# Patient Record
Sex: MALE | Race: WHITE | NOT HISPANIC OR LATINO | Employment: OTHER | URBAN - METROPOLITAN AREA
[De-identification: names, ages, dates, MRNs, and addresses within clinical notes are randomized per-mention and may not be internally consistent; named-entity substitution may affect disease eponyms.]

---

## 2017-01-04 ENCOUNTER — LAB CONVERSION - ENCOUNTER (OUTPATIENT)
Dept: OTHER | Facility: OTHER | Age: 62
End: 2017-01-04

## 2017-01-04 ENCOUNTER — GENERIC CONVERSION - ENCOUNTER (OUTPATIENT)
Dept: OTHER | Facility: OTHER | Age: 62
End: 2017-01-04

## 2017-01-04 LAB
HBA1C MFR BLD HPLC: 6.3 % OF TOTAL HGB
HCT VFR BLD AUTO: 50 % (ref 38.5–50)
HGB BLD-MCNC: 15.7 G/DL (ref 13.2–17.1)
TESTOSTERONE FREE (HISTORICAL): 112.7 PG/ML (ref 35–155)
TESTOSTERONE TOTAL (HISTORICAL): 581 NG/DL (ref 250–1100)

## 2017-03-03 ENCOUNTER — GENERIC CONVERSION - ENCOUNTER (OUTPATIENT)
Dept: OTHER | Facility: OTHER | Age: 62
End: 2017-03-03

## 2017-03-03 LAB
LEFT EYE DIABETIC RETINOPATHY: NORMAL
RIGHT EYE DIABETIC RETINOPATHY: NORMAL

## 2017-03-27 ENCOUNTER — ALLSCRIPTS OFFICE VISIT (OUTPATIENT)
Dept: OTHER | Facility: OTHER | Age: 62
End: 2017-03-27

## 2017-04-03 DIAGNOSIS — E11.9 TYPE 2 DIABETES MELLITUS WITHOUT COMPLICATIONS (HCC): ICD-10-CM

## 2017-05-01 ENCOUNTER — LAB CONVERSION - ENCOUNTER (OUTPATIENT)
Dept: OTHER | Facility: OTHER | Age: 62
End: 2017-05-01

## 2017-05-01 LAB — HBA1C MFR BLD HPLC: 6.2 % OF TOTAL HGB

## 2017-05-02 ENCOUNTER — GENERIC CONVERSION - ENCOUNTER (OUTPATIENT)
Dept: OTHER | Facility: OTHER | Age: 62
End: 2017-05-02

## 2017-06-16 ENCOUNTER — TRANSCRIBE ORDERS (OUTPATIENT)
Dept: ADMINISTRATIVE | Facility: HOSPITAL | Age: 62
End: 2017-06-16

## 2017-06-16 DIAGNOSIS — D35.3 BENIGN NEOPLASM OF PITUITARY GLAND AND CRANIOPHARYNGEAL DUCT (POUCH) (HCC): Primary | ICD-10-CM

## 2017-06-16 DIAGNOSIS — D35.2 BENIGN NEOPLASM OF PITUITARY GLAND AND CRANIOPHARYNGEAL DUCT (POUCH) (HCC): Primary | ICD-10-CM

## 2017-06-19 ENCOUNTER — LAB (OUTPATIENT)
Dept: LAB | Age: 62
End: 2017-06-19
Payer: COMMERCIAL

## 2017-06-19 DIAGNOSIS — D35.2 BENIGN NEOPLASM OF PITUITARY GLAND (HCC): ICD-10-CM

## 2017-06-19 LAB
BUN SERPL-MCNC: 25 MG/DL (ref 5–25)
CREAT SERPL-MCNC: 1.12 MG/DL (ref 0.6–1.3)
GFR SERPL CREATININE-BSD FRML MDRD: >60 ML/MIN/1.73SQ M

## 2017-06-19 PROCEDURE — 36415 COLL VENOUS BLD VENIPUNCTURE: CPT

## 2017-06-19 PROCEDURE — 84520 ASSAY OF UREA NITROGEN: CPT

## 2017-06-19 PROCEDURE — 82565 ASSAY OF CREATININE: CPT

## 2017-06-20 ENCOUNTER — GENERIC CONVERSION - ENCOUNTER (OUTPATIENT)
Dept: OTHER | Facility: OTHER | Age: 62
End: 2017-06-20

## 2017-06-26 DIAGNOSIS — D35.2 BENIGN NEOPLASM OF PITUITARY GLAND (HCC): ICD-10-CM

## 2017-06-28 ENCOUNTER — HOSPITAL ENCOUNTER (OUTPATIENT)
Dept: RADIOLOGY | Facility: HOSPITAL | Age: 62
Discharge: HOME | End: 2017-06-28
Attending: INTERNAL MEDICINE
Payer: COMMERCIAL

## 2017-06-28 DIAGNOSIS — D35.2 BENIGN NEOPLASM OF PITUITARY GLAND AND CRANIOPHARYNGEAL DUCT (POUCH) (HCC): ICD-10-CM

## 2017-06-28 DIAGNOSIS — D35.3 BENIGN NEOPLASM OF PITUITARY GLAND AND CRANIOPHARYNGEAL DUCT (POUCH) (HCC): ICD-10-CM

## 2017-06-28 PROCEDURE — 70553 MRI BRAIN STEM W/O & W/DYE: CPT

## 2017-06-28 PROCEDURE — A9585 GADOBUTROL INJECTION: HCPCS | Performed by: INTERNAL MEDICINE

## 2017-06-28 RX ADMIN — GADOBUTROL 7 ML: 604.72 INJECTION INTRAVENOUS at 18:00

## 2017-07-05 ENCOUNTER — GENERIC CONVERSION - ENCOUNTER (OUTPATIENT)
Dept: OTHER | Facility: OTHER | Age: 62
End: 2017-07-05

## 2017-07-31 ENCOUNTER — ALLSCRIPTS OFFICE VISIT (OUTPATIENT)
Dept: OTHER | Facility: OTHER | Age: 62
End: 2017-07-31

## 2017-08-01 DIAGNOSIS — E11.9 TYPE 2 DIABETES MELLITUS WITHOUT COMPLICATIONS (HCC): ICD-10-CM

## 2017-08-01 DIAGNOSIS — E23.0 HYPOPITUITARISM (HCC): ICD-10-CM

## 2017-08-01 DIAGNOSIS — D35.2 BENIGN NEOPLASM OF PITUITARY GLAND (HCC): ICD-10-CM

## 2017-08-01 DIAGNOSIS — E78.5 HYPERLIPIDEMIA: ICD-10-CM

## 2017-10-16 ENCOUNTER — HOSPITAL ENCOUNTER (OUTPATIENT)
Dept: SLEEP CENTER | Facility: CLINIC | Age: 62
Discharge: HOME | End: 2017-10-16
Payer: COMMERCIAL

## 2017-10-16 ENCOUNTER — TRANSCRIBE ORDERS (OUTPATIENT)
Dept: SLEEP CENTER | Facility: CLINIC | Age: 62
End: 2017-10-16

## 2017-10-16 DIAGNOSIS — G47.33 OSA (OBSTRUCTIVE SLEEP APNEA): ICD-10-CM

## 2017-10-16 DIAGNOSIS — G47.33 OSA (OBSTRUCTIVE SLEEP APNEA): Primary | ICD-10-CM

## 2017-11-02 ENCOUNTER — LAB CONVERSION - ENCOUNTER (OUTPATIENT)
Dept: OTHER | Facility: OTHER | Age: 62
End: 2017-11-02

## 2017-11-02 LAB
A/G RATIO (HISTORICAL): 1.6 (CALC) (ref 1–2.5)
ALBUMIN SERPL BCP-MCNC: 5 G/DL (ref 3.6–5.1)
ALP SERPL-CCNC: 56 U/L (ref 40–115)
ALT SERPL W P-5'-P-CCNC: 34 U/L (ref 9–46)
AST SERPL W P-5'-P-CCNC: 20 U/L (ref 10–35)
BILIRUB SERPL-MCNC: 1.2 MG/DL (ref 0.2–1.2)
BUN SERPL-MCNC: 21 MG/DL (ref 7–25)
BUN/CREA RATIO (HISTORICAL): NORMAL (CALC) (ref 6–22)
CALCIUM SERPL-MCNC: 9.6 MG/DL (ref 8.6–10.3)
CHLORIDE SERPL-SCNC: 101 MMOL/L (ref 98–110)
CHOLEST SERPL-MCNC: 141 MG/DL
CHOLEST/HDLC SERPL: 3.7 (CALC)
CO2 SERPL-SCNC: 28 MMOL/L (ref 20–31)
CREAT SERPL-MCNC: 1.09 MG/DL (ref 0.7–1.25)
CREATININE, RANDOM URINE (HISTORICAL): 18 MG/DL (ref 20–370)
EGFR AFRICAN AMERICAN (HISTORICAL): 83 ML/MIN/1.73M2
EGFR-AMERICAN CALC (HISTORICAL): 72 ML/MIN/1.73M2
GAMMA GLOBULIN (HISTORICAL): 3.1 G/DL (CALC) (ref 1.9–3.7)
GLUCOSE (HISTORICAL): 83 MG/DL (ref 65–99)
HBA1C MFR BLD HPLC: 6 % OF TOTAL HGB
HDLC SERPL-MCNC: 38 MG/DL
LDL CHOLESTEROL (HISTORICAL): 83 MG/DL (CALC)
MAGNESIUM, UR (HISTORICAL): 2.6 MG/DL
MICROALBUMIN/CREATININE RATIO (HISTORICAL): 144 MCG/MG CREAT
NON-HDL-CHOL (CHOL-HDL) (HISTORICAL): 103 MG/DL (CALC)
POTASSIUM SERPL-SCNC: 3.7 MMOL/L (ref 3.5–5.3)
PROLACTIN (HISTORICAL): 5.7 NG/ML (ref 2–18)
PROSTATE SPECIFIC ANTIGEN TOTAL (HISTORICAL): 0.7 NG/ML
SODIUM SERPL-SCNC: 140 MMOL/L (ref 135–146)
T4 FREE SERPL-MCNC: 1 NG/DL (ref 0.8–1.8)
TESTOSTERONE FREE (HISTORICAL): 155.9 PG/ML (ref 35–155)
TESTOSTERONE TOTAL (HISTORICAL): 891 NG/DL (ref 250–1100)
TOTAL PROTEIN (HISTORICAL): 8.1 G/DL (ref 6.1–8.1)
TRIGL SERPL-MCNC: 102 MG/DL
TSH SERPL DL<=0.05 MIU/L-ACNC: 3.11 MIU/L (ref 0.4–4.5)

## 2017-11-06 ENCOUNTER — GENERIC CONVERSION - ENCOUNTER (OUTPATIENT)
Dept: OTHER | Facility: OTHER | Age: 62
End: 2017-11-06

## 2017-11-27 ENCOUNTER — GENERIC CONVERSION - ENCOUNTER (OUTPATIENT)
Dept: OTHER | Facility: OTHER | Age: 62
End: 2017-11-27

## 2017-11-30 ENCOUNTER — ALLSCRIPTS OFFICE VISIT (OUTPATIENT)
Dept: OTHER | Facility: OTHER | Age: 62
End: 2017-11-30

## 2017-12-05 NOTE — PROGRESS NOTES
Assessment    1  DMII (diabetes mellitus, type 2) (250 00) (E11 9)   2  Hypogonadotropic hypogonadism (253 4) (E23 0)   3  Benign essential hypertension (401 1) (I10)   4  Hyperlipidemia (272 4) (E78 5)   5  Pituitary microadenoma (227 3) (D35 2)    Plan  DMII (diabetes mellitus, type 2)    · (1) HEMOGLOBIN A1C; Status:Active; Requested for:94Hbb0654; Perform:[facility]; GRR:07NSR5957;NJVPJBJ; For:DMII (diabetes mellitus, type 2); Ordered By:Juan aSntiago;   · Follow-up visit in 6 months Evaluation and Treatment  Follow-up  Status: Complete   Done: 99ZKO6072   Ordered; For: DMII (diabetes mellitus, type 2); Ordered By: Mirella Kirk Performed:  Due: 04TDO0772; Last Updated By: Harley Lopez; 11/30/2017 11:40:19 AM   · Follow-Up With Advanced Practitioner Evaluation and Treatment  Follow-up  Status:  Complete  Done: 61BHL9412   Ordered; For: DMII (diabetes mellitus, type 2); Ordered By: Mirella Kirk Performed:  Due: 54BOA3729; Last Updated By: Harley Lopez; 11/30/2017 11:40:24 AM  Hypogonadotropic hypogonadism    · (1) TESTOSTERONE, FREE (DIRECT) AND TOTAL; Status:Active; Requested  for:41Fcc2386;    Perform:[facility]; TEL:97TAN7516; Ordered;  For:Hypogonadotropic hypogonadism; Ordered By:Juan Santiago; Discussion/Summary  Discussion Summary:   1  Type 2 diabetes is under good control  Continue current therapy  2  Hypogonadism-the testosterone level was elevated and his dose was decreased  Repeat total and free testosterone level in a few weeks  3  Hypertension-the blood pressure is slightly elevated  He is currently taking decongestants which could be playing a role in this  I have asked him to have a repeat blood pressure done at his primary care office when he presents therefore an EKG required for his cataract surgery  In addition, he is going to check his blood pressure at home as well  4  Hyperlipidemia-continue current therapy     Counseling Documentation With Imm: The patient was counseled regarding diagnostic results, instructions for management, impressions  Medication SE Review and Pt Understands Tx: The treatment plan was reviewed with the patient/guardian  The patient/guardian understands and agrees with the treatment plan      Chief Complaint  Chief Complaint Free Text Note Form: Follow up      History of Present Illness  Diabetes: The patient is being seen for routine follow-up of Diabetes Mellitus 2  The HbA1c was 6% performed on   See Medication List for current medication(s)  See Medication List for dosage(s)  By report, there is good compliance with treatment, good tolerance of treatment and good symptom control  There are no known contributing risk factors or pertinent lifestyle factors  The patient is currently asymptomatic   Disease Course and Complications:   Renal: microalbuminuria  Pituitary Adenoma: The patient is being seen for follow-up of a pituitary adenoma  The adenoma has been visualized on pituitary MRI  The patient is currently asymptomatic  Current treatment includes testosterone  By report, there is good compliance with treatment, good tolerance of treatment and good symptom control  Hyperlipidemia (Follow-Up): The patient states his hyperlipidemia has been under good control since the last visit  Comorbid Illnesses: diabetes mellitus and hypertension  He has no significant interval events  Symptoms: The patient is currently asymptomatic  Associated symptoms include no focal neurologic deficits  Hypertension (Follow-Up): The patient presents for follow-up of essential hypertension  The patient states he has been stable with his blood pressure control since the last visit  He has no comorbid illnesses  He has no significant interval events  Symptoms: The patient is currently asymptomatic  Associated symptoms include no headache        Review of Systems  Endo Adult ROS Male Established v2 - [facility]: Constitutional/General: no recent weight gain, no recent weight loss, no poor energy/fatigue, no increased energy level, no insomnia/sleep problems, no fever and no feeling weak  Heart: no high blood pressure, no chest pain/tightness, no rapid/racing heart rate and no palpitations  Genitourinary - Urinary no frequent urination, no excess urination and no urinating during the night  Eyes: no blurred vision, no double vision, no bulging eyes, no gritty/scratchy eyes and no excessive tearing  Mouth / Throat: no hoarseness and no difficulty swallowing  Neck: no lumps, no swollen glands, no neck pain, no neck stiffness and no enlarged thyroid  Respiratory: no wheezing, no asthma and no persistent cough  Musculoskeletal: no muscle aches/pain, no joint aches/pain and no muscle weakness  Skin & Hair: no dry skin, no acne, the hair texture was not oily, no hair loss and no excessive hair growth  Gastrointestinal: no constipation, no diarrhea, no waking at night to drink and no stomach ache  Neurological: no blackouts, no weakness and no tremors  Genital: no testicular pain and no testicular lumps/bumps/mass  Endocrine: no feeling hot frequently, no feeling cold frequently, no shifts between feeling hot and cold, no cold hands or feet, no excessive sweating, no thyroid problems, no blood sugar problems, no excessive thirst, no excessive hunger, no change in shoe size, no nausea or vomiting and no shaky hands  ROS Reviewed:   ROS reviewed  Active Problems    1  Ankle pain, unspecified laterality   2  Arthritis (716 90) (M19 90)   3  Benign essential hypertension (401 1) (I10)   4  Carpal tunnel syndrome (354 0) (G56 00)   5  Cervicalgia (723 1) (M54 2)   6  Closed nondisplaced pilon fracture of left tibia with routine healing (V54 16) (S82 875D)   7  DMII (diabetes mellitus, type 2) (250 00) (E11 9)   8  Hyperlipidemia (272 4) (E78 5)   9  Hypogonadotropic hypogonadism (253 4) (E23 0)   10  Nephrolithiasis (592 0) (N20 0)   11  Other closed fracture of distal end of left radius, initial encounter (813 42) (S52 592A)   12  Other closed fracture of proximal end of right fibula, initial encounter (823 01) (S82 831A)   13  Pilon fracture, right, closed, initial encounter   14  Pituitary microadenoma (227 3) (D35 2)   15  Seborrheic dermatitis (690 10) (L21 9)   16  Sleep apnea (780 57) (G47 30)   17  Special screening examination for neoplasm of prostate (V76 44) (Z12 5)   18  Spondylosis of cervical region without myelopathy or radiculopathy (721 0) (M47 812)   19  Sprain Of The Anterior Talofibular Ligament (845 02)   20  Vitamin D deficiency (268 9) (E55 9)   21  Wrist pain (719 43) (M25 539)    Past Medical History    1  History of Diabetes Mellitus (250 00)  Active Problems And Past Medical History Reviewed: The active problems and past medical history were reviewed and updated today  Surgical History    1  History of Tonsillectomy   2  History of Urological Procedures Renal Cyst Aspiration  Surgical History Reviewed: The surgical history was reviewed and updated today  Family History  Mother    1  Family history of Diabetes Mellitus (V18 0)   2  Family history of Heart Disease (V17 49)   3  Family history of Hypertension (V17 49)   4  Family history of Nephrolithiasis   5  Family history of Renal Disease  Father    6  Family history of Diabetes Mellitus (V18 0)   7  Family history of Heart Disease (V17 49)  Sister    8  Family history of Diabetes Mellitus (V18 0)   9  Family history of Heart Disease (V17 49)   10  Family history of Hypertension (V17 49)   11  Family history of Stroke Syndrome (V17 1)  Brother    15  Family history of Diabetes Mellitus (V18 0)   13  Family history of Diabetes Mellitus (V18 0)   14  Family history of Diabetes Mellitus (V18 0)   15  Family history of Diabetes Mellitus (V18 0)   16  Family history of Heart Disease (V17 49)   17   Family history of Hypertension (V17 49)   18  Family history of Nephrolithiasis  Family History Reviewed: The family history was reviewed and updated today  Social History    · Being A Social Drinker   · Denied: History of Drug Use   · Former smoker (L19 87) (U42 152)  Social History Reviewed: The social history was reviewed and updated today  The social history was reviewed and is unchanged  Current Meds   1  Amlodipine Besylate-Valsartan 5-320 MG Oral Tablet; Take 1 tablet daily; Therapy: 12Sep2017 to (Evaluate:07Sep2018)  Requested for: 02Sdj1174; Last   Rx:12Sep2017 Ordered   2  Apidra SoloStar 100 UNIT/ML Subcutaneous Solution Pen-injector; Use up to 20 units   before meals as directed (has been taking only at breakfast)  Requested for:   07Aug2017; Last Rx:56Dkq9481 Ordered   3  Aspirin EC 81 MG Oral Tablet Delayed Release Recorded   4  BD Pen Needle Harriett U/F 32G X 4 MM Miscellaneous; Uset 4/ a day; Therapy: 13BEA3405 to (Evaluate:45Liq8826)  Requested for: 22LHB8265; Last   Rx:27Qld8581 Ordered   5  BD Syringe/Needle 23G X 1" 3 ML Miscellaneous; Use once a week; Therapy: 87Soc3760 to (Evaluate:72Qny5163)  Requested for: 09OVO6934; Last   Rx:27Ank7402 Ordered   6  GlyBURIDE 5 MG Oral Tablet; take 2 tablets in AM and 1 tablet in PM;   Therapy: 05Bou4964 to (Evaluate:11Jun2018)  Requested for: 20Jun2017; Last   Rx:16Jun2017 Ordered   7  GNP Loratadine 10 MG Oral Tablet Recorded   8  Ibuprofen 200 MG Oral Tablet Recorded   9  Janumet  MG Oral Tablet; TAKE 1 TABLET TWICE DAILY WITH MEALS; Therapy: 23Rwt4727 to (Evaluate:80Lre7205)  Requested for: 27ACQ6469; Last   Rx:56Rbw5080 Ordered   10  Jardiance 25 MG Oral Tablet; Take 1 tablet daily; Therapy: 31Zsc6742 to (Loco Piper)  Requested for: 27Mar2017; Last    Rx:27Mar2017 Ordered   11  Lantus SoloStar 100 UNIT/ML Subcutaneous Solution Pen-injector; INJECT 30 UNITS      SUBCUTANEOUSLY AT BEDTIME;     Therapy: 75JXC9962 to (Evaluate:28Hqd8643) Requested for: 20Jun2017; Last    Rx:12Jun2017 Ordered   12  Lisinopril 40 MG Oral Tablet; Take 1 tablet daily; Therapy: 00Zqq0998 to (Evaluate:26May2018)  Requested for: 44UXH3867; Last    Rx:34Lvk5275 Ordered   13  Omeprazole 20 MG Oral Capsule Delayed Release Recorded   14  OneTouch Delica Lancets 14B Miscellaneous; Patient testing 3 times daily as directed     Requested for: 20Jan2014; Last Rx:20Jan2014 Ordered   15  OneTouch Verio In [employer]; TEST 3 TIMES DAILY OR AS   DIRECTED; Therapy: 40WXQ6244 to (Evaluate:22Mar2018)  Requested for: 52VRW7064; Last    Rx:27Mar2017 Ordered   16  Osteo Bi-Flex Regular Strength TABS Recorded   17  Probiotic Oral Capsule Recorded   18  Simvastatin 20 MG Oral Tablet; TAKE 1 TABLET AT BEDTIME; Therapy: 72Gny9999 to (Evaluate:11Jun2018)  Requested for: 20Jun2017; Last    Rx:16Jun2017 Ordered   19  Testosterone Cypionate 200 MG/ML Intramuscular Solution; Inject  0 4mL weekly as    directed by physician; Therapy: 79Ujn8109 to (Evaluate:96Rtv9047); Last Rx:14Jun2017 Ordered   20  Vitamin B-1 100 MG Oral Tablet; TAKE 1 TABLET DAILY; Therapy: 24OLN8868 to Recorded   21  Vitamin D3 1000 UNIT Oral Capsule; TAKE AS DIRECTED; Therapy: 08RBP3565 to Recorded   22  Vitamin E 100 UNIT Oral Capsule; Take 1 in morning, take 1 in evening; Therapy: 18JSS8409 to Recorded  Medication List Reviewed: The medication list was reviewed and updated today  Allergies    1  Augmentin TABS   2  Biaxin TABS   3  NovoLOG SOLN    4  Dust   5  Mold   6  Peanuts    Vitals  Vital Signs    Recorded: 90AAQ6342 11:23AM   Heart Rate 84   Systolic 407   Diastolic 80   Height 5 ft 3 in   Weight 162 lb 3 04 oz   BMI Calculated 28 73   BSA Calculated 1 77     Physical Exam    Constitutional   General appearance: No acute distress, well appearing and well nourished  Eyes   Conjunctiva and lids: No swelling, erythema, or discharge  Pupils: Equal, round and reactive to light   The sclera are anicteric  Extraocular movements are intact  Ears, Nose, Mouth, and Throat   External inspection of ears, nose and lips: Normal     Oropharynx: Normal with no erythema, edema, exudate or lesions  Exam of Head: The head is atraumatic and normocephalic  Neck: The neck is supple  The thyroid is normal in size with no palpable nodules  Pulmonary   Auscultation of lungs: Clear to auscultation bilaterally with normal chest expansion  Cardiovascular   Auscultation of heart: Normal rate and rhythm with no murmurs, gallops or rubs  Abdomen   Abdomen: Abdomen is soft, non-tender with normal bowel sounds  Lymphatic   Palpation of lymph nodes: No supraclavicular or suboccipital lymphadenopathy  Musculoskeletal   Inspection/palpation of joints, bones, and muscles: Muscle bulk and tone is normal     Skin   Skin and subcutaneous tissue: Normal skin temperature and color  Neurologic   Reflexes: 2+ and symmetric  Motor Strength: Strength is 5/5 bilaterally  Psychiatric   Orientation to person, place and time: Normal     Mood and affect: Affect and attention span are normal        Results/Data  (1) LIPID PANEL, FASTING 28Oct2017 09:25AM Daiana Odor     Test Name Result Flag Reference   CHOLESTEROL, TOTAL 141 mg/dL  <200   HDL CHOLESTEROL 38 mg/dL L >40   TRIGLICERIDES 189 mg/dL  <150   LDL-CHOLESTEROL 83 mg/dL (calc)     Reference range: <100     Desirable range <100 mg/dL for patients with CHD or  diabetes and <70 mg/dL for diabetic patients with  known heart disease  LDL-C is now calculated using the Phil-Webb   calculation, which is a validated novel method providing   better accuracy than the Friedewald equation in the   estimation of LDL-C  Marcio Gill  [facility]  4963;312(39): [phone]   (http://[employer]/faq/IAN667)   CHOL/HDLC RATIO 3 7 (calc)  <5 0   NON HDL CHOLESTEROL 103 mg/dL (calc)  <130   For patients with diabetes plus 1 major ASCVD risk factor, treating to a non-HDL-C goal of <100 mg/dL   (LDL-C of <70 mg/dL) is considered a therapeutic   option  (Q) MICROALBUMIN, RANDOM URINE (W/CREATININE) 28Oct2017 09:25AM Lizbeth Rhea     Test Name Result Flag Reference   CREATININE, RANDOM URINE 18 mg/dL L    MICROALBUMIN 2 6 mg/dL     Reference Range  Not established   MICROALBUMIN/CREATININE$RATIO, RANDOM URINE 144 mcg/mg creat H <30   The ADA defines abnormalities in albumin  excretion as follows:     Category         Result (mcg/mg creatinine)     Normal                    <30  Microalbuminuria            Clinical albuminuria   > OR = 300     The ADA recommends that at least two of three  specimens collected within a 3-6 month period be  abnormal before considering a patient to be  within a diagnostic category  (1) COMPREHENSIVE METABOLIC PANEL 48MRT9709 93:79FG [facility]     Test Name Result Flag Reference   GLUCOSE 83 mg/dL  65-99   Fasting reference interval   UREA NITROGEN (BUN) 21 mg/dL  7-25   CREATININE 1 09 mg/dL  0 70-1 25   For patients >52years of age, the reference limit  for Creatinine is approximately 13% higher for people  identified as -American  eGFR NON-AFR   AMERICAN 72 mL/min/1 73m2  > OR = 60   eGFR AFRICAN AMERICAN 83 mL/min/1 73m2  > OR = 60   BUN/CREATININE RATIO   4-43   NOT APPLICABLE (calc)   SODIUM 140 mmol/L  135-146   POTASSIUM 3 7 mmol/L  3 5-5 3   CHLORIDE 101 mmol/L     CARBON DIOXIDE 28 mmol/L  20-31   CALCIUM 9 6 mg/dL  8 6-10 3   PROTEIN, TOTAL 8 1 g/dL  6 1-8 1   ALBUMIN 5 0 g/dL  3 6-5 1   GLOBULIN 3 1 g/dL (calc)  1 9-3 7   ALBUMIN/GLOBULIN RATIO 1 6 (calc)  1 0-2 5   BILIRUBIN, TOTAL 1 2 mg/dL  0 2-1 2   ALKALINE PHOSPHATASE 56 U/L     AST 20 U/L  10-35   ALT 34 U/L  9-46     (Q) PROLACTIN 28Oct2017 09:25AM Lizbeth Rhea     Test Name Result Flag Reference   PROLACTIN 5 7 ng/mL  2 0-18 0     (1) T4, FREE 28Oct2017 09:25AM [facility]     Test Name Result Flag Reference   T4, FREE 1 0 ng/dL  0 8-1 8     (Q) TSH, 3RD GENERATION 28Oct2017 09:25AM Jackolyn Dowse     Test Name Result Flag Reference   TSH 3 11 mIU/L  0 40-4 50     (1) PSA (SCREEN) (Dx V76 44 Screen for Prostate Cancer) 96QTX2941 09:25AM Jackolyn Dowse     Test Name Result Flag Reference   PSA, TOTAL 0 7 ng/mL  < OR = 4 0   The total PSA value from this assay system is   standardized against the WHO standard  The test   result will be approximately 20% lower when compared   to the equimolar-standardized total PSA (Abund[place]   [place])  Comparison of serial PSA results should be   interpreted with this fact in mind  This test was performed using the Siemens   chemiluminescent method  Values obtained from   different assay methods cannot be used  interchangeably  PSA levels, regardless of  value, should not be interpreted as absolute  evidence of the presence or absence of disease  (Q) TESTOSTERONE, FREE AND TOTAL, LC/MS/MS 28Oct2017 09:25AM Jackolyn Dowse     Test Name Result Flag Reference   TESTOSTERONE, TOTAL,$LC/MS/ ng/dL  250-1100   Men with clinically significant hypogonadal  symptoms and testosterone values repeatedly in  the range of the 200-300 ng/dL or less, may  benefit from testosterone treatment after  adequate risk and benefits counseling  For more information on this test, go to  http://[employer]/faq/  TotalTestosteroneLCMSMS        This test was developed and its analytical performance  characteristics have been determined by [address] [place] [address]  It has  not been cleared or approved by the [place]  Food and Drug  Administration  This assay has been validated pursuant  to the CLIA regulations and is used for clinical  purposes     FREE TESTOSTERONE 155 9 pg/mL H 35 0-155 0   This test was developed and its analytical performance  characteristics have been determined by [facility] [place], [place]  It has  not been cleared or approved by the [place]  Food and Drug  Administration  This assay has been validated pursuant  to the CLIA regulations and is used for clinical  purposes  * MRI BRAIN PITUITARY WO AND W CONTRAST [id number] 04:49PM Devon Cantrell     Test Name Result Flag Reference   MRI BRAIN PITUITARY WO AND W CONTRAST (Report)     This is a summary report  The complete report is available in the patient's medical record  If you cannot access the medical record, please contact the sending organization for a detailed fax or copy  MRI BRAIN AND SELLA WITH AND WITHOUT CONTRAST     INDICATION: 43-year-old male, pituitary microadenoma, follow-up     COMPARISON: 10/1/2015 MRI     TECHNIQUE:   Brain: Sagittal T1, axial T2  Axial FLAIR  Axial T1, Axial Aurelia, Axial DWI  Axial T1 post contrast   Axial BRAVO post contrast       Sella: Sagittal T1, Coronal T1 pre-and-post contrast, coronal post contrast dynamic imaging  Coronal T2  Sagittal T1 post contrast    Targeted images of the sella were performed requiring additional time at acquisition and interpretation of approximately 25%     IV Contrast: 7 mL of gadobutrol injection (MULTI-DOSE)      IMAGE QUALITY: Diagnostic  FINDINGS:     BRAIN PARENCHYMA: There is no discrete mass, mass effect or midline shift  No abnormal white matter signal identified  Brainstem and cerebellum demonstrate normal signal  There is no intracranial hemorrhage  There is no evidence of acute infarction and   diffusion imaging is unremarkable  Postcontrast imaging is normal      VENTRICLES: Normal      SELLA AND PITUITARY GLAND: A faint region of diminished enhancement is present within the left side of the pituitary gland measuring approximately 3-4 mm suspicious for microadenoma, essentially unchanged  ORBITS: Normal      PARANASAL SINUSES: Normal      VASCULATURE: Evaluation of the major intracranial vasculature demonstrates appropriate flow voids  CALVARIUM AND SKULL BASE: Normal      EXTRACRANIAL SOFT TISSUES: Normal        IMPRESSION:   Persistent small region of diminished enhancement left-sided pituitary gland consistent with 3-4 mm microadenoma, unchanged     No significant additional intracranial abnormalities,      Stable appearance           Workstation performed: [id number]     Signed by:   Rita Guillory MD   6/29/17     Future Appointments    Date/Time Provider Specialty Site   12/14/2017 04:15 PM Roderick Santiago MD Internal Medicine [facility]   04/16/2018 05:30 PM Roderick Santiago MD Internal Medicine [facility]   05/31/2018 05:00 PM Harrison Wallis, [facility] Endocrinology [facility] ENDOCRINOLOGY     Signatures   Electronically signed by : LOBITO Santiago ; Nov 30 2017 11:49AM EST                       (Author)

## 2017-12-14 ENCOUNTER — GENERIC CONVERSION - ENCOUNTER (OUTPATIENT)
Dept: INTERNAL MEDICINE CLINIC | Age: 62
End: 2017-12-14

## 2017-12-14 ENCOUNTER — ALLSCRIPTS OFFICE VISIT (OUTPATIENT)
Dept: OTHER | Facility: OTHER | Age: 62
End: 2017-12-14

## 2017-12-21 DIAGNOSIS — E23.0 HYPOPITUITARISM (HCC): ICD-10-CM

## 2018-01-03 ENCOUNTER — LAB CONVERSION - ENCOUNTER (OUTPATIENT)
Dept: OTHER | Facility: OTHER | Age: 63
End: 2018-01-03

## 2018-01-03 LAB
TESTOSTERONE FREE (HISTORICAL): 97.2 PG/ML (ref 35–155)
TESTOSTERONE TOTAL (HISTORICAL): 675 NG/DL (ref 250–1100)

## 2018-01-04 ENCOUNTER — GENERIC CONVERSION - ENCOUNTER (OUTPATIENT)
Dept: OTHER | Facility: OTHER | Age: 63
End: 2018-01-04

## 2018-01-09 NOTE — RESULT NOTES
Discussion/Summary   Testosterone levels slightly high-- what day did he take injection and what day did he go for blood test? may need slight dose reduction  Rest of labs OK   diabetes under good control  Verified Results  (1) LIPID PANEL, FASTING 28Oct2017 09:25AM Kristina Haridng     Test Name Result Flag Reference   CHOLESTEROL, TOTAL 141 mg/dL  <200   HDL CHOLESTEROL 38 mg/dL L >55   TRIGLICERIDES 933 mg/dL  <150   LDL-CHOLESTEROL 83 mg/dL (calc)     Reference range: <100     Desirable range <100 mg/dL for patients with CHD or  diabetes and <70 mg/dL for diabetic patients with  known heart disease  LDL-C is now calculated using the Phil-Webb   calculation, which is a validated novel method providing   better accuracy than the Friedewald equation in the   estimation of LDL-C  Mallory Thompsonteodoro Cano  2021;291(24): 7374-0353   (http://[employer]/faq/MJH505)   CHOL/HDLC RATIO 3 7 (calc)  <5 0   NON HDL CHOLESTEROL 103 mg/dL (calc)  <130   For patients with diabetes plus 1 major ASCVD risk   factor, treating to a non-HDL-C goal of <100 mg/dL   (LDL-C of <70 mg/dL) is considered a therapeutic   option  (Q) MICROALBUMIN, RANDOM URINE (W/CREATININE) 28Oct2017 09:25AM Kristina Harding     Test Name Result Flag Reference   CREATININE, RANDOM URINE 18 mg/dL L    MICROALBUMIN 2 6 mg/dL     Reference Range  Not established   MICROALBUMIN/CREATININE$RATIO, RANDOM URINE 144 mcg/mg creat H <30   The ADA defines abnormalities in albumin  excretion as follows:     Category         Result (mcg/mg creatinine)     Normal                    <30  Microalbuminuria            Clinical albuminuria   > OR = 300     The ADA recommends that at least two of three  specimens collected within a 3-6 month period be  abnormal before considering a patient to be  within a diagnostic category       (1) COMPREHENSIVE METABOLIC PANEL [id number] 68:66NW Kristina Harding     Test Name Result Flag Reference   GLUCOSE 83 mg/dL  65-99   Fasting reference interval   UREA NITROGEN (BUN) 21 mg/dL  7-25   CREATININE 1 09 mg/dL  0 70-1 25   For patients >52years of age, the reference limit  for Creatinine is approximately 13% higher for people  identified as -American  eGFR NON-AFR  AMERICAN 72 mL/min/1 73m2  > OR = 60   eGFR AFRICAN AMERICAN 83 mL/min/1 73m2  > OR = 60   BUN/CREATININE RATIO   6-79   NOT APPLICABLE (calc)   SODIUM 140 mmol/L  135-146   POTASSIUM 3 7 mmol/L  3 5-5 3   CHLORIDE 101 mmol/L     CARBON DIOXIDE 28 mmol/L  20-31   CALCIUM 9 6 mg/dL  8 6-10 3   PROTEIN, TOTAL 8 1 g/dL  6 1-8 1   ALBUMIN 5 0 g/dL  3 6-5 1   GLOBULIN 3 1 g/dL (calc)  1 9-3 7   ALBUMIN/GLOBULIN RATIO 1 6 (calc)  1 0-2 5   BILIRUBIN, TOTAL 1 2 mg/dL  0 2-1 2   ALKALINE PHOSPHATASE 56 U/L     AST 20 U/L  10-35   ALT 34 U/L  9-46     (Q) PROLACTIN 28Oct2017 09:25AM Gwinda Dill     Test Name Result Flag Reference   PROLACTIN 5 7 ng/mL  2 0-18 0     (1) T4, FREE 28Oct2017 09:25AM Gwinda Dill     Test Name Result Flag Reference   T4, FREE 1 0 ng/dL  0 8-1 8     (Q) TSH, 3RD GENERATION 28Oct2017 09:25AM Gwinda Dill     Test Name Result Flag Reference   TSH 3 11 mIU/L  0 40-4 50     (1) PSA (SCREEN) (Dx V76 44 Screen for Prostate Cancer) [id number] 09:25AM Gwinda Dill     Test Name Result Flag Reference   PSA, TOTAL 0 7 ng/mL  < OR = 4 0   The total PSA value from this assay system is   standardized against the WHO standard  The test   result will be approximately 20% lower when compared   to the equimolar-standardized total PSA ([place]und[place]   [place])  Comparison of serial PSA results should be   interpreted with this fact in mind  This test was performed using the Siemens   chemiluminescent method  Values obtained from   different assay methods cannot be used  interchangeably   PSA levels, regardless of  value, should not be interpreted as absolute  evidence of the presence or absence of disease  (Q) TESTOSTERONE, FREE AND TOTAL, LC/MS/MS 28Oct2017 09:25 Kecia Merino     Test Name Result Flag Reference   TESTOSTERONE, TOTAL,$LC/MS/ ng/dL  250-1100   Men with clinically significant hypogonadal  symptoms and testosterone values repeatedly in  the range of the 200-300 ng/dL or less, may  benefit from testosterone treatment after  adequate risk and benefits counseling  For more information on this test, go to  http://[employer]/faq/  TotalTestosteroneLCMSMS        This test was developed and its analytical performance  characteristics have been determined by [address] [place] [address]  It has  not been cleared or approved by the [place]  Food and Drug  Administration  This assay has been validated pursuant  to the CLIA regulations and is used for clinical  purposes  FREE TESTOSTERONE 155 9 pg/mL H 35 0-155 0   This test was developed and its analytical performance  characteristics have been determined by [address] [place] [address]  It has  not been cleared or approved by the [place]  Food and Drug  Administration  This assay has been validated pursuant  to the CLIA regulations and is used for clinical  purposes  (Q) HEMOGLOBIN A1c 28Oct2017 09:25 Kecia Merino     Test Name Result Flag Reference   HEMOGLOBIN A1c 6 0 % of total Hgb H <5 7   For someone without known diabetes, a hemoglobin   A1c value between 5 7% and 6 4% is consistent with  prediabetes and should be confirmed with a   follow-up test      For someone with known diabetes, a value <7%  indicates that their diabetes is well controlled  A1c  targets should be individualized based on duration of  diabetes, age, comorbid conditions, and other  considerations  This assay result is consistent with an increased risk  of diabetes       Currently, no consensus exists regarding use of  hemoglobin A1c for diagnosis of diabetes for children

## 2018-01-12 NOTE — RESULT NOTES
Message   Reviewed [facility] patient by phone  He is reporting some higher blood sugars before lunch and he will send log next week  A1C 6 5  very good  + urine microalbumin, can repeat at next visit     Verified Results  (1) [address] 09:01AM VHX     Test Name Result Flag Reference   GLUCOSE 145 mg/dL H 65-99   Fasting reference interval   UREA NITROGEN (BUN) 28 mg/dL H 7-25   CREATININE 1 02 mg/dL  0 70-1 25   For patients >52years of age, the reference limit  for Creatinine is approximately 13% higher for people  identified as -American  eGFR NON-AFR  AMERICAN 78 mL/min/1 73m2  > OR = 60   eGFR AFRICAN AMERICAN 91 mL/min/1 73m2  > OR = 60   BUN/CREATININE RATIO 27 (calc) H 6-22   SODIUM 138 mmol/L  135-146   POTASSIUM 4 2 mmol/L  3 5-5 3   CHLORIDE 103 mmol/L     CARBON DIOXIDE 20 mmol/L  19-30   CALCIUM 9 3 mg/dL  8 6-10 3   We received your handwritten test order for a chemistry  panel containing 8 to 13 analytes  We performed the AMA   defined Basic Metabolic Panel  If this is not what you   intended to order, please contact your local client   immediately so that we can  adjust our billing appropriately  You may also inquire  about alternative or additional testing            (1) T4, FREE [id number] 09:01AM VHX     Test Name Result Flag Reference   T4, FREE 1 0 ng/dL  0 8-1 8     (1) LIPID PANEL, FASTING [id number] 09:01AM VHX     Test Name Result Flag Reference   CHOLESTEROL, TOTAL 117 mg/dL L 125-200   HDL CHOLESTEROL 36 mg/dL L > OR = 40   TRIGLICERIDES 044 mg/dL  <150   LDL-CHOLESTEROL 60 mg/dL (calc)  <130   Desirable range <100 mg/dL for patients with CHD or  diabetes and <70 mg/dL for diabetic patients with  known heart disease  CHOL/HDLC RATIO 3 3 (calc)  < OR = 5 0   NON HDL CHOLESTEROL 81 mg/dL (calc)     Target for non-HDL cholesterol is 30 mg/dL higher than   LDL cholesterol target       (Q) MICROALBUMIN, RANDOM URINE (W/CREATININE) [id number] 09:01AM Niraj Patches     Test Name Result Flag Reference   CREATININE, RANDOM URINE 29 mg/dL     MICROALBUMIN 3 5 mg/dL     Reference Range  Not established   MICROALBUMIN/CREATININE$RATIO, RANDOM URINE 121 mcg/mg creat H <30   The ADA defines abnormalities in albumin  excretion as follows:     Category         Result (mcg/mg creatinine)     Normal                    <30  Microalbuminuria            Clinical albuminuria   > OR = 300     The ADA recommends that at least two of three  specimens collected within a 3-6 month period be  abnormal before considering a patient to be  within a diagnostic category  (Q) TSH, 3RD GENERATION [id number] 09:01AM Niraj Patches     Test Name Result Flag Reference   TSH 2 13 mIU/L  0 40-4 50     (Q) TESTOSTERONE, FREE AND TOTAL, LC/MS/MS [id number] 09:01AM Niraj Patches     Test Name Result Flag Reference   TESTOSTERONE, TOTAL,$LC/MS/ ng/dL  250-1100   Men with clinically significant hypogonadal  symptoms and testosterone values repeatedly in  the range of the 200-300 ng/dL or less, may  benefit from testosterone treatment after  adequate risk and benefits counseling  For more information on this test, go to  http://[employer]/faq/  TotalTestosteroneLCMSMS   FREE TESTOSTERONE 134 8 pg/mL  35 0-155 0     (Q) HEMOGLOBIN A1c [id number] 09:01AM Jessie Torrez   REPORT COMMENT:  FASTING:YES     Test Name Result Flag Reference   HEMOGLOBIN A1c 6 5 % of total Hgb H <5 7   According to ADA guidelines, hemoglobin A1c <7 0%  represents optimal control in non-pregnant diabetic  patients  Different metrics may apply to specific  patient populations  Standards of Medical Care in    Diabetes Care   2013;36:s11-s66     For the purpose of screening for the presence of  diabetes  <5 7%       Consistent with the absence of diabetes  5 7-6 4%    Consistent with increased risk for diabetes              (prediabetes)  >or=6 5%    Consistent with diabetes     This assay result is consistent with diabetes  mellitus  Currently, no consensus exists for use of hemoglobin  A1c for diagnosis of diabetes for children

## 2018-01-12 NOTE — RESULT NOTES
Message   A1C 6 3- Diabetes is under good control  Testosterone levels look good too  Verified Results  (1) HEMATOCRIT, BLOOD [id number] 11:17AM Augusto Ards     Test Name Result Flag Reference   HEMATOCRIT 50 0 %  38 5-50 0     (1) HEMOGLOBIN, BLOOD [id number] 11:17AM Augusto Ards     Test Name Result Flag Reference   HEMOGLOBIN 15 7 g/dL  13 2-17 1     (Q) TESTOSTERONE, FREE AND TOTAL, LC/MS/MS [id number] 11:17AM Augusto Ards     Test Name Result Flag Reference   TESTOSTERONE, TOTAL,$LC/MS/ ng/dL  250-1100   Men with clinically significant hypogonadal  symptoms and testosterone values repeatedly in  the range of the 200-300 ng/dL or less, may  benefit from testosterone treatment after  adequate risk and benefits counseling  For more information on this test, go to  http://[employer]/faq/  TotalTestosteroneLCMSMS   FREE TESTOSTERONE 112 7 pg/mL  35 0-155 0     (Q) HEMOGLOBIN A1c [id number] 11:17AM Jessie Torrez   REPORT COMMENT:  FASTING:NO     Test Name Result Flag Reference   HEMOGLOBIN A1c 6 3 % of total Hgb H <5 7   According to ADA guidelines, hemoglobin A1c <7 0%  represents optimal control in non-pregnant diabetic  patients  Different metrics may apply to specific  patient populations  Standards of Medical Care in    Diabetes Care  2013;36:s11-s66     For the purpose of screening for the presence of  diabetes  <5 7%       Consistent with the absence of diabetes  5 7-6 4%    Consistent with increased risk for diabetes              (prediabetes)  >or=6 5%    Consistent with diabetes     This assay result is consistent with a higher risk  of diabetes  Currently, no consensus exists for use of hemoglobin  A1c for diagnosis of diabetes for children

## 2018-01-13 VITALS
SYSTOLIC BLOOD PRESSURE: 148 MMHG | BODY MASS INDEX: 29.41 KG/M2 | HEART RATE: 82 BPM | WEIGHT: 166.01 LBS | HEIGHT: 63 IN | DIASTOLIC BLOOD PRESSURE: 72 MMHG

## 2018-01-13 VITALS
WEIGHT: 166 LBS | HEIGHT: 63 IN | BODY MASS INDEX: 29.41 KG/M2 | DIASTOLIC BLOOD PRESSURE: 68 MMHG | HEART RATE: 80 BPM | SYSTOLIC BLOOD PRESSURE: 144 MMHG

## 2018-01-13 NOTE — RESULT NOTES
Discussion/Summary   Diabetes is under good control  Verified Results  (Q) HEMOGLOBIN A1c 29Apr2017 10:34AM Juan Santiago   REPORT COMMENT:  FASTING:NO     Test Name Result Flag Reference   HEMOGLOBIN A1c 6 2 % of total Hgb H <5 7   For someone without known diabetes, a hemoglobin   A1c value between 5 7% and 6 4% is consistent with  prediabetes and should be confirmed with a   follow-up test      For someone with known diabetes, a value <7%  indicates that their diabetes is well controlled  A1c  targets should be individualized based on duration of  diabetes, age, comorbid conditions, and other  considerations  This assay result is consistent with an increased risk  of diabetes  Currently, no consensus exists regarding use of  hemoglobin A1c for diagnosis of diabetes for children

## 2018-01-13 NOTE — RESULT NOTES
Message   Please make sure patient keeps upcoming appointment with Dr Jensie Tompkins  Did he ever go for MRI of pituitary? He will need testosterone replacement and possibly Levothyroxine replacement  Verified Results  (1) CBC/PLT/DIFF [id number] 11:00AM [employer]     Test Name Result Flag Reference   WBC COUNT 9 95 Thousand/uL  4 31-10 16   RBC COUNT 5 00 Million/uL  3 88-5 62   HEMOGLOBIN 14 7 g/dL  12 0-17 0   HEMATOCRIT 42 4 %  36 5-49 3   MCV 85 fL  82-98   MCH 29 4 pg  26 8-34 3   MCHC 34 7 g/dL  31 4-37 4   RDW 15 6 % H 11 6-15 1   MPV 10 6 fL  8 9-12 7   PLATELET COUNT 094 Thousands/uL  149-390   nRBC AUTOMATED 0 /100 WBCs     NEUTROPHILS RELATIVE PERCENT 51 %  43-75   LYMPHOCYTES RELATIVE PERCENT 37 %  14-44   MONOCYTES RELATIVE PERCENT 10 %  4-12   EOSINOPHILS RELATIVE PERCENT 2 %  0-6   BASOPHILS RELATIVE PERCENT 0 %  0-1   NEUTROPHILS ABSOLUTE COUNT 5 00 Thousands/µL  1 85-7 62   LYMPHOCYTES ABSOLUTE COUNT 3 66 Thousands/µL  0 60-4 47   MONOCYTES ABSOLUTE COUNT 1 02 Thousand/µL  0 17-1 22   EOSINOPHILS ABSOLUTE COUNT 0 23 Thousand/µL  0 00-0 61   BASOPHILS ABSOLUTE COUNT 0 01 Thousands/µL  0 00-0 10     (1) TSH [id number] 11:00AM June Skinner   Patients undergoing fluorescein dye angiography may retain small amounts of fluorescein in the body for 48-72 hours post procedure  Samples containing fluorescein can produce falsely depressed TSH values  If the patient had this procedure,a specimen should be resubmitted post fluorescein clearance       Test Name Result Flag Reference   TSH 4 230 uIU/mL H 0 358-3 740     (1) PSA, DIAGNOSTIC (FOLLOW-UP) [id number] 11:00AM [employer]     Test Name Result Flag Reference   PSA 0 6 ng/mL  0 0-4 0     (1) T4, FREE [id number] 11:00AM [employer]     Test Name Result Flag Reference   T4,FREE 0 98 ng/dL  0 76-1 46     (1) COMPREHENSIVE METABOLIC PANEL [id number] 77:20VN Kobe Skinner 39 Kidney Disease Education Program recommendations are as follows:  GFR calculation is accurate only with a steady state creatinine  Chronic Kidney disease less than 60 ml/min/1 73 sq  meters  Kidney failure less than 15 ml/min/1 73 sq  meters  Test Name Result Flag Reference   GLUCOSE,RANDM 233 mg/dL H    SODIUM 137 mmol/L  136-145   POTASSIUM 4 4 mmol/L  3 5-5 3   CHLORIDE 101 mmol/L  100-108   CARBON DIOXIDE 28 mmol/L  21-32   ANION GAP (CALC) 8 mmol/L  4-13   BLOOD UREA NITROGEN 24 mg/dL  5-25   CREATININE 1 07 mg/dL  0 60-1 30   Standardized to IDMS reference method   CALCIUM 9 3 mg/dL  8 3-10 1   BILI, TOTAL 0 94 mg/dL  0 20-1 00   ALK PHOSPHATAS 74 U/L     ALT (SGPT) 30 U/L  12-78   AST(SGOT) 15 U/L  5-45   ALBUMIN 4 1 g/dL  3 5-5 0   TOTAL PROTEIN 7 4 g/dL  6 4-8 2   eGFR Non-African American      >60 0 ml/min/1 73sq m     (1) THYROID ANTIBODIES PANEL [id number] 11:00AM [employer]   Performed at:  [address]  [id number]  : Gilbert Carlos MD, Phone:  [phone]     Test Name Result Flag Reference   THY MICROSOM AB 9 IU/mL  0 - 34     (1) THYROID ANTIBODIES PANEL [id number] 11:00AM [employer]   Performed at:  [address]  [id number]  : Gilbert Carlos MD, Phone:  [phone]     Test Name Result Flag Reference   THYROGLOB AB <1 0 IU/mL  0 0 - 0 9   Thyroglobulin Antibody measured by [facility] Methodology     (1) HEMOGLOBIN A1C [id number] 11:00AM [employer]   5 7-6 4% impaired fasting glucose  >=6 5% diagnosis of diabetes    Falsely low levels are seen in conditions linked to short RBC life span-  hemolytic anemia, and splenomegaly  Falsely elevated levels are seen in situations where there is an increased production of RBC- receipt of erythropoietin or blood transfusions  Adopted from ADA-Clinical Practice Recommendations     Test Name Result Flag Reference   HEMOGLOBIN A1C 6 6 % H 4 0-5 6   EST  AVG   GLUCOSE 143 mg/dl       (1) TESTOSTERONE, FREE (DIRECT) AND TOTAL [id number] 11: Latesha Alexander   Performed at:  [address] [facility] [employer] [address]  [id number]  : Tod Farrell MD, Phone:  [phone]     Test Name Result Flag Reference   FREE TESTOSTERONE, DIRECT 4 6 pg/mL L 6 6 - 18 1   COMMENT Comment     Adult male reference interval is based on a population of lean malesup to 36years old     TESTOSTERONE (TOTAL) 273 ng/dL L 348 - 1197       Signatures   Electronically signed by : Monty Zacarias, ; Feb 19 2016 10:07AM EST                       (Author)

## 2018-01-14 VITALS
HEIGHT: 63 IN | HEART RATE: 84 BPM | SYSTOLIC BLOOD PRESSURE: 148 MMHG | DIASTOLIC BLOOD PRESSURE: 80 MMHG | BODY MASS INDEX: 28.74 KG/M2 | WEIGHT: 162.19 LBS

## 2018-01-15 ENCOUNTER — ALLSCRIPTS OFFICE VISIT (OUTPATIENT)
Dept: OTHER | Facility: OTHER | Age: 63
End: 2018-01-15

## 2018-01-15 NOTE — PROGRESS NOTES
Chief Complaint   Patient here for Testosterone injection teaching  Active Problems    1  Ankle pain, unspecified laterality   2  Arthritis (716 90) (M19 90)   3  Benign essential hypertension (401 1) (I10)   4  Carpal tunnel syndrome (354 0) (G56 00)   5  Cervicalgia (723 1) (M54 2)   6  Closed nondisplaced pilon fracture of left tibia with routine healing (V54 16) (S82 875D)   7  Decreased libido (799 81) (R68 82)   8  DMII (diabetes mellitus, type 2) (250 00) (E11 9)   9  Hyperlipidemia (272 4) (E78 5)   10  Hypertension (401 9) (I10)   11  Hypogonadotropic hypogonadism (253 4) (E23 0)   12  Nephrolithiasis (592 0) (N20 0)   13  Other closed fracture of distal end of left radius, initial encounter (813 42) (S52 592A)   14  Other closed fracture of proximal end of right fibula, initial encounter (823 01) (S82 831A)   15  Pilon fracture, right, closed, initial encounter   16  Pituitary microadenoma (227 3) (D35 2)   17  Sleep apnea (780 57) (G47 30)   18  Special screening examination for neoplasm of prostate (V76 44) (Z12 5)   19  Spondylosis of cervical region without myelopathy or radiculopathy (721 0) (M47 812)   20  Sprain Of The Anterior Talofibular Ligament (845 02)   21  Type 2 diabetes mellitus with hyperglycemia (250 00) (E11 65)   22  Vitamin D deficiency (268 9) (E55 9)   23  Wrist pain (719 43) (M25 539)    Current Meds   1  Amlodipine Besylate-Valsartan 5-320 MG Oral Tablet; Take 1 tablet daily; Therapy: 57VAD6401 to (Evaluate:18Feb2017)  Requested for: 66Utl0934; Last   Rx:24Jnc8724 Ordered   2  Androderm 4 MG/24HR Transdermal Patch 24 Hour; APPLY 1 PATCH DAILY AS   DIRECTED; Therapy: 69GHI0625 to (Evaluate:26Mmi3164); Last Rx:41Fxz6289 Ordered   3  Aspirin EC 81 MG Oral Tablet Delayed Release Recorded   4  BD Pen Needle Harriett U/F 32G X 4 MM Miscellaneous; use 1 daily; Therapy: 77CIJ3238 to (Evaluate:04Smx7587)  Requested for: 75UYZ9194; Last   Rx:49Fdf3821 Ordered   5   BD Syringe/Needle 23G X 1" 3 ML Miscellaneous; Use once a week; Therapy: 26Fmf2137 to (Evaluate:16Cjl2953)  Requested for: 12Cro6391; Last   Rx:66Wij2332 Ordered   6  Diazepam 5 MG Oral Tablet; TAKE 1 TABLET 3 TIMES DAILY AS NEEDED; Therapy: 08IBO8472 to (Evaluate:19Nov2015); Last Rx:29Pek5844 Ordered   7  GlyBURIDE 5 MG Oral Tablet; take 2 tablets in AM and 1 tablet in PM;   Therapy: 42Vrz5611 to (Evaluate:01Abq4172)  Requested for: 49Cph7163; Last   Rx:50Wnb9314 Ordered   8  GNP Loratadine 10 MG Oral Tablet Recorded   9  Ibuprofen 200 MG Oral Tablet Recorded   10  Janumet  MG Oral Tablet; TAKE 1 TABLET TWICE DAILY WITH MEALS; Therapy: 07Vva6024 to (Evaluate:75May4894)  Requested for: 98Bup3679; Last    Rx:09Shc6399 Ordered   11  Lantus SoloStar 100 UNIT/ML Subcutaneous Solution Pen-injector; INJECT 20 UNITS               SUBCUTANEOUSLY AT BEDTIME; Therapy: 12OXQ3814 to (Evaluate:69Gyr2377)  Requested for: 52Pjf9614; Last    Rx:82Rxw4618 Ordered   12  Lisinopril 40 MG Oral Tablet; Take 1 tablet daily; Therapy: 56Ngq7554 to (Evaluate:06Yvj1662)  Requested for: 19Tpb8502; Last    Rx:91Bcf8678 Ordered   13  NovoLOG FlexPen 100 UNIT/ML Subcutaneous Solution Pen-injector; 10 units sq tid ac; Therapy: 15GHP4258 to (Evaluate:25Jun2016)  Requested for: 58XMQ6283; Last    Rx:63Gfx9403 Ordered   14  OneTouch Delica Lancets 49A Miscellaneous; Patient testing 3 times daily as directed     Requested for: 20Jan2014; Last Rx:20Jan2014 Ordered   15  OneTouch Verio In [employer]; TEST 3 TIMES DAILY OR AS   DIRECTED; Therapy: 91DXT8857 to (Evaluate:14Apr2017)  Requested for: 19Apr2016; Last    Rx:33Sbw0675 Ordered   16  Osteo Bi-Flex Regular Strength TABS Recorded   17  Oxycodone-Acetaminophen 5-325 MG Oral Tablet; take 1 tablet every 6 hours as    needed; Therapy: 41BNI8314 to (Last Rx:05Nov2015) Ordered   18  Pioglitazone HCl - 30 MG Oral Tablet (Actos); Take 1 tablet daily;     Therapy: 80Qqy4415 to (Evaluate:45Gcl4597)  Requested for: 64Ugr6171; Last    Rx:36Omb9994 Ordered   19  Probiotic Oral Capsule Recorded   20  Simvastatin 20 MG Oral Tablet; TAKE 1 TABLET AT BEDTIME; Therapy: 47Ive9592 to (Evaluate:45Oqk2837)  Requested for: 24Txr5165; Last    Rx:03Dcy0742 Ordered   21  Testosterone Cypionate 200 MG/ML Intramuscular Solution; Inject 100mg (0 5mL)    weekly as directed by physician; Therapy: 83Eci0933 to (Evaluate:11Oct2016); Last Rx:85Oae4786 Ordered   22  Vitamin B-1 100 MG Oral Tablet; TAKE 1 TABLET DAILY; Therapy: 88ZCE9831 to Recorded   23  Vitamin D3 1000 UNIT Oral Capsule; TAKE AS DIRECTED; Therapy: 02ULH6230 to Recorded   24  Vitamin E 100 UNIT Oral Capsule; Take 1 in morning, take 1 in evening; Therapy: 68EBC8598 to Recorded    Allergies    1  Augmentin TABS   2  Biaxin TABS   3  NovoLOG SOLN    Assessment   Patient accompanied by wife  Medication and syringes/needles brought in by patient  Reviewed safe storage and handling of medication/supplies and proper disposal of sharps  Reviewed proper site selection and med/site prep  Reviewed proper IM injection technique  Patient self administered Testosterone Cypionate 50mg IM in right lateral thigh as ordered  Tolerated well  Answered all questions presented  Plan   Repeat weekly on same day of week  Call office with any questions/problems  Have bloodwork drawn midcycle between 6th and 7th doses  (June 4th)  Script given  Verbalized understanding       Future Appointments    Date/Time Provider Specialty Site   05/25/2016 01:00 PM Annetta Whitley [facility] ENDOCRINOLOGY     Signatures   Electronically signed by : LOBITO Heranndez ; Apr 27 2016 10:20AM EST

## 2018-01-16 NOTE — RESULT NOTES
Verified Results  (1) HGB AND HCT, BLOOD 07Apr2016 04:54PM Dee Cid Order Number: [id number]     Order Number: [id number]     Test Name Result Flag Reference   HEMATOCRIT 39 2 %  36 5-49 3   HEMOGLOBIN 13 6 g/dL  12 0-17 0     (1) PSA, DIAGNOSTIC (FOLLOW-UP) 07Apr2016 04:54PM Dee Cid Order Number: [id number]     Order Number: [id number]     Test Name Result Flag Reference   PSA 0 5 ng/mL  0 0-4 0

## 2018-01-16 NOTE — RESULT NOTES
Verified Results  (1) TESTOSTERONE, FREE (DIRECT) AND TOTAL 07Apr2016 04:54PM Geraldine Flower Order Number: [id number]    Performed at:  [address]  [id number]  : Gilbert Carlos MD, Phone:  [phone]     Test Name Result Flag Reference   FREE TESTOSTERONE, DIRECT 3 0 pg/mL L 6 6 - 18 1   COMMENT Comment     Adult male reference interval is based on a population of lean malesup to 36years old     TESTOSTERONE (TOTAL) 183 ng/dL L 348 - 1197

## 2018-01-16 NOTE — RESULT NOTES
Discussion/Summary   Small pituitary adenoma  This appears to be stable  We will continue to monitor over time  Verified Results  * MRI BRAIN PITUITARY WO AND W CONTRAST [id number] 04:49PM Davide Clarke     Test Name Result Flag Reference   MRI BRAIN PITUITARY WO AND W CONTRAST (Report)     This is a summary report  The complete report is available in the patient's medical record  If you cannot access the medical record, please contact the sending organization for a detailed fax or copy  MRI BRAIN AND SELLA WITH AND WITHOUT CONTRAST     INDICATION: 55-year-old male, pituitary microadenoma, follow-up     COMPARISON: 10/1/2015 MRI     TECHNIQUE:   Brain: Sagittal T1, axial T2  Axial FLAIR  Axial T1, Axial [place], Axial DWI  Axial T1 post contrast   Axial BRAVO post contrast       Sella: Sagittal T1, Coronal T1 pre-and-post contrast, coronal post contrast dynamic imaging  Coronal T2  Sagittal T1 post contrast    Targeted images of the sella were performed requiring additional time at acquisition and interpretation of approximately 25%     IV Contrast: 7 mL of gadobutrol injection (MULTI-DOSE)      IMAGE QUALITY: Diagnostic  FINDINGS:     BRAIN PARENCHYMA: There is no discrete mass, mass effect or midline shift  No abnormal white matter signal identified  Brainstem and cerebellum demonstrate normal signal  There is no intracranial hemorrhage  There is no evidence of acute infarction and   diffusion imaging is unremarkable  Postcontrast imaging is normal      VENTRICLES: Normal      SELLA AND PITUITARY GLAND: A faint region of diminished enhancement is present within the left side of the pituitary gland measuring approximately 3-4 mm suspicious for microadenoma, essentially unchanged  ORBITS: Normal      PARANASAL SINUSES: Normal      VASCULATURE: Evaluation of the major intracranial vasculature demonstrates appropriate flow voids       CALVARIUM AND SKULL BASE: Normal      EXTRACRANIAL SOFT TISSUES: Normal        IMPRESSION:   Persistent small region of diminished enhancement left-sided pituitary gland consistent with 3-4 mm microadenoma, unchanged     No significant additional intracranial abnormalities,      Stable appearance           Workstation performed: [id number]     Signed by:   Paula Coats MD   6/29/17

## 2018-01-16 NOTE — RESULT NOTES
Message   The testosterone level is very elevated  Was test done at the right time i e  middle of the cycle or was it done at the time of the injection? Verified Results  (Q) HEMOGLOBIN + HEMATOCRIT [id number] 10:13AM Juan Santiago     Test Name Result Flag Reference   HEMOGLOBIN 14 3 g/dL  13 2-17 1   HEMATOCRIT 44 3 %  38 5-50 0     (Q) TESTOSTERONE, FREE AND TOTAL, LC/MS/MS [id number] 10:13AM Juan Santiago   REPORT COMMENT:  FASTING:NO     Test Name Result Flag Reference   TESTOSTERONE, TOTAL,$LC/MS/MS 4801 ng/dL H 250-1100   Men with clinically significant hypogonadal  symptoms and testosterone values repeatedly in  the range of the 200-300 ng/dL or less, may  benefit from testosterone treatment after  adequate risk and benefits counseling  For more information on this test, go to  http://education  [facility]/faq/  TotalTestosteroneLCMSMS   FREE TESTOSTERONE 263 6 pg/mL H 35 0-155 0

## 2018-01-16 NOTE — PROGRESS NOTES
Assessment   1  Viral upper respiratory tract infection with cough (465 9) (J06 9,B97 89)    Plan   Hypertension    · Amlodipine Besylate-Valsartan 5-320 MG Oral Tablet  Viral upper respiratory tract infection with cough    · Benzonatate 100 MG Oral Capsule; TAKE 1 CAPSULE 3 TIMES DAILY AS NEEDED   · Fluticasone Propionate 50 MCG/ACT Nasal Suspension; INHALE 1 SPRAY Twice    daily in each nostril   · ProAir  (90 Base) MCG/ACT Inhalation Aerosol Solution; INHALE 1 TO 2    PUFFS EVERY 4 TO 6 HOURS AS NEEDED    Discussion/Summary      This illness appears viral in nature  using proair inhaler for cough and mild wheezing  tessalon perles for cough during the day  flonase nasal spray to open sinuses  Mucinex DM for cough and to thin secretions plenty of fluids and get rest next week if symptoms are not improving  The patient was counseled regarding diagnostic results,-- instructions for management,-- risk factor reductions,-- prognosis,-- patient and family education,-- impressions,-- risks and benefits of treatment options,-- importance of compliance with treatment  Possible side effects of new medications were reviewed with the patient/guardian today  The treatment plan was reviewed with the patient/guardian  The patient/guardian understands and agrees with the treatment plan      Chief Complaint   pt  presents for cough with mucous, headache and fever  Started last 1/11/18, has been getting progressively worse  pt  has been taking OTC Meds  History of Present Illness   HPI: Patient presents for an acute visit  He reports that starting on 1/11/18, he began to cough after having cataract surgery  His cough has worsened significantly over the weekend  He is also experiencing a headache, fevers, chills, but denies N/V/D  This morning, he took ibuprofen, which has lowered his fever today  He reports his tmax is 99 8, with a baseline temp of 96 3 per the patient        Review of Systems Constitutional: fever,-- feeling poorly,-- chills-- and-- feeling tired  ENT: sore throat,-- nasal discharge-- and-- hoarseness, but-- no earache-- and-- no nosebleeds  Cardiovascular: no chest pain  Respiratory: cough-- and-- wheezing, but-- no shortness of breath  Gastrointestinal: no complaints of abdominal pain, no constipation, no nausea or vomiting, no diarrhea or bloody stools  Integumentary: no rashes  Neurological: headache  Active Problems   1  Ankle pain, unspecified laterality   2  Arthritis (716 90) (M19 90)   3  Benign essential hypertension (401 1) (I10)   4  Carpal tunnel syndrome (354 0) (G56 00)   5  Cervicalgia (723 1) (M54 2)   6  Closed nondisplaced pilon fracture of left tibia with routine healing (V54 16) (S82 875D)   7  DMII (diabetes mellitus, type 2) (250 00) (E11 9)   8  Hyperlipidemia (272 4) (E78 5)   9  Hypogonadotropic hypogonadism (253 4) (E23 0)   10  Nephrolithiasis (592 0) (N20 0)   11  Other closed fracture of distal end of left radius, initial encounter (813 42) (S52 592A)   12  Other closed fracture of proximal end of right fibula, initial encounter (823 01) (S82 831A)   13  Pilon fracture, right, closed, initial encounter   14  Pituitary microadenoma (227 3) (D35 2)   15  Pre-op examination (V72 84) (Z01 818)   16  Seborrheic dermatitis (690 10) (L21 9)   17  Sleep apnea (780 57) (G47 30)   18  Spondylosis of cervical region without myelopathy or radiculopathy (721 0) (M47 812)   19  Sprain Of The Anterior Talofibular Ligament (845 02)   20  Vitamin D deficiency (268 9) (E55 9)   21  Wrist pain (719 43) (M25 539)    Past Medical History   Active Problems And Past Medical History Reviewed: The active problems and past medical history were reviewed and updated today  Surgical History   Surgical History Reviewed: The surgical history was reviewed and updated today         Social History    · Being A Social Drinker   · Denied: History of Drug Use   · Former smoker (H59 33) (F54 899)  The social history was reviewed and updated today  The social history was reviewed and is unchanged  Family History   Family History Reviewed: The family history was reviewed and updated today  Current Meds    1  Amlodipine Besylate-Valsartan  MG Oral Tablet; TAKE 1 TABLET DAILY; Therapy: (Recorded:15Jan2018) to Recorded   2  Amlodipine Besylate-Valsartan 5-320 MG Oral Tablet; Take 1 tablet daily; Therapy: 98Dhi5051 to (Evaluate:33Flt0776)  Requested for: 13Iov6676; Last     Rx:12Sep2017 Ordered   3  Apidra SoloStar 100 UNIT/ML Subcutaneous Solution Pen-injector; up to 25 units before     meals as directed; Therapy: (Recorded:77Hwi4923) to Recorded   4  Aspirin EC 81 MG Oral Tablet Delayed Release Recorded   5  BD Pen Needle Harriett U/F 32G X 4 MM Miscellaneous; Uset 4/ a day; Therapy: 60EJZ0081 to (Evaluate:17Zbg9264)  Requested for: 96KWC8299; Last     Rx:84Vrc3909 Ordered   6  BD Syringe/Needle 23G X 1 3 ML Miscellaneous; Use once a week; Therapy: 14Apr2016 to (Evaluate:04Pkn1225)  Requested for: 79UYT4328; Last     Rx:94Ykm2903 Ordered   7  GlyBURIDE 5 MG Oral Tablet; take 2 tablets in AM and 1 tablet in PM;     Therapy: 78Teg0150 to (Evaluate:11Jun2018)  Requested for: 20Jun2017; Last     Rx:16Jun2017 Ordered   8  GNP Loratadine 10 MG Oral Tablet Recorded   9  Ibuprofen 200 MG Oral Tablet; Take 1-3 every 4-6 hours as needed; Therapy: (Recorded:15Jan2018) to Recorded   10  Janumet  MG Oral Tablet; TAKE 1 TABLET TWICE DAILY WITH MEALS; Therapy: 40Tyb0394 to (Evaluate:56Cmg3517)  Requested for: 96SNI5125; Last      Rx:63Nqk1877 Ordered   11  Jardiance 25 MG Oral Tablet; Take 1 tablet daily; Therapy: 30Hpm9907 to (Darcie Franco)  Requested for: 27Mar2017; Last      Rx:27Mar2017 Ordered   12   Ketorolac Tromethamine 0 4 % Ophthalmic Solution; four times a day in Left eye and      three times a day in Right eye;      Therapy: (Recorded:15Jan2018) to Recorded   13  Lantus SoloStar 100 UNIT/ML Subcutaneous Solution Pen-injector; INJECT 30 UNITS        SUBCUTANEOUSLY AT BEDTIME; Therapy: 70HLN3922 to (Evaluate:31Ghj8920)  Requested for: 20Jun2017; Last      Rx:12Jun2017 Ordered   14  Lisinopril 40 MG Oral Tablet; Take 1 tablet daily; Therapy: 73Xgf2825 to (Evaluate:26May2018)  Requested for: 46HIF7261; Last      Rx:31May2017 Ordered   15  Ofloxacin 0 3 % Ophthalmic Solution; INSTILL 1 DROP IN LEFT EYE 4 TIMES A DAY; Therapy: (Recorded:15Jan2018) to Recorded   16  Omeprazole 20 MG Oral Capsule Delayed Release; take 1 capsule daily; Therapy: (Recorded:15Jan2018) to Recorded   17  OneTouch Delica Lancets 79E Miscellaneous; Patient testing 3 times daily as directed       Requested for: 20Jan2014; Last Rx:20Jan2014 Ordered   18  OneTouch Verio In [employer]; TEST 3 TIMES DAILY OR AS   DIRECTED; Therapy: 13CJZ5714 to (Penny Lynch)  Requested for: 20UQL7070; Last      Rx:27Mar2017 Ordered   19  Osteo Bi-Flex Regular Strength TABS; Take 1 tablet twice daily; Therapy: (Recorded:15Jan2018) to Recorded   20  PrednisoLONE Acetate 1 % Ophthalmic Suspension; four times a day in Left eye and      one time a day in Right eye; Therapy: (Recorded:15Jan2018) to Recorded   21  Probiotic Oral Capsule; USE AS DIRECTED; Therapy: (Recorded:15Jan2018) to Recorded   22  Simvastatin 20 MG Oral Tablet; TAKE 1 TABLET AT BEDTIME; Therapy: 93Nej5718 to (Evaluate:11Jun2018)  Requested for: 20Jun2017; Last      Rx:16Jun2017 Ordered   23  Testosterone Cypionate 200 MG/ML Intramuscular Solution; INJECT 0 3 ML Weekly; Therapy: (Recorded:29Prw8026) to Recorded   24  Vitamin B-1 100 MG Oral Tablet; TAKE 1 TABLET DAILY; Therapy: 70CSR8830 to Recorded   25  Vitamin D3 1000 UNIT Oral Capsule; TAKE AS DIRECTED; Therapy: 40EEV7518 to Recorded   26   Vitamin E 400 UNIT Oral Capsule; 1 capsule in AM 1 capsule in PM;      Therapy: 43UXX7224 to Recorded     The medication list was reviewed and updated today  Allergies   1  Augmentin TABS   2  Biaxin TABS   3  NovoLOG SOLN  4  Dust   5  Mold   6  Peanuts    Vitals    Recorded: 52FIQ0037 01:51PM   Temperature 97 9 F, Tympanic   Heart Rate 91   Systolic 644, LUE, Sitting   Diastolic 70, LUE, Sitting   Height 5 ft 3 in   Weight 160 lb 6 oz   BMI Calculated 28 41   BSA Calculated 1 76   O2 Saturation 97, RA     Physical Exam        Constitutional      General appearance: No acute distress, well appearing and well nourished  Eyes wearing dark glasses s/p cataract surgery  Ears, Nose, Mouth, and Throat      External inspection of ears and nose: Normal        Otoscopic examination: Tympanic membrance translucent with normal light reflex  Canals patent without erythema  Nasal mucosa, septum, and turbinates: Normal without edema or erythema  -- clear nasal discharge  Oropharynx: Normal with no erythema, edema, exudate or lesions  Pulmonary      Respiratory effort: No increased work of breathing or signs of respiratory distress  Auscultation of lungs: Clear to auscultation, equal breath sounds bilaterally, no wheezes, no rales, no rhonci  Cardiovascular      Auscultation of heart: Normal rate and rhythm, normal S1 and S2, without murmurs  Examination of extremities for edema and/or varicosities: Normal        Lymphatic      Palpation of lymph nodes in neck: Abnormal   bilateral anterior cervical node enlargement        Musculoskeletal      Gait and station: Normal        Psychiatric      Orientation to person, place and time: Normal        Mood and affect: Normal           Future Appointments      Date/Time Provider Specialty Site   04/16/2018 05:30 PM Ramy Alvares MD Internal Medicine [facility]   05/31/2018 05:00 PM Selwyn Pineda [facility] Endocrinology [facility] ENDOCRINOLOGY   01/22/2018 10:30 AM Jada Desir Kimberly Pepe DO Pain Management [facility] SPINE     Signatures    Electronically signed by : Jeffery Ye; Guanaco 15 2018  2:10PM EST                       (Author)     Electronically signed by : Darrin Hirsch DO; Guanaco 15 2018  5:30PM EST

## 2018-01-17 ENCOUNTER — GENERIC CONVERSION - ENCOUNTER (OUTPATIENT)
Dept: OTHER | Facility: OTHER | Age: 63
End: 2018-01-17

## 2018-01-17 NOTE — RESULT NOTES
Discussion/Summary   Normal     Verified Results  (1) CREATININE 14QDP5505 05:15PM Leelee Mary Order Number: [id number]     Test Name Result Flag Reference   CREATININE 1 12 mg/dL  0 60-1 30   Standardized to IDMS reference method   eGFR Non-African American      >60 0 ml/min/1 73sq m   National Kidney Disease Education Program recommendations are as follows:  GFR calculation is accurate only with a steady state creatinine  Chronic Kidney disease less than 60 ml/min/1 73 sq  meters  Kidney failure less than 15 ml/min/1 73 sq  meters       (1) BUN 87EGZ4658 05:15PM Leelee Hernandez Order Number: [id number]     Test Name Result Flag Reference   BLOOD UREA NITROGEN 25 mg/dL  5-25

## 2018-01-18 NOTE — RESULT NOTES
Message   Diabetes is under good control  He does have protein in the urine  He is on ARB  Verified Results  (1) HEMOGLOBIN A1C 84Bae4697 05:06PM Olga ApnaPaisa Order Number: [id number]     Test Name Result Flag Reference   HEMOGLOBIN A1C 6 7 % H 4 2-6 3   EST  AVG  GLUCOSE 146 mg/dl       (1) MICROALBUMIN CREATININE RATIO, RANDOM URINE 11Uht4424 05:06PM Olga ApnaPaisa Order Number: [id number]     Test Name Result Flag Reference   MICROALBUMIN/ CREAT R 137 mg/g creatinine H 0-30   MICROALBUMIN,URINE 64 0 mg/L H 0 0-20 0   CREATININE URINE 46 6 mg/dL       (1) COMPREHENSIVE METABOLIC PANEL 91BDI6749 51:17QJ Olga ApnaPaisa Order Number: [id number]     Test Name Result Flag Reference   GLUCOSE,RANDM 148 mg/dL H    If the patient is fasting, the ADA then defines impaired fasting glucose as > 100 mg/dL and diabetes as > or equal to 123 mg/dL  SODIUM 140 mmol/L  136-145   POTASSIUM 4 3 mmol/L  3 5-5 3   CHLORIDE 105 mmol/L  100-108   CARBON DIOXIDE 25 mmol/L  21-32   ANION GAP (CALC) 10 mmol/L  4-13   BLOOD UREA NITROGEN 29 mg/dL H 5-25   CREATININE 1 17 mg/dL  0 60-1 30   Standardized to IDMS reference method   CALCIUM 9 6 mg/dL  8 3-10 1   BILI, TOTAL 0 89 mg/dL  0 20-1 00   ALK PHOSPHATAS 53 U/L     ALT (SGPT) 42 U/L  12-78   AST(SGOT) 21 U/L  5-45   ALBUMIN 4 3 g/dL  3 5-5 0   TOTAL PROTEIN 7 3 g/dL  6 4-8 2   eGFR Non-African American      >60 0 ml/min/1 73sq m   [facility] Disease Education Program recommendations are as follows:  GFR calculation is accurate only with a steady state creatinine  Chronic Kidney disease less than 60 ml/min/1 73 sq  meters  Kidney failure less than 15 ml/min/1 73 sq  meters

## 2018-01-22 VITALS
WEIGHT: 163.25 LBS | TEMPERATURE: 98.5 F | BODY MASS INDEX: 28.93 KG/M2 | HEIGHT: 63 IN | OXYGEN SATURATION: 98 % | DIASTOLIC BLOOD PRESSURE: 72 MMHG | SYSTOLIC BLOOD PRESSURE: 144 MMHG | HEART RATE: 80 BPM

## 2018-01-22 DIAGNOSIS — M54.12 RADICULOPATHY OF CERVICAL REGION: ICD-10-CM

## 2018-01-23 VITALS
DIASTOLIC BLOOD PRESSURE: 82 MMHG | SYSTOLIC BLOOD PRESSURE: 154 MMHG | TEMPERATURE: 97.1 F | OXYGEN SATURATION: 98 % | HEART RATE: 87 BPM | BODY MASS INDEX: 28.95 KG/M2 | WEIGHT: 163.38 LBS | HEIGHT: 63 IN

## 2018-01-23 VITALS
WEIGHT: 160.38 LBS | HEIGHT: 63 IN | OXYGEN SATURATION: 97 % | TEMPERATURE: 97.9 F | HEART RATE: 91 BPM | BODY MASS INDEX: 28.42 KG/M2 | DIASTOLIC BLOOD PRESSURE: 70 MMHG | SYSTOLIC BLOOD PRESSURE: 148 MMHG

## 2018-01-23 NOTE — MISCELLANEOUS
Message  Return to work or school:   Linnette Malone is under my professional care   He was seen in my office on 01/15/2017   He is able to return to work on  01/18/2018            Signatures   Electronically signed by : Liana Curling; Jan 18 2018  9:07AM EST                       (Author)

## 2018-01-23 NOTE — RESULT NOTES
Discussion/Summary   Normal testosterone     Verified Results  (Q) TESTOSTERONE, FREE AND TOTAL, LC/MS/MS 32OPR4216 09:34AM Juan Santiago   REPORT COMMENT:  FASTING:YES     Test Name Result Flag Reference   TESTOSTERONE, TOTAL,$LC/MS/ ng/dL  250-1100   Men with clinically significant hypogonadal  symptoms and testosterone values repeatedly in  the range of the 200-300 ng/dL or less, may  benefit from testosterone treatment after  adequate risk and benefits counseling  For more information on this test, go to  http://[employer]/faq/  TotalTestosteroneLCMSMS        This test was developed and its analytical performance  characteristics have been determined by [address] [place] [address]  It has  not been cleared or approved by the [place]  Food and Drug  Administration  This assay has been validated pursuant  to the CLIA regulations and is used for clinical  purposes  FREE TESTOSTERONE 97 2 pg/mL  35 0-155 0   This test was developed and its analytical performance  characteristics have been determined by [address] [place] [address]  It has  not been cleared or approved by the [place]  Food and Drug  Administration  This assay has been validated pursuant  to the CLIA regulations and is used for clinical  purposes

## 2018-01-23 NOTE — CONSULTS
Chief Complaint  pt  presents for PRE OP visit  Date Surgery: 12/20/17 Cataract Removal Dr [facility] [address] [facility] [place] [address] [address] [facility] Ph: [phone] Fax: [phone]      History of Present Illness  Pre-Op Visit (Brief): The patient is being seen for a preoperative visit  The procedure is a(n) Cataract surgery scheduled for 12/20/2017 with Dr George Lima  Surgical Risk Assessment:   Prior Anesthesia: He had prior anesthesia, no prior adverse reaction to edidural anesthesia, no prior adverse reaction to spinal anesthesia and no prior adverse reaction to general anesthesia  Pertinent Past Medical History: diabetes and insulin use  Exercise Capacity: able to walk four blocks without symptoms and able to walk two flights of stairs without symptoms  Lifestyle Factors: denies tobacco use, denies illegal drug use and Social alcohol use  Symptoms: no symptoms  Pertinent Family History: no pertinent family history  Living Situation: home is secure and supportive and no post-op concerns with his living situation  HPI: 15-year-old male is seen today for preoperative assessment for which he is to undergo right cataract surgery on 12/20/2017 followed by left cataract surgery in January 2018  he has no active complaints or concerns  He does have diabetes and is on insulin however is well controlled  Most recent A1c is 6 0 percent  He does see an endocrinologist for diabetes management  Review of Systems    Constitutional: no fever, no recent weight gain, no chills and no recent weight loss  Eyes: eyesight problems, but no eye pain, no dryness of the eyes, eyes not red and no itching of the eyes  ENT: no earache, no nosebleeds, no sore throat, no hearing loss and no hoarseness  Cardiovascular: no chest pain, no intermittent leg claudication, no palpitations and no extremity edema     Respiratory: no shortness of breath, no cough, no orthopnea, no wheezing, no shortness of breath during exertion and no PND  Gastrointestinal: no abdominal pain, no nausea, no vomiting, no constipation, no diarrhea and no blood in stools  Genitourinary: no dysuria and no incontinence  Musculoskeletal: no arthralgias and no myalgias  Integumentary: no rashes and no skin lesions  Neurological: no headache, no numbness and no dizziness  Psychiatric: no anxiety and no depression  Endocrine: no muscle weakness and no feelings of weakness  Hematologic/Lymphatic: no swollen glands, no tendency for easy bleeding and no tendency for easy bruising  ROS reviewed  Active Problems    1  Ankle pain, unspecified laterality   2  Arthritis (716 90) (M19 90)   3  Benign essential hypertension (401 1) (I10)   4  Carpal tunnel syndrome (354 0) (G56 00)   5  Cervicalgia (723 1) (M54 2)   6  Closed nondisplaced pilon fracture of left tibia with routine healing (V54 16) (S82 875D)   7  DMII (diabetes mellitus, type 2) (250 00) (E11 9)   8  Hyperlipidemia (272 4) (E78 5)   9  Hypogonadotropic hypogonadism (253 4) (E23 0)   10  Nephrolithiasis (592 0) (N20 0)   11  Other closed fracture of distal end of left radius, initial encounter (813 42) (S52 592A)   12  Other closed fracture of proximal end of right fibula, initial encounter (823 01) (S82 831A)   13  Pilon fracture, right, closed, initial encounter   14  Pituitary microadenoma (227 3) (D35 2)   15  Seborrheic dermatitis (690 10) (L21 9)   16  Sleep apnea (780 57) (G47 30)   17  Spondylosis of cervical region without myelopathy or radiculopathy (721 0) (M47 812)   18  Sprain Of The Anterior Talofibular Ligament (845 02)   19  Vitamin D deficiency (268 9) (E55 9)   20  Wrist pain (719 43) (M25 539)    Past Medical History    · History of Diabetes Mellitus (250 00)    The active problems and past medical history were reviewed and updated today        Surgical History    · History of Tonsillectomy   · History of Urological Procedures Renal Cyst Aspiration    The surgical history was reviewed and updated today  Family History    · Family history of Diabetes Mellitus (V18 0)   · Family history of Heart Disease (V17 49)   · Family history of Hypertension (V17 49)   · Family history of Nephrolithiasis   · Family history of Renal Disease    · Family history of Diabetes Mellitus (V18 0)   · Family history of Heart Disease (V17 49)    · Family history of Diabetes Mellitus (V18 0)   · Family history of Heart Disease (V17 49)   · Family history of Hypertension (V17 49)   · Family history of Stroke Syndrome (V17 1)    · Family history of Diabetes Mellitus (V18 0)   · Family history of Diabetes Mellitus (V18 0)   · Family history of Diabetes Mellitus (V18 0)   · Family history of Diabetes Mellitus (V18 0)   · Family history of Heart Disease (V17 49)   · Family history of Hypertension (V17 49)   · Family history of Nephrolithiasis    The family history was reviewed and updated today  Social History    · Being A Social Drinker   · Denied: History of Drug Use   · Former smoker (W80 08) (T59 693)  The social history was reviewed and updated today  The social history was reviewed and is unchanged  Current Meds   1  Amlodipine Besylate-Valsartan 5-320 MG Oral Tablet; Take 1 tablet daily; Therapy: 66Wlw7344 to (Evaluate:33Cvh3390)  Requested for: 99Byc5421; Last   Rx:42Hzt8475 Ordered   2  Apidra SoloStar 100 UNIT/ML Subcutaneous Solution Pen-injector; up to 25 units before   meals as directed; Therapy: (Recorded:92Hmf3552) to Recorded   3  Apidra SoloStar 100 UNIT/ML Subcutaneous Solution Pen-injector; Use up to 20 units   before meals as directed (has been taking only at breakfast)  Requested for:   46Bdd5606; Last Rx:40Xap7925 Ordered   4  Aspirin EC 81 MG Oral Tablet Delayed Release Recorded   5  BD Pen Needle Harriett U/F 32G X 4 MM Miscellaneous; Uset 4/ a day;    Therapy: 61BFJ5822 to (Evaluate:77Azy0624)  Requested for: 71PTI5085; Last Rx: 05ZCB1586 Ordered   6  BD Syringe/Needle 23G X 1" 3 ML Miscellaneous; Use once a week; Therapy: 52Xtq7715 to (Evaluate:92Vwj3955)  Requested for: 90OOY7565; Last   Rx:43Djj1845 Ordered   7  GlyBURIDE 5 MG Oral Tablet; take 2 tablets in AM and 1 tablet in PM;   Therapy: 79Lkf6351 to (Evaluate:11Jun2018)  Requested for: 20Jun2017; Last   Rx:16Jun2017 Ordered   8  GNP Loratadine 10 MG Oral Tablet Recorded   9  Ibuprofen 200 MG Oral Tablet Recorded   10  Janumet  MG Oral Tablet; TAKE 1 TABLET TWICE DAILY WITH MEALS; Therapy: 80Vfh6507 to (Evaluate:39Gaf5329)  Requested for: 40YOA3232; Last    Rx:46Yho4584 Ordered   11  Jardiance 25 MG Oral Tablet; Take 1 tablet daily; Therapy: 73Hwo4616 to ([id number])  Requested for: 27Mar2017; Last    Rx:27Mar2017 Ordered   12  Lantus SoloStar 100 UNIT/ML Subcutaneous Solution Pen-injector; INJECT 30 UNITS      SUBCUTANEOUSLY AT BEDTIME; Therapy: 28SES1264 to (Evaluate:35Cgj0347)  Requested for: 20Jun2017; Last    Rx:12Jun2017 Ordered   13  Lisinopril 40 MG Oral Tablet; Take 1 tablet daily; Therapy: 39Bwy3799 to (Evaluate:26May2018)  Requested for: 88DYC2042; Last    Rx:48Mhy3683 Ordered   14  Omeprazole 20 MG Oral Capsule Delayed Release Recorded   15  OneTouch Delica Lancets 87D Miscellaneous; Patient testing 3 times daily as directed     Requested for: 20Jan2014; Last Rx:20Jan2014 Ordered   16  OneTouch Verio In [employer]; TEST 3 TIMES DAILY OR AS   DIRECTED; Therapy: 57BGJ3341 to ([id number])  Requested for: 93ESU5762; Last    Rx:27Mar2017 Ordered   17  Osteo Bi-Flex Regular Strength TABS Recorded   18  Probiotic Oral Capsule Recorded   19  Simvastatin 20 MG Oral Tablet; TAKE 1 TABLET AT BEDTIME; Therapy: 60Jyn6381 to (Evaluate:11Jun2018)  Requested for: 20Jun2017; Last    Rx:16Jun2017 Ordered   20  Testosterone Cypionate 200 MG/ML Intramuscular Solution; Inject  0 4mL weekly as    directed by physician;     Therapy: 14Apr2016 to (Evaluate:05Dzr2120); Last Rx:14Jun2017 Ordered   21  Testosterone Cypionate 200 MG/ML Intramuscular Solution; INJECT 0 3 ML Weekly; Therapy: (Recorded:07Ehk7872) to Recorded   22  Vitamin B-1 100 MG Oral Tablet; TAKE 1 TABLET DAILY; Therapy: 75ZXM5807 to Recorded   23  Vitamin D3 1000 UNIT Oral Capsule; TAKE AS DIRECTED; Therapy: 28KHS4755 to Recorded   24  Vitamin E 100 UNIT Oral Capsule; Take 1 in morning, take 1 in evening; Therapy: 24PEK6952 to Recorded    The medication list was reviewed and updated today  Allergies    1  Augmentin TABS   2  Biaxin TABS   3  NovoLOG SOLN    4  Dust   5  Mold   6  Peanuts    Vitals  Signs    Temperature: 97 1 F, Tympanic  Heart Rate: 87  Systolic: 328, LUE, Sitting  Diastolic: 82, LUE, Sitting  Height: 5 ft 3 in  Weight: 163 lb 6 oz  BMI Calculated: 28 94  BSA Calculated: 1 77  O2 Saturation: 98, RA    Physical Exam    Constitutional   General appearance: No acute distress, well appearing and well nourished  Head and Face   Head and face: Normal     Palpation of the face and sinuses: No sinus tenderness  Eyes   Conjunctiva and lids: No erythema, swelling or discharge  Pupils and irises: Equal, round, reactive to light  Ears, Nose, Mouth, and Throat   External inspection of ears and nose: Normal     Otoscopic examination: Tympanic membranes translucent with normal light reflex  Canals patent without erythema  Hearing: Normal     Nasal mucosa, septum, and turbinates: Normal without edema or erythema  Lips, teeth, and gums: Normal, good dentition  Oropharynx: Normal with no erythema, edema, exudate or lesions  Neck   Neck: Supple, symmetric, trachea midline, no masses  Thyroid: Normal, no thyromegaly  Pulmonary   Respiratory effort: No increased work of breathing or signs of respiratory distress  Percussion of chest: Normal     Palpation of chest: Normal     Auscultation of lungs: Clear to auscultation      Cardiovascular Palpation of heart: Normal PMI, no thrills  Auscultation of heart: Normal rate and rhythm, normal S1 and S2, no murmurs  Pedal pulses: 2+ bilaterally  Examination of extremities for edema and/or varicosities: Normal     Abdomen   Abdomen: Non-tender, no masses  Liver and spleen: No hepatomegaly or splenomegaly  Lymphatic   Palpation of lymph nodes in neck: No lymphadenopathy  Musculoskeletal   Gait and station: Normal     Inspection/palpation of digits and nails: Normal without clubbing or cyanosis  Inspection/palpation of joints, bones, and muscles: Normal     Range of motion: Normal     Stability: Normal     Muscle strength/tone: Normal     Skin   Skin and subcutaneous tissue: Normal without rashes or lesions  Palpation of skin and subcutaneous tissue: Normal turgor  Neurologic   Cranial nerves: Cranial nerves 2-12 intact  Cortical function: Normal mental status  Reflexes: 2+ and symmetric  Sensation: No sensory loss  Coordination: Normal finger to nose and heel to shin  Psychiatric   Judgment and insight: Normal     Orientation to person, place and time: Normal     Recent and remote memory: Intact  Mood and affect: Normal        Results/Data  EKG/ECG- POC 26GKN0719 04:24PM Christianne Chu     Test Name Result Flag Reference   EKG/ECG 12/14/2017       Summary / No summary entered :      No summary entered  Documents attached :      Michael Goldstein ECG - [facility]; Enc: 12VKB1884 - Appointment - Christianne Chu -      (Internal Medicine) (Result Document)  A 12 lead ECG was performed and was normal    Rhythm and rate: normal sinus rhythm  P-waves: the P wave is normal    QRS: the QRS is normal    ST segment: the ST segments are normal    Comparison to prior ECGs:  no interval change  Assessment    1   Pre-op examination (H60 85) (Z01 818)    Plan  DMII (diabetes mellitus, type 2)    · Apidra SoloStar 100 UNIT/ML Subcutaneous Solution Pen-injector  Hypogonadotropic hypogonadism    · Testosterone Cypionate 200 MG/ML Intramuscular Solution  Pre-op examination    · EKG/ECG- POC; Status:Complete - Retrospective By Protocol Authorization;   Done:  41ZVS7711 04:24PM   · Follow-up PRN Evaluation and Treatment  Follow-up  Status: Complete  Done:  14PAD3799 04:30PM    Discussion/Summary  Surgical Clearance: He is at a LOW risk from a cardiovascular standpoint at this time without any additional cardiac testing  Reevaluation needed, if he should present with symptoms prior to surgery/procedure  58-year-old male:  Preoperative assessment: Patient is low risk for this low risk procedure  Continue with current medication regimen  Blood sugars are well controlled  EKG reviewed today in the office, no abnormality seen /no ST or T-wave abnormalities  Preoperative assessment form completed and faxed to his ophthalmologist, [facility]  The patient was counseled regarding diagnostic results, instructions for management, risk factor reductions, prognosis, patient and family education, impressions, risks and benefits of treatment options, importance of compliance with treatment  Educational resources provided:   Possible side effects of new medications were reviewed with the patient/guardian today  The treatment plan was reviewed with the patient/guardian  The patient/guardian understands and agrees with the treatment plan      End of Encounter Meds    1  Lisinopril 40 MG Oral Tablet; Take 1 tablet daily; Therapy: 33Ziv1133 to (Evaluate:86Ast2600)  Requested for: 67AHX5524; Last   Rx:80Azk9144 Ordered    2  Apidra SoloStar 100 UNIT/ML Subcutaneous Solution Pen-injector; up to 25 units before   meals as directed; Therapy: (Recorded:39Haf1425) to Recorded   3  BD Pen Needle Harriett U/F 32G X 4 MM Miscellaneous; Uset 4/ a day; Therapy: 73LYA8806 to (Evaluate:01Ozn6218)  Requested for: 54APJ8719; Last   Rx:18Oxs5257 Ordered   4  Jardiance 25 MG Oral Tablet;  Take 1 tablet daily; Therapy: 74Ffj9755 to (Evaluate:22Mar2018)  Requested for: 27Mar2017; Last   Rx:27Mar2017 Ordered   5  OneTouch Delica Lancets 49H Miscellaneous; Patient testing 3 times daily as directed    Requested for: 20Jan2014; Last Rx:20Jan2014 Ordered   6  OneTouch Verio In [employer]; TEST 3 TIMES DAILY OR AS   DIRECTED; Therapy: 05QNZ7808 to (Evaluate:22Mar2018)  Requested for: 14PMB6190; Last   Rx:27Mar2017 Ordered    7  Simvastatin 20 MG Oral Tablet; TAKE 1 TABLET AT BEDTIME; Therapy: 31Lmi1443 to (Evaluate:11Jun2018)  Requested for: 20Jun2017; Last   Rx:16Jun2017 Ordered    8  Amlodipine Besylate-Valsartan 5-320 MG Oral Tablet; Take 1 tablet daily; Therapy: 25Sbp2518 to (Evaluate:95Cmx1580)  Requested for: 19Pdu1331; Last   Rx:12Sep2017 Ordered    9  BD Syringe/Needle 23G X 1" 3 ML Miscellaneous; Use once a week; Therapy: 42Gnt8541 to (Evaluate:59Qrt5507)  Requested for: 40NCF0963; Last   Rx:75Ymt2895 Ordered    10  GlyBURIDE 5 MG Oral Tablet; take 2 tablets in AM and 1 tablet in PM;    Therapy: 94Nxs3733 to (Evaluate:11Jun2018)  Requested for: 20Jun2017; Last    Rx:16Jun2017 Ordered   11  Janumet  MG Oral Tablet; TAKE 1 TABLET TWICE DAILY WITH MEALS; Therapy: 64Dhh8111 to (Evaluate:46Nfc0692)  Requested for: 97DVM0002; Last    Rx:56Ofz6205 Ordered   12  Lantus SoloStar 100 UNIT/ML Subcutaneous Solution Pen-injector; INJECT 30 UNITS      SUBCUTANEOUSLY AT BEDTIME; Therapy: 63IZT8595 to (Evaluate:63Uwy7949)  Requested for: 20Jun2017; Last    Rx:12Jun2017 Ordered    13  Aspirin EC 81 MG Oral Tablet Delayed Release Recorded   14  GNP Loratadine 10 MG Oral Tablet Recorded   15  Ibuprofen 200 MG Oral Tablet Recorded   16  Omeprazole 20 MG Oral Capsule Delayed Release Recorded   17  Osteo Bi-Flex Regular Strength TABS Recorded   18  Probiotic Oral Capsule Recorded   19  Testosterone Cypionate 200 MG/ML Intramuscular Solution; INJECT 0 3 ML Weekly;     Therapy: (Recorded:14Xso1187) to Recorded 20  Vitamin B-1 100 MG Oral Tablet; TAKE 1 TABLET DAILY; Therapy: 84RPR4022 to Recorded   21  Vitamin D3 1000 UNIT Oral Capsule; TAKE AS DIRECTED; Therapy: 97IZW8408 to Recorded   22  Vitamin E 100 UNIT Oral Capsule; Take 1 in morning, take 1 in evening; Therapy: 73BKW7776 to Recorded    Signatures   Electronically signed by :  Earl Freire MD; Dec 14 2017  4:32PM EST                       (Author)

## 2018-02-01 ENCOUNTER — EVALUATION (OUTPATIENT)
Dept: PHYSICAL THERAPY | Age: 63
End: 2018-02-01
Payer: COMMERCIAL

## 2018-02-01 DIAGNOSIS — M54.12 RADICULOPATHY OF CERVICAL REGION: ICD-10-CM

## 2018-02-01 DIAGNOSIS — M54.12 CERVICAL RADICULOPATHY: Primary | ICD-10-CM

## 2018-02-01 PROCEDURE — 97140 MANUAL THERAPY 1/> REGIONS: CPT | Performed by: PHYSICAL THERAPIST

## 2018-02-01 PROCEDURE — G8990 OTHER PT/OT CURRENT STATUS: HCPCS | Performed by: PHYSICAL THERAPIST

## 2018-02-01 PROCEDURE — 97163 PT EVAL HIGH COMPLEX 45 MIN: CPT | Performed by: PHYSICAL THERAPIST

## 2018-02-01 PROCEDURE — G8991 OTHER PT/OT GOAL STATUS: HCPCS | Performed by: PHYSICAL THERAPIST

## 2018-02-01 RX ORDER — AMLODIPINE BESYLATE AND BENAZEPRIL HYDROCHLORIDE 10; 20 MG/1; MG/1
1 CAPSULE ORAL DAILY
COMMUNITY
End: 2018-05-31 | Stop reason: ALTCHOICE

## 2018-02-01 RX ORDER — GABAPENTIN 250 MG/5ML
SOLUTION ORAL 3 TIMES DAILY
COMMUNITY
End: 2018-03-14

## 2018-02-01 RX ORDER — LORATADINE 10 MG/1
10 TABLET ORAL DAILY
COMMUNITY

## 2018-02-01 RX ORDER — SIMVASTATIN 10 MG
10 TABLET ORAL
COMMUNITY
End: 2018-05-31 | Stop reason: ALTCHOICE

## 2018-02-01 RX ORDER — GLYBURIDE 5 MG/1
5 TABLET ORAL
COMMUNITY
End: 2018-05-31 | Stop reason: SDUPTHER

## 2018-02-01 RX ORDER — OMEPRAZOLE 10 MG/1
10 CAPSULE, DELAYED RELEASE ORAL DAILY
COMMUNITY
End: 2019-08-08 | Stop reason: SDUPTHER

## 2018-02-01 RX ORDER — LISINOPRIL 10 MG/1
10 TABLET ORAL DAILY
COMMUNITY
End: 2018-03-14

## 2018-02-01 NOTE — PROGRESS NOTES
PT Evaluation     Today's date: 2018  Patient name: Nimisha Abbasi  : 1955  MRN: [id number]  Referring provider: Stefan Garrison DO  Dx:   Encounter Diagnoses   Name Primary?  Radiculopathy of cervical region     Cervical radiculopathy Yes                  Assessment  Impairments: abnormal coordination, abnormal muscle tone, abnormal or restricted ROM, abnormal movement, impaired physical strength and lacks appropriate home exercise program  Functional limitations: driving, sleeping, rotating right, carrying, looking up and looking down  Assessment details: Nimisha Abbasi is a 57 y o  male present with:   Cervical radiculopathy  (primary encounter diagnosis)  Radiculopathy of cervical region    Yoli José has the above listed impairments and will benefit from skilled PT to improve deficits to return to prior level of function  Understanding of Dx/Px/POC: good   Prognosis: good  Prognosis details: Pt demonstrates sx resolution in NWB  Progression to loaded reductive measures is yet to be determined  Goals  STG: 3weeks   Independent with HEP Not met  Increase AROM by 50%Not met    LT weeks  Increase strength by 1 grade   Not met  Return to 75% of functional activity Not met    Plan  Patient would benefit from: skilled PT  Referral necessary: Yes  Planned modality interventions: TENS, thermotherapy: hydrocollator packs, traction, ultrasound, unattended electrical stimulation and cryotherapy  Planned therapy interventions: patient education, postural training, strengthening, therapeutic exercise, muscle pump exercises, manual therapy, joint mobilization, flexibility, abdominal trunk stabilization, functional ROM exercises and home exercise program  Other planned therapy interventions: Stephen cervical progression, cervical stabilization TE's, sx avoidance, self treatments  Frequency: 2x week  Duration in visits: 12  Duration in weeks: 6  Treatment plan discussed with: patient  Plan details: Pt educated in self treatment for relative lateral compartment in unloaded positions  Re-assess effects of self treatment and continue to progress pt as tolerated  Subjective Evaluation    History of Present Illness  Mechanism of injury: Present Symptoms:  Present since: chronic since  on acute over the last 6months  Commenced as a result of: no apparent reason  Worsening/Better/No change: worsening  Constant sx: neck   Intermittent sx: shoulder and arm  Worse: L and R sidelying, fex, ext, am and pm  Better: Lying supine, as day progresses  Disturb sleep: yes  Pillows: 2  Bed surface:  firm  Previous treatments: chiropractic, neurology, pain management, scheduled for an epidural, PT with Hp, TENS and stretching  Night pain: yes  Accidents: [address], ,   Unexplained weight loss: Denied    Recurrent probem  Quality of life: good    Pain  Current pain ratin  At best pain rating: 3  At worst pain ratin  Location: L cervical, L shoulder,  N&T L arm  Quality: cramping, radiating, knife-like and discomfort  Relieving factors: rest, support, heat and medications  Progression: worsening    Social Support    Employment status: working  Life stress: Computer work      Diagnostic Tests  MRI studies: abnormal  Treatments  Previous treatment: chiropractic, medication, physical therapy and injection treatment  Patient Goals  Patient goals for therapy: decreased pain          Objective     Special Questions      Additional Special Questions  Pt denies the 5 D's and 3 N's    Static Posture     Cervical Spine  Tilted right      Neurological Testing     Sensation   Cervical/Thoracic   Left   Intact: light touch    Right   Intact: light touch    Reflexes   Left   Biceps (C5/C6): brisk (3+)  Brachioradialis (C6): normal (2+)  Triceps (C7): absent (0)    Right   Biceps (C5/C6): normal (2+)  Brachioradialis (C6): normal (2+)  Triceps (C7): trace (1+)    Strength/Myotome Testing     Left Shoulder     Planes of Motion Flexion: 4+   Abduction: 4+     Right Shoulder     Planes of Motion   Flexion: 4+   Abduction: 4+     Left Elbow   Flexion: 4-  Extension: 3+    Right Elbow   Flexion: 5  Extension: 4+    Additional Strength Details  PAB R 4/5 L 3/5    Tests     Additional Tests Details  Pre-test pain sitting  6/10 L cervical and L shoulder and N&T in C6/7 distributions  [address] and radiated  Rep- pro NT  50 [address],25 A RET  RET EXT  Rep RET EXT    Pretest symptoms lying on 2 pillows dec   Cx  3/10, L shoulder 6/10, N&T in C6 and C7 ( note one pillow inc  Sx and radiated shooting pain down L UE)  RET  Rep RET  RET EXT  REP RET EXT    Pretest pain sitting  LF-R   Rep LF-R  LF-L          Supine with traction and LF- L with O P at C7  Abolished sx spine pain, sh 3/10, N&T "less"  Sustained - Pt educated in self sustained mobilizaition for HEP  Rep LF-L  Rot - R  Rep ROT - R  ROT - L  Rep ROT - L  FLEX  Rep FLEX  Static Tests:  Protrusion  Retraction  Flexion   Extension (sitting, prone, supine)  Supine LF - L  Provisional Classification, Unilateral Asymmetrical below elbow    Principle of Management  Lateral Principal          Precautions:DMII; h/o kidney stones; CX stenosis; pituitary microadenoma; Vit D deficiency; HTN; Cx HNP    Daily Treatment Diary     Manual  2/1/18            Evaulation             Lateral Flexion O P  supine 5'            Man  Traction 3'                                          Exercise Diary                                                                                                                                                                                                                                                                                      Modalities              MHP prn                                                 Flowsheet Rows    Flowsheet Row Most Recent Value   PT/OT G-Codes   Current Score  53   Projected Score  59   FOTO information reviewed  Yes   Assessment Type Evaluation   G code set  Other PT/OT Primary   Other PT Primary Current Status ()  CK   Other PT Primary Goal Status ()  CK

## 2018-02-02 DIAGNOSIS — E11.8 TYPE 2 DIABETES MELLITUS WITH COMPLICATION, UNSPECIFIED LONG TERM INSULIN USE STATUS: Primary | ICD-10-CM

## 2018-02-05 ENCOUNTER — OFFICE VISIT (OUTPATIENT)
Dept: PHYSICAL THERAPY | Age: 63
End: 2018-02-05
Payer: COMMERCIAL

## 2018-02-05 DIAGNOSIS — M54.12 RADICULOPATHY OF CERVICAL REGION: Primary | ICD-10-CM

## 2018-02-05 PROCEDURE — 97140 MANUAL THERAPY 1/> REGIONS: CPT | Performed by: PHYSICAL THERAPIST

## 2018-02-05 PROCEDURE — 97110 THERAPEUTIC EXERCISES: CPT | Performed by: PHYSICAL THERAPIST

## 2018-02-05 PROCEDURE — 97112 NEUROMUSCULAR REEDUCATION: CPT | Performed by: PHYSICAL THERAPIST

## 2018-02-05 RX ORDER — SITAGLIPTIN AND METFORMIN HYDROCHLORIDE 1000; 50 MG/1; MG/1
TABLET, FILM COATED ORAL
Qty: 180 TABLET | Refills: 3 | Status: SHIPPED | OUTPATIENT
Start: 2018-02-05 | End: 2018-05-31 | Stop reason: SDUPTHER

## 2018-02-05 NOTE — PROGRESS NOTES
Daily Note     Today's date: 2018  Patient name: Ramiro Morales  : 1955  MRN: [id number]  Referring provider: George Varner DO  Dx:   Encounter Diagnosis   Name Primary?  Radiculopathy of cervical region Yes                  Subjective: Pt reports pain in left lower  cervical spine -5/10  Objective: See treatment diary below  Precautions: DMII; HBP;     Daily Treatment Diary     Manual  18            Traction 8'            SB with Over Presser 8'            Kinesio Cx CTJ 75%, UT inhib 75%, CX paraspinal 23% facil  8'                                          Exercise Diary              Supine progressive retraction 5' 2 pillow  5'1 5 pillow            Pt self lateral over pressure :10X10                                                                                                                                                                                                                                                          Modalities              CP PRN                                                   Assessment: Tolerated treatment well  Patient given HEP of self overpressure every hour 10 reps 10: in sitting and in supine  Plan: Re-check pt HEP and progress as tolerated

## 2018-02-08 ENCOUNTER — OFFICE VISIT (OUTPATIENT)
Dept: PHYSICAL THERAPY | Age: 63
End: 2018-02-08
Payer: COMMERCIAL

## 2018-02-08 DIAGNOSIS — M54.12 RADICULOPATHY OF CERVICAL REGION: Primary | ICD-10-CM

## 2018-02-08 DIAGNOSIS — M54.12 CERVICAL RADICULOPATHY: ICD-10-CM

## 2018-02-08 PROCEDURE — 97140 MANUAL THERAPY 1/> REGIONS: CPT | Performed by: PHYSICAL THERAPIST

## 2018-02-08 PROCEDURE — 97110 THERAPEUTIC EXERCISES: CPT | Performed by: PHYSICAL THERAPIST
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
## 2018-02-12 ENCOUNTER — APPOINTMENT (OUTPATIENT)
Dept: PHYSICAL THERAPY | Age: 63
End: 2018-02-12
Payer: COMMERCIAL

## 2018-02-12 DIAGNOSIS — E11.9 TYPE 2 DIABETES MELLITUS WITHOUT COMPLICATIONS (HCC): ICD-10-CM

## 2018-02-15 ENCOUNTER — OFFICE VISIT (OUTPATIENT)
Dept: PHYSICAL THERAPY | Age: 63
End: 2018-02-15
Payer: COMMERCIAL

## 2018-02-15 DIAGNOSIS — M54.12 RADICULOPATHY OF CERVICAL REGION: Primary | ICD-10-CM

## 2018-02-15 DIAGNOSIS — M54.12 CERVICAL RADICULOPATHY: ICD-10-CM

## 2018-02-15 PROCEDURE — 97110 THERAPEUTIC EXERCISES: CPT | Performed by: PHYSICAL THERAPIST

## 2018-02-15 PROCEDURE — 97140 MANUAL THERAPY 1/> REGIONS: CPT | Performed by: PHYSICAL THERAPIST

## 2018-02-15 NOTE — PROGRESS NOTES
Daily Note     Today's date: 2/15/2018  Patient name: Phil Wheat  : 1955  MRN: [id number]  Referring provider: Jaycee Villa DO  Dx:   Encounter Diagnoses   Name Primary?  Radiculopathy of cervical region Yes    Cervical radiculopathy                   Subjective: Pt reports pain in left lower  cervical spine 4-5/10 and into Left shoulder  Objective: See treatment diary below  Precautions: DMII; HBP;     Daily Treatment Diary     Manual  2/5/18 2/8/18 2/15/1          Traction 8' 8' 8'          SB with Over Pressure 8' 8' 5'          Kinesio Cx CTJ 75%, UT inhib 75%, CX paraspinal 23% facil  8' 8' [place] UT inhib 5'          Prone JM  CTJ L Unilat PA Gr III-IV and C7 &t1 transverse L to R gr  III-IV 10'  CTJ B PA gr III-IV    5'          Graston CTJ/UT   8'              Exercise Diary  2/5/18 2/8/18 2/15/18          Supine progressive retraction 5' 2 pillow  5'1 5 pillow 5'1 pillow 5' one pillow          Pt self lateral over pressure :10X10 2X10 2X10          Chin tucks supine  2x10 2X10          Prone I             Prone Y             Prone T             Prone retract             Prone retract with UE ext             Prone retract with UE flex             Supine Rotation   2X10          Mulligan self SNAG rotation   2X10                                                                                                                                   Modalities              CP PRN                                         Pt treated 1:1          Assessment: Pt returns after missing last week due to illness  Pt was able to increase his L SB AROM and continued with pain  Added STW with graston to address dysfunction  Pt unable to assume prone without sx  Added light cervical stabilization  Pt is scheduled for cortisone injection this week and opted not to tape cervical spine        Plan: Re-check pt HEP and progress as tolerated to postural TE's in prone

## 2018-02-16 ENCOUNTER — HOSPITAL ENCOUNTER (OUTPATIENT)
Dept: RADIOLOGY | Facility: CLINIC | Age: 63
Discharge: HOME | End: 2018-02-16
Admitting: ANESTHESIOLOGY
Payer: COMMERCIAL

## 2018-02-16 VITALS
RESPIRATION RATE: 18 BRPM | HEART RATE: 75 BPM | TEMPERATURE: 97.1 F | SYSTOLIC BLOOD PRESSURE: 159 MMHG | OXYGEN SATURATION: 98 % | DIASTOLIC BLOOD PRESSURE: 67 MMHG

## 2018-02-16 DIAGNOSIS — E11.9 TYPE 2 DIABETES MELLITUS WITHOUT COMPLICATION, UNSPECIFIED LONG TERM INSULIN USE STATUS: Primary | ICD-10-CM

## 2018-02-16 DIAGNOSIS — M54.12 CERVICAL RADICULOPATHY: ICD-10-CM

## 2018-02-16 PROCEDURE — 62321 NJX INTERLAMINAR CRV/THRC: CPT | Performed by: ANESTHESIOLOGY

## 2018-02-16 RX ORDER — PAPAVERINE HCL 150 MG
10 CAPSULE, EXTENDED RELEASE ORAL ONCE
Status: COMPLETED | OUTPATIENT
Start: 2018-02-16 | End: 2018-02-16

## 2018-02-16 RX ORDER — LIDOCAINE HYDROCHLORIDE 10 MG/ML
5 INJECTION, SOLUTION EPIDURAL; INFILTRATION; INTRACAUDAL; PERINEURAL ONCE
Status: COMPLETED | OUTPATIENT
Start: 2018-02-16 | End: 2018-02-16

## 2018-02-16 RX ORDER — BLOOD SUGAR DIAGNOSTIC
STRIP MISCELLANEOUS
COMMUNITY
Start: 2017-11-13 | End: 2018-02-16 | Stop reason: SDUPTHER

## 2018-02-16 RX ORDER — BLOOD SUGAR DIAGNOSTIC
STRIP MISCELLANEOUS
Qty: 400 EACH | Refills: 3 | Status: SHIPPED | OUTPATIENT
Start: 2018-02-16 | End: 2018-03-28 | Stop reason: SDUPTHER

## 2018-02-16 RX ADMIN — IOHEXOL 1 ML: 300 INJECTION, SOLUTION INTRAVENOUS at 15:37

## 2018-02-16 RX ADMIN — LIDOCAINE HYDROCHLORIDE 3 ML: 10 INJECTION, SOLUTION EPIDURAL; INFILTRATION; INTRACAUDAL; PERINEURAL at 15:36

## 2018-02-16 RX ADMIN — DEXAMETHASONE SODIUM PHOSPHATE 10 MG: 10 INJECTION, SOLUTION INTRAMUSCULAR; INTRAVENOUS at 15:36

## 2018-02-16 NOTE — H&P
History of Present Illness: The patient is a 57 y o  male who presents with complaints of neck and arm pain  There is no problem list on file for this patient  Past Medical History:   Diagnosis Date    Avitaminosis D 2012    Diabetes mellitus ([facility] [address] )     Displacement of cervical intervertebral disc 2014    HTN (hypertension) 2007    [facility]    Spinal stenosis in cervical region 2014    Uric acid nephrolithiasis 1990       History reviewed  No pertinent surgical history        Current Outpatient Prescriptions:     amLODIPine-benazepril (LOTREL) 10-20 MG per capsule, Take 1 capsule by mouth daily, Disp: , Rfl:     gabapentin (NEURONTIN) 250 mg/5 mL solution, Take by mouth 3 (three) times a day, Disp: , Rfl:     glyBURIDE (DIABETA) 5 mg tablet, Take 5 mg by mouth daily with breakfast, Disp: , Rfl:     JANUMET  MG per tablet, TAKE 1 TABLET TWICE DAILY  WITH MEALS, Disp: 180 tablet, Rfl: 3    lisinopril (ZESTRIL) 10 mg tablet, Take 10 mg by mouth daily, Disp: , Rfl:     loratadine (CLARITIN) 10 mg tablet, Take 10 mg by mouth daily, Disp: , Rfl:     omeprazole (PriLOSEC) 10 mg delayed release capsule, Take 10 mg by mouth daily, Disp: , Rfl:     ONETOUCH VERIO test strip, Test blood sugars 4 x daily as directed, Disp: 400 each, Rfl: 3    simvastatin (ZOCOR) 10 mg tablet, Take 10 mg by mouth daily at bedtime, Disp: , Rfl:     Current Facility-Administered Medications:     dexamethasone (PF) (DECADRON) injection 10 mg, 10 mg, Epidural, Once, Mandeep Galarza, DO    iohexol (OMNIPAQUE) 300 mg/mL injection 50 mL, 50 mL, Epidural, Once, Mandeep Galarza,     lidocaine (PF) (XYLOCAINE-MPF) 1 % injection 5 mL, 5 mL, Epidural, Once, Jeffrey Galarza,     Allergies   Allergen Reactions    Augmentin [Amoxicillin-Pot Clavulanate] Abdominal Pain    Biaxin [Clarithromycin] Abdominal Pain    Seasonal Ic [Cholestatin] Headache       Physical Exam:   Vitals:    02/16/18 1501   BP: 155/75   Pulse: 81   Resp: 18   Temp: (!) 97 1 °F (36 2 °C)   SpO2: 97%     General: Awake, Alert, Oriented x 3, Mood and affect appropriate  Respiratory: Respirations even and unlabored  Cardiovascular: Peripheral pulses intact; no edema  Musculoskeletal Exam:   Bilateral cervical paraspinals tender to palpation    ASA Score: 2    Assessment:   1  Cervical radiculopathy        Plan: XIAO      Assessment   Assessed    1  Cervicalgia (723 1) (M54 2)   2  Cervical radiculopathy (723 4) (M54 12)   3  Spondylosis of cervical region without myelopathy or radiculopathy (721 0) (M47 812)   4  Cervical stenosis of spinal canal (723 0) (M48 02)   5  Cervical herniated disc (722 0) (M50 20)     Plan   Cervical radiculopathy    · Start: Gabapentin 100 MG Oral Capsule; TAKE 1 CAPSULE BEDTIME MAY INCREASE TO    3 CAPSULES AT BEDTIME AS TOLERATED   Rx By: Hai Ascencio; Dispense: 30 Days ; #:90 Capsule; Refill: 2;For: Cervical radiculopathy; JEREMIAH = N; Verified Transmission to [id number] Last Updated By: System, [employer]; 1/22/2018 11:29:58 AM   · *1 - [facility]  Consult: evaluate and treat    please evaluate if pt is a candidate for Stpehen based therapy  Status: Active     Requested for: 38UDM9257   Ordered; For: Cervical radiculopathy; Ordered By: Hai Ascencio Performed:  Due: 87QXH3587  () Care Summary provided  : Yes   · Cervical Interlaminar Epidural Steroid Injection; Status:Active; Requested for:45Uag4188; Perform:[facility]; EPN:33XZJ4396;AAKMXNR; For:Cervical radiculopathy; Ordered By:Hemanth Galarza;           68-year-old male with a history of diabetes which is well-controlled presenting for initial consultation regarding neck pain and cervical radiculopathy in the C6-7 distribution of the left upper extremity secondary to multilevel cervical degenerative disc disease and spondylosis with varying degrees of central and foraminal stenosis    There is also a disc herniation at C6-7 which is the likely cause of the patient's symptoms  He has done physical therapy without any lasting relief and is currently taking ibuprofen p r n  with mild relief  He has tried gabapentin in the past which was not effective and Lyrica in the past which she was unable tolerate  He is unsure as to which doses he was taking of these medications at that time as this was some time ago  1  I will schedule the patient for cervical epidural steroid injection reduce the inflammatory component of his pain  The procedure was discussed in detail using diagrams and models  Risks associated with the procedure were discussed with the patient including, but not limited to bleeding, infection, bruising, and nerve damage  2  The patient may continue with ibuprofen and should not exceed more than 800 mg q 8 hours p r n      3  I will reinitiate gabapentin 100 mg q h s  and titrate up to 300 mg q h s     I did discuss with the patient that occasionally will need to titrate up to 1800 mg daily in order to reach optimal therapeutic effect  The patient was apprised of the most common side effects of gabapentin and he was given a titration schedule at this visit  4  I'll prescribed Stephen based physical therapy for his radicular symptoms as he would like to retry physical therapy     5  I will follow up the patient in 6-8 weeks after injection           Discussion/Summary   The patient has the current Goals: Reduced pain and improved function  The patent has the current Barriers: Diabetes  Patient is able to Self-Care  Possible side effects of new medications were reviewed with the patient/guardian today  The treatment plan was reviewed with the patient/guardian   The patient/guardian understands and agrees with the treatment plan    The patient was counseled regarding diagnostic results,-- instructions for management,-- risk factor reductions,-- prognosis,-- patient and family education,-- impressions,-- risks and benefits of treatment options-- and-- importance of compliance with treatment  total time of encounter was 30 minutes  Self Referrals: No      Chief Complaint   Chief Complaints    1  Neck Pain  neck and left arm pain      History of Present Illness   40-year-old male with a history of diabetes presenting for initial consultation regarding neck pain that radiates into the posterolateral aspect of the left upper extremity into the 1st 3 digits of his left hand with associated numbness, paresthesias, and subjective weakness  He denies any right upper extremity symptoms  The pain will occasionally radiate up the occipital region of his left scalp  He denies any bladder or bowel incontinence or balance issues  He is currently taking ibuprofen p r n  with mild relief  He has previously tried gabapentin and Lyrica  The Gabapentin was ineffective and Lyrica he was unable to tolerate  He has done multiple rounds of physical therapy in the past which did not provide any lasting relief  patient does have an MRI of the cervical spine from 2014 revealing degenerative disc disease and spondylosis  There is moderate left foraminal stenosis at C2-3  There is mild central and foraminal stenosis at C3-4  There is mild central and bilateral foraminal stenosis at C4-5  And there is a small disc herniation at C6-7 resulting in mild central and mild left foraminal stenosis  Referring physician is  Hilarie Duverney      Primary Care physician is  Beverley Philippe presents with complaints of constant episodes of left posterior neck pain, described as dull and aching, radiating to the left trapezius, left arm and left hand  On a scale of 1 to 10, the patient rates the pain as 6  Review of Systems        Constitutional: Fever-- and-- recent weight gain, but-- no recent weight loss  Eyes: no double vision-- and-- no blurry vision        Cardiovascular: no chest pain,-- no palpitations-- and-- no lower extremity edema  Respiratory: wheezing, but-- no complaints of shortness of breath  Musculoskeletal: muscle weakness-- and-- decreased range of motion, but-- no difficulty walking,-- no joint stiffness,-- no joint swelling,-- no limb swelling-- and-- no pain in extremity  Neurological: no dizziness,-- no difficulty swallowing,-- no memory loss,-- no loss of consciousness-- and-- no seizures  Gastrointestinal: no nausea,-- no vomiting,-- no constipation-- and-- no diarrhea  Genitourinary: frequent urination, but-- no difficulty initiating urine stream-- and-- no genital pain  Integumentary: no complaints of skin rash  Psychiatric: no depression  Endocrine: hypothyroidism, but-- no excessive thirst,-- no adrenal disease-- and-- no hyperthyroidism  Hematologic/Lymphatic: no tendency for easy bruising-- and-- no tendency for easy bleeding  ROS reviewed  Active Problems   Problems    1  Ankle pain, unspecified laterality   2  Arthritis (716 90) (M19 90)   3  Benign essential hypertension (401 1) (I10)   4  Carpal tunnel syndrome (354 0) (G56 00)   5  Cervicalgia (723 1) (M54 2)   6  Closed nondisplaced pilon fracture of left tibia with routine healing (V54 16) (S82 875D)   7  DMII (diabetes mellitus, type 2) (250 00) (E11 9)   8  Hyperlipidemia (272 4) (E78 5)   9  Hypogonadotropic hypogonadism (253 4) (E23 0)   10  Nephrolithiasis (592 0) (N20 0)   11  Other closed fracture of distal end of left radius, initial encounter (813 42) (S52 592A)   12  Other closed fracture of proximal end of right fibula, initial encounter (823 01) (S82 831A)   13  Pilon fracture, right, closed, initial encounter   14  Pituitary microadenoma (227 3) (D35 2)   15  Pre-op examination (V72 84) (Z01 818)   16  Seborrheic dermatitis (690 10) (L21 9)   17  Sleep apnea (780 57) (G47 30)   18  Spondylosis of cervical region without myelopathy or radiculopathy (721 0) (M47 812)   19  Sprain Of The Anterior Talofibular Ligament (845 02)   20  Viral upper respiratory tract infection with cough (465 9) (J06 9,B97 89)   21  Vitamin D deficiency (268 9) (E55 9)   22  Wrist pain (719 43) (M25 539)     Past Medical History   Problems    1  Arthritis (716 90) (M19 90)   2  History of Diabetes Mellitus (250 00)   3  History of gastroesophageal reflux (GERD) (V12 79) (Z87 19)   4  History of hypertension (V12 59) (Z86 79)   5  History of kidney stones (V13 01) (Z87 442)   6  History of thyroid disorder (V12 29) (Z86 39)   7  Hyperlipidemia (272 4) (E78 5)     Surgical History   Problems    1  History of Cataract Surgery   2  History of Tonsillectomy   3  History of Urological Procedures Renal Cyst Aspiration     Family History   Mother    1  Family history of Diabetes Mellitus (V18 0)   2  Family history of Heart Disease (V17 49)   3  Family history of Hypertension (V17 49)   4  Family history of Nephrolithiasis   5  Family history of Renal Disease  Father    6  Family history of Diabetes Mellitus (V18 0)   7  Family history of Heart Disease (V17 49)  Sister    8  Family history of Diabetes Mellitus (V18 0)   9  Family history of Heart Disease (V17 49)   10  Family history of Hypertension (V17 49)   11  Family history of Stroke Syndrome (V17 1)  Brother    15  Family history of Diabetes Mellitus (V18 0)   13  Family history of Diabetes Mellitus (V18 0)   14  Family history of Diabetes Mellitus (V18 0)   15  Family history of Diabetes Mellitus (V18 0)   16  Family history of Heart Disease (V17 49)   17  Family history of Hypertension (V17 49)   18  Family history of Nephrolithiasis  Unknown    19  Family history of Back disorder   20  Family history of lung cancer (V16 1) (Z80 1)   21  Family history of malignant neoplasm of stomach (V16 0) (Z80 0)   22   Family history of thyroid disease (V18 19) (Z80 46)     Social History   Problems    · Being A Social Drinker   · Denied: History of Drug Use   · Former smoker (V15 82) (G07 088)     Current Meds    1  Amlodipine Besylate-Valsartan  MG Oral Tablet; TAKE 1 TABLET DAILY; Therapy: (Recorded:15Jan2018) to Recorded   2  Apidra SoloStar 100 UNIT/ML Subcutaneous Solution Pen-injector; up to 25 units before     meals as directed; Therapy: (Recorded:74Utx3133) to Recorded   3  Aspirin EC 81 MG Oral Tablet Delayed Release Recorded   4  BD Pen Needle Harriett U/F 32G X 4 MM Miscellaneous; Uset 4/ a day; Therapy: 47ZTV5303 to (Evaluate:62Txk4358)  Requested for: 14MWQ4551; Last     Rx:71Dtv9674 Ordered   5  BD Syringe/Needle 23G X 1 3 ML Miscellaneous; Use once a week; Therapy: 59Huu6013 to (Evaluate:13Xfh5394)  Requested for: 51AZM2410; Last     Rx:85Six4367 Ordered   6  Benzonatate 100 MG Oral Capsule; TAKE 1 CAPSULE 3 TIMES DAILY AS NEEDED; Therapy: 61DDE3544 to [phone])  Requested for: 17ADF8214; Last     Rx:15Jan2018 Ordered   7  Fluticasone Propionate 50 MCG/ACT Nasal Suspension; INHALE 1 SPRAY Twice daily in     each nostril; Therapy: 16OMD0393 to (Last Rx:15Jan2018)  Requested for: 51MMP3075 Ordered   8  GlyBURIDE 5 MG Oral Tablet; take 2 tablets in AM and 1 tablet in PM;     Therapy: 49Frb4456 to (Evaluate:11Jun2018)  Requested for: 20Jun2017; Last     Rx:16Jun2017 Ordered   9  GNP Loratadine 10 MG Oral Tablet Recorded   10  Ibuprofen 200 MG Oral Tablet; Take 1-3 every 4-6 hours as needed; Therapy: (Recorded:15Jan2018) to Recorded   11  Janumet  MG Oral Tablet; TAKE 1 TABLET TWICE DAILY WITH MEALS; Therapy: 99Lzt5526 to (Evaluate:34Qeo2613)  Requested for: 26UDO2415; Last      Rx:36Lej4295 Ordered   12  Jardiance 25 MG Oral Tablet; Take 1 tablet daily; Therapy: 63Rbn6709 to (Geronimo Souza)  Requested for: 27Mar2017; Last      Rx:27Mar2017 Ordered   13  Ketorolac Tromethamine 0 4 % Ophthalmic Solution; four times a day in Left eye and      three times a day in Right eye;       Therapy: (Recorded:15Jan2018) to Recorded   14  Lantus SoloStar 100 UNIT/ML Subcutaneous Solution Pen-injector; INJECT 30 UNITS        SUBCUTANEOUSLY AT BEDTIME; Therapy: 02VOG6126 to (Evaluate:63Efg3621)  Requested for: 20Jun2017; Last      Rx:12Jun2017 Ordered   15  Lisinopril 40 MG Oral Tablet; Take 1 tablet daily; Therapy: 66Emb0163 to (Evaluate:11Wbw1060)  Requested for: 64ZWA4855; Last      Rx:31May2017 Ordered   16  Ofloxacin 0 3 % Ophthalmic Solution; INSTILL 1 DROP IN LEFT EYE 4 TIMES A DAY; Therapy: (Recorded:15Jan2018) to Recorded   17  Omeprazole 20 MG Oral Capsule Delayed Release; take 1 capsule daily; Therapy: (Recorded:15Jan2018) to Recorded   18  OneTouch Delica Lancets 01B Miscellaneous; Patient testing 3 times daily as directed       Requested for: 20Jan2014; Last Rx:20Jan2014 Ordered   19  OneTouch Verio In [employer]; TEST 3 TIMES DAILY OR AS   DIRECTED; Therapy: 88WRI9597 to (Evaluate:22Mar2018)  Requested for: 37NUI8663; Last      Rx:27Mar2017 Ordered   20  Osteo Bi-Flex Regular Strength TABS; Take 1 tablet twice daily; Therapy: (Recorded:15Jan2018) to Recorded   21  PrednisoLONE Acetate 1 % Ophthalmic Suspension; four times a day in Left eye and      one time a day in Right eye; Therapy: (Recorded:15Jan2018) to Recorded   22  ProAir  (90 Base) MCG/ACT Inhalation Aerosol Solution; INHALE 1 TO 2 PUFFS      EVERY 4 TO 6 HOURS AS NEEDED; Therapy: 98QJK3979 to (Last Rx:15Jan2018)  Requested for: 38ZDB5118 Ordered   23  Probiotic Oral Capsule; USE AS DIRECTED; Therapy: (Recorded:15Jan2018) to Recorded   24  Simvastatin 20 MG Oral Tablet; TAKE 1 TABLET AT BEDTIME; Therapy: 83Dom4562 to (Evaluate:11Jun2018)  Requested for: 20Jun2017; Last      Rx:16Jun2017 Ordered   25  Testosterone Cypionate 200 MG/ML Intramuscular Solution; INJECT 0 3 ML Weekly; Therapy: (Recorded:83Oow2963) to Recorded   26  Vitamin B-1 100 MG Oral Tablet; TAKE 1 TABLET DAILY;       Therapy: 13DMA8707 to Recorded   27  Vitamin D3 1000 UNIT Oral Capsule; TAKE AS DIRECTED; Therapy: 88EPM6471 to Recorded   28  Vitamin E 400 UNIT Oral Capsule; 1 capsule in AM 1 capsule in PM;      Therapy: 36LEP7742 to Recorded     Allergies   Medication    1  Augmentin TABS   2  Biaxin TABS   3  NovoLOG SOLN  Non-Medication    4  Dust   5  Mold   6  Peanuts     Vitals   Vital Signs     Recorded: 51IGT2038 10:56AM   Temperature 99 3 F   Heart Rate 95   Systolic 866   Diastolic 78   Height 5 ft 3 in   Weight 160 lb    BMI Calculated 28 34   BSA Calculated 1 76   Pain Scale 6      Physical Exam        Constitutional      General appearance: Well developed, well nourished, alert, in no distress, non-toxic and no overt pain behavior  Eyes      Sclera: anicteric      HEENT      Hearing grossly intact  Neck      Neck: Supple, symmetric, trachea midline, no masses  Pulmonary      Respiratory effort: Even and unlabored  Abdomen      Abdomen: Soft, non-tender, non-distended  Skin      Skin and subcutaneous tissue: Normal without rashes or lesions, well hydrated  Psychiatric      Mood and affect: Mood and affect appropriate  Neurologic      the muscle tone was normal      Musculoskeletal      Gait and station: Normal        Cervical Spine examination demonstrates Cervical Spine:      Appearance: Normal       Tenderness: left paraspinal tenderness-- and-- left trapezius muscle  Palpatory findings include left-sided muscle spasms  Cervical Sensory Exam:   -- Decreased sensation to light touch on the posterior aspect of the left forearm and 1st 3 digits of the left hand particularly on the dorsal aspect  ROM: Full except as noted: Extension was restricted-- and-- was painful       hand strength was normal bilaterally  wrist strength was normal bilaterally  elbow strength was normal bilaterally  shoulder strength was normal bilaterally        Evaluation of Muscle Stretch Reflexes on the right side demonstrates negative Sams's Test right upper extremity-- and-- negative right ankle clonus--   muscle stretch reflexes equal and symmetric in the right upper and lower limbs  Evaluation of Muscle Stretch Reflexes on the left side demonstrates negative left ankle clonus, but-- muscle stretch reflexes equal and symmetric in the left upper and lower limbs-- and-- negative Sams's Test left upper extremity  Special Tests: positive Spurling's Maneuver to the left, but-- negative Spurling's Maneuver to the right

## 2018-02-16 NOTE — DISCHARGE INSTRUCTIONS
Steroid Joint Injection   WHAT YOU NEED TO KNOW:   A steroid joint injection is a procedure to inject steroid medicine into a joint  Steroid medicine decreases pain and inflammation  The injection may also contain an anesthetic (numbing medicine) to decrease pain  It may be done to treat conditions such as arthritis, gout, or carpal tunnel syndrome  The injections may be given in your knee, ankle, shoulder, elbow, wrist, ankle or sacroiliac joint  1  Do not apply heat to any area that is numb  If you have discomfort or soreness at the injection site, you may apply ice today, 20 minutes on and 20 minutes off  Tomorrow you may use ice or warm, moist heat  Do not apply ice or heat directly to the skin  2  You may have an increase or change in the discomfort for 36-48 hours after your treatment  Apply ice and continue with any pain medicine you have been prescribed  3  Do not do anything strenuous today  You may shower, but no tub baths or hot tubs today  You may resume your normal activities tomorrow, but do not overdo it  Resume normal activities slowly when you are feeling better  4  If you experience redness, drainage or swelling at the injection site, or if you develop a fever above 100 degrees, please call [facility] at [phone] or go to the Emergency Room  5  Continue to take all routine medicines prescribed by your primary care physician unless otherwise instructed by our staff  Most blood thinners should be started again according to your regularly scheduled dosing  If you have any questions, please give our office a call  If you have a problem specifically related to your procedure, please call our office at [phone]  Problems not related to your procedure should be directed to your primary care physician  Call PCP if blood sugar is consistently above 300  Blood sugars may be elevated due to steroid

## 2018-02-19 ENCOUNTER — APPOINTMENT (OUTPATIENT)
Dept: PHYSICAL THERAPY | Age: 63
End: 2018-02-19
Payer: COMMERCIAL

## 2018-02-19 LAB — HBA1C MFR BLD: 5.9 % OF TOTAL HGB

## 2018-02-20 NOTE — PROGRESS NOTES
Please call the patient regarding his abnormal result  The diabetes is too tightly controlled  My concern is that you are having low blood sugar  Please send in blood sugar log  Would recommend another continuous glucose monitor

## 2018-02-22 ENCOUNTER — TELEPHONE (OUTPATIENT)
Dept: ENDOCRINOLOGY | Facility: CLINIC | Age: 63
End: 2018-02-22

## 2018-02-22 ENCOUNTER — TELEPHONE (OUTPATIENT)
Dept: PAIN MEDICINE | Facility: CLINIC | Age: 63
End: 2018-02-22

## 2018-02-22 ENCOUNTER — OFFICE VISIT (OUTPATIENT)
Dept: PHYSICAL THERAPY | Age: 63
End: 2018-02-22
Payer: COMMERCIAL

## 2018-02-22 DIAGNOSIS — M54.12 RADICULOPATHY OF CERVICAL REGION: Primary | ICD-10-CM

## 2018-02-22 DIAGNOSIS — M54.12 CERVICAL RADICULOPATHY: ICD-10-CM

## 2018-02-22 PROCEDURE — 97140 MANUAL THERAPY 1/> REGIONS: CPT | Performed by: PHYSICAL THERAPIST

## 2018-02-22 PROCEDURE — 97110 THERAPEUTIC EXERCISES: CPT | Performed by: PHYSICAL THERAPIST

## 2018-02-22 PROCEDURE — 97112 NEUROMUSCULAR REEDUCATION: CPT | Performed by: PHYSICAL THERAPIST

## 2018-02-22 PROCEDURE — 97012 MECHANICAL TRACTION THERAPY: CPT | Performed by: PHYSICAL THERAPIST

## 2018-02-22 NOTE — PROGRESS NOTES
Daily Note     Today's date: 2018  Patient name: Kallie Jamil  : 1955  MRN: [id number]  Referring provider: Ricky Rey DO  Dx:   Encounter Diagnoses   Name Primary?  Radiculopathy of cervical region Yes    Cervical radiculopathy                   Subjective: Pt reports pain in left lower  cervical spine 4-5/10 and into Left shoulder  PT feels the tape from last treatment was not as effective  Objective: See treatment diary below  Precautions: DMII; HBP;     Daily Treatment Diary     Manual  2/5/18 2/8/18 2/15/1 2/22/18         Traction 8' 8' 8' 20min inclusive of set up mechanical 10# intermittent         SB with Over Pressure 8' 8' 5'          Kinesio Cx CTJ 75%, UT inhib 75%, CX paraspinal 23% facil  8' 8' [place]nel UT inhib 5' No UT inhib  Added spring A inter scap for postural Assist  10min         Prone JM  CTJ L Unilat PA Gr III-IV and C7 &t1 transverse L to R gr  III-IV 10'  CTJ B PA gr III-IV    5' Median N  Glide Positve at postion 1         [place] CTJ/UT   8'              Exercise Diary  2/5/18 2/8/18 2/15/18 2/22/18         Supine progressive retraction 5' 2 pillow  5'1 5 pillow 5'1 pillow 5' one pillow 5' one pillow         Pt self lateral over pressure :10X10 2X10 2X10          Chin tucks supine  2x10 2X10 2x10 with biofeedback at 30mmhg 2mmhg pressure 1 sec ea         Prone I             Prone Y             Prone T             Prone retract             Prone retract with UE ext             Prone retract with UE flex             Supine Rotation   2X10          Mulligan self SNAG rotation   2X10                                                                                                                                   Modalities              CP PRN                                         Pt treated 1:1          Assessment: Pt returns after missing last week due to illness  Pt was able to increase his L SB AROM and continued with pain  Added Added mechanical traction  PT feels traction decreased his pain 50%  Continued with light cervical stabilization using biofeedback  Pt received a  cortisone injection last week and reported no pain for 2 days followed by a gradual return of sx  Pt demonstrated positive neural tension signs and educated in median n  Glides in supine  Plan: Re-assess the effects of mechanical traction next visit

## 2018-02-22 NOTE — TELEPHONE ENCOUNTER
----- Message from Bryan Cardozo MD sent at 2/20/2018  7:41 AM EST -----  Please call the patient regarding his abnormal result  The diabetes is too tightly controlled  My concern is that you are having low blood sugar  Please send in blood sugar log  Would recommend another continuous glucose monitor

## 2018-02-22 NOTE — TELEPHONE ENCOUNTER
Patient reports he is doing okay  He does have pain in his shoulders - he is starting physical therapy - Patient will give more time       Confirmed f/u

## 2018-02-22 NOTE — TELEPHONE ENCOUNTER
----- Message from Kaiden Cochran MD sent at 2/20/2018  7:41 AM EST -----  Please call the patient regarding his abnormal result  The diabetes is too tightly controlled  My concern is that you are having low blood sugar  Please send in blood sugar log  Would recommend another continuous glucose monitor

## 2018-02-26 ENCOUNTER — OFFICE VISIT (OUTPATIENT)
Dept: PHYSICAL THERAPY | Age: 63
End: 2018-02-26
Payer: COMMERCIAL

## 2018-02-26 DIAGNOSIS — M54.12 CERVICAL RADICULOPATHY: ICD-10-CM

## 2018-02-26 DIAGNOSIS — M54.12 RADICULOPATHY OF CERVICAL REGION: Primary | ICD-10-CM

## 2018-02-26 PROCEDURE — 97140 MANUAL THERAPY 1/> REGIONS: CPT | Performed by: PHYSICAL THERAPIST

## 2018-03-02 ENCOUNTER — TELEPHONE (OUTPATIENT)
Dept: PAIN MEDICINE | Facility: MEDICAL CENTER | Age: 63
End: 2018-03-02

## 2018-03-02 NOTE — TELEPHONE ENCOUNTER
S/w the patient and he stated he continues with a lot of pain in his neck radiating to his left shoulder  He continues on the medication that was prescribed previously and he cancelled PT yesterday because he was in a lot of pain  JW to advise   thanks

## 2018-03-02 NOTE — TELEPHONE ENCOUNTER
Pt called stating he has a f/u scheduled with Dr Joce Hamilton on 3/14  He is having a lot of pain  He is taking Gabapentin 300 mg at bedtime and approx 12 to 16 ibuprophen a day  He is having trouble sleeping and getting through the day  Pt would like a call back to discuss  Pt can be reached at [phone]

## 2018-03-02 NOTE — TELEPHONE ENCOUNTER
Patient can titrate up on his gabapentin to 100 mg in the morning, 100 mg in the afternoon, and 300 mg at bedtime to see if this provides better relief    If the patient would like we can try a repeat cervical epidural steroid injection

## 2018-03-05 NOTE — TELEPHONE ENCOUNTER
S/w pt, instructed him to titrate up on his Gabapentin  Pt will give it a few days to take effect and if he does not get relief he will discuss doing a repeat XIAO at his OV on 3/14, told pt that he can call before that if he would like to schedule it  Also instructed pt to call if he needs a refill of his Gabapentin with the increase in dose, pt verbalized understanding but said he just got it filled so should have enough for a while

## 2018-03-13 DIAGNOSIS — E11.8 TYPE 2 DIABETES MELLITUS WITH COMPLICATION, UNSPECIFIED LONG TERM INSULIN USE STATUS: Primary | ICD-10-CM

## 2018-03-14 ENCOUNTER — OFFICE VISIT (OUTPATIENT)
Dept: PAIN MEDICINE | Facility: CLINIC | Age: 63
End: 2018-03-14
Payer: COMMERCIAL

## 2018-03-14 VITALS
HEIGHT: 63 IN | SYSTOLIC BLOOD PRESSURE: 150 MMHG | WEIGHT: 163.6 LBS | BODY MASS INDEX: 28.99 KG/M2 | TEMPERATURE: 98.5 F | HEART RATE: 86 BPM | DIASTOLIC BLOOD PRESSURE: 80 MMHG

## 2018-03-14 DIAGNOSIS — M48.02 CERVICAL STENOSIS OF SPINAL CANAL: ICD-10-CM

## 2018-03-14 DIAGNOSIS — M50.20 CERVICAL HERNIATED DISC: ICD-10-CM

## 2018-03-14 DIAGNOSIS — M54.2 NECK PAIN: Primary | ICD-10-CM

## 2018-03-14 DIAGNOSIS — M47.812 SPONDYLOSIS OF CERVICAL REGION WITHOUT MYELOPATHY OR RADICULOPATHY: ICD-10-CM

## 2018-03-14 DIAGNOSIS — M54.12 CERVICAL RADICULOPATHY: ICD-10-CM

## 2018-03-14 PROBLEM — L21.9 SEBORRHEIC DERMATITIS: Status: ACTIVE | Noted: 2017-11-27

## 2018-03-14 PROBLEM — J06.9 VIRAL UPPER RESPIRATORY TRACT INFECTION WITH COUGH: Status: ACTIVE | Noted: 2018-01-15

## 2018-03-14 PROCEDURE — 99215 OFFICE O/P EST HI 40 MIN: CPT | Performed by: NURSE PRACTITIONER

## 2018-03-14 RX ORDER — ASPIRIN 81 MG/1
81 TABLET ORAL DAILY
COMMUNITY

## 2018-03-14 RX ORDER — LISINOPRIL 40 MG/1
TABLET ORAL
COMMUNITY
Start: 2018-01-15 | End: 2018-05-31 | Stop reason: SDUPTHER

## 2018-03-14 RX ORDER — EMPAGLIFLOZIN 25 MG/1
TABLET, FILM COATED ORAL
COMMUNITY
Start: 2018-02-24 | End: 2018-04-21 | Stop reason: SDUPTHER

## 2018-03-14 RX ORDER — FLUTICASONE PROPIONATE 50 MCG
SPRAY, SUSPENSION (ML) NASAL 2 TIMES DAILY
COMMUNITY
Start: 2018-01-15 | End: 2019-02-27

## 2018-03-14 RX ORDER — SIMVASTATIN 20 MG
TABLET ORAL
COMMUNITY
Start: 2018-01-15 | End: 2018-05-31 | Stop reason: SDUPTHER

## 2018-03-14 RX ORDER — GABAPENTIN 300 MG/1
CAPSULE ORAL
Qty: 90 CAPSULE | Refills: 1 | Status: SHIPPED | OUTPATIENT
Start: 2018-03-14 | End: 2018-05-10 | Stop reason: SDUPTHER

## 2018-03-14 RX ORDER — GABAPENTIN 100 MG/1
CAPSULE ORAL
COMMUNITY
Start: 2018-02-17 | End: 2018-03-14 | Stop reason: SDUPTHER

## 2018-03-14 RX ORDER — IBUPROFEN 200 MG
200 TABLET ORAL AS NEEDED
COMMUNITY

## 2018-03-14 NOTE — PROGRESS NOTES
Assessment:  1  Neck pain    2  Cervical stenosis of spinal canal    3  Spondylosis of cervical region without myelopathy or radiculopathy    4  Cervical herniated disc    5  Cervical radiculopathy        Plan:  While the patient was in the office today, I did have a thorough conversation with the patient regarding his medication regimen and treatment plan  I explained to the patient that at this point time since I feel there is definitely significant inflammatory and neuropathic component and he did note short-term relief, that it would be beneficial to proceed with a repeat cervical epidural steroid injection with Dr Nickolas Roth  Complete risks and benefits including bleeding, infection, tissue reaction, nerve injury and allergic reaction were discussed  The approach was demonstrated using models and literature was provided  Verbal and written consent was obtained  With regards to the gabapentin, I explained the patient that since I do feel there is definitely a significant neuropathic component and he is on a subtherapeutic dose, with minimal improvement, without side effects, that would be beneficial to slowly titrate him up to 900 mg daily over the next 2 months and see how he does  I advised the patient that if they experience any side effects or issues with the changes in their medication regiment, they should give our office a call to discuss  I also advised the patient not to drive or operate machinery until they see how the changes in the medication regimen affects them  The patient was agreeable and verbalized an understanding  I advised the patient to hold off on the physical therapy and/or any chiropractic treatment for now and let see how he does with the increase in gabapentin and the repeat injection  The patient was agreeable and verbalized an understanding  The patient will follow-up in 8 weeks for medication prescription refill and reevaluation   The patient was advised to contact the office should their symptoms worsen in the interim  The patient was agreeable and verbalized an understanding  I attest that I have spent at least 30 minutes face to face with the patient and that at least 50% of the time was educating and/or discussing the patient's symptoms and treatment plan options until the patient was satisfied with the plan  History of Present Illness: The patient is a 57 y o  male last seen on 1/22/18 who presents for a follow up office visit in regards to chronic pain secondary to cervical spondylosis with a herniated disc and stenosis as well as left upper extremity radiculopathy  The patient currently reports that since his last office visit although some of his symptoms have improved, he does feel that some of the radicular symptoms have worsened, especially as a result of the physical therapy  He reports that after a week or 2 of therapy he felt that was actually making his pain worse and right now the therapy is on hold  He did proceed with the cervical epidural steroid injection on February 16, 2018 and reports that it did provide relief for approximately 5 days, however, he did have physical therapy around the time of the injection and is not sure that he can make a fair judgment about whether not the injection was helpful  He presents today to discuss his medication regimen and treatment plan as well as the possibility of proceeding with a repeat cervical epidural steroid injection  Since his last office visit he did start the gabapentin as recommended and is currently taking 500 mg daily with minimal to moderate improvement, without side effects  Current pain medications includes:  Gabapentin 500 mg daily  The patient reports that this regimen is providing 30% pain relief  The patient is reporting no side effects from this pain medication regimen      I have personally reviewed and/or updated the patient's past medical history, past surgical history, family history, social history, current medications, allergies, and vital signs today  Review of Systems:    Review of Systems   Respiratory: Negative for shortness of breath  Cardiovascular: Negative for chest pain  Gastrointestinal: Negative for constipation, diarrhea, nausea and vomiting  Musculoskeletal: Negative for arthralgias, gait problem, joint swelling and myalgias  Skin: Positive for rash  Neurological: Negative for dizziness, seizures and weakness (Muscle )  All other systems reviewed and are negative  Past Medical History:   Diagnosis Date    Avitaminosis D 2012    Diabetes mellitus ([facility] )     Displacement of cervical intervertebral disc 2014    HTN (hypertension) 2007    [facility]    Spinal stenosis in cervical region 2014    Uric acid nephrolithiasis 1990       No past surgical history on file  No family history on file  Social History     Occupational History    Not on file       Social History Main Topics    Smoking status: Not on file    Smokeless tobacco: Not on file    Alcohol use Not on file    Drug use: Unknown    Sexual activity: Not on file         Current Outpatient Prescriptions:     fluticasone (FLONASE) 50 mcg/act nasal spray, into each nostril Twice daily, Disp: , Rfl:     Insulin Pen Needle (BD PEN NEEDLE MILDRED U/F) 32G X 4 MM MISC, by Does not apply route, Disp: , Rfl:     SYRINGE-NEEDLE, DISP, 3 ML (B-D SYRINGE/NEEDLE 3CC/23GX1") 23G X 1" 3 ML MISC, by Does not apply route once a week, Disp: , Rfl:     amLODIPine-benazepril (LOTREL) 10-20 MG per capsule, Take 1 capsule by mouth daily, Disp: , Rfl:     aspirin (ECOTRIN LOW STRENGTH) 81 mg EC tablet, Take by mouth, Disp: , Rfl:     gabapentin (NEURONTIN) 100 mg capsule, , Disp: , Rfl:     gabapentin (NEURONTIN) 250 mg/5 mL solution, Take by mouth 3 (three) times a day, Disp: , Rfl:     Glucosamine-Chondroitin (OSTEO BI-FLEX REGULAR STRENGTH) 250-200 MG TABS, Take 1 tablet by mouth 2 (two) times a day, Disp: , Rfl:     glyBURIDE (DIABETA) 5 mg tablet, Take 5 mg by mouth daily with breakfast, Disp: , Rfl:     ibuprofen (MOTRIN) 200 mg tablet, Take by mouth, Disp: , Rfl:     insulin glulisine (APIDRA SOLOSTAR) injection pen 100 units/mL, Inject under the skin Daily, Disp: , Rfl:     JANUMET  MG per tablet, TAKE 1 TABLET TWICE DAILY  WITH MEALS, Disp: 180 tablet, Rfl: 3    JARDIANCE 25 MG TABS, , Disp: , Rfl:     lisinopril (ZESTRIL) 40 mg tablet, , Disp: , Rfl:     loratadine (CLARITIN) 10 mg tablet, Take 10 mg by mouth daily, Disp: , Rfl:     omeprazole (PriLOSEC) 10 mg delayed release capsule, Take 10 mg by mouth daily, Disp: , Rfl:     ONETOUCH VERIO test strip, Test blood sugars 4 x daily as directed, Disp: 400 each, Rfl: 3    PROAIR  (90 Base) MCG/ACT inhaler, Inhale 108 puffs every 4 to 6 hours as needed, Disp: , Rfl: 0    Probiotic Product (PROBIOTIC-10) CAPS, Take by mouth, Disp: , Rfl:     simvastatin (ZOCOR) 10 mg tablet, Take 10 mg by mouth daily at bedtime, Disp: , Rfl:     simvastatin (ZOCOR) 20 mg tablet, , Disp: , Rfl:     Allergies   Allergen Reactions    Augmentin [Amoxicillin-Pot Clavulanate] Abdominal Pain    Biaxin [Clarithromycin] Abdominal Pain    Dust Mite Extract     Insulin Aspart GI Intolerance    Molds & Smuts     Nuts     Seasonal Ic [Cholestatin] Headache       Physical Exam:    /80   Pulse 86   Temp 98 5 °F (36 9 °C)   Ht 5' 3" (1 6 m)   Wt 74 2 kg (163 lb 9 6 oz)   BMI 28 98 kg/m²     Constitutional:normal, well developed, well nourished, alert, in no distress and non-toxic and no overt pain behavior    Eyes:anicteric  HEENT:grossly intact  Neck:supple, symmetric, trachea midline and no masses   Pulmonary:even and unlabored  Cardiovascular:No edema or pitting edema present  Skin:Normal without rashes or lesions and well hydrated  Psychiatric:Mood and affect appropriate  Neurologic:Cranial Nerves II-XII grossly intact  Musculoskeletal:normal      Imaging  No orders to display         No orders of the defined types were placed in this encounter

## 2018-03-14 NOTE — H&P
Assessment:  1  Neck pain    2  Cervical stenosis of spinal canal    3  Spondylosis of cervical region without myelopathy or radiculopathy    4  Cervical herniated disc    5  Cervical radiculopathy        Plan:  While the patient was in the office today, I did have a thorough conversation with the patient regarding his medication regimen and treatment plan  I explained to the patient that at this point time since I feel there is definitely significant inflammatory and neuropathic component and he did note short-term relief, that it would be beneficial to proceed with a repeat cervical epidural steroid injection with Dr Keeley Adair  Complete risks and benefits including bleeding, infection, tissue reaction, nerve injury and allergic reaction were discussed  The approach was demonstrated using models and literature was provided  Verbal and written consent was obtained  With regards to the gabapentin, I explained the patient that since I do feel there is definitely a significant neuropathic component and he is on a subtherapeutic dose, with minimal improvement, without side effects, that would be beneficial to slowly titrate him up to 900 mg daily over the next 2 months and see how he does  I advised the patient that if they experience any side effects or issues with the changes in their medication regiment, they should give our office a call to discuss  I also advised the patient not to drive or operate machinery until they see how the changes in the medication regimen affects them  The patient was agreeable and verbalized an understanding  I advised the patient to hold off on the physical therapy and/or any chiropractic treatment for now and let see how he does with the increase in gabapentin and the repeat injection  The patient was agreeable and verbalized an understanding  The patient will follow-up in 8 weeks for medication prescription refill and reevaluation   The patient was advised to contact the office should their symptoms worsen in the interim  The patient was agreeable and verbalized an understanding  I attest that I have spent at least 30 minutes face to face with the patient and that at least 50% of the time was educating and/or discussing the patient's symptoms and treatment plan options until the patient was satisfied with the plan  History of Present Illness: The patient is a 57 y o  male last seen on 1/22/18 who presents for a follow up office visit in regards to chronic pain secondary to cervical spondylosis with a herniated disc and stenosis as well as left upper extremity radiculopathy  The patient currently reports that since his last office visit although some of his symptoms have improved, he does feel that some of the radicular symptoms have worsened, especially as a result of the physical therapy  He reports that after a week or 2 of therapy he felt that was actually making his pain worse and right now the therapy is on hold  He did proceed with the cervical epidural steroid injection on February 16, 2018 and reports that it did provide relief for approximately 5 days, however, he did have physical therapy around the time of the injection and is not sure that he can make a fair judgment about whether not the injection was helpful  He presents today to discuss his medication regimen and treatment plan as well as the possibility of proceeding with a repeat cervical epidural steroid injection  Since his last office visit he did start the gabapentin as recommended and is currently taking 500 mg daily with minimal to moderate improvement, without side effects  Current pain medications includes:  Gabapentin 500 mg daily  The patient reports that this regimen is providing 30% pain relief  The patient is reporting no side effects from this pain medication regimen      I have personally reviewed and/or updated the patient's past medical history, past surgical history, family history, social history, current medications, allergies, and vital signs today  Review of Systems:    Review of Systems   Respiratory: Negative for shortness of breath  Cardiovascular: Negative for chest pain  Gastrointestinal: Negative for constipation, diarrhea, nausea and vomiting  Musculoskeletal: Negative for arthralgias, gait problem, joint swelling and myalgias  Skin: Positive for rash  Neurological: Negative for dizziness, seizures and weakness (Muscle )  All other systems reviewed and are negative  Past Medical History:   Diagnosis Date    Avitaminosis D 2012    [facility] mellitus ([facility] )     Displacement of cervical intervertebral disc 2014    HTN (hypertension) 2007    [facility] 2010    Spinal stenosis in cervical region 2014    Uric acid nephrolithiasis 1990       No past surgical history on file  No family history on file  Social History     Occupational History    Not on file       Social History Main Topics    Smoking status: Not on file    Smokeless tobacco: Not on file    Alcohol use Not on file    Drug use: Unknown    Sexual activity: Not on file         Current Outpatient Prescriptions:     fluticasone (FLONASE) 50 mcg/act nasal spray, into each nostril Twice daily, Disp: , Rfl:     Insulin Pen Needle (BD PEN NEEDLE MILDRED U/F) 32G X 4 MM MISC, by Does not apply route, Disp: , Rfl:     SYRINGE-NEEDLE, DISP, 3 ML (B-D SYRINGE/NEEDLE 3CC/23GX1") 23G X 1" 3 ML MISC, by Does not apply route once a week, Disp: , Rfl:     amLODIPine-benazepril (LOTREL) 10-20 MG per capsule, Take 1 capsule by mouth daily, Disp: , Rfl:     aspirin (ECOTRIN LOW STRENGTH) 81 mg EC tablet, Take by mouth, Disp: , Rfl:     gabapentin (NEURONTIN) 100 mg capsule, , Disp: , Rfl:     gabapentin (NEURONTIN) 250 mg/5 mL solution, Take by mouth 3 (three) times a day, Disp: , Rfl:     Glucosamine-Chondroitin (OSTEO BI-FLEX REGULAR STRENGTH) 250-200 MG TABS, Take 1 tablet by mouth 2 (two) times a day, Disp: , Rfl:     glyBURIDE (DIABETA) 5 mg tablet, Take 5 mg by mouth daily with breakfast, Disp: , Rfl:     ibuprofen (MOTRIN) 200 mg tablet, Take by mouth, Disp: , Rfl:     insulin glulisine (APIDRA SOLOSTAR) injection pen 100 units/mL, Inject under the skin Daily, Disp: , Rfl:     JANUMET  MG per tablet, TAKE 1 TABLET TWICE DAILY  WITH MEALS, Disp: 180 tablet, Rfl: 3    JARDIANCE 25 MG TABS, , Disp: , Rfl:     lisinopril (ZESTRIL) 40 mg tablet, , Disp: , Rfl:     loratadine (CLARITIN) 10 mg tablet, Take 10 mg by mouth daily, Disp: , Rfl:     omeprazole (PriLOSEC) 10 mg delayed release capsule, Take 10 mg by mouth daily, Disp: , Rfl:     ONETOUCH VERIO test strip, Test blood sugars 4 x daily as directed, Disp: 400 each, Rfl: 3    PROAIR  (90 Base) MCG/ACT inhaler, Inhale 108 puffs every 4 to 6 hours as needed, Disp: , Rfl: 0    Probiotic Product (PROBIOTIC-10) CAPS, Take by mouth, Disp: , Rfl:     simvastatin (ZOCOR) 10 mg tablet, Take 10 mg by mouth daily at bedtime, Disp: , Rfl:     simvastatin (ZOCOR) 20 mg tablet, , Disp: , Rfl:     Allergies   Allergen Reactions    Augmentin [Amoxicillin-Pot Clavulanate] Abdominal Pain    Biaxin [Clarithromycin] Abdominal Pain    Dust Mite Extract     Insulin Aspart GI Intolerance    Molds & Smuts     Nuts     Seasonal Ic [Cholestatin] Headache       Physical Exam:    /80   Pulse 86   Temp 98 5 °F (36 9 °C)   Ht 5' 3" (1 6 m)   Wt 74 2 kg (163 lb 9 6 oz)   BMI 28 98 kg/m²      Constitutional:normal, well developed, well nourished, alert, in no distress and non-toxic and no overt pain behavior    Eyes:anicteric  HEENT:grossly intact  Neck:supple, symmetric, trachea midline and no masses   Pulmonary:even and unlabored  Cardiovascular:No edema or pitting edema present  Skin:Normal without rashes or lesions and well hydrated  Psychiatric:Mood and affect appropriate  Neurologic:Cranial Nerves II-XII grossly intact  Musculoskeletal:normal      Imaging  No orders to display         No orders of the defined types were placed in this encounter

## 2018-03-16 ENCOUNTER — TELEPHONE (OUTPATIENT)
Dept: ENDOCRINOLOGY | Facility: CLINIC | Age: 63
End: 2018-03-16

## 2018-03-20 ENCOUNTER — TELEPHONE (OUTPATIENT)
Dept: PAIN MEDICINE | Facility: MEDICAL CENTER | Age: 63
End: 2018-03-20

## 2018-03-20 NOTE — TELEPHONE ENCOUNTER
Pt left message on spa voicemail  States that he saw Jas on 3/14 and has some questions about his medications  Pt is requesting a call back at [phone]

## 2018-03-22 ENCOUNTER — TELEPHONE (OUTPATIENT)
Dept: PAIN MEDICINE | Facility: MEDICAL CENTER | Age: 63
End: 2018-03-22

## 2018-03-27 DIAGNOSIS — Z79.4 TYPE 2 DIABETES MELLITUS WITH HYPERGLYCEMIA, WITH LONG-TERM CURRENT USE OF INSULIN (HCC): Primary | ICD-10-CM

## 2018-03-27 DIAGNOSIS — E11.65 TYPE 2 DIABETES MELLITUS WITH HYPERGLYCEMIA, WITH LONG-TERM CURRENT USE OF INSULIN (HCC): Primary | ICD-10-CM

## 2018-03-27 RX ORDER — INSULIN GLARGINE 100 [IU]/ML
INJECTION, SOLUTION SUBCUTANEOUS
Qty: 30 ML | Refills: 2 | Status: SHIPPED | OUTPATIENT
Start: 2018-03-27 | End: 2018-05-31 | Stop reason: SDUPTHER

## 2018-03-28 DIAGNOSIS — E11.9 TYPE 2 DIABETES MELLITUS WITHOUT COMPLICATION, UNSPECIFIED LONG TERM INSULIN USE STATUS: ICD-10-CM

## 2018-03-29 RX ORDER — BLOOD SUGAR DIAGNOSTIC
STRIP MISCELLANEOUS
Qty: 400 EACH | Refills: 3 | Status: SHIPPED | OUTPATIENT
Start: 2018-03-29 | End: 2019-03-19 | Stop reason: SDUPTHER

## 2018-04-06 ENCOUNTER — HOSPITAL ENCOUNTER (OUTPATIENT)
Dept: RADIOLOGY | Facility: CLINIC | Age: 63
Discharge: HOME | End: 2018-04-06
Admitting: ANESTHESIOLOGY
Payer: COMMERCIAL

## 2018-04-06 VITALS
RESPIRATION RATE: 18 BRPM | OXYGEN SATURATION: 96 % | TEMPERATURE: 98.6 F | SYSTOLIC BLOOD PRESSURE: 158 MMHG | HEART RATE: 72 BPM | DIASTOLIC BLOOD PRESSURE: 71 MMHG

## 2018-04-06 DIAGNOSIS — M54.12 CERVICAL RADICULOPATHY: ICD-10-CM

## 2018-04-06 DIAGNOSIS — M48.02 CERVICAL STENOSIS OF SPINAL CANAL: ICD-10-CM

## 2018-04-06 DIAGNOSIS — M50.20 CERVICAL HERNIATED DISC: ICD-10-CM

## 2018-04-06 DIAGNOSIS — M54.2 NECK PAIN: ICD-10-CM

## 2018-04-06 PROCEDURE — 62321 NJX INTERLAMINAR CRV/THRC: CPT | Performed by: ANESTHESIOLOGY

## 2018-04-06 RX ORDER — PAPAVERINE HCL 150 MG
10 CAPSULE, EXTENDED RELEASE ORAL ONCE
Status: COMPLETED | OUTPATIENT
Start: 2018-04-06 | End: 2018-04-06

## 2018-04-06 RX ORDER — LIDOCAINE HYDROCHLORIDE 10 MG/ML
5 INJECTION, SOLUTION EPIDURAL; INFILTRATION; INTRACAUDAL; PERINEURAL ONCE
Status: COMPLETED | OUTPATIENT
Start: 2018-04-06 | End: 2018-04-06

## 2018-04-06 RX ADMIN — LIDOCAINE HYDROCHLORIDE 3 ML: 10 INJECTION, SOLUTION EPIDURAL; INFILTRATION; INTRACAUDAL; PERINEURAL at 16:08

## 2018-04-06 RX ADMIN — IOHEXOL 1 ML: 300 INJECTION, SOLUTION INTRAVENOUS at 16:10

## 2018-04-06 RX ADMIN — DEXAMETHASONE SODIUM PHOSPHATE 10 MG: 10 INJECTION, SOLUTION INTRAMUSCULAR; INTRAVENOUS at 16:11

## 2018-04-06 NOTE — DISCHARGE INSTRUCTIONS
Epidural Steroid Injection   WHAT YOU NEED TO KNOW:   An epidural steroid injection (LUIS MIGUEL) is a procedure to inject steroid medicine into the epidural space  The epidural space is between your spinal cord and vertebrae  Steroids reduce inflammation and fluid buildup in your spine that may be causing pain  You may be given pain medicine along with the steroids  ACTIVITY  · Do not drive or operate machinery today  · No strenuous activity today - bending, lifting, etc   · You may resume normal activites starting tomorrow - start slowly and as tolerated  · You may shower today, but no tub baths or hot tubs  · You may have numbness for several hours from the local anesthetic  Please use caution and common sense, especially with weight-bearing activities  CARE OF THE INJECTION SITE  · If you have soreness or pain, apply ice to the area today (20 minutes on/20 minutes off)  · Starting tomorrow, you may use warm, moist heat or ice if needed  · You may have an increase or change in your discomfort for 36-48 hours after your treatment  · Apply ice and continue with any pain medication you have been prescribed  · Notify [facility] if you have any of the following: redness, drainage, swelling, headache, stiff neck or fever above 100°F     SPECIAL INSTRUCTIONS  · Our office will contact you in approximately 7 days for a progress report  MEDICATIONS  · Continue to take all routine medications  · Our office may have instructed you to hold some medications  If you have a problem specifically related to your procedure, please call our office at [phone]  Problems not related to your procedure should be directed to your primary care physician

## 2018-04-06 NOTE — H&P
History of Present Illness: The patient is a 57 y o  male who presents with complaints of neck and arm pain  Patient Active Problem List   Diagnosis    Benign essential hypertension    Carpal tunnel syndrome    Cervical herniated disc    Cervical radiculopathy    Cervical stenosis of spinal canal    Neck pain    DMII (diabetes mellitus, type 2) ([facility] [address] [facility]    Hyperlipidemia    Hypogonadotropic hypogonadism (HCC)    Nephrolithiasis    Pituitary microadenoma ([facility] [facility]    Seborrheic dermatitis    Sleep apnea    Spondylosis of cervical region without myelopathy or radiculopathy    Sprain and strain of calcaneofibular (ligament)    Viral upper respiratory tract infection with cough    Vitamin D deficiency       Past Medical History:   Diagnosis Date    Avitaminosis D 2012    Diabetes mellitus ([facility] [address] [facility]     Displacement of cervical intervertebral disc 2014    HTN (hypertension) 2007    [facility]    Spinal stenosis in cervical region 2014    Uric acid nephrolithiasis 1990       History reviewed  No pertinent surgical history        Current Outpatient Prescriptions:     amLODIPine-benazepril (LOTREL) 10-20 MG per capsule, Take 1 capsule by mouth daily, Disp: , Rfl:     aspirin (ECOTRIN LOW STRENGTH) 81 mg EC tablet, Take by mouth, Disp: , Rfl:     fluticasone (FLONASE) 50 mcg/act nasal spray, into each nostril Twice daily, Disp: , Rfl:     Glucosamine-Chondroitin (OSTEO BI-FLEX REGULAR STRENGTH) 250-200 MG TABS, Take 1 tablet by mouth 2 (two) times a day, Disp: , Rfl:     glyBURIDE (DIABETA) 5 mg tablet, Take 5 mg by mouth daily with breakfast, Disp: , Rfl:     ibuprofen (MOTRIN) 200 mg tablet, Take by mouth, Disp: , Rfl:     insulin glulisine (APIDRA SOLOSTAR) injection pen 100 units/mL, Inject under the skin Daily, Disp: , Rfl:     Insulin Pen Needle (BD PEN NEEDLE MILDRED U/F) 32G X 4 MM MISC, by Does not apply route, Disp: , Rfl:     JANUMET  MG per tablet, TAKE 1 TABLET TWICE DAILY  WITH MEALS, Disp: 180 tablet, Rfl: 3    JARDIANCE 25 MG TABS, , Disp: , Rfl:     LANTUS SOLOSTAR injection pen 100 units/mL, INJECT 30 UNITS            SUBCUTANEOUSLY AT BEDTIME, Disp: 30 mL, Rfl: 2    lisinopril (ZESTRIL) 40 mg tablet, , Disp: , Rfl:     loratadine (CLARITIN) 10 mg tablet, Take 10 mg by mouth daily, Disp: , Rfl:     omeprazole (PriLOSEC) 10 mg delayed release capsule, Take 10 mg by mouth daily, Disp: , Rfl:     ONETOUCH VERIO test strip, Test blood sugars 4 x daily as directed, Disp: 400 each, Rfl: 3    Probiotic Product (PROBIOTIC-10) CAPS, Take by mouth, Disp: , Rfl:     simvastatin (ZOCOR) 10 mg tablet, Take 10 mg by mouth daily at bedtime, Disp: , Rfl:     simvastatin (ZOCOR) 20 mg tablet, , Disp: , Rfl:     SYRINGE-NEEDLE, DISP, 3 ML (B-D SYRINGE/NEEDLE 3CC/23GX1") 23G X 1" 3 ML MISC, by Does not apply route once a week, Disp: , Rfl:     gabapentin (NEURONTIN) 300 mg capsule, Take 1 PO BID x 2 weeks, then 1 PO TID , Disp: 90 capsule, Rfl: 1    PROAIR  (90 Base) MCG/ACT inhaler, Inhale 108 puffs every 4 to 6 hours as needed, Disp: , Rfl: 0  No current facility-administered medications for this encounter  Allergies   Allergen Reactions    Augmentin [Amoxicillin-Pot Clavulanate] Abdominal Pain    Biaxin [Clarithromycin] Abdominal Pain    Dust Mite Extract     Insulin Aspart GI Intolerance    Molds & Smuts     Nuts     Seasonal Ic [Cholestatin] Headache       Physical Exam:   Vitals:    04/06/18 1614   BP: 158/71   Pulse: 72   Resp: 18   Temp:    SpO2: 96%     General: Awake, Alert, Oriented x 3, Mood and affect appropriate  Respiratory: Respirations even and unlabored  Cardiovascular: Peripheral pulses intact; no edema  Musculoskeletal Exam:   Bilateral cervical paraspinals tender to palpation  ASA Score: 2    Assessment:   1  Neck pain    2  Cervical stenosis of spinal canal    3  Cervical herniated disc    4   Cervical radiculopathy        Plan: XIAO #2        Assessment:  1  Neck pain    2  Cervical stenosis of spinal canal    3  Spondylosis of cervical region without myelopathy or radiculopathy    4  Cervical herniated disc    5  Cervical radiculopathy          Plan:  While the patient was in the office today, I did have a thorough conversation with the patient regarding his medication regimen and treatment plan  I explained to the patient that at this point time since I feel there is definitely significant inflammatory and neuropathic component and he did note short-term relief, that it would be beneficial to proceed with a repeat cervical epidural steroid injection with Dr Rizwana Vaughan  Complete risks and benefits including bleeding, infection, tissue reaction, nerve injury and allergic reaction were discussed  The approach was demonstrated using models and literature was provided  Verbal and written consent was obtained  With regards to the gabapentin, I explained the patient that since I do feel there is definitely a significant neuropathic component and he is on a subtherapeutic dose, with minimal improvement, without side effects, that would be beneficial to slowly titrate him up to 900 mg daily over the next 2 months and see how he does  I advised the patient that if they experience any side effects or issues with the changes in their medication regiment, they should give our office a call to discuss  I also advised the patient not to drive or operate machinery until they see how the changes in the medication regimen affects them  The patient was agreeable and verbalized an understanding       I advised the patient to hold off on the physical therapy and/or any chiropractic treatment for now and let see how he does with the increase in gabapentin and the repeat injection  The patient was agreeable and verbalized an understanding                                                       The patient will follow-up in 8 weeks for medication prescription refill and reevaluation  The patient was advised to contact the office should their symptoms worsen in the interim  The patient was agreeable and verbalized an understanding        I attest that I have spent at least 30 minutes face to face with the patient and that at least 50% of the time was educating and/or discussing the patient's symptoms and treatment plan options until the patient was satisfied with the plan          History of Present Illness: The patient is a 57 y o  male last seen on 1/22/18 who presents for a follow up office visit in regards to chronic pain secondary to cervical spondylosis with a herniated disc and stenosis as well as left upper extremity radiculopathy  The patient currently reports that since his last office visit although some of his symptoms have improved, he does feel that some of the radicular symptoms have worsened, especially as a result of the physical therapy  He reports that after a week or 2 of therapy he felt that was actually making his pain worse and right now the therapy is on hold  He did proceed with the cervical epidural steroid injection on February 16, 2018 and reports that it did provide relief for approximately 5 days, however, he did have physical therapy around the time of the injection and is not sure that he can make a fair judgment about whether not the injection was helpful  He presents today to discuss his medication regimen and treatment plan as well as the possibility of proceeding with a repeat cervical epidural steroid injection  Since his last office visit he did start the gabapentin as recommended and is currently taking 500 mg daily with minimal to moderate improvement, without side effects      Current pain medications includes:  Gabapentin 500 mg daily  The patient reports that this regimen is providing 30% pain relief    The patient is reporting no side effects from this pain medication regimen      I have personally reviewed and/or updated the patient's past medical history, past surgical history, family history, social history, current medications, allergies, and vital signs today          Review of Systems:     Review of Systems   Respiratory: Negative for shortness of breath  Cardiovascular: Negative for chest pain  Gastrointestinal: Negative for constipation, diarrhea, nausea and vomiting  Musculoskeletal: Negative for arthralgias, gait problem, joint swelling and myalgias  Skin: Positive for rash  Neurological: Negative for dizziness, seizures and weakness (Muscle )     All other systems reviewed and are negative                 Past Medical History:   Diagnosis Date    Avitaminosis D 2012    Diabetes mellitus ([facility] [address] [facility]      Displacement of cervical intervertebral disc 2014    HTN (hypertension) 2007    [facility]    Spinal stenosis in cervical region 2014    Uric acid nephrolithiasis 1990         No past surgical history on file      No family history on file      Social History          Occupational History    Not on file            Social History Main Topics    Smoking status: Not on file    Smokeless tobacco: Not on file    Alcohol use Not on file    Drug use: Unknown    Sexual activity: Not on file            Current Outpatient Prescriptions:     fluticasone (FLONASE) 50 mcg/act nasal spray, into each nostril Twice daily, Disp: , Rfl:     Insulin Pen Needle (BD PEN NEEDLE MILDRED U/F) 32G X 4 MM MISC, by Does not apply route, Disp: , Rfl:     SYRINGE-NEEDLE, DISP, 3 ML (B-D SYRINGE/NEEDLE 3CC/23GX1") 23G X 1" 3 ML MISC, by Does not apply route once a week, Disp: , Rfl:     amLODIPine-benazepril (LOTREL) 10-20 MG per capsule, Take 1 capsule by mouth daily, Disp: , Rfl:     aspirin (ECOTRIN LOW STRENGTH) 81 mg EC tablet, Take by mouth, Disp: , Rfl:     gabapentin (NEURONTIN) 100 mg capsule, , Disp: , Rfl:     gabapentin (NEURONTIN) 250 mg/5 mL solution, Take by mouth 3 (three) times a day, Disp: , Rfl:     Glucosamine-Chondroitin (OSTEO BI-FLEX REGULAR STRENGTH) 250-200 MG TABS, Take 1 tablet by mouth 2 (two) times a day, Disp: , Rfl:     glyBURIDE (DIABETA) 5 mg tablet, Take 5 mg by mouth daily with breakfast, Disp: , Rfl:     ibuprofen (MOTRIN) 200 mg tablet, Take by mouth, Disp: , Rfl:     insulin glulisine (APIDRA SOLOSTAR) injection pen 100 units/mL, Inject under the skin Daily, Disp: , Rfl:     JANUMET  MG per tablet, TAKE 1 TABLET TWICE DAILY  WITH MEALS, Disp: 180 tablet, Rfl: 3    JARDIANCE 25 MG TABS, , Disp: , Rfl:     lisinopril (ZESTRIL) 40 mg tablet, , Disp: , Rfl:     loratadine (CLARITIN) 10 mg tablet, Take 10 mg by mouth daily, Disp: , Rfl:     omeprazole (PriLOSEC) 10 mg delayed release capsule, Take 10 mg by mouth daily, Disp: , Rfl:     ONETOUCH VERIO test strip, Test blood sugars 4 x daily as directed, Disp: 400 each, Rfl: 3    PROAIR  (90 Base) MCG/ACT inhaler, Inhale 108 puffs every 4 to 6 hours as needed, Disp: , Rfl: 0    Probiotic Product (PROBIOTIC-10) CAPS, Take by mouth, Disp: , Rfl:     simvastatin (ZOCOR) 10 mg tablet, Take 10 mg by mouth daily at bedtime, Disp: , Rfl:     simvastatin (ZOCOR) 20 mg tablet, , Disp: , Rfl:      Allergies   Allergen Reactions    Augmentin [Amoxicillin-Pot Clavulanate] Abdominal Pain    Biaxin [Clarithromycin] Abdominal Pain    Dust Mite Extract      Insulin Aspart GI Intolerance    Molds & Smuts      Nuts      Seasonal Ic [Cholestatin] Headache         Physical Exam:     /80   Pulse 86   Temp 98 5 °F (36 9 °C)   Ht 5' 3" (1 6 m)   Wt 74 2 kg (163 lb 9 6 oz)   BMI 28 98 kg/m²       Constitutional:normal, well developed, well nourished, alert, in no distress and non-toxic and no overt pain behavior    Eyes:anicteric  HEENT:grossly intact  Neck:supple, symmetric, trachea midline and no masses   Pulmonary:even and unlabored  Cardiovascular:No edema or pitting edema present  Skin:Normal without rashes or lesions and well hydrated  Psychiatric:Mood and affect appropriate  Neurologic:Cranial Nerves II-XII grossly intact  Musculoskeletal:normal

## 2018-04-12 ENCOUNTER — TELEPHONE (OUTPATIENT)
Dept: PAIN MEDICINE | Facility: CLINIC | Age: 63
End: 2018-04-12

## 2018-04-16 NOTE — TELEPHONE ENCOUNTER
Spoke with pt states that he has had 90% relief and current pain level is 3/10 following his procedure    Confirmed f/u appt scheduled don 06/05/18

## 2018-04-21 DIAGNOSIS — Z79.4 TYPE 2 DIABETES MELLITUS WITH HYPERGLYCEMIA, WITH LONG-TERM CURRENT USE OF INSULIN (HCC): Primary | ICD-10-CM

## 2018-04-21 DIAGNOSIS — E11.65 TYPE 2 DIABETES MELLITUS WITH HYPERGLYCEMIA, WITH LONG-TERM CURRENT USE OF INSULIN (HCC): Primary | ICD-10-CM

## 2018-04-23 RX ORDER — EMPAGLIFLOZIN 25 MG/1
TABLET, FILM COATED ORAL
Qty: 30 TABLET | Refills: 3 | Status: SHIPPED | OUTPATIENT
Start: 2018-04-23 | End: 2018-05-31 | Stop reason: SDUPTHER

## 2018-05-01 DIAGNOSIS — E29.1 HYPOGONADISM IN MALE: Primary | ICD-10-CM

## 2018-05-02 RX ORDER — TESTOSTERONE CYPIONATE 200 MG/ML
INJECTION INTRAMUSCULAR
Qty: 10 ML | Refills: 1 | Status: SHIPPED | OUTPATIENT
Start: 2018-05-02 | End: 2018-05-18 | Stop reason: SDUPTHER

## 2018-05-06 DIAGNOSIS — M48.02 CERVICAL STENOSIS OF SPINAL CANAL: ICD-10-CM

## 2018-05-06 DIAGNOSIS — M54.12 CERVICAL RADICULOPATHY: ICD-10-CM

## 2018-05-06 DIAGNOSIS — M54.2 NECK PAIN: ICD-10-CM

## 2018-05-07 RX ORDER — GABAPENTIN 300 MG/1
CAPSULE ORAL
Qty: 90 CAPSULE | Refills: 1 | OUTPATIENT
Start: 2018-05-07

## 2018-05-10 ENCOUNTER — TELEPHONE (OUTPATIENT)
Dept: PAIN MEDICINE | Facility: CLINIC | Age: 63
End: 2018-05-10

## 2018-05-10 DIAGNOSIS — M54.12 CERVICAL RADICULOPATHY: ICD-10-CM

## 2018-05-10 DIAGNOSIS — M48.02 CERVICAL STENOSIS OF SPINAL CANAL: ICD-10-CM

## 2018-05-10 DIAGNOSIS — M54.2 NECK PAIN: ICD-10-CM

## 2018-05-10 RX ORDER — GABAPENTIN 300 MG/1
CAPSULE ORAL
Qty: 90 CAPSULE | Refills: 0 | Status: SHIPPED | OUTPATIENT
Start: 2018-05-10 | End: 2018-06-05 | Stop reason: SDUPTHER

## 2018-05-10 NOTE — TELEPHONE ENCOUNTER
Pt called for a refill on his Gabapentin 300mg 3 times a day  Pt uses [facility] on [address]  Pt has a few days left   You may call the patient with any questions at [phone]

## 2018-05-18 DIAGNOSIS — E29.1 HYPOGONADISM IN MALE: ICD-10-CM

## 2018-05-18 RX ORDER — TESTOSTERONE CYPIONATE 200 MG/ML
80 INJECTION INTRAMUSCULAR WEEKLY
Qty: 10 ML | Refills: 0 | Status: SHIPPED | OUTPATIENT
Start: 2018-05-18 | End: 2018-08-31 | Stop reason: SDUPTHER

## 2018-05-31 ENCOUNTER — OFFICE VISIT (OUTPATIENT)
Dept: ENDOCRINOLOGY | Facility: CLINIC | Age: 63
End: 2018-05-31
Payer: COMMERCIAL

## 2018-05-31 VITALS
HEIGHT: 63 IN | HEART RATE: 80 BPM | BODY MASS INDEX: 29.77 KG/M2 | SYSTOLIC BLOOD PRESSURE: 140 MMHG | WEIGHT: 168 LBS | DIASTOLIC BLOOD PRESSURE: 80 MMHG

## 2018-05-31 DIAGNOSIS — E23.0 HYPOGONADOTROPIC HYPOGONADISM (HCC): Primary | ICD-10-CM

## 2018-05-31 DIAGNOSIS — I10 BENIGN ESSENTIAL HYPERTENSION: ICD-10-CM

## 2018-05-31 DIAGNOSIS — Z79.4 TYPE 2 DIABETES MELLITUS WITH HYPERGLYCEMIA, WITH LONG-TERM CURRENT USE OF INSULIN (HCC): ICD-10-CM

## 2018-05-31 DIAGNOSIS — E78.5 HYPERLIPIDEMIA, UNSPECIFIED HYPERLIPIDEMIA TYPE: ICD-10-CM

## 2018-05-31 DIAGNOSIS — E11.65 TYPE 2 DIABETES MELLITUS WITH HYPERGLYCEMIA, WITH LONG-TERM CURRENT USE OF INSULIN (HCC): ICD-10-CM

## 2018-05-31 PROCEDURE — 99214 OFFICE O/P EST MOD 30 MIN: CPT | Performed by: PHYSICIAN ASSISTANT

## 2018-05-31 RX ORDER — SIMVASTATIN 20 MG
20 TABLET ORAL
Qty: 90 TABLET | Refills: 3 | Status: SHIPPED | OUTPATIENT
Start: 2018-05-31 | End: 2018-09-04 | Stop reason: SDUPTHER

## 2018-05-31 RX ORDER — BLOOD-GLUCOSE METER
EACH MISCELLANEOUS ONCE
Qty: 1 KIT | Refills: 1 | Status: SHIPPED | OUTPATIENT
Start: 2018-05-31 | End: 2021-08-17

## 2018-05-31 RX ORDER — LISINOPRIL 40 MG/1
40 TABLET ORAL DAILY
Qty: 90 TABLET | Refills: 3 | Status: SHIPPED | OUTPATIENT
Start: 2018-05-31 | End: 2018-09-04 | Stop reason: SDUPTHER

## 2018-05-31 RX ORDER — AMLODIPINE BESYLATE 10 MG/1
10 TABLET ORAL DAILY
Qty: 90 TABLET | Refills: 3 | Status: SHIPPED | OUTPATIENT
Start: 2018-05-31 | End: 2019-02-22 | Stop reason: SDUPTHER

## 2018-05-31 RX ORDER — GLYBURIDE 5 MG/1
5 TABLET ORAL
Qty: 90 TABLET | Refills: 3 | Status: SHIPPED | OUTPATIENT
Start: 2018-05-31 | End: 2018-09-04 | Stop reason: SDUPTHER

## 2018-05-31 RX ORDER — BLOOD SUGAR DIAGNOSTIC
STRIP MISCELLANEOUS
Qty: 500 EACH | Refills: 3 | Status: SHIPPED | OUTPATIENT
Start: 2018-05-31 | End: 2018-10-15

## 2018-05-31 NOTE — LETTER
June 1, 2018     MD Ankita Barreto [address]  [address] [address]    Patient: Brooke Bliss   YOB: 1955   Date of Visit: 5/31/2018       Dear Dr Melissa Harrell:    Thank you for referring Alia Alexandrasalmita to me for evaluation  Below are my notes for this consultation  If you have questions, please do not hesitate to call me  I look forward to following your patient along with you  Sincerely,        Sina Rosas PA-C        CC: No Recipients  Sina Rosas PA-C  6/1/2018  3:37 PM  Sign at close encounter      Established Patient Progress Note      Chief Complaint   Patient presents with    Diabetes Type 2          History of Present Illness:   Brooke Bliss is a 57 y o  male with a history of type 2 diabetes  Denies recent illness or hospitalizations  Denies recent severe hypoglycemic or severe hyperglycemic episodes  Denies any issues with his current regimen  home glucose monitoring: are performed regularly  Blood Sugars have been higher recently but are getting better  He has had about 3 steroid injections in neck-- Feb-March and is also  on gabapentin  Now getting back to usual activity, was less active  He has had about  8 pound weight gain  Current Diabetes Regimen:  Apidra 22 units before breakfast only During the week  On the weekend, 10-20 with breakfast only  Lantus 30 at bedtime  Jardiance 25mg daily  Glyburide 5mg daily    Tends to go low sometimes before lunch time especially on weekend when more active  Has hypertension: Taking lisinopril  **he has been off the amlodipine/valsartan combo over the past few weeks since he couldn't get a refill (on our list it says amlodipine benazepril but prior [employer] med list confirm the amlodipine/valsartan combo  Has hyperlipidemia: Taking Zocor      For Hypogonadism, taking weekly testosterone injections  Denies problems with injection  History of pituitary Microadenoma    Stable on MRI 6/2017 compared to 2015  He denies headaches and double vision  Patient Active Problem List   Diagnosis    Benign essential hypertension    Carpal tunnel syndrome    Cervical herniated disc    Cervical radiculopathy    Cervical stenosis of spinal canal    Neck pain    DMII [facility]di[facility], type 2) ([facility] )    Hyperlipidemia    Hypogonadotropic hypogonadism (HCC)    Nephrolithiasis    [facility]adenoma [facility] )    Seborrheic dermatitis    Sleep apnea    Spondylosis of cervical region without myelopathy or radiculopathy    Sprain and strain of calcaneofibular (ligament)    Viral upper respiratory tract infection with cough    Vitamin D deficiency      Past Medical History:   Diagnosis Date    Avitaminosis D 2012    Diabetes mellitus ([facility]     Displacement of cervical intervertebral disc 2014    HTN (hypertension) 2007    Pituitary microadenoma ([facility] ) 2010    Spinal stenosis in cervical region 2014    Uric acid nephrolithiasis 1990      History reviewed  No pertinent surgical history     Family History   Problem Relation Age of Onset    Diabetes unspecified Mother     Diabetes unspecified Father     Diabetes unspecified Sister     Diabetes unspecified Brother     Diabetes unspecified Sister     Diabetes unspecified Sister     Diabetes unspecified Brother     Diabetes unspecified Brother     Diabetes unspecified Brother     Diabetes unspecified Brother      Social History   Substance Use Topics    Smoking status: Never Smoker    Smokeless tobacco: Never Used    Alcohol use Yes     Allergies   Allergen Reactions    Augmentin [Amoxicillin-Pot Clavulanate] Abdominal Pain    Biaxin [Clarithromycin] Abdominal Pain    Dust Mite Extract     Insulin Aspart GI Intolerance    Molds & Smuts     Nuts     Seasonal Ic [Cholestatin] Headache         Current Outpatient Prescriptions:     aspirin (ECOTRIN LOW STRENGTH) 81 mg EC tablet, Take by mouth, Disp: , Rfl:     Empagliflozin (Lorry Anis) 25 MG TABS, Take 1 tablet (25 mg total) by mouth daily for 30 days, Disp: 30 tablet, Rfl: 11    fluticasone (FLONASE) 50 mcg/act nasal spray, into each nostril Twice daily, Disp: , Rfl:     gabapentin (NEURONTIN) 300 mg capsule, Take 1 PO TID , Disp: 90 capsule, Rfl: 0    Glucosamine-Chondroitin (OSTEO BI-FLEX REGULAR STRENGTH) 250-200 MG TABS, Take 1 tablet by mouth 2 (two) times a day, Disp: , Rfl:     glyBURIDE (DIABETA) 5 mg tablet, Take 1 tablet (5 mg total) by mouth daily with breakfast, Disp: 90 tablet, Rfl: 3    ibuprofen (MOTRIN) 200 mg tablet, Take by mouth, Disp: , Rfl:     insulin glargine (LANTUS SOLOSTAR) 100 units/mL injection pen, Inject 30 Units under the skin daily at bedtime for 90 days, Disp: 10 pen, Rfl: 3    insulin glulisine (APIDRA SOLOSTAR) 100 units/mL injection pen, Inject 22 Units under the skin daily with breakfast for 90 days, Disp: 7 pen, Rfl: 3    Insulin Pen Needle (BD PEN NEEDLE MILDRED U/F) 32G X 4 MM MISC, by Does not apply route 2 (two) times a day for 100 days, Disp: 200 each, Rfl: 3    lisinopril (ZESTRIL) 40 mg tablet, Take 1 tablet (40 mg total) by mouth daily, Disp: 90 tablet, Rfl: 3    loratadine (CLARITIN) 10 mg tablet, Take 10 mg by mouth daily, Disp: , Rfl:     omeprazole (PriLOSEC) 10 mg delayed release capsule, Take 10 mg by mouth daily, Disp: , Rfl:     ONETOUCH VERIO test strip, Test blood sugars 4 x daily as directed, Disp: 400 each, Rfl: 3    PROAIR  (90 Base) MCG/ACT inhaler, Inhale 108 puffs every 4 to 6 hours as needed, Disp: , Rfl: 0    Probiotic Product (PROBIOTIC-10) CAPS, Take by mouth, Disp: , Rfl:     simvastatin (ZOCOR) 20 mg tablet, Take 1 tablet (20 mg total) by mouth daily at bedtime for 90 days, Disp: 90 tablet, Rfl: 3    sitaGLIPtin-metFORMIN (JANUMET)  MG per tablet, Take 1 tablet by mouth 2 (two) times a day with meals for 90 days, Disp: 180 tablet, Rfl: 3    SYRINGE-NEEDLE, DISP, 3 ML (B-D SYRINGE/NEEDLE 3CC/23GX1") 23G X 1" 3 ML MISC, by Does not apply route once a week, Disp: , Rfl:     testosterone cypionate (DEPO-TESTOSTERONE) 200 mg/mL SOLN, Inject 0 4 mL (80 mg total) into the shoulder, thigh, or buttocks once a week, Disp: 10 mL, Rfl: 0    amLODIPine (NORVASC) 10 mg tablet, Take 1 tablet (10 mg total) by mouth daily for 90 days, Disp: 90 tablet, Rfl: 3    Blood Glucose Monitoring Suppl (ONETOUCH VERIO) w/Device KIT, by Does not apply route once for 1 dose Dispense 1 meter, Disp: 1 kit, Rfl: 1    ONETOUCH VERIO test strip, Check BG 4-5x per day, Disp: 500 each, Rfl: 3    Review of Systems   Constitutional: Negative for activity change, appetite change and fatigue  HENT: Negative for sore throat, trouble swallowing and voice change  Eyes: Negative for visual disturbance  Respiratory: Negative for choking, chest tightness and shortness of breath  Cardiovascular: Negative for chest pain, palpitations and leg swelling  Gastrointestinal: Negative for abdominal pain, constipation and diarrhea  Endocrine: Negative for cold intolerance, heat intolerance, polydipsia, polyphagia and polyuria  Genitourinary: Negative for frequency  Musculoskeletal: Positive for neck pain  Negative for arthralgias and myalgias  Skin: Negative for rash  Neurological: Negative for dizziness and syncope  Hematological: Negative for adenopathy  Psychiatric/Behavioral: Negative for sleep disturbance  All other systems reviewed and are negative  Physical Exam:  Body mass index is 29 76 kg/m²  /80   Pulse 80   Ht 5' 3" (1 6 m)   Wt 76 2 kg (168 lb)   BMI 29 76 kg/m²     Wt Readings from Last 3 Encounters:   05/31/18 76 2 kg (168 lb)   03/14/18 74 2 kg (163 lb 9 6 oz)   01/15/18 72 7 kg (160 lb 6 1 oz)       Physical Exam   Constitutional: He is oriented to person, place, and time  He appears well-developed and well-nourished  No distress  HENT:   Head: Normocephalic and atraumatic     Mouth/Throat: Oropharynx is clear and moist    Eyes: Conjunctivae and EOM are normal  Pupils are equal, round, and reactive to light  Neck: Normal range of motion  Neck supple  No thyromegaly present  Cardiovascular: Normal rate, regular rhythm and normal heart sounds  No murmur heard  Pulmonary/Chest: Effort normal and breath sounds normal  No respiratory distress  He has no wheezes  He has no rales  Abdominal: Soft  Bowel sounds are normal  He exhibits no distension  There is no tenderness  Musculoskeletal: Normal range of motion  He exhibits no edema  Lymphadenopathy:     He has no cervical adenopathy  Neurological: He is alert and oriented to person, place, and time  Skin: Skin is warm and dry  Psychiatric: He has a normal mood and affect  Vitals reviewed      Diabetic Foot Exam    Labs:   Component      Latest Ref Rng & Units 4/29/2017 6/19/2017 10/28/2017   Glucose      65 - 99 mg/dL   83   BUN      7 - 25 mg/dL  25 21   Creatinine      0 70 - 1 25 mg/dL  1 12 1 09   EGFR-AMERICAN CALC      > OR = 60 mL/min/1 73m2   72   AAGFR      > OR = 60 mL/min/1 73m2   83   BUN/CREA Ratio      6 - 22 (calc)   NOT APPLICABLE   Sodium      135 - 146 mmol/L   140   Potassium      3 5 - 5 3 mmol/L   3 7   Chloride      98 - 110 mmol/L   101   CO2      20 - 31 mmol/L   28   Calcium      8 6 - 10 3 mg/dL   9 6   Total Protein      6 1 - 8 1 g/dL   8 1   Albumin      3 6 - 5 1 g/dL   5 0   GAMMA GLOBULIN      1 9 - 3 7 g/dL (calc)   3 1   A/G RATIO      1 0 - 2 5 (calc)   1 6   Total Bilirubin      0 2 - 1 2 mg/dL   1 2   Alkaline Phosphatase      40 - 115 U/L   56   AST      10 - 35 U/L   20   ALT      9 - 46 U/L   34   Cholesterol      <200 mg/dL   141   HDL      >40 mg/dL   38 (L)   Triglycerides      <150 mg/dL   102   LDL CHOLESTEROL      mg/dL (calc)   83   Chol/HDL Ratio      <5 0 (calc)   3 7   NON-HDL-CHOL (CHOL-HDL)      <130 mg/dL (calc)   103   CREATININE, RANDOM URINE      20 - 370 mg/dL   18 (L)   MAGNESIUM, UR      mg/dL 2 6   MICROALBUMIN/CREATININE RATIO      <30 mcg/mg creat   144 (H)   eGFR      ml/min/1 73sq m  >60 0    TESTOSTERONE TOTAL      250 - 1100 ng/dL   891   TESTOSTERONE FREE      35 0 - 155 0 pg/mL   155 9 (H)   PROLACTIN      2 0 - 18 0 ng/mL   5 7   Hemoglobin A1C      <5 7 % of total Hgb 6 2 (H)  6 0 (H)   TSH 3RD GENERATON      0 40 - 4 50 mIU/L   3 11   PROSTATE SPECIFIC ANTIGEN TOTAL      < OR = 4 0 ng/mL   0 7   Free T4      0 8 - 1 8 ng/dL   1 0     Component      Latest Ref Rng & Units 12/30/2017 2/17/2018   Glucose      65 - 99 mg/dL     BUN      7 - 25 mg/dL     Creatinine      0 70 - 1 25 mg/dL     EGFR-AMERICAN CALC      > OR = 60 mL/min/1 73m2     AAGFR      > OR = 60 mL/min/1 73m2     BUN/CREA Ratio      6 - 22 (calc)     Sodium      135 - 146 mmol/L     Potassium      3 5 - 5 3 mmol/L     Chloride      98 - 110 mmol/L     CO2      20 - 31 mmol/L     Calcium      8 6 - 10 3 mg/dL     Total Protein      6 1 - 8 1 g/dL     Albumin      3 6 - 5 1 g/dL     GAMMA GLOBULIN      1 9 - 3 7 g/dL (calc)     A/G RATIO      1 0 - 2 5 (calc)     Total Bilirubin      0 2 - 1 2 mg/dL     Alkaline Phosphatase      40 - 115 U/L     AST      10 - 35 U/L     ALT      9 - 46 U/L     Cholesterol      <200 mg/dL     HDL      >40 mg/dL     Triglycerides      <150 mg/dL     LDL CHOLESTEROL      mg/dL (calc)     Chol/HDL Ratio      <5 0 (calc)     NON-HDL-CHOL (CHOL-HDL)      <130 mg/dL (calc)     CREATININE, RANDOM URINE      20 - 370 mg/dL     MAGNESIUM, UR      mg/dL     MICROALBUMIN/CREATININE RATIO      <30 mcg/mg creat     eGFR      ml/min/1 73sq m     TESTOSTERONE TOTAL      250 - 1100 ng/dL 675    TESTOSTERONE FREE      35 0 - 155 0 pg/mL 97 2    PROLACTIN      2 0 - 18 0 ng/mL     Hemoglobin A1C      <5 7 % of total Hgb  5 9 (H)   TSH 3RD GENERATON      0 40 - 4 50 mIU/L     PROSTATE SPECIFIC ANTIGEN TOTAL      < OR = 4 0 ng/mL     Free T4      0 8 - 1 8 ng/dL         Impression & Plan:    Problem List Items Addressed This Visit     Benign essential hypertension     BP slightly above goal at 140/80--- he has actually been off the amlodipine/valsartan combo for a few weeks  Since he is already on an ACE inhibitor will RX amlodipine 10mg without the ARB  Relevant Medications    amLODIPine (NORVASC) 10 mg tablet    lisinopril (ZESTRIL) 40 mg tablet    DMII (diabetes mellitus, type 2) ([facility])     Last A1C low at 5 9 but blood sugars have been a little higher since that time  Occasional hypoglycemia before lunch especially on weekend when more active  When more active, reduce breakfast apidra to 10 units  Discused option of changing apidra to a GLP-1 agonist such as victoza which may help with appetite reduction and weight loss and less likely to cause hypoglycemia compared to apidra  He will look into insurance formulary and let us know if he wants to try this  Relevant Medications    amLODIPine (NORVASC) 10 mg tablet    glyBURIDE (DIABETA) 5 mg tablet    insulin glulisine (APIDRA SOLOSTAR) 100 units/mL injection pen    Insulin Pen Needle (BD PEN NEEDLE MILDRED U/F) 32G X 4 MM MISC    sitaGLIPtin-metFORMIN (JANUMET)  MG per tablet    Empagliflozin (JARDIANCE) 25 MG TABS    insulin glargine (LANTUS SOLOSTAR) 100 units/mL injection pen    simvastatin (ZOCOR) 20 mg tablet    lisinopril (ZESTRIL) 40 mg tablet    ONETOUCH VERIO test strip    Other Relevant Orders    HEMOGLOBIN A1C W/ EAG ESTIMATION    Hyperlipidemia     Continue statin  Relevant Medications    simvastatin (ZOCOR) 20 mg tablet    Other Relevant Orders    Comprehensive metabolic panel    Hypogonadotropic hypogonadism (HCC) - Primary     Continue Testosterone injection  Check Lab testing as ordered mid-cycle             Relevant Orders    CBC and differential    Testosterone, free, total          Orders Placed This Encounter   Procedures    HEMOGLOBIN A1C W/ EAG ESTIMATION     Standing Status:   Future     Standing Expiration Date:   11/30/2018    Comprehensive metabolic panel     This is a patient instruction: Patient fasting for 8 hours or longer recommended  Standing Status:   Future     Standing Expiration Date:   11/30/2018    CBC and differential     This is a patient instruction: This test is non-fasting  Please drink two glasses of water morning of bloodwork  Standing Status:   Future     Standing Expiration Date:   11/30/2018    Testosterone, free, total     This is a patient instruction: Fasting preferred  Collections for men not undergoing treatment must be completed between 7am-9am ONLY  Collection time restrictions are not applicable to women or men already undergoing treatment  Standing Status:   Future     Standing Expiration Date:   11/30/2018       Patient Instructions   Check if Insurance covers Victoza, Bydureon, Ozempic, or Trulicity  This can help with high blood sugars after meals  These are non-insulin injections that can maybe take place of the apidra and/or glyburide and help with weight loss  OR   Xultophy or Saint Mita  Which are combination daily injections that could take place of lantus and Apidra  Discussed with the patient and all questioned fully answered  He will call me if any problems arise  Follow-up appointment in 4 months       Counseled patient on diagnostic results, prognosis, risk and benefit of treatment options, instruction for management, importance of treatment compliance, Risk  factor reduction and impressions      Rivera Covington PA-C

## 2018-05-31 NOTE — PATIENT INSTRUCTIONS
Check if Insurance covers Victoza, Bydureon, Ozempic, or Trulicity  This can help with high blood sugars after meals  These are non-insulin injections that can maybe take place of the apidra and/or glyburide and help with weight loss  OR   Xultophy or Saint Mita  Which are combination daily injections that could take place of lantus and Apidra

## 2018-05-31 NOTE — PROGRESS NOTES
Established Patient Progress Note      Chief Complaint   Patient presents with    Diabetes Type 2          History of Present Illness:   Travis Holloway is a 57 y o  male with a history of type 2 diabetes  Denies recent illness or hospitalizations  Denies recent severe hypoglycemic or severe hyperglycemic episodes  Denies any issues with his current regimen  home glucose monitoring: are performed regularly  Blood Sugars have been higher recently but are getting better  He has had about 3 steroid injections in neck-- Feb-March and is also  on gabapentin  Now getting back to usual activity, was less active  He has had about  8 pound weight gain  Current Diabetes Regimen:  Apidra 22 units before breakfast only During the week  On the weekend, 10-20 with breakfast only  Lantus 30 at bedtime  Jardiance 25mg daily  Glyburide 5mg daily    Tends to go low sometimes before lunch time especially on weekend when more active  Has hypertension: Taking lisinopril  **he has been off the amlodipine/valsartan combo over the past few weeks since he couldn't get a refill (on our list it says amlodipine benazepril but prior [employer] med list confirm the amlodipine/valsartan combo  Has hyperlipidemia: Taking Zocor      For Hypogonadism, taking weekly testosterone injections  Denies problems with injection  History of pituitary Microadenoma  Stable on MRI 6/2017 compared to 2015  He denies headaches and double vision       Patient Active Problem List   Diagnosis    Benign essential hypertension    Carpal tunnel syndrome    Cervical herniated disc    Cervical radiculopathy    Cervical stenosis of spinal canal    Neck pain    DMII (diabetes mellitus, type 2) (HCC)    Hyperlipidemia    [facility]    Nephrolithiasis    Pituitary microadenoma ([address] )    Seborrheic dermatitis    Sleep apnea    Spondylosis of cervical region without myelopathy or radiculopathy    Sprain and strain of calcaneofibular (ligament)    Viral upper respiratory tract infection with cough    Vitamin D deficiency      Past Medical History:   Diagnosis Date    Avitaminosis D 2012    [facility] mellitus [facility]     Displacement of cervical intervertebral disc 2014    HTN (hypertension) 2007    [facility]adenoma [facility] ) 2010    Spinal stenosis in cervical region 2014    Uric acid nephrolithiasis 1990      History reviewed  No pertinent surgical history     Family History   Problem Relation Age of Onset    Diabetes unspecified Mother     Diabetes unspecified Father     Diabetes unspecified Sister     Diabetes unspecified Brother     Diabetes unspecified Sister     Diabetes unspecified Sister     Diabetes unspecified Brother     Diabetes unspecified Brother     Diabetes unspecified Brother     Diabetes unspecified Brother      Social History   Substance Use Topics    Smoking status: Never Smoker    Smokeless tobacco: Never Used    Alcohol use Yes     Allergies   Allergen Reactions    Augmentin [Amoxicillin-Pot Clavulanate] Abdominal Pain    Biaxin [Clarithromycin] Abdominal Pain    Dust Mite Extract     Insulin Aspart GI Intolerance    Molds & Smuts     Nuts     Seasonal Ic [Cholestatin] Headache         Current Outpatient Prescriptions:     aspirin (ECOTRIN LOW STRENGTH) 81 mg EC tablet, Take by mouth, Disp: , Rfl:     Empagliflozin (JARDIANCE) 25 MG TABS, Take 1 tablet (25 mg total) by mouth daily for 30 days, Disp: 30 tablet, Rfl: 11    fluticasone (FLONASE) 50 mcg/act nasal spray, into each nostril Twice daily, Disp: , Rfl:     gabapentin (NEURONTIN) 300 mg capsule, Take 1 PO TID , Disp: 90 capsule, Rfl: 0    Glucosamine-Chondroitin (OSTEO BI-FLEX REGULAR STRENGTH) 250-200 MG TABS, Take 1 tablet by mouth 2 (two) times a day, Disp: , Rfl:     glyBURIDE (DIABETA) 5 mg tablet, Take 1 tablet (5 mg total) by mouth daily with breakfast, Disp: 90 tablet, Rfl: 3    ibuprofen (MOTRIN) 200 mg tablet, Take by mouth, Disp: , Rfl:     insulin glargine (LANTUS SOLOSTAR) 100 units/mL injection pen, Inject 30 Units under the skin daily at bedtime for 90 days, Disp: 10 pen, Rfl: 3    insulin glulisine (APIDRA SOLOSTAR) 100 units/mL injection pen, Inject 22 Units under the skin daily with breakfast for 90 days, Disp: 7 pen, Rfl: 3    Insulin Pen Needle (BD PEN NEEDLE MILDRED U/F) 32G X 4 MM MISC, by Does not apply route 2 (two) times a day for 100 days, Disp: 200 each, Rfl: 3    lisinopril (ZESTRIL) 40 mg tablet, Take 1 tablet (40 mg total) by mouth daily, Disp: 90 tablet, Rfl: 3    loratadine (CLARITIN) 10 mg tablet, Take 10 mg by mouth daily, Disp: , Rfl:     omeprazole (PriLOSEC) 10 mg delayed release capsule, Take 10 mg by mouth daily, Disp: , Rfl:     ONETOUCH VERIO test strip, Test blood sugars 4 x daily as directed, Disp: 400 each, Rfl: 3    PROAIR  (90 Base) MCG/ACT inhaler, Inhale 108 puffs every 4 to 6 hours as needed, Disp: , Rfl: 0    Probiotic Product (PROBIOTIC-10) CAPS, Take by mouth, Disp: , Rfl:     simvastatin (ZOCOR) 20 mg tablet, Take 1 tablet (20 mg total) by mouth daily at bedtime for 90 days, Disp: 90 tablet, Rfl: 3    sitaGLIPtin-metFORMIN (JANUMET)  MG per tablet, Take 1 tablet by mouth 2 (two) times a day with meals for 90 days, Disp: 180 tablet, Rfl: 3    SYRINGE-NEEDLE, DISP, 3 ML (B-D SYRINGE/NEEDLE 3CC/23GX1") 23G X 1" 3 ML MISC, by Does not apply route once a week, Disp: , Rfl:     testosterone cypionate (DEPO-TESTOSTERONE) 200 mg/mL SOLN, Inject 0 4 mL (80 mg total) into the shoulder, thigh, or buttocks once a week, Disp: 10 mL, Rfl: 0    amLODIPine (NORVASC) 10 mg tablet, Take 1 tablet (10 mg total) by mouth daily for 90 days, Disp: 90 tablet, Rfl: 3    Blood Glucose Monitoring Suppl (ONETOUCH VERIO) w/Device KIT, by Does not apply route once for 1 dose Dispense 1 meter, Disp: 1 kit, Rfl: 1    ONETOUCH VERIO test strip, Check BG 4-5x per day, Disp: 500 each, Rfl: 3    Review of Systems   Constitutional: Negative for activity change, appetite change and fatigue  HENT: Negative for sore throat, trouble swallowing and voice change  Eyes: Negative for visual disturbance  Respiratory: Negative for choking, chest tightness and shortness of breath  Cardiovascular: Negative for chest pain, palpitations and leg swelling  Gastrointestinal: Negative for abdominal pain, constipation and diarrhea  Endocrine: Negative for cold intolerance, heat intolerance, polydipsia, polyphagia and polyuria  Genitourinary: Negative for frequency  Musculoskeletal: Positive for neck pain  Negative for arthralgias and myalgias  Skin: Negative for rash  Neurological: Negative for dizziness and syncope  Hematological: Negative for adenopathy  Psychiatric/Behavioral: Negative for sleep disturbance  All other systems reviewed and are negative  Physical Exam:  Body mass index is 29 76 kg/m²  /80   Pulse 80   Ht 5' 3" (1 6 m)   Wt 76 2 kg (168 lb)   BMI 29 76 kg/m²    Wt Readings from Last 3 Encounters:   05/31/18 76 2 kg (168 lb)   03/14/18 74 2 kg (163 lb 9 6 oz)   01/15/18 72 7 kg (160 lb 6 1 oz)       Physical Exam   Constitutional: He is oriented to person, place, and time  He appears well-developed and well-nourished  No distress  HENT:   Head: Normocephalic and atraumatic  Mouth/Throat: Oropharynx is clear and moist    Eyes: Conjunctivae and EOM are normal  Pupils are equal, round, and reactive to light  Neck: Normal range of motion  Neck supple  No thyromegaly present  Cardiovascular: Normal rate, regular rhythm and normal heart sounds  No murmur heard  Pulmonary/Chest: Effort normal and breath sounds normal  No respiratory distress  He has no wheezes  He has no rales  Abdominal: Soft  Bowel sounds are normal  He exhibits no distension  There is no tenderness  Musculoskeletal: Normal range of motion  He exhibits no edema  Lymphadenopathy:     He has no cervical adenopathy  Neurological: He is alert and oriented to person, place, and time  Skin: Skin is warm and dry  Psychiatric: He has a normal mood and affect  Vitals reviewed      Diabetic Foot Exam    Labs:   Component      Latest Ref Rng & Units 4/29/2017 6/19/2017 10/28/2017   Glucose      65 - 99 mg/dL   83   BUN      7 - 25 mg/dL  25 21   Creatinine      0 70 - 1 25 mg/dL  1 12 1 09   EGFR-AMERICAN CALC      > OR = 60 mL/min/1 73m2   72   AAGFR      > OR = 60 mL/min/1 73m2   83   BUN/CREA Ratio      6 - 22 (calc)   NOT APPLICABLE   Sodium      135 - 146 mmol/L   140   Potassium      3 5 - 5 3 mmol/L   3 7   Chloride      98 - 110 mmol/L   101   CO2      20 - 31 mmol/L   28   Calcium      8 6 - 10 3 mg/dL   9 6   Total Protein      6 1 - 8 1 g/dL   8 1   Albumin      3 6 - 5 1 g/dL   5 0   GAMMA GLOBULIN      1 9 - 3 7 g/dL (calc)   3 1   A/G RATIO      1 0 - 2 5 (calc)   1 6   Total Bilirubin      0 2 - 1 2 mg/dL   1 2   Alkaline Phosphatase      40 - 115 U/L   56   AST      10 - 35 U/L   20   ALT      9 - 46 U/L   34   Cholesterol      <200 mg/dL   141   HDL      >40 mg/dL   38 (L)   Triglycerides      <150 mg/dL   102   LDL CHOLESTEROL      mg/dL (calc)   83   Chol/HDL Ratio      <5 0 (calc)   3 7   NON-HDL-CHOL (CHOL-HDL)      <130 mg/dL (calc)   103   CREATININE, RANDOM URINE      20 - 370 mg/dL   18 (L)   MAGNESIUM, UR      mg/dL   2 6   MICROALBUMIN/CREATININE RATIO      <30 mcg/mg creat   144 (H)   eGFR      ml/min/1 73sq m  >60 0    TESTOSTERONE TOTAL      250 - 1100 ng/dL   891   TESTOSTERONE FREE      35 0 - 155 0 pg/mL   155 9 (H)   PROLACTIN      2 0 - 18 0 ng/mL   5 7   Hemoglobin A1C      <5 7 % of total Hgb 6 2 (H)  6 0 (H)   TSH 3RD GENERATON      0 40 - 4 50 mIU/L   3 11   PROSTATE SPECIFIC ANTIGEN TOTAL      < OR = 4 0 ng/mL   0 7   Free T4      0 8 - 1 8 ng/dL   1 0     Component      Latest Ref Rng & Units 12/30/2017 2/17/2018   Glucose      65 - 99 mg/dL     BUN      7 - 25 mg/dL     Creatinine      0 70 - 1 25 mg/dL     EGFR-AMERICAN CALC      > OR = 60 mL/min/1 73m2     AAGFR      > OR = 60 mL/min/1 73m2     BUN/CREA Ratio      6 - 22 (calc)     Sodium      135 - 146 mmol/L     Potassium      3 5 - 5 3 mmol/L     Chloride      98 - 110 mmol/L     CO2      20 - 31 mmol/L     Calcium      8 6 - 10 3 mg/dL     Total Protein      6 1 - 8 1 g/dL     Albumin      3 6 - 5 1 g/dL     GAMMA GLOBULIN      1 9 - 3 7 g/dL (calc)     A/G RATIO      1 0 - 2 5 (calc)     Total Bilirubin      0 2 - 1 2 mg/dL     Alkaline Phosphatase      40 - 115 U/L     AST      10 - 35 U/L     ALT      9 - 46 U/L     Cholesterol      <200 mg/dL     HDL      >40 mg/dL     Triglycerides      <150 mg/dL     LDL CHOLESTEROL      mg/dL (calc)     Chol/HDL Ratio      <5 0 (calc)     NON-HDL-CHOL (CHOL-HDL)      <130 mg/dL (calc)     CREATININE, RANDOM URINE      20 - 370 mg/dL     MAGNESIUM, UR      mg/dL     MICROALBUMIN/CREATININE RATIO      <30 mcg/mg creat     eGFR      ml/min/1 73sq m     TESTOSTERONE TOTAL      250 - 1100 ng/dL 675    TESTOSTERONE FREE      35 0 - 155 0 pg/mL 97 2    PROLACTIN      2 0 - 18 0 ng/mL     Hemoglobin A1C      <5 7 % of total Hgb  5 9 (H)   TSH 3RD GENERATON      0 40 - 4 50 mIU/L     PROSTATE SPECIFIC ANTIGEN TOTAL      < OR = 4 0 ng/mL     Free T4      0 8 - 1 8 ng/dL         Impression & Plan:    Problem List Items Addressed This Visit     Benign essential hypertension     BP slightly above goal at 140/80--- he has actually been off the amlodipine/valsartan combo for a few weeks  Since he is already on an ACE inhibitor will RX amlodipine 10mg without the ARB  Relevant Medications    amLODIPine (NORVASC) 10 mg tablet    lisinopril (ZESTRIL) 40 mg tablet    DMII (diabetes mellitus, type 2) (HCC)     Last A1C low at 5 9 but blood sugars have been a little higher since that time    Occasional hypoglycemia before lunch especially on weekend when more active  When more active, reduce breakfast apidra to 10 units  Discused option of changing apidra to a GLP-1 agonist such as victoza which may help with appetite reduction and weight loss and less likely to cause hypoglycemia compared to apidra  He will look into insurance formulary and let us know if he wants to try this  Relevant Medications    amLODIPine (NORVASC) 10 mg tablet    glyBURIDE (DIABETA) 5 mg tablet    insulin glulisine (APIDRA SOLOSTAR) 100 units/mL injection pen    Insulin Pen Needle (BD PEN NEEDLE MILDRED U/F) 32G X 4 MM MISC    sitaGLIPtin-metFORMIN (JANUMET)  MG per tablet    Empagliflozin (JARDIANCE) 25 MG TABS    insulin glargine (LANTUS SOLOSTAR) 100 units/mL injection pen    simvastatin (ZOCOR) 20 mg tablet    lisinopril (ZESTRIL) 40 mg tablet    ONETOUCH VERIO test strip    Other Relevant Orders    HEMOGLOBIN A1C W/ EAG ESTIMATION    Hyperlipidemia     Continue statin  Relevant Medications    simvastatin (ZOCOR) 20 mg tablet    Other Relevant Orders    Comprehensive metabolic panel    Hypogonadotropic hypogonadism (HCC) - Primary     Continue Testosterone injection  Check Lab testing as ordered mid-cycle  Relevant Orders    CBC and differential    Testosterone, free, total          Orders Placed This Encounter   Procedures    HEMOGLOBIN A1C W/ EAG ESTIMATION     Standing Status:   Future     Standing Expiration Date:   11/30/2018    Comprehensive metabolic panel     This is a patient instruction: Patient fasting for 8 hours or longer recommended  Standing Status:   Future     Standing Expiration Date:   11/30/2018    CBC and differential     This is a patient instruction: This test is non-fasting  Please drink two glasses of water morning of bloodwork  Standing Status:   Future     Standing Expiration Date:   11/30/2018    Testosterone, free, total     This is a patient instruction: Fasting preferred   Collections for men not undergoing treatment must be completed between 7am-9am ONLY  Collection time restrictions are not applicable to women or men already undergoing treatment  Standing Status:   Future     Standing Expiration Date:   11/30/2018       Patient Instructions   Check if Insurance covers Victoza, Bydureon, Ozempic, or Trulicity  This can help with high blood sugars after meals  These are non-insulin injections that can maybe take place of the apidra and/or glyburide and help with weight loss  OR   Xultophy or [place]  Which are combination daily injections that could take place of lantus and Apidra  Discussed with the patient and all questioned fully answered  He will call me if any problems arise  Follow-up appointment in 4 months       Counseled patient on diagnostic results, prognosis, risk and benefit of treatment options, instruction for management, importance of treatment compliance, Risk  factor reduction and impressions      Terri Brenner PA-C

## 2018-06-01 NOTE — ASSESSMENT & PLAN NOTE
BP slightly above goal at 140/80--- he has actually been off the amlodipine/valsartan combo for a few weeks  Since he is already on an ACE inhibitor will RX amlodipine 10mg without the ARB

## 2018-06-01 NOTE — ASSESSMENT & PLAN NOTE
Last A1C low at 5 9 but blood sugars have been a little higher since that time  Occasional hypoglycemia before lunch especially on weekend when more active  When more active, reduce breakfast apidra to 10 units  Discused option of changing apidra to a GLP-1 agonist such as victoza which may help with appetite reduction and weight loss and less likely to cause hypoglycemia compared to apidra  He will look into insurance formulary and let us know if he wants to try this

## 2018-06-05 ENCOUNTER — CLINICAL SUPPORT (OUTPATIENT)
Dept: PAIN MEDICINE | Facility: CLINIC | Age: 63
End: 2018-06-05
Payer: COMMERCIAL

## 2018-06-05 VITALS
HEIGHT: 63 IN | TEMPERATURE: 99 F | HEART RATE: 77 BPM | BODY MASS INDEX: 29.77 KG/M2 | DIASTOLIC BLOOD PRESSURE: 73 MMHG | WEIGHT: 168 LBS | SYSTOLIC BLOOD PRESSURE: 155 MMHG

## 2018-06-05 DIAGNOSIS — M50.20 CERVICAL HERNIATED DISC: Primary | ICD-10-CM

## 2018-06-05 DIAGNOSIS — M54.12 CERVICAL RADICULOPATHY: ICD-10-CM

## 2018-06-05 DIAGNOSIS — M54.2 NECK PAIN: ICD-10-CM

## 2018-06-05 DIAGNOSIS — M47.812 SPONDYLOSIS OF CERVICAL REGION WITHOUT MYELOPATHY OR RADICULOPATHY: ICD-10-CM

## 2018-06-05 DIAGNOSIS — M48.02 CERVICAL STENOSIS OF SPINAL CANAL: ICD-10-CM

## 2018-06-05 PROCEDURE — 99213 OFFICE O/P EST LOW 20 MIN: CPT | Performed by: ANESTHESIOLOGY

## 2018-06-05 RX ORDER — GABAPENTIN 300 MG/1
CAPSULE ORAL
Qty: 90 CAPSULE | Refills: 2 | Status: SHIPPED | OUTPATIENT
Start: 2018-06-05 | End: 2018-08-24 | Stop reason: SDUPTHER

## 2018-06-05 NOTE — PROGRESS NOTES
Assessment:  1  Cervical herniated disc    2  Neck pain    3  Cervical stenosis of spinal canal    4  Cervical radiculopathy    5  Spondylosis of cervical region without myelopathy or radiculopathy        Plan:  68-year-old male with a history of diabetes returning for follow-up of neck pain and cervical radiculopathy in the C6-7 distribution of the left upper extremity secondary to multilevel cervical degenerative disc disease and spondylosis with varying degrees of central foraminal stenosis  The patient also has a disc herniation at C6-7  The patient is status post cervical epidural steroid injection x2 with significant improvement in the patient's neck and upper extremity symptoms  The patient's last injection was April 6, 2018 and the patient is still noting relief today  The patient is quite satisfied with the amount of relief he is experiencing and has been able to improve his function as a result  The patient is currently taking gabapentin 300 mg t i d  and ibuprofen p r n  with about 80-90% relief  He denies any side effects to the medication the other than a couple lb weight gain  He is unsure as to whether not this is from the gabapentin  I did explain to the patient that weight gain can be a side effect of the gabapentin, however the patient would like to see how he does over the next few months since he has been able to exercise more to see if he is able to lose the weight which I think is reasonable  1   We will continue gabapentin 300 mg t i d   2   The patient may continue with ibuprofen p r n  should not exceed more than 800 mg q 8 hours p r n   3   We will repeat cervical epidural steroid injection p r n   4   The patient will continue with his home exercise program  5    I will follow up the patient in 3 months      [place] Prescription Drug Monitoring Program report was reviewed and was appropriate       My impressions and treatment recommendations were discussed in detail with the patient who verbalized understanding and had no further questions  Discharge instructions were provided  I personally saw and examined the patient and I agree with the above discussed plan of care  No orders of the defined types were placed in this encounter  No orders of the defined types were placed in this encounter  History of Present Illness:    Marzena Sandoval is a 57 y o  male  with a history of diabetes returning for follow-up of neck pain and cervical radiculopathy in the C6-7 distribution of the left upper extremity secondary to multilevel cervical degenerative disc disease and spondylosis with varying degrees of central foraminal stenosis  The patient also has a disc herniation at C6-7  The patient is status post cervical epidural steroid injection x2 with significant improvement in the patient's neck and upper extremity symptoms  The patient's last injection was April 6, 2018 and the patient is still noting relief today  The patient is quite satisfied with the amount of relief he is experiencing and has been able to improve his function as a result  He denies any right upper extremity symptoms, bladder bowel incontinence, or balance issues  The patient is currently taking gabapentin 300 mg t i d  and ibuprofen p r n  with about 80-90% relief  He denies any side effects to the medication the other than a couple lb weight gain  The patient rates pain 1/10 on the pain is worse in the morning  The pain is occasional and described as dull and aching  The pain is worse with bending and twisting his neck and exercise  The pain is alleviated with relaxation, sitting, and lying down  I have personally reviewed and/or updated the patient's past medical history, past surgical history, family history, social history, current medications, allergies, and vital signs today       Other than as stated above, the patient denies any interval changes in medications, medical condition, mental condition, symptoms, or allergies since the last office visit  Review of Systems:    Review of Systems   Respiratory: Negative for shortness of breath  Cardiovascular: Negative for chest pain  Gastrointestinal: Negative for constipation, diarrhea, nausea and vomiting  Musculoskeletal: Negative for arthralgias, gait problem, joint swelling and myalgias  Skin: Negative for rash  Neurological: Negative for dizziness, seizures and weakness  All other systems reviewed and are negative  Patient Active Problem List   Diagnosis    Benign essential hypertension    Carpal tunnel syndrome    Cervical herniated disc    Cervical radiculopathy    Cervical stenosis of spinal canal    Neck pain    DMII (diabetes mellitus, type 2) ([place] [address] )    Hyperlipidemia    Hypogonadotropic hypogonadism (HCC)    Nephrolithiasis    Pituitary microadenoma ([place] [address] )    Seborrheic dermatitis    Sleep apnea    Spondylosis of cervical region without myelopathy or radiculopathy    Sprain and strain of calcaneofibular (ligament)    Viral upper respiratory tract infection with cough    Vitamin D deficiency       Past Medical History:   Diagnosis Date    Avitaminosis D 2012    Diabetes mellitus ([place] [address] )     Displacement of cervical intervertebral disc 2014    HTN (hypertension) 2007    Pituitary microadenoma ([place] [address] ) 2010    Spinal stenosis in cervical region 2014    Uric acid nephrolithiasis 1990       No past surgical history on file  Family History   Problem Relation Age of Onset    Diabetes unspecified Mother     Diabetes unspecified Father     Diabetes unspecified Sister     Diabetes unspecified Brother     Diabetes unspecified Sister     Diabetes unspecified Sister     Diabetes unspecified Brother     Diabetes unspecified Brother     Diabetes unspecified Brother     Diabetes unspecified Brother        Social History     Occupational History    Not on file       Social History Main Topics    Smoking status: Never Smoker    Smokeless tobacco: Never Used    Alcohol use Yes    Drug use: No    Sexual activity: Not on file       Current Outpatient Prescriptions on File Prior to Visit   Medication Sig    amLODIPine (NORVASC) 10 mg tablet Take 1 tablet (10 mg total) by mouth daily for 90 days    aspirin (ECOTRIN LOW STRENGTH) 81 mg EC tablet Take by mouth    Blood Glucose Monitoring Suppl (ONETOUCH VERIO) w/Device KIT by Does not apply route once for 1 dose Dispense 1 meter    Empagliflozin (JARDIANCE) 25 MG TABS Take 1 tablet (25 mg total) by mouth daily for 30 days    fluticasone (FLONASE) 50 mcg/act nasal spray into each nostril Twice daily    gabapentin (NEURONTIN) 300 mg capsule Take 1 PO TID      Glucosamine-Chondroitin (OSTEO BI-FLEX REGULAR STRENGTH) 250-200 MG TABS Take 1 tablet by mouth 2 (two) times a day    glyBURIDE (DIABETA) 5 mg tablet Take 1 tablet (5 mg total) by mouth daily with breakfast    ibuprofen (MOTRIN) 200 mg tablet Take by mouth    insulin glargine (LANTUS SOLOSTAR) 100 units/mL injection pen Inject 30 Units under the skin daily at bedtime for 90 days    insulin glulisine (APIDRA SOLOSTAR) 100 units/mL injection pen Inject 22 Units under the skin daily with breakfast for 90 days    Insulin Pen Needle (BD PEN NEEDLE MILDRED U/F) 32G X 4 MM MISC by Does not apply route 2 (two) times a day for 100 days    lisinopril (ZESTRIL) 40 mg tablet Take 1 tablet (40 mg total) by mouth daily    loratadine (CLARITIN) 10 mg tablet Take 10 mg by mouth daily    omeprazole (PriLOSEC) 10 mg delayed release capsule Take 10 mg by mouth daily    ONETOUCH VERIO test strip Test blood sugars 4 x daily as directed    ONETOUCH VERIO test strip Check BG 4-5x per day    PROAIR  (90 Base) MCG/ACT inhaler Inhale 108 puffs every 4 to 6 hours as needed    Probiotic Product (PROBIOTIC-10) CAPS Take by mouth    simvastatin (ZOCOR) 20 mg tablet Take 1 tablet (20 mg total) by mouth daily at bedtime for 90 days    sitaGLIPtin-metFORMIN (JANUMET)  MG per tablet Take 1 tablet by mouth 2 (two) times a day with meals for 90 days    SYRINGE-NEEDLE, DISP, 3 ML (B-D SYRINGE/NEEDLE 3CC/23GX1") 23G X 1" 3 ML MISC by Does not apply route once a week    testosterone cypionate (DEPO-TESTOSTERONE) 200 mg/mL SOLN Inject 0 4 mL (80 mg total) into the shoulder, thigh, or buttocks once a week     No current facility-administered medications on file prior to visit  Allergies   Allergen Reactions    Augmentin [Amoxicillin-Pot Clavulanate] Abdominal Pain    Biaxin [Clarithromycin] Abdominal Pain    Dust Mite Extract     Insulin Aspart GI Intolerance    Molds & Smuts     Nuts     Seasonal Ic [Cholestatin] Headache       Physical Exam:    /73   Pulse 77   Temp 99 °F (37 2 °C)   Ht 5' 3" (1 6 m)   Wt 76 2 kg (168 lb)   BMI 29 76 kg/m²     Constitutional: normal, well developed, well nourished, alert, in no distress and non-toxic and no overt pain behavior  Eyes: anicteric  HEENT: grossly intact  Neck: supple, symmetric, trachea midline and no masses   Pulmonary:even and unlabored  Cardiovascular:No edema or pitting edema present  Skin:Normal without rashes or lesions and well hydrated  Psychiatric:Mood and affect appropriate  Neurologic:Cranial Nerves II-XII grossly intact  Musculoskeletal:normal gait  Left cervical paraspinals and trapezius mildly tender to palpation  Bilateral upper extremity strength 5/5 in all muscle groups  Negative Spurling's bilaterally      Imaging  Imaging reviewed

## 2018-06-10 DIAGNOSIS — M54.2 NECK PAIN: ICD-10-CM

## 2018-06-10 DIAGNOSIS — M54.12 CERVICAL RADICULOPATHY: ICD-10-CM

## 2018-06-10 DIAGNOSIS — M48.02 CERVICAL STENOSIS OF SPINAL CANAL: ICD-10-CM

## 2018-06-11 RX ORDER — GABAPENTIN 300 MG/1
CAPSULE ORAL
Qty: 90 CAPSULE | Refills: 0 | OUTPATIENT
Start: 2018-06-11

## 2018-06-27 LAB
ALBUMIN SERPL-MCNC: 4.3 G/DL (ref 3.6–5.1)
ALBUMIN SERPL-MCNC: 4.5 G/DL (ref 3.6–5.1)
ALBUMIN/GLOB SERPL: 1.7 (CALC) (ref 1–2.5)
ALP SERPL-CCNC: 55 U/L (ref 40–115)
ALT SERPL-CCNC: 32 U/L (ref 9–46)
AST SERPL-CCNC: 19 U/L (ref 10–35)
BASOPHILS # BLD AUTO: 45 CELLS/UL (ref 0–200)
BASOPHILS NFR BLD AUTO: 0.4 %
BILIRUB SERPL-MCNC: 1 MG/DL (ref 0.2–1.2)
BUN SERPL-MCNC: 28 MG/DL (ref 7–25)
BUN/CREAT SERPL: 26 (CALC) (ref 6–22)
CALCIUM SERPL-MCNC: 9.5 MG/DL (ref 8.6–10.3)
CHLORIDE SERPL-SCNC: 103 MMOL/L (ref 98–110)
CO2 SERPL-SCNC: 25 MMOL/L (ref 20–31)
CREAT SERPL-MCNC: 1.08 MG/DL (ref 0.7–1.25)
EOSINOPHIL # BLD AUTO: 260 CELLS/UL (ref 15–500)
EOSINOPHIL NFR BLD AUTO: 2.3 %
ERYTHROCYTE [DISTWIDTH] IN BLOOD BY AUTOMATED COUNT: 16.6 % (ref 11–15)
EST. AVERAGE GLUCOSE BLD GHB EST-MCNC: 131 (CALC)
EST. AVERAGE GLUCOSE BLD GHB EST-SCNC: 7.3 (CALC)
GLOBULIN SER CALC-MCNC: 2.6 G/DL (CALC) (ref 1.9–3.7)
GLUCOSE SERPL-MCNC: 139 MG/DL (ref 65–99)
HBA1C MFR BLD: 6.2 % OF TOTAL HGB
HCT VFR BLD AUTO: 51.4 % (ref 38.5–50)
HGB BLD-MCNC: 17.2 G/DL (ref 13.2–17.1)
LYMPHOCYTES # BLD AUTO: 3334 CELLS/UL (ref 850–3900)
LYMPHOCYTES NFR BLD AUTO: 29.5 %
MCH RBC QN AUTO: 27.7 PG (ref 27–33)
MCHC RBC AUTO-ENTMCNC: 33.5 G/DL (ref 32–36)
MCV RBC AUTO: 82.6 FL (ref 80–100)
MONOCYTES # BLD AUTO: 1040 CELLS/UL (ref 200–950)
MONOCYTES NFR BLD AUTO: 9.2 %
NEUTROPHILS # BLD AUTO: 6622 CELLS/UL (ref 1500–7800)
NEUTROPHILS NFR BLD AUTO: 58.6 %
PLATELET # BLD AUTO: 316 THOUSAND/UL (ref 140–400)
PMV BLD REES-ECKER: 10.2 FL (ref 7.5–12.5)
POTASSIUM SERPL-SCNC: 4.3 MMOL/L (ref 3.5–5.3)
PROT SERPL-MCNC: 7.1 G/DL (ref 6.1–8.1)
RBC # BLD AUTO: 6.22 MILLION/UL (ref 4.2–5.8)
SHBG SERPL-SCNC: 20 NMOL/L (ref 22–77)
SL AMB EGFR AFRICAN AMERICAN: 84 ML/MIN/1.73M2
SL AMB EGFR NON AFRICAN AMERICAN: 72 ML/MIN/1.73M2
SODIUM SERPL-SCNC: 139 MMOL/L (ref 135–146)
TESTOST FREE SERPL-MCNC: 149.2 PG/ML (ref 46–224)
TESTOST SERPL-MCNC: 687 NG/DL (ref 250–1100)
TESTOSTERONE.FREE+WB SERPL-MCNC: 293.8 NG/DL (ref 110–575)
WBC # BLD AUTO: 11.3 THOUSAND/UL (ref 3.8–10.8)

## 2018-06-28 ENCOUNTER — TELEPHONE (OUTPATIENT)
Dept: ENDOCRINOLOGY | Facility: CLINIC | Age: 63
End: 2018-06-28

## 2018-06-28 NOTE — TELEPHONE ENCOUNTER
----- Message from Belle Luevano PA-C sent at 6/27/2018  5:42 PM EDT -----  A1C 6 2- Diabetes under good control  Testosterone levels normal   Blood count just slightly above normal, which could be from testosterone, will monitor

## 2018-08-16 DIAGNOSIS — E11.65 TYPE 2 DIABETES MELLITUS WITH HYPERGLYCEMIA, WITH LONG-TERM CURRENT USE OF INSULIN (HCC): ICD-10-CM

## 2018-08-16 DIAGNOSIS — Z79.4 TYPE 2 DIABETES MELLITUS WITH HYPERGLYCEMIA, WITH LONG-TERM CURRENT USE OF INSULIN (HCC): ICD-10-CM

## 2018-08-17 DIAGNOSIS — E11.65 TYPE 2 DIABETES MELLITUS WITH HYPERGLYCEMIA, WITH LONG-TERM CURRENT USE OF INSULIN (HCC): ICD-10-CM

## 2018-08-17 DIAGNOSIS — Z79.4 TYPE 2 DIABETES MELLITUS WITH HYPERGLYCEMIA, WITH LONG-TERM CURRENT USE OF INSULIN (HCC): ICD-10-CM

## 2018-08-17 RX ORDER — PEN NEEDLE, DIABETIC 32GX 5/32"
NEEDLE, DISPOSABLE MISCELLANEOUS 4 TIMES DAILY
Qty: 360 EACH | Refills: 2 | Status: SHIPPED | OUTPATIENT
Start: 2018-08-17 | End: 2019-03-19 | Stop reason: SDUPTHER

## 2018-08-24 ENCOUNTER — CLINICAL SUPPORT (OUTPATIENT)
Dept: PAIN MEDICINE | Facility: CLINIC | Age: 63
End: 2018-08-24
Payer: COMMERCIAL

## 2018-08-24 VITALS — WEIGHT: 167 LBS | DIASTOLIC BLOOD PRESSURE: 80 MMHG | SYSTOLIC BLOOD PRESSURE: 156 MMHG | BODY MASS INDEX: 29.58 KG/M2

## 2018-08-24 DIAGNOSIS — M50.20 CERVICAL HERNIATED DISC: ICD-10-CM

## 2018-08-24 DIAGNOSIS — M54.12 CERVICAL RADICULOPATHY: ICD-10-CM

## 2018-08-24 DIAGNOSIS — M54.40 LOW BACK PAIN WITH SCIATICA, SCIATICA LATERALITY UNSPECIFIED, UNSPECIFIED BACK PAIN LATERALITY, UNSPECIFIED CHRONICITY: Primary | ICD-10-CM

## 2018-08-24 DIAGNOSIS — M46.1 SACROILIITIS (HCC): ICD-10-CM

## 2018-08-24 DIAGNOSIS — M54.2 NECK PAIN: ICD-10-CM

## 2018-08-24 DIAGNOSIS — M47.812 SPONDYLOSIS OF CERVICAL REGION WITHOUT MYELOPATHY OR RADICULOPATHY: ICD-10-CM

## 2018-08-24 DIAGNOSIS — M48.02 CERVICAL STENOSIS OF SPINAL CANAL: ICD-10-CM

## 2018-08-24 PROCEDURE — 99214 OFFICE O/P EST MOD 30 MIN: CPT | Performed by: ANESTHESIOLOGY

## 2018-08-24 RX ORDER — MELATONIN
1000 2 TIMES DAILY
COMMUNITY

## 2018-08-24 RX ORDER — GABAPENTIN 300 MG/1
CAPSULE ORAL
Qty: 270 CAPSULE | Refills: 1 | Status: SHIPPED | OUTPATIENT
Start: 2018-08-24 | End: 2019-02-27 | Stop reason: SDUPTHER

## 2018-08-24 RX ORDER — VITAMIN E 268 MG
400 CAPSULE ORAL 2 TIMES DAILY
COMMUNITY

## 2018-08-24 NOTE — PROGRESS NOTES
Assessment:  1  Low back pain with sciatica, sciatica laterality unspecified, unspecified back pain laterality, unspecified chronicity    2  Neck pain    3  Cervical stenosis of spinal canal    4  Spondylosis of cervical region without myelopathy or radiculopathy    5  Cervical radiculopathy    6  Cervical herniated disc    7  Sacroiliitis ([facility] [address] )        Plan:  26-year-old male with neck pain and cervical radiculopathy into the left upper extremity secondary to degenerative disc disease, spondylosis, and central and foraminal stenosis returning for follow-up  The patient's neck and left upper extremity symptoms are well controlled at this point  He had a very favorable result to cervical epidural steroid injection x2 which he is still noting relief from  He continues on gabapentin 300 mg t i d  and ibuprofen p r n  with about 80% relief  The patient does have a new complaint of about 2 weeks of left-sided lumbosacral back pain  The does have evidence of sacroiliitis on the left  The patient states that the pain does radiate into the buttock  There is a myofascial component as well to his left lumbar pain  1   I will prescribe physical therapy for the patient's left-sided low back and left buttock pain  2  I will continue gabapentin 300 mg t i d  for his neuropathic complaints  3  We will repeat cervical epidural steroid injection p r n   4   The patient may continue with ibuprofen p r n  and should not exceed more than 800 mg q 8 hours p r n   5   The patient will continue with his home exercise program  6  I will follow up the patient in 3 months      History of Present Illness: The patient is a 57 y o  male returning for follow-up of neck pain and cervical radiculopathy into the left upper extremity secondary to degenerative disc disease, spondylosis, central and foraminal stenosis  The patient has had excellent relief with cervical epidural steroid injection x2 which she is still noting relief from  He has a new complaint of left-sided low back pain that radiates into the left buttock  He denies any numbness, paresthesias, or subjective weakness into his lower extremities  The patient noted the pain when he was mowing his lawn  The patient is currently taking get b i d  and ibuprofen with about 80% relief  The patient rates his pain a 3/10 and the pain is worse at night  The pain is occasional and described as dull, aching, and cramping  The pain is worse with bending, twisting, lifting, and exercise  The pain is alleviated with sitting, relaxation, and lying down  I have personally reviewed and/or updated the patient's past medical history, past surgical history, family history, social history, current medications, allergies, and vital signs today  Other than as stated above, the patient denies any interval changes in medications, medical condition, mental condition, symptoms, or allergies since the last office visit  Review of Systems  Review of Systems   Musculoskeletal:        Joint stiffness, swelling, pain         Past Medical History:   Diagnosis Date    Avitaminosis D 2012    Diabetes mellitus ([facility] [address] )     Displacement of cervical intervertebral disc 2014    HTN (hypertension) 2007    [facility]    Spinal stenosis in cervical region 2014    Uric acid nephrolithiasis 1990       No past surgical history on file  Family History   Problem Relation Age of Onset    Diabetes unspecified Mother     Diabetes unspecified Father     Diabetes unspecified Sister     Diabetes unspecified Brother     Diabetes unspecified Sister     Diabetes unspecified Sister     Diabetes unspecified Brother     Diabetes unspecified Brother     Diabetes unspecified Brother     Diabetes unspecified Brother        Social History     Occupational History    Not on file       Social History Main Topics    Smoking status: Never Smoker    Smokeless tobacco: Never Used   Bret Chang Alcohol use Yes    Drug use: No    Sexual activity: Not on file         Current Outpatient Prescriptions:     amLODIPine (NORVASC) 10 mg tablet, Take 1 tablet (10 mg total) by mouth daily for 90 days, Disp: 90 tablet, Rfl: 3    aspirin (ECOTRIN LOW STRENGTH) 81 mg EC tablet, Take by mouth, Disp: , Rfl:     B Complex Vitamins (VITAMIN B-COMPLEX PO), Take by mouth, Disp: , Rfl:     BD PEN NEEDLE MILDRED U/F 32G X 4 MM MISC, by Does not apply route 4 (four) times a day for 180 days, Disp: 360 each, Rfl: 2    Blood Glucose Monitoring Suppl (ONETOUCH VERIO) w/Device KIT, by Does not apply route once for 1 dose Dispense 1 meter, Disp: 1 kit, Rfl: 1    cholecalciferol (VITAMIN D3) 1,000 units tablet, Take 1,000 Units by mouth daily, Disp: , Rfl:     Empagliflozin (JARDIANCE) 25 MG TABS, Take 1 tablet (25 mg total) by mouth daily for 30 days, Disp: 30 tablet, Rfl: 11    fluticasone (FLONASE) 50 mcg/act nasal spray, into each nostril Twice daily, Disp: , Rfl:     gabapentin (NEURONTIN) 300 mg capsule, Take 1 PO TID , Disp: 90 capsule, Rfl: 2    Glucosamine-Chondroitin (OSTEO BI-FLEX REGULAR STRENGTH) 250-200 MG TABS, Take 1 tablet by mouth 2 (two) times a day, Disp: , Rfl:     glyBURIDE (DIABETA) 5 mg tablet, Take 1 tablet (5 mg total) by mouth daily with breakfast, Disp: 90 tablet, Rfl: 3    ibuprofen (MOTRIN) 200 mg tablet, Take by mouth, Disp: , Rfl:     insulin glargine (LANTUS SOLOSTAR) 100 units/mL injection pen, Inject 30 Units under the skin daily at bedtime for 90 days, Disp: 10 pen, Rfl: 3    insulin glulisine (APIDRA SOLOSTAR) 100 units/mL injection pen, Inject 22 Units under the skin daily with breakfast for 90 days, Disp: 7 pen, Rfl: 3    lisinopril (ZESTRIL) 40 mg tablet, Take 1 tablet (40 mg total) by mouth daily, Disp: 90 tablet, Rfl: 3    loratadine (CLARITIN) 10 mg tablet, Take 10 mg by mouth daily, Disp: , Rfl:     MULTIPLE VITAMIN PO, Take by mouth, Disp: , Rfl:     omeprazole (PriLOSEC) 10 mg delayed release capsule, Take 10 mg by mouth daily, Disp: , Rfl:     ONETOUCH VERIO test strip, Test blood sugars 4 x daily as directed, Disp: 400 each, Rfl: 3    ONETOUCH VERIO test strip, Check BG 4-5x per day, Disp: 500 each, Rfl: 3    Probiotic Product (PROBIOTIC-10) CAPS, Take by mouth, Disp: , Rfl:     simvastatin (ZOCOR) 20 mg tablet, Take 1 tablet (20 mg total) by mouth daily at bedtime for 90 days, Disp: 90 tablet, Rfl: 3    sitaGLIPtin-metFORMIN (JANUMET)  MG per tablet, Take 1 tablet by mouth 2 (two) times a day with meals for 90 days, Disp: 180 tablet, Rfl: 3    SYRINGE-NEEDLE, DISP, 3 ML (B-D SYRINGE/NEEDLE 3CC/23GX1") 23G X 1" 3 ML MISC, by Does not apply route once a week, Disp: , Rfl:     testosterone cypionate (DEPO-TESTOSTERONE) 200 mg/mL SOLN, Inject 0 4 mL (80 mg total) into the shoulder, thigh, or buttocks once a week, Disp: 10 mL, Rfl: 0    vitamin E, tocopherol, 400 units capsule, Take 400 Units by mouth daily, Disp: , Rfl:     PROAIR  (90 Base) MCG/ACT inhaler, Inhale 108 puffs every 4 to 6 hours as needed, Disp: , Rfl: 0    Allergies   Allergen Reactions    Augmentin [Amoxicillin-Pot Clavulanate] Abdominal Pain    Biaxin [Clarithromycin] Abdominal Pain    Dust Mite Extract     Insulin Aspart GI Intolerance    Molds & Smuts     Nuts     Seasonal Ic [Cholestatin] Headache       Physical Exam:    /80   Wt 75 8 kg (167 lb)   BMI 29 58 kg/m²     Constitutional:normal, well developed, well nourished, alert, in no distress and non-toxic and no overt pain behavior  Eyes:anicteric  HEENT:grossly intact  Neck:supple, symmetric, trachea midline and no masses   Pulmonary:even and unlabored  Cardiovascular:No edema or pitting edema present  Skin:Normal without rashes or lesions and well hydrated  Psychiatric:Mood and affect appropriate  Neurologic:Cranial Nerves II-XII grossly intact  Musculoskeletal:normal gait    Bilateral cervical paraspinals and trapezii mildly tender to palpation  Bilateral upper extremity strength 5/5 in all muscle groups  Negative Spurling's bilaterally  Left lumbar paraspinals tender to palpation from L2-L5 and ropy in texture  Left SI joint tender to palpation  Bilateral lower extremity strength 5/5 in all muscle groups  Equivocal straight leg raise on the left and negative on the right  Positive Nain's, Gaenslen's, and AP compression test on the left and negative on the right  Imaging  Imaging reviewed  No orders to display       No orders of the defined types were placed in this encounter

## 2018-08-31 DIAGNOSIS — E29.1 HYPOGONADISM IN MALE: ICD-10-CM

## 2018-08-31 DIAGNOSIS — E11.65 TYPE 2 DIABETES MELLITUS WITH HYPERGLYCEMIA, WITH LONG-TERM CURRENT USE OF INSULIN (HCC): ICD-10-CM

## 2018-08-31 DIAGNOSIS — Z79.4 TYPE 2 DIABETES MELLITUS WITH HYPERGLYCEMIA, WITH LONG-TERM CURRENT USE OF INSULIN (HCC): ICD-10-CM

## 2018-08-31 RX ORDER — TESTOSTERONE CYPIONATE 200 MG/ML
80 INJECTION INTRAMUSCULAR WEEKLY
Qty: 10 ML | Refills: 0 | Status: SHIPPED | OUTPATIENT
Start: 2018-08-31 | End: 2019-02-22 | Stop reason: SDUPTHER

## 2018-08-31 NOTE — TELEPHONE ENCOUNTER
Patient wants his Testosterone prescription sent to his local [facility] pharmacy and [facility] to [facility]

## 2018-09-04 DIAGNOSIS — E11.65 TYPE 2 DIABETES MELLITUS WITH HYPERGLYCEMIA, WITH LONG-TERM CURRENT USE OF INSULIN (HCC): ICD-10-CM

## 2018-09-04 DIAGNOSIS — Z79.4 TYPE 2 DIABETES MELLITUS WITH HYPERGLYCEMIA, WITH LONG-TERM CURRENT USE OF INSULIN (HCC): ICD-10-CM

## 2018-09-04 RX ORDER — LISINOPRIL 40 MG/1
TABLET ORAL
Qty: 90 TABLET | Refills: 3 | Status: SHIPPED | OUTPATIENT
Start: 2018-09-04 | End: 2019-10-21 | Stop reason: SDUPTHER

## 2018-09-04 RX ORDER — SIMVASTATIN 20 MG
TABLET ORAL
Qty: 90 TABLET | Refills: 3 | Status: SHIPPED | OUTPATIENT
Start: 2018-09-04 | End: 2019-06-24 | Stop reason: SDUPTHER

## 2018-09-04 RX ORDER — GLYBURIDE 5 MG/1
5 TABLET ORAL 3 TIMES DAILY
Qty: 270 TABLET | Refills: 0 | Status: SHIPPED | OUTPATIENT
Start: 2018-09-04 | End: 2018-12-17 | Stop reason: SDUPTHER

## 2018-10-12 ENCOUNTER — TELEPHONE (OUTPATIENT)
Dept: PAIN MEDICINE | Facility: CLINIC | Age: 63
End: 2018-10-12

## 2018-10-12 NOTE — TELEPHONE ENCOUNTER
Patient is calling because he is wanting to ask if he can be brought in to get another injection prior to him going on vacation at the beginning of November  His next office visit is not until 11/20  He is having some increased pain in his neck/back that started recently

## 2018-10-15 ENCOUNTER — OFFICE VISIT (OUTPATIENT)
Dept: ENDOCRINOLOGY | Facility: CLINIC | Age: 63
End: 2018-10-15
Payer: COMMERCIAL

## 2018-10-15 VITALS
HEART RATE: 92 BPM | WEIGHT: 166.4 LBS | SYSTOLIC BLOOD PRESSURE: 140 MMHG | HEIGHT: 63 IN | BODY MASS INDEX: 29.48 KG/M2 | DIASTOLIC BLOOD PRESSURE: 72 MMHG

## 2018-10-15 DIAGNOSIS — Z79.4 TYPE 2 DIABETES MELLITUS WITHOUT COMPLICATION, WITH LONG-TERM CURRENT USE OF INSULIN (HCC): Primary | ICD-10-CM

## 2018-10-15 DIAGNOSIS — I10 BENIGN ESSENTIAL HYPERTENSION: ICD-10-CM

## 2018-10-15 DIAGNOSIS — E11.65 TYPE 2 DIABETES MELLITUS WITH HYPERGLYCEMIA, WITH LONG-TERM CURRENT USE OF INSULIN (HCC): ICD-10-CM

## 2018-10-15 DIAGNOSIS — E11.9 TYPE 2 DIABETES MELLITUS WITHOUT COMPLICATION, WITH LONG-TERM CURRENT USE OF INSULIN (HCC): Primary | ICD-10-CM

## 2018-10-15 DIAGNOSIS — E78.2 MIXED HYPERLIPIDEMIA: ICD-10-CM

## 2018-10-15 DIAGNOSIS — Z79.4 TYPE 2 DIABETES MELLITUS WITH HYPERGLYCEMIA, WITH LONG-TERM CURRENT USE OF INSULIN (HCC): ICD-10-CM

## 2018-10-15 DIAGNOSIS — E55.9 VITAMIN D DEFICIENCY: ICD-10-CM

## 2018-10-15 DIAGNOSIS — E23.0 HYPOGONADOTROPIC HYPOGONADISM (HCC): ICD-10-CM

## 2018-10-15 LAB
ALBUMIN SERPL BCP-MCNC: 4.2 G/DL (ref 3.5–5)
ALP SERPL-CCNC: 59 U/L (ref 46–116)
ALT SERPL W P-5'-P-CCNC: 37 U/L (ref 12–78)
ANION GAP SERPL CALCULATED.3IONS-SCNC: 8 MMOL/L (ref 4–13)
AST SERPL W P-5'-P-CCNC: 18 U/L (ref 5–45)
BILIRUB SERPL-MCNC: 1.1 MG/DL (ref 0.2–1)
BUN SERPL-MCNC: 22 MG/DL (ref 5–25)
CALCIUM SERPL-MCNC: 9.4 MG/DL (ref 8.3–10.1)
CHLORIDE SERPL-SCNC: 108 MMOL/L (ref 100–108)
CHOLEST SERPL-MCNC: 111 MG/DL (ref 50–200)
CO2 SERPL-SCNC: 25 MMOL/L (ref 21–32)
CREAT SERPL-MCNC: 1.01 MG/DL (ref 0.6–1.3)
CREAT UR-MCNC: 64.4 MG/DL
EST. AVERAGE GLUCOSE BLD GHB EST-MCNC: 128 MG/DL
GFR SERPL CREATININE-BSD FRML MDRD: 78 ML/MIN/1.73SQ M
GLUCOSE P FAST SERPL-MCNC: 155 MG/DL (ref 65–99)
HBA1C MFR BLD: 6.1 % (ref 4.2–6.3)
HCT VFR BLD AUTO: 54.3 % (ref 36.5–49.3)
HDLC SERPL-MCNC: 32 MG/DL (ref 40–60)
HGB BLD-MCNC: 18 G/DL (ref 12–17)
LDLC SERPL CALC-MCNC: 43 MG/DL (ref 0–100)
MICROALBUMIN UR-MCNC: 62.9 MG/L (ref 0–20)
MICROALBUMIN/CREAT 24H UR: 98 MG/G CREATININE (ref 0–30)
NONHDLC SERPL-MCNC: 79 MG/DL
POTASSIUM SERPL-SCNC: 4.2 MMOL/L (ref 3.5–5.3)
PROT SERPL-MCNC: 7.3 G/DL (ref 6.4–8.2)
SODIUM SERPL-SCNC: 141 MMOL/L (ref 136–145)
T4 FREE SERPL-MCNC: 0.9 NG/DL (ref 0.76–1.46)
TRIGL SERPL-MCNC: 181 MG/DL
TSH SERPL DL<=0.05 MIU/L-ACNC: 1.55 UIU/ML (ref 0.36–3.74)

## 2018-10-15 PROCEDURE — 80061 LIPID PANEL: CPT | Performed by: INTERNAL MEDICINE

## 2018-10-15 PROCEDURE — 80053 COMPREHEN METABOLIC PANEL: CPT | Performed by: INTERNAL MEDICINE

## 2018-10-15 PROCEDURE — 82043 UR ALBUMIN QUANTITATIVE: CPT | Performed by: INTERNAL MEDICINE

## 2018-10-15 PROCEDURE — 36415 COLL VENOUS BLD VENIPUNCTURE: CPT | Performed by: INTERNAL MEDICINE

## 2018-10-15 PROCEDURE — 84402 ASSAY OF FREE TESTOSTERONE: CPT | Performed by: INTERNAL MEDICINE

## 2018-10-15 PROCEDURE — 84439 ASSAY OF FREE THYROXINE: CPT | Performed by: INTERNAL MEDICINE

## 2018-10-15 PROCEDURE — 84403 ASSAY OF TOTAL TESTOSTERONE: CPT | Performed by: INTERNAL MEDICINE

## 2018-10-15 PROCEDURE — 82570 ASSAY OF URINE CREATININE: CPT | Performed by: INTERNAL MEDICINE

## 2018-10-15 PROCEDURE — 84443 ASSAY THYROID STIM HORMONE: CPT | Performed by: INTERNAL MEDICINE

## 2018-10-15 PROCEDURE — 83036 HEMOGLOBIN GLYCOSYLATED A1C: CPT | Performed by: INTERNAL MEDICINE

## 2018-10-15 PROCEDURE — 99214 OFFICE O/P EST MOD 30 MIN: CPT | Performed by: INTERNAL MEDICINE

## 2018-10-15 PROCEDURE — 85014 HEMATOCRIT: CPT | Performed by: INTERNAL MEDICINE

## 2018-10-15 PROCEDURE — 85018 HEMOGLOBIN: CPT | Performed by: INTERNAL MEDICINE

## 2018-10-15 NOTE — PROGRESS NOTES
Alka Lorenzo 57 y o  male MRN: [id number]    Encounter: [id number]      Assessment/Plan     Assessment: This is a 57y o -year-old male with diabetes with hyperglycemia, vitamin D deficiency, hypertension and hyperlipidemia  He also has hypogonadotropic hypogonadism  Plan:  1  Type 2 diabetes with hyperglycemia-check hemoglobin A1c  Based on results, I will make further changes as necessary  2  For vitamin-D deficiency, he will continue replacement  3  Hypertension-blood pressure is close to goal     4  Hypogonadotropic hypogonadism-continue testosterone replacement  Check H&H, total and free testosterone  5  Hyperlipidemia- continue statin  Check lipid panel, TSH and free T4     CC: Diabetes    History of Present Illness     HPI:  78-year-old male with type 2 diabetes for which he is on oral agents as well as feasible with insulin therapy  He denies any hypoglycemia  He has been having some hyperglycemia  He denies any complications of his diabetes  He denies any polyuria, polydipsia or nocturia  For hyperlipidemia, he is on a statin  He denies any myalgia  For the hypertension, he is on amlodipine and lisinopril  He denies any cough  For the hypogonadotropic hypogonadism, he is on testosterone injections once a week  He denies any difficulty urinating  He takes cholecalciferol for vitamin-D deficiency  He denies any fatigue  Review of Systems   Constitutional: Negative for chills and fever  Respiratory: Negative for shortness of breath  Cardiovascular: Negative for chest pain  Gastrointestinal: Negative for constipation, diarrhea, nausea and vomiting  Endocrine: Negative for polydipsia and polyuria  Neurological: Negative for numbness  All other systems reviewed and are negative        Historical Information   Past Medical History:   Diagnosis Date    Avitaminosis D 2012    Diabetes mellitus ([facility] [address] )     Displacement of cervical intervertebral disc 2014    HTN (hypertension) 2007    Pituitary microadenoma [facility]) 2010    Spinal stenosis in cervical region 2014    Uric acid nephrolithiasis 1990     History reviewed  No pertinent surgical history    Social History   History   Alcohol Use    Yes     History   Drug Use No     History   Smoking Status    Never Smoker   Smokeless Tobacco    Never Used     Family History:   Family History   Problem Relation Age of Onset    Diabetes unspecified Mother     Diabetes unspecified Father     Diabetes unspecified Sister     Diabetes unspecified Brother     Diabetes unspecified Sister     Diabetes unspecified Sister     Diabetes unspecified Brother     Diabetes unspecified Brother     Diabetes unspecified Brother     Diabetes unspecified Brother        Meds/Allergies   Current Outpatient Prescriptions   Medication Sig Dispense Refill    amLODIPine (NORVASC) 10 mg tablet Take 1 tablet (10 mg total) by mouth daily for 90 days 90 tablet 3    aspirin (ECOTRIN LOW STRENGTH) 81 mg EC tablet Take by mouth      B Complex Vitamins (VITAMIN B-COMPLEX PO) Take by mouth      BD PEN NEEDLE MILDRED U/F 32G X 4 MM MISC by Does not apply route 4 (four) times a day for 180 days 360 each 2    Blood Glucose Monitoring Suppl (Saeed Hearn) w/Device KIT by Does not apply route once for 1 dose Dispense 1 meter 1 kit 1    cholecalciferol (VITAMIN D3) 1,000 units tablet Take 1,000 Units by mouth daily      Empagliflozin (JARDIANCE) 25 MG TABS Take 1 tablet (25 mg total) by mouth daily for 30 days 30 tablet 11    fluticasone (FLONASE) 50 mcg/act nasal spray into each nostril Twice daily      gabapentin (NEURONTIN) 300 mg capsule Take 1 PO TID  270 capsule 1    Glucosamine-Chondroitin (OSTEO BI-FLEX REGULAR STRENGTH) 250-200 MG TABS Take 1 tablet by mouth 2 (two) times a day      glyBURIDE (DIABETA) 5 mg tablet Take 1 tablet (5 mg total) by mouth 3 (three) times a day for 90 days 270 tablet 0    ibuprofen (MOTRIN) 200 mg tablet Take by mouth      insulin glargine (LANTUS SOLOSTAR) 100 units/mL injection pen Inject 30 Units under the skin daily at bedtime for 90 days 10 pen 3    insulin glulisine (APIDRA SOLOSTAR) 100 units/mL injection pen Inject 22 Units under the skin daily for 90 days 6 pen 0    lisinopril (ZESTRIL) 40 mg tablet TAKE 1 TABLET DAILY 90 tablet 3    loratadine (CLARITIN) 10 mg tablet Take 10 mg by mouth daily      MULTIPLE VITAMIN PO Take by mouth      omeprazole (PriLOSEC) 10 mg delayed release capsule Take 10 mg by mouth daily      ONETOUCH VERIO test strip Test blood sugars 4 x daily as directed 400 each 3    PROAIR  (90 Base) MCG/ACT inhaler Inhale 108 puffs every 4 to 6 hours as needed  0    Probiotic Product (PROBIOTIC-10) CAPS Take by mouth      simvastatin (ZOCOR) 20 mg tablet TAKE 1 TABLET AT BEDTIME 90 tablet 3    sitaGLIPtin-metFORMIN (JANUMET)  MG per tablet Take 1 tablet by mouth 2 (two) times a day with meals for 90 days 180 tablet 3    SYRINGE-NEEDLE, DISP, 3 ML (B-D SYRINGE/NEEDLE 3CC/23GX1") 23G X 1" 3 ML MISC by Does not apply route once a week 12 each 1    testosterone cypionate (DEPO-TESTOSTERONE) 200 mg/mL SOLN Inject 0 4 mL (80 mg total) into a muscle once a week 10 mL 0    vitamin E, tocopherol, 400 units capsule Take 400 Units by mouth daily       No current facility-administered medications for this visit  Allergies   Allergen Reactions    Augmentin [Amoxicillin-Pot Clavulanate] Abdominal Pain    Biaxin [Clarithromycin] Abdominal Pain    Dust Mite Extract     Insulin Aspart GI Intolerance    Molds & Smuts     Nuts     Seasonal Ic [Cholestatin] Headache       Objective   Vitals: Blood pressure 140/72, pulse 92, height 5' 3" (1 6 m), weight 75 5 kg (166 lb 6 4 oz)  Physical Exam   Constitutional: He is oriented to person, place, and time  He appears well-developed and well-nourished  No distress  HENT:   Head: Normocephalic and atraumatic     Mouth/Throat: Oropharynx is clear and moist and mucous membranes are normal  No oropharyngeal exudate  Eyes: Conjunctivae, EOM and lids are normal  Right eye exhibits no discharge  Left eye exhibits no discharge  No scleral icterus  Neck: Neck supple  No thyromegaly present  Cardiovascular: Normal rate, regular rhythm and normal heart sounds  Exam reveals no gallop and no friction rub  Pulses are no weak pulses  No murmur heard  Pulses:       Dorsalis pedis pulses are 1+ on the right side, and 1+ on the left side  Pulmonary/Chest: Effort normal and breath sounds normal  No respiratory distress  He has no wheezes  Abdominal: Soft  Bowel sounds are normal  He exhibits no distension  There is no tenderness  Musculoskeletal: Normal range of motion  He exhibits no edema, tenderness or deformity  Feet:   Right Foot:   Skin Integrity: Negative for ulcer, skin breakdown, erythema, warmth, callus or dry skin  Left Foot:   Skin Integrity: Negative for ulcer, skin breakdown, erythema, warmth, callus or dry skin  Lymphadenopathy:        Head (right side): No occipital adenopathy present  Head (left side): No occipital adenopathy present  Right: No supraclavicular adenopathy present  Left: No supraclavicular adenopathy present  Neurological: He is alert and oriented to person, place, and time  No cranial nerve deficit  Coordination normal    Skin: Skin is warm and intact  No rash noted  He is not diaphoretic  No erythema  Psychiatric: He has a normal mood and affect  His behavior is normal    Vitals reviewed  Patient's shoes and socks removed  Right Foot/Ankle   Right Foot Inspection  Skin Exam: skin normal and skin intact no dry skin, no warmth, no callus, no erythema, no maceration, no abnormal color, no pre-ulcer, no ulcer and no callus                            Sensory   Vibration: diminished    Monofilament testing: intact  Vascular    The right DP pulse is 1+       Left Foot/Ankle  Left Foot Inspection  Skin Exam: skin intactno dry skin, no warmth, no erythema, no maceration, normal color, no pre-ulcer, no ulcer and no callus                                         Sensory   Vibration: diminished    Monofilament: intact  Vascular    The left DP pulse is 1+  Assign Risk Category:  No deformity present; No loss of protective sensation; No weak pulses       Risk: 0        The history was obtained from the review of the chart, patient  Lab Results:   Lab Results   Component Value Date/Time    Hemoglobin A1C 6 2 (H) 06/23/2018 09:27 AM    Hemoglobin A1C 5 9 (H) 02/17/2018 10:33 AM    Hemoglobin A1C 6 0 (H) 10/28/2017 09:25 AM    White Blood Cell Count 11 3 (H) 06/23/2018 09:27 AM    Hemoglobin 17 2 (H) 06/23/2018 09:27 AM    HCT 51 4 (H) 06/23/2018 09:27 AM    MCV 82 6 06/23/2018 09:27 AM    Platelet Count 154 06/72/0925 09:27 AM    BUN 28 (H) 06/23/2018 09:27 AM    BUN 21 10/28/2017 09:25 AM    Sodium 140 10/28/2017 09:25 AM    SL AMB POTASSIUM 4 3 06/23/2018 09:27 AM    Potassium 3 7 10/28/2017 09:25 AM    Chloride 103 06/23/2018 09:27 AM    Chloride 101 10/28/2017 09:25 AM    CO2 25 06/23/2018 09:27 AM    CO2 28 10/28/2017 09:25 AM    Creatinine 1 09 10/28/2017 09:25 AM    AST 20 10/28/2017 09:25 AM    ALT 34 10/28/2017 09:25 AM    Albumin 4 5 06/23/2018 09:27 AM    Albumin 4 3 06/23/2018 09:27 AM    Albumin 5 0 10/28/2017 09:25 AM    Globulin 2 6 06/23/2018 09:27 AM    Cholesterol 141 10/28/2017 09:25 AM    HDL 38 (L) 10/28/2017 09:25 AM    Triglycerides 102 10/28/2017 09:25 AM           Imaging Studies: I have personally reviewed pertinent reports  Portions of the record may have been created with voice recognition software  Occasional wrong word or "sound a like" substitutions may have occurred due to the inherent limitations of voice recognition software  Read the chart carefully and recognize, using context, where substitutions have occurred

## 2018-10-17 LAB
TESTOST FREE SERPL-MCNC: 14.2 PG/ML (ref 6.6–18.1)
TESTOST SERPL-MCNC: 670 NG/DL (ref 264–916)

## 2018-10-17 NOTE — TELEPHONE ENCOUNTER
Called pt, scheduled XIAO on Tues 10/30/18  Pt takes aspirin on own (preventative)- will hold for 6 days, last dose on Tues 10/23, also takes ibuprofen, will hold for one day, last dose on Sun 10/28  Went over pre procedure instructions, NPO 1 hr prior, if sick or on abx needs to call to rs, wear loose, comf clothing like Tshirt- no jewelry, needs   Pt verbalized understanding

## 2018-10-18 ENCOUNTER — TELEPHONE (OUTPATIENT)
Dept: ENDOCRINOLOGY | Facility: CLINIC | Age: 63
End: 2018-10-18

## 2018-10-18 DIAGNOSIS — E23.0 HYPOGONADOTROPIC HYPOGONADISM (HCC): Primary | ICD-10-CM

## 2018-10-18 NOTE — TELEPHONE ENCOUNTER
----- Message from Magalis Owens MD sent at 10/18/2018  3:03 PM EDT -----  Please call the patient regarding his abnormal result  Diabetes is under good control  Normal thyroid testing  The fascial level was normal   The hemoglobin is slightly elevated  Decrease testosterone to 0 3 mL per week  The LDL is at target  There is small amount of protein in the urine  Continue to monitor  Check total and free testosterone level in six weeks after decrease in the amount injected

## 2018-10-18 NOTE — PROGRESS NOTES
Please call the patient regarding his abnormal result  Diabetes is under good control  Normal thyroid testing  The fascial level was normal   The hemoglobin is slightly elevated  Decrease testosterone to 0 3 mL per week  The LDL is at target  There is small amount of protein in the urine  Continue to monitor  Check total and free testosterone level in six weeks after decrease in the amount injected

## 2018-10-22 ENCOUNTER — OFFICE VISIT (OUTPATIENT)
Dept: SLEEP CENTER | Facility: CLINIC | Age: 63
End: 2018-10-22
Payer: COMMERCIAL

## 2018-10-22 VITALS
BODY MASS INDEX: 29.23 KG/M2 | DIASTOLIC BLOOD PRESSURE: 82 MMHG | HEIGHT: 63 IN | HEART RATE: 80 BPM | SYSTOLIC BLOOD PRESSURE: 146 MMHG | WEIGHT: 165 LBS

## 2018-10-22 DIAGNOSIS — E23.0 HYPOGONADOTROPIC HYPOGONADISM (HCC): ICD-10-CM

## 2018-10-22 DIAGNOSIS — M54.2 NECK PAIN: ICD-10-CM

## 2018-10-22 DIAGNOSIS — I10 BENIGN ESSENTIAL HYPERTENSION: ICD-10-CM

## 2018-10-22 DIAGNOSIS — E66.9 DIABETES MELLITUS TYPE 2 IN OBESE (HCC): ICD-10-CM

## 2018-10-22 DIAGNOSIS — E66.3 OVERWEIGHT (BMI 25.0-29.9): ICD-10-CM

## 2018-10-22 DIAGNOSIS — G47.33 OBSTRUCTIVE SLEEP APNEA SYNDROME: Primary | ICD-10-CM

## 2018-10-22 DIAGNOSIS — E11.69 DIABETES MELLITUS TYPE 2 IN OBESE (HCC): ICD-10-CM

## 2018-10-22 PROCEDURE — 99214 OFFICE O/P EST MOD 30 MIN: CPT | Performed by: INTERNAL MEDICINE

## 2018-10-22 NOTE — PROGRESS NOTES
Follow-Up Note - [address]  57 y o  male  :1955  MCO:[id number]    CC: I saw this patient for follow-up in clinic today for his Sleep Disordered Breathing, Coexisting Sleep and Medical Problems  PFSH, Problem List, Medications & Allergies were reviewed in EMR  Interval changes: [none reported ]   He  has a past medical history of Avitaminosis D (); Diabetes mellitus ([facility] [address] ); Displacement of cervical intervertebral disc (); HTN (hypertension) (); Pituitary microadenoma ([facility] [address] ) (); Spinal stenosis in cervical region (); and Uric acid nephrolithiasis ()  He has a current medication list which includes the following prescription(s): aspirin, b complex vitamins, bd pen needle eunice u/f, cholecalciferol, empagliflozin, fluticasone, gabapentin, glucosamine-chondroitin, glyburide, ibuprofen, insulin glulisine, lisinopril, loratadine, multiple vitamin, omeprazole, onetouch verio, proair hfa, probiotic-10, simvastatin, syringe-needle (disp) 3 ml, testosterone cypionate, vitamin e (tocopherol), amlodipine, onetouch verio, insulin glargine, and sitagliptin-metformin  ROS: Reviewed (see attached)  [Significant for  Neck pain and upper extremity radicular symptoms  That at times disturb sleep, for which she is getting steroid injections  Medical conditions are stable ]     HPI: Catherine Silveira reports no  difficulty tolerating PAP; With respect to compliance, data download shows:  No data was available, but he reports regular use of the device for > 4hours/night  · He is experiencing no  adverse effects:   · He is benefitting from use and reports: sleeping better     Sleep Routine: He reports getting 7 hours or more sleep[  ]; he has no difficulty initiating or maintaining sleep   He awakens with the aid of an alarm feeling refreshed  He denied excessive drowsiness[ ]  He rated himself at Total score: 6 /24 on the Reubens sleepiness scale    [  ]  Habits:[ reports that he has never smoked  He has never used smokeless tobacco ], [ reports that he drinks alcohol ], [ reports that he does not use drugs  ], Caffeine use: limited[ ], Exercise routine: irregular[ ]  EXAM: /82   Pulse 80   Ht 5' 3" (1 6 m)   Wt 74 8 kg (165 lb)   BMI 29 23 kg/m²      Patient is well groomed; well appearing; no deformity  He is alert, orientated, cooperative [and in no distress]  Mental state [appears normal]  Skin/Extrem: warm & dry; col & hydration normal; no edema  EOMI; There are [no] facial pressure marks or rashes  Neck Circumference: 43 cm  Nasal airway is [patent ] Oral airway [is crowded ] Mucous membranes appeared [normal]  RRR; Resp effort is normal  There is truncal obesity  Gait & Balance [normal]   Speech is [clear & coherent]  IMPRESSION: Primary Sleep/Secondary(to Medical or Psych conditions) & comorbidities   1  Obstructive sleep apnea syndrome     2  Benign essential hypertension     3  Neck pain     4  Diabetes mellitus type 2 in obese ([address] )     5  Overweight (BMI 25 0-29  9)         PLAN:  1  Treatment with  PAP is medically necessary and Yoli José is agreable to continue use  2  Care of equipment, methods to improve comfort using PAP and importance of compliance with therapy were discussed  3  Pressure settin cm H2O     4  Rx provided to replace supplies and Care coordinated with DME provider  5  Strategies for weight reduction were discussed  6  Follow-up is advised in [1 [facility] [or sooner if needed] [to monitor progress, compliance and to adjust therapy]  Thank you for allowing me to participate in the care of this patient      Sincerely,    Authenticated electronically by Obey James MD on    Board Certified Specialist

## 2018-10-22 NOTE — PROGRESS NOTES
Review of Systems      Genitourinary none   Cardiology none   Gastrointestinal frequent heartburn/acid reflux   Neurology none   Constitutional fatigue and weight change   Integumentary none   Psychiatry none   Musculoskeletal joint pain, muscle aches and back pain   Pulmonary none   ENT none   Endocrine none   Hematological none

## 2018-10-30 ENCOUNTER — HOSPITAL ENCOUNTER (OUTPATIENT)
Dept: RADIOLOGY | Facility: CLINIC | Age: 63
Discharge: HOME | End: 2018-10-30
Admitting: ANESTHESIOLOGY
Payer: COMMERCIAL

## 2018-10-30 VITALS
RESPIRATION RATE: 18 BRPM | DIASTOLIC BLOOD PRESSURE: 76 MMHG | HEART RATE: 77 BPM | SYSTOLIC BLOOD PRESSURE: 152 MMHG | OXYGEN SATURATION: 96 % | TEMPERATURE: 98.3 F

## 2018-10-30 DIAGNOSIS — M54.2 NECK PAIN: ICD-10-CM

## 2018-10-30 DIAGNOSIS — M54.12 CERVICAL RADICULOPATHY: ICD-10-CM

## 2018-10-30 PROCEDURE — 62321 NJX INTERLAMINAR CRV/THRC: CPT | Performed by: ANESTHESIOLOGY

## 2018-10-30 RX ORDER — LIDOCAINE HYDROCHLORIDE 10 MG/ML
5 INJECTION, SOLUTION EPIDURAL; INFILTRATION; INTRACAUDAL; PERINEURAL ONCE
Status: COMPLETED | OUTPATIENT
Start: 2018-10-30 | End: 2018-10-30

## 2018-10-30 RX ORDER — PAPAVERINE HCL 150 MG
10 CAPSULE, EXTENDED RELEASE ORAL ONCE
Status: COMPLETED | OUTPATIENT
Start: 2018-10-30 | End: 2018-10-30

## 2018-10-30 RX ADMIN — DEXAMETHASONE SODIUM PHOSPHATE 10 MG: 10 INJECTION, SOLUTION INTRAMUSCULAR; INTRAVENOUS at 08:43

## 2018-10-30 RX ADMIN — IOHEXOL 1 ML: 300 INJECTION, SOLUTION INTRAVENOUS at 08:42

## 2018-10-30 RX ADMIN — LIDOCAINE HYDROCHLORIDE 3 ML: 10 INJECTION, SOLUTION EPIDURAL; INFILTRATION; INTRACAUDAL; PERINEURAL at 08:39

## 2018-10-30 NOTE — DISCHARGE INSTR - LAB
Epidural Steroid Injection   WHAT YOU NEED TO KNOW:   An epidural steroid injection (LUIS MIGUEL) is a procedure to inject steroid medicine into the epidural space  The epidural space is between your spinal cord and vertebrae  Steroids reduce inflammation and fluid buildup in your spine that may be causing pain  You may be given pain medicine along with the steroids  ACTIVITY  · Do not drive or operate machinery today  · No strenuous activity today - bending, lifting, etc   · You may resume normal activites starting tomorrow - start slowly and as tolerated  · You may shower today, but no tub baths or hot tubs  · You may have numbness for several hours from the local anesthetic  Please use caution and common sense, especially with weight-bearing activities  CARE OF THE INJECTION SITE  · If you have soreness or pain, apply ice to the area today (20 minutes on/20 minutes off)  · Starting tomorrow, you may use warm, moist heat or ice if needed  · You may have an increase or change in your discomfort for 36-48 hours after your treatment  · Apply ice and continue with any pain medication you have been prescribed  · Notify [facility] if you have any of the following: redness, drainage, swelling, headache, stiff neck or fever above 100°F     SPECIAL INSTRUCTIONS  · Our office will contact you in approximately 7 days for a progress report  MEDICATIONS  · Continue to take all routine medications  · Our office may have instructed you to hold some medications  If you have a problem specifically related to your procedure, please call our office at [phone]  Problems not related to your procedure should be directed to your primary care physician

## 2018-10-30 NOTE — H&P
History of Present Illness: The patient is a 57 y o  male who presents with complaints of neck and arm pain  Patient Active Problem List   Diagnosis    Benign essential hypertension    Carpal tunnel syndrome    Cervical herniated disc    Cervical radiculopathy    Cervical stenosis of spinal canal    Neck pain    DMII (diabetes mellitus, type 2) ([facility] [address] )    Hyperlipidemia    Hypogonadotropic hypogonadism (HCC)    Nephrolithiasis    Pituitary microadenoma ([facility] [address] )    Seborrheic dermatitis    Obstructive sleep apnea syndrome    Spondylosis of cervical region without myelopathy or radiculopathy    Sprain and strain of calcaneofibular (ligament)    Viral upper respiratory tract infection with cough    Vitamin D deficiency    Low back pain with sciatica    Sacroiliitis ([facility] [address] )       Past Medical History:   Diagnosis Date    Avitaminosis D 2012    Diabetes mellitus ([facility] [address] )     Displacement of cervical intervertebral disc 2014    HTN (hypertension) 2007    Pituitary microadenoma ([facility] [address] ) 2010    Spinal stenosis in cervical region 2014    Uric acid nephrolithiasis 1990       History reviewed  No pertinent surgical history        Current Outpatient Prescriptions:     amLODIPine (NORVASC) 10 mg tablet, Take 1 tablet (10 mg total) by mouth daily for 90 days, Disp: 90 tablet, Rfl: 3    aspirin (ECOTRIN LOW STRENGTH) 81 mg EC tablet, Take by mouth, Disp: , Rfl:     B Complex Vitamins (VITAMIN B-COMPLEX PO), Take by mouth, Disp: , Rfl:     BD PEN NEEDLE MILDRED U/F 32G X 4 MM MISC, by Does not apply route 4 (four) times a day for 180 days, Disp: 360 each, Rfl: 2    Blood Glucose Monitoring Suppl (ONETOUCH VERIO) w/Device KIT, by Does not apply route once for 1 dose Dispense 1 meter, Disp: 1 kit, Rfl: 1    cholecalciferol (VITAMIN D3) 1,000 units tablet, Take 1,000 Units by mouth daily, Disp: , Rfl:     Empagliflozin (JARDIANCE) 25 MG TABS, Take 1 tablet (25 mg total) by mouth daily for 30 days, Disp: 90 tablet, Rfl: 3    fluticasone (FLONASE) 50 mcg/act nasal spray, into each nostril Twice daily, Disp: , Rfl:     gabapentin (NEURONTIN) 300 mg capsule, Take 1 PO TID , Disp: 270 capsule, Rfl: 1    Glucosamine-Chondroitin (OSTEO BI-FLEX REGULAR STRENGTH) 250-200 MG TABS, Take 1 tablet by mouth 2 (two) times a day, Disp: , Rfl:     glyBURIDE (DIABETA) 5 mg tablet, Take 1 tablet (5 mg total) by mouth 3 (three) times a day for 90 days, Disp: 270 tablet, Rfl: 0    ibuprofen (MOTRIN) 200 mg tablet, Take by mouth, Disp: , Rfl:     insulin glargine (LANTUS SOLOSTAR) 100 units/mL injection pen, Inject 30 Units under the skin daily at bedtime for 90 days, Disp: 10 pen, Rfl: 3    insulin glulisine (APIDRA SOLOSTAR) 100 units/mL injection pen, Inject 22 Units under the skin daily for 90 days, Disp: 6 pen, Rfl: 0    lisinopril (ZESTRIL) 40 mg tablet, TAKE 1 TABLET DAILY, Disp: 90 tablet, Rfl: 3    loratadine (CLARITIN) 10 mg tablet, Take 10 mg by mouth daily, Disp: , Rfl:     MULTIPLE VITAMIN PO, Take by mouth, Disp: , Rfl:     omeprazole (PriLOSEC) 10 mg delayed release capsule, Take 10 mg by mouth daily, Disp: , Rfl:     ONETOUCH VERIO test strip, Test blood sugars 4 x daily as directed, Disp: 400 each, Rfl: 3    PROAIR  (90 Base) MCG/ACT inhaler, Inhale 108 puffs every 4 to 6 hours as needed, Disp: , Rfl: 0    Probiotic Product (PROBIOTIC-10) CAPS, Take by mouth, Disp: , Rfl:     simvastatin (ZOCOR) 20 mg tablet, TAKE 1 TABLET AT BEDTIME, Disp: 90 tablet, Rfl: 3    sitaGLIPtin-metFORMIN (JANUMET)  MG per tablet, Take 1 tablet by mouth 2 (two) times a day with meals for 90 days, Disp: 180 tablet, Rfl: 3    SYRINGE-NEEDLE, DISP, 3 ML (B-D SYRINGE/NEEDLE 3CC/23GX1") 23G X 1" 3 ML MISC, by Does not apply route once a week, Disp: 12 each, Rfl: 1    testosterone cypionate (DEPO-TESTOSTERONE) 200 mg/mL SOLN, Inject 0 4 mL (80 mg total) into a muscle once a week, Disp: 10 mL, Rfl: 0    vitamin E, tocopherol, 400 units capsule, Take 400 Units by mouth daily, Disp: , Rfl:     Current Facility-Administered Medications:     dexamethasone (PF) (DECADRON) injection 10 mg, 10 mg, Intra-articular, Once, Gaetano Long DO    iohexol (OMNIPAQUE) 300 mg/mL injection 50 mL, 50 mL, Injection, Once, Jeffrey Galarza DO    lidocaine (PF) (XYLOCAINE-MPF) 1 % injection 5 mL, 5 mL, Intra-articular, Once, Jeffrey Galarza, DO    Allergies   Allergen Reactions    Augmentin [Amoxicillin-Pot Clavulanate] Abdominal Pain    Biaxin [Clarithromycin] Abdominal Pain    Dust Mite Extract     Insulin Aspart GI Intolerance    Molds & Smuts     Nuts     Seasonal Ic [Cholestatin] Headache       Physical Exam:   Vitals:    10/30/18 0820   BP: 154/76   Pulse: 71   Resp: 18   Temp: 98 3 °F (36 8 °C)   SpO2: 95%     General: Awake, Alert, Oriented x 3, Mood and affect appropriate  Respiratory: Respirations even and unlabored  Cardiovascular: Peripheral pulses intact; no edema  Musculoskeletal Exam:  Left cervical paraspinals tender to palpation  ASA Score: 2    Patient/Chart Verification  Patient ID Verified: Verbal  Consents Confirmed: Procedural, To be obtained in the Pre-Procedure area  H&P( within 30 days) Verified: To be obtained in the Pre-Procedure area  Interval H&P(within 24 hr) Complete (required for Outpatients and Surgery Admit only): To be obtained in the Pre-Procedure area  Allergies Reviewed: Yes  Anticoag/NSAID held?: Yes (ASA held for 6 days, Ibuprofen held for 24 hours)  Currently on antibiotics?: No    Assessment:   1  Cervical radiculopathy    2   Neck pain        Plan: REPEAT XIAO- ASA/ IBUPROFEN

## 2018-11-07 ENCOUNTER — TELEPHONE (OUTPATIENT)
Dept: PAIN MEDICINE | Facility: CLINIC | Age: 63
End: 2018-11-07

## 2018-11-08 NOTE — TELEPHONE ENCOUNTER
Pt returned call stating that he had about 70% relief so far and his current pain level is a 3 or 4  He is currently on a trip to [place] and is doing a lot of walking and carrying luggage, etc  Pt is happy with the results so far  He is aware of his f/u visit on 11/20  He can be reached at [phone] with any other questions

## 2018-12-06 LAB
TESTOST FREE SERPL-MCNC: 85.7 PG/ML (ref 35–155)
TESTOST SERPL-MCNC: 595 NG/DL (ref 250–1100)

## 2018-12-14 ENCOUNTER — TELEPHONE (OUTPATIENT)
Dept: ENDOCRINOLOGY | Facility: CLINIC | Age: 63
End: 2018-12-14

## 2018-12-14 NOTE — TELEPHONE ENCOUNTER
----- Message from Leah Escobar MD sent at 12/14/2018 12:25 PM EST -----  The laboratory testing results are normal

## 2018-12-17 DIAGNOSIS — E11.65 TYPE 2 DIABETES MELLITUS WITH HYPERGLYCEMIA, WITH LONG-TERM CURRENT USE OF INSULIN (HCC): ICD-10-CM

## 2018-12-17 DIAGNOSIS — Z79.4 TYPE 2 DIABETES MELLITUS WITH HYPERGLYCEMIA, WITH LONG-TERM CURRENT USE OF INSULIN (HCC): ICD-10-CM

## 2018-12-18 RX ORDER — GLYBURIDE 5 MG/1
5 TABLET ORAL 3 TIMES DAILY
Qty: 270 TABLET | Refills: 1 | Status: SHIPPED | OUTPATIENT
Start: 2018-12-18 | End: 2019-03-19 | Stop reason: SDUPTHER

## 2019-02-21 ENCOUNTER — TELEPHONE (OUTPATIENT)
Dept: PAIN MEDICINE | Facility: CLINIC | Age: 64
End: 2019-02-21

## 2019-02-21 NOTE — TELEPHONE ENCOUNTER
Pt contacted Call Center requested refill of their medication  Medication Name: Gabapentin      Dosage of Med: 300 mg      Frequency of Med: take 1 po tid      Remaining Medication: patient has 2 week supply left, but states it may take a week to receive through [address] mail service  That is why he is requesting his refill now  Pharmacy and Location: On file        Pt  Preferred Callback Phone Number: [phone]      Thank you  ADVISED PATIENT REFILL REQUESTS WILL BE PROCESSED WITHIN 24-48 HOURS

## 2019-02-21 NOTE — TELEPHONE ENCOUNTER
Patient aware during his office visit with Shannon on 2/26/19, he will then get his refill sent for mail order  He appreciated follow up on plan

## 2019-02-21 NOTE — TELEPHONE ENCOUNTER
Spoke to patient as he is requesting his refill for Gabapentin 300 TID  He is taking it as prescribed, overall helping him, he only experienced wt gain in the very beginning, which has now tapered off  He does have 2 weeks of medication left  However, due to using [facility], it takes a wk for him to get the medications  This is why he is requesting early, please advise  I seen patient had cancelled his last OV in Novembers, and then he forgot to re-schedule   He is now scheduled to see you on 2/26/19 arrival 845 am

## 2019-02-22 DIAGNOSIS — Z79.4 TYPE 2 DIABETES MELLITUS WITH HYPERGLYCEMIA, WITH LONG-TERM CURRENT USE OF INSULIN (HCC): ICD-10-CM

## 2019-02-22 DIAGNOSIS — E11.65 TYPE 2 DIABETES MELLITUS WITH HYPERGLYCEMIA, WITH LONG-TERM CURRENT USE OF INSULIN (HCC): ICD-10-CM

## 2019-02-22 DIAGNOSIS — E29.1 HYPOGONADISM IN MALE: ICD-10-CM

## 2019-02-22 RX ORDER — TESTOSTERONE CYPIONATE 200 MG/ML
80 INJECTION INTRAMUSCULAR WEEKLY
Qty: 10 ML | Refills: 0 | Status: SHIPPED | OUTPATIENT
Start: 2019-02-22 | End: 2019-06-25 | Stop reason: SDUPTHER

## 2019-02-22 RX ORDER — AMLODIPINE BESYLATE 10 MG/1
10 TABLET ORAL DAILY
Qty: 90 TABLET | Refills: 3 | Status: SHIPPED | OUTPATIENT
Start: 2019-02-22 | End: 2020-01-27 | Stop reason: SDUPTHER

## 2019-02-27 ENCOUNTER — OFFICE VISIT (OUTPATIENT)
Dept: PAIN MEDICINE | Facility: CLINIC | Age: 64
End: 2019-02-27
Payer: COMMERCIAL

## 2019-02-27 VITALS
HEIGHT: 63 IN | HEART RATE: 76 BPM | WEIGHT: 167 LBS | TEMPERATURE: 98.2 F | BODY MASS INDEX: 29.59 KG/M2 | DIASTOLIC BLOOD PRESSURE: 78 MMHG | SYSTOLIC BLOOD PRESSURE: 158 MMHG

## 2019-02-27 DIAGNOSIS — M48.02 CERVICAL STENOSIS OF SPINAL CANAL: ICD-10-CM

## 2019-02-27 DIAGNOSIS — M50.20 CERVICAL HERNIATED DISC: ICD-10-CM

## 2019-02-27 DIAGNOSIS — M47.812 CERVICAL FACET SYNDROME: ICD-10-CM

## 2019-02-27 DIAGNOSIS — M54.2 NECK PAIN: ICD-10-CM

## 2019-02-27 DIAGNOSIS — M54.12 CERVICAL RADICULOPATHY: ICD-10-CM

## 2019-02-27 DIAGNOSIS — M47.812 SPONDYLOSIS OF CERVICAL REGION WITHOUT MYELOPATHY OR RADICULOPATHY: Primary | ICD-10-CM

## 2019-02-27 PROCEDURE — 99214 OFFICE O/P EST MOD 30 MIN: CPT | Performed by: NURSE PRACTITIONER

## 2019-02-27 RX ORDER — GABAPENTIN 300 MG/1
CAPSULE ORAL
Qty: 270 CAPSULE | Refills: 1 | Status: SHIPPED | OUTPATIENT
Start: 2019-02-27 | End: 2019-07-19 | Stop reason: SDUPTHER

## 2019-02-27 RX ORDER — MELOXICAM 7.5 MG/1
7.5 TABLET ORAL 2 TIMES DAILY
Qty: 60 TABLET | Refills: 1 | Status: SHIPPED | OUTPATIENT
Start: 2019-02-27 | End: 2019-03-19 | Stop reason: ALTCHOICE

## 2019-02-27 RX ORDER — EMPAGLIFLOZIN 25 MG/1
TABLET, FILM COATED ORAL
COMMUNITY
Start: 2019-01-05 | End: 2019-03-19 | Stop reason: SDUPTHER
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
## 2019-03-01 ENCOUNTER — TELEPHONE (OUTPATIENT)
Dept: PAIN MEDICINE | Facility: CLINIC | Age: 64
End: 2019-03-01

## 2019-03-01 NOTE — TELEPHONE ENCOUNTER
Spoke with patient,  scheduled RT C2-4 MBB 03/20/19 arrival 7:45am    Went over pre procedure instructions, pt diabetic, follow normal routine for meds and food, if sick or on abx needs to call to rs, wear loose, comf clothing- like Tshirt, no jewelry, needs   Went over 5/10 pain level or greater that day and not to take any OTC pain meds prior  Pt verbalized understanding

## 2019-03-19 ENCOUNTER — OFFICE VISIT (OUTPATIENT)
Dept: ENDOCRINOLOGY | Facility: CLINIC | Age: 64
End: 2019-03-19
Payer: COMMERCIAL

## 2019-03-19 VITALS
BODY MASS INDEX: 29.59 KG/M2 | HEIGHT: 63 IN | HEART RATE: 80 BPM | SYSTOLIC BLOOD PRESSURE: 136 MMHG | DIASTOLIC BLOOD PRESSURE: 70 MMHG | WEIGHT: 167 LBS

## 2019-03-19 DIAGNOSIS — E11.65 TYPE 2 DIABETES MELLITUS WITH HYPERGLYCEMIA, WITH LONG-TERM CURRENT USE OF INSULIN (HCC): ICD-10-CM

## 2019-03-19 DIAGNOSIS — D35.2 PITUITARY MICROADENOMA (HCC): ICD-10-CM

## 2019-03-19 DIAGNOSIS — Z79.4 INSULIN LONG-TERM USE (HCC): Primary | ICD-10-CM

## 2019-03-19 DIAGNOSIS — Z79.4 TYPE 2 DIABETES MELLITUS WITHOUT COMPLICATION, WITH LONG-TERM CURRENT USE OF INSULIN (HCC): ICD-10-CM

## 2019-03-19 DIAGNOSIS — I10 BENIGN ESSENTIAL HYPERTENSION: ICD-10-CM

## 2019-03-19 DIAGNOSIS — E11.9 TYPE 2 DIABETES MELLITUS WITHOUT COMPLICATION, WITH LONG-TERM CURRENT USE OF INSULIN (HCC): ICD-10-CM

## 2019-03-19 DIAGNOSIS — E78.2 MIXED HYPERLIPIDEMIA: ICD-10-CM

## 2019-03-19 DIAGNOSIS — E55.9 VITAMIN D DEFICIENCY: ICD-10-CM

## 2019-03-19 DIAGNOSIS — E29.1 HYPOGONADISM IN MALE: ICD-10-CM

## 2019-03-19 DIAGNOSIS — Z79.4 TYPE 2 DIABETES MELLITUS WITH HYPERGLYCEMIA, WITH LONG-TERM CURRENT USE OF INSULIN (HCC): ICD-10-CM

## 2019-03-19 DIAGNOSIS — E23.0 HYPOGONADOTROPIC HYPOGONADISM (HCC): ICD-10-CM

## 2019-03-19 LAB — SL AMB POCT HEMOGLOBIN AIC: 6.4 (ref ?–6.5)

## 2019-03-19 PROCEDURE — 99214 OFFICE O/P EST MOD 30 MIN: CPT | Performed by: PHYSICIAN ASSISTANT

## 2019-03-19 PROCEDURE — 83036 HEMOGLOBIN GLYCOSYLATED A1C: CPT | Performed by: PHYSICIAN ASSISTANT

## 2019-03-19 RX ORDER — BLOOD SUGAR DIAGNOSTIC
STRIP MISCELLANEOUS
Qty: 400 EACH | Refills: 3 | Status: SHIPPED | OUTPATIENT
Start: 2019-03-19 | End: 2020-01-27 | Stop reason: SDUPTHER

## 2019-03-19 RX ORDER — GLYBURIDE 5 MG/1
5 TABLET ORAL
Qty: 270 TABLET | Refills: 1 | Status: SHIPPED | OUTPATIENT
Start: 2019-03-19 | End: 2019-05-30

## 2019-03-19 RX ORDER — PEN NEEDLE, DIABETIC 32GX 5/32"
NEEDLE, DISPOSABLE MISCELLANEOUS
Qty: 200 EACH | Refills: 3 | Status: SHIPPED | OUTPATIENT
Start: 2019-03-19 | End: 2019-09-17 | Stop reason: SDUPTHER

## 2019-03-19 NOTE — PROGRESS NOTES
Established Patient Progress Note      Chief Complaint   Patient presents with    Diabetes Type 2    Hypogonadism    Vitamin D Deficiency    Hyperlipidemia    Hypertension        History of Present Illness:   Elizabeth Morales is a 70 y o  male with a history of type 2 diabetes with long term use of insulin since many years ago  Reports complications of none  Denies recent illness or hospitalizations  Denies recent severe hypoglycemic or severe hyperglycemic episodes  Denies any issues with his current regimen  home glucose monitoring: are performed regularly 4x per day  Reports hypoglycemia after breakfast, especially on week when active  Home blood glucose readings:   Before breakfast: low-mid 100s, though sometimes 200s  Before lunch: 100-200s  Before dinner:   Bedtime: 100s-200s     Current regimen:   Apidra 20 units before breakfast during week-- weekend takes 5-15 units before breakfast   Lantus 30 at bedtime  Glyburide 5mg breakfast, 10mg dinner  Jardiance 10mg daily    Has hypertension: Taking lisinopril and amlodipine  Has hyperlipidemia: Taking simvastatin      For hypogonadism, taking weekly testosterone injections  Reports continued fatigue, a little better  Reports increased breakouts on skin  Wondering about any alternatives  Previously used patch  Hx pituitary adenoma 3-4mm stable 2017 MRI compared to 2015  For Vit D Deficiency takes supplements       Patient Active Problem List   Diagnosis    Benign essential hypertension    Carpal tunnel syndrome    Cervical herniated disc    Cervical radiculopathy    Cervical stenosis of spinal canal    Neck pain    [facility]    Hyperlipidemia    [facility]ogonadotropic hypogonadism ([facility]    Nephrolithiasis    Pit[facility] microadenoma [facility] )    Seborrheic dermatitis    Obstructive sleep apnea syndrome    Spondylosis of cervical region without myelopathy or radiculopathy    Sprain and strain of calcaneofibular (ligament)    Viral upper respiratory tract infection with cough    Vitamin D deficiency    Low back pain with sciatica    [facility]roili[facility] )      Past Medical History:   Diagnosis Date    Avitaminosis D 2012    [facility] mellitus [facility]     Displacement of cervical intervertebral disc 2014    HTN (hypertension) 2007    Pituitary microadenoma [facility] 2010    Spinal stenosis in cervical region 2014    Uric acid nephrolithiasis 1990      History reviewed  No pertinent surgical history     Family History   Problem Relation Age of Onset    Diabetes unspecified Mother     Diabetes unspecified Father     Diabetes unspecified Sister     Diabetes unspecified Brother     Diabetes unspecified Sister     Diabetes unspecified Sister     Diabetes unspecified Brother     Diabetes unspecified Brother     Diabetes unspecified Brother     Diabetes unspecified Brother      Social History     Tobacco Use    Smoking status: Never Smoker    Smokeless tobacco: Never Used   Substance Use Topics    Alcohol use: Yes     Comment: social     Allergies   Allergen Reactions    Augmentin [Amoxicillin-Pot Clavulanate] Abdominal Pain    Biaxin [Clarithromycin] Abdominal Pain    Dust Mite Extract     Insulin Aspart GI Intolerance    Molds & Smuts     Nuts     Seasonal Ic [Cholestatin] Headache         Current Outpatient Medications:     amLODIPine (NORVASC) 10 mg tablet, Take 1 tablet (10 mg total) by mouth daily for 90 days, Disp: 90 tablet, Rfl: 3    aspirin (ECOTRIN LOW STRENGTH) 81 mg EC tablet, Take by mouth, Disp: , Rfl:     B Complex Vitamins (VITAMIN B-COMPLEX PO), Take by mouth, Disp: , Rfl:     BD PEN NEEDLE MILDRED U/F 32G X 4 MM MISC, Use 2 per day, Disp: 200 each, Rfl: 3    Blood Glucose Monitoring Suppl (ONETOUCH VERIO) w/Device KIT, by Does not apply route once for 1 dose Dispense 1 meter, Disp: 1 kit, Rfl: 1    cholecalciferol (VITAMIN D3) 1,000 units tablet, Take 1,000 Units by mouth daily, Disp: , Rfl:     Empagliflozin (JARDIANCE) 25 MG TABS, Take 1 tablet (25 mg total) by mouth daily for 30 days, Disp: 90 tablet, Rfl: 3    gabapentin (NEURONTIN) 300 mg capsule, Take 1 PO TID , Disp: 270 capsule, Rfl: 1    Glucosamine-Chondroitin (OSTEO BI-FLEX REGULAR STRENGTH) 250-200 MG TABS, Take 1 tablet by mouth 2 (two) times a day, Disp: , Rfl:     glyBURIDE (DIABETA) 5 mg tablet, Take 1 tablet (5 mg total) by mouth daily with breakfast for 90 days, Disp: 270 tablet, Rfl: 1    ibuprofen (MOTRIN) 200 mg tablet, Take by mouth, Disp: , Rfl:     insulin glargine (LANTUS SOLOSTAR) 100 units/mL injection pen, Inject 30 Units under the skin daily at bedtime for 90 days, Disp: 10 pen, Rfl: 0    lisinopril (ZESTRIL) 40 mg tablet, TAKE 1 TABLET DAILY, Disp: 90 tablet, Rfl: 3    loratadine (CLARITIN) 10 mg tablet, Take 10 mg by mouth daily, Disp: , Rfl:     MULTIPLE VITAMIN PO, Take by mouth, Disp: , Rfl:     omeprazole (PriLOSEC) 10 mg delayed release capsule, Take 10 mg by mouth daily, Disp: , Rfl:     ONETOUCH VERIO test strip, Test blood sugars 4 x daily as directed, Disp: 400 each, Rfl: 3    Probiotic Product (PROBIOTIC-10) CAPS, Take by mouth, Disp: , Rfl:     simvastatin (ZOCOR) 20 mg tablet, TAKE 1 TABLET AT BEDTIME, Disp: 90 tablet, Rfl: 3    sitaGLIPtin-metFORMIN (JANUMET)  MG per tablet, Take 1 tablet by mouth 2 (two) times a day with meals for 90 days, Disp: 180 tablet, Rfl: 3    SYRINGE-NEEDLE, DISP, 3 ML (B-D SYRINGE/NEEDLE 3CC/23GX1") 23G X 1" 3 ML MISC, Use 1 per week, Disp: 12 each, Rfl: 3    testosterone cypionate (DEPO-TESTOSTERONE) 200 mg/mL SOLN, Inject 0 4 mL (80 mg total) into a muscle once a week, Disp: 10 mL, Rfl: 0    vitamin E, tocopherol, 400 units capsule, Take 400 Units by mouth daily, Disp: , Rfl:     liraglutide (VICTOZA) injection, Inject 0 3 mL (1 8 mg total) under the skin daily for 30 days, Disp: 9 mL, Rfl: 0    Review of Systems   Constitutional: Negative for activity change, appetite change and fatigue  HENT: Negative for sore throat, trouble swallowing and voice change  Eyes: Negative for visual disturbance  Respiratory: Negative for choking, chest tightness and shortness of breath  Cardiovascular: Negative for chest pain, palpitations and leg swelling  Gastrointestinal: Negative for abdominal pain, constipation and diarrhea  Endocrine: Negative for cold intolerance, heat intolerance, polydipsia, polyphagia and polyuria  Genitourinary: Negative for frequency  Musculoskeletal: Positive for arthralgias, back pain and neck pain  Negative for myalgias  Skin: Negative for rash  Neurological: Negative for dizziness and syncope  Hematological: Negative for adenopathy  Psychiatric/Behavioral: Negative for sleep disturbance  All other systems reviewed and are negative  Physical Exam:  Body mass index is 29 58 kg/m²  /70   Pulse 80   Ht 5' 3" (1 6 m)   Wt 75 8 kg (167 lb)   BMI 29 58 kg/m²    Wt Readings from Last 3 Encounters:   03/19/19 75 8 kg (167 lb)   02/27/19 75 8 kg (167 lb)   10/22/18 74 8 kg (165 lb)       Physical Exam   Constitutional: He is oriented to person, place, and time  He appears well-developed and well-nourished  No distress  HENT:   Head: Normocephalic and atraumatic  Mouth/Throat: Oropharynx is clear and moist    Eyes: Pupils are equal, round, and reactive to light  Conjunctivae and EOM are normal    Neck: Normal range of motion  Neck supple  No thyromegaly present  Cardiovascular: Normal rate, regular rhythm and normal heart sounds  Pulses are no weak pulses  No murmur heard  Pulses:       Dorsalis pedis pulses are 2+ on the right side, and 2+ on the left side  Posterior tibial pulses are 2+ on the right side, and 2+ on the left side  Pulmonary/Chest: Effort normal and breath sounds normal  No respiratory distress  He has no wheezes  He has no rales  Abdominal: Soft   Bowel sounds are normal  He exhibits no distension  There is no tenderness  Musculoskeletal: Normal range of motion  He exhibits no edema  Feet:   Right Foot:   Skin Integrity: Negative for ulcer, skin breakdown, erythema, warmth, callus or dry skin  Left Foot:   Skin Integrity: Negative for ulcer, skin breakdown, erythema, warmth, callus or dry skin  Lymphadenopathy:     He has no cervical adenopathy  Neurological: He is alert and oriented to person, place, and time  Skin: Skin is warm and dry  Psychiatric: He has a normal mood and affect  Vitals reviewed  Patient's shoes and socks removed  Right Foot/Ankle   Right Foot Inspection  Skin Exam: skin normal and skin intact no dry skin, no warmth, no callus, no erythema, no maceration, no abnormal color, no pre-ulcer, no ulcer and no callus                          Toe Exam: ROM and strength within normal limitsno swelling, no tenderness, erythema and  no right toe deformity  Sensory       Monofilament testing: intact  Vascular  Capillary refills: < 3 seconds  The right DP pulse is 2+  The right PT pulse is 2+  Left Foot/Ankle  Left Foot Inspection  Skin Exam: skin normal and skin intactno dry skin, no warmth, no erythema, no maceration, normal color, no pre-ulcer, no ulcer and no callus                         Toe Exam: ROM and strength within normal limitsno swelling, no tenderness, no erythema and no left toe deformity                   Sensory       Monofilament: intact  Vascular  Capillary refills: < 3 seconds  The left DP pulse is 2+  The left PT pulse is 2+  Assign Risk Category:  No deformity present; No loss of protective sensation;  No weak pulses       Risk: 0      Labs:     Component      Latest Ref Rng & Units 4/24/2015 9/15/2015 10/28/2017   White Blood Cell Count      3 8 - 10 8 Thousand/uL      Red Blood Cell Count      4 20 - 5 80 Million/uL      Hemoglobin      13 2 - 17 1 g/dL      HCT      38 5 - 50 0 %      MCV      80 0 - 100 0 fL MCH      27 0 - 33 0 pg      MCHC  32 0 - 36 0 g/dL      RDW      11 0 - 15 0 %      Platelet Count      047 - 400 Thousand/uL      SL AMB MPV      7 5 - 12 5 fL      Neutrophils (Absolute)      1,500 - 7,800 cells/uL      Lymphocytes (Absolute)      850 - 3,900 cells/uL      Monocytes (Absolute)      200 - 950 cells/uL      Eosinophils (Absolute)      15 - 500 cells/uL      Basophils ABS      0 - 200 cells/uL      Neutrophils      %      Lymphocytes      %      Monocytes      %      Eosinophils      %      Basophils PCT      %      Cholesterol      50 - 200 mg/dL      Triglycerides      <=150 mg/dL      HDL      40 - 60 mg/dL      LDL Direct      0 - 100 mg/dL      Non-HDL Cholesterol      mg/dl      EXT Creatinine Urine      mg/dL      MICROALBUM ,U,RANDOM      0 0 - 20 0 mg/L      MICROALBUMIN/CREATININE RATIO      0 - 30 mg/g creatinine      Hemoglobin      12 0 - 17 0 g/dL      HCT      36 5 - 49 3 %      TESTOSTERONE FREE      35 0 - 155 0 pg/mL      Testosterone, Total, LC/MS      250 - 1,100 ng/dL      INSULIN-LIKE GROWTH FACTOR-1      49 - 188 ng/mL  132 0    FSH, POC      0 9 - 15 0 mIU/mL 9 7     LUTEINIZING HORMONE      1 5 - 9 3 mIU/mL 4 9     PROLACTIN      2 0 - 18 0 ng/mL   5 7   PROSTATE SPECIFIC ANTIGEN TOTAL      < OR = 4 0 ng/mL   0 7   Free T4      0 76 - 1 46 ng/dL      TSH 3RD GENERATON      0 358 - 3 740 uIU/mL      Hemoglobin A1C      6 5        Component      Latest Ref Rng & Units 6/23/2018 10/15/2018 12/1/2018   White Blood Cell Count      3 8 - 10 8 Thousand/uL 11 3 (H)     Red Blood Cell Count      4 20 - 5 80 Million/uL 6 22 (H)     Hemoglobin      13 2 - 17 1 g/dL 17 2 (H)     HCT      38 5 - 50 0 % 51 4 (H)     MCV      80 0 - 100 0 fL 82 6     MCH      27 0 - 33 0 pg 27 7     MCHC        32 0 - 36 0 g/dL 33 5     RDW      11 0 - 15 0 % 16 6 (H)     Platelet Count      306 - 400 Thousand/uL 316     SL AMB MPV      7 5 - 12 5 fL 10 2     Neutrophils (Absolute)      1,500 - 7,800 cells/uL 6,622     Lymphocytes (Absolute)      850 - 3,900 cells/uL 3,334     Monocytes (Absolute)      200 - 950 cells/uL 1,040 (H)     Eosinophils (Absolute)      15 - 500 cells/uL 260     Basophils ABS      0 - 200 cells/uL 45     Neutrophils      % 58 6     Lymphocytes      % 29 5     Monocytes      % 9 2     Eosinophils      % 2 3     Basophils PCT      % 0 4     Cholesterol      50 - 200 mg/dL  111    Triglycerides      <=150 mg/dL  181 (H)    HDL      40 - 60 mg/dL  32 (L)    LDL Direct      0 - 100 mg/dL  43    Non-HDL Cholesterol      mg/dl  79    EXT Creatinine Urine      mg/dL  64 4    MICROALBUM ,U,RANDOM      0 0 - 20 0 mg/L  62 9 (H)    MICROALBUMIN/CREATININE RATIO      0 - 30 mg/g creatinine  98 (H)    Hemoglobin      12 0 - 17 0 g/dL  18 0 (H)    HCT      36 5 - 49 3 %  54 3 (H)    TESTOSTERONE FREE      35 0 - 155 0 pg/mL  14 2 85 7   Testosterone, Total, LC/MS      250 - 1,100 ng/dL  670 595   INSULIN-LIKE GROWTH FACTOR-1      49 - 188 ng/mL      FSH, POC      0 9 - 15 0 mIU/mL      LUTEINIZING HORMONE      1 5 - 9 3 mIU/mL      PROLACTIN      2 0 - 18 0 ng/mL      PROSTATE SPECIFIC ANTIGEN TOTAL      < OR = 4 0 ng/mL      Free T4      0 76 - 1 46 ng/dL  0 90    TSH 3RD GENERATON      0 358 - 3 740 uIU/mL  1 550    Hemoglobin A1C      6 5        Component      Latest Ref Rng & Units 3/19/2019   White Blood Cell Count      3 8 - 10 8 Thousand/uL    Red Blood Cell Count      4 20 - 5 80 Million/uL    Hemoglobin      13 2 - 17 1 g/dL    HCT      38 5 - 50 0 %    MCV      80 0 - 100 0 fL    MCH      27 0 - 33 0 pg    MCHC        32 0 - 36 0 g/dL    RDW      11 0 - 15 0 %    Platelet Count      495 - 400 Thousand/uL    SL AMB MPV      7 5 - 12 5 fL    Neutrophils (Absolute)      1,500 - 7,800 cells/uL    Lymphocytes (Absolute)      850 - 3,900 cells/uL    Monocytes (Absolute)      200 - 950 cells/uL    Eosinophils (Absolute)      15 - 500 cells/uL    Basophils ABS      0 - 200 cells/uL Neutrophils      %    Lymphocytes      %    Monocytes      %    Eosinophils      %    Basophils PCT      %    Cholesterol      50 - 200 mg/dL    Triglycerides      <=150 mg/dL    HDL      40 - 60 mg/dL    LDL Direct      0 - 100 mg/dL    Non-HDL Cholesterol      mg/dl    EXT Creatinine Urine      mg/dL    MICROALBUM ,U,RANDOM      0 0 - 20 0 mg/L    MICROALBUMIN/CREATININE RATIO      0 - 30 mg/g creatinine    Hemoglobin      12 0 - 17 0 g/dL    HCT      36 5 - 49 3 %    TESTOSTERONE FREE      35 0 - 155 0 pg/mL    Testosterone, Total, LC/MS      250 - 1,100 ng/dL    INSULIN-LIKE GROWTH FACTOR-1      49 - 188 ng/mL    FSH, POC      0 9 - 15 0 mIU/mL    LUTEINIZING HORMONE      1 5 - 9 3 mIU/mL    PROLACTIN      2 0 - 18 0 ng/mL    PROSTATE SPECIFIC ANTIGEN TOTAL      < OR = 4 0 ng/mL    Free T4      0 76 - 1 46 ng/dL    TSH 3RD GENERATON      0 358 - 3 740 uIU/mL    Hemoglobin A1C      6 5 6 4     Impression & Plan:     Problem List Items Addressed This Visit        Endocrine    DMII (diabetes mellitus, type 2) ([facility])     A1C 6 4- at goal but blood sugars highly variable with some hypoglycemia  He is taking large dose of apidra with breakfast only and often has hypoglycemia after breakfast   Discussed starting GLP-1 agonist- patient agreeable  Will start daily victoza 0 6mg daily x 1 week, 1 2mg daily x 1 week, then 1 8mg daily after that  For the first week he should reduce apidra by half then when on 1 2mg dose of victoza stop the apidra  Send BG log in two weeks  Reviewed risks/Side effects of victoza  IF doing well on victoza when due for refill will change janumet to plain metformin  Will also reduce Glyburide to 5mg twice per day  Discussed risk of hypoglycemia on insulin, glyburide, and glp-1 agonist and need to monitor personally  He is currently checking bG 4x per day-- Discussed option of CGM for personal use  and he will check with insurance and let us know if he is interested  Relevant Medications    liraglutide (VICTOZA) injection    insulin glargine (LANTUS SOLOSTAR) 100 units/mL injection pen    glyBURIDE (DIABETA) 5 mg tablet    BD PEN NEEDLE MILDRED U/F 32G X 4 MM MISC    SYRINGE-NEEDLE, DISP, 3 ML (B-D SYRINGE/NEEDLE 3CC/23GX1") 23G X 1" 3 ML MISC    ONETOUCH VERIO test strip    Other Relevant Orders    Hemoglobin A1C    Comprehensive metabolic panel    Hypogonadotropic hypogonadism ([facility] )     He is asking about alternatives to testosterone injections- suggest trial of clomiphene  He will think about it and let me know  Pituitary microadenoma ([facility] [address] [address] )     Stable in 2017 compared to 2015 MRI  Cardiovascular and Mediastinum    Benign essential hypertension     Improved  Continue current regimen  Other    Hyperlipidemia     Continue simvastatin  Vitamin D deficiency     Continue supplements  Other Visit Diagnoses     Insulin [facility] ([facility] )    -  Primary    Relevant Orders    POCT hemoglobin A1c (Completed)    Hypogonadism in male        Relevant Orders    Testosterone, free, total    CBC and differential Lab Collect    PSA, total screen Lab Collect          Orders Placed This Encounter   Procedures    Hemoglobin A1C     Standing Status:   Future     Standing Expiration Date:   3/19/2020    Comprehensive metabolic panel     This is a patient instruction: Patient fasting for 8 hours or longer recommended  Standing Status:   Future     Standing Expiration Date:   3/19/2020    Testosterone, free, total     This is a patient instruction: Fasting preferred  Collections for men not undergoing treatment must be completed between 7am-9am ONLY  Collection time restrictions are not applicable to women or men already undergoing treatment  Standing Status:   Future     Standing Expiration Date:   3/19/2020    CBC and differential Lab Collect     This is a patient instruction: This test is non-fasting   Please drink two glasses of water morning of bloodwork  Standing Status:   Future     Standing Expiration Date:   3/19/2020    PSA, total screen Lab Collect     This is a patient instruction: This test is non-fasting  Please drink two glasses of water morning of bloodwork  Standing Status:   Future     Standing Expiration Date:   3/19/2020    POCT hemoglobin A1c       There are no Patient Instructions on file for this visit  Discussed with the patient and all questioned fully answered  He will call me if any problems arise  Follow-up appointment in 3 months       Counseled patient on diagnostic results, prognosis, risk and benefit of treatment options, instruction for management, importance of treatment compliance, Risk  factor reduction and impressions    JAMES Alvarez

## 2019-03-26 ENCOUNTER — HOSPITAL ENCOUNTER (OUTPATIENT)
Dept: RADIOLOGY | Facility: CLINIC | Age: 64
Discharge: HOME | End: 2019-03-26
Admitting: ANESTHESIOLOGY
Payer: COMMERCIAL

## 2019-03-26 VITALS
SYSTOLIC BLOOD PRESSURE: 179 MMHG | TEMPERATURE: 98.8 F | DIASTOLIC BLOOD PRESSURE: 78 MMHG | HEART RATE: 78 BPM | OXYGEN SATURATION: 94 % | RESPIRATION RATE: 20 BRPM

## 2019-03-26 DIAGNOSIS — M47.812 SPONDYLOSIS OF CERVICAL REGION WITHOUT MYELOPATHY OR RADICULOPATHY: ICD-10-CM

## 2019-03-26 PROCEDURE — 64490 INJ PARAVERT F JNT C/T 1 LEV: CPT | Performed by: ANESTHESIOLOGY

## 2019-03-26 PROCEDURE — 64491 INJ PARAVERT F JNT C/T 2 LEV: CPT | Performed by: ANESTHESIOLOGY

## 2019-03-26 RX ORDER — LIDOCAINE HYDROCHLORIDE 10 MG/ML
5 INJECTION, SOLUTION EPIDURAL; INFILTRATION; INTRACAUDAL; PERINEURAL ONCE
Status: COMPLETED | OUTPATIENT
Start: 2019-03-26 | End: 2019-03-26

## 2019-03-26 RX ADMIN — LIDOCAINE HYDROCHLORIDE 1.5 ML: 20 INJECTION, SOLUTION EPIDURAL; INFILTRATION; INTRACAUDAL; PERINEURAL at 10:53

## 2019-03-26 RX ADMIN — LIDOCAINE HYDROCHLORIDE 4 ML: 10 INJECTION, SOLUTION EPIDURAL; INFILTRATION; INTRACAUDAL; PERINEURAL at 10:48

## 2019-03-26 NOTE — H&P
History of Present Illness: The patient is a 70 y o  male who presents with complaints of neck pain  Patient Active Problem List   Diagnosis    Benign essential hypertension    Carpal tunnel syndrome    Cervical herniated disc    Cervical radiculopathy    Cervical stenosis of spinal canal    Neck pain    DMII (diabetes mellitus, type 2) ([facility]    Hyperlipidemia    Hypogonadotropic hypogonadism (HCC)    Nephrolithiasis    Pituitary microadenoma ([facility]    Seborrheic dermatitis    Obstructive sleep apnea syndrome    Spondylosis of cervical region without myelopathy or radiculopathy    Sprain and strain of calcaneofibular (ligament)    Viral upper respiratory tract infection with cough    Vitamin D deficiency    Low back pain with sciatica    Sacroiliitis ([facility] )       Past Medical History:   Diagnosis Date    Avitaminosis D 2012    Diabetes mellitus ([facility]     Displacement of cervical intervertebral disc 2014    HTN (hypertension) 2007    Pituitary microadenoma ([facility] ) 2010    Spinal stenosis in cervical region 2014    Uric acid nephrolithiasis 1990       History reviewed  No pertinent surgical history        Current Outpatient Medications:     amLODIPine (NORVASC) 10 mg tablet, Take 1 tablet (10 mg total) by mouth daily for 90 days, Disp: 90 tablet, Rfl: 3    aspirin (ECOTRIN LOW STRENGTH) 81 mg EC tablet, Take by mouth, Disp: , Rfl:     B Complex Vitamins (VITAMIN B-COMPLEX PO), Take by mouth, Disp: , Rfl:     BD PEN NEEDLE MILDRED U/F 32G X 4 MM MISC, Use 2 per day, Disp: 200 each, Rfl: 3    Blood Glucose Monitoring Suppl (ONETOUCH VERIO) w/Device KIT, by Does not apply route once for 1 dose Dispense 1 meter, Disp: 1 kit, Rfl: 1    cholecalciferol (VITAMIN D3) 1,000 units tablet, Take 1,000 Units by mouth daily, Disp: , Rfl:     Empagliflozin (JARDIANCE) 25 MG TABS, Take 1 tablet (25 mg total) by mouth daily for 30 days, Disp: 90 tablet, Rfl: 3    gabapentin (NEURONTIN) 300 mg capsule, Take 1 PO TID , Disp: 270 capsule, Rfl: 1    Glucosamine-Chondroitin (OSTEO BI-FLEX REGULAR STRENGTH) 250-200 MG TABS, Take 1 tablet by mouth 2 (two) times a day, Disp: , Rfl:     glyBURIDE (DIABETA) 5 mg tablet, Take 1 tablet (5 mg total) by mouth daily with breakfast for 90 days, Disp: 270 tablet, Rfl: 1    ibuprofen (MOTRIN) 200 mg tablet, Take by mouth, Disp: , Rfl:     insulin glargine (LANTUS SOLOSTAR) 100 units/mL injection pen, Inject 30 Units under the skin daily at bedtime for 90 days, Disp: 10 pen, Rfl: 0    liraglutide (VICTOZA) injection, Inject 0 3 mL (1 8 mg total) under the skin daily for 30 days, Disp: 9 mL, Rfl: 0    lisinopril (ZESTRIL) 40 mg tablet, TAKE 1 TABLET DAILY, Disp: 90 tablet, Rfl: 3    loratadine (CLARITIN) 10 mg tablet, Take 10 mg by mouth daily, Disp: , Rfl:     MULTIPLE VITAMIN PO, Take by mouth, Disp: , Rfl:     omeprazole (PriLOSEC) 10 mg delayed release capsule, Take 10 mg by mouth daily, Disp: , Rfl:     ONETOUCH VERIO test strip, Test blood sugars 4 x daily as directed, Disp: 400 each, Rfl: 3    Probiotic Product (PROBIOTIC-10) CAPS, Take by mouth, Disp: , Rfl:     simvastatin (ZOCOR) 20 mg tablet, TAKE 1 TABLET AT BEDTIME, Disp: 90 tablet, Rfl: 3    sitaGLIPtin-metFORMIN (JANUMET)  MG per tablet, Take 1 tablet by mouth 2 (two) times a day with meals for 90 days, Disp: 180 tablet, Rfl: 3    SYRINGE-NEEDLE, DISP, 3 ML (B-D SYRINGE/NEEDLE 3CC/23GX1") 23G X 1" 3 ML MISC, Use 1 per week, Disp: 12 each, Rfl: 3    testosterone cypionate (DEPO-TESTOSTERONE) 200 mg/mL SOLN, Inject 0 4 mL (80 mg total) into a muscle once a week, Disp: 10 mL, Rfl: 0    vitamin E, tocopherol, 400 units capsule, Take 400 Units by mouth daily, Disp: , Rfl:     Allergies   Allergen Reactions    Augmentin [Amoxicillin-Pot Clavulanate] Abdominal Pain    Biaxin [Clarithromycin] Abdominal Pain    Dust Mite Extract     Insulin Aspart GI Intolerance    Molds & Smuts     Nuts     Seasonal Ic [Cholestatin] Headache       Physical Exam:   Vitals:    03/26/19 1029   BP: 161/75   Pulse: 84   Resp: 20   Temp: 98 8 °F (37 1 °C)   SpO2: 95%     General: Awake, Alert, Oriented x 3, Mood and affect appropriate  Respiratory: Respirations even and unlabored  Cardiovascular: Peripheral pulses intact; no edema  Musculoskeletal Exam:  Right cervical paraspinals and trapezius tender to palpation  ASA Score: 2    Patient/Chart Verification  Patient ID Verified: Verbal  ID Band Applied: No  Consents Confirmed: Procedural  H&P( within 30 days) Verified: To be obtained in the Pre-Procedure area  Interval H&P(within 24 hr) Complete (required for Outpatients and Surgery Admit only): To be obtained in the Pre-Procedure area  Allergies Reviewed: Yes  Anticoag/NSAID held?: NA  Currently on antibiotics?: No    Assessment:   1   Spondylosis of cervical region without myelopathy or radiculopathy        Plan: RT C2-4 MBB

## 2019-03-26 NOTE — DISCHARGE INSTR - LAB

## 2019-03-29 ENCOUNTER — TELEPHONE (OUTPATIENT)
Dept: PAIN MEDICINE | Facility: CLINIC | Age: 64
End: 2019-03-29

## 2019-03-29 NOTE — TELEPHONE ENCOUNTER
The patient had a variable response to right C2, C3, C4 medial branch blocks    Did the patient feel he got enough relief to move forward with medial branch block 2 ?

## 2019-03-29 NOTE — TELEPHONE ENCOUNTER
S/w patient would like to confirm his pain diary was rec'd in [facility] office, please call pt

## 2019-04-01 NOTE — TELEPHONE ENCOUNTER
S/w pt, he said it only helped his pain in his shoulder and down his arm for about three hours but did not help the pain at the base of his skull  He said he did have some relief in his shoulder the next morning  RN asked pt if he had relief when he did the activities that cause him pain, pt said he did not have any relief in any of his painful areas  Confirmed next OV with pt to re-group for reevaluation

## 2019-04-04 LAB
LEFT EYE DIABETIC RETINOPATHY: NORMAL
RIGHT EYE DIABETIC RETINOPATHY: NORMAL

## 2019-04-08 ENCOUNTER — TELEPHONE (OUTPATIENT)
Dept: ENDOCRINOLOGY | Facility: CLINIC | Age: 64
End: 2019-04-08

## 2019-04-12 DIAGNOSIS — E11.65 TYPE 2 DIABETES MELLITUS WITH HYPERGLYCEMIA, WITH LONG-TERM CURRENT USE OF INSULIN (HCC): ICD-10-CM

## 2019-04-12 DIAGNOSIS — Z79.4 TYPE 2 DIABETES MELLITUS WITH HYPERGLYCEMIA, WITH LONG-TERM CURRENT USE OF INSULIN (HCC): ICD-10-CM

## 2019-04-15 RX ORDER — INSULIN GLARGINE 100 [IU]/ML
INJECTION, SOLUTION SUBCUTANEOUS
Qty: 30 ML | Refills: 2 | Status: SHIPPED | OUTPATIENT
Start: 2019-04-15 | End: 2019-06-24 | Stop reason: SDUPTHER

## 2019-04-19 ENCOUNTER — TELEPHONE (OUTPATIENT)
Dept: PAIN MEDICINE | Facility: MEDICAL CENTER | Age: 64
End: 2019-04-19

## 2019-04-23 ENCOUNTER — TELEPHONE (OUTPATIENT)
Dept: ENDOCRINOLOGY | Facility: CLINIC | Age: 64
End: 2019-04-23

## 2019-05-02 ENCOUNTER — TELEPHONE (OUTPATIENT)
Dept: INTERNAL MEDICINE CLINIC | Facility: CLINIC | Age: 64
End: 2019-05-02

## 2019-05-14 ENCOUNTER — OFFICE VISIT (OUTPATIENT)
Dept: INTERNAL MEDICINE CLINIC | Facility: CLINIC | Age: 64
End: 2019-05-14
Payer: COMMERCIAL

## 2019-05-14 VITALS
DIASTOLIC BLOOD PRESSURE: 76 MMHG | TEMPERATURE: 99 F | OXYGEN SATURATION: 96 % | WEIGHT: 159.6 LBS | SYSTOLIC BLOOD PRESSURE: 156 MMHG | BODY MASS INDEX: 27.25 KG/M2 | HEIGHT: 64 IN | HEART RATE: 104 BPM

## 2019-05-14 DIAGNOSIS — E78.2 MIXED HYPERLIPIDEMIA: ICD-10-CM

## 2019-05-14 DIAGNOSIS — E11.9 TYPE 2 DIABETES MELLITUS WITHOUT COMPLICATION, WITH LONG-TERM CURRENT USE OF INSULIN (HCC): ICD-10-CM

## 2019-05-14 DIAGNOSIS — Z23 NEED FOR DIPHTHERIA-TETANUS-PERTUSSIS (TDAP) VACCINE: ICD-10-CM

## 2019-05-14 DIAGNOSIS — I10 BENIGN ESSENTIAL HYPERTENSION: ICD-10-CM

## 2019-05-14 DIAGNOSIS — Z79.4 TYPE 2 DIABETES MELLITUS WITHOUT COMPLICATION, WITH LONG-TERM CURRENT USE OF INSULIN (HCC): ICD-10-CM

## 2019-05-14 DIAGNOSIS — Z23 NEED FOR PNEUMOCOCCAL VACCINATION: ICD-10-CM

## 2019-05-14 DIAGNOSIS — Z11.59 NEED FOR HEPATITIS C SCREENING TEST: ICD-10-CM

## 2019-05-14 DIAGNOSIS — H66.001 NON-RECURRENT ACUTE SUPPURATIVE OTITIS MEDIA OF RIGHT EAR WITHOUT SPONTANEOUS RUPTURE OF TYMPANIC MEMBRANE: ICD-10-CM

## 2019-05-14 DIAGNOSIS — Z12.11 SCREENING FOR COLON CANCER: Primary | ICD-10-CM

## 2019-05-14 PROCEDURE — 1101F PT FALLS ASSESS-DOCD LE1/YR: CPT | Performed by: INTERNAL MEDICINE

## 2019-05-14 PROCEDURE — 3008F BODY MASS INDEX DOCD: CPT | Performed by: INTERNAL MEDICINE

## 2019-05-14 PROCEDURE — 1160F RVW MEDS BY RX/DR IN RCRD: CPT | Performed by: INTERNAL MEDICINE

## 2019-05-14 PROCEDURE — 99214 OFFICE O/P EST MOD 30 MIN: CPT | Performed by: INTERNAL MEDICINE

## 2019-05-14 RX ORDER — AZITHROMYCIN 250 MG/1
TABLET, FILM COATED ORAL
Qty: 6 TABLET | Refills: 0 | Status: SHIPPED | OUTPATIENT
Start: 2019-05-14 | End: 2019-05-18

## 2019-05-14 RX ORDER — LIRAGLUTIDE 6 MG/ML
INJECTION SUBCUTANEOUS
Qty: 9 PEN | Refills: 3 | Status: SHIPPED | OUTPATIENT
Start: 2019-05-14 | End: 2019-05-14 | Stop reason: SINTOL

## 2019-05-15 ENCOUNTER — TELEPHONE (OUTPATIENT)
Dept: INTERNAL MEDICINE CLINIC | Facility: CLINIC | Age: 64
End: 2019-05-15

## 2019-05-15 ENCOUNTER — TELEPHONE (OUTPATIENT)
Dept: RADIOLOGY | Facility: CLINIC | Age: 64
End: 2019-05-15

## 2019-05-15 DIAGNOSIS — J06.9 VIRAL UPPER RESPIRATORY TRACT INFECTION WITH COUGH: Primary | ICD-10-CM

## 2019-05-15 PROCEDURE — 2026F EYE IMG VALID EVC RTNOPTHY: CPT | Performed by: INTERNAL MEDICINE

## 2019-05-15 PROCEDURE — 3044F HG A1C LEVEL LT 7.0%: CPT | Performed by: INTERNAL MEDICINE

## 2019-05-15 RX ORDER — BENZONATATE 200 MG/1
200 CAPSULE ORAL 3 TIMES DAILY PRN
Qty: 20 CAPSULE | Refills: 0 | Status: SHIPPED | OUTPATIENT
Start: 2019-05-15 | End: 2019-06-24 | Stop reason: ALTCHOICE

## 2019-05-15 RX ORDER — PROMETHAZINE HYDROCHLORIDE AND CODEINE PHOSPHATE 6.25; 1 MG/5ML; MG/5ML
5 SYRUP ORAL EVERY 4 HOURS PRN
Qty: 120 ML | Refills: 0 | Status: SHIPPED | OUTPATIENT
Start: 2019-05-15 | End: 2019-06-24 | Stop reason: ALTCHOICE

## 2019-05-30 DIAGNOSIS — Z79.4 TYPE 2 DIABETES MELLITUS WITH HYPERGLYCEMIA, WITH LONG-TERM CURRENT USE OF INSULIN (HCC): ICD-10-CM

## 2019-05-30 DIAGNOSIS — E11.65 TYPE 2 DIABETES MELLITUS WITH HYPERGLYCEMIA, WITH LONG-TERM CURRENT USE OF INSULIN (HCC): ICD-10-CM

## 2019-05-30 RX ORDER — GLYBURIDE 5 MG/1
TABLET ORAL
Qty: 270 TABLET | Refills: 1 | Status: SHIPPED | OUTPATIENT
Start: 2019-05-30 | End: 2019-06-24 | Stop reason: SDUPTHER

## 2019-06-19 LAB
ALBUMIN SERPL-MCNC: 4.6 G/DL (ref 3.6–5.1)
ALBUMIN/GLOB SERPL: 1.8 (CALC) (ref 1–2.5)
ALP SERPL-CCNC: 52 U/L (ref 40–115)
ALT SERPL-CCNC: 35 U/L (ref 9–46)
AST SERPL-CCNC: 21 U/L (ref 10–35)
BASOPHILS # BLD AUTO: 45 CELLS/UL (ref 0–200)
BASOPHILS NFR BLD AUTO: 0.4 %
BILIRUB SERPL-MCNC: 1.6 MG/DL (ref 0.2–1.2)
BUN SERPL-MCNC: 23 MG/DL (ref 7–25)
BUN/CREAT SERPL: ABNORMAL (CALC) (ref 6–22)
CALCIUM SERPL-MCNC: 9.6 MG/DL (ref 8.6–10.3)
CHLORIDE SERPL-SCNC: 102 MMOL/L (ref 98–110)
CO2 SERPL-SCNC: 28 MMOL/L (ref 20–32)
CREAT SERPL-MCNC: 1.03 MG/DL (ref 0.7–1.25)
EOSINOPHIL # BLD AUTO: 347 CELLS/UL (ref 15–500)
EOSINOPHIL NFR BLD AUTO: 3.1 %
ERYTHROCYTE [DISTWIDTH] IN BLOOD BY AUTOMATED COUNT: 15.7 % (ref 11–15)
GLOBULIN SER CALC-MCNC: 2.5 G/DL (CALC) (ref 1.9–3.7)
GLUCOSE SERPL-MCNC: 78 MG/DL (ref 65–99)
HBA1C MFR BLD: 6.4 % OF TOTAL HGB
HCT VFR BLD AUTO: 56.8 % (ref 38.5–50)
HGB BLD-MCNC: 19.3 G/DL (ref 13.2–17.1)
LYMPHOCYTES # BLD AUTO: 3315 CELLS/UL (ref 850–3900)
LYMPHOCYTES NFR BLD AUTO: 29.6 %
MCH RBC QN AUTO: 29.3 PG (ref 27–33)
MCHC RBC AUTO-ENTMCNC: 34 G/DL (ref 32–36)
MCV RBC AUTO: 86.2 FL (ref 80–100)
MONOCYTES # BLD AUTO: 1086 CELLS/UL (ref 200–950)
MONOCYTES NFR BLD AUTO: 9.7 %
NEUTROPHILS # BLD AUTO: 6406 CELLS/UL (ref 1500–7800)
NEUTROPHILS NFR BLD AUTO: 57.2 %
PLATELET # BLD AUTO: 269 THOUSAND/UL (ref 140–400)
PMV BLD REES-ECKER: 10.1 FL (ref 7.5–12.5)
POTASSIUM SERPL-SCNC: 3.8 MMOL/L (ref 3.5–5.3)
PROT SERPL-MCNC: 7.1 G/DL (ref 6.1–8.1)
PSA SERPL-MCNC: 0.7 NG/ML
RBC # BLD AUTO: 6.59 MILLION/UL (ref 4.2–5.8)
SL AMB EGFR AFRICAN AMERICAN: 88 ML/MIN/1.73M2
SL AMB EGFR NON AFRICAN AMERICAN: 76 ML/MIN/1.73M2
SODIUM SERPL-SCNC: 140 MMOL/L (ref 135–146)
TESTOST FREE SERPL-MCNC: 122 PG/ML (ref 35–155)
TESTOST SERPL-MCNC: 722 NG/DL (ref 250–1100)
WBC # BLD AUTO: 11.2 THOUSAND/UL (ref 3.8–10.8)

## 2019-06-24 ENCOUNTER — OFFICE VISIT (OUTPATIENT)
Dept: ENDOCRINOLOGY | Facility: CLINIC | Age: 64
End: 2019-06-24
Payer: COMMERCIAL

## 2019-06-24 VITALS
HEIGHT: 64 IN | DIASTOLIC BLOOD PRESSURE: 80 MMHG | BODY MASS INDEX: 28.17 KG/M2 | SYSTOLIC BLOOD PRESSURE: 142 MMHG | WEIGHT: 165 LBS | HEART RATE: 72 BPM

## 2019-06-24 DIAGNOSIS — E29.1 HYPOGONADISM IN MALE: ICD-10-CM

## 2019-06-24 DIAGNOSIS — E55.9 VITAMIN D DEFICIENCY: ICD-10-CM

## 2019-06-24 DIAGNOSIS — Z79.4 TYPE 2 DIABETES MELLITUS WITH HYPERGLYCEMIA, WITH LONG-TERM CURRENT USE OF INSULIN (HCC): Primary | ICD-10-CM

## 2019-06-24 DIAGNOSIS — E23.0 HYPOGONADOTROPIC HYPOGONADISM (HCC): ICD-10-CM

## 2019-06-24 DIAGNOSIS — E78.2 MIXED HYPERLIPIDEMIA: ICD-10-CM

## 2019-06-24 DIAGNOSIS — I10 BENIGN ESSENTIAL HYPERTENSION: ICD-10-CM

## 2019-06-24 DIAGNOSIS — E11.65 TYPE 2 DIABETES MELLITUS WITH HYPERGLYCEMIA, WITH LONG-TERM CURRENT USE OF INSULIN (HCC): Primary | ICD-10-CM

## 2019-06-24 DIAGNOSIS — D35.2 PITUITARY MICROADENOMA (HCC): ICD-10-CM

## 2019-06-24 PROCEDURE — 99214 OFFICE O/P EST MOD 30 MIN: CPT | Performed by: PHYSICIAN ASSISTANT

## 2019-06-24 RX ORDER — INSULIN GLULISINE 100 [IU]/ML
INJECTION, SOLUTION SUBCUTANEOUS
COMMUNITY
End: 2019-06-24 | Stop reason: SDUPTHER

## 2019-06-24 RX ORDER — GLYBURIDE 5 MG/1
5 TABLET ORAL 3 TIMES DAILY
Qty: 270 TABLET | Refills: 3 | Status: SHIPPED | OUTPATIENT
Start: 2019-06-24 | End: 2020-01-27 | Stop reason: SDUPTHER

## 2019-06-24 RX ORDER — BRIMONIDINE TARTRATE, TIMOLOL MALEATE 2; 5 MG/ML; MG/ML
1 SOLUTION/ DROPS OPHTHALMIC EVERY 12 HOURS SCHEDULED
COMMUNITY

## 2019-06-24 RX ORDER — SIMVASTATIN 20 MG
20 TABLET ORAL
Qty: 90 TABLET | Refills: 3 | Status: SHIPPED | OUTPATIENT
Start: 2019-06-24 | End: 2020-01-27 | Stop reason: SDUPTHER

## 2019-06-24 RX ORDER — INSULIN GLULISINE 100 [IU]/ML
25 INJECTION, SOLUTION SUBCUTANEOUS
Qty: 10 PEN | Refills: 3 | Status: SHIPPED | OUTPATIENT
Start: 2019-06-24 | End: 2019-07-25 | Stop reason: SDUPTHER

## 2019-06-25 ENCOUNTER — TELEPHONE (OUTPATIENT)
Dept: PAIN MEDICINE | Facility: MEDICAL CENTER | Age: 64
End: 2019-06-25

## 2019-06-25 RX ORDER — TESTOSTERONE CYPIONATE 200 MG/ML
60 INJECTION INTRAMUSCULAR WEEKLY
Qty: 10 ML | Refills: 0 | Status: SHIPPED | OUTPATIENT
Start: 2019-06-25 | End: 2019-12-19 | Stop reason: SDUPTHER

## 2019-06-25 NOTE — TELEPHONE ENCOUNTER
Pt left a voice mail stating that he would like to reschedule his procedure     Pt can be reached at [phone]

## 2019-06-27 NOTE — TELEPHONE ENCOUNTER
Procedure ordered- called pt, he needs to check w his wife about her availability to drive him  Gave multiple date/ time options to pt from 07/11/19 and after  Pt will call back to my direct line to schedule

## 2019-06-27 NOTE — TELEPHONE ENCOUNTER
Pt returned call, scheduled XIAO on Wed 07/17/19 arr at 15:00  Pt takes ASA on own, will hold for 6 days, last dose on Wed 07/10/19 and takes ibuprofen, will hold for 1 day, last dose on Mon 07/15/19  Went over pre procedure instructions, NPO 1 hr prior, if sick or on abx needs to call to rs, wear loose, comf clothing like Tshirt- no jewelry, needs   Pt verbalized understanding

## 2019-07-11 ENCOUNTER — TELEPHONE (OUTPATIENT)
Dept: ENDOCRINOLOGY | Facility: CLINIC | Age: 64
End: 2019-07-11

## 2019-07-17 ENCOUNTER — HOSPITAL ENCOUNTER (OUTPATIENT)
Dept: RADIOLOGY | Facility: CLINIC | Age: 64
Discharge: HOME | End: 2019-07-17
Admitting: ANESTHESIOLOGY
Payer: COMMERCIAL

## 2019-07-17 VITALS
TEMPERATURE: 97.6 F | SYSTOLIC BLOOD PRESSURE: 175 MMHG | DIASTOLIC BLOOD PRESSURE: 76 MMHG | OXYGEN SATURATION: 96 % | HEART RATE: 70 BPM | RESPIRATION RATE: 20 BRPM

## 2019-07-17 DIAGNOSIS — M54.12 CERVICAL RADICULOPATHY: ICD-10-CM

## 2019-07-17 PROCEDURE — 62321 NJX INTERLAMINAR CRV/THRC: CPT | Performed by: ANESTHESIOLOGY

## 2019-07-17 RX ORDER — LIDOCAINE HYDROCHLORIDE 10 MG/ML
5 INJECTION, SOLUTION EPIDURAL; INFILTRATION; INTRACAUDAL; PERINEURAL ONCE
Status: COMPLETED | OUTPATIENT
Start: 2019-07-17 | End: 2019-07-17

## 2019-07-17 RX ORDER — PAPAVERINE HCL 150 MG
10 CAPSULE, EXTENDED RELEASE ORAL ONCE
Status: COMPLETED | OUTPATIENT
Start: 2019-07-17 | End: 2019-07-17

## 2019-07-17 RX ADMIN — LIDOCAINE HYDROCHLORIDE 5 ML: 10 INJECTION, SOLUTION EPIDURAL; INFILTRATION; INTRACAUDAL; PERINEURAL at 15:39

## 2019-07-17 RX ADMIN — DEXAMETHASONE SODIUM PHOSPHATE 10 MG: 10 INJECTION, SOLUTION INTRAMUSCULAR; INTRAVENOUS at 15:38

## 2019-07-17 RX ADMIN — IOHEXOL 1 ML: 300 INJECTION, SOLUTION INTRAVENOUS at 15:38

## 2019-07-17 NOTE — DISCHARGE INSTR - LAB
Epidural Steroid Injection   WHAT YOU NEED TO KNOW:   An epidural steroid injection (LUIS MIGUEL) is a procedure to inject steroid medicine into the epidural space  The epidural space is between your spinal cord and vertebrae  Steroids reduce inflammation and fluid buildup in your spine that may be causing pain  You may be given pain medicine along with the steroids  ACTIVITY  · Do not drive or operate machinery today  · No strenuous activity today - bending, lifting, etc   · You may resume normal activites starting tomorrow - start slowly and as tolerated  · You may shower today, but no tub baths or hot tubs  · You may have numbness for several hours from the local anesthetic  Please use caution and common sense, especially with weight-bearing activities  CARE OF THE INJECTION SITE  · If you have soreness or pain, apply ice to the area today (20 minutes on/20 minutes off)  · Starting tomorrow, you may use warm, moist heat or ice if needed  · You may have an increase or change in your discomfort for 36-48 hours after your treatment  · Apply ice and continue with any pain medication you have been prescribed  · Notify [facility] if you have any of the following: redness, drainage, swelling, headache, stiff neck or fever above 100°F     SPECIAL INSTRUCTIONS  · Our office will contact you in approximately 7 days for a progress report  MEDICATIONS  · Continue to take all routine medications  · Our office may have instructed you to hold some medications  If you have a problem specifically related to your procedure, please call our office at [phone]  Problems not related to your procedure should be directed to your primary care physician

## 2019-07-17 NOTE — H&P
History of Present Illness: The patient is a 70 y o  male who presents with complaints of neck and arm pain      Patient Active Problem List   Diagnosis    Benign essential hypertension    Carpal tunnel syndrome    Cervical herniated disc    Cervical radiculopathy    Cervical stenosis of spinal canal    Neck pain    Type 2 diabetes mellitus with hyperglycemia, with long-term current use of insulin ([facility])    Hyperlipidemia    Hypogonadotropic hypogonadism ([address] )    Nephrolithiasis    Pituitary microadenoma ([address] )    Seborrheic dermatitis    Obstructive sleep apnea syndrome    Spondylosis of cervical region without myelopathy or radiculopathy    Sprain and strain of calcaneofibular (ligament)    Viral upper respiratory tract infection with cough    Vitamin D deficiency    Low back pain with sciatica    Sacroiliitis ([address] )    BMI 27 0-27 9,adult    Non-recurrent acute suppurative otitis media of right ear without spontaneous rupture of tympanic membrane       Past Medical History:   Diagnosis Date    Arthritis     Last assessed 5/24/2013    Avitaminosis D 2012    Diabetes mellitus ([address] )     Displacement of cervical intervertebral disc 2014    GERD (gastroesophageal reflux disease)     HTN (hypertension) 2007    Pituitary microadenoma [facility] [address] [facility] 2010    Spinal stenosis in cervical region 2014    Uric acid nephrolithiasis 1990       Past Surgical History:   Procedure Laterality Date    ASPIRATION / INJECTION RENAL CYST      Renal Cyst Aspiration    CATARACT EXTRACTION      12/2017- right eye, 01/2018- left eye    EYE SURGERY Bilateral     Cataract Surgery    TONSILLECTOMY           Current Outpatient Medications:     amLODIPine (NORVASC) 10 mg tablet, Take 1 tablet (10 mg total) by mouth daily for 90 days, Disp: 90 tablet, Rfl: 3    APIDRA SOLOSTAR 100 units/mL injection pen, Inject 25 Units under the skin daily before breakfast for 90 days 25 units only in the AM, Disp: 10 pen, Rfl: 3   aspirin (ECOTRIN LOW STRENGTH) 81 mg EC tablet, Take 81 mg by mouth daily , Disp: , Rfl:     B Complex Vitamins (VITAMIN B-COMPLEX PO), Take 1 capsule by mouth daily , Disp: , Rfl:     BD PEN NEEDLE MILDRED U/F 32G X 4 MM MISC, Use 2 per day, Disp: 200 each, Rfl: 3    Blood Glucose Monitoring Suppl (ONETOUCH VERIO) w/Device KIT, by Does not apply route once for 1 dose Dispense 1 meter, Disp: 1 kit, Rfl: 1    brimonidine-timolol (COMBIGAN) 0 2-0 5 %, Administer 1 drop to both eyes every 12 (twelve) hours, Disp: , Rfl:     cholecalciferol (VITAMIN D3) 1,000 units tablet, Take 1,000 Units by mouth 2 (two) times a day , Disp: , Rfl:     Empagliflozin (JARDIANCE) 25 MG TABS, Take 1 tablet (25 mg total) by mouth daily for 252 days, Disp: 90 tablet, Rfl: 3    gabapentin (NEURONTIN) 300 mg capsule, Take 1 PO TID   (Patient taking differently: Take 300 mg by mouth 3 (three) times a day ), Disp: 270 capsule, Rfl: 1    Glucosamine-Chondroitin (OSTEO BI-FLEX REGULAR STRENGTH) 250-200 MG TABS, Take 1 tablet by mouth 2 (two) times a day, Disp: , Rfl:     glyBURIDE (DIABETA) 5 mg tablet, Take 1 tablet (5 mg total) by mouth 3 (three) times a day, Disp: 270 tablet, Rfl: 3    ibuprofen (MOTRIN) 200 mg tablet, Take 200 mg by mouth as needed , Disp: , Rfl:     insulin glargine (LANTUS SOLOSTAR) 100 units/mL injection pen, Inject 30 Units under the skin daily at bedtime, Disp: 30 mL, Rfl: 3    lisinopril (ZESTRIL) 40 mg tablet, TAKE 1 TABLET DAILY, Disp: 90 tablet, Rfl: 3    loratadine (CLARITIN) 10 mg tablet, Take 10 mg by mouth daily, Disp: , Rfl:     MULTIPLE VITAMIN PO, Take 1 tablet by mouth daily , Disp: , Rfl:     omeprazole (PriLOSEC) 10 mg delayed release capsule, Take 10 mg by mouth daily, Disp: , Rfl:     ONETOUCH VERIO test strip, Test blood sugars 4 x daily as directed, Disp: 400 each, Rfl: 3    Probiotic Product (PROBIOTIC-10) CAPS, Take by mouth, Disp: , Rfl:     simvastatin (ZOCOR) 20 mg tablet, Take 1 tablet (20 mg total) by mouth daily at bedtime, Disp: 90 tablet, Rfl: 3    sitaGLIPtin-metFORMIN (JANUMET)  MG per tablet, Take 1 tablet by mouth 2 (two) times a day with meals for 122 days, Disp: 180 tablet, Rfl: 3    SYRINGE-NEEDLE, DISP, 3 ML (B-D SYRINGE/NEEDLE 3CC/23GX1") 23G X 1" 3 ML MISC, Use 1 per week, Disp: 12 each, Rfl: 3    testosterone cypionate (DEPO-TESTOSTERONE) 200 mg/mL SOLN, Inject 0 3 mL (60 mg total) into a muscle once a week for 234 days, Disp: 10 mL, Rfl: 0    vitamin E, tocopherol, 400 units capsule, Take 400 Units by mouth 2 (two) times a day , Disp: , Rfl:     Allergies   Allergen Reactions    Augmentin [Amoxicillin-Pot Clavulanate] Abdominal Pain    Biaxin [Clarithromycin] Abdominal Pain    Dust Mite Extract     Insulin Aspart GI Intolerance    Molds & Smuts     Nuts     Seasonal Ic [Cholestatin] Headache       Physical Exam:   Vitals:    07/17/19 1512   BP: 154/84   Pulse:    Resp:    Temp:    SpO2:      General: Awake, Alert, Oriented x 3, Mood and affect appropriate  Respiratory: Respirations even and unlabored  Cardiovascular: Peripheral pulses intact; no edema  Musculoskeletal Exam:  Bilateral cervical paraspinals tender to palpation  ASA Score: 2    Patient/Chart Verification  Patient ID Verified: Verbal  ID Band Applied: No  Consents Confirmed: Procedural  H&P( within 30 days) Verified: To be obtained in the Pre-Procedure area  Interval H&P(within 24 hr) Complete (required for Outpatients and Surgery Admit only): To be obtained in the Pre-Procedure area  Allergies Reviewed: Yes  Anticoag/NSAID held?: Yes(ASA , Ibuprofen)  Currently on antibiotics?: No    Assessment:   1   Cervical radiculopathy        Plan: XIAO

## 2019-07-19 ENCOUNTER — TELEPHONE (OUTPATIENT)
Dept: PAIN MEDICINE | Facility: MEDICAL CENTER | Age: 64
End: 2019-07-19

## 2019-07-19 DIAGNOSIS — M54.12 CERVICAL RADICULOPATHY: ICD-10-CM

## 2019-07-19 DIAGNOSIS — M54.2 NECK PAIN: ICD-10-CM

## 2019-07-19 DIAGNOSIS — M48.02 CERVICAL STENOSIS OF SPINAL CANAL: ICD-10-CM

## 2019-07-19 RX ORDER — GABAPENTIN 300 MG/1
300 CAPSULE ORAL 3 TIMES DAILY
Qty: 270 CAPSULE | Refills: 0 | Status: SHIPPED | OUTPATIENT
Start: 2019-07-19 | End: 2019-08-29 | Stop reason: SDUPTHER

## 2019-07-19 NOTE — TELEPHONE ENCOUNTER
SW patient, states the medication is still helping with his pain, denies any side effects from taking the medication

## 2019-07-19 NOTE — TELEPHONE ENCOUNTER
Pt contacted Call Center requested refill of their medication  Medication Name:  Gabapentin     Dosage of Med: 300 mg     Frequency of Med:  1 tablet TID    Remaining Medication:  20 days left     Pharmacy and Location:    [facility] mail order     Pt   Preferred Callback Phone Number:    [phone]

## 2019-07-19 NOTE — TELEPHONE ENCOUNTER
Refill sent to pharmacy    Please let the patient know that this is the last refill I will be sending and without an office visit since he has not been seen since February

## 2019-07-24 ENCOUNTER — TELEPHONE (OUTPATIENT)
Dept: PAIN MEDICINE | Facility: CLINIC | Age: 64
End: 2019-07-24

## 2019-07-25 DIAGNOSIS — Z79.4 TYPE 2 DIABETES MELLITUS WITH HYPERGLYCEMIA, WITH LONG-TERM CURRENT USE OF INSULIN (HCC): ICD-10-CM

## 2019-07-25 DIAGNOSIS — E11.65 TYPE 2 DIABETES MELLITUS WITH HYPERGLYCEMIA, WITH LONG-TERM CURRENT USE OF INSULIN (HCC): ICD-10-CM

## 2019-07-25 RX ORDER — INSULIN GLULISINE 100 [IU]/ML
INJECTION, SOLUTION SUBCUTANEOUS
Qty: 10 PEN | Refills: 3 | Status: SHIPPED | OUTPATIENT
Start: 2019-07-25 | End: 2020-01-27 | Stop reason: SDUPTHER

## 2019-07-25 RX ORDER — INSULIN GLULISINE 100 [IU]/ML
25 INJECTION, SOLUTION SUBCUTANEOUS
Qty: 10 PEN | Refills: 3 | Status: CANCELLED | OUTPATIENT
Start: 2019-07-25 | End: 2019-10-23

## 2019-07-31 NOTE — TELEPHONE ENCOUNTER
S/w pt, he said he is doing a little better in the 2nd week s/p injection  Pt said he still has some pain in the spot where the top of his spine meets his skull  Pt said he got 50-60% relief total  Pt said he has not had any HA's since  Pt is going to call back to set up a follow up

## 2019-08-08 ENCOUNTER — OFFICE VISIT (OUTPATIENT)
Dept: INTERNAL MEDICINE CLINIC | Facility: CLINIC | Age: 64
End: 2019-08-08
Payer: COMMERCIAL

## 2019-08-08 ENCOUNTER — TELEPHONE (OUTPATIENT)
Dept: INTERNAL MEDICINE CLINIC | Facility: CLINIC | Age: 64
End: 2019-08-08

## 2019-08-08 VITALS
OXYGEN SATURATION: 97 % | SYSTOLIC BLOOD PRESSURE: 142 MMHG | HEIGHT: 64 IN | WEIGHT: 163.6 LBS | HEART RATE: 90 BPM | DIASTOLIC BLOOD PRESSURE: 80 MMHG | BODY MASS INDEX: 27.93 KG/M2 | TEMPERATURE: 98.5 F

## 2019-08-08 DIAGNOSIS — E11.65 TYPE 2 DIABETES MELLITUS WITH HYPERGLYCEMIA, WITH LONG-TERM CURRENT USE OF INSULIN (HCC): ICD-10-CM

## 2019-08-08 DIAGNOSIS — M50.20 CERVICAL HERNIATED DISC: ICD-10-CM

## 2019-08-08 DIAGNOSIS — L82.0 INFLAMED SEBORRHEIC KERATOSIS: ICD-10-CM

## 2019-08-08 DIAGNOSIS — M54.12 CERVICAL RADICULOPATHY: ICD-10-CM

## 2019-08-08 DIAGNOSIS — M54.40 LOW BACK PAIN WITH SCIATICA, SCIATICA LATERALITY UNSPECIFIED, UNSPECIFIED BACK PAIN LATERALITY, UNSPECIFIED CHRONICITY: ICD-10-CM

## 2019-08-08 DIAGNOSIS — K21.9 GASTROESOPHAGEAL REFLUX DISEASE WITHOUT ESOPHAGITIS: ICD-10-CM

## 2019-08-08 DIAGNOSIS — R59.0 SUBMANDIBULAR LYMPHADENOPATHY: ICD-10-CM

## 2019-08-08 DIAGNOSIS — E55.9 VITAMIN D DEFICIENCY: ICD-10-CM

## 2019-08-08 DIAGNOSIS — I10 BENIGN ESSENTIAL HYPERTENSION: ICD-10-CM

## 2019-08-08 DIAGNOSIS — Z86.010 HISTORY OF COLON POLYPS: Primary | ICD-10-CM

## 2019-08-08 DIAGNOSIS — Z12.11 ENCOUNTER FOR SCREENING FOR MALIGNANT NEOPLASM OF COLON: ICD-10-CM

## 2019-08-08 DIAGNOSIS — Z79.4 TYPE 2 DIABETES MELLITUS WITH HYPERGLYCEMIA, WITH LONG-TERM CURRENT USE OF INSULIN (HCC): ICD-10-CM

## 2019-08-08 PROBLEM — H66.001 NON-RECURRENT ACUTE SUPPURATIVE OTITIS MEDIA OF RIGHT EAR WITHOUT SPONTANEOUS RUPTURE OF TYMPANIC MEMBRANE: Status: RESOLVED | Noted: 2019-05-14 | Resolved: 2019-08-08

## 2019-08-08 PROBLEM — J06.9 VIRAL UPPER RESPIRATORY TRACT INFECTION WITH COUGH: Status: RESOLVED | Noted: 2018-01-15 | Resolved: 2019-08-08

## 2019-08-08 PROCEDURE — 1160F RVW MEDS BY RX/DR IN RCRD: CPT | Performed by: INTERNAL MEDICINE

## 2019-08-08 PROCEDURE — 99215 OFFICE O/P EST HI 40 MIN: CPT | Performed by: INTERNAL MEDICINE

## 2019-08-08 PROCEDURE — 1036F TOBACCO NON-USER: CPT | Performed by: INTERNAL MEDICINE

## 2019-08-08 PROCEDURE — 3008F BODY MASS INDEX DOCD: CPT | Performed by: INTERNAL MEDICINE

## 2019-08-08 RX ORDER — OMEPRAZOLE 10 MG/1
10 CAPSULE, DELAYED RELEASE ORAL DAILY
Qty: 90 CAPSULE | Refills: 1 | Status: SHIPPED | OUTPATIENT
Start: 2019-08-08 | End: 2020-03-09 | Stop reason: SDUPTHER

## 2019-08-08 RX ORDER — HYDROCHLOROTHIAZIDE 12.5 MG/1
12.5 TABLET ORAL DAILY
Qty: 30 TABLET | Refills: 3 | Status: SHIPPED | OUTPATIENT
Start: 2019-08-08 | End: 2019-11-18 | Stop reason: SDUPTHER

## 2019-08-08 NOTE — TELEPHONE ENCOUNTER
Spoke to Kirit Nguyen @ [facility]  She will look into obtaining the Colonoscopy Report from 2005  She will call me back with that information  We need the colonoscopy report faxed to our office

## 2019-08-08 NOTE — ASSESSMENT & PLAN NOTE
Continue follow-up spine pain and continue gabapentin  He has an appointment for bimal evaluation at [facility]

## 2019-08-08 NOTE — ASSESSMENT & PLAN NOTE
Continue with lisinopril 40 mg daily and amlodipine 10 mg daily  Will start hydrochlorothiazide 12 5 mg daily

## 2019-08-08 NOTE — PROGRESS NOTES
Assessment/Plan:    Inflamed seborrheic keratosis  Will schedule appointment for shave excision and biopsy  Type 2 diabetes mellitus with hyperglycemia, with long-term current use of insulin (HCC)  Lab Results   Component Value Date    HGBA1C 6 4 (H) 06/15/2019       Continue current regimen and follow-up with endocrinology  Great control  Benign essential hypertension  Continue with lisinopril 40 mg daily and amlodipine 10 mg daily  Will start hydrochlorothiazide 12 5 mg daily  Cervical radiculopathy  Continue follow-up spine pain and continue gabapentin  He has an appointment for furtther evaluation at [facility]  Hyperlipidemia  Continue with simvastatin 20 mg daily  Vitamin D deficiency  Continue vitamin-D supplementation  Gastroesophageal reflux disease without esophagitis  Continue with omeprazole  Submandibular lymphadenopathy  Will order an US for evaluation of right submandibular lymphadenopathy  Diagnoses and all orders for this visit:    History of colon polyps    Encounter for screening for malignant neoplasm of colon  -     Ambulatory referral to Colorectal Surgery; Future    Inflamed seborrheic keratosis  -     Ambulatory referral to Dermatology; Future    Type 2 diabetes mellitus with hyperglycemia, with long-term current use of insulin ([facility])    Cervical radiculopathy    Low back pain with sciatica, sciatica laterality unspecified, unspecified back pain laterality, unspecified chronicity    Cervical herniated disc    Vitamin D deficiency    Gastroesophageal reflux disease without esophagitis  -     omeprazole (PriLOSEC) 10 mg delayed release capsule; Take 1 capsule (10 mg total) by mouth daily    Submandibular lymphadenopathy  -     US head neck soft tissue; Future    Benign essential hypertension  -     hydrochlorothiazide (HYDRODIURIL) 12 5 mg tablet;  Take 1 tablet (12 5 mg total) by mouth daily    Other orders  -     Cancel: Ambulatory referral to Colorectal Surgery; Future          Subjective:      Patient ID: Trent Hassan is a 70 y o  male  40-year-old male is seen today for follow-up of chronic conditions  No laboratory studies to review today  He has followed up with his endocrinologist as well as spine and pain doctor  He recently underwent a steroid injection to the cervical spine in July 17, 2019  He reports having mild relief of pain after receiving injection  He is also on gabapentin which is also providing little relief  He also reports having right sided submandibular lymphadenopathy since "6 months to a year"  He also reports having a mole on his right mid-lower back that is bothersome  (pruritis, inflamed, crusting)  Diabetes   He presents for his follow-up diabetic visit  He has type 2 diabetes mellitus  His disease course has been stable  There are no hypoglycemic associated symptoms  Pertinent negatives for hypoglycemia include no dizziness or headaches  There are no diabetic associated symptoms  Pertinent negatives for diabetes include no blurred vision, no chest pain, no fatigue, no foot paresthesias, no foot ulcerations, no polydipsia, no polyphagia, no polyuria, no visual change, no weakness and no weight loss  There are no hypoglycemic complications  Symptoms are stable  Diabetic complications include nephropathy  Risk factors for coronary artery disease include diabetes mellitus, dyslipidemia, hypertension and male sex  Current diabetic treatment includes insulin injections and oral agent (triple therapy)  He is compliant with treatment all of the time  He is following a generally healthy and diabetic diet  He participates in exercise intermittently  His breakfast blood glucose is taken between 7-8 am  His breakfast blood glucose range is generally 140-180 mg/dl  His overall blood glucose range is 140-180 mg/dl  An ACE inhibitor/angiotensin II receptor blocker is being taken  He sees a podiatrist Eye exam is current  Hypertension   This is a chronic problem  The current episode started more than 1 year ago  The problem is unchanged  The problem is uncontrolled  Pertinent negatives include no blurred vision, chest pain, headaches, palpitations or shortness of breath  Risk factors for coronary artery disease include dyslipidemia, diabetes mellitus, male gender and obesity  Past treatments include ACE inhibitors and calcium channel blockers  The current treatment provides moderate improvement  Compliance problems include exercise  Heartburn   He reports no abdominal pain, no chest pain, no choking, no coughing, no heartburn, no nausea, no sore throat or no wheezing  This is a chronic problem  The current episode started more than 1 year ago  The problem occurs occasionally  The symptoms are aggravated by certain foods  Pertinent negatives include no fatigue or weight loss  He has tried a PPI for the symptoms  The treatment provided moderate relief  The following portions of the patient's history were reviewed and updated as appropriate: allergies, current medications, past family history, past medical history, past social history, past surgical history and problem list     Review of Systems   Constitutional: Negative for activity change, appetite change, chills, diaphoresis, fatigue, fever and weight loss  HENT: Negative for congestion, postnasal drip, rhinorrhea, sinus pressure, sinus pain, sneezing and sore throat  Eyes: Negative for blurred vision and visual disturbance  Respiratory: Negative for apnea, cough, choking, chest tightness, shortness of breath and wheezing  Cardiovascular: Negative for chest pain, palpitations and leg swelling  Gastrointestinal: Negative for abdominal distention, abdominal pain, anal bleeding, blood in stool, constipation, diarrhea, heartburn, nausea and vomiting  Endocrine: Negative for cold intolerance, heat intolerance, polydipsia, polyphagia and polyuria     Genitourinary: Negative for difficulty urinating, dysuria and hematuria  Musculoskeletal: Negative  Skin: Negative  Neurological: Negative for dizziness, weakness, light-headedness, numbness and headaches  Hematological: Negative for adenopathy  Psychiatric/Behavioral: Negative for agitation, sleep disturbance and suicidal ideas  All other systems reviewed and are negative          Past Medical History:   Diagnosis Date    Arthritis     Last assessed 5/24/2013    Avitaminosis D 2012    Diabetes mellitus ([facility] )     Displacement of cervical intervertebral disc 2014    GERD (gastroesophageal reflux disease)     Glaucoma     Normal Pressure Glaucoma    HTN (hypertension) 2007    Pituitary microadenoma ([facility] ) 2010    Spinal stenosis in cervical region 2014    Uric acid nephrolithiasis 1990         Current Outpatient Medications:     amLODIPine (NORVASC) 10 mg tablet, Take 1 tablet (10 mg total) by mouth daily for 90 days, Disp: 90 tablet, Rfl: 3    APIDRA SOLOSTAR 100 units/mL injection pen, Use up to 25-30 units only in the AM, Disp: 10 pen, Rfl: 3    aspirin (ECOTRIN LOW STRENGTH) 81 mg EC tablet, Take 81 mg by mouth daily , Disp: , Rfl:     B Complex Vitamins (VITAMIN B-COMPLEX PO), Take 1 capsule by mouth daily , Disp: , Rfl:     BD PEN NEEDLE MILDRED U/F 32G X 4 MM MISC, Use 2 per day, Disp: 200 each, Rfl: 3    brimonidine-timolol (COMBIGAN) 0 2-0 5 %, Administer 1 drop to both eyes every 12 (twelve) hours, Disp: , Rfl:     cholecalciferol (VITAMIN D3) 1,000 units tablet, Take 1,000 Units by mouth 2 (two) times a day , Disp: , Rfl:     Empagliflozin (JARDIANCE) 25 MG TABS, Take 1 tablet (25 mg total) by mouth daily for 252 days, Disp: 90 tablet, Rfl: 3    gabapentin (NEURONTIN) 300 mg capsule, Take 1 capsule (300 mg total) by mouth 3 (three) times a day, Disp: 270 capsule, Rfl: 0    Glucosamine-Chondroitin (OSTEO BI-FLEX REGULAR STRENGTH) 250-200 MG TABS, Take 1 tablet by mouth 2 (two) times a day, Disp: , Rfl:     glyBURIDE (DIABETA) 5 mg tablet, Take 1 tablet (5 mg total) by mouth 3 (three) times a day, Disp: 270 tablet, Rfl: 3    ibuprofen (MOTRIN) 200 mg tablet, Take 200 mg by mouth as needed , Disp: , Rfl:     insulin glargine (LANTUS SOLOSTAR) 100 units/mL injection pen, Inject 30 Units under the skin daily at bedtime, Disp: 30 mL, Rfl: 3    lisinopril (ZESTRIL) 40 mg tablet, TAKE 1 TABLET DAILY, Disp: 90 tablet, Rfl: 3    loratadine (CLARITIN) 10 mg tablet, Take 10 mg by mouth daily, Disp: , Rfl:     MULTIPLE VITAMIN PO, Take 1 tablet by mouth daily , Disp: , Rfl:     omeprazole (PriLOSEC) 10 mg delayed release capsule, Take 1 capsule (10 mg total) by mouth daily, Disp: 90 capsule, Rfl: 1    ONETOUCH VERIO test strip, Test blood sugars 4 x daily as directed, Disp: 400 each, Rfl: 3    Probiotic Product (PROBIOTIC-10) CAPS, Take by mouth daily , Disp: , Rfl:     simvastatin (ZOCOR) 20 mg tablet, Take 1 tablet (20 mg total) by mouth daily at bedtime, Disp: 90 tablet, Rfl: 3    sitaGLIPtin-metFORMIN (JANUMET)  MG per tablet, Take 1 tablet by mouth 2 (two) times a day with meals for 122 days, Disp: 180 tablet, Rfl: 3    SYRINGE-NEEDLE, DISP, 3 ML (B-D SYRINGE/NEEDLE 3CC/23GX1") 23G X 1" 3 ML MISC, Use 1 per week, Disp: 12 each, Rfl: 3    testosterone cypionate (DEPO-TESTOSTERONE) 200 mg/mL SOLN, Inject 0 3 mL (60 mg total) into a muscle once a week for 234 days, Disp: 10 mL, Rfl: 0    vitamin E, tocopherol, 400 units capsule, Take 400 Units by mouth 2 (two) times a day , Disp: , Rfl:     Blood Glucose Monitoring Suppl (ONETOUCH VERIO) w/Device KIT, by Does not apply route once for 1 dose Dispense 1 meter, Disp: 1 kit, Rfl: 1    hydrochlorothiazide (HYDRODIURIL) 12 5 mg tablet, Take 1 tablet (12 5 mg total) by mouth daily, Disp: 30 tablet, Rfl: 3    Allergies   Allergen Reactions    Augmentin [Amoxicillin-Pot Clavulanate] Abdominal Pain    Biaxin [Clarithromycin] Abdominal Pain    Dust Mite Extract     Insulin Aspart GI Intolerance    Molds & Smuts     Nuts     Seasonal Ic [Cholestatin] Headache       Social History   Past Surgical History:   Procedure Laterality Date    ASPIRATION / INJECTION RENAL CYST      Renal Cyst Aspiration    CATARACT EXTRACTION      12/2017- right eye, 01/2018- left eye    EYE SURGERY Bilateral     Cataract Surgery    TONSILLECTOMY       Family History   Problem Relation Age of Onset    Diabetes unspecified Mother     Heart disease Mother     Nephrolithiasis Mother     Kidney disease Mother     Other Mother         Back Disorder    Thyroid disease Mother     Diabetes unspecified Father     Heart disease Father     Diabetes unspecified Sister     Heart disease Sister     Stroke Sister     Diabetes unspecified Brother     Heart disease Brother     Nephrolithiasis Brother     Lung cancer Brother     Other Brother         COPD    Diabetes unspecified Sister     Heart disease Sister     Diabetes unspecified Sister     Diabetes unspecified Brother     Heart disease Brother     Diabetes unspecified Brother     Other Brother         Back Disorder    Diabetes unspecified Brother     Other Brother         Back Disorder    Diabetes unspecified Brother     Stomach cancer Paternal Aunt        Objective:  /80 (BP Location: Left arm, Patient Position: Sitting, Cuff Size: Adult)   Pulse 90   Temp 98 5 °F (36 9 °C) (Oral)   Ht 5' 3 5" (1 613 m)   Wt 74 2 kg (163 lb 9 6 oz) Comment: WITH SHOES  SpO2 97% Comment: RA  BMI 28 53 kg/m²     Recent Results (from the past 1344 hour(s))   Comprehensive metabolic panel    Collection Time: 06/15/19  9:48 AM   Result Value Ref Range    Glucose, Random 78 65 - 99 mg/dL    BUN 23 7 - 25 mg/dL    Creatinine 1 03 0 70 - 1 25 mg/dL    eGFR Non  76 > OR = 60 mL/min/1 73m2    eGFR  88 > OR = 60 mL/min/1 73m2    SL AMB BUN/CREATININE RATIO NOT APPLICABLE 6 - 22 (calc) Sodium 140 135 - 146 mmol/L    Potassium 3 8 3 5 - 5 3 mmol/L    Chloride 102 98 - 110 mmol/L    CO2 28 20 - 32 mmol/L    SL AMB CALCIUM 9 6 8 6 - 10 3 mg/dL    Protein, Total 7 1 6 1 - 8 1 g/dL    Albumin 4 6 3 6 - 5 1 g/dL    Globulin 2 5 1 9 - 3 7 g/dL (calc)    Albumin/Globulin Ratio 1 8 1 0 - 2 5 (calc)    TOTAL BILIRUBIN 1 6 (H) 0 2 - 1 2 mg/dL    Alkaline Phosphatase 52 40 - 115 U/L    AST 21 10 - 35 U/L    ALT 35 9 - 46 U/L   CBC and differential    Collection Time: 06/15/19  9:48 AM   Result Value Ref Range    White Blood Cell Count 11 2 (H) 3 8 - 10 8 Thousand/uL    Red Blood Cell Count 6 59 (H) 4 20 - 5 80 Million/uL    Hemoglobin 19 3 (H) 13 2 - 17 1 g/dL    HCT 56 8 (H) 38 5 - 50 0 %    MCV 86 2 80 0 - 100 0 fL    MCH 29 3 27 0 - 33 0 pg    MCHC 34 0 32 0 - 36 0 g/dL    RDW 15 7 (H) 11 0 - 15 0 %    Platelet Count 919 614 - 400 Thousand/uL    SL AMB MPV 10 1 7 5 - 12 5 fL    Neutrophils (Absolute) 6,406 1,500 - 7,800 cells/uL    Lymphocytes (Absolute) 3,315 850 - 3,900 cells/uL    Monocytes (Absolute) 1,086 (H) 200 - 950 cells/uL    Eosinophils (Absolute) 347 15 - 500 cells/uL    Basophils ABS 45 0 - 200 cells/uL    Neutrophils 57 2 %    Lymphocytes 29 6 %    Monocytes 9 7 %    Eosinophils 3 1 %    Basophils PCT 0 4 %   PSA, Total Screen    Collection Time: 06/15/19  9:48 AM   Result Value Ref Range    Prostate Specific Antigen Total 0 7 < OR = 4 0 ng/mL   Testosterone, free, total    Collection Time: 06/15/19  9:48 AM   Result Value Ref Range    Testosterone, Total, LC/ 250 - 1,100 ng/dL    Testosterone, Free 122 0 35 0 - 155 0 pg/mL   Hemoglobin A1c (w/out EAG)    Collection Time: 06/15/19  9:48 AM   Result Value Ref Range    Hemoglobin A1C 6 4 (H) <5 7 % of total Hgb            Physical Exam   Constitutional: He is oriented to person, place, and time  He appears well-developed and well-nourished  No distress  HENT:   Head: Normocephalic and atraumatic     Eyes: Pupils are equal, round, and reactive to light  Conjunctivae and EOM are normal  Right eye exhibits no discharge  Left eye exhibits no discharge  No scleral icterus  Neck: Normal range of motion  Neck supple  No JVD present  No thyromegaly present  Cardiovascular: Normal rate, regular rhythm, normal heart sounds and intact distal pulses  Exam reveals no gallop and no friction rub  No murmur heard  Pulmonary/Chest: Effort normal and breath sounds normal  No respiratory distress  He has no wheezes  He has no rales  He exhibits no tenderness  Abdominal: Soft  Bowel sounds are normal  He exhibits no distension and no mass  There is no tenderness  There is no rebound and no guarding  Musculoskeletal: Normal range of motion  He exhibits no edema, tenderness or deformity  Lymphadenopathy:        Head (right side): Submandibular adenopathy present  He has no cervical adenopathy  Neurological: He is alert and oriented to person, place, and time  He has normal reflexes  No cranial nerve deficit  Coordination normal    Skin: Skin is warm and dry  No rash noted  He is not diaphoretic  No erythema  No pallor  Psychiatric: He has a normal mood and affect  His behavior is normal  Judgment and thought content normal    Nursing note and vitals reviewed

## 2019-08-08 NOTE — ASSESSMENT & PLAN NOTE
Lab Results   Component Value Date    HGBA1C 6 4 (H) 06/15/2019       Continue current regimen and follow-up with endocrinology  Great control

## 2019-08-28 ENCOUNTER — TELEPHONE (OUTPATIENT)
Dept: PAIN MEDICINE | Facility: CLINIC | Age: 64
End: 2019-08-28

## 2019-08-29 ENCOUNTER — TELEPHONE (OUTPATIENT)
Dept: PAIN MEDICINE | Facility: CLINIC | Age: 64
End: 2019-08-29

## 2019-08-29 DIAGNOSIS — M48.02 CERVICAL STENOSIS OF SPINAL CANAL: ICD-10-CM

## 2019-08-29 DIAGNOSIS — M54.2 NECK PAIN: ICD-10-CM

## 2019-08-29 DIAGNOSIS — M54.12 CERVICAL RADICULOPATHY: ICD-10-CM

## 2019-08-29 RX ORDER — GABAPENTIN 300 MG/1
300 CAPSULE ORAL 3 TIMES DAILY
Qty: 270 CAPSULE | Refills: 1 | Status: SHIPPED | OUTPATIENT
Start: 2019-08-29 | End: 2019-08-30 | Stop reason: SDUPTHER

## 2019-08-29 NOTE — TELEPHONE ENCOUNTER
S/w pt, he was in to make a follow up today and he is asking for a RF of Gabapentin 300 mg TID  Pt said he is tolerating his current dose and denies s/e  Pt would like it sent to [facility]

## 2019-08-29 NOTE — TELEPHONE ENCOUNTER
Patient called to schedule f/u- offered 1st avail oct 11th- he stated he will be out of his gabapentin   Please advise

## 2019-08-30 RX ORDER — GABAPENTIN 300 MG/1
300 CAPSULE ORAL 3 TIMES DAILY
Qty: 270 CAPSULE | Refills: 1 | Status: SHIPPED | OUTPATIENT
Start: 2019-08-30 | End: 2020-03-23 | Stop reason: SDUPTHER

## 2019-09-04 ENCOUNTER — HOSPITAL ENCOUNTER (OUTPATIENT)
Dept: RADIOLOGY | Age: 64
Discharge: HOME | End: 2019-09-04
Payer: COMMERCIAL

## 2019-09-04 DIAGNOSIS — R59.0 SUBMANDIBULAR LYMPHADENOPATHY: ICD-10-CM

## 2019-09-04 PROCEDURE — 76536 US EXAM OF HEAD AND NECK: CPT

## 2019-09-17 DIAGNOSIS — Z79.4 TYPE 2 DIABETES MELLITUS WITH HYPERGLYCEMIA, WITH LONG-TERM CURRENT USE OF INSULIN (HCC): ICD-10-CM

## 2019-09-17 DIAGNOSIS — E11.65 TYPE 2 DIABETES MELLITUS WITH HYPERGLYCEMIA, WITH LONG-TERM CURRENT USE OF INSULIN (HCC): ICD-10-CM

## 2019-09-17 RX ORDER — PEN NEEDLE, DIABETIC 32GX 5/32"
NEEDLE, DISPOSABLE MISCELLANEOUS
Qty: 200 EACH | Refills: 3 | Status: SHIPPED | OUTPATIENT
Start: 2019-09-17 | End: 2020-01-27 | Stop reason: SDUPTHER

## 2019-10-11 ENCOUNTER — OFFICE VISIT (OUTPATIENT)
Dept: PAIN MEDICINE | Facility: CLINIC | Age: 64
End: 2019-10-11
Payer: COMMERCIAL

## 2019-10-11 VITALS
WEIGHT: 164 LBS | SYSTOLIC BLOOD PRESSURE: 153 MMHG | HEIGHT: 64 IN | HEART RATE: 69 BPM | BODY MASS INDEX: 28 KG/M2 | DIASTOLIC BLOOD PRESSURE: 77 MMHG

## 2019-10-11 DIAGNOSIS — G89.4 CHRONIC PAIN SYNDROME: Primary | ICD-10-CM

## 2019-10-11 DIAGNOSIS — M54.2 NECK PAIN: ICD-10-CM

## 2019-10-11 DIAGNOSIS — M50.20 CERVICAL HERNIATED DISC: ICD-10-CM

## 2019-10-11 DIAGNOSIS — M47.812 SPONDYLOSIS OF CERVICAL REGION WITHOUT MYELOPATHY OR RADICULOPATHY: ICD-10-CM

## 2019-10-11 DIAGNOSIS — M48.02 CERVICAL STENOSIS OF SPINAL CANAL: ICD-10-CM

## 2019-10-11 DIAGNOSIS — M54.12 CERVICAL RADICULOPATHY: ICD-10-CM

## 2019-10-11 PROCEDURE — 99213 OFFICE O/P EST LOW 20 MIN: CPT | Performed by: NURSE PRACTITIONER

## 2019-10-11 NOTE — PROGRESS NOTES
Assessment:  1  Chronic pain syndrome    2  Neck pain    3  Cervical radiculopathy    4  Cervical herniated disc    5  Cervical stenosis of spinal canal    6  Spondylosis of cervical region without myelopathy or radiculopathy        Plan:  While the patient and his wife were in the office today, I did have a thorough conversation with them regarding his chronic pain syndrome, symptoms, medication regimen  I explained the patient at this point since he feels the gabapentin and medical marijuana providing significant and stable relief, I feel it is reasonable appropriate to continue the gabapentin as prescribed  However, the patient just filled a 90 day supply from his mail order 6 weeks ago and still has a refill  I advised the patient that once he feels the refill he should call our office so we can send another 90 days supply so that way we can make sure that he has enough medication to get back to his office visit in 6 months  The patient was agreeable and verbalized an understanding  I advised the patient that with regards to medical marijuana, he should continue to follow-up with the physician who is managing the medical marijuana Program for him  The patient was agreeable and verbalized an understanding  The patient will follow-up in 6 months for medication prescription refill and reevaluation  The patient was advised to contact the office should their symptoms worsen in the interim  The patient was agreeable and verbalized an understanding  History of Present Illness: The patient is a 70 y o  male last seen on 2/2719 who presents for a follow up office visit in regards to chronic pain syndrome secondary to herniated cervical disc with stenosis and radiculopathy  The patient currently reports that since his last office visit overall his pain symptoms have significantly improved, which she feels is a direct result of the medical marijuana    The patient is status post a cervical epidural steroid injection in July with Dr Ariadna Sultana, which she reports provided 30-40% relief of his pain symptoms for a week at best   The patient reports that he has been continuing to take the gabapentin and he feels that the combination of the gabapentin and the medical marijuana is providing significant and stable relief as he is sleeping much better and most of his pain is minimal, tolerable, and manageable at this time  He even reports that he has seen a significant decrease in the need for over-the-counter medications help his pain and just feels better overall  Current pain medications includes:  Gabapentin 300 mg t i d   The patient reports that this regimen is providing 80% pain relief  The patient is reporting no side effects from this pain medication regimen  I have personally reviewed and/or updated the patient's past medical history, past surgical history, family history, social history, current medications, allergies, and vital signs today  Review of Systems:    Review of Systems   Respiratory: Negative for shortness of breath  Cardiovascular: Negative for chest pain  Gastrointestinal: Negative for constipation, diarrhea, nausea and vomiting  Musculoskeletal: Negative for arthralgias, gait problem, joint swelling and myalgias  Skin: Negative for rash  Neurological: Negative for dizziness, seizures and weakness  All other systems reviewed and are negative          Past Medical History:   Diagnosis Date    Arthritis     Last assessed 5/24/2013    Avitaminosis D 2012    [facility]     Displacement of cervical intervertebral disc 2014    GERD (gastroesophageal reflux disease)     Glaucoma     Normal Pressure Glaucoma    HTN (hypertension) 2007    Pituitary microadenoma ([facility] ) 2010    Spinal stenosis in cervical region 2014    Uric acid nephrolithiasis 1990       Past Surgical History:   Procedure Laterality Date    ASPIRATION / INJECTION RENAL CYST      Renal Cyst Aspiration    CATARACT EXTRACTION      2017- right eye, 2018- left eye    EYE SURGERY Bilateral     Cataract Surgery    TONSILLECTOMY         Family History   Problem Relation Age of Onset    Diabetes unspecified Mother     Heart disease Mother     Nephrolithiasis Mother     Kidney disease Mother     Other Mother         Back Disorder    Thyroid disease Mother     Diabetes unspecified Father     Heart disease Father     Diabetes unspecified Sister     Heart disease Sister     Stroke Sister     Diabetes unspecified Brother     Heart disease Brother     Nephrolithiasis Brother     Lung cancer Brother     Other Brother         COPD    Diabetes unspecified Sister     Heart disease Sister     Diabetes unspecified Sister     Diabetes unspecified Brother     Heart disease Brother     Diabetes unspecified Brother     Other Brother         Back Disorder    Diabetes unspecified Brother     Other Brother         Back Disorder    Diabetes unspecified Brother     Stomach cancer Paternal Aunt        Social History     Occupational History    Not on file   Tobacco Use    Smoking status: Former Smoker     Last attempt to quit: 1980     Years since quittin 8    Smokeless tobacco: Never Used   Substance and Sexual Activity    Alcohol use:  Yes     Alcohol/week: 2 0 standard drinks     Types: 2 Cans of beer per week     Frequency: 2-4 times a month     Drinks per session: 1 or 2     Binge frequency: Never     Comment: social    Drug use: No    Sexual activity: Not on file           Allergies   Allergen Reactions    Augmentin [Amoxicillin-Pot Clavulanate] Abdominal Pain    Biaxin [Clarithromycin] Abdominal Pain    Dust Mite Extract     Insulin Aspart GI Intolerance    Molds & Smuts     Nuts     Seasonal Ic [Cholestatin] Headache       Physical Exam:    /77   Pulse 69   Ht 5' 3 5" (1 613 m)   Wt 74 4 kg (164 lb)   BMI 28 60 kg/m²     Constitutional:normal, well developed, well nourished, alert, in no distress and non-toxic and no overt pain behavior  Eyes:anicteric  HEENT:grossly intact  Neck:supple, symmetric, trachea midline and no masses   Pulmonary:even and unlabored  Cardiovascular:No edema or pitting edema present  Skin:Normal without rashes or lesions and well hydrated  Psychiatric:Mood and affect appropriate  Neurologic:Cranial Nerves II-XII grossly intact  Musculoskeletal:normal      Imaging  No orders to display         No orders of the defined types were placed in this encounter

## 2019-10-21 ENCOUNTER — OFFICE VISIT (OUTPATIENT)
Dept: SLEEP CENTER | Facility: CLINIC | Age: 64
End: 2019-10-21
Payer: COMMERCIAL

## 2019-10-21 VITALS
SYSTOLIC BLOOD PRESSURE: 112 MMHG | DIASTOLIC BLOOD PRESSURE: 60 MMHG | WEIGHT: 165 LBS | HEIGHT: 63 IN | BODY MASS INDEX: 29.23 KG/M2

## 2019-10-21 DIAGNOSIS — E11.65 TYPE 2 DIABETES MELLITUS WITH HYPERGLYCEMIA, WITH LONG-TERM CURRENT USE OF INSULIN (HCC): ICD-10-CM

## 2019-10-21 DIAGNOSIS — E66.3 OVERWEIGHT (BMI 25.0-29.9): ICD-10-CM

## 2019-10-21 DIAGNOSIS — I10 BENIGN ESSENTIAL HYPERTENSION: ICD-10-CM

## 2019-10-21 DIAGNOSIS — G89.4 CHRONIC PAIN SYNDROME: ICD-10-CM

## 2019-10-21 DIAGNOSIS — Z79.4 TYPE 2 DIABETES MELLITUS WITH HYPERGLYCEMIA, WITH LONG-TERM CURRENT USE OF INSULIN (HCC): ICD-10-CM

## 2019-10-21 DIAGNOSIS — G47.33 OBSTRUCTIVE SLEEP APNEA SYNDROME: Primary | ICD-10-CM

## 2019-10-21 PROCEDURE — 3074F SYST BP LT 130 MM HG: CPT | Performed by: INTERNAL MEDICINE

## 2019-10-21 PROCEDURE — 99214 OFFICE O/P EST MOD 30 MIN: CPT | Performed by: INTERNAL MEDICINE

## 2019-10-21 PROCEDURE — 3078F DIAST BP <80 MM HG: CPT | Performed by: INTERNAL MEDICINE

## 2019-10-21 RX ORDER — LISINOPRIL 40 MG/1
TABLET ORAL
Qty: 90 TABLET | Refills: 3 | Status: SHIPPED | OUTPATIENT
Start: 2019-10-21 | End: 2020-01-27 | Stop reason: SDUPTHER

## 2019-10-21 NOTE — PATIENT INSTRUCTIONS

## 2019-10-21 NOTE — PROGRESS NOTES
Follow-Up Note - [address]  70 y o  male  :1955  KKZ:[id number]    CC: I saw this patient for follow-up in clinic today for his Sleep Disordered Breathing, Coexisting Sleep and Medical Problems  The patient had both diagnostic and therapeutic sleep studies in : The diagnostic study confirmed moderate obstructive sleep apnea:  AHI 24/hour  higher while supine and during stage REM  Intermittent snoring of moderate intensity was noted  Minimum oxygen saturation 90 %  During the subsequent therapeutic study, sleep disordered breathing was successfully remediated with PAP at 6 H2O   (currently is using at 7 cm H2O)    PFSH, Problem List, Medications & Allergies were reviewed in EMR  Interval changes: none reported  He  has a past medical history of Arthritis, Avitaminosis D (), Diabetes mellitus ([facility] ), Displacement of cervical intervertebral disc (), GERD (gastroesophageal reflux disease), Glaucoma, HTN (hypertension) (), Pituitary microadenoma ([facility] ) (), Spinal stenosis in cervical region (), and Uric acid nephrolithiasis ()  He has a current medication list which includes the following prescription(s): apidra solostar, aspirin, b complex vitamins, bd pen needle eunice u/f, brimonidine-timolol, cholecalciferol, empagliflozin, gabapentin, glucosamine-chondroitin, glyburide, hydrochlorothiazide, ibuprofen, insulin glargine, lisinopril, loratadine, multiple vitamin, omeprazole, onetouch verio, other medication (see sig), probiotic-10, simvastatin, sitagliptin-metformin, syringe-needle (disp) 3 ml, testosterone cypionate, vitamin e (tocopherol), amlodipine, and onetouch verio  ROS: Reviewed (see attached)  Significant for medical conditions are stable  DATA REVIEWED:  No data was available, but he reports regular use of the device for > 4hours/night     SUBJECTIVE: Regarding use of PAP, Eugenio Moreno reports:   · He is experiencing some adverse effects: dry mouth  · He is   benefiting from use: sleeping better   Sleep Routine: He reports getting 7 5 hrs sleep  ; since initiating medical marijuana around a month ago, has no difficulty initiating or maintaining sleep   He awakens with the aid of an alarm and feels refreshed  He denied excessive drowsiness   He rated himself at Total score: 5 /24 on the [place] sleepiness scale  Habits: reports that he quit smoking about 39 years ago  He has quit using smokeless tobacco ,  reports that he drinks about 2 0 standard drinks of alcohol per week  ,  reports that he does not use drugs  , Caffeine use: limited , Exercise routine: regular    OBJECTIVE: /60   Ht 5' 3" (1 6 m)   Wt 74 8 kg (165 lb)   BMI 29 23 kg/m²    Constitutional: Patient is well groomed; well appearing  Skin/Extrem: warm & dry; col & hydration normal; no edema  Psych: cooperativeand in no distress  Mental State appears normal   CNS: Alert, orientated, clear & coherent speech  H&N: EOMI; NC/AT:no facial pressure marks, no rashes  ENMT Mucus membranes normal Nasal airway:patent  Oral airway: crowded  Resp:effort is normal CVS: RRR ABD:truncal obesity MSK:Gait normal     ASSESSMENT: Primary Sleep/Secondary(to Medical or Psych conditions) & comorbidities   1  Obstructive sleep apnea syndrome  PAP DME Resupply/Reorder   2  Chronic pain syndrome     3  Benign essential hypertension     4  Overweight (BMI 25 0-29 9)     5  Type 2 diabetes mellitus with hyperglycemia, with long-term current use of insulin (HCC)       PLAN:  1  Treatment with  PAP is medically necessary and Eugenio Burn is agreable to continue use  2  Care of equipment, methods to improve comfort using PAP and importance of compliance with therapy were discussed  3  Pressure setting: continue 7 cmH2O     4  Rx provided to replace supplies and Care coordinated with DME provider  5  Strategies for weight reduction were discussed      6  Follow-up is advised in 1 year or sooner if needed to monitor progress, compliance and to adjust therapy  Thank you for allowing me to participate in the care of this patient      Sincerely,    Authenticated electronically by Grant Hutson MD on 45/73/25   Board Certified Specialist

## 2019-10-21 NOTE — PROGRESS NOTES
Review of Systems      Genitourinary none   Cardiology ankle/leg swelling   Gastrointestinal none   Neurology none   Constitutional fatigue   Integumentary none   Psychiatry none   Musculoskeletal joint pain, muscle aches and back pain   Pulmonary none   ENT none   Endocrine none   Hematological none

## 2019-10-23 ENCOUNTER — TELEPHONE (OUTPATIENT)
Dept: SLEEP CENTER | Facility: CLINIC | Age: 64
End: 2019-10-23

## 2019-11-05 ENCOUNTER — OFFICE VISIT (OUTPATIENT)
Dept: INTERNAL MEDICINE CLINIC | Age: 64
End: 2019-11-05
Payer: COMMERCIAL

## 2019-11-05 VITALS
BODY MASS INDEX: 29.3 KG/M2 | WEIGHT: 165.4 LBS | SYSTOLIC BLOOD PRESSURE: 160 MMHG | HEART RATE: 65 BPM | HEIGHT: 63 IN | DIASTOLIC BLOOD PRESSURE: 70 MMHG | TEMPERATURE: 97.8 F | OXYGEN SATURATION: 97 %

## 2019-11-05 DIAGNOSIS — Z79.4 TYPE 2 DIABETES MELLITUS WITH HYPERGLYCEMIA, WITH LONG-TERM CURRENT USE OF INSULIN (HCC): ICD-10-CM

## 2019-11-05 DIAGNOSIS — E11.65 TYPE 2 DIABETES MELLITUS WITH HYPERGLYCEMIA, WITH LONG-TERM CURRENT USE OF INSULIN (HCC): ICD-10-CM

## 2019-11-05 DIAGNOSIS — K11.20 SALIVARY GLAND ADENITIS: Primary | ICD-10-CM

## 2019-11-05 DIAGNOSIS — I10 BENIGN ESSENTIAL HYPERTENSION: ICD-10-CM

## 2019-11-05 DIAGNOSIS — H60.391 OTHER INFECTIVE ACUTE OTITIS EXTERNA OF RIGHT EAR: ICD-10-CM

## 2019-11-05 DIAGNOSIS — G47.33 OBSTRUCTIVE SLEEP APNEA SYNDROME: ICD-10-CM

## 2019-11-05 DIAGNOSIS — H66.90 EAR INFECTION: ICD-10-CM

## 2019-11-05 DIAGNOSIS — M50.20 CERVICAL HERNIATED DISC: ICD-10-CM

## 2019-11-05 DIAGNOSIS — M54.12 CERVICAL RADICULOPATHY: ICD-10-CM

## 2019-11-05 DIAGNOSIS — M54.40 LOW BACK PAIN WITH SCIATICA, SCIATICA LATERALITY UNSPECIFIED, UNSPECIFIED BACK PAIN LATERALITY, UNSPECIFIED CHRONICITY: ICD-10-CM

## 2019-11-05 DIAGNOSIS — E78.2 MIXED HYPERLIPIDEMIA: ICD-10-CM

## 2019-11-05 PROBLEM — H60.90 OTITIS EXTERNA: Status: ACTIVE | Noted: 2019-11-05

## 2019-11-05 PROCEDURE — 3008F BODY MASS INDEX DOCD: CPT | Performed by: INTERNAL MEDICINE

## 2019-11-05 PROCEDURE — 99214 OFFICE O/P EST MOD 30 MIN: CPT | Performed by: INTERNAL MEDICINE

## 2019-11-05 RX ORDER — MOMETASONE FUROATE 50 UG/1
2 SPRAY, METERED NASAL DAILY
Qty: 1 ACT | Refills: 0 | Status: SHIPPED | OUTPATIENT
Start: 2019-11-05 | End: 2021-04-14

## 2019-11-05 RX ORDER — CEFUROXIME AXETIL 500 MG/1
500 TABLET ORAL EVERY 12 HOURS SCHEDULED
Qty: 14 TABLET | Refills: 0 | Status: SHIPPED | OUTPATIENT
Start: 2019-11-05 | End: 2019-11-12

## 2019-11-05 NOTE — PATIENT INSTRUCTIONS
1  Diarrhea go on a brat diet and use Kaopectate p r n   2  Chronic swelling in the right side of the jaw consistent with salivary gland enlargement and pain as well as ear pain  Antibiotics were given as well as ear drops and decongestants  3  Diabetes mellitus readjust insulin according to his testing for management during his infection    4  You have an appointment with Dr Grayson DURBIN tomorrow at 9:30 a m    5  Dr Mey Ornelas will determine whether you can go on year vacation and fly

## 2019-11-05 NOTE — PROGRESS NOTES
BMI Counseling: Body mass index is 29 3 kg/m²  The BMI is above normal  Nutrition recommendations include reducing portion sizes, decreasing overall calorie intake, 3-5 servings of fruits/vegetables daily and reducing fast food intake  Assessment/Plan:    1  Chronic swelling of the right side of his jaw consistent with salivary gland enlargement  Patient states that recently has become quite painful and it has been associated with ear pain  Physical examination did reveal swelling and pain in the salivary gland along the right jaw he had some tenderness on internal jaw at the area of the Stetten duct     I believe the had some otitis externa et media with dullness to the drum on the right  There was no evidence of diabetic malignant here infection there was no drainage  I gave the patient a script for Ceftin as well as ear drops and patient was to take Allegra and Nasonex also was to gargle with warm salt water  Because of the fact that he is diabetic and this was quite painful and he wishes to go on a flight on Friday I made arrangements for him to see his ear and nose and throat specialist on Wednesday  2  Diabetes:  Patient is quite stable at this time he is on insulin and frequently monitors his own blood sugar  He was told to monitor it more closely with this infection  3  Pituitary microadenoma which is being followed by his primary care  4  Obstructive sleep apnea patient uses a CPAP  5  Congenital murmur without symptoms of shortness of breath or chest pain  6  Benign essential hypertension stable  7  Lower back and neck pain stable   8  Loose stools patient was told to go on a brat diet and use Kaopectate on p r n  Basis   Diagnoses and all orders for this visit:    Salivary gland adenitis  -     FL sialogram; Future  -     cefuroxime (CEFTIN) 500 mg tablet;  Take 1 tablet (500 mg total) by mouth every 12 (twelve) hours for 7 days  -     mometasone (NASONEX) 50 mcg/act nasal spray; 2 sprays into each nostril daily    Ear infection  -     mometasone (NASONEX) 50 mcg/act nasal spray; 2 sprays into each nostril daily    Other infective acute otitis externa of right ear  -     neomycin-polymyxin-hydrocortisone (CORTISPORIN) otic solution; Administer 4 drops to the right ear every 8 (eight) hours          Subjective:      Patient ID: Murtaza Moreland is a 70 y o  male  This is a case of a 70year old male who presents with right ear pain and painful submandibular lymphadenopathy  The earache started 2 days ago and has gotten worse  This AM he noticed the lymphadenopathy  He is afebrile  He used his medical marijuana this AM but it has not relieved the pain  He has had intermittent submandibular lymphadenopathy for 5-10 years  9/4/19 ultrasound showed benign lymphadenopathy  Earache    Associated symptoms include diarrhea (no blood or mucus), neck pain and a sore throat  Pertinent negatives include no coughing, ear discharge, headaches, hearing loss, rash, rhinorrhea or vomiting  Diarrhea    Associated symptoms include arthralgias  Pertinent negatives include no chills, coughing, fever, headaches, myalgias or vomiting  The following portions of the patient's history were reviewed and updated as appropriate: allergies, current medications, past family history, past medical history, past social history, past surgical history and problem list     Review of Systems   Constitutional: Negative for chills and fever  HENT: Positive for ear pain, sinus pressure and sore throat  Negative for congestion, ear discharge, hearing loss, rhinorrhea, sneezing and tinnitus  Eyes: Negative for pain and itching  Respiratory: Negative for cough and shortness of breath  Cardiovascular: Negative for chest pain  Gastrointestinal: Positive for diarrhea (no blood or mucus)  Negative for vomiting  Endocrine: Negative for polydipsia and polyuria  Genitourinary: Positive for frequency (on diuretic)   Negative for difficulty urinating and urgency  Musculoskeletal: Positive for arthralgias, back pain and neck pain  Negative for myalgias  Skin: Negative for rash  Allergic/Immunologic: Positive for environmental allergies  Neurological: Negative for dizziness, seizures, syncope, speech difficulty, light-headedness, numbness and headaches  Psychiatric/Behavioral: Positive for sleep disturbance (sleep apnea)  Negative for suicidal ideas  The patient is not nervous/anxious  Objective:      /70 (BP Location: Left arm, Patient Position: Sitting, Cuff Size: Large)   Pulse 65   Temp 97 8 °F (36 6 °C) (Tympanic)   Ht 5' 3" (1 6 m)   Wt 75 kg (165 lb 6 4 oz) Comment: shoes on  SpO2 97%   BMI 29 30 kg/m²          Physical Exam   Constitutional: He appears well-developed and well-nourished  HENT:   Head: Normocephalic  Right Ear: External ear normal    Left Ear: External ear normal    Ears:    Right TM dullness   Eyes: Right eye exhibits no discharge  Left eye exhibits no discharge  Neck: Normal range of motion  Neck supple  Cardiovascular: Normal rate and regular rhythm  Exam reveals no gallop  Murmur heard  Pulmonary/Chest: Effort normal and breath sounds normal    Abdominal: Soft  Bowel sounds are normal  He exhibits no distension and no mass  There is no tenderness  There is no rebound and no guarding  A hernia is present  Musculoskeletal: He exhibits edema (trace)  Neurological: He is alert  No cranial nerve deficit  Coordination normal    Skin: Skin is warm  Psychiatric: He has a normal mood and affect

## 2019-11-07 ENCOUNTER — APPOINTMENT (OUTPATIENT)
Dept: LAB | Age: 64
End: 2019-11-07
Payer: COMMERCIAL

## 2019-11-07 ENCOUNTER — TRANSCRIBE ORDERS (OUTPATIENT)
Dept: LAB | Age: 64
End: 2019-11-07

## 2019-11-07 DIAGNOSIS — Z11.59 SCREENING EXAMINATION FOR POLIOMYELITIS: ICD-10-CM

## 2019-11-07 DIAGNOSIS — Z11.59 SCREENING EXAMINATION FOR POLIOMYELITIS: Primary | ICD-10-CM

## 2019-11-07 LAB — HCV AB SER QL: NORMAL

## 2019-11-07 PROCEDURE — 86803 HEPATITIS C AB TEST: CPT

## 2019-11-07 PROCEDURE — 36415 COLL VENOUS BLD VENIPUNCTURE: CPT

## 2019-11-18 ENCOUNTER — OFFICE VISIT (OUTPATIENT)
Dept: INTERNAL MEDICINE CLINIC | Facility: CLINIC | Age: 64
End: 2019-11-18
Payer: COMMERCIAL

## 2019-11-18 VITALS
BODY MASS INDEX: 28.48 KG/M2 | OXYGEN SATURATION: 98 % | HEIGHT: 64 IN | SYSTOLIC BLOOD PRESSURE: 158 MMHG | WEIGHT: 166.8 LBS | DIASTOLIC BLOOD PRESSURE: 72 MMHG | HEART RATE: 68 BPM | TEMPERATURE: 98.5 F

## 2019-11-18 DIAGNOSIS — E11.65 TYPE 2 DIABETES MELLITUS WITH HYPERGLYCEMIA, WITH LONG-TERM CURRENT USE OF INSULIN (HCC): ICD-10-CM

## 2019-11-18 DIAGNOSIS — R59.0 SUBMANDIBULAR LYMPHADENOPATHY: ICD-10-CM

## 2019-11-18 DIAGNOSIS — K21.9 GASTROESOPHAGEAL REFLUX DISEASE WITHOUT ESOPHAGITIS: Primary | ICD-10-CM

## 2019-11-18 DIAGNOSIS — I10 BENIGN ESSENTIAL HYPERTENSION: ICD-10-CM

## 2019-11-18 DIAGNOSIS — Z79.4 TYPE 2 DIABETES MELLITUS WITH HYPERGLYCEMIA, WITH LONG-TERM CURRENT USE OF INSULIN (HCC): ICD-10-CM

## 2019-11-18 DIAGNOSIS — E55.9 VITAMIN D DEFICIENCY: ICD-10-CM

## 2019-11-18 DIAGNOSIS — E78.2 MIXED HYPERLIPIDEMIA: ICD-10-CM

## 2019-11-18 PROBLEM — H66.90 EAR INFECTION: Status: RESOLVED | Noted: 2019-11-05 | Resolved: 2019-11-18

## 2019-11-18 PROBLEM — H60.90 OTITIS EXTERNA: Status: RESOLVED | Noted: 2019-11-05 | Resolved: 2019-11-18

## 2019-11-18 PROCEDURE — 1160F RVW MEDS BY RX/DR IN RCRD: CPT | Performed by: INTERNAL MEDICINE

## 2019-11-18 PROCEDURE — 1036F TOBACCO NON-USER: CPT | Performed by: INTERNAL MEDICINE

## 2019-11-18 PROCEDURE — 99214 OFFICE O/P EST MOD 30 MIN: CPT | Performed by: INTERNAL MEDICINE

## 2019-11-18 RX ORDER — HYDROCHLOROTHIAZIDE 25 MG/1
25 TABLET ORAL DAILY
Qty: 90 TABLET | Refills: 1 | Status: SHIPPED | OUTPATIENT
Start: 2019-11-18 | End: 2020-01-27 | Stop reason: SDUPTHER

## 2019-11-18 NOTE — ASSESSMENT & PLAN NOTE
Uncontrolled  Continue with amlodipine 10 mg daily and lisinopril 40 mg daily  Increase hydrochlorothiazide from 12 5 mg daily to 25 mg daily

## 2019-11-18 NOTE — PROGRESS NOTES
Assessment/Plan:    Benign essential hypertension  Uncontrolled  Continue with amlodipine 10 mg daily and lisinopril 40 mg daily  Increase hydrochlorothiazide from 12 5 mg daily to 25 mg daily  Type 2 diabetes mellitus with hyperglycemia, with long-term current use of insulin ([facility])  Controlled  Continue current diabetes regimen  Continue follow-up with endocrinology  Gastroesophageal reflux disease without esophagitis  Well controlled  Continue with omeprazole  Submandibular lymphadenopathy  Likely infectious in etiology  Continue follow-up with ENT as well as completing antibiotic therapy  Hyperlipidemia  Continue simvastatin 20 mg at bedtime  Will check a lipid panel  Vitamin D deficiency  Continue vitamin-D supplementation  Diagnoses and all orders for this visit:    Gastroesophageal reflux disease without esophagitis    Type 2 diabetes mellitus with hyperglycemia, with long-term current use of insulin ([facility])    Benign essential hypertension  -     hydrochlorothiazide (HYDRODIURIL) 25 mg tablet; Take 1 tablet (25 mg total) by mouth daily    Submandibular lymphadenopathy    Mixed hyperlipidemia    Vitamin D deficiency                Subjective:      Patient ID: Thomas Syed is a 70 y o  male  70year old male is seen today for follow up of chronic conditions  Since last visit, he was evaluated by ENT for recurrent right side jaw swelling  He was treated with oral antibiotic and antibiotic ear drops  He has follow up with ENT and swelling has reduced  He has been compliant with medication regimen  Hypertension   This is a chronic problem  The current episode started more than 1 year ago  The problem is unchanged  The problem is uncontrolled  Pertinent negatives include no anxiety, blurred vision, chest pain, headaches, malaise/fatigue, neck pain, orthopnea, palpitations, peripheral edema, PND, shortness of breath or sweats   Risk factors for coronary artery disease include male gender, diabetes mellitus and obesity  Past treatments include ACE inhibitors, calcium channel blockers and diuretics  The current treatment provides mild improvement  There are no compliance problems  Diabetes   He presents for his follow-up diabetic visit  He has type 2 diabetes mellitus  His disease course has been stable  There are no hypoglycemic associated symptoms  Pertinent negatives for hypoglycemia include no confusion, headaches or sweats  There are no diabetic associated symptoms  Pertinent negatives for diabetes include no blurred vision, no chest pain and no fatigue  There are no hypoglycemic complications  Symptoms are stable  There are no diabetic complications  Risk factors for coronary artery disease include diabetes mellitus, hypertension, male sex and obesity  Current diabetic treatment includes insulin injections and oral agent (triple therapy)  He is compliant with treatment all of the time  He is following a generally healthy diet  He participates in exercise intermittently  His breakfast blood glucose is taken between 7-8 am  His breakfast blood glucose range is generally 130-140 mg/dl  His overall blood glucose range is 130-140 mg/dl  An ACE inhibitor/angiotensin II receptor blocker is being taken  He does not see a podiatrist Eye exam is current  Heartburn   He reports no abdominal pain, no chest pain, no coughing, no nausea, no sore throat or no wheezing  Pertinent negatives include no fatigue  He has tried a PPI for the symptoms  The treatment provided moderate relief  The following portions of the patient's history were reviewed and updated as appropriate: allergies, current medications, past family history, past medical history, past social history, past surgical history and problem list     Review of Systems   Constitutional: Negative  Negative for chills, fatigue, fever and malaise/fatigue     HENT: Negative for congestion, ear pain, postnasal drip, rhinorrhea and sore throat  Eyes: Negative  Negative for blurred vision  Respiratory: Negative for cough, chest tightness, shortness of breath and wheezing  Cardiovascular: Negative for chest pain, palpitations, orthopnea and PND  Gastrointestinal: Negative for abdominal distention, abdominal pain, blood in stool, constipation, diarrhea and nausea  Endocrine: Negative  Genitourinary: Negative for difficulty urinating, dysuria and hematuria  Musculoskeletal: Negative  Negative for neck pain  Skin: Negative  Allergic/Immunologic: Negative for environmental allergies and food allergies  Neurological: Negative  Negative for headaches  Hematological: Negative for adenopathy  Psychiatric/Behavioral: Negative for agitation, behavioral problems, confusion and sleep disturbance           Past Medical History:   Diagnosis Date    Arthritis     Last assessed 5/24/2013    Avitaminosis D 2012    Diabetes mellitus ([facility] )     Displacement of cervical intervertebral disc 2014    GERD (gastroesophageal reflux disease)     Glaucoma     Normal Pressure Glaucoma    HTN (hypertension) 2007    Pituitary microadenoma ([facility] ) 2010    Spinal stenosis in cervical region 2014    Uric acid nephrolithiasis 1990         Current Outpatient Medications:     amLODIPine (NORVASC) 10 mg tablet, Take 1 tablet (10 mg total) by mouth daily for 90 days, Disp: 90 tablet, Rfl: 3    APIDRA SOLOSTAR 100 units/mL injection pen, Use up to 25-30 units only in the AM (Patient taking differently: Use up to 10-25 units only in the AM), Disp: 10 pen, Rfl: 3    aspirin (ECOTRIN LOW STRENGTH) 81 mg EC tablet, Take 81 mg by mouth daily , Disp: , Rfl:     B Complex Vitamins (VITAMIN B-COMPLEX PO), Take 1 capsule by mouth daily , Disp: , Rfl:     BD PEN NEEDLE MILDRED U/F 32G X 4 MM MISC, Use 2 per day, Disp: 200 each, Rfl: 3    Blood Glucose Monitoring Suppl (ONETOUCH VERIO) w/Device KIT, by Does not apply route once for 1 dose Dispense 1 meter, Disp: 1 kit, Rfl: 1    brimonidine-timolol (COMBIGAN) 0 2-0 5 %, Administer 1 drop to both eyes every 12 (twelve) hours, Disp: , Rfl:     cholecalciferol (VITAMIN D3) 1,000 units tablet, Take 1,000 Units by mouth 2 (two) times a day , Disp: , Rfl:     Empagliflozin (JARDIANCE) 25 MG TABS, Take 1 tablet (25 mg total) by mouth daily for 252 days, Disp: 90 tablet, Rfl: 3    gabapentin (NEURONTIN) 300 mg capsule, Take 1 capsule (300 mg total) by mouth 3 (three) times a day, Disp: 270 capsule, Rfl: 1    Glucosamine-Chondroitin (OSTEO BI-FLEX REGULAR STRENGTH) 250-200 MG TABS, Take 1 tablet by mouth 2 (two) times a day, Disp: , Rfl:     glyBURIDE (DIABETA) 5 mg tablet, Take 1 tablet (5 mg total) by mouth 3 (three) times a day, Disp: 270 tablet, Rfl: 3    hydrochlorothiazide (HYDRODIURIL) 25 mg tablet, Take 1 tablet (25 mg total) by mouth daily, Disp: 90 tablet, Rfl: 1    ibuprofen (MOTRIN) 200 mg tablet, Take 200 mg by mouth as needed , Disp: , Rfl:     insulin glargine (LANTUS SOLOSTAR) 100 units/mL injection pen, Inject 30 Units under the skin daily at bedtime, Disp: 30 mL, Rfl: 3    lisinopril (ZESTRIL) 40 mg tablet, TAKE 1 TABLET DAILY, Disp: 90 tablet, Rfl: 3    loratadine (CLARITIN) 10 mg tablet, Take 10 mg by mouth daily, Disp: , Rfl:     mometasone (NASONEX) 50 mcg/act nasal spray, 2 sprays into each nostril daily, Disp: 1 Act, Rfl: 0    MULTIPLE VITAMIN PO, Take 1 tablet by mouth daily , Disp: , Rfl:     neomycin-polymyxin-hydrocortisone (CORTISPORIN) otic solution, Administer 4 drops to the right ear every 8 (eight) hours, Disp: 10 mL, Rfl: 0    omeprazole (PriLOSEC) 10 mg delayed release capsule, Take 1 capsule (10 mg total) by mouth daily, Disp: 90 capsule, Rfl: 1    ONETOUCH VERIO test strip, Test blood sugars 4 x daily as directed, Disp: 400 each, Rfl: 3    other medication, see sig,, Medication/product name: Medical Marijuana, Disp: , Rfl:     Probiotic Product (PROBIOTIC-10) CAPS, Take by mouth daily , Disp: , Rfl:     simvastatin (ZOCOR) 20 mg tablet, Take 1 tablet (20 mg total) by mouth daily at bedtime, Disp: 90 tablet, Rfl: 3    sitaGLIPtin-metFORMIN (JANUMET)  MG per tablet, Take 1 tablet by mouth 2 (two) times a day with meals for 122 days, Disp: 180 tablet, Rfl: 3    SYRINGE-NEEDLE, DISP, 3 ML (B-D SYRINGE/NEEDLE 3CC/23GX1") 23G X 1" 3 ML MISC, Use 1 per week, Disp: 12 each, Rfl: 3    testosterone cypionate (DEPO-TESTOSTERONE) 200 mg/mL SOLN, Inject 0 3 mL (60 mg total) into a muscle once a week for 234 days, Disp: 10 mL, Rfl: 0    vitamin E, tocopherol, 400 units capsule, Take 400 Units by mouth 2 (two) times a day , Disp: , Rfl:     Allergies   Allergen Reactions    Augmentin [Amoxicillin-Pot Clavulanate] Abdominal Pain    Biaxin [Clarithromycin] Abdominal Pain    Dust Mite Extract     Insulin Aspart GI Intolerance     Novolog     Molds & Smuts     Nuts     Seasonal Ic [Cholestatin] Headache       Social History   Past Surgical History:   Procedure Laterality Date    ASPIRATION / INJECTION RENAL CYST      Renal Cyst Aspiration    CATARACT EXTRACTION      12/2017- right eye, 01/2018- left eye    EYE SURGERY Bilateral     Cataract Surgery    TONSILLECTOMY       Family History   Problem Relation Age of Onset    Diabetes unspecified Mother     Heart disease Mother     Nephrolithiasis Mother     Kidney disease Mother     Other Mother         Back Disorder    Thyroid disease Mother     Diabetes unspecified Father     Heart disease Father     Diabetes unspecified Sister     Heart disease Sister     Stroke Sister     Diabetes unspecified Brother     Heart disease Brother     Nephrolithiasis Brother     Lung cancer Brother     Other Brother         COPD    Diabetes unspecified Sister     Heart disease Sister     Diabetes unspecified Sister     Diabetes unspecified Brother     Heart disease Brother     Diabetes unspecified Brother     Other Brother Back Disorder    Diabetes unspecified Brother     Other Brother         Back Disorder    Diabetes unspecified Brother     Stomach cancer Paternal Aunt        Objective:  /72 (BP Location: Left arm, Patient Position: Sitting, Cuff Size: Standard)   Pulse 68   Temp 98 5 °F (36 9 °C) (Oral)   Ht 5' 3 78" (1 62 m) Comment: boots on  Wt 75 7 kg (166 lb 12 8 oz) Comment: boots on  SpO2 98%   BMI 28 83 kg/m²     Recent Results (from the past 1344 hour(s))   Hepatitis C antibody    Collection Time: 11/07/19 10:55 AM   Result Value Ref Range    Hepatitis C Ab Non-reactive Non-reactive            Physical Exam   Constitutional: He is oriented to person, place, and time  He appears well-developed and well-nourished  No distress  HENT:   Head: Normocephalic and atraumatic  Eyes: Pupils are equal, round, and reactive to light  Conjunctivae and EOM are normal  Right eye exhibits no discharge  Left eye exhibits no discharge  No scleral icterus  Neck: Normal range of motion  Neck supple  No JVD present  No thyromegaly present  Cardiovascular: Normal rate, regular rhythm, normal heart sounds and intact distal pulses  Exam reveals no gallop and no friction rub  No murmur heard  Pulmonary/Chest: Effort normal and breath sounds normal  No respiratory distress  He has no wheezes  He has no rales  He exhibits no tenderness  Abdominal: Soft  Bowel sounds are normal  He exhibits no distension and no mass  There is no tenderness  There is no rebound and no guarding  Musculoskeletal: Normal range of motion  He exhibits no edema, tenderness or deformity  Lymphadenopathy:     He has no cervical adenopathy  Neurological: He is alert and oriented to person, place, and time  He has normal reflexes  No cranial nerve deficit  Coordination normal    Skin: Skin is warm and dry  No rash noted  He is not diaphoretic  No erythema  No pallor  Psychiatric: He has a normal mood and affect   His behavior is normal  Judgment and thought content normal    Nursing note and vitals reviewed

## 2019-11-18 NOTE — ASSESSMENT & PLAN NOTE
Likely infectious in etiology  Continue follow-up with ENT as well as completing antibiotic therapy

## 2019-12-19 DIAGNOSIS — E29.1 HYPOGONADISM IN MALE: ICD-10-CM

## 2019-12-19 RX ORDER — TESTOSTERONE CYPIONATE 200 MG/ML
60 INJECTION INTRAMUSCULAR WEEKLY
Qty: 10 ML | Refills: 0 | Status: SHIPPED | OUTPATIENT
Start: 2019-12-19 | End: 2019-12-23 | Stop reason: SDUPTHER

## 2019-12-20 ENCOUNTER — TELEPHONE (OUTPATIENT)
Dept: ENDOCRINOLOGY | Facility: CLINIC | Age: 64
End: 2019-12-20

## 2019-12-20 ENCOUNTER — OFFICE VISIT (OUTPATIENT)
Dept: INTERNAL MEDICINE CLINIC | Facility: CLINIC | Age: 64
End: 2019-12-20
Payer: COMMERCIAL

## 2019-12-20 VITALS
HEART RATE: 82 BPM | WEIGHT: 168 LBS | TEMPERATURE: 98.9 F | DIASTOLIC BLOOD PRESSURE: 80 MMHG | HEIGHT: 63 IN | OXYGEN SATURATION: 96 % | SYSTOLIC BLOOD PRESSURE: 178 MMHG | BODY MASS INDEX: 29.77 KG/M2

## 2019-12-20 DIAGNOSIS — I10 BENIGN ESSENTIAL HYPERTENSION: Primary | ICD-10-CM

## 2019-12-20 DIAGNOSIS — Z23 NEED FOR SHINGLES VACCINE: ICD-10-CM

## 2019-12-20 PROCEDURE — 99213 OFFICE O/P EST LOW 20 MIN: CPT | Performed by: INTERNAL MEDICINE

## 2019-12-20 PROCEDURE — 90471 IMMUNIZATION ADMIN: CPT

## 2019-12-20 PROCEDURE — 90750 HZV VACC RECOMBINANT IM: CPT

## 2019-12-20 RX ORDER — CLONIDINE HYDROCHLORIDE 0.1 MG/1
0.1 TABLET ORAL EVERY 12 HOURS SCHEDULED
Qty: 60 TABLET | Refills: 0 | Status: SHIPPED | OUTPATIENT
Start: 2019-12-20 | End: 2020-01-02

## 2019-12-20 NOTE — TELEPHONE ENCOUNTER
[facility] prior auth started for Testosterone Cypionate 200 mg/ML Sol - Inject 0 3 mL weekly, [phone]  Prior auth completed via CoverMyMeds

## 2019-12-20 NOTE — ASSESSMENT & PLAN NOTE
Uncontrolled  Will add clonidine 0 1 mg BID to current regimen: amlodipine 10 mg daily, lisinopril 40 mg daily, and HCTZ 25 mg daily

## 2019-12-23 RX ORDER — TESTOSTERONE CYPIONATE 200 MG/ML
60 INJECTION INTRAMUSCULAR WEEKLY
Qty: 10 ML | Refills: 0 | Status: SHIPPED | OUTPATIENT
Start: 2019-12-23 | End: 2020-01-27 | Stop reason: ALTCHOICE

## 2019-12-23 NOTE — TELEPHONE ENCOUNTER
[facility] prior auth approved, PA# [id number], [employer] 12/20/2019 - 12/20/20    Called and notified [facility] pharmacy of prior auth approval

## 2019-12-28 DIAGNOSIS — E11.65 TYPE 2 DIABETES MELLITUS WITH HYPERGLYCEMIA, WITH LONG-TERM CURRENT USE OF INSULIN (HCC): ICD-10-CM

## 2019-12-28 DIAGNOSIS — Z79.4 TYPE 2 DIABETES MELLITUS WITH HYPERGLYCEMIA, WITH LONG-TERM CURRENT USE OF INSULIN (HCC): ICD-10-CM

## 2019-12-31 ENCOUNTER — APPOINTMENT (OUTPATIENT)
Dept: LAB | Age: 64
End: 2019-12-31
Payer: COMMERCIAL

## 2019-12-31 DIAGNOSIS — E11.9 TYPE 2 DIABETES MELLITUS WITHOUT COMPLICATION, WITH LONG-TERM CURRENT USE OF INSULIN (HCC): ICD-10-CM

## 2019-12-31 DIAGNOSIS — E78.2 MIXED HYPERLIPIDEMIA: ICD-10-CM

## 2019-12-31 DIAGNOSIS — E29.1 HYPOGONADISM IN MALE: ICD-10-CM

## 2019-12-31 DIAGNOSIS — Z11.59 NEED FOR HEPATITIS C SCREENING TEST: ICD-10-CM

## 2019-12-31 DIAGNOSIS — Z79.4 TYPE 2 DIABETES MELLITUS WITH HYPERGLYCEMIA, WITH LONG-TERM CURRENT USE OF INSULIN (HCC): ICD-10-CM

## 2019-12-31 DIAGNOSIS — E11.65 TYPE 2 DIABETES MELLITUS WITH HYPERGLYCEMIA, WITH LONG-TERM CURRENT USE OF INSULIN (HCC): ICD-10-CM

## 2019-12-31 DIAGNOSIS — Z79.4 TYPE 2 DIABETES MELLITUS WITHOUT COMPLICATION, WITH LONG-TERM CURRENT USE OF INSULIN (HCC): ICD-10-CM

## 2019-12-31 DIAGNOSIS — I10 BENIGN ESSENTIAL HYPERTENSION: ICD-10-CM

## 2019-12-31 LAB
ALBUMIN SERPL BCP-MCNC: 4.1 G/DL (ref 3.5–5)
ALP SERPL-CCNC: 54 U/L (ref 46–116)
ALT SERPL W P-5'-P-CCNC: 47 U/L (ref 12–78)
ANION GAP SERPL CALCULATED.3IONS-SCNC: 5 MMOL/L (ref 4–13)
AST SERPL W P-5'-P-CCNC: 19 U/L (ref 5–45)
BASOPHILS # BLD AUTO: 0.07 THOUSANDS/ΜL (ref 0–0.1)
BASOPHILS NFR BLD AUTO: 1 % (ref 0–1)
BILIRUB SERPL-MCNC: 1.03 MG/DL (ref 0.2–1)
BUN SERPL-MCNC: 28 MG/DL (ref 5–25)
CALCIUM SERPL-MCNC: 9.6 MG/DL (ref 8.3–10.1)
CHLORIDE SERPL-SCNC: 103 MMOL/L (ref 100–108)
CHOLEST SERPL-MCNC: 119 MG/DL (ref 50–200)
CO2 SERPL-SCNC: 31 MMOL/L (ref 21–32)
CREAT SERPL-MCNC: 1.2 MG/DL (ref 0.6–1.3)
CREAT UR-MCNC: 57.7 MG/DL
EOSINOPHIL # BLD AUTO: 0.42 THOUSAND/ΜL (ref 0–0.61)
EOSINOPHIL NFR BLD AUTO: 3 % (ref 0–6)
ERYTHROCYTE [DISTWIDTH] IN BLOOD BY AUTOMATED COUNT: 17 % (ref 11.6–15.1)
EST. AVERAGE GLUCOSE BLD GHB EST-MCNC: 131 MG/DL
GFR SERPL CREATININE-BSD FRML MDRD: 63 ML/MIN/1.73SQ M
GLUCOSE P FAST SERPL-MCNC: 200 MG/DL (ref 65–99)
HBA1C MFR BLD: 6.2 % (ref 4.2–6.3)
HCT VFR BLD AUTO: 57.2 % (ref 36.5–49.3)
HCV AB SER QL: NORMAL
HDLC SERPL-MCNC: 28 MG/DL
HGB BLD-MCNC: 19.1 G/DL (ref 12–17)
IMM GRANULOCYTES # BLD AUTO: 0.09 THOUSAND/UL (ref 0–0.2)
IMM GRANULOCYTES NFR BLD AUTO: 1 % (ref 0–2)
LDLC SERPL CALC-MCNC: 38 MG/DL (ref 0–100)
LYMPHOCYTES # BLD AUTO: 3.41 THOUSANDS/ΜL (ref 0.6–4.47)
LYMPHOCYTES NFR BLD AUTO: 27 % (ref 14–44)
MCH RBC QN AUTO: 28.9 PG (ref 26.8–34.3)
MCHC RBC AUTO-ENTMCNC: 33.4 G/DL (ref 31.4–37.4)
MCV RBC AUTO: 87 FL (ref 82–98)
MICROALBUMIN UR-MCNC: 29.5 MG/L (ref 0–20)
MICROALBUMIN/CREAT 24H UR: 51 MG/G CREATININE (ref 0–30)
MONOCYTES # BLD AUTO: 1.15 THOUSAND/ΜL (ref 0.17–1.22)
MONOCYTES NFR BLD AUTO: 9 % (ref 4–12)
NEUTROPHILS # BLD AUTO: 7.55 THOUSANDS/ΜL (ref 1.85–7.62)
NEUTS SEG NFR BLD AUTO: 59 % (ref 43–75)
NRBC BLD AUTO-RTO: 0 /100 WBCS
PLATELET # BLD AUTO: 317 THOUSANDS/UL (ref 149–390)
PMV BLD AUTO: 9.8 FL (ref 8.9–12.7)
POTASSIUM SERPL-SCNC: 3.9 MMOL/L (ref 3.5–5.3)
PROT SERPL-MCNC: 7.8 G/DL (ref 6.4–8.2)
PSA SERPL-MCNC: 0.8 NG/ML (ref 0–4)
RBC # BLD AUTO: 6.6 MILLION/UL (ref 3.88–5.62)
SODIUM SERPL-SCNC: 139 MMOL/L (ref 136–145)
T4 FREE SERPL-MCNC: 1 NG/DL (ref 0.76–1.46)
TRIGL SERPL-MCNC: 264 MG/DL
TSH SERPL DL<=0.05 MIU/L-ACNC: 2.7 UIU/ML (ref 0.36–3.74)
WBC # BLD AUTO: 12.69 THOUSAND/UL (ref 4.31–10.16)

## 2019-12-31 PROCEDURE — 84443 ASSAY THYROID STIM HORMONE: CPT

## 2019-12-31 PROCEDURE — 84403 ASSAY OF TOTAL TESTOSTERONE: CPT

## 2019-12-31 PROCEDURE — 85025 COMPLETE CBC W/AUTO DIFF WBC: CPT

## 2019-12-31 PROCEDURE — 80061 LIPID PANEL: CPT

## 2019-12-31 PROCEDURE — 84439 ASSAY OF FREE THYROXINE: CPT

## 2019-12-31 PROCEDURE — 84402 ASSAY OF FREE TESTOSTERONE: CPT

## 2019-12-31 PROCEDURE — 80053 COMPREHEN METABOLIC PANEL: CPT

## 2019-12-31 PROCEDURE — G0103 PSA SCREENING: HCPCS

## 2019-12-31 PROCEDURE — 82043 UR ALBUMIN QUANTITATIVE: CPT

## 2019-12-31 PROCEDURE — 82570 ASSAY OF URINE CREATININE: CPT

## 2019-12-31 PROCEDURE — 86803 HEPATITIS C AB TEST: CPT

## 2019-12-31 PROCEDURE — 36415 COLL VENOUS BLD VENIPUNCTURE: CPT

## 2019-12-31 PROCEDURE — 83036 HEMOGLOBIN GLYCOSYLATED A1C: CPT

## 2019-12-31 RX ORDER — EMPAGLIFLOZIN 25 MG/1
TABLET, FILM COATED ORAL
Qty: 90 TABLET | Refills: 3 | Status: SHIPPED | OUTPATIENT
Start: 2019-12-31 | End: 2020-01-27 | Stop reason: SDUPTHER

## 2020-01-02 ENCOUNTER — OFFICE VISIT (OUTPATIENT)
Dept: INTERNAL MEDICINE CLINIC | Age: 65
End: 2020-01-02
Payer: COMMERCIAL

## 2020-01-02 VITALS
SYSTOLIC BLOOD PRESSURE: 148 MMHG | HEIGHT: 63 IN | WEIGHT: 163 LBS | OXYGEN SATURATION: 96 % | DIASTOLIC BLOOD PRESSURE: 82 MMHG | TEMPERATURE: 97.2 F | BODY MASS INDEX: 28.88 KG/M2 | HEART RATE: 65 BPM

## 2020-01-02 DIAGNOSIS — I10 BENIGN ESSENTIAL HYPERTENSION: ICD-10-CM

## 2020-01-02 DIAGNOSIS — E11.65 TYPE 2 DIABETES MELLITUS WITH HYPERGLYCEMIA, WITH LONG-TERM CURRENT USE OF INSULIN (HCC): ICD-10-CM

## 2020-01-02 DIAGNOSIS — Z79.4 TYPE 2 DIABETES MELLITUS WITH HYPERGLYCEMIA, WITH LONG-TERM CURRENT USE OF INSULIN (HCC): ICD-10-CM

## 2020-01-02 DIAGNOSIS — M54.12 CERVICAL RADICULOPATHY: ICD-10-CM

## 2020-01-02 DIAGNOSIS — I10 BENIGN ESSENTIAL HYPERTENSION: Primary | ICD-10-CM

## 2020-01-02 LAB
TESTOST FREE SERPL-MCNC: 14.6 PG/ML (ref 6.6–18.1)
TESTOST SERPL-MCNC: 574 NG/DL (ref 264–916)

## 2020-01-02 PROCEDURE — 3008F BODY MASS INDEX DOCD: CPT | Performed by: PHYSICIAN ASSISTANT

## 2020-01-02 PROCEDURE — 99213 OFFICE O/P EST LOW 20 MIN: CPT | Performed by: PHYSICIAN ASSISTANT

## 2020-01-02 RX ORDER — CLONIDINE HYDROCHLORIDE 0.1 MG/1
0.05 TABLET ORAL EVERY 12 HOURS SCHEDULED
Qty: 30 TABLET | Refills: 0
Start: 2020-01-02 | End: 2020-02-05

## 2020-01-02 NOTE — PROGRESS NOTES
Assessment/Plan:         Diagnoses and all orders for this visit:    Benign essential hypertension  Comments:  decrease clonidine to 1/2 tab bid - if pt tolerates to continue this  f/u bp check in 1-2 weeks  limit salt intake, caffiene limit to once daily   denies nsaid  Orders:  -     cloNIDine (CATAPRES) 0 1 mg tablet; Take 0 5 tablets (0 05 mg total) by mouth every 12 (twelve) hours    Cervical radiculopathy  Comments:  pain has been under better control   not currently having neck pain     Type 2 diabetes mellitus with hyperglycemia, with long-term current use of insulin (HCC)  Comments:  continue to monitor at home     Benign essential hypertension  -     cloNIDine (CATAPRES) 0 1 mg tablet; Take 0 5 tablets (0 05 mg total) by mouth every 12 (twelve) hours        Pt to check bp with arm cuff daily and make log  Call in 1 week  F/u with Dr Shelby Solitario in 1 month  Pt intolerant of catapres at 0 1mg bid  Refuses to try patch due to skin sensitivities to other patches in past   We are limited with medication options as pt is already on ace, diruetic and ccb  Pt instructed to f/u if bp consistently over 150/90   Subjective:      Patient ID: Thomas Syed is a 75 y o  male  Pt here for f/u for HTN  Pt was started on clonidine 0 1mg bid on 12/20/19  Pt is already on amlodipine 10mg, lisinopril 40mg, hctz 25mg daily   Pt reports since adding the clonidine he has developed significant dry mouth to the point where he has trouble talking  Pt reports he has been compliant with this   He does not want to continue this current dose       Pt has home wrist cuff which has typically been 130-140s/ 80s     Pt has eliminated salt from diet, he states he doesn't eat anything with salt         The following portions of the patient's history were reviewed and updated as appropriate: allergies, current medications, past family history, past medical history, past social history, past surgical history and problem list     Review of Systems   Constitutional: Negative for appetite change, chills, fatigue and fever  HENT: Negative for congestion, hearing loss, postnasal drip and voice change  Dry mouth    Eyes: Negative for itching and visual disturbance  Respiratory: Negative for cough, shortness of breath and wheezing  Cardiovascular: Negative for chest pain, palpitations and leg swelling  Gastrointestinal: Negative for abdominal pain, constipation, diarrhea and nausea  Genitourinary: Negative for dysuria, hematuria and urgency  Musculoskeletal: Negative for arthralgias, back pain, gait problem and myalgias  Skin: Negative for rash  Allergic/Immunologic: Negative for environmental allergies  Neurological: Negative for dizziness, speech difficulty, light-headedness and headaches  Hematological: Does not bruise/bleed easily  Psychiatric/Behavioral: Negative for sleep disturbance  The patient is not nervous/anxious            Past Medical History:   Diagnosis Date    Arthritis     Last assessed 5/24/2013    Avitaminosis D 2012    Diabetes mellitus ([facility] )     Displacement of cervical intervertebral disc 2014    GERD (gastroesophageal reflux disease)     Glaucoma     Normal Pressure Glaucoma    HTN (hypertension) 2007    Pituitary microadenoma ([facility] ) 2010    Spinal stenosis in cervical region 2014    Uric acid nephrolithiasis 1990         Current Outpatient Medications:     amLODIPine (NORVASC) 10 mg tablet, Take 1 tablet (10 mg total) by mouth daily for 90 days, Disp: 90 tablet, Rfl: 3    APIDRA SOLOSTAR 100 units/mL injection pen, Use up to 25-30 units only in the AM (Patient taking differently: Use up to 10-25 units only in the AM), Disp: 10 pen, Rfl: 3    aspirin (ECOTRIN LOW STRENGTH) 81 mg EC tablet, Take 81 mg by mouth daily , Disp: , Rfl:     B Complex Vitamins (VITAMIN B-COMPLEX PO), Take 1 capsule by mouth daily , Disp: , Rfl:     BD PEN NEEDLE MILDRED U/F 32G X 4 MM MISC, Use 2 per day, Disp: 200 each, Rfl: 3    brimonidine-timolol (COMBIGAN) 0 2-0 5 %, Administer 1 drop to both eyes every 12 (twelve) hours, Disp: , Rfl:     cholecalciferol (VITAMIN D3) 1,000 units tablet, Take 1,000 Units by mouth 2 (two) times a day , Disp: , Rfl:     cloNIDine (CATAPRES) 0 1 mg tablet, Take 0 5 tablets (0 05 mg total) by mouth every 12 (twelve) hours, Disp: 30 tablet, Rfl: 0    gabapentin (NEURONTIN) 300 mg capsule, Take 1 capsule (300 mg total) by mouth 3 (three) times a day, Disp: 270 capsule, Rfl: 1    Glucosamine-Chondroitin (OSTEO BI-FLEX REGULAR STRENGTH) 250-200 MG TABS, Take 1 tablet by mouth 2 (two) times a day, Disp: , Rfl:     glyBURIDE (DIABETA) 5 mg tablet, Take 1 tablet (5 mg total) by mouth 3 (three) times a day, Disp: 270 tablet, Rfl: 3    hydrochlorothiazide (HYDRODIURIL) 25 mg tablet, Take 1 tablet (25 mg total) by mouth daily, Disp: 90 tablet, Rfl: 1    ibuprofen (MOTRIN) 200 mg tablet, Take 200 mg by mouth as needed , Disp: , Rfl:     insulin glargine (LANTUS SOLOSTAR) 100 units/mL injection pen, Inject 30 Units under the skin daily at bedtime, Disp: 30 mL, Rfl: 3    JARDIANCE 25 MG TABS, TAKE 1 TABLET DAILY, Disp: 90 tablet, Rfl: 3    lisinopril (ZESTRIL) 40 mg tablet, TAKE 1 TABLET DAILY, Disp: 90 tablet, Rfl: 3    loratadine (CLARITIN) 10 mg tablet, Take 10 mg by mouth daily, Disp: , Rfl:     MULTIPLE VITAMIN PO, Take 1 tablet by mouth daily , Disp: , Rfl:     omeprazole (PriLOSEC) 10 mg delayed release capsule, Take 1 capsule (10 mg total) by mouth daily, Disp: 90 capsule, Rfl: 1    ONETOUCH VERIO test strip, Test blood sugars 4 x daily as directed, Disp: 400 each, Rfl: 3    other medication, see sig,, Medication/product name: Medical Marijuana, Disp: , Rfl:     Probiotic Product (PROBIOTIC-10) CAPS, Take by mouth daily , Disp: , Rfl:     simvastatin (ZOCOR) 20 mg tablet, Take 1 tablet (20 mg total) by mouth daily at bedtime, Disp: 90 tablet, Rfl: 3    SYRINGE-NEEDLE, DISP, 3 ML (B-D SYRINGE/NEEDLE 3CC/23GX1") 23G X 1" 3 ML MISC, Use 1 per week, Disp: 12 each, Rfl: 3    testosterone cypionate (DEPO-TESTOSTERONE) 200 mg/mL SOLN, Inject 0 3 mL (60 mg total) into a muscle once a week, Disp: 10 mL, Rfl: 0    vitamin E, tocopherol, 400 units capsule, Take 400 Units by mouth 2 (two) times a day , Disp: , Rfl:     Blood Glucose Monitoring Suppl (ONETOUCH VERIO) w/Device KIT, by Does not apply route once for 1 dose Dispense 1 meter, Disp: 1 kit, Rfl: 1    mometasone (NASONEX) 50 mcg/act nasal spray, 2 sprays into each nostril daily, Disp: 1 Act, Rfl: 0    neomycin-polymyxin-hydrocortisone (CORTISPORIN) otic solution, Administer 4 drops to the right ear every 8 (eight) hours (Patient not taking: Reported on 1/2/2020), Disp: 10 mL, Rfl: 0    sitaGLIPtin-metFORMIN (JANUMET)  MG per tablet, Take 1 tablet by mouth 2 (two) times a day with meals for 122 days, Disp: 180 tablet, Rfl: 3    Allergies   Allergen Reactions    Augmentin [Amoxicillin-Pot Clavulanate] Abdominal Pain    Biaxin [Clarithromycin] Abdominal Pain    Dust Mite Extract     Insulin Aspart GI Intolerance     Novolog     Molds & Smuts     Nuts     Seasonal Ic [Cholestatin] Headache       Social History   Past Surgical History:   Procedure Laterality Date    ASPIRATION / INJECTION RENAL CYST      Renal Cyst Aspiration    CATARACT EXTRACTION      12/2017- right eye, 01/2018- left eye    EYE SURGERY Bilateral     Cataract Surgery    TONSILLECTOMY       Family History   Problem Relation Age of Onset    Diabetes unspecified Mother     Heart disease Mother     Nephrolithiasis Mother     Kidney disease Mother     Other Mother         Back Disorder    Thyroid disease Mother     Diabetes unspecified Father     Heart disease Father     Diabetes unspecified Sister     Heart disease Sister     Stroke Sister     Diabetes unspecified Brother     Heart disease Brother     Nephrolithiasis Brother     Lung cancer Brother     Other Brother         COPD    Diabetes unspecified Sister     Heart disease Sister     Diabetes unspecified Sister     Diabetes unspecified Brother     Heart disease Brother     Diabetes unspecified Brother     Other Brother         Back Disorder    Diabetes unspecified Brother     Other Brother         Back Disorder    Diabetes unspecified Brother     Stomach cancer Paternal Aunt        Objective:  /82 (BP Location: Left arm, Patient Position: Sitting, Cuff Size: Standard)   Pulse 65   Temp (!) 97 2 °F (36 2 °C) (Tympanic)   Ht 5' 3 39" (1 61 m) Comment: shoes on  Wt 73 9 kg (163 lb) Comment: shoes on  SpO2 96%   BMI 28 52 kg/m²        Physical Exam   Constitutional: He is oriented to person, place, and time  He appears well-developed and well-nourished  No distress  HENT:   Head: Normocephalic and atraumatic  Mouth/Throat: Oropharynx is clear and moist    Eyes: Pupils are equal, round, and reactive to light  EOM are normal    Neck: Normal range of motion  Cardiovascular: Normal rate, regular rhythm and normal heart sounds  No murmur heard  Pulmonary/Chest: Effort normal and breath sounds normal  No respiratory distress  He has no wheezes  He has no rales  Abdominal: Soft  Bowel sounds are normal  There is no tenderness  Musculoskeletal: Normal range of motion  He exhibits no edema or deformity  Lymphadenopathy:     He has no cervical adenopathy  Neurological: He is alert and oriented to person, place, and time  No cranial nerve deficit  Coordination normal    Skin: Skin is warm and dry  No rash noted  Psychiatric: He has a normal mood and affect   His behavior is normal

## 2020-01-27 ENCOUNTER — OFFICE VISIT (OUTPATIENT)
Dept: ENDOCRINOLOGY | Facility: CLINIC | Age: 65
End: 2020-01-27
Payer: COMMERCIAL

## 2020-01-27 VITALS
DIASTOLIC BLOOD PRESSURE: 90 MMHG | BODY MASS INDEX: 28.59 KG/M2 | WEIGHT: 163.4 LBS | SYSTOLIC BLOOD PRESSURE: 160 MMHG | HEART RATE: 80 BPM

## 2020-01-27 DIAGNOSIS — Z79.4 TYPE 2 DIABETES MELLITUS WITH HYPERGLYCEMIA, WITH LONG-TERM CURRENT USE OF INSULIN (HCC): Primary | ICD-10-CM

## 2020-01-27 DIAGNOSIS — E11.65 TYPE 2 DIABETES MELLITUS WITH HYPERGLYCEMIA, WITH LONG-TERM CURRENT USE OF INSULIN (HCC): Primary | ICD-10-CM

## 2020-01-27 DIAGNOSIS — Z79.4 TYPE 2 DIABETES MELLITUS WITHOUT COMPLICATION, WITH LONG-TERM CURRENT USE OF INSULIN (HCC): ICD-10-CM

## 2020-01-27 DIAGNOSIS — E78.2 MIXED HYPERLIPIDEMIA: ICD-10-CM

## 2020-01-27 DIAGNOSIS — E55.9 VITAMIN D DEFICIENCY: ICD-10-CM

## 2020-01-27 DIAGNOSIS — E23.0 HYPOGONADOTROPIC HYPOGONADISM (HCC): ICD-10-CM

## 2020-01-27 DIAGNOSIS — D75.1 ERYTHROCYTOSIS: ICD-10-CM

## 2020-01-27 DIAGNOSIS — E11.9 TYPE 2 DIABETES MELLITUS WITHOUT COMPLICATION, WITH LONG-TERM CURRENT USE OF INSULIN (HCC): ICD-10-CM

## 2020-01-27 DIAGNOSIS — I10 BENIGN ESSENTIAL HYPERTENSION: ICD-10-CM

## 2020-01-27 DIAGNOSIS — Z12.11 SCREENING FOR MALIGNANT NEOPLASM OF COLON: Primary | ICD-10-CM

## 2020-01-27 PROCEDURE — 4010F ACE/ARB THERAPY RXD/TAKEN: CPT | Performed by: PHYSICIAN ASSISTANT

## 2020-01-27 PROCEDURE — 99214 OFFICE O/P EST MOD 30 MIN: CPT | Performed by: PHYSICIAN ASSISTANT

## 2020-01-27 RX ORDER — GLYBURIDE 5 MG/1
TABLET ORAL
Qty: 270 TABLET | Refills: 3
Start: 2020-01-27 | End: 2020-01-27 | Stop reason: SDUPTHER

## 2020-01-27 RX ORDER — LISINOPRIL 40 MG/1
40 TABLET ORAL DAILY
Qty: 90 TABLET | Refills: 3 | Status: SHIPPED | OUTPATIENT
Start: 2020-01-27 | End: 2021-02-03 | Stop reason: SDUPTHER

## 2020-01-27 RX ORDER — AMLODIPINE BESYLATE 10 MG/1
10 TABLET ORAL DAILY
Qty: 90 TABLET | Refills: 3 | Status: SHIPPED | OUTPATIENT
Start: 2020-01-27 | End: 2020-03-18

## 2020-01-27 RX ORDER — BLOOD SUGAR DIAGNOSTIC
STRIP MISCELLANEOUS
Qty: 400 EACH | Refills: 3 | Status: SHIPPED | OUTPATIENT
Start: 2020-01-27 | End: 2021-02-17 | Stop reason: SDUPTHER

## 2020-01-27 RX ORDER — GLYBURIDE 5 MG/1
TABLET ORAL
Qty: 90 TABLET | Refills: 3 | Status: SHIPPED | OUTPATIENT
Start: 2020-01-27 | End: 2020-05-04 | Stop reason: SDUPTHER

## 2020-01-27 RX ORDER — SIMVASTATIN 20 MG
20 TABLET ORAL
Qty: 90 TABLET | Refills: 3 | Status: SHIPPED | OUTPATIENT
Start: 2020-01-27 | End: 2021-02-03 | Stop reason: SDUPTHER

## 2020-01-27 RX ORDER — INSULIN GLULISINE 100 [IU]/ML
20 INJECTION, SOLUTION SUBCUTANEOUS
Qty: 6 PEN | Refills: 1 | Status: SHIPPED | OUTPATIENT
Start: 2020-01-27 | End: 2020-05-04 | Stop reason: SDUPTHER

## 2020-01-27 RX ORDER — PEN NEEDLE, DIABETIC 32GX 5/32"
NEEDLE, DISPOSABLE MISCELLANEOUS
Qty: 200 EACH | Refills: 3 | Status: SHIPPED | OUTPATIENT
Start: 2020-01-27 | End: 2020-05-04 | Stop reason: SDUPTHER

## 2020-01-27 RX ORDER — HYDROCHLOROTHIAZIDE 25 MG/1
25 TABLET ORAL DAILY
Qty: 90 TABLET | Refills: 3 | Status: SHIPPED | OUTPATIENT
Start: 2020-01-27 | End: 2020-02-06 | Stop reason: SDUPTHER

## 2020-01-27 NOTE — ASSESSMENT & PLAN NOTE
Well controlled but due to hypoglycemia will reduce glyburide to 5mg daily and send BG log in two weeks   Lab Results   Component Value Date    HGBA1C 6 2 12/31/2019

## 2020-01-27 NOTE — PROGRESS NOTES
Established Patient Progress Note      Chief Complaint   Patient presents with    Diabetes Type 2        History of Present Illness:   Scar Saul is a 75 y o  male with a history of type 2 diabetes with long term use of insulin since many years ago  Reports complications of none  Denies recent illness or hospitalizations  Denies any issues with his current regimen  home glucose monitoring: are performed regularly 4x per day  Many readings are in target except occasional highs and some lows-- 1 night had lows in the 50s  Hx GI Side effects on Victoza     Current regimen:   Lantus 30 units daily   Janumet  Jardiance  Apiday 10-25 units with breakfast  Glyburide 5mg- 1 tab breakfast, 2 tabs dinner    Last Eye Exam: UTD  Last Foot Exam: UTD    Has hypertension: Taking lisinopril, clonidine, amlodipine, HCTZ  Hasn't been feeling right since on new on new BP Medication, clonidine  Has been getting hot flashes and dry mouth, some of the side effects improved since dose lowered  Will be seeing PCP for this 2/5  Has hyperlipidemia: Taking simvastatin      Hypogonadism: Taking Testosterone 200mg/ml-- 60mg weekly  Had problems with insurance covering testosterone blood levels  History of pituitary Microadenoma  Stable on MRI 6/2017 compared to 2015         Patient Active Problem List   Diagnosis    Benign essential hypertension    Carpal tunnel syndrome    Cervical herniated disc    Cervical radiculopathy    Cervical stenosis of spinal canal    Type 2 diabetes mellitus with hyperglycemia, with long-term current use of insulin (HCC)    Hyperlipidemia    Hypogonadotropic hypogonadism (HCC)    Nephrolithiasis    Pituitary microadenoma (HCC)    Seborrheic dermatitis    Obstructive sleep apnea syndrome    Spondylosis of cervical region without myelopathy or radiculopathy    Sprain and strain of calcaneofibular (ligament)    Vitamin D deficiency    Low back pain with sciatica    Sacroiliitis ([address] )  BMI 27 0-27 9,adult    Inflamed seborrheic keratosis    Gastroesophageal reflux disease without esophagitis    Submandibular lymphadenopathy    Chronic pain syndrome    Salivary gland adenitis      Past Medical History:   Diagnosis Date    Arthritis     Last assessed 5/24/2013    Avitaminosis D 2012    Diabetes mellitus [facility] [address]     Displacement of cervical intervertebral disc 2014    GERD (gastroesophageal reflux disease)     Glaucoma     Normal Pressure Glaucoma    HTN (hypertension) 2007    Pituitary microadenoma ([facility] [address] ) 2010    Spinal stenosis in cervical region 2014    Uric acid nephrolithiasis 1990      Past Surgical History:   Procedure Laterality Date    ASPIRATION / INJECTION RENAL CYST      Renal Cyst Aspiration    CATARACT EXTRACTION      12/2017- right eye, 01/2018- left eye    EYE SURGERY Bilateral     Cataract Surgery    TONSILLECTOMY        Family History   Problem Relation Age of Onset    Diabetes unspecified Mother     Heart disease Mother     Nephrolithiasis Mother     Kidney disease Mother     Other Mother         Back Disorder    Thyroid disease Mother     Diabetes unspecified Father     Heart disease Father     Diabetes unspecified Sister     Heart disease Sister     Stroke Sister     Diabetes unspecified Brother     Heart disease Brother     Nephrolithiasis Brother     Lung cancer Brother     Other Brother         COPD    Diabetes unspecified Sister     Heart disease Sister     Diabetes unspecified Sister     Diabetes unspecified Brother     Heart disease Brother     Diabetes unspecified Brother     Other Brother         Back Disorder    Diabetes unspecified Brother     Other Brother         Back Disorder    Diabetes unspecified Brother     Stomach cancer Paternal Aunt      Social History     Tobacco Use    Smoking status: Former Smoker     Packs/day: 0 25     Years: 2 00     Pack years: 0 50     Last attempt to quit: 1980     Years since quitting: 40 0    Smokeless tobacco: Former User   Substance Use Topics    Alcohol use:  Yes     Alcohol/week: 2 0 standard drinks     Types: 2 Cans of beer per week     Frequency: 2-4 times a month     Drinks per session: 1 or 2     Binge frequency: Never     Comment: social     Allergies   Allergen Reactions    Augmentin [Amoxicillin-Pot Clavulanate] Abdominal Pain    Biaxin [Clarithromycin] Abdominal Pain    Dust Mite Extract     Insulin Aspart GI Intolerance     Novolog     Molds & Smuts     Nuts     Seasonal Ic [Cholestatin] Headache         Current Outpatient Medications:     amLODIPine (NORVASC) 10 mg tablet, Take 1 tablet (10 mg total) by mouth daily, Disp: 90 tablet, Rfl: 3    APIDRA SOLOSTAR 100 units/mL injection pen, Inject 20 Units under the skin daily before breakfast, Disp: 6 pen, Rfl: 1    aspirin (ECOTRIN LOW STRENGTH) 81 mg EC tablet, Take 81 mg by mouth daily , Disp: , Rfl:     B Complex Vitamins (VITAMIN B-COMPLEX PO), Take 1 capsule by mouth daily , Disp: , Rfl:     BD PEN NEEDLE MILDRED U/F 32G X 4 MM MISC, Use 2 per day, Disp: 200 each, Rfl: 3    brimonidine-timolol (COMBIGAN) 0 2-0 5 %, Administer 1 drop to both eyes every 12 (twelve) hours, Disp: , Rfl:     cholecalciferol (VITAMIN D3) 1,000 units tablet, Take 1,000 Units by mouth 2 (two) times a day , Disp: , Rfl:     cloNIDine (CATAPRES) 0 1 mg tablet, Take 0 5 tablets (0 05 mg total) by mouth every 12 (twelve) hours, Disp: 30 tablet, Rfl: 0    Empagliflozin (JARDIANCE) 25 MG TABS, Take 1 tablet (25 mg total) by mouth daily, Disp: 90 tablet, Rfl: 3    gabapentin (NEURONTIN) 300 mg capsule, Take 1 capsule (300 mg total) by mouth 3 (three) times a day, Disp: 270 capsule, Rfl: 1    Glucosamine-Chondroitin (OSTEO BI-FLEX REGULAR STRENGTH) 250-200 MG TABS, Take 1 tablet by mouth 2 (two) times a day, Disp: , Rfl:     glyBURIDE (DIABETA) 5 mg tablet, 1 tab daily with breakfast, Disp: 90 tablet, Rfl: 3    hydrochlorothiazide (HYDRODIURIL) 25 mg tablet, Take 1 tablet (25 mg total) by mouth daily, Disp: 90 tablet, Rfl: 3    ibuprofen (MOTRIN) 200 mg tablet, Take 200 mg by mouth as needed , Disp: , Rfl:     insulin glargine (LANTUS SOLOSTAR) 100 units/mL injection pen, Inject 30 Units under the skin daily at bedtime, Disp: 30 mL, Rfl: 3    lisinopril (ZESTRIL) 40 mg tablet, Take 1 tablet (40 mg total) by mouth daily, Disp: 90 tablet, Rfl: 3    loratadine (CLARITIN) 10 mg tablet, Take 10 mg by mouth daily, Disp: , Rfl:     MULTIPLE VITAMIN PO, Take 1 tablet by mouth daily , Disp: , Rfl:     omeprazole (PriLOSEC) 10 mg delayed release capsule, Take 1 capsule (10 mg total) by mouth daily, Disp: 90 capsule, Rfl: 1    ONETOUCH VERIO test strip, Test blood sugars 4 x daily as directed, Disp: 400 each, Rfl: 3    Probiotic Product (PROBIOTIC-10) CAPS, Take by mouth daily , Disp: , Rfl:     simvastatin (ZOCOR) 20 mg tablet, Take 1 tablet (20 mg total) by mouth daily at bedtime, Disp: 90 tablet, Rfl: 3    sitaGLIPtin-metFORMIN (JANUMET)  MG per tablet, Take 1 tablet by mouth 2 (two) times a day with meals, Disp: 180 tablet, Rfl: 3    SYRINGE-NEEDLE, DISP, 3 ML (B-D SYRINGE/NEEDLE 3CC/23GX1") 23G X 1" 3 ML MISC, Use 1 per week, Disp: 12 each, Rfl: 3    vitamin E, tocopherol, 400 units capsule, Take 400 Units by mouth 2 (two) times a day , Disp: , Rfl:     Blood Glucose Monitoring Suppl (ONETOUCH VERIO) w/Device KIT, by Does not apply route once for 1 dose Dispense 1 meter, Disp: 1 kit, Rfl: 1    mometasone (NASONEX) 50 mcg/act nasal spray, 2 sprays into each nostril daily (Patient not taking: Reported on 1/27/2020), Disp: 1 Act, Rfl: 0    neomycin-polymyxin-hydrocortisone (CORTISPORIN) otic solution, Administer 4 drops to the right ear every 8 (eight) hours (Patient not taking: Reported on 1/2/2020), Disp: 10 mL, Rfl: 0    other medication, see sig,, Medication/product name: Medical Marijuana, Disp: , Rfl:     Review of Systems Constitutional: Negative for activity change, appetite change and fatigue  HENT: Negative for sore throat, trouble swallowing and voice change  Eyes: Negative for visual disturbance  Respiratory: Negative for choking, chest tightness and shortness of breath  Cardiovascular: Negative for chest pain, palpitations and leg swelling  Gastrointestinal: Negative for abdominal pain, constipation and diarrhea  Endocrine: Negative for cold intolerance, heat intolerance, polydipsia, polyphagia and polyuria  Genitourinary: Negative for frequency  Musculoskeletal: Positive for neck pain  Negative for arthralgias and myalgias  Skin: Negative for rash  Neurological: Negative for dizziness and syncope  Hematological: Negative for adenopathy  Psychiatric/Behavioral: Negative for sleep disturbance  All other systems reviewed and are negative  Physical Exam:  Body mass index is 28 59 kg/m²  /90   Pulse 80   Wt 74 1 kg (163 lb 6 4 oz)   BMI 28 59 kg/m²    Wt Readings from Last 3 Encounters:   01/27/20 74 1 kg (163 lb 6 4 oz)   01/02/20 73 9 kg (163 lb)   12/20/19 76 2 kg (168 lb)       Physical Exam   Constitutional: He is oriented to person, place, and time  He appears well-developed and well-nourished  No distress  HENT:   Head: Normocephalic and atraumatic  Mouth/Throat: Oropharynx is clear and moist    Eyes: Pupils are equal, round, and reactive to light  Conjunctivae and EOM are normal    Neck: Normal range of motion  Neck supple  No thyromegaly present  Cardiovascular: Normal rate, regular rhythm and normal heart sounds  No murmur heard  Pulmonary/Chest: Effort normal and breath sounds normal  No respiratory distress  He has no wheezes  He has no rales  Abdominal: Soft  Bowel sounds are normal  He exhibits no distension  There is no tenderness  Musculoskeletal: Normal range of motion  He exhibits no edema  Lymphadenopathy:     He has no cervical adenopathy  Neurological: He is alert and oriented to person, place, and time  Skin: Skin is warm and dry  Psychiatric: He has a normal mood and affect  Vitals reviewed      Labs:     Component      Latest Ref Rng & Units 6/15/2019 12/31/2019   WBC      4 31 - 10 16 Thousand/uL  12 69 (H)   Red Blood Cell Count      3 88 - 5 62 Million/uL  6 60 (H)   Hemoglobin      12 0 - 17 0 g/dL  19 1 (H)   HCT      36 5 - 49 3 %  57 2 (H)   MCV      82 - 98 fL  87   MCH      26 8 - 34 3 pg  28 9   MCHC      31 4 - 37 4 g/dL  33 4   RDW      11 6 - 15 1 %  17 0 (H)   MPV      8 9 - 12 7 fL  9 8   Platelet Count      672 - 390 Thousands/uL  317   nRBC      /100 WBCs  0   Neutrophils %      43 - 75 %  59   Immat GRANS %      0 - 2 %  1   Lymphocytes Relative      14 - 44 %  27   Monocytes Relative      4 - 12 %  9   Eosinophils      0 - 6 %  3   Basophils Relative      0 - 1 %  1   Absolute Neutrophils      1 85 - 7 62 Thousands/µL  7 55   Immature Grans Absolute      0 00 - 0 20 Thousand/uL  0 09   Lymphocytes Absolute      0 60 - 4 47 Thousands/µL  3 41   Absolute Monocytes      0 17 - 1 22 Thousand/µL  1 15   Absolute Eosinophils      0 00 - 0 61 Thousand/µL  0 42   Basophils Absolute      0 00 - 0 10 Thousands/µL  0 07   Sodium      136 - 145 mmol/L  139   Potassium      3 5 - 5 3 mmol/L  3 9   Chloride      100 - 108 mmol/L  103   CO2      21 - 32 mmol/L  31   Anion Gap      4 - 13 mmol/L  5   BUN      5 - 25 mg/dL  28 (H)   Creatinine      0 60 - 1 30 mg/dL  1 20   GLUCOSE FASTING      65 - 99 mg/dL  200 (H)   Calcium      8 3 - 10 1 mg/dL  9 6   AST      5 - 45 U/L  19   ALT      12 - 78 U/L  47   Alkaline Phosphatase      46 - 116 U/L  54   Total Protein      6 4 - 8 2 g/dL  7 8   Albumin      3 5 - 5 0 g/dL  4 1   TOTAL BILIRUBIN      0 20 - 1 00 mg/dL  1 03 (H)   eGFR      ml/min/1 73sq m  63   Cholesterol      50 - 200 mg/dL  119   Triglycerides      <=150 mg/dL  264 (H)   HDL      >=40 mg/dL  28 (L)   LDL Direct      0 - 100 mg/dL  38   EXT Creatinine Urine      mg/dL  57 7   MICROALBUM ,U,RANDOM      0 0 - 20 0 mg/L  29 5 (H)   MICROALBUMIN/CREATININE RATIO      0 - 30 mg/g creatinine  51 (H)   Hemoglobin A1C      4 2 - 6 3 % 6 4 (H) 6 2   EAG      mg/dl  131   TESTOSTERONE FREE      6 6 - 18 1 pg/mL  14 6   Testosterone, Total, LC/MS      264 - 916 ng/dL  574   PSA, Total      0 0 - 4 0 ng/mL  0 8   HEPATITIS C ANTIBODY      Non-reactive  Non-reactive   TSH 3RD GENERATON      0 358 - 3 740 uIU/mL  2 700   Free T4      0 76 - 1 46 ng/dL  1 00       Impression & Plan:    Problem List Items Addressed This Visit        Endocrine    Type 2 diabetes mellitus with hyperglycemia, with long-term current use of insulin ([facility] [address] ) - Primary     Well controlled but due to hypoglycemia will reduce glyburide to 5mg daily and send BG log in two weeks   Lab Results   Component Value Date    HGBA1C 6 2 12/31/2019            Relevant Medications    ONETOUCH VERIO test strip    lisinopril (ZESTRIL) 40 mg tablet    Empagliflozin (JARDIANCE) 25 MG TABS    glyBURIDE (DIABETA) 5 mg tablet    BD PEN NEEDLE MILDRED U/F 32G X 4 MM MISC    APIDRA SOLOSTAR 100 units/mL injection pen    sitaGLIPtin-metFORMIN (JANUMET)  MG per tablet    amLODIPine (NORVASC) 10 mg tablet    simvastatin (ZOCOR) 20 mg tablet    insulin glargine (LANTUS SOLOSTAR) 100 units/mL injection pen    Hypogonadotropic hypogonadism (HCC)     H&H remain elevated and are actually higher with reduction of testosterone from last visit and he has recently had a hard time with control of HTN recently  Stop testosterone and repeat CBC in 1 month    He has had trouble with insurance not covered testosterone levels-- more frequent testing was needed due to adjustment in medication due to increase in H&H advised him to send letter can appeal if needed but for now will not repeat the testosterone in 1 month when CBC is checked         Relevant Orders    CBC and differential       Cardiovascular and Mediastinum    Benign essential hypertension    Relevant Medications    lisinopril (ZESTRIL) 40 mg tablet    amLODIPine (NORVASC) 10 mg tablet    hydrochlorothiazide (HYDRODIURIL) 25 mg tablet       Other    Hyperlipidemia     LDL at goal but triglycerides higher likely due to diet around holidays lab testing completed 12/31- continue simvastatin  Relevant Medications    simvastatin (ZOCOR) 20 mg tablet    Vitamin D deficiency     Continue supplements  Other Visit Diagnoses     Erythrocytosis        Relevant Orders    CBC and differential    Type 2 diabetes mellitus without complication, with long-term current use of insulin ([facility])        Relevant Medications    ONETOUCH VERIO test strip    Empagliflozin (JARDIANCE) 25 MG TABS    glyBURIDE (DIABETA) 5 mg tablet    APIDRA SOLOSTAR 100 units/mL injection pen    sitaGLIPtin-metFORMIN (JANUMET)  MG per tablet    insulin glargine (LANTUS SOLOSTAR) 100 units/mL injection pen          Orders Placed This Encounter   Procedures    CBC and differential     This is a patient instruction: This test is non-fasting  Please drink two glasses of water morning of bloodwork  Standing Status:   Future     Standing Expiration Date:   7/27/2020       Patient Instructions   Stop Testosterone  Repeat CBC in 1 month    Reduce glyburide to 5mg daily with breakfast  Send BG readings in two weeks or ASAP if problems, we may need to increase dose of insulin      Discussed with the patient and all questioned fully answered  He will call me if any problems arise  Follow-up appointment in 3 months       Counseled patient on diagnostic results, prognosis, risk and benefit of treatment options, instruction for management, importance of treatment compliance, Risk  factor reduction and impressions    John Cruz PA-C

## 2020-01-27 NOTE — ASSESSMENT & PLAN NOTE
H&H remain elevated and are actually higher with reduction of testosterone from last visit and he has recently had a hard time with control of HTN recently  Stop testosterone and repeat CBC in 1 month    He has had trouble with insurance not covered testosterone levels-- more frequent testing was needed due to adjustment in medication due to increase in H&H advised him to send letter can appeal if needed but for now will not repeat the testosterone in 1 month when CBC is checked

## 2020-01-27 NOTE — ASSESSMENT & PLAN NOTE
LDL at goal but triglycerides higher likely due to diet around holidays lab testing completed 12/31- continue simvastatin

## 2020-01-27 NOTE — PATIENT INSTRUCTIONS
Stop Testosterone  Repeat CBC in 1 month    Reduce glyburide to 5mg daily with breakfast  Send BG readings in two weeks or ASAP if problems, we may need to increase dose of insulin

## 2020-02-05 ENCOUNTER — OFFICE VISIT (OUTPATIENT)
Dept: INTERNAL MEDICINE CLINIC | Facility: CLINIC | Age: 65
End: 2020-02-05
Payer: COMMERCIAL

## 2020-02-05 VITALS
WEIGHT: 161 LBS | HEART RATE: 71 BPM | DIASTOLIC BLOOD PRESSURE: 80 MMHG | SYSTOLIC BLOOD PRESSURE: 162 MMHG | HEIGHT: 63 IN | BODY MASS INDEX: 28.53 KG/M2 | TEMPERATURE: 98.6 F | OXYGEN SATURATION: 95 %

## 2020-02-05 DIAGNOSIS — I10 BENIGN ESSENTIAL HYPERTENSION: Primary | ICD-10-CM

## 2020-02-05 PROCEDURE — 3077F SYST BP >= 140 MM HG: CPT | Performed by: INTERNAL MEDICINE

## 2020-02-05 PROCEDURE — 99213 OFFICE O/P EST LOW 20 MIN: CPT | Performed by: INTERNAL MEDICINE

## 2020-02-05 PROCEDURE — 1036F TOBACCO NON-USER: CPT | Performed by: INTERNAL MEDICINE

## 2020-02-05 PROCEDURE — 3079F DIAST BP 80-89 MM HG: CPT | Performed by: INTERNAL MEDICINE

## 2020-02-05 PROCEDURE — 1160F RVW MEDS BY RX/DR IN RCRD: CPT | Performed by: INTERNAL MEDICINE

## 2020-02-05 PROCEDURE — 3008F BODY MASS INDEX DOCD: CPT | Performed by: INTERNAL MEDICINE

## 2020-02-05 RX ORDER — DOXAZOSIN MESYLATE 4 MG/1
4 TABLET ORAL DAILY
Qty: 30 TABLET | Refills: 0 | Status: SHIPPED | OUTPATIENT
Start: 2020-02-05 | End: 2020-03-04 | Stop reason: SDUPTHER

## 2020-02-05 NOTE — ASSESSMENT & PLAN NOTE
Continue lisinopril 40 mg daily, amlodipine 10 mg daily, and hydrochlorothiazide 25 mg daily  Will discontinue clonidine due to adverse effects and lack of efficacy with treatment  Will start doxazosin 4 mg daily  Recheck in 1 month

## 2020-02-05 NOTE — PROGRESS NOTES
Assessment/Plan:    Benign essential hypertension  Continue lisinopril 40 mg daily, amlodipine 10 mg daily, and hydrochlorothiazide 25 mg daily  Will discontinue clonidine due to adverse effects and lack of efficacy with treatment  Will start doxazosin 4 mg daily  Recheck in 1 month  Diagnoses and all orders for this visit:    Benign essential hypertension  -     doxazosin (CARDURA) 4 mg tablet; Take 1 tablet (4 mg total) by mouth daily          BMI Counseling: Body mass index is 28 52 kg/m²  The BMI is above normal  Nutrition recommendations include decreasing portion sizes, encouraging healthy choices of fruits and vegetables, decreasing fast food intake, consuming healthier snacks, limiting drinks that contain sugar, moderation in carbohydrate intake, increasing intake of lean protein, reducing intake of saturated and trans fat and reducing intake of cholesterol  Exercise recommendations include moderate physical activity 150 minutes/week and exercising 3-5 times per week  No pharmacotherapy was ordered  Patient referred to PCP due to patient being overweight  Time spent during encounter: 15 minutes (counseling, reviewing medications, and discussing treatment and plan)    Subjective:      Patient ID: Panda Sarah is a 75 y o  male  Chief Complaint   Patient presents with    Follow-up     1 month follow up blood work done on 12/31/2019    Hypertension    Diabetes       75year old male is seen today for follow-up for management of hypertension  His current antihypertensive regimen consist of lisinopril 40 mg daily, amlodipine 10 mg daily, hydrochlorothiazide 25 mg daily, and clonidine 0 5 mg twice a day  His clonidine was reduced from 0 1 mg twice a day  He monitors his blood pressure at home to which an average blood pressure reading is 155/80 mm Hg  Clonidine dose was reduced due to dry mouth    He also has been experiencing episodes of hot flashes and diaphoresis that lasts approximately 5-10 minutes  This occurs randomly, at rest or with exertion  He has checked his blood glucose during these episodes and reports the readings are within normal range  He cannot correlate with any preceding event/medications  Symptoms started he started clonidine and when hydrochlorothiazide was increased  Hypertension   This is a chronic problem  The current episode started more than 1 year ago  The problem is unchanged  The problem is uncontrolled  Pertinent negatives include no anxiety, blurred vision, chest pain, headaches, malaise/fatigue, neck pain, orthopnea, palpitations, peripheral edema, PND, shortness of breath or sweats  Risk factors for coronary artery disease include male gender, diabetes mellitus, dyslipidemia and obesity  Past treatments include ACE inhibitors, calcium channel blockers, diuretics and alpha 1 blockers  The current treatment provides mild improvement  There are no compliance problems  The following portions of the patient's history were reviewed and updated as appropriate: allergies, current medications, past family history, past medical history, past social history, past surgical history and problem list     Review of Systems   Constitutional: Negative for activity change, appetite change, chills, diaphoresis, fatigue, fever and malaise/fatigue  HENT: Negative for congestion, postnasal drip, rhinorrhea, sinus pressure, sinus pain, sneezing and sore throat  Eyes: Negative for blurred vision and visual disturbance  Respiratory: Negative for apnea, cough, choking, chest tightness, shortness of breath and wheezing  Cardiovascular: Negative for chest pain, palpitations, orthopnea, leg swelling and PND  Gastrointestinal: Negative for abdominal distention, abdominal pain, anal bleeding, blood in stool, constipation, diarrhea, nausea and vomiting  Endocrine: Negative for cold intolerance and heat intolerance     Genitourinary: Negative for difficulty urinating, dysuria and hematuria  Musculoskeletal: Negative  Negative for neck pain  Skin: Negative  Neurological: Negative for dizziness, weakness, light-headedness, numbness and headaches  Hematological: Negative for adenopathy  Psychiatric/Behavioral: Negative for agitation, sleep disturbance and suicidal ideas  All other systems reviewed and are negative          Past Medical History:   Diagnosis Date    Arthritis     Last assessed 5/24/2013    Avitaminosis D 2012    Diabetes mellitus ([facility] [place] [facility] )     Displacement of cervical intervertebral disc 2014    GERD (gastroesophageal reflux disease)     Glaucoma     Normal Pressure Glaucoma    HTN (hypertension) 2007    Pituitary microadenoma ([facility] [place] [facility] ) 2010    Spinal stenosis in cervical region 2014    Uric acid nephrolithiasis 1990         Current Outpatient Medications:     amLODIPine (NORVASC) 10 mg tablet, Take 1 tablet (10 mg total) by mouth daily, Disp: 90 tablet, Rfl: 3    APIDRA SOLOSTAR 100 units/mL injection pen, Inject 20 Units under the skin daily before breakfast, Disp: 6 pen, Rfl: 1    aspirin (ECOTRIN LOW STRENGTH) 81 mg EC tablet, Take 81 mg by mouth daily , Disp: , Rfl:     B Complex Vitamins (VITAMIN B-COMPLEX PO), Take 1 capsule by mouth daily , Disp: , Rfl:     BD PEN NEEDLE MILDRED U/F 32G X 4 MM MISC, Use 2 per day, Disp: 200 each, Rfl: 3    brimonidine-timolol (COMBIGAN) 0 2-0 5 %, Administer 1 drop to both eyes every 12 (twelve) hours, Disp: , Rfl:     cholecalciferol (VITAMIN D3) 1,000 units tablet, Take 1,000 Units by mouth 2 (two) times a day , Disp: , Rfl:     Empagliflozin (JARDIANCE) 25 MG TABS, Take 1 tablet (25 mg total) by mouth daily, Disp: 90 tablet, Rfl: 3    gabapentin (NEURONTIN) 300 mg capsule, Take 1 capsule (300 mg total) by mouth 3 (three) times a day, Disp: 270 capsule, Rfl: 1    Glucosamine-Chondroitin (OSTEO BI-FLEX REGULAR STRENGTH) 250-200 MG TABS, Take 1 tablet by mouth 2 (two) times a day, Disp: , Rfl:   glyBURIDE (DIABETA) 5 mg tablet, 1 tab daily with breakfast, Disp: 90 tablet, Rfl: 3    hydrochlorothiazide (HYDRODIURIL) 25 mg tablet, Take 1 tablet (25 mg total) by mouth daily, Disp: 90 tablet, Rfl: 3    ibuprofen (MOTRIN) 200 mg tablet, Take 200 mg by mouth as needed , Disp: , Rfl:     insulin glargine (LANTUS SOLOSTAR) 100 units/mL injection pen, Inject 30 Units under the skin daily at bedtime, Disp: 30 mL, Rfl: 3    lisinopril (ZESTRIL) 40 mg tablet, Take 1 tablet (40 mg total) by mouth daily, Disp: 90 tablet, Rfl: 3    loratadine (CLARITIN) 10 mg tablet, Take 10 mg by mouth daily, Disp: , Rfl:     MULTIPLE VITAMIN PO, Take 1 tablet by mouth daily , Disp: , Rfl:     neomycin-polymyxin-hydrocortisone (CORTISPORIN) otic solution, Administer 4 drops to the right ear every 8 (eight) hours, Disp: 10 mL, Rfl: 0    omeprazole (PriLOSEC) 10 mg delayed release capsule, Take 1 capsule (10 mg total) by mouth daily, Disp: 90 capsule, Rfl: 1    ONETOUCH VERIO test strip, Test blood sugars 4 x daily as directed, Disp: 400 each, Rfl: 3    other medication, see sig,, Medication/product name: Medical Marijuana, Disp: , Rfl:     Probiotic Product (PROBIOTIC-10) CAPS, Take by mouth daily , Disp: , Rfl:     simvastatin (ZOCOR) 20 mg tablet, Take 1 tablet (20 mg total) by mouth daily at bedtime, Disp: 90 tablet, Rfl: 3    sitaGLIPtin-metFORMIN (JANUMET)  MG per tablet, Take 1 tablet by mouth 2 (two) times a day with meals, Disp: 180 tablet, Rfl: 3    SYRINGE-NEEDLE, DISP, 3 ML (B-D SYRINGE/NEEDLE 3CC/23GX1") 23G X 1" 3 ML MISC, Use 1 per week, Disp: 12 each, Rfl: 3    vitamin E, tocopherol, 400 units capsule, Take 400 Units by mouth 2 (two) times a day , Disp: , Rfl:     Blood Glucose Monitoring Suppl (ONETOUCH VERIO) w/Device KIT, by Does not apply route once for 1 dose Dispense 1 meter, Disp: 1 kit, Rfl: 1    doxazosin (CARDURA) 4 mg tablet, Take 1 tablet (4 mg total) by mouth daily, Disp: 30 tablet, Rfl: 0    mometasone (NASONEX) 50 mcg/act nasal spray, 2 sprays into each nostril daily (Patient not taking: Reported on 2/5/2020), Disp: 1 Act, Rfl: 0    Allergies   Allergen Reactions    Clonidine Other (See Comments)     Diaphoresis, hot flashes    Augmentin [Amoxicillin-Pot Clavulanate] Abdominal Pain    Biaxin [Clarithromycin] Abdominal Pain    Dust Mite Extract     Insulin Aspart GI Intolerance     Novolog     Molds & Smuts     Nuts     Seasonal Ic [Cholestatin] Headache       Social History   Past Surgical History:   Procedure Laterality Date    ASPIRATION / INJECTION RENAL CYST      Renal Cyst Aspiration    CATARACT EXTRACTION      12/2017- right eye, 01/2018- left eye    EYE SURGERY Bilateral     Cataract Surgery    TONSILLECTOMY       Family History   Problem Relation Age of Onset    Diabetes unspecified Mother     Heart disease Mother     Nephrolithiasis Mother     Kidney disease Mother     Other Mother         Back Disorder    Thyroid disease Mother     Diabetes unspecified Father     Heart disease Father     Diabetes unspecified Sister     Heart disease Sister     Stroke Sister     Diabetes unspecified Brother     Heart disease Brother     Nephrolithiasis Brother     Lung cancer Brother     Other Brother         COPD    Diabetes unspecified Sister     Heart disease Sister     Diabetes unspecified Sister     Diabetes unspecified Brother     Heart disease Brother     Diabetes unspecified Brother     Other Brother         Back Disorder    Diabetes unspecified Brother     Other Brother         Back Disorder    Diabetes unspecified Brother     Stomach cancer Paternal Aunt        Objective:  /80 (BP Location: Left arm, Patient Position: Sitting, Cuff Size: Adult)   Pulse 71   Temp 98 6 °F (37 °C) (Oral)   Ht 5' 3" (1 6 m)   Wt 73 kg (161 lb)   SpO2 95%   BMI 28 52 kg/m²     Recent Results (from the past 1344 hour(s))   Hemoglobin A1C    Collection Time: 12/31/19  7:42 AM   Result Value Ref Range    Hemoglobin A1C 6 2 4 2 - 6 3 %     mg/dl   Comprehensive metabolic panel    Collection Time: 12/31/19  7:42 AM   Result Value Ref Range    Sodium 139 136 - 145 mmol/L    Potassium 3 9 3 5 - 5 3 mmol/L    Chloride 103 100 - 108 mmol/L    CO2 31 21 - 32 mmol/L    ANION GAP 5 4 - 13 mmol/L    BUN 28 (H) 5 - 25 mg/dL    Creatinine 1 20 0 60 - 1 30 mg/dL    Glucose, Fasting 200 (H) 65 - 99 mg/dL    Calcium 9 6 8 3 - 10 1 mg/dL    AST 19 5 - 45 U/L    ALT 47 12 - 78 U/L    Alkaline Phosphatase 54 46 - 116 U/L    Total Protein 7 8 6 4 - 8 2 g/dL    Albumin 4 1 3 5 - 5 0 g/dL    Total Bilirubin 1 03 (H) 0 20 - 1 00 mg/dL    eGFR 63 ml/min/1 73sq m   Testosterone, free, total    Collection Time: 12/31/19  7:42 AM   Result Value Ref Range    Testosterone, Free 14 6 6 6 - 18 1 pg/mL    TESTOSTERONE TOTAL 574 264 - 916 ng/dL   CBC and differential Lab Collect    Collection Time: 12/31/19  7:42 AM   Result Value Ref Range    WBC 12 69 (H) 4 31 - 10 16 Thousand/uL    RBC 6 60 (H) 3 88 - 5 62 Million/uL    Hemoglobin 19 1 (H) 12 0 - 17 0 g/dL    Hematocrit 57 2 (H) 36 5 - 49 3 %    MCV 87 82 - 98 fL    MCH 28 9 26 8 - 34 3 pg    MCHC 33 4 31 4 - 37 4 g/dL    RDW 17 0 (H) 11 6 - 15 1 %    MPV 9 8 8 9 - 12 7 fL    Platelets 330 631 - 551 Thousands/uL    nRBC 0 /100 WBCs    Neutrophils Relative 59 43 - 75 %    Immat GRANS % 1 0 - 2 %    Lymphocytes Relative 27 14 - 44 %    Monocytes Relative 9 4 - 12 %    Eosinophils Relative 3 0 - 6 %    Basophils Relative 1 0 - 1 %    Neutrophils Absolute 7 55 1 85 - 7 62 Thousands/µL    Immature Grans Absolute 0 09 0 00 - 0 20 Thousand/uL    Lymphocytes Absolute 3 41 0 60 - 4 47 Thousands/µL    Monocytes Absolute 1 15 0 17 - 1 22 Thousand/µL    Eosinophils Absolute 0 42 0 00 - 0 61 Thousand/µL    Basophils Absolute 0 07 0 00 - 0 10 Thousands/µL   PSA, total screen Lab Collect    Collection Time: 12/31/19  7:42 AM   Result Value Ref Range    PSA 0 8 0 0 - 4 0 ng/mL   Hepatitis C antibody    Collection Time: 12/31/19  7:42 AM   Result Value Ref Range    Hepatitis C Ab Non-reactive Non-reactive   Lipid Panel with Direct LDL reflex    Collection Time: 12/31/19  7:42 AM   Result Value Ref Range    Cholesterol 119 50 - 200 mg/dL    Triglycerides 264 (H) <=150 mg/dL    HDL, Direct 28 (L) >=40 mg/dL    LDL Calculated 38 0 - 100 mg/dL   Microalbumin / creatinine urine ratio    Collection Time: 12/31/19  7:42 AM   Result Value Ref Range    Creatinine, Ur 57 7 mg/dL    Microalbum  ,U,Random 29 5 (H) 0 0 - 20 0 mg/L    Microalb Creat Ratio 51 (H) 0 - 30 mg/g creatinine   TSH, 3rd generation    Collection Time: 12/31/19  7:42 AM   Result Value Ref Range    TSH 3RD GENERATON 2 700 0 358 - 3 740 uIU/mL   T4, free    Collection Time: 12/31/19  7:42 AM   Result Value Ref Range    Free T4 1 00 0 76 - 1 46 ng/dL            Physical Exam   Constitutional: He is oriented to person, place, and time  He appears well-developed and well-nourished  No distress  HENT:   Head: Normocephalic and atraumatic  Eyes: Pupils are equal, round, and reactive to light  Conjunctivae and EOM are normal  Right eye exhibits no discharge  Left eye exhibits no discharge  No scleral icterus  Neck: Normal range of motion  Neck supple  No JVD present  No thyromegaly present  Cardiovascular: Normal rate, regular rhythm, normal heart sounds and intact distal pulses  Exam reveals no gallop and no friction rub  No murmur heard  Pulmonary/Chest: Effort normal and breath sounds normal  No respiratory distress  He has no wheezes  He has no rales  He exhibits no tenderness  Abdominal: Soft  Bowel sounds are normal  He exhibits no distension and no mass  There is no tenderness  There is no rebound and no guarding  Musculoskeletal: Normal range of motion  He exhibits no edema, tenderness or deformity  Lymphadenopathy:     He has no cervical adenopathy     Neurological: He is alert and oriented to person, place, and time  He has normal reflexes  No cranial nerve deficit  Coordination normal    Skin: Skin is warm and dry  No rash noted  He is not diaphoretic  No erythema  No pallor  Psychiatric: He has a normal mood and affect  His behavior is normal  Judgment and thought content normal    Nursing note and vitals reviewed

## 2020-02-06 DIAGNOSIS — I10 BENIGN ESSENTIAL HYPERTENSION: ICD-10-CM

## 2020-02-06 RX ORDER — HYDROCHLOROTHIAZIDE 25 MG/1
25 TABLET ORAL DAILY
Qty: 90 TABLET | Refills: 3 | Status: SHIPPED | OUTPATIENT
Start: 2020-02-06 | End: 2021-02-08 | Stop reason: SDUPTHER

## 2020-03-04 ENCOUNTER — OFFICE VISIT (OUTPATIENT)
Dept: INTERNAL MEDICINE CLINIC | Facility: CLINIC | Age: 65
End: 2020-03-04
Payer: COMMERCIAL

## 2020-03-04 VITALS
HEIGHT: 63 IN | WEIGHT: 161 LBS | BODY MASS INDEX: 28.53 KG/M2 | TEMPERATURE: 98.3 F | DIASTOLIC BLOOD PRESSURE: 70 MMHG | HEART RATE: 81 BPM | SYSTOLIC BLOOD PRESSURE: 140 MMHG | OXYGEN SATURATION: 97 %

## 2020-03-04 DIAGNOSIS — E11.65 TYPE 2 DIABETES MELLITUS WITH HYPERGLYCEMIA, WITH LONG-TERM CURRENT USE OF INSULIN (HCC): ICD-10-CM

## 2020-03-04 DIAGNOSIS — I10 BENIGN ESSENTIAL HYPERTENSION: Primary | ICD-10-CM

## 2020-03-04 DIAGNOSIS — Z79.4 TYPE 2 DIABETES MELLITUS WITH HYPERGLYCEMIA, WITH LONG-TERM CURRENT USE OF INSULIN (HCC): ICD-10-CM

## 2020-03-04 DIAGNOSIS — Z23 NEED FOR PNEUMOCOCCAL VACCINE: ICD-10-CM

## 2020-03-04 DIAGNOSIS — E78.2 MIXED HYPERLIPIDEMIA: ICD-10-CM

## 2020-03-04 PROCEDURE — 3078F DIAST BP <80 MM HG: CPT | Performed by: INTERNAL MEDICINE

## 2020-03-04 PROCEDURE — 99213 OFFICE O/P EST LOW 20 MIN: CPT | Performed by: INTERNAL MEDICINE

## 2020-03-04 PROCEDURE — 3008F BODY MASS INDEX DOCD: CPT | Performed by: INTERNAL MEDICINE

## 2020-03-04 PROCEDURE — 90670 PCV13 VACCINE IM: CPT

## 2020-03-04 PROCEDURE — 1160F RVW MEDS BY RX/DR IN RCRD: CPT | Performed by: INTERNAL MEDICINE

## 2020-03-04 PROCEDURE — 90471 IMMUNIZATION ADMIN: CPT

## 2020-03-04 PROCEDURE — 1036F TOBACCO NON-USER: CPT | Performed by: INTERNAL MEDICINE

## 2020-03-04 PROCEDURE — 3077F SYST BP >= 140 MM HG: CPT | Performed by: INTERNAL MEDICINE

## 2020-03-04 RX ORDER — DOXAZOSIN MESYLATE 4 MG/1
4 TABLET ORAL DAILY
Qty: 90 TABLET | Refills: 1 | Status: SHIPPED | OUTPATIENT
Start: 2020-03-04 | End: 2020-03-04 | Stop reason: SDUPTHER

## 2020-03-04 RX ORDER — DOXAZOSIN MESYLATE 4 MG/1
4 TABLET ORAL DAILY
Qty: 30 TABLET | Refills: 0 | Status: SHIPPED | OUTPATIENT
Start: 2020-03-04 | End: 2020-06-25 | Stop reason: SDUPTHER

## 2020-03-04 NOTE — PROGRESS NOTES
Assessment/Plan:    Benign essential hypertension  Continue current antihypertensive regimen  Elevated during today's visit however this was due to stress from traffic  In reviewing his home blood pressure readings, he is well controlled and less than 130/80  Continue amlodipine 10 mg daily, doxazosin 4 mg daily, hydrochlorothiazide 25 mg daily, and lisinopril 40 mg daily  Diagnoses and all orders for this visit:    Benign essential hypertension  -     CBC and differential; Future  -     Comprehensive metabolic panel; Future  -     Discontinue: doxazosin (CARDURA) 4 mg tablet; Take 1 tablet (4 mg total) by mouth daily  -     doxazosin (CARDURA) 4 mg tablet; Take 1 tablet (4 mg total) by mouth daily    Type 2 diabetes mellitus with hyperglycemia, with long-term current use of insulin (HCC)  -     CBC and differential; Future  -     Comprehensive metabolic panel; Future  -     Hemoglobin A1C; Future  -     Microalbumin / creatinine urine ratio; Future  -     Lipid panel; Future    Mixed hyperlipidemia  -     Comprehensive metabolic panel; Future  -     Lipid panel; Future                Time spent during encounter: 15 minutes (counseling, reviewing medications, and discussing treatment and plan)    Subjective:      Patient ID: Murtaza Moreland is a 75 y o  male  No chief complaint on file  12-year-old male is seen today for one-month follow-up for management of hypertension  Since last visit, to which clonidine was discontinued due to lack of efficacy and side effects (hot flashes, jitteriness), and he was started on doxazosin 4 mg daily and advised to continue lisinopril 40 mg daily, amlodipine 10 mg daily, and hydrochlorothiazide 25 mg daily  He has been keeping a blood pressure log and after reviewing, appears is average systolic blood pressure is 120 over 65 mm Hg  Average heart rate of 75 beats per minute    He denies any adverse effects with starting doxazosin or better controlled of blood pressure  Hypertension   This is a chronic problem  The current episode started more than 1 year ago  The problem is unchanged  The problem is controlled  Pertinent negatives include no chest pain, headaches, palpitations or shortness of breath  Past treatments include ACE inhibitors, diuretics and calcium channel blockers  The current treatment provides moderate improvement  There are no compliance problems  The following portions of the patient's history were reviewed and updated as appropriate: allergies, current medications, past family history, past medical history, past social history, past surgical history and problem list     Review of Systems   Constitutional: Negative for activity change, appetite change, chills, diaphoresis, fatigue and fever  HENT: Negative for congestion, postnasal drip, rhinorrhea, sinus pressure, sinus pain, sneezing and sore throat  Eyes: Negative for visual disturbance  Respiratory: Negative for apnea, cough, choking, chest tightness, shortness of breath and wheezing  Cardiovascular: Negative for chest pain, palpitations and leg swelling  Gastrointestinal: Negative for abdominal distention, abdominal pain, anal bleeding, blood in stool, constipation, diarrhea, nausea and vomiting  Endocrine: Negative for cold intolerance and heat intolerance  Genitourinary: Negative for difficulty urinating, dysuria and hematuria  Musculoskeletal: Negative  Skin: Negative  Neurological: Negative for dizziness, weakness, light-headedness, numbness and headaches  Hematological: Negative for adenopathy  Psychiatric/Behavioral: Negative for agitation, sleep disturbance and suicidal ideas  All other systems reviewed and are negative          Past Medical History:   Diagnosis Date    Arthritis     Last assessed 5/24/2013    Avitaminosis D 2012    Diabetes mellitus ([facility] [address] )     Displacement of cervical intervertebral disc 2014    GERD (gastroesophageal reflux disease)     Glaucoma     Normal Pressure Glaucoma    HTN (hypertension) 2007    Pituitary microadenoma ([address] ) 2010    Spinal stenosis in cervical region 2014    Uric acid nephrolithiasis 1990         Current Outpatient Medications:     amLODIPine (NORVASC) 10 mg tablet, Take 1 tablet (10 mg total) by mouth daily, Disp: 90 tablet, Rfl: 3    APIDRA SOLOSTAR 100 units/mL injection pen, Inject 20 Units under the skin daily before breakfast, Disp: 6 pen, Rfl: 1    aspirin (ECOTRIN LOW STRENGTH) 81 mg EC tablet, Take 81 mg by mouth daily , Disp: , Rfl:     B Complex Vitamins (VITAMIN B-COMPLEX PO), Take 1 capsule by mouth daily , Disp: , Rfl:     BD PEN NEEDLE MILDRED U/F 32G X 4 MM MISC, Use 2 per day, Disp: 200 each, Rfl: 3    brimonidine-timolol (COMBIGAN) 0 2-0 5 %, Administer 1 drop to both eyes every 12 (twelve) hours, Disp: , Rfl:     cholecalciferol (VITAMIN D3) 1,000 units tablet, Take 1,000 Units by mouth 2 (two) times a day , Disp: , Rfl:     doxazosin (CARDURA) 4 mg tablet, Take 1 tablet (4 mg total) by mouth daily, Disp: 30 tablet, Rfl: 0    Empagliflozin (JARDIANCE) 25 MG TABS, Take 1 tablet (25 mg total) by mouth daily, Disp: 90 tablet, Rfl: 3    gabapentin (NEURONTIN) 300 mg capsule, Take 1 capsule (300 mg total) by mouth 3 (three) times a day, Disp: 270 capsule, Rfl: 1    Glucosamine-Chondroitin (OSTEO BI-FLEX REGULAR STRENGTH) 250-200 MG TABS, Take 1 tablet by mouth 2 (two) times a day, Disp: , Rfl:     glyBURIDE (DIABETA) 5 mg tablet, 1 tab daily with breakfast, Disp: 90 tablet, Rfl: 3    hydrochlorothiazide (HYDRODIURIL) 25 mg tablet, Take 1 tablet (25 mg total) by mouth daily, Disp: 90 tablet, Rfl: 3    ibuprofen (MOTRIN) 200 mg tablet, Take 200 mg by mouth as needed , Disp: , Rfl:     insulin glargine (LANTUS SOLOSTAR) 100 units/mL injection pen, Inject 30 Units under the skin daily at bedtime, Disp: 30 mL, Rfl: 3    lisinopril (ZESTRIL) 40 mg tablet, Take 1 tablet (40 mg total) by mouth daily, Disp: 90 tablet, Rfl: 3    loratadine (CLARITIN) 10 mg tablet, Take 10 mg by mouth daily, Disp: , Rfl:     mometasone (NASONEX) 50 mcg/act nasal spray, 2 sprays into each nostril daily, Disp: 1 Act, Rfl: 0    MULTIPLE VITAMIN PO, Take 1 tablet by mouth daily , Disp: , Rfl:     neomycin-polymyxin-hydrocortisone (CORTISPORIN) otic solution, Administer 4 drops to the right ear every 8 (eight) hours, Disp: 10 mL, Rfl: 0    omeprazole (PriLOSEC) 10 mg delayed release capsule, Take 1 capsule (10 mg total) by mouth daily, Disp: 90 capsule, Rfl: 1    ONETOUCH VERIO test strip, Test blood sugars 4 x daily as directed, Disp: 400 each, Rfl: 3    other medication, see sig,, Medication/product name: Medical Marijuana, Disp: , Rfl:     Probiotic Product (PROBIOTIC-10) CAPS, Take by mouth daily , Disp: , Rfl:     simvastatin (ZOCOR) 20 mg tablet, Take 1 tablet (20 mg total) by mouth daily at bedtime, Disp: 90 tablet, Rfl: 3    sitaGLIPtin-metFORMIN (JANUMET)  MG per tablet, Take 1 tablet by mouth 2 (two) times a day with meals, Disp: 180 tablet, Rfl: 3    SYRINGE-NEEDLE, DISP, 3 ML (B-D SYRINGE/NEEDLE 3CC/23GX1") 23G X 1" 3 ML MISC, Use 1 per week, Disp: 12 each, Rfl: 3    vitamin E, tocopherol, 400 units capsule, Take 400 Units by mouth 2 (two) times a day , Disp: , Rfl:     Blood Glucose Monitoring Suppl (ONETOUCH VERIO) w/Device KIT, by Does not apply route once for 1 dose Dispense 1 meter, Disp: 1 kit, Rfl: 1    Allergies   Allergen Reactions    Clonidine Other (See Comments)     Diaphoresis, hot flashes    Augmentin [Amoxicillin-Pot Clavulanate] Abdominal Pain    Biaxin [Clarithromycin] Abdominal Pain    Dust Mite Extract     Insulin Aspart GI Intolerance     Novolog     Molds & Smuts     Nuts     Seasonal Ic [Cholestatin] Headache       Social History   Past Surgical History:   Procedure Laterality Date    ASPIRATION / INJECTION RENAL CYST      Renal Cyst Aspiration    CATARACT EXTRACTION      12/2017- right eye, 01/2018- left eye    EYE SURGERY Bilateral     Cataract Surgery    TONSILLECTOMY       Family History   Problem Relation Age of Onset    Diabetes unspecified Mother     Heart disease Mother     Nephrolithiasis Mother     Kidney disease Mother     Other Mother         Back Disorder    Thyroid disease Mother     Diabetes unspecified Father     Heart disease Father     Diabetes unspecified Sister     Heart disease Sister     Stroke Sister     Diabetes unspecified Brother     Heart disease Brother     Nephrolithiasis Brother     Lung cancer Brother     Other Brother         COPD    Diabetes unspecified Sister     Heart disease Sister     Diabetes unspecified Sister     Diabetes unspecified Brother     Heart disease Brother     Diabetes unspecified Brother     Other Brother         Back Disorder    Diabetes unspecified Brother     Other Brother         Back Disorder    Diabetes unspecified Brother     Stomach cancer Paternal Aunt        Objective:  /70 (BP Location: Left arm, Patient Position: Sitting, Cuff Size: Large)   Pulse 81   Temp 98 3 °F (36 8 °C) (Oral)   Ht 5' 3" (1 6 m)   Wt 73 kg (161 lb)   SpO2 97%   BMI 28 52 kg/m²     No results found for this or any previous visit (from the past 1344 hour(s))  Physical Exam   Constitutional: He is oriented to person, place, and time  He appears well-developed and well-nourished  No distress  HENT:   Head: Normocephalic and atraumatic  Eyes: Pupils are equal, round, and reactive to light  Conjunctivae and EOM are normal  Right eye exhibits no discharge  Left eye exhibits no discharge  No scleral icterus  Neck: Normal range of motion  Neck supple  No JVD present  No thyromegaly present  Cardiovascular: Normal rate, regular rhythm, normal heart sounds and intact distal pulses  Exam reveals no gallop and no friction rub  Pulses are no weak pulses  No murmur heard    Pulses: Dorsalis pedis pulses are 2+ on the right side, and 2+ on the left side  Posterior tibial pulses are 2+ on the right side, and 2+ on the left side  Pulmonary/Chest: Effort normal and breath sounds normal  No respiratory distress  He has no wheezes  He has no rales  He exhibits no tenderness  Abdominal: Soft  Bowel sounds are normal  He exhibits no distension and no mass  There is no tenderness  There is no rebound and no guarding  Musculoskeletal: Normal range of motion  He exhibits no edema, tenderness or deformity  Feet:   Right Foot:   Skin Integrity: Negative for ulcer, skin breakdown, erythema, warmth, callus or dry skin  Left Foot:   Skin Integrity: Negative for ulcer, skin breakdown, erythema, warmth, callus or dry skin  Lymphadenopathy:     He has no cervical adenopathy  Neurological: He is alert and oriented to person, place, and time  He has normal reflexes  No cranial nerve deficit  Coordination normal    Skin: Skin is warm and dry  No rash noted  He is not diaphoretic  No erythema  No pallor  Psychiatric: He has a normal mood and affect  His behavior is normal  Judgment and thought content normal    Nursing note and vitals reviewed  Diabetic Foot Exam    Patient's shoes and socks removed  Right Foot/Ankle   Right Foot Inspection  Skin Exam: skin normal and skin intact no dry skin, no warmth, no callus, no erythema, no maceration, no abnormal color, no pre-ulcer, no ulcer and no callus                          Toe Exam: ROM and strength within normal limits  Sensory       Monofilament testing: intact  Vascular  Capillary refills: < 3 seconds  The right DP pulse is 2+  The right PT pulse is 2+       Left Foot/Ankle  Left Foot Inspection  Skin Exam: skin normal and skin intactno dry skin, no warmth, no erythema, no maceration, normal color, no pre-ulcer, no ulcer and no callus                         Toe Exam: ROM and strength within normal limits                   Sensory Monofilament: intact  Vascular  Capillary refills: < 3 seconds  The left DP pulse is 2+  The left PT pulse is 2+  Assign Risk Category:  No deformity present; No loss of protective sensation;  No weak pulses       Risk: 0

## 2020-03-04 NOTE — ASSESSMENT & PLAN NOTE
Continue current antihypertensive regimen  Elevated during today's visit however this was due to stress from traffic  In reviewing his home blood pressure readings, he is well controlled and less than 130/80  Continue amlodipine 10 mg daily, doxazosin 4 mg daily, hydrochlorothiazide 25 mg daily, and lisinopril 40 mg daily

## 2020-03-09 DIAGNOSIS — K21.9 GASTROESOPHAGEAL REFLUX DISEASE WITHOUT ESOPHAGITIS: ICD-10-CM

## 2020-03-09 RX ORDER — OMEPRAZOLE 10 MG/1
10 CAPSULE, DELAYED RELEASE ORAL DAILY
Qty: 90 CAPSULE | Refills: 1 | Status: SHIPPED | OUTPATIENT
Start: 2020-03-09 | End: 2020-06-25 | Stop reason: SDUPTHER

## 2020-03-17 DIAGNOSIS — Z79.4 TYPE 2 DIABETES MELLITUS WITH HYPERGLYCEMIA, WITH LONG-TERM CURRENT USE OF INSULIN (HCC): ICD-10-CM

## 2020-03-17 DIAGNOSIS — E11.65 TYPE 2 DIABETES MELLITUS WITH HYPERGLYCEMIA, WITH LONG-TERM CURRENT USE OF INSULIN (HCC): ICD-10-CM

## 2020-03-18 RX ORDER — AMLODIPINE BESYLATE 10 MG/1
TABLET ORAL
Qty: 90 TABLET | Refills: 3 | Status: SHIPPED | OUTPATIENT
Start: 2020-03-18 | End: 2021-02-03 | Stop reason: SDUPTHER

## 2020-03-22 ENCOUNTER — TELEPHONE (OUTPATIENT)
Dept: PAIN MEDICINE | Facility: CLINIC | Age: 65
End: 2020-03-22

## 2020-03-23 RX ORDER — GABAPENTIN 300 MG/1
300 CAPSULE ORAL 3 TIMES DAILY
Qty: 270 CAPSULE | Refills: 1 | Status: SHIPPED | OUTPATIENT
Start: 2020-03-23 | End: 2020-09-09 | Stop reason: SDUPTHER

## 2020-03-23 NOTE — PROGRESS NOTES
Virtual Brief Visit    Reason for visit is a follow-up for chronic neck pain and cervical radiculopathy      Encounter provider CHRISTIANO Goodwin    Provider located at [address] [place] [address]      Recent Visits  Date Type Provider Dept   03/22/20 Telephone Gus Johnson recent visits within past 7 days and meeting all other requirements     Future Appointments  No visits were found meeting these conditions  Showing future appointments within next 150 days and meeting all other requirements        Patient agrees to participate in a virtual check in via telephone or video visit instead of presenting to the office to address urgent/immediate medical needs  Patient is aware this is a billable service  After connecting through telephone, the patient was identified by name and date of birth  Murtaza Moreland was informed that this was a telemedicine visit and that the visit is being conducted through telephone which may not be secure and therefore might not be HIPAA-compliant  My office door was closed  No one else was in the room  He acknowledged consent and understanding of privacy and security of the virtual check-in visit  I informed the patient that I have reviewed his record in Epic and presented the opportunity for him to ask any questions regarding the visit today  The patient initiated communication and agreed to participate  Subjective  Murtaza Moreland is a 75 y o  male who is currently concerned about the risks of COVID-19, and although not experiencing signs or symptoms of COVID-19, out of fear of exposure, the patient preferred to proceed with virtual visit today  The patient is currently being seen by our practice for chronic neck pain secondary to chronic pain syndrome, cervical degenerative disc disease, cervical stenosis and cervical radiculopathy    The patient has been managing on gabapentin 300 mg t i d  And medical marijuana with an 80% improvement of his pain  He has had cervical epidural steroid injections in the past with good relief as well  Overall, he is happy with his current pain relief and would like to continue on his current medication regimen        Past Medical History:   Diagnosis Date    Arthritis     Last assessed 5/24/2013    Avitaminosis D 2012    [facility] [address] [facility]     Displacement of cervical intervertebral disc 2014    GERD (gastroesophageal reflux disease)     Glaucoma     Normal Pressure Glaucoma    HTN (hypertension) 2007    Pituitary microadenoma ([facility] [address] ) 2010    Spinal stenosis in cervical region 2014    Uric acid nephrolithiasis 1990       Past Surgical History:   Procedure Laterality Date    ASPIRATION / INJECTION RENAL CYST      Renal Cyst Aspiration    CATARACT EXTRACTION      12/2017- right eye, 01/2018- left eye    EYE SURGERY Bilateral     Cataract Surgery    TONSILLECTOMY         Current Outpatient Medications   Medication Sig Dispense Refill    amLODIPine (NORVASC) 10 mg tablet TAKE 1 TABLET BY MOUTH DAILY 90 tablet 3    APIDRA SOLOSTAR 100 units/mL injection pen Inject 20 Units under the skin daily before breakfast 6 pen 1    aspirin (ECOTRIN LOW STRENGTH) 81 mg EC tablet Take 81 mg by mouth daily       B Complex Vitamins (VITAMIN B-COMPLEX PO) Take 1 capsule by mouth daily       BD PEN NEEDLE MILDRED U/F 32G X 4 MM MISC Use 2 per day 200 each 3    Blood Glucose Monitoring Suppl (ONETOUCH VERIO) w/Device KIT by Does not apply route once for 1 dose Dispense 1 meter 1 kit 1    brimonidine-timolol (COMBIGAN) 0 2-0 5 % Administer 1 drop to both eyes every 12 (twelve) hours      cholecalciferol (VITAMIN D3) 1,000 units tablet Take 1,000 Units by mouth 2 (two) times a day       doxazosin (CARDURA) 4 mg tablet Take 1 tablet (4 mg total) by mouth daily 30 tablet 0    Empagliflozin (JARDIANCE) 25 MG TABS Take 1 tablet (25 mg total) by mouth daily 90 tablet 3    gabapentin (NEURONTIN) 300 mg capsule Take 1 capsule (300 mg total) by mouth 3 (three) times a day 270 capsule 1    Glucosamine-Chondroitin (OSTEO BI-FLEX REGULAR STRENGTH) 250-200 MG TABS Take 1 tablet by mouth 2 (two) times a day      glyBURIDE (DIABETA) 5 mg tablet 1 tab daily with breakfast 90 tablet 3    hydrochlorothiazide (HYDRODIURIL) 25 mg tablet Take 1 tablet (25 mg total) by mouth daily 90 tablet 3    ibuprofen (MOTRIN) 200 mg tablet Take 200 mg by mouth as needed       insulin glargine (LANTUS SOLOSTAR) 100 units/mL injection pen Inject 30 Units under the skin daily at bedtime 30 mL 3    lisinopril (ZESTRIL) 40 mg tablet Take 1 tablet (40 mg total) by mouth daily 90 tablet 3    loratadine (CLARITIN) 10 mg tablet Take 10 mg by mouth daily      mometasone (NASONEX) 50 mcg/act nasal spray 2 sprays into each nostril daily 1 Act 0    MULTIPLE VITAMIN PO Take 1 tablet by mouth daily       neomycin-polymyxin-hydrocortisone (CORTISPORIN) otic solution Administer 4 drops to the right ear every 8 (eight) hours 10 mL 0    omeprazole (PriLOSEC) 10 mg delayed release capsule Take 1 capsule (10 mg total) by mouth daily 90 capsule 1    ONETOUCH VERIO test strip Test blood sugars 4 x daily as directed 400 each 3    other medication, see sig, Medication/product name: Medical Marijuana      Probiotic Product (PROBIOTIC-10) CAPS Take by mouth daily       simvastatin (ZOCOR) 20 mg tablet Take 1 tablet (20 mg total) by mouth daily at bedtime 90 tablet 3    sitaGLIPtin-metFORMIN (JANUMET)  MG per tablet Take 1 tablet by mouth 2 (two) times a day with meals 180 tablet 3    SYRINGE-NEEDLE, DISP, 3 ML (B-D SYRINGE/NEEDLE 3CC/23GX1") 23G X 1" 3 ML MISC Use 1 per week 12 each 3    vitamin E, tocopherol, 400 units capsule Take 400 Units by mouth 2 (two) times a day        No current facility-administered medications for this visit           Allergies   Allergen Reactions    Clonidine Other (See Comments)     Diaphoresis, hot flashes    Augmentin [Amoxicillin-Pot Clavulanate] Abdominal Pain    Biaxin [Clarithromycin] Abdominal Pain    Dust Mite Extract     Insulin Aspart GI Intolerance     Novolog     Molds & Smuts     Nuts     Seasonal Ic [Cholestatin] Headache       Assessment    Chad telephone assessment is The patient's pain seems to be well managed on his current medication regimen        Plan:  1  The patient may continue gabapentin 300 mg t i d  As prescribed  This medication was refilled at today's office visit  2  The patient may continue medical marijuana as per certified Physician  3  We can repeat cervical epidural steroid injections as needed  4  The patient may continue Tylenol p r n  and should not exceed more than 3000 mg daily  5  The patient will continue with his home exercise program  6  The patient will follow-up in 6 months for medication prescription refill and reevaluation  The patient was advised to contact the office should their symptoms worsen in the interim  The patient was agreeable and verbalized an understanding  I spent 6 minutes with the patient during this virtual check-in visit    Established patient level 4

## 2020-03-23 NOTE — PATIENT INSTRUCTIONS
Plan:  1  The patient may continue gabapentin 300 mg t i d  As prescribed  This medication was refilled at today's office visit  2  The patient may continue medical marijuana as per certified Physician  3  We can repeat cervical epidural steroid injections as needed  4  The patient may continue Tylenol p r n  and should not exceed more than 3000 mg daily  5  The patient will continue with his home exercise program  6  The patient will follow-up in 6 months for medication prescription refill and reevaluation  The patient was advised to contact the office should their symptoms worsen in the interim  The patient was agreeable and verbalized an understanding

## 2020-03-24 ENCOUNTER — TELEMEDICINE (OUTPATIENT)
Dept: PAIN MEDICINE | Facility: CLINIC | Age: 65
End: 2020-03-24
Payer: COMMERCIAL

## 2020-03-24 DIAGNOSIS — M48.02 CERVICAL STENOSIS OF SPINAL CANAL: ICD-10-CM

## 2020-03-24 DIAGNOSIS — M54.12 CERVICAL RADICULOPATHY: ICD-10-CM

## 2020-03-24 DIAGNOSIS — M54.2 NECK PAIN: ICD-10-CM

## 2020-03-24 DIAGNOSIS — M47.812 SPONDYLOSIS OF CERVICAL REGION WITHOUT MYELOPATHY OR RADICULOPATHY: ICD-10-CM

## 2020-03-24 DIAGNOSIS — G89.4 CHRONIC PAIN SYNDROME: Primary | ICD-10-CM

## 2020-03-24 DIAGNOSIS — M50.20 CERVICAL HERNIATED DISC: ICD-10-CM

## 2020-03-24 PROCEDURE — 99213 OFFICE O/P EST LOW 20 MIN: CPT | Performed by: NURSE PRACTITIONER

## 2020-05-04 ENCOUNTER — TELEMEDICINE (OUTPATIENT)
Dept: ENDOCRINOLOGY | Facility: CLINIC | Age: 65
End: 2020-05-04
Payer: COMMERCIAL

## 2020-05-04 VITALS — SYSTOLIC BLOOD PRESSURE: 123 MMHG | BODY MASS INDEX: 28.52 KG/M2 | HEIGHT: 63 IN | DIASTOLIC BLOOD PRESSURE: 58 MMHG

## 2020-05-04 DIAGNOSIS — E11.65 TYPE 2 DIABETES MELLITUS WITH HYPERGLYCEMIA, WITH LONG-TERM CURRENT USE OF INSULIN (HCC): Primary | ICD-10-CM

## 2020-05-04 DIAGNOSIS — E55.9 VITAMIN D DEFICIENCY: ICD-10-CM

## 2020-05-04 DIAGNOSIS — I10 BENIGN ESSENTIAL HYPERTENSION: ICD-10-CM

## 2020-05-04 DIAGNOSIS — Z79.4 TYPE 2 DIABETES MELLITUS WITH HYPERGLYCEMIA, WITH LONG-TERM CURRENT USE OF INSULIN (HCC): Primary | ICD-10-CM

## 2020-05-04 DIAGNOSIS — G47.33 OBSTRUCTIVE SLEEP APNEA SYNDROME: ICD-10-CM

## 2020-05-04 DIAGNOSIS — D35.2 PITUITARY MICROADENOMA (HCC): ICD-10-CM

## 2020-05-04 DIAGNOSIS — E23.0 HYPOGONADOTROPIC HYPOGONADISM (HCC): ICD-10-CM

## 2020-05-04 PROCEDURE — 99214 OFFICE O/P EST MOD 30 MIN: CPT | Performed by: INTERNAL MEDICINE

## 2020-05-04 RX ORDER — PEN NEEDLE, DIABETIC 32GX 5/32"
NEEDLE, DISPOSABLE MISCELLANEOUS
Qty: 200 EACH | Refills: 3 | Status: SHIPPED | OUTPATIENT
Start: 2020-05-04 | End: 2021-02-17 | Stop reason: SDUPTHER

## 2020-05-04 RX ORDER — GLYBURIDE 5 MG/1
TABLET ORAL
Qty: 90 TABLET | Refills: 3 | Status: SHIPPED | OUTPATIENT
Start: 2020-05-04 | End: 2020-06-27

## 2020-05-04 RX ORDER — INSULIN GLULISINE 100 [IU]/ML
20 INJECTION, SOLUTION SUBCUTANEOUS
Qty: 6 PEN | Refills: 1 | Status: SHIPPED | OUTPATIENT
Start: 2020-05-04 | End: 2020-05-05

## 2020-05-05 ENCOUNTER — TELEPHONE (OUTPATIENT)
Dept: ENDOCRINOLOGY | Facility: CLINIC | Age: 65
End: 2020-05-05

## 2020-05-05 DIAGNOSIS — E11.65 TYPE 2 DIABETES MELLITUS WITH HYPERGLYCEMIA, WITH LONG-TERM CURRENT USE OF INSULIN (HCC): ICD-10-CM

## 2020-05-05 DIAGNOSIS — Z79.4 TYPE 2 DIABETES MELLITUS WITH HYPERGLYCEMIA, WITH LONG-TERM CURRENT USE OF INSULIN (HCC): ICD-10-CM

## 2020-05-05 RX ORDER — INSULIN GLULISINE 100 [IU]/ML
20 INJECTION, SOLUTION SUBCUTANEOUS
Qty: 15 ML | Refills: 1 | Status: SHIPPED | OUTPATIENT
Start: 2020-05-05 | End: 2020-12-08 | Stop reason: SDUPTHER

## 2020-06-10 ENCOUNTER — CLINICAL SUPPORT (OUTPATIENT)
Dept: INTERNAL MEDICINE CLINIC | Age: 65
End: 2020-06-10
Payer: COMMERCIAL

## 2020-06-10 DIAGNOSIS — Z23 NEED FOR SHINGLES VACCINE: Primary | ICD-10-CM

## 2020-06-10 PROCEDURE — 90471 IMMUNIZATION ADMIN: CPT

## 2020-06-10 PROCEDURE — 90750 HZV VACC RECOMBINANT IM: CPT

## 2020-06-12 ENCOUNTER — TELEMEDICINE (OUTPATIENT)
Dept: INTERNAL MEDICINE CLINIC | Age: 65
End: 2020-06-12
Payer: COMMERCIAL

## 2020-06-12 VITALS — WEIGHT: 161 LBS | TEMPERATURE: 98.6 F | HEIGHT: 63 IN | BODY MASS INDEX: 28.53 KG/M2

## 2020-06-12 DIAGNOSIS — Z20.828 EXPOSURE TO SARS-ASSOCIATED CORONAVIRUS: ICD-10-CM

## 2020-06-12 DIAGNOSIS — Z20.828 EXPOSURE TO SARS-ASSOCIATED CORONAVIRUS: Primary | ICD-10-CM

## 2020-06-12 DIAGNOSIS — B34.9 ACUTE VIRAL SYNDROME: ICD-10-CM

## 2020-06-12 PROCEDURE — U0003 INFECTIOUS AGENT DETECTION BY NUCLEIC ACID (DNA OR RNA); SEVERE ACUTE RESPIRATORY SYNDROME CORONAVIRUS 2 (SARS-COV-2) (CORONAVIRUS DISEASE [COVID-19]), AMPLIFIED PROBE TECHNIQUE, MAKING USE OF HIGH THROUGHPUT TECHNOLOGIES AS DESCRIBED BY CMS-2020-01-R: HCPCS | Performed by: OBSTETRICS & GYNECOLOGY

## 2020-06-12 PROCEDURE — 1101F PT FALLS ASSESS-DOCD LE1/YR: CPT | Performed by: INTERNAL MEDICINE

## 2020-06-12 PROCEDURE — 99214 OFFICE O/P EST MOD 30 MIN: CPT | Performed by: INTERNAL MEDICINE

## 2020-06-12 PROCEDURE — 3288F FALL RISK ASSESSMENT DOCD: CPT | Performed by: INTERNAL MEDICINE

## 2020-06-13 LAB — SARS-COV-2 RNA SPEC QL NAA+PROBE: NOT DETECTED

## 2020-06-15 ENCOUNTER — TELEMEDICINE (OUTPATIENT)
Dept: INTERNAL MEDICINE CLINIC | Age: 65
End: 2020-06-15
Payer: COMMERCIAL

## 2020-06-15 ENCOUNTER — TELEPHONE (OUTPATIENT)
Dept: INTERNAL MEDICINE CLINIC | Age: 65
End: 2020-06-15

## 2020-06-15 VITALS — WEIGHT: 165 LBS | TEMPERATURE: 97.2 F | BODY MASS INDEX: 29.23 KG/M2

## 2020-06-15 DIAGNOSIS — B34.9 ACUTE VIRAL SYNDROME: Primary | ICD-10-CM

## 2020-06-15 PROCEDURE — 4040F PNEUMOC VAC/ADMIN/RCVD: CPT | Performed by: INTERNAL MEDICINE

## 2020-06-15 PROCEDURE — 99214 OFFICE O/P EST MOD 30 MIN: CPT | Performed by: INTERNAL MEDICINE

## 2020-06-15 PROCEDURE — 1160F RVW MEDS BY RX/DR IN RCRD: CPT | Performed by: INTERNAL MEDICINE

## 2020-06-19 ENCOUNTER — LAB (OUTPATIENT)
Dept: LAB | Age: 65
End: 2020-06-19
Payer: COMMERCIAL

## 2020-06-19 DIAGNOSIS — I10 BENIGN ESSENTIAL HYPERTENSION: ICD-10-CM

## 2020-06-19 DIAGNOSIS — E78.2 MIXED HYPERLIPIDEMIA: ICD-10-CM

## 2020-06-19 DIAGNOSIS — Z79.4 TYPE 2 DIABETES MELLITUS WITH HYPERGLYCEMIA, WITH LONG-TERM CURRENT USE OF INSULIN (HCC): ICD-10-CM

## 2020-06-19 DIAGNOSIS — E23.0 HYPOGONADOTROPIC HYPOGONADISM (HCC): ICD-10-CM

## 2020-06-19 DIAGNOSIS — E11.65 TYPE 2 DIABETES MELLITUS WITH HYPERGLYCEMIA, WITH LONG-TERM CURRENT USE OF INSULIN (HCC): ICD-10-CM

## 2020-06-19 LAB
ALBUMIN SERPL BCP-MCNC: 3.8 G/DL (ref 3.5–5)
ALP SERPL-CCNC: 54 U/L (ref 46–116)
ALT SERPL W P-5'-P-CCNC: 58 U/L (ref 12–78)
ANION GAP SERPL CALCULATED.3IONS-SCNC: 5 MMOL/L (ref 4–13)
AST SERPL W P-5'-P-CCNC: 23 U/L (ref 5–45)
BASOPHILS # BLD AUTO: 0.04 THOUSANDS/ΜL (ref 0–0.1)
BASOPHILS NFR BLD AUTO: 0 % (ref 0–1)
BILIRUB SERPL-MCNC: 0.67 MG/DL (ref 0.2–1)
BUN SERPL-MCNC: 27 MG/DL (ref 5–25)
CALCIUM SERPL-MCNC: 8.6 MG/DL (ref 8.3–10.1)
CHLORIDE SERPL-SCNC: 107 MMOL/L (ref 100–108)
CHOLEST SERPL-MCNC: 104 MG/DL (ref 50–200)
CO2 SERPL-SCNC: 27 MMOL/L (ref 21–32)
CREAT SERPL-MCNC: 0.92 MG/DL (ref 0.6–1.3)
CREAT UR-MCNC: 34 MG/DL
EOSINOPHIL # BLD AUTO: 0.43 THOUSAND/ΜL (ref 0–0.61)
EOSINOPHIL NFR BLD AUTO: 4 % (ref 0–6)
ERYTHROCYTE [DISTWIDTH] IN BLOOD BY AUTOMATED COUNT: 14.6 % (ref 11.6–15.1)
EST. AVERAGE GLUCOSE BLD GHB EST-MCNC: 123 MG/DL
GFR SERPL CREATININE-BSD FRML MDRD: 86 ML/MIN/1.73SQ M
GLUCOSE P FAST SERPL-MCNC: 126 MG/DL (ref 65–99)
HBA1C MFR BLD: 5.9 %
HCT VFR BLD AUTO: 43.6 % (ref 36.5–49.3)
HDLC SERPL-MCNC: 28 MG/DL
HGB BLD-MCNC: 14.8 G/DL (ref 12–17)
IMM GRANULOCYTES # BLD AUTO: 0.13 THOUSAND/UL (ref 0–0.2)
IMM GRANULOCYTES NFR BLD AUTO: 1 % (ref 0–2)
LDLC SERPL CALC-MCNC: 46 MG/DL (ref 0–100)
LYMPHOCYTES # BLD AUTO: 3.04 THOUSANDS/ΜL (ref 0.6–4.47)
LYMPHOCYTES NFR BLD AUTO: 27 % (ref 14–44)
MCH RBC QN AUTO: 30.6 PG (ref 26.8–34.3)
MCHC RBC AUTO-ENTMCNC: 33.9 G/DL (ref 31.4–37.4)
MCV RBC AUTO: 90 FL (ref 82–98)
MICROALBUMIN UR-MCNC: 12.3 MG/L (ref 0–20)
MICROALBUMIN/CREAT 24H UR: 36 MG/G CREATININE (ref 0–30)
MONOCYTES # BLD AUTO: 1.08 THOUSAND/ΜL (ref 0.17–1.22)
MONOCYTES NFR BLD AUTO: 10 % (ref 4–12)
NEUTROPHILS # BLD AUTO: 6.56 THOUSANDS/ΜL (ref 1.85–7.62)
NEUTS SEG NFR BLD AUTO: 58 % (ref 43–75)
NONHDLC SERPL-MCNC: 76 MG/DL
NRBC BLD AUTO-RTO: 0 /100 WBCS
PLATELET # BLD AUTO: 329 THOUSANDS/UL (ref 149–390)
PMV BLD AUTO: 9.3 FL (ref 8.9–12.7)
POTASSIUM SERPL-SCNC: 3.6 MMOL/L (ref 3.5–5.3)
PROT SERPL-MCNC: 7.4 G/DL (ref 6.4–8.2)
RBC # BLD AUTO: 4.84 MILLION/UL (ref 3.88–5.62)
SODIUM SERPL-SCNC: 139 MMOL/L (ref 136–145)
TRIGL SERPL-MCNC: 150 MG/DL
WBC # BLD AUTO: 11.28 THOUSAND/UL (ref 4.31–10.16)

## 2020-06-19 PROCEDURE — 3061F NEG MICROALBUMINURIA REV: CPT | Performed by: PHYSICIAN ASSISTANT

## 2020-06-19 PROCEDURE — 36415 COLL VENOUS BLD VENIPUNCTURE: CPT

## 2020-06-19 PROCEDURE — 82570 ASSAY OF URINE CREATININE: CPT

## 2020-06-19 PROCEDURE — 85025 COMPLETE CBC W/AUTO DIFF WBC: CPT

## 2020-06-19 PROCEDURE — 3044F HG A1C LEVEL LT 7.0%: CPT | Performed by: PHYSICIAN ASSISTANT

## 2020-06-19 PROCEDURE — 84403 ASSAY OF TOTAL TESTOSTERONE: CPT

## 2020-06-19 PROCEDURE — 83036 HEMOGLOBIN GLYCOSYLATED A1C: CPT

## 2020-06-19 PROCEDURE — 80053 COMPREHEN METABOLIC PANEL: CPT

## 2020-06-19 PROCEDURE — 84402 ASSAY OF FREE TESTOSTERONE: CPT

## 2020-06-19 PROCEDURE — 82043 UR ALBUMIN QUANTITATIVE: CPT

## 2020-06-19 PROCEDURE — 80061 LIPID PANEL: CPT

## 2020-06-20 LAB
TESTOST FREE SERPL-MCNC: 2.7 PG/ML (ref 6.6–18.1)
TESTOST SERPL-MCNC: 141 NG/DL (ref 264–916)

## 2020-06-25 ENCOUNTER — OFFICE VISIT (OUTPATIENT)
Dept: INTERNAL MEDICINE CLINIC | Facility: CLINIC | Age: 65
End: 2020-06-25
Payer: COMMERCIAL

## 2020-06-25 VITALS
HEIGHT: 63 IN | WEIGHT: 166 LBS | BODY MASS INDEX: 29.41 KG/M2 | OXYGEN SATURATION: 97 % | DIASTOLIC BLOOD PRESSURE: 70 MMHG | TEMPERATURE: 98.9 F | SYSTOLIC BLOOD PRESSURE: 148 MMHG | HEART RATE: 81 BPM

## 2020-06-25 DIAGNOSIS — B00.1 HERPES LABIALIS: ICD-10-CM

## 2020-06-25 DIAGNOSIS — E23.0 HYPOGONADOTROPIC HYPOGONADISM (HCC): Primary | ICD-10-CM

## 2020-06-25 DIAGNOSIS — K21.9 GASTROESOPHAGEAL REFLUX DISEASE WITHOUT ESOPHAGITIS: ICD-10-CM

## 2020-06-25 DIAGNOSIS — I10 BENIGN ESSENTIAL HYPERTENSION: ICD-10-CM

## 2020-06-25 DIAGNOSIS — E78.2 MIXED HYPERLIPIDEMIA: ICD-10-CM

## 2020-06-25 PROBLEM — M54.40 LOW BACK PAIN WITH SCIATICA: Status: RESOLVED | Noted: 2018-08-24 | Resolved: 2020-06-25

## 2020-06-25 PROBLEM — B34.9 ACUTE VIRAL SYNDROME: Status: RESOLVED | Noted: 2020-06-12 | Resolved: 2020-06-25

## 2020-06-25 PROCEDURE — 1160F RVW MEDS BY RX/DR IN RCRD: CPT | Performed by: INTERNAL MEDICINE

## 2020-06-25 PROCEDURE — 4040F PNEUMOC VAC/ADMIN/RCVD: CPT | Performed by: INTERNAL MEDICINE

## 2020-06-25 PROCEDURE — 99214 OFFICE O/P EST MOD 30 MIN: CPT | Performed by: INTERNAL MEDICINE

## 2020-06-25 PROCEDURE — 1036F TOBACCO NON-USER: CPT | Performed by: INTERNAL MEDICINE

## 2020-06-25 PROCEDURE — 3008F BODY MASS INDEX DOCD: CPT | Performed by: INTERNAL MEDICINE

## 2020-06-25 PROCEDURE — 3044F HG A1C LEVEL LT 7.0%: CPT | Performed by: INTERNAL MEDICINE

## 2020-06-25 PROCEDURE — 3078F DIAST BP <80 MM HG: CPT | Performed by: INTERNAL MEDICINE

## 2020-06-25 PROCEDURE — 3077F SYST BP >= 140 MM HG: CPT | Performed by: INTERNAL MEDICINE

## 2020-06-25 RX ORDER — DOXAZOSIN MESYLATE 4 MG/1
4 TABLET ORAL DAILY
Qty: 90 TABLET | Refills: 1 | Status: SHIPPED | OUTPATIENT
Start: 2020-06-25 | End: 2020-09-29 | Stop reason: SDUPTHER

## 2020-06-25 RX ORDER — VALACYCLOVIR HYDROCHLORIDE 1 G/1
2000 TABLET, FILM COATED ORAL 2 TIMES DAILY
Qty: 4 TABLET | Refills: 1 | Status: SHIPPED | OUTPATIENT
Start: 2020-06-25 | End: 2020-08-10 | Stop reason: ALTCHOICE

## 2020-06-25 RX ORDER — OMEPRAZOLE 10 MG/1
10 CAPSULE, DELAYED RELEASE ORAL DAILY
Qty: 90 CAPSULE | Refills: 1 | Status: SHIPPED | OUTPATIENT
Start: 2020-06-25 | End: 2020-08-04

## 2020-06-27 DIAGNOSIS — E11.65 TYPE 2 DIABETES MELLITUS WITH HYPERGLYCEMIA, WITH LONG-TERM CURRENT USE OF INSULIN (HCC): ICD-10-CM

## 2020-06-27 DIAGNOSIS — Z79.4 TYPE 2 DIABETES MELLITUS WITH HYPERGLYCEMIA, WITH LONG-TERM CURRENT USE OF INSULIN (HCC): ICD-10-CM

## 2020-06-27 RX ORDER — GLYBURIDE 5 MG/1
TABLET ORAL
Qty: 270 TABLET | Refills: 1 | Status: SHIPPED | OUTPATIENT
Start: 2020-06-27 | End: 2021-02-03 | Stop reason: SDUPTHER

## 2020-06-29 ENCOUNTER — TELEPHONE (OUTPATIENT)
Dept: ENDOCRINOLOGY | Facility: CLINIC | Age: 65
End: 2020-06-29

## 2020-08-04 DIAGNOSIS — K21.9 GASTROESOPHAGEAL REFLUX DISEASE WITHOUT ESOPHAGITIS: ICD-10-CM

## 2020-08-04 RX ORDER — OMEPRAZOLE 10 MG/1
CAPSULE, DELAYED RELEASE ORAL
Qty: 90 CAPSULE | Refills: 1 | Status: SHIPPED | OUTPATIENT
Start: 2020-08-04 | End: 2021-02-08 | Stop reason: SDUPTHER

## 2020-08-10 ENCOUNTER — OFFICE VISIT (OUTPATIENT)
Dept: ENDOCRINOLOGY | Facility: CLINIC | Age: 65
End: 2020-08-10
Payer: COMMERCIAL

## 2020-08-10 VITALS
WEIGHT: 168.2 LBS | HEIGHT: 63 IN | DIASTOLIC BLOOD PRESSURE: 64 MMHG | HEART RATE: 74 BPM | BODY MASS INDEX: 29.8 KG/M2 | SYSTOLIC BLOOD PRESSURE: 120 MMHG

## 2020-08-10 DIAGNOSIS — E11.65 TYPE 2 DIABETES MELLITUS WITH HYPERGLYCEMIA, WITH LONG-TERM CURRENT USE OF INSULIN (HCC): Primary | ICD-10-CM

## 2020-08-10 DIAGNOSIS — Z79.4 TYPE 2 DIABETES MELLITUS WITH HYPERGLYCEMIA, WITH LONG-TERM CURRENT USE OF INSULIN (HCC): Primary | ICD-10-CM

## 2020-08-10 DIAGNOSIS — E55.9 VITAMIN D DEFICIENCY: ICD-10-CM

## 2020-08-10 DIAGNOSIS — E23.0 HYPOGONADOTROPIC HYPOGONADISM (HCC): ICD-10-CM

## 2020-08-10 DIAGNOSIS — I10 BENIGN ESSENTIAL HYPERTENSION: ICD-10-CM

## 2020-08-10 DIAGNOSIS — E78.2 MIXED HYPERLIPIDEMIA: ICD-10-CM

## 2020-08-10 PROCEDURE — 3078F DIAST BP <80 MM HG: CPT | Performed by: PHYSICIAN ASSISTANT

## 2020-08-10 PROCEDURE — 1160F RVW MEDS BY RX/DR IN RCRD: CPT | Performed by: PHYSICIAN ASSISTANT

## 2020-08-10 PROCEDURE — 1036F TOBACCO NON-USER: CPT | Performed by: PHYSICIAN ASSISTANT

## 2020-08-10 PROCEDURE — 99214 OFFICE O/P EST MOD 30 MIN: CPT | Performed by: PHYSICIAN ASSISTANT

## 2020-08-10 PROCEDURE — 3074F SYST BP LT 130 MM HG: CPT | Performed by: PHYSICIAN ASSISTANT

## 2020-08-10 PROCEDURE — 3044F HG A1C LEVEL LT 7.0%: CPT | Performed by: PHYSICIAN ASSISTANT

## 2020-08-10 PROCEDURE — 3008F BODY MASS INDEX DOCD: CPT | Performed by: PHYSICIAN ASSISTANT

## 2020-08-10 PROCEDURE — 4040F PNEUMOC VAC/ADMIN/RCVD: CPT | Performed by: PHYSICIAN ASSISTANT

## 2020-08-10 NOTE — PROGRESS NOTES
Established Patient Progress Note      Chief Complaint   Patient presents with    Diabetes Type 2        History of Present Illness:   Rosa Razo is a 75 y o  male with a history of type 2 diabetes with long term use of insulin since many years ago  Reports complications of neuropathy  Denies recent illness or hospitalizations  Denies recent severe hypoglycemic or severe hyperglycemic episodes  Denies any issues with his current regimen  home glucose monitoring: are performed regularly  Remains off Victoza due to GI side effects  He does report mid afternoon hypoglycemia on the weekend when he is more active  He is checking blood sugars on average 3x per day  Most readings are in target with a few mild lows and a few highs which tend to occur most frequently at bedtime  Current regimen:   Lantus 30 units daily   Janumet  Jardiance  Apiday 20 units with breakfast, reduces on the weekend  Glyburide 5mg daily     Hx hypogonadism  Since last visit, reports continued fatigue  Fatigue is worsening since stopping testosterone in January  H e reports decreased libdo, but No ED  No change in hair growth and No night sweats  Has CHUYITA and uses CPAP  Denies urinary problems or hx blood clots    Last Eye Exam: UTD  Last Foot Exam: UTD    Has hypertension: Taking multiple meds  Has improved  Doing better off the clonidine  Has hyperlipidemia: Taking simvastatin      For Vitamin D Deficiency, taking supplements       Patient Active Problem List   Diagnosis    Benign essential hypertension    Carpal tunnel syndrome    Cervical herniated disc    Cervical radiculopathy    Cervical stenosis of spinal canal    Type 2 diabetes mellitus with hyperglycemia, with long-term current use of insulin (HCC)    Hyperlipidemia    Hypogonadotropic hypogonadism (HCC)    Nephrolithiasis    Pituitary microadenoma ([address] )    Seborrheic dermatitis    Obstructive sleep apnea syndrome    Spondylosis of cervical region without myelopathy or radiculopathy    Sprain and strain of calcaneofibular (ligament)    Vitamin D deficiency    Sacroiliitis (HCC)    BMI 27 0-27 9,adult    Inflamed seborrheic keratosis    Gastroesophageal reflux disease without esophagitis    Submandibular lymphadenopathy    Chronic pain syndrome    Salivary gland adenitis      Past Medical History:   Diagnosis Date    Arthritis     Last assessed 5/24/2013    Avitaminosis D 2012    Diabetes mellitus [facility] [address] [facility]     Displacement of cervical intervertebral disc 2014    GERD (gastroesophageal reflux disease)     Glaucoma     Normal Pressure Glaucoma    HTN (hypertension) 2007    Pituitary microadenoma ([facility] [address] ) 2010    Spinal stenosis in cervical region 2014    Uric acid nephrolithiasis 1990      Past Surgical History:   Procedure Laterality Date    ASPIRATION / INJECTION RENAL CYST      Renal Cyst Aspiration    CATARACT EXTRACTION      12/2017- right eye, 01/2018- left eye    EYE SURGERY Bilateral     Cataract Surgery    TONSILLECTOMY        Family History   Problem Relation Age of Onset    Diabetes unspecified Mother     Heart disease Mother     Nephrolithiasis Mother     Kidney disease Mother     Other Mother         Back Disorder    Thyroid disease Mother     Diabetes unspecified Father     Heart disease Father     Diabetes unspecified Sister     Heart disease Sister     Stroke Sister     Diabetes unspecified Brother     Heart disease Brother     Nephrolithiasis Brother     Lung cancer Brother     Other Brother         COPD    Diabetes unspecified Sister     Heart disease Sister     Diabetes unspecified Sister     Diabetes unspecified Brother     Heart disease Brother     Diabetes unspecified Brother     Other Brother         Back Disorder    Diabetes unspecified Brother     Other Brother         Back Disorder    Diabetes unspecified Brother     Stomach cancer Paternal Aunt      Social History     Tobacco Use    Smoking status: Former Smoker     Packs/day: 0 25     Years: 2 00     Pack years: 0 50     Types: Cigarettes     Last attempt to quit:      Years since quittin 6    Smokeless tobacco: Never Used   Substance Use Topics    Alcohol use:  Yes     Alcohol/week: 2 0 standard drinks     Types: 2 Cans of beer per week     Frequency: 2-4 times a month     Drinks per session: 1 or 2     Binge frequency: Never     Comment: social     Allergies   Allergen Reactions    Clonidine Other (See Comments)     Diaphoresis, hot flashes    Augmentin [Amoxicillin-Pot Clavulanate] Abdominal Pain    Biaxin [Clarithromycin] Abdominal Pain    Dust Mite Extract     Insulin Aspart GI Intolerance     Novolog     Molds & Smuts     Nuts     Seasonal Ic [Cholestatin] Headache         Current Outpatient Medications:     amLODIPine (NORVASC) 10 mg tablet, TAKE 1 TABLET BY MOUTH DAILY, Disp: 90 tablet, Rfl: 3    APIDRA SOLOSTAR 100 units/mL injection pen, INJECT 20 UNITS UNDER THE SKIN DAILY BEFORE BREAKFAST, Disp: 15 mL, Rfl: 1    aspirin (ECOTRIN LOW STRENGTH) 81 mg EC tablet, Take 81 mg by mouth daily , Disp: , Rfl:     B Complex Vitamins (VITAMIN B-COMPLEX PO), Take 1 capsule by mouth daily , Disp: , Rfl:     BD PEN NEEDLE MILDRED U/F 32G X 4 MM MISC, Use 2 per day, Disp: 200 each, Rfl: 3    Blood Glucose Monitoring Suppl (ONETOUCH VERIO) w/Device KIT, by Does not apply route once for 1 dose Dispense 1 meter, Disp: 1 kit, Rfl: 1    brimonidine-timolol (COMBIGAN) 0 2-0 5 %, Administer 1 drop to both eyes every 12 (twelve) hours, Disp: , Rfl:     cholecalciferol (VITAMIN D3) 1,000 units tablet, Take 1,000 Units by mouth 2 (two) times a day , Disp: , Rfl:     doxazosin (CARDURA) 4 mg tablet, Take 1 tablet (4 mg total) by mouth daily, Disp: 90 tablet, Rfl: 1    Empagliflozin (JARDIANCE) 25 MG TABS, Take 1 tablet (25 mg total) by mouth daily, Disp: 90 tablet, Rfl: 3    gabapentin (NEURONTIN) 300 mg capsule, Take 1 capsule (300 mg total) by mouth 3 (three) times a day, Disp: 270 capsule, Rfl: 1    Glucosamine-Chondroitin (OSTEO BI-FLEX REGULAR STRENGTH) 250-200 MG TABS, Take 1 tablet by mouth 2 (two) times a day, Disp: , Rfl:     glyBURIDE (DIABETA) 5 mg tablet, TAKE 1 TABLET BY MOUTH THREE TIMES DAILY (Patient taking differently: Take 5 mg by mouth daily with breakfast ), Disp: 270 tablet, Rfl: 1    hydrochlorothiazide (HYDRODIURIL) 25 mg tablet, Take 1 tablet (25 mg total) by mouth daily, Disp: 90 tablet, Rfl: 3    ibuprofen (MOTRIN) 200 mg tablet, Take 200 mg by mouth as needed , Disp: , Rfl:     insulin glargine (LANTUS SOLOSTAR) 100 units/mL injection pen, Inject 30 Units under the skin daily at bedtime, Disp: 30 mL, Rfl: 3    lisinopril (ZESTRIL) 40 mg tablet, Take 1 tablet (40 mg total) by mouth daily, Disp: 90 tablet, Rfl: 3    loratadine (CLARITIN) 10 mg tablet, Take 10 mg by mouth daily, Disp: , Rfl:     mometasone (NASONEX) 50 mcg/act nasal spray, 2 sprays into each nostril daily (Patient taking differently: 2 sprays into each nostril as needed ), Disp: 1 Act, Rfl: 0    MULTIPLE VITAMIN PO, Take 1 tablet by mouth daily , Disp: , Rfl:     omeprazole (PriLOSEC) 10 mg delayed release capsule, TAKE ONE CAPSULE BY MOUTH DAILY, Disp: 90 capsule, Rfl: 1    ONETOUCH VERIO test strip, Test blood sugars 4 x daily as directed, Disp: 400 each, Rfl: 3    other medication, see sig,, Medication/product name: Medical Marijuana, Disp: , Rfl:     Probiotic Product (PROBIOTIC-10) CAPS, Take 1 capsule by mouth daily , Disp: , Rfl:     simvastatin (ZOCOR) 20 mg tablet, Take 1 tablet (20 mg total) by mouth daily at bedtime, Disp: 90 tablet, Rfl: 3    sitaGLIPtin-metFORMIN (Janumet)  MG per tablet, Take 1 tablet by mouth 2 (two) times a day with meals, Disp: 180 tablet, Rfl: 3    vitamin E, tocopherol, 400 units capsule, Take 400 Units by mouth 2 (two) times a day , Disp: , Rfl:     clomiPHENE (CLOMID) 50 mg tablet, 1/2 tab daily, Disp: 30 tablet, Rfl: 1    SYRINGE-NEEDLE, DISP, 3 ML (B-D SYRINGE/NEEDLE 3CC/23GX1") 23G X 1" 3 ML MISC, Use 1 per week (Patient not taking: Reported on 8/10/2020), Disp: 12 each, Rfl: 3    Review of Systems   Constitutional: Positive for fatigue  Negative for activity change and appetite change  HENT: Negative for sore throat, trouble swallowing and voice change  Eyes: Negative for visual disturbance  Respiratory: Negative for choking, chest tightness and shortness of breath  Cardiovascular: Negative for chest pain, palpitations and leg swelling  Gastrointestinal: Negative for abdominal pain, constipation and diarrhea  Endocrine: Negative for cold intolerance, heat intolerance, polydipsia, polyphagia and polyuria  Genitourinary: Negative for frequency  Musculoskeletal: Positive for back pain and neck pain  Negative for arthralgias and myalgias  Skin: Negative for rash  Neurological: Negative for dizziness and syncope  Hematological: Negative for adenopathy  Psychiatric/Behavioral: Negative for sleep disturbance  All other systems reviewed and are negative  Physical Exam:  Body mass index is 29 8 kg/m²  /64 (BP Location: Left arm, Patient Position: Sitting, Cuff Size: Standard)   Pulse 74   Ht 5' 3" (1 6 m)   Wt 76 3 kg (168 lb 3 2 oz)   BMI 29 80 kg/m²    Wt Readings from Last 3 Encounters:   08/10/20 76 3 kg (168 lb 3 2 oz)   06/25/20 75 3 kg (166 lb)   06/15/20 74 8 kg (165 lb)       Physical Exam  Vitals signs reviewed  Constitutional:       General: He is not in acute distress  Appearance: He is well-developed  HENT:      Head: Normocephalic and atraumatic  Eyes:      Conjunctiva/sclera: Conjunctivae normal       Pupils: Pupils are equal, round, and reactive to light  Neck:      Musculoskeletal: Normal range of motion and neck supple  Thyroid: No thyromegaly  Cardiovascular:      Rate and Rhythm: Normal rate and regular rhythm        Heart sounds: Normal heart sounds  No murmur  Pulmonary:      Effort: Pulmonary effort is normal  No respiratory distress  Breath sounds: Normal breath sounds  No wheezing or rales  Abdominal:      General: Bowel sounds are normal  There is no distension  Palpations: Abdomen is soft  Tenderness: There is no abdominal tenderness  Musculoskeletal: Normal range of motion  Lymphadenopathy:      Cervical: No cervical adenopathy  Skin:     General: Skin is warm and dry  Neurological:      Mental Status: He is alert and oriented to person, place, and time  Labs:   Lab Results   Component Value Date    HGBA1C 5 9 (H) 06/19/2020    HGBA1C 6 2 12/31/2019    HGBA1C 6 4 (H) 06/15/2019     Lab Results   Component Value Date    CREATININE 0 92 06/19/2020    CREATININE 1 20 12/31/2019    CREATININE 1 03 06/15/2019    BUN 27 (H) 06/19/2020     10/28/2017    K 3 6 06/19/2020     06/19/2020    CO2 27 06/19/2020     eGFR   Date Value Ref Range Status   06/19/2020 86 ml/min/1 73sq m Final     Lab Results   Component Value Date    CHOL 141 10/28/2017    HDL 28 (L) 06/19/2020    TRIG 150 06/19/2020     Lab Results   Component Value Date    ALT 58 06/19/2020    AST 23 06/19/2020    ALKPHOS 54 06/19/2020    BILITOT 1 2 10/28/2017     Lab Results   Component Value Date    SVR6AJMEADQH 2 700 12/31/2019    COK6ZNVVHJLH 1 550 10/15/2018    UQY8UTDKCKKA 3 11 10/28/2017     Lab Results   Component Value Date    FREET4 1 00 12/31/2019       Impression & Plan:    Problem List Items Addressed This Visit        Endocrine    Type 2 diabetes mellitus with hyperglycemia, with long-term current use of insulin ([address] ) - Primary     Well controlled  He is reporting hypoglycemia on the weekend when [facility] to skip glyburide doses on the weekend     Lab Results   Component Value Date    HGBA1C 5 9 (H) 06/19/2020            Relevant Orders    HEMOGLOBIN A1C W/ EAG ESTIMATION Lab Collect Hypogonadotropic hypogonadism ([facility] [address] )     He has been off testosterone injections due to increased H&H  Since that time, he has had worsening fatigue and decreased libido  Discussed option of clomiphene and that it is off label treatment for hypogonadism  He would like to give this a try  Start clomiphene 25mg daily and complete lab testing as ordered  If not covered or ineffective will resume testosterone injections at lower dose with close monitoring of H&H         Relevant Medications    clomiPHENE (CLOMID) 50 mg tablet    Other Relevant Orders    Testosterone, free, total Lab Collect    Hemoglobin and hematocrit, blood Lab Collect       Cardiovascular and Mediastinum    Benign essential hypertension     This has improved significantly  Continue current regimen  Other    Hyperlipidemia     Continue simvastatin  Vitamin D deficiency     Continue supplements  Orders Placed This Encounter   Procedures    Testosterone, free, total Lab Collect     This is a patient instruction: Fasting preferred  Collections for men not undergoing treatment must be completed between 7am-9am ONLY  Collection time restrictions are not applicable to women or men already undergoing treatment  Standing Status:   Future     Standing Expiration Date:   8/10/2021    Hemoglobin and hematocrit, blood Lab Collect     Standing Status:   Future     Standing Expiration Date:   8/10/2021    HEMOGLOBIN A1C W/ EAG ESTIMATION Lab Collect     Standing Status:   Future     Standing Expiration Date:   8/10/2021       There are no Patient Instructions on file for this visit  Discussed with the patient and all questioned fully answered  He will call me if any problems arise  Follow-up appointment in 3 months       Counseled patient on diagnostic results, prognosis, risk and benefit of treatment options, instruction for management, importance of treatment compliance, Risk  factor reduction and impressions    Lesa Gowers, PA-C

## 2020-08-12 NOTE — ASSESSMENT & PLAN NOTE
He has been off testosterone injections due to increased H&H  Since that time, he has had worsening fatigue and decreased libido  Discussed option of clomiphene and that it is off label treatment for hypogonadism  He would like to give this a try  Start clomiphene 25mg daily and complete lab testing as ordered   If not covered or ineffective will resume testosterone injections at lower dose with close monitoring of H&H

## 2020-08-12 NOTE — ASSESSMENT & PLAN NOTE
Well controlled  He is reporting hypoglycemia on the weekend when [facility] to skip glyburide doses on the weekend     Lab Results   Component Value Date    HGBA1C 5 9 (H) 06/19/2020

## 2020-08-26 ENCOUNTER — TELEPHONE (OUTPATIENT)
Dept: RADIOLOGY | Facility: CLINIC | Age: 65
End: 2020-08-26

## 2020-08-26 NOTE — TELEPHONE ENCOUNTER
lmom for pt to cb to r/s appt on 9/4- Shmuel Lockwood is not in the office in the afternoon- please r/s to the morning or another afternoon that works for patient  Thank you

## 2020-09-09 ENCOUNTER — OFFICE VISIT (OUTPATIENT)
Dept: PAIN MEDICINE | Facility: CLINIC | Age: 65
End: 2020-09-09
Payer: COMMERCIAL

## 2020-09-09 VITALS
SYSTOLIC BLOOD PRESSURE: 138 MMHG | HEART RATE: 75 BPM | DIASTOLIC BLOOD PRESSURE: 68 MMHG | WEIGHT: 167 LBS | HEIGHT: 63 IN | TEMPERATURE: 98.2 F | BODY MASS INDEX: 29.59 KG/M2

## 2020-09-09 DIAGNOSIS — M50.20 CERVICAL HERNIATED DISC: ICD-10-CM

## 2020-09-09 DIAGNOSIS — G89.4 CHRONIC PAIN SYNDROME: Primary | ICD-10-CM

## 2020-09-09 DIAGNOSIS — M48.02 CERVICAL STENOSIS OF SPINAL CANAL: ICD-10-CM

## 2020-09-09 DIAGNOSIS — M54.2 NECK PAIN: ICD-10-CM

## 2020-09-09 DIAGNOSIS — M54.12 CERVICAL RADICULOPATHY: ICD-10-CM

## 2020-09-09 PROCEDURE — 99214 OFFICE O/P EST MOD 30 MIN: CPT | Performed by: NURSE PRACTITIONER

## 2020-09-09 PROCEDURE — 3075F SYST BP GE 130 - 139MM HG: CPT | Performed by: NURSE PRACTITIONER

## 2020-09-09 PROCEDURE — 3078F DIAST BP <80 MM HG: CPT | Performed by: NURSE PRACTITIONER

## 2020-09-09 RX ORDER — GABAPENTIN 300 MG/1
300 CAPSULE ORAL 3 TIMES DAILY
Qty: 270 CAPSULE | Refills: 1 | Status: SHIPPED | OUTPATIENT
Start: 2020-09-09 | End: 2020-09-14 | Stop reason: SDUPTHER

## 2020-09-09 NOTE — PROGRESS NOTES
Assessment:  1  Chronic pain syndrome    2  Neck pain    3  Cervical stenosis of spinal canal    4  Cervical radiculopathy    5  Cervical herniated disc        Plan:  1  The patient may continue gabapentin 300 mg 3 times a day as prescribed  This medication was refilled today  2  The patient may continue medical marijuana as per certified Physician  3  We can repeat cervical epidural steroid injections as needed  4  The patient may continue Tylenol p r n  And should not exceed more than 4000 mg in 24 hours  5  The patient will continue with his home exercise program  6  The patient will follow-up in 6 months for medication prescription refill and reevaluation  The patient was advised to contact the office should their symptoms worsen in the interim  The patient was agreeable and verbalized an understanding  [employer] software was used to dictate this note  It may contain errors with dictating incorrect words or incorrect spelling  Please contact the provider directly with any questions  History of Present Illness: The patient is a 75 y o  male last seen on 3/24/20 who presents for a follow up office visit in regards to chronic right-sided neck pain secondary to cervical degenerative disc disease, stenosis and radiculopathy  The patient denies any radiating symptoms into the upper extremities, bowel or bladder incontinence or balance issues  The patient has had cervical epidural steroid injections in the past with great relief  He feels his pain is currently well managed with gabapentin 300 mg 3 times a day and medical marijuana  He rates his pain as 3/10 on the numeric pain rating scale  States his pain is intermittent in nature and bothersome at night  He characterizes the pain as dull aching and pressure-like  I have personally reviewed and/or updated the patient's past medical history, past surgical history, family history, social history, current medications, allergies, and vital signs today  Review of Systems:    Review of Systems   Constitutional: Negative for fever and unexpected weight change  HENT: Negative for trouble swallowing  Eyes: Negative for visual disturbance  Respiratory: Negative for shortness of breath and wheezing  Cardiovascular: Negative for chest pain and palpitations  Gastrointestinal: Negative for constipation, diarrhea, nausea and vomiting  Endocrine: Negative for cold intolerance, heat intolerance and polydipsia  Genitourinary: Negative for difficulty urinating and frequency  Musculoskeletal: Negative for arthralgias, gait problem, joint swelling and myalgias  Skin: Negative for rash  Neurological: Negative for dizziness, seizures, syncope, weakness and headaches  Hematological: Does not bruise/bleed easily  Psychiatric/Behavioral: Negative for dysphoric mood  All other systems reviewed and are negative          Past Medical History:   Diagnosis Date    Arthritis     Last assessed 5/24/2013    Avitaminosis D 2012    Diabetes mellitus ([facility] )     Displacement of cervical intervertebral disc 2014    GERD (gastroesophageal reflux disease)     Glaucoma     Normal Pressure Glaucoma    HTN (hypertension) 2007    Pituitary microadenoma ([facility] [address] ) 2010    Spinal stenosis in cervical region 2014    Uric acid nephrolithiasis 1990       Past Surgical History:   Procedure Laterality Date    ASPIRATION / INJECTION RENAL CYST      Renal Cyst Aspiration    CATARACT EXTRACTION      12/2017- right eye, 01/2018- left eye    EYE SURGERY Bilateral     Cataract Surgery    TONSILLECTOMY         Family History   Problem Relation Age of Onset    Diabetes unspecified Mother     Heart disease Mother     Nephrolithiasis Mother     Kidney disease Mother     Other Mother         Back Disorder    Thyroid disease Mother     Diabetes unspecified Father     Heart disease Father     Diabetes unspecified Sister     Heart disease Sister     Stroke Sister     Diabetes unspecified Brother     Heart disease Brother     Nephrolithiasis Brother     Lung cancer Brother     Other Brother         COPD    Diabetes unspecified Sister     Heart disease Sister     Diabetes unspecified Sister     Diabetes unspecified Brother     Heart disease Brother     Diabetes unspecified Brother     Other Brother         Back Disorder    Diabetes unspecified Brother     Other Brother         Back Disorder    Diabetes unspecified Brother     Stomach cancer Paternal [de-identified]        Social History     Occupational History    Occupation:    Tobacco Use    Smoking status: Former Smoker     Packs/day: 0 25     Years: 2 00     Pack years: 0 50     Types: Cigarettes     Last attempt to quit:      Years since quittin 7    Smokeless tobacco: Never Used   Substance and Sexual Activity    Alcohol use:  Yes     Alcohol/week: 2 0 standard drinks     Types: 2 Cans of beer per week     Frequency: 2-4 times a month     Drinks per session: 1 or 2     Binge frequency: Never     Comment: social    Drug use: Yes     Types: Marijuana     Comment: legal     Sexual activity: Yes     Partners: Female     Birth control/protection: Female Sterilization         Current Outpatient Medications:     amLODIPine (NORVASC) 10 mg tablet, TAKE 1 TABLET BY MOUTH DAILY, Disp: 90 tablet, Rfl: 3    aspirin (ECOTRIN LOW STRENGTH) 81 mg EC tablet, Take 81 mg by mouth daily , Disp: , Rfl:     B Complex Vitamins (VITAMIN B-COMPLEX PO), Take 1 capsule by mouth daily , Disp: , Rfl:     BD PEN NEEDLE MILDRED U/F 32G X 4 MM MISC, Use 2 per day, Disp: 200 each, Rfl: 3    brimonidine-timolol (COMBIGAN) 0 2-0 5 %, Administer 1 drop to both eyes every 12 (twelve) hours, Disp: , Rfl:     cholecalciferol (VITAMIN D3) 1,000 units tablet, Take 1,000 Units by mouth 2 (two) times a day , Disp: , Rfl:     clomiPHENE (CLOMID) 50 mg tablet, 1/2 tab daily, Disp: 30 tablet, Rfl: 1    doxazosin (CARDURA) 4 mg tablet, Take 1 tablet (4 mg total) by mouth daily, Disp: 90 tablet, Rfl: 1    Empagliflozin (JARDIANCE) 25 MG TABS, Take 1 tablet (25 mg total) by mouth daily, Disp: 90 tablet, Rfl: 3    gabapentin (NEURONTIN) 300 mg capsule, Take 1 capsule (300 mg total) by mouth 3 (three) times a day, Disp: 270 capsule, Rfl: 1    Glucosamine-Chondroitin (OSTEO BI-FLEX REGULAR STRENGTH) 250-200 MG TABS, Take 1 tablet by mouth 2 (two) times a day, Disp: , Rfl:     glyBURIDE (DIABETA) 5 mg tablet, TAKE 1 TABLET BY MOUTH THREE TIMES DAILY (Patient taking differently: Take 5 mg by mouth daily with breakfast ), Disp: 270 tablet, Rfl: 1    hydrochlorothiazide (HYDRODIURIL) 25 mg tablet, Take 1 tablet (25 mg total) by mouth daily, Disp: 90 tablet, Rfl: 3    ibuprofen (MOTRIN) 200 mg tablet, Take 200 mg by mouth as needed , Disp: , Rfl:     insulin glargine (LANTUS SOLOSTAR) 100 units/mL injection pen, Inject 30 Units under the skin daily at bedtime, Disp: 30 mL, Rfl: 3    lisinopril (ZESTRIL) 40 mg tablet, Take 1 tablet (40 mg total) by mouth daily, Disp: 90 tablet, Rfl: 3    loratadine (CLARITIN) 10 mg tablet, Take 10 mg by mouth daily, Disp: , Rfl:     mometasone (NASONEX) 50 mcg/act nasal spray, 2 sprays into each nostril daily (Patient taking differently: 2 sprays into each nostril as needed ), Disp: 1 Act, Rfl: 0    MULTIPLE VITAMIN PO, Take 1 tablet by mouth daily , Disp: , Rfl:     omeprazole (PriLOSEC) 10 mg delayed release capsule, TAKE ONE CAPSULE BY MOUTH DAILY, Disp: 90 capsule, Rfl: 1    ONETOUCH VERIO test strip, Test blood sugars 4 x daily as directed, Disp: 400 each, Rfl: 3    other medication, see sig,, Medication/product name: Medical Marijuana, Disp: , Rfl:     Probiotic Product (PROBIOTIC-10) CAPS, Take 1 capsule by mouth daily , Disp: , Rfl:     simvastatin (ZOCOR) 20 mg tablet, Take 1 tablet (20 mg total) by mouth daily at bedtime, Disp: 90 tablet, Rfl: 3    vitamin E, tocopherol, 400 units capsule, Take 400 Units by mouth 2 (two) times a day , Disp: , Rfl:     APIDRA SOLOSTAR 100 units/mL injection pen, INJECT 20 UNITS UNDER THE SKIN DAILY BEFORE BREAKFAST, Disp: 15 mL, Rfl: 1    Blood Glucose Monitoring Suppl (ONETOUCH VERIO) w/Device KIT, by Does not apply route once for 1 dose Dispense 1 meter, Disp: 1 kit, Rfl: 1    sitaGLIPtin-metFORMIN (Janumet)  MG per tablet, Take 1 tablet by mouth 2 (two) times a day with meals, Disp: 180 tablet, Rfl: 3    SYRINGE-NEEDLE, DISP, 3 ML (B-D SYRINGE/NEEDLE 3CC/23GX1") 23G X 1" 3 ML MISC, Use 1 per week, Disp: 12 each, Rfl: 3    Allergies   Allergen Reactions    Clonidine Other (See Comments)     Diaphoresis, hot flashes    Augmentin [Amoxicillin-Pot Clavulanate] Abdominal Pain    Biaxin [Clarithromycin] Abdominal Pain    Dust Mite Extract     Insulin Aspart GI Intolerance     Novolog     Molds & Smuts     Nuts     Seasonal Ic [Cholestatin] Headache       Physical Exam:    /68   Pulse 75   Temp 98 2 °F (36 8 °C)   Ht 5' 3" (1 6 m)   Wt 75 8 kg (167 lb)   BMI 29 58 kg/m²     Constitutional:normal, well developed, well nourished, alert, in no distress and non-toxic and no overt pain behavior  Eyes:anicteric  HEENT:grossly intact  Neck:supple, symmetric, trachea midline and no masses   Pulmonary:even and unlabored  Cardiovascular:No edema or pitting edema present  Skin:Normal without rashes or lesions and well hydrated  Psychiatric:Mood and affect appropriate  Neurologic:Cranial Nerves II-XII grossly intact  Musculoskeletal:normal gait      Imaging  No orders to display         No orders of the defined types were placed in this encounter

## 2020-09-11 ENCOUNTER — TELEPHONE (OUTPATIENT)
Dept: PAIN MEDICINE | Facility: MEDICAL CENTER | Age: 65
End: 2020-09-11

## 2020-09-11 DIAGNOSIS — M54.12 CERVICAL RADICULOPATHY: ICD-10-CM

## 2020-09-11 DIAGNOSIS — M48.02 CERVICAL STENOSIS OF SPINAL CANAL: ICD-10-CM

## 2020-09-11 DIAGNOSIS — M54.2 NECK PAIN: ICD-10-CM

## 2020-09-11 NOTE — TELEPHONE ENCOUNTER
Saul Lent[facility] called requesting a year supply of refills for patient's Gabapentin 300 mg       Phone#: [phone]  Fax # [phone]

## 2020-09-11 NOTE — TELEPHONE ENCOUNTER
Jefry Ramos, looks like you recently sent a 80 day Rx with 1 Refill to [facility] and now they are requesting refills for 1 year  Pls advise

## 2020-09-13 DIAGNOSIS — E11.65 TYPE 2 DIABETES MELLITUS WITH HYPERGLYCEMIA, WITH LONG-TERM CURRENT USE OF INSULIN (HCC): ICD-10-CM

## 2020-09-13 DIAGNOSIS — Z79.4 TYPE 2 DIABETES MELLITUS WITH HYPERGLYCEMIA, WITH LONG-TERM CURRENT USE OF INSULIN (HCC): ICD-10-CM

## 2020-09-14 RX ORDER — GABAPENTIN 300 MG/1
300 CAPSULE ORAL 3 TIMES DAILY
Qty: 270 CAPSULE | Refills: 3 | Status: SHIPPED | OUTPATIENT
Start: 2020-09-14 | End: 2021-02-08 | Stop reason: SDUPTHER

## 2020-09-14 RX ORDER — SITAGLIPTIN AND METFORMIN HYDROCHLORIDE 1000; 50 MG/1; MG/1
TABLET, FILM COATED ORAL
Qty: 180 TABLET | Refills: 3 | Status: SHIPPED | OUTPATIENT
Start: 2020-09-14 | End: 2021-02-03 | Stop reason: SDUPTHER

## 2020-09-29 ENCOUNTER — OFFICE VISIT (OUTPATIENT)
Dept: INTERNAL MEDICINE CLINIC | Facility: CLINIC | Age: 65
End: 2020-09-29
Payer: COMMERCIAL

## 2020-09-29 VITALS
SYSTOLIC BLOOD PRESSURE: 138 MMHG | HEIGHT: 63 IN | WEIGHT: 166.6 LBS | TEMPERATURE: 98 F | OXYGEN SATURATION: 98 % | HEART RATE: 70 BPM | DIASTOLIC BLOOD PRESSURE: 68 MMHG | BODY MASS INDEX: 29.52 KG/M2

## 2020-09-29 DIAGNOSIS — E11.65 TYPE 2 DIABETES MELLITUS WITH HYPERGLYCEMIA, WITH LONG-TERM CURRENT USE OF INSULIN (HCC): ICD-10-CM

## 2020-09-29 DIAGNOSIS — K21.9 GASTROESOPHAGEAL REFLUX DISEASE WITHOUT ESOPHAGITIS: Primary | ICD-10-CM

## 2020-09-29 DIAGNOSIS — I10 BENIGN ESSENTIAL HYPERTENSION: ICD-10-CM

## 2020-09-29 DIAGNOSIS — E55.9 VITAMIN D DEFICIENCY: ICD-10-CM

## 2020-09-29 DIAGNOSIS — Z79.4 TYPE 2 DIABETES MELLITUS WITH HYPERGLYCEMIA, WITH LONG-TERM CURRENT USE OF INSULIN (HCC): ICD-10-CM

## 2020-09-29 DIAGNOSIS — E78.2 MIXED HYPERLIPIDEMIA: ICD-10-CM

## 2020-09-29 DIAGNOSIS — M25.541 ARTHRALGIA OF RIGHT HAND: ICD-10-CM

## 2020-09-29 PROCEDURE — 99214 OFFICE O/P EST MOD 30 MIN: CPT | Performed by: INTERNAL MEDICINE

## 2020-09-29 PROCEDURE — 1036F TOBACCO NON-USER: CPT | Performed by: INTERNAL MEDICINE

## 2020-09-29 PROCEDURE — 1160F RVW MEDS BY RX/DR IN RCRD: CPT | Performed by: INTERNAL MEDICINE

## 2020-09-29 RX ORDER — DOXAZOSIN MESYLATE 4 MG/1
4 TABLET ORAL DAILY
Qty: 90 TABLET | Refills: 1 | Status: SHIPPED | OUTPATIENT
Start: 2020-09-29 | End: 2021-02-08 | Stop reason: SDUPTHER

## 2020-09-29 NOTE — ASSESSMENT & PLAN NOTE
Continue follow-up with endocrinology  Continue Apidra 20 units daily, Lantus 30 units at bedtime, Jardiance 25 mg daily, glyburide 5 mg daily and Janumet

## 2020-09-29 NOTE — PROGRESS NOTES
Assessment/Plan:    Arthralgia of right hand  Discussed dexterity exercises and use of tylenol/NSAIDs  Vitamin D deficiency  Continue vitamin supplementation  Hyperlipidemia  Continue simvastatin 20 mg daily  Benign essential hypertension  Controlled  Continue lisinopril 40 mg daily, amlodipine 10 mg daily, doxazosin 4 mg daily  Type 2 diabetes mellitus with hyperglycemia, with long-term current use of insulin (HCC)  Continue follow-up with endocrinology  Continue Apidra 20 units daily, Lantus 30 units at bedtime, Jardiance 25 mg daily, glyburide 5 mg daily and Janumet  Gastroesophageal reflux disease without esophagitis  Controlled  Continue omeprazole  Diagnoses and all orders for this visit:    Gastroesophageal reflux disease without esophagitis    Benign essential hypertension  -     doxazosin (CARDURA) 4 mg tablet; Take 1 tablet (4 mg total) by mouth daily    Type 2 diabetes mellitus with hyperglycemia, with long-term current use of insulin (HCC)    Arthralgia of right hand    BMI 27 0-27 9,adult    Vitamin D deficiency    Mixed hyperlipidemia                Time spent during encounter: 25 minutes (counseling, reviewing medications, and discussing treatment and plan)    Subjective:      Patient ID: Lilia Henderson is a 75 y o  male  Chief Complaint   Patient presents with    Follow-up     folloe up on htn    Hypertension    Diabetes       12-year-old male is seen today for follow-up of chronic conditions  No laboratory studies to review today  Hypertension:  He keeps a blood pressure log which he brought in with him today  Average systolic blood pressure readings are 120 mm Hg, ranging from 104-139  He does admit to occasional lightheadedness that occurs whenever he rises from a seated position  He has been compliant with his medication regimen  He is experiencing right index finger pain and swelling  Hypertension   This is a chronic problem   The current episode started more than 1 year ago  The problem is unchanged  The problem is controlled  Pertinent negatives include no chest pain, headaches, palpitations or shortness of breath  Risk factors for coronary artery disease include diabetes mellitus, dyslipidemia and male gender  Past treatments include ACE inhibitors, calcium channel blockers, diuretics and alpha 1 blockers  The current treatment provides moderate improvement  There are no compliance problems  Diabetes   He presents for his follow-up diabetic visit  He has type 2 diabetes mellitus  His disease course has been stable  There are no hypoglycemic associated symptoms  Pertinent negatives for hypoglycemia include no dizziness or headaches  There are no diabetic associated symptoms  Pertinent negatives for diabetes include no chest pain, no fatigue and no weakness  There are no hypoglycemic complications  Symptoms are stable  There are no diabetic complications  Risk factors for coronary artery disease include dyslipidemia, male sex and diabetes mellitus  Current diabetic treatment includes diet, insulin injections and oral agent (triple therapy)  His weight is stable  He is following a diabetic and generally healthy diet  His breakfast blood glucose is taken between 6-7 am  His breakfast blood glucose range is generally 140-180 mg/dl  His lunch blood glucose is taken between 12-1 pm  His lunch blood glucose range is generally 140-180 mg/dl  His dinner blood glucose range is generally 140-180 mg/dl  His overall blood glucose range is 140-180 mg/dl  An ACE inhibitor/angiotensin II receptor blocker is being taken  He sees a podiatrist Eye exam is current  Hyperlipidemia   This is a chronic problem  The current episode started more than 1 year ago  The problem is controlled  Recent lipid tests were reviewed and are normal  Pertinent negatives include no chest pain or shortness of breath  Current antihyperlipidemic treatment includes statins   The current treatment provides moderate improvement of lipids  There are no compliance problems  The following portions of the patient's history were reviewed and updated as appropriate: allergies, current medications, past family history, past medical history, past social history, past surgical history and problem list     Review of Systems   Constitutional: Negative for activity change, appetite change, chills, diaphoresis, fatigue and fever  HENT: Negative for congestion, postnasal drip, rhinorrhea, sinus pressure, sinus pain, sneezing and sore throat  Eyes: Negative for visual disturbance  Respiratory: Negative for apnea, cough, choking, chest tightness, shortness of breath and wheezing  Cardiovascular: Negative for chest pain, palpitations and leg swelling  Gastrointestinal: Negative for abdominal distention, abdominal pain, anal bleeding, blood in stool, constipation, diarrhea, nausea and vomiting  Endocrine: Negative for cold intolerance and heat intolerance  Genitourinary: Negative for difficulty urinating, dysuria and hematuria  Musculoskeletal: Negative  Skin: Negative  Neurological: Negative for dizziness, weakness, light-headedness, numbness and headaches  Hematological: Negative for adenopathy  Psychiatric/Behavioral: Negative for agitation, sleep disturbance and suicidal ideas  All other systems reviewed and are negative          Past Medical History:   Diagnosis Date    Arthritis     Last assessed 5/24/2013    Avitaminosis D 2012    Diabetes mellitus ([facility] )     Displacement of cervical intervertebral disc 2014    GERD (gastroesophageal reflux disease)     Glaucoma     Normal Pressure Glaucoma    HTN (hypertension) 2007    Pituitary microadenoma ([facility] ) 2010    Spinal stenosis in cervical region 2014    Uric acid nephrolithiasis 1990         Current Outpatient Medications:     amLODIPine (NORVASC) 10 mg tablet, TAKE 1 TABLET BY MOUTH DAILY, Disp: 90 tablet, Rfl: 3   aspirin (ECOTRIN LOW STRENGTH) 81 mg EC tablet, Take 81 mg by mouth daily , Disp: , Rfl:     B Complex Vitamins (VITAMIN B-COMPLEX PO), Take 1 capsule by mouth daily , Disp: , Rfl:     BD PEN NEEDLE MILDRED U/F 32G X 4 MM MISC, Use 2 per day, Disp: 200 each, Rfl: 3    brimonidine-timolol (COMBIGAN) 0 2-0 5 %, Administer 1 drop to both eyes every 12 (twelve) hours, Disp: , Rfl:     cholecalciferol (VITAMIN D3) 1,000 units tablet, Take 1,000 Units by mouth 2 (two) times a day , Disp: , Rfl:     clomiPHENE (CLOMID) 50 mg tablet, 1/2 tab daily, Disp: 30 tablet, Rfl: 1    doxazosin (CARDURA) 4 mg tablet, Take 1 tablet (4 mg total) by mouth daily, Disp: 90 tablet, Rfl: 1    Empagliflozin (JARDIANCE) 25 MG TABS, Take 1 tablet (25 mg total) by mouth daily, Disp: 90 tablet, Rfl: 3    gabapentin (NEURONTIN) 300 mg capsule, Take 1 capsule (300 mg total) by mouth 3 (three) times a day, Disp: 270 capsule, Rfl: 3    Glucosamine-Chondroitin (OSTEO BI-FLEX REGULAR STRENGTH) 250-200 MG TABS, Take 1 tablet by mouth 2 (two) times a day, Disp: , Rfl:     glyBURIDE (DIABETA) 5 mg tablet, TAKE 1 TABLET BY MOUTH THREE TIMES DAILY (Patient taking differently: Take 5 mg by mouth daily with breakfast ), Disp: 270 tablet, Rfl: 1    hydrochlorothiazide (HYDRODIURIL) 25 mg tablet, Take 1 tablet (25 mg total) by mouth daily, Disp: 90 tablet, Rfl: 3    ibuprofen (MOTRIN) 200 mg tablet, Take 200 mg by mouth as needed , Disp: , Rfl:     insulin glargine (LANTUS SOLOSTAR) 100 units/mL injection pen, Inject 30 Units under the skin daily at bedtime, Disp: 30 mL, Rfl: 3    Janumet  MG per tablet, TAKE 1 TABLET BY MOUTH TWICE DAILY WITH MEALS, Disp: 180 tablet, Rfl: 3    lisinopril (ZESTRIL) 40 mg tablet, Take 1 tablet (40 mg total) by mouth daily, Disp: 90 tablet, Rfl: 3    loratadine (CLARITIN) 10 mg tablet, Take 10 mg by mouth daily, Disp: , Rfl:     mometasone (NASONEX) 50 mcg/act nasal spray, 2 sprays into each nostril daily (Patient taking differently: 2 sprays into each nostril as needed ), Disp: 1 Act, Rfl: 0    MULTIPLE VITAMIN PO, Take 1 tablet by mouth daily , Disp: , Rfl:     omeprazole (PriLOSEC) 10 mg delayed release capsule, TAKE ONE CAPSULE BY MOUTH DAILY, Disp: 90 capsule, Rfl: 1    ONETOUCH VERIO test strip, Test blood sugars 4 x daily as directed, Disp: 400 each, Rfl: 3    other medication, see sig,, Medication/product name: Medical Marijuana, Disp: , Rfl:     Probiotic Product (PROBIOTIC-10) CAPS, Take 1 capsule by mouth daily , Disp: , Rfl:     simvastatin (ZOCOR) 20 mg tablet, Take 1 tablet (20 mg total) by mouth daily at bedtime, Disp: 90 tablet, Rfl: 3    SYRINGE-NEEDLE, DISP, 3 ML (B-D SYRINGE/NEEDLE 3CC/23GX1") 23G X 1" 3 ML MISC, Use 1 per week, Disp: 12 each, Rfl: 3    vitamin E, tocopherol, 400 units capsule, Take 400 Units by mouth 2 (two) times a day , Disp: , Rfl:     APIDRA SOLOSTAR 100 units/mL injection pen, INJECT 20 UNITS UNDER THE SKIN DAILY BEFORE BREAKFAST, Disp: 15 mL, Rfl: 1    Blood Glucose Monitoring Suppl (Eduardo Gerard) w/Device KIT, by Does not apply route once for 1 dose Dispense 1 meter, Disp: 1 kit, Rfl: 1    Allergies   Allergen Reactions    Clonidine Other (See Comments)     Diaphoresis, hot flashes    Augmentin [Amoxicillin-Pot Clavulanate] Abdominal Pain    Biaxin [Clarithromycin] Abdominal Pain    Dust Mite Extract     Insulin Aspart GI Intolerance     Novolog     Molds & Smuts     Nuts     Seasonal Ic [Cholestatin] Headache       Social History   Past Surgical History:   Procedure Laterality Date    ASPIRATION / INJECTION RENAL CYST      Renal Cyst Aspiration    CATARACT EXTRACTION      12/2017- right eye, 01/2018- left eye    EYE SURGERY Bilateral     Cataract Surgery    TONSILLECTOMY       Family History   Problem Relation Age of Onset    Diabetes unspecified Mother     Heart disease Mother     Nephrolithiasis Mother     Kidney disease Mother    Shadia Courser Other Mother         Back Disorder    Thyroid disease Mother     Diabetes unspecified Father     Heart disease Father     Diabetes unspecified Sister     Heart disease Sister     Stroke Sister     Diabetes unspecified Brother     Heart disease Brother     Nephrolithiasis Brother     Lung cancer Brother     Other Brother         COPD    Diabetes unspecified Sister     Heart disease Sister     Diabetes unspecified Sister     Diabetes unspecified Brother     Heart disease Brother     Diabetes unspecified Brother     Other Brother         Back Disorder    Diabetes unspecified Brother     Other Brother         Back Disorder    Diabetes unspecified Brother     Stomach cancer Paternal Aunt        Objective:  /68 (BP Location: Left arm, Patient Position: Sitting, Cuff Size: Adult)   Pulse 70   Temp 98 °F (36 7 °C)   Ht 5' 3" (1 6 m)   Wt 75 6 kg (166 lb 9 6 oz)   SpO2 98%   BMI 29 51 kg/m²     No results found for this or any previous visit (from the past 1344 hour(s))  Physical Exam  Vitals signs and nursing note reviewed  Constitutional:       General: He is not in acute distress  Appearance: He is well-developed  He is not diaphoretic  HENT:      Head: Normocephalic and atraumatic  Eyes:      General: No scleral icterus  Right eye: No discharge  Left eye: No discharge  Conjunctiva/sclera: Conjunctivae normal       Pupils: Pupils are equal, round, and reactive to light  Neck:      Musculoskeletal: Normal range of motion and neck supple  Thyroid: No thyromegaly  Vascular: No JVD  Cardiovascular:      Rate and Rhythm: Normal rate and regular rhythm  Heart sounds: Normal heart sounds  No murmur  No friction rub  No gallop  Pulmonary:      Effort: Pulmonary effort is normal  No respiratory distress  Breath sounds: Normal breath sounds  No wheezing or rales  Chest:      Chest wall: No tenderness     Abdominal:      General: Bowel sounds are normal  There is no distension  Palpations: Abdomen is soft  There is no mass  Tenderness: There is no abdominal tenderness  There is no guarding or rebound  Musculoskeletal: Normal range of motion  General: No tenderness or deformity  Hands:    Lymphadenopathy:      Cervical: No cervical adenopathy  Skin:     General: Skin is warm and dry  Coloration: Skin is not pale  Findings: No erythema or rash  Neurological:      Mental Status: He is alert and oriented to person, place, and time  Cranial Nerves: No cranial nerve deficit  Coordination: Coordination normal       Deep Tendon Reflexes: Reflexes are normal and symmetric  Psychiatric:         Behavior: Behavior normal          Thought Content:  Thought content normal          Judgment: Judgment normal

## 2020-09-29 NOTE — ASSESSMENT & PLAN NOTE
"Chief Complaint   Patient presents with     Pain     F/U       Initial /90  Pulse 68  Wt 112.9 kg (249 lb)  SpO2 95%  BMI 31.97 kg/m2 Estimated body mass index is 31.97 kg/(m^2) as calculated from the following:    Height as of 11/15/16: 1.88 m (6' 2\").    Weight as of this encounter: 112.9 kg (249 lb).  Medication Reconciliation: [facility]        " Continue vitamin supplementation

## 2020-10-26 ENCOUNTER — OFFICE VISIT (OUTPATIENT)
Dept: SLEEP CENTER | Facility: CLINIC | Age: 65
End: 2020-10-26
Payer: COMMERCIAL

## 2020-10-26 VITALS
BODY MASS INDEX: 29.23 KG/M2 | DIASTOLIC BLOOD PRESSURE: 60 MMHG | HEART RATE: 77 BPM | WEIGHT: 165 LBS | SYSTOLIC BLOOD PRESSURE: 100 MMHG | TEMPERATURE: 96.3 F | HEIGHT: 63 IN

## 2020-10-26 DIAGNOSIS — R41.3 MEMORY DIFFICULTIES: ICD-10-CM

## 2020-10-26 DIAGNOSIS — Z79.899 MEDICAL MARIJUANA USE: ICD-10-CM

## 2020-10-26 DIAGNOSIS — I10 BENIGN ESSENTIAL HYPERTENSION: ICD-10-CM

## 2020-10-26 DIAGNOSIS — Z79.4 TYPE 2 DIABETES MELLITUS WITH HYPERGLYCEMIA, WITH LONG-TERM CURRENT USE OF INSULIN (HCC): ICD-10-CM

## 2020-10-26 DIAGNOSIS — E66.3 OVERWEIGHT (BMI 25.0-29.9): ICD-10-CM

## 2020-10-26 DIAGNOSIS — G89.4 CHRONIC PAIN SYNDROME: ICD-10-CM

## 2020-10-26 DIAGNOSIS — G47.33 OBSTRUCTIVE SLEEP APNEA SYNDROME: Primary | ICD-10-CM

## 2020-10-26 DIAGNOSIS — E11.65 TYPE 2 DIABETES MELLITUS WITH HYPERGLYCEMIA, WITH LONG-TERM CURRENT USE OF INSULIN (HCC): ICD-10-CM

## 2020-10-26 PROCEDURE — 99214 OFFICE O/P EST MOD 30 MIN: CPT | Performed by: INTERNAL MEDICINE

## 2020-10-27 ENCOUNTER — TELEPHONE (OUTPATIENT)
Dept: SLEEP CENTER | Facility: CLINIC | Age: 65
End: 2020-10-27

## 2020-10-28 ENCOUNTER — TELEPHONE (OUTPATIENT)
Dept: SLEEP CENTER | Facility: CLINIC | Age: 65
End: 2020-10-28

## 2020-11-11 ENCOUNTER — TELEPHONE (OUTPATIENT)
Dept: ENDOCRINOLOGY | Facility: CLINIC | Age: 65
End: 2020-11-11

## 2020-11-20 ENCOUNTER — OFFICE VISIT (OUTPATIENT)
Dept: INTERNAL MEDICINE CLINIC | Facility: CLINIC | Age: 65
End: 2020-11-20
Payer: COMMERCIAL

## 2020-11-20 VITALS
HEART RATE: 81 BPM | OXYGEN SATURATION: 98 % | BODY MASS INDEX: 29.55 KG/M2 | DIASTOLIC BLOOD PRESSURE: 70 MMHG | TEMPERATURE: 97.6 F | HEIGHT: 63 IN | SYSTOLIC BLOOD PRESSURE: 148 MMHG | WEIGHT: 166.8 LBS

## 2020-11-20 DIAGNOSIS — R22.30 AXILLARY FULLNESS: Primary | ICD-10-CM

## 2020-11-20 PROBLEM — R59.0 AXILLARY LYMPHADENOPATHY: Status: ACTIVE | Noted: 2020-11-20

## 2020-11-20 PROCEDURE — 3008F BODY MASS INDEX DOCD: CPT | Performed by: INTERNAL MEDICINE

## 2020-11-20 PROCEDURE — 3077F SYST BP >= 140 MM HG: CPT | Performed by: INTERNAL MEDICINE

## 2020-11-20 PROCEDURE — 99213 OFFICE O/P EST LOW 20 MIN: CPT | Performed by: INTERNAL MEDICINE

## 2020-11-20 PROCEDURE — 3078F DIAST BP <80 MM HG: CPT | Performed by: INTERNAL MEDICINE

## 2020-11-20 PROCEDURE — 1160F RVW MEDS BY RX/DR IN RCRD: CPT | Performed by: INTERNAL MEDICINE

## 2020-11-20 PROCEDURE — 1036F TOBACCO NON-USER: CPT | Performed by: INTERNAL MEDICINE

## 2020-11-23 ENCOUNTER — HOSPITAL ENCOUNTER (OUTPATIENT)
Dept: RADIOLOGY | Age: 65
Discharge: HOME | End: 2020-11-23
Payer: COMMERCIAL

## 2020-11-23 ENCOUNTER — LAB (OUTPATIENT)
Dept: LAB | Age: 65
End: 2020-11-23
Payer: COMMERCIAL

## 2020-11-23 DIAGNOSIS — R22.30 AXILLARY FULLNESS: ICD-10-CM

## 2020-11-23 DIAGNOSIS — E11.65 TYPE 2 DIABETES MELLITUS WITH HYPERGLYCEMIA, WITH LONG-TERM CURRENT USE OF INSULIN (HCC): ICD-10-CM

## 2020-11-23 DIAGNOSIS — Z79.4 TYPE 2 DIABETES MELLITUS WITH HYPERGLYCEMIA, WITH LONG-TERM CURRENT USE OF INSULIN (HCC): ICD-10-CM

## 2020-11-23 DIAGNOSIS — E23.0 HYPOGONADOTROPIC HYPOGONADISM (HCC): ICD-10-CM

## 2020-11-23 LAB
BASOPHILS # BLD AUTO: 0.05 THOUSANDS/ΜL (ref 0–0.1)
BASOPHILS NFR BLD AUTO: 1 % (ref 0–1)
EOSINOPHIL # BLD AUTO: 0.32 THOUSAND/ΜL (ref 0–0.61)
EOSINOPHIL NFR BLD AUTO: 3 % (ref 0–6)
ERYTHROCYTE [DISTWIDTH] IN BLOOD BY AUTOMATED COUNT: 14.7 % (ref 11.6–15.1)
HCT VFR BLD AUTO: 45.7 % (ref 36.5–49.3)
HGB BLD-MCNC: 14.9 G/DL (ref 12–17)
IMM GRANULOCYTES # BLD AUTO: 0.05 THOUSAND/UL (ref 0–0.2)
IMM GRANULOCYTES NFR BLD AUTO: 1 % (ref 0–2)
LYMPHOCYTES # BLD AUTO: 2.63 THOUSANDS/ΜL (ref 0.6–4.47)
LYMPHOCYTES NFR BLD AUTO: 26 % (ref 14–44)
MCH RBC QN AUTO: 29 PG (ref 26.8–34.3)
MCHC RBC AUTO-ENTMCNC: 32.6 G/DL (ref 31.4–37.4)
MCV RBC AUTO: 89 FL (ref 82–98)
MONOCYTES # BLD AUTO: 0.89 THOUSAND/ΜL (ref 0.17–1.22)
MONOCYTES NFR BLD AUTO: 9 % (ref 4–12)
NEUTROPHILS # BLD AUTO: 6.06 THOUSANDS/ΜL (ref 1.85–7.62)
NEUTS SEG NFR BLD AUTO: 60 % (ref 43–75)
NRBC BLD AUTO-RTO: 0 /100 WBCS
PLATELET # BLD AUTO: 257 THOUSANDS/UL (ref 149–390)
PMV BLD AUTO: 9.7 FL (ref 8.9–12.7)
RBC # BLD AUTO: 5.14 MILLION/UL (ref 3.88–5.62)
WBC # BLD AUTO: 10 THOUSAND/UL (ref 4.31–10.16)

## 2020-11-23 PROCEDURE — 85025 COMPLETE CBC W/AUTO DIFF WBC: CPT

## 2020-11-23 PROCEDURE — 83036 HEMOGLOBIN GLYCOSYLATED A1C: CPT

## 2020-11-23 PROCEDURE — 36415 COLL VENOUS BLD VENIPUNCTURE: CPT

## 2020-11-23 PROCEDURE — 84402 ASSAY OF FREE TESTOSTERONE: CPT

## 2020-11-23 PROCEDURE — 84403 ASSAY OF TOTAL TESTOSTERONE: CPT

## 2020-11-23 PROCEDURE — 76882 US LMTD JT/FCL EVL NVASC XTR: CPT

## 2020-11-24 ENCOUNTER — TELEPHONE (OUTPATIENT)
Dept: INTERNAL MEDICINE CLINIC | Facility: CLINIC | Age: 65
End: 2020-11-24

## 2020-11-24 LAB
EST. AVERAGE GLUCOSE BLD GHB EST-MCNC: 134 MG/DL
HBA1C MFR BLD: 6.3 %
TESTOST FREE SERPL-MCNC: 2.7 PG/ML (ref 6.6–18.1)
TESTOST SERPL-MCNC: 208 NG/DL (ref 264–916)

## 2020-11-24 PROCEDURE — 3044F HG A1C LEVEL LT 7.0%: CPT | Performed by: INTERNAL MEDICINE

## 2020-11-25 DIAGNOSIS — R59.0 AXILLARY LYMPHADENOPATHY: ICD-10-CM

## 2020-11-25 DIAGNOSIS — R22.30 AXILLARY FULLNESS: Primary | ICD-10-CM

## 2020-12-07 ENCOUNTER — TELEPHONE (OUTPATIENT)
Dept: ENDOCRINOLOGY | Facility: CLINIC | Age: 65
End: 2020-12-07

## 2020-12-07 LAB
LEFT EYE DIABETIC RETINOPATHY: NORMAL
RIGHT EYE DIABETIC RETINOPATHY: NORMAL

## 2020-12-08 DIAGNOSIS — E11.65 TYPE 2 DIABETES MELLITUS WITH HYPERGLYCEMIA, WITH LONG-TERM CURRENT USE OF INSULIN (HCC): ICD-10-CM

## 2020-12-08 DIAGNOSIS — Z79.4 TYPE 2 DIABETES MELLITUS WITH HYPERGLYCEMIA, WITH LONG-TERM CURRENT USE OF INSULIN (HCC): ICD-10-CM

## 2020-12-08 RX ORDER — INSULIN GLULISINE 100 [IU]/ML
20 INJECTION, SOLUTION SUBCUTANEOUS
Qty: 6 PEN | Refills: 0 | Status: SHIPPED | OUTPATIENT
Start: 2020-12-08 | End: 2020-12-16

## 2020-12-09 DIAGNOSIS — Z79.4 TYPE 2 DIABETES MELLITUS WITH HYPERGLYCEMIA, WITH LONG-TERM CURRENT USE OF INSULIN (HCC): ICD-10-CM

## 2020-12-09 DIAGNOSIS — E11.65 TYPE 2 DIABETES MELLITUS WITH HYPERGLYCEMIA, WITH LONG-TERM CURRENT USE OF INSULIN (HCC): ICD-10-CM

## 2020-12-09 RX ORDER — SYRINGE WITH NEEDLE, 1 ML 25GX5/8"
SYRINGE, EMPTY DISPOSABLE MISCELLANEOUS
Qty: 12 EACH | Refills: 3 | Status: SHIPPED | OUTPATIENT
Start: 2020-12-09 | End: 2022-01-12 | Stop reason: ALTCHOICE

## 2020-12-15 DIAGNOSIS — Z79.4 TYPE 2 DIABETES MELLITUS WITH HYPERGLYCEMIA, WITH LONG-TERM CURRENT USE OF INSULIN (HCC): ICD-10-CM

## 2020-12-15 DIAGNOSIS — E11.65 TYPE 2 DIABETES MELLITUS WITH HYPERGLYCEMIA, WITH LONG-TERM CURRENT USE OF INSULIN (HCC): ICD-10-CM

## 2020-12-16 RX ORDER — INSULIN GLULISINE 100 [IU]/ML
20 INJECTION, SOLUTION SUBCUTANEOUS
Qty: 6 PEN | Refills: 0 | Status: SHIPPED | OUTPATIENT
Start: 2020-12-16 | End: 2021-01-05

## 2020-12-21 ENCOUNTER — HOSPITAL ENCOUNTER (OUTPATIENT)
Dept: RADIOLOGY | Age: 65
Discharge: HOME | End: 2020-12-21
Payer: COMMERCIAL

## 2020-12-21 DIAGNOSIS — R59.0 AXILLARY LYMPHADENOPATHY: ICD-10-CM

## 2020-12-21 PROCEDURE — 76882 US LMTD JT/FCL EVL NVASC XTR: CPT

## 2020-12-23 ENCOUNTER — TELEPHONE (OUTPATIENT)
Dept: INTERNAL MEDICINE CLINIC | Age: 65
End: 2020-12-23

## 2021-01-05 DIAGNOSIS — Z79.4 TYPE 2 DIABETES MELLITUS WITH HYPERGLYCEMIA, WITH LONG-TERM CURRENT USE OF INSULIN (HCC): ICD-10-CM

## 2021-01-05 DIAGNOSIS — E11.65 TYPE 2 DIABETES MELLITUS WITH HYPERGLYCEMIA, WITH LONG-TERM CURRENT USE OF INSULIN (HCC): ICD-10-CM

## 2021-01-05 RX ORDER — INSULIN GLULISINE 100 [IU]/ML
20 INJECTION, SOLUTION SUBCUTANEOUS
Qty: 15 ML | Refills: 1 | Status: SHIPPED | OUTPATIENT
Start: 2021-01-05 | End: 2021-07-29 | Stop reason: ALTCHOICE

## 2021-02-03 ENCOUNTER — TELEPHONE (OUTPATIENT)
Dept: ENDOCRINOLOGY | Facility: CLINIC | Age: 66
End: 2021-02-03

## 2021-02-03 DIAGNOSIS — E11.65 TYPE 2 DIABETES MELLITUS WITH HYPERGLYCEMIA, WITH LONG-TERM CURRENT USE OF INSULIN (HCC): Primary | ICD-10-CM

## 2021-02-03 DIAGNOSIS — Z79.4 TYPE 2 DIABETES MELLITUS WITH HYPERGLYCEMIA, WITH LONG-TERM CURRENT USE OF INSULIN (HCC): Primary | ICD-10-CM

## 2021-02-03 RX ORDER — SIMVASTATIN 20 MG
20 TABLET ORAL
Qty: 90 TABLET | Refills: 1 | Status: SHIPPED | OUTPATIENT
Start: 2021-02-03 | End: 2021-06-07 | Stop reason: SDUPTHER

## 2021-02-03 RX ORDER — INSULIN GLARGINE 100 [IU]/ML
30 INJECTION, SOLUTION SUBCUTANEOUS
Qty: 30 ML | Refills: 1 | Status: SHIPPED | OUTPATIENT
Start: 2021-02-03 | End: 2021-10-06 | Stop reason: SDUPTHER

## 2021-02-03 RX ORDER — GLYBURIDE 5 MG/1
5 TABLET ORAL
Qty: 90 TABLET | Refills: 1 | Status: SHIPPED | OUTPATIENT
Start: 2021-02-03 | End: 2021-02-08 | Stop reason: CLARIF

## 2021-02-03 RX ORDER — AMLODIPINE BESYLATE 10 MG/1
10 TABLET ORAL DAILY
Qty: 90 TABLET | Refills: 1 | Status: SHIPPED | OUTPATIENT
Start: 2021-02-03 | End: 2021-06-07 | Stop reason: SDUPTHER

## 2021-02-03 RX ORDER — EMPAGLIFLOZIN 25 MG/1
25 TABLET, FILM COATED ORAL DAILY
Qty: 90 TABLET | Refills: 1 | Status: SHIPPED | OUTPATIENT
Start: 2021-02-03 | End: 2021-08-09

## 2021-02-03 RX ORDER — INSULIN LISPRO 100 [IU]/ML
INJECTION, SOLUTION INTRAVENOUS; SUBCUTANEOUS
Qty: 8 PEN | Refills: 1 | Status: SHIPPED | OUTPATIENT
Start: 2021-02-03 | End: 2021-04-14 | Stop reason: SDUPTHER

## 2021-02-03 RX ORDER — LISINOPRIL 40 MG/1
40 TABLET ORAL DAILY
Qty: 90 TABLET | Refills: 1 | Status: SHIPPED | OUTPATIENT
Start: 2021-02-03 | End: 2021-06-07 | Stop reason: SDUPTHER

## 2021-02-03 RX ORDER — SITAGLIPTIN AND METFORMIN HYDROCHLORIDE 1000; 50 MG/1; MG/1
1 TABLET, FILM COATED ORAL 2 TIMES DAILY WITH MEALS
Qty: 180 TABLET | Refills: 3 | Status: SHIPPED | OUTPATIENT
Start: 2021-02-03 | End: 2021-04-14

## 2021-02-03 NOTE — TELEPHONE ENCOUNTER
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

## 2021-02-03 NOTE — TELEPHONE ENCOUNTER
Patient left a message on our answering machine asking for a call back regarding prescriptions  He stated he called and left a message on 1/25 but never heard back  Please call him at [phone]  Thank you

## 2021-02-08 ENCOUNTER — OFFICE VISIT (OUTPATIENT)
Dept: INTERNAL MEDICINE CLINIC | Facility: CLINIC | Age: 66
End: 2021-02-08
Payer: MEDICARE

## 2021-02-08 VITALS
HEIGHT: 63 IN | SYSTOLIC BLOOD PRESSURE: 102 MMHG | OXYGEN SATURATION: 98 % | TEMPERATURE: 41 F | DIASTOLIC BLOOD PRESSURE: 70 MMHG | BODY MASS INDEX: 29.62 KG/M2 | HEART RATE: 82 BPM | WEIGHT: 167.2 LBS

## 2021-02-08 DIAGNOSIS — M48.02 CERVICAL STENOSIS OF SPINAL CANAL: ICD-10-CM

## 2021-02-08 DIAGNOSIS — Z79.4 TYPE 2 DIABETES MELLITUS WITH HYPERGLYCEMIA, WITH LONG-TERM CURRENT USE OF INSULIN (HCC): Primary | ICD-10-CM

## 2021-02-08 DIAGNOSIS — Z11.59 NEED FOR HEPATITIS C SCREENING TEST: ICD-10-CM

## 2021-02-08 DIAGNOSIS — K21.9 GASTROESOPHAGEAL REFLUX DISEASE WITHOUT ESOPHAGITIS: ICD-10-CM

## 2021-02-08 DIAGNOSIS — M54.12 CERVICAL RADICULOPATHY: ICD-10-CM

## 2021-02-08 DIAGNOSIS — M54.2 NECK PAIN: ICD-10-CM

## 2021-02-08 DIAGNOSIS — E78.2 MIXED HYPERLIPIDEMIA: ICD-10-CM

## 2021-02-08 DIAGNOSIS — I10 BENIGN ESSENTIAL HYPERTENSION: ICD-10-CM

## 2021-02-08 DIAGNOSIS — E55.9 VITAMIN D DEFICIENCY: ICD-10-CM

## 2021-02-08 DIAGNOSIS — Z13.6 SCREENING FOR CARDIOVASCULAR CONDITION: ICD-10-CM

## 2021-02-08 DIAGNOSIS — Z12.12 SCREENING FOR COLORECTAL CANCER: ICD-10-CM

## 2021-02-08 DIAGNOSIS — E11.65 TYPE 2 DIABETES MELLITUS WITH HYPERGLYCEMIA, WITH LONG-TERM CURRENT USE OF INSULIN (HCC): Primary | ICD-10-CM

## 2021-02-08 DIAGNOSIS — Z12.11 SCREENING FOR COLORECTAL CANCER: ICD-10-CM

## 2021-02-08 DIAGNOSIS — Z00.00 ENCOUNTER FOR ANNUAL WELLNESS VISIT (AWV) IN MEDICARE PATIENT: ICD-10-CM

## 2021-02-08 DIAGNOSIS — Z00.00 WELCOME TO MEDICARE PREVENTIVE VISIT: ICD-10-CM

## 2021-02-08 DIAGNOSIS — Z12.5 SCREENING FOR PROSTATE CANCER: ICD-10-CM

## 2021-02-08 PROBLEM — R22.30 AXILLARY FULLNESS: Status: RESOLVED | Noted: 2020-11-20 | Resolved: 2021-02-08

## 2021-02-08 PROBLEM — E78.5 DYSLIPIDEMIA: Status: ACTIVE | Noted: 2021-02-08

## 2021-02-08 PROCEDURE — 1123F ACP DISCUSS/DSCN MKR DOCD: CPT | Performed by: INTERNAL MEDICINE

## 2021-02-08 PROCEDURE — G0402 INITIAL PREVENTIVE EXAM: HCPCS | Performed by: INTERNAL MEDICINE

## 2021-02-08 PROCEDURE — 99214 OFFICE O/P EST MOD 30 MIN: CPT | Performed by: INTERNAL MEDICINE

## 2021-02-08 PROCEDURE — G0403 EKG FOR INITIAL PREVENT EXAM: HCPCS | Performed by: INTERNAL MEDICINE

## 2021-02-08 RX ORDER — HYDROCHLOROTHIAZIDE 25 MG/1
25 TABLET ORAL DAILY
Qty: 90 TABLET | Refills: 3 | Status: SHIPPED | OUTPATIENT
Start: 2021-02-08 | End: 2021-08-17 | Stop reason: SDUPTHER

## 2021-02-08 RX ORDER — OMEPRAZOLE 10 MG/1
10 CAPSULE, DELAYED RELEASE ORAL DAILY
Qty: 90 CAPSULE | Refills: 1 | Status: SHIPPED | OUTPATIENT
Start: 2021-02-08 | End: 2021-03-08

## 2021-02-08 RX ORDER — GABAPENTIN 300 MG/1
300 CAPSULE ORAL 3 TIMES DAILY
Qty: 270 CAPSULE | Refills: 3 | Status: SHIPPED | OUTPATIENT
Start: 2021-02-08 | End: 2021-08-17 | Stop reason: SDUPTHER

## 2021-02-08 RX ORDER — GLIPIZIDE 5 MG/1
5 TABLET, FILM COATED, EXTENDED RELEASE ORAL DAILY
Qty: 90 TABLET | Refills: 1 | Status: SHIPPED | OUTPATIENT
Start: 2021-02-08 | End: 2021-07-29 | Stop reason: ALTCHOICE

## 2021-02-08 RX ORDER — DOXAZOSIN MESYLATE 4 MG/1
4 TABLET ORAL DAILY
Qty: 90 TABLET | Refills: 1 | Status: SHIPPED | OUTPATIENT
Start: 2021-02-08 | End: 2021-08-17 | Stop reason: SDUPTHER

## 2021-02-08 NOTE — PROGRESS NOTES
Assessment and Plan:     Problem List Items Addressed This Visit        Digestive    Gastroesophageal reflux disease without esophagitis    Relevant Medications    omeprazole (PriLOSEC) 10 mg delayed release capsule       Endocrine    Type 2 diabetes mellitus with hyperglycemia, with long-term current use of insulin (HCC)    Relevant Orders    Microalbumin / creatinine urine ratio    Lipid panel       Cardiovascular and Mediastinum    Benign essential hypertension    Relevant Medications    hydrochlorothiazide (HYDRODIURIL) 25 mg tablet    doxazosin (CARDURA) 4 mg tablet       Nervous and Auditory    Cervical radiculopathy    Relevant Medications    gabapentin (NEURONTIN) 300 mg capsule       Other    Cervical stenosis of spinal canal    Relevant Medications    gabapentin (NEURONTIN) 300 mg capsule    Hyperlipidemia    Relevant Orders    Lipid panel      Other Visit Diagnoses     Encounter for annual wellness visit (AWV) in Medicare patient    -  Primary    Relevant Orders    POCT ECG    Neck pain        Relevant Medications    gabapentin (NEURONTIN) 300 mg capsule    Welcome to Medicare preventive visit        Screening for cardiovascular condition        Need for hepatitis C screening test        Screening for colorectal cancer        Screening for prostate cancer        Relevant Orders    PSA, Total Screen        BMI Counseling: Body mass index is 29 62 kg/m²  The BMI is above normal  Nutrition recommendations include decreasing portion sizes, encouraging healthy choices of fruits and vegetables, decreasing fast food intake, consuming healthier snacks, limiting drinks that contain sugar, moderation in carbohydrate intake, increasing intake of lean protein, reducing intake of saturated and trans fat and reducing intake of cholesterol  Exercise recommendations include moderate physical activity 150 minutes/week and exercising 3-5 times per week  No pharmacotherapy was ordered   Patient referred to PCP due to patient being overweight  Depression Screening and Follow-up Plan: Patient's depression screening was positive with a PHQ-2 score of 0  Clincally patient does not have depression  No treatment is required  Falls Plan of Care: balance, strength, and gait training instructions were provided  Medications that increase falls were reviewed  Vitamin D supplementation was recommended  Preventive health issues were discussed with patient, and age appropriate screening tests were ordered as noted in patient's After Visit Summary  Personalized health advice and appropriate referrals for health education or preventive services given if needed, as noted in patient's After Visit Summary  History of Present Illness:     Patient presents for [place] to Medicare visit  Patient Care Team:  Paul Canales MD as PCP - Arlen Meyers MD as PCP - Endocrinology (Endocrinology)  Paul Canales MD as PCP - [facility]CP-[facility]  MD Anabela Browne DO (Pain Medicine)  DO Ashlie John MD     Review of Systems:     Review of Systems   Constitutional: Negative for activity change, appetite change, chills, diaphoresis, fatigue and fever  HENT: Negative for congestion, postnasal drip, rhinorrhea, sinus pressure, sinus pain, sneezing and sore throat  Eyes: Negative for visual disturbance  Respiratory: Negative for apnea, cough, choking, chest tightness, shortness of breath and wheezing  Cardiovascular: Negative for chest pain, palpitations and leg swelling  Gastrointestinal: Negative for abdominal distention, abdominal pain, anal bleeding, blood in stool, constipation, diarrhea, nausea and vomiting  Endocrine: Negative for cold intolerance and heat intolerance  Genitourinary: Negative for difficulty urinating, dysuria and hematuria  Musculoskeletal: Negative  Skin: Negative      Neurological: Negative for dizziness, weakness, light-headedness, numbness and headaches  Hematological: Negative for adenopathy  Psychiatric/Behavioral: Negative for agitation, sleep disturbance and suicidal ideas  All other systems reviewed and are negative       Problem List:     Patient Active Problem List   Diagnosis    Benign essential hypertension    Carpal tunnel syndrome    Cervical herniated disc    Cervical radiculopathy    Cervical stenosis of spinal canal    Type 2 diabetes mellitus with hyperglycemia, with long-term current use of insulin (HCC)    Hyperlipidemia    Hypogonadotropic hypogonadism (HCC)    Nephrolithiasis    Pituitary microadenoma ([address] )    Seborrheic dermatitis    Obstructive sleep apnea syndrome    Spondylosis of cervical region without myelopathy or radiculopathy    Sprain and strain of calcaneofibular (ligament)    Vitamin D deficiency    Sacroiliitis (HCC)    BMI 27 0-27 9,adult    Inflamed seborrheic keratosis    Gastroesophageal reflux disease without esophagitis    Submandibular lymphadenopathy    Chronic pain syndrome    Salivary gland adenitis    Arthralgia of right hand    Axillary fullness      Past Medical and Surgical History:     Past Medical History:   Diagnosis Date    Arthritis     Last assessed 5/24/2013    Avitaminosis D 2012    Diabetes mellitus ([address] )     Displacement of cervical intervertebral disc 2014    GERD (gastroesophageal reflux disease)     Glaucoma     Normal Pressure Glaucoma    HTN (hypertension) 2007    Pituitary microadenoma ([address] ) 2010    Spinal stenosis in cervical region 2014    Uric acid nephrolithiasis 1990     Past Surgical History:   Procedure Laterality Date    ASPIRATION / INJECTION RENAL CYST      Renal Cyst Aspiration    CATARACT EXTRACTION      12/2017- right eye, 01/2018- left eye    EYE SURGERY Bilateral     Cataract Surgery    TONSILLECTOMY        Family History:     Family History   Problem Relation Age of Onset    Diabetes unspecified Mother     Heart disease Mother     Nephrolithiasis Mother     Kidney disease Mother     Other Mother         Back Disorder    Thyroid disease Mother     Diabetes unspecified Father     Heart disease Father     Diabetes unspecified Sister     Heart disease Sister     Stroke Sister     Diabetes unspecified Brother     Heart disease Brother     Nephrolithiasis Brother     Lung cancer Brother     Other Brother         COPD    Diabetes unspecified Sister     Heart disease Sister     Diabetes unspecified Sister     Diabetes unspecified Brother     Heart disease Brother     Diabetes unspecified Brother     Other Brother         Back Disorder    Diabetes unspecified Brother     Other Brother         Back Disorder    Diabetes unspecified Brother     Stomach cancer Paternal Aunt       Social History:     E-Cigarette/Vaping    E-Cigarette Use Never User      E-Cigarette/Vaping Substances    Nicotine No     THC No     CBD No     Flavoring No     Other No     Unknown No      Social History     Socioeconomic History    Marital status: /Civil Union     Spouse name: None    Number of children: None    Years of education: None    Highest education level: None   Occupational History    Occupation:    Social Needs    Financial resource strain: None    Food insecurity     Worry: None     Inability: None    Transportation needs     Medical: None     Non-medical: None   Tobacco Use    Smoking status: Former Smoker     Packs/day: 0 25     Years: 2 00     Pack years: 0 50     Types: Cigarettes     Quit date:      Years since quittin 1    Smokeless tobacco: Never Used   Substance and Sexual Activity    Alcohol use:  Yes     Alcohol/week: 2 0 standard drinks     Types: 2 Cans of beer per week     Frequency: 2-4 times a month     Drinks per session: 1 or 2     Binge frequency: Never     Comment: social    Drug use: Yes     Types: Marijuana     Comment: legal / medical     Sexual activity: Yes     Partners: Female     Birth control/protection: Female Sterilization   Lifestyle    Physical activity     Days per week: None     Minutes per session: None    Stress: None   Relationships    Social connections     Talks on phone: None     Gets together: None     Attends [employer] service: None     Active member of club or organization: None     Attends meetings of clubs or organizations: None     Relationship status: None    Intimate partner violence     Fear of current or ex partner: None     Emotionally abused: None     Physically abused: None     Forced sexual activity: None   Other Topics Concern    None   Social History Narrative    None      Medications and Allergies:     Current Outpatient Medications   Medication Sig Dispense Refill    amLODIPine (NORVASC) 10 mg tablet Take 1 tablet (10 mg total) by mouth daily 90 tablet 1    Apidra SoloStar 100 units/mL injection pen INJECT 20 UNITS UNDER THE SKIN DAILY BEFORE BREAKFAST 15 mL 1    aspirin (ECOTRIN LOW STRENGTH) 81 mg EC tablet Take 81 mg by mouth daily       B Complex Vitamins (VITAMIN B-COMPLEX PO) Take 1 capsule by mouth daily       BD PEN NEEDLE MILDRED U/F 32G X 4 MM MISC Use 2 per day 200 each 3    Blood Glucose Monitoring Suppl ([facility]) w/Device KIT by Does not apply route once for 1 dose Dispense 1 meter 1 kit 1    brimonidine-timolol (COMBIGAN) 0 2-0 5 % Administer 1 drop to both eyes every 12 (twelve) hours      cholecalciferol (VITAMIN D3) 1,000 units tablet Take 1,000 Units by mouth 2 (two) times a day       doxazosin (CARDURA) 4 mg tablet Take 1 tablet (4 mg total) by mouth daily 90 tablet 1    Empagliflozin (Jardiance) 25 MG TABS Take 1 tablet (25 mg total) by mouth daily 90 tablet 1    gabapentin (NEURONTIN) 300 mg capsule Take 1 capsule (300 mg total) by mouth 3 (three) times a day 270 capsule 3    glipiZIDE (GLUCOTROL XL) 5 mg 24 hr tablet Take 1 tablet (5 mg total) by mouth daily 90 tablet 1    Glucosamine-Chondroitin (OSTEO BI-FLEX REGULAR STRENGTH) 250-200 MG TABS Take 1 tablet by mouth 2 (two) times a day      hydrochlorothiazide (HYDRODIURIL) 25 mg tablet Take 1 tablet (25 mg total) by mouth daily 90 tablet 3    ibuprofen (MOTRIN) 200 mg tablet Take 200 mg by mouth as needed       insulin glargine (Lantus SoloStar) 100 units/mL injection pen Inject 30 Units under the skin daily at bedtime 30 mL 1    insulin lispro (HumaLOG KwikPen) 100 units/mL injection pen Take 25 units daily as directed 8 pen 1    lisinopril (ZESTRIL) 40 mg tablet Take 1 tablet (40 mg total) by mouth daily 90 tablet 1    loratadine (CLARITIN) 10 mg tablet Take 10 mg by mouth daily      MULTIPLE VITAMIN PO Take 1 tablet by mouth daily       omeprazole (PriLOSEC) 10 mg delayed release capsule Take 1 capsule (10 mg total) by mouth daily 90 capsule 1    ONETOUCH VERIO test strip Test blood sugars 4 x daily as directed 400 each 3    other medication, see sig, Medication/product name: Medical Marijuana      Probiotic Product (PROBIOTIC-10) CAPS Take 1 capsule by mouth daily       simvastatin (ZOCOR) 20 mg tablet Take 1 tablet (20 mg total) by mouth daily at bedtime 90 tablet 1    sitaGLIPtin-metFORMIN (Janumet)  MG per tablet Take 1 tablet by mouth 2 (two) times a day with meals 180 tablet 3    SYRINGE-NEEDLE, DISP, 3 ML (B-D 3CC LUER-DESHAUN SYR 23GX1") 23G X 1" 3 ML MISC USE AND DISCARD 1 SYRINGE PER WEEK 12 each 3    vitamin E, tocopherol, 400 units capsule Take 400 Units by mouth 2 (two) times a day       clomiPHENE (CLOMID) 50 mg tablet 1/2 tab daily (Patient not taking: Reported on 2/8/2021) 30 tablet 1    mometasone (NASONEX) 50 mcg/act nasal spray 2 sprays into each nostril daily (Patient not taking: Reported on 2/8/2021) 1 Act 0     No current facility-administered medications for this visit        Allergies   Allergen Reactions    Clonidine Other (See Comments)     Diaphoresis, hot flashes    Augmentin [Amoxicillin-Pot Clavulanate] Abdominal Pain    Biaxin [Clarithromycin] Abdominal Pain    Dust Mite Extract     Insulin Aspart GI Intolerance     Novolog     Molds & Smuts     Nuts     Seasonal Ic [Cholestatin] Headache      Immunizations:     Immunization History   Administered Date(s) Administered    H1N1, All Formulations 11/16/2009    INFLUENZA 09/29/2009, 10/07/2016, 10/14/2017, 10/09/2018, 10/04/2019    Influenza Quadrivalent Preservative Free 3 years and older IM 10/14/2017    Influenza, injectable, quadrivalent, preservative free 0 5 mL 10/09/2018    Pneumococcal Conjugate 13-Valent 03/04/2020    Tdap 06/05/2008    Zoster 09/08/2016    Zoster Vaccine Recombinant 12/20/2019, 06/10/2020      Health Maintenance:         Topic Date Due    Colorectal Cancer Screening  02/17/2023    Hepatitis C Screening  Completed         Topic Date Due    DTaP,Tdap,and Td Vaccines (2 - Td) 06/05/2018    Influenza Vaccine (1) 09/01/2020      Medicare Screening Tests and Risk Assessments:     Renita Price is here for his Welcome to Medicare visit  Health Risk Assessment:   Patient rates overall health as good  Patient feels that their physical health rating is same  Eyesight was rated as slightly worse  Hearing was rated as same  Patient feels that their emotional and mental health rating is same  Pain experienced in the last 7 days has been a lot  Patient's pain rating has been 6/10  Patient states that he has experienced no weight loss or gain in last 6 months  Depression Screening:   PHQ-2 Score: 0      Fall Risk Screening: In the past year, patient has experienced: no history of falling in past year      Home Safety:  Patient has trouble with stairs inside or outside of their home  Patient has working smoke alarms and has working carbon monoxide detector  Home safety hazards include: none  Nutrition:   Current diet is No Added Salt, Limited junk food and Diabetic       Medications:   Patient is currently taking over-the-counter supplements  OTC medications include: see medication list  Patient is able to manage medications  Activities of Daily Living (ADLs)/Instrumental Activities of Daily Living (IADLs):   Walk and transfer into and out of bed and chair?: Yes  Dress and groom yourself?: Yes    Bathe or shower yourself?: Yes    Feed yourself? Yes  Do your laundry/housekeeping?: Yes  Manage your money, pay your bills and track your expenses?: Yes  Make your own meals?: Yes    Do your own shopping?: Yes    Previous Hospitalizations:   Any hospitalizations or ED visits within the last 12 months?: No      Advance Care Planning:   Living will: No    Durable POA for healthcare: No    Advanced directive counseling given: Yes    End of Life Decisions reviewed with patient: Yes    Provider agrees with end of life decisions: Yes      Comments: Discussed with patient, full code    PREVENTIVE SCREENINGS      Cardiovascular Screening:    General: Screening Not Indicated, History Lipid Disorder, Risks and Benefits Discussed and Screening Current      Diabetes Screening:     General: Screening Not Indicated, History Diabetes, Risks and Benefits Discussed and Screening Current      Colorectal Cancer Screening:     General: Screening Current      Prostate Cancer Screening:    General: Risks and Benefits Discussed and Screening Current    Due for: PSA      Osteoporosis Screening:    General: Screening Not Indicated      Abdominal Aortic Aneurysm (AAA) Screening:    Risk factors include: age between 73-67 yo        Lung Cancer Screening:     General: Screening Not Indicated      Hepatitis C Screening:    General: Screening Current    Other Counseling Topics:   Alcohol use counseling, car/seat belt/driving safety, skin self-exam, sunscreen and calcium and vitamin D intake and regular weightbearing exercise       No exam data present     Physical Exam:     /70 (BP Location: Left arm, Patient Position: Sitting, Cuff Size: Large)   Pulse 82   Temp (!) 41 °F (5 °C)   Ht 5' 3" (1 6 m)   Wt 75 8 kg (167 lb 3 2 oz)   SpO2 98%   BMI 29 62 kg/m²     Physical Exam  Vitals signs and nursing note reviewed  Constitutional:       General: He is not in acute distress  Appearance: Normal appearance  He is normal weight  He is not ill-appearing, toxic-appearing or diaphoretic  HENT:      Head: Normocephalic and atraumatic  Right Ear: Tympanic membrane, ear canal and external ear normal  There is no impacted cerumen  Left Ear: Tympanic membrane, ear canal and external ear normal  There is no impacted cerumen  Nose: Nose normal  No congestion or rhinorrhea  Mouth/Throat:      Mouth: Mucous membranes are moist       Pharynx: Oropharynx is clear  No oropharyngeal exudate or posterior oropharyngeal erythema  Eyes:      General: No scleral icterus  Right eye: No discharge  Left eye: No discharge  Extraocular Movements: Extraocular movements intact  Conjunctiva/sclera: Conjunctivae normal       Pupils: Pupils are equal, round, and reactive to light  Neck:      Musculoskeletal: Normal range of motion and neck supple  No neck rigidity or muscular tenderness  Vascular: No carotid bruit  Cardiovascular:      Rate and Rhythm: Normal rate and regular rhythm  Pulses: Normal pulses  Heart sounds: Normal heart sounds  No murmur  No friction rub  No gallop  Pulmonary:      Effort: Pulmonary effort is normal  No respiratory distress  Breath sounds: Normal breath sounds  No wheezing or rales  Abdominal:      General: Abdomen is flat  Bowel sounds are normal  There is no distension  Palpations: Abdomen is soft  There is no mass  Tenderness: There is no abdominal tenderness  There is no guarding  Hernia: No hernia is present  Musculoskeletal: Normal range of motion  General: No swelling, tenderness, deformity or signs of injury        Right lower leg: No edema  Left lower leg: No edema  Lymphadenopathy:      Cervical: No cervical adenopathy  Skin:     General: Skin is warm and dry  Capillary Refill: Capillary refill takes less than 2 seconds  Coloration: Skin is not jaundiced or pale  Findings: No bruising, erythema, lesion or rash  Neurological:      General: No focal deficit present  Mental Status: He is alert and oriented to person, place, and time  Mental status is at baseline  Cranial Nerves: No cranial nerve deficit  Sensory: No sensory deficit  Motor: No weakness  Coordination: Coordination normal       Gait: Gait normal       Deep Tendon Reflexes: Reflexes normal    Psychiatric:         Mood and Affect: Mood normal          Behavior: Behavior normal          Thought Content:  Thought content normal          Judgment: Judgment normal           Aliza Morgan MD

## 2021-02-08 NOTE — PROGRESS NOTES
Assessment/Plan:    Gastroesophageal reflux disease without esophagitis  Controlled  Continue omeprazole  Type 2 diabetes mellitus with hyperglycemia, with long-term current use of insulin ([facility])    Lab Results   Component Value Date    HGBA1C 6 3 (H) 11/23/2020   Controlled  Continue follow-up with endocrinology  Continue current diabetic regimen:  Glipizide 5 mg daily, Janumet, Jardiance 25 mg daily, Lantus 30 units daily, Humalog with sliding scale coverage, and Apidra 20 units daily       Benign essential hypertension  Controlled  Continue amlodipine 10 mg daily, doxazosin 4 mg daily, lisinopril 40 mg daily, and hydrochlorothiazide 25 mg daily  Vitamin D deficiency  Continue vitamin supplementation  Dyslipidemia  Continue simvastatin 20 mg daily  Diagnoses and all orders for this visit:    Type 2 diabetes mellitus with hyperglycemia, with long-term current use of insulin ([facility])  -     Microalbumin / creatinine urine ratio; Future  -     Lipid panel; Future    Encounter for annual wellness visit (AWV) in Medicare patient  -     POCT ECG    Gastroesophageal reflux disease without esophagitis  -     omeprazole (PriLOSEC) 10 mg delayed release capsule; Take 1 capsule (10 mg total) by mouth daily    Benign essential hypertension  -     hydrochlorothiazide (HYDRODIURIL) 25 mg tablet; Take 1 tablet (25 mg total) by mouth daily  -     doxazosin (CARDURA) 4 mg tablet; Take 1 tablet (4 mg total) by mouth daily    Neck pain  -     gabapentin (NEURONTIN) 300 mg capsule; Take 1 capsule (300 mg total) by mouth 3 (three) times a day    Cervical stenosis of spinal canal  -     gabapentin (NEURONTIN) 300 mg capsule; Take 1 capsule (300 mg total) by mouth 3 (three) times a day    Cervical radiculopathy  -     gabapentin (NEURONTIN) 300 mg capsule;  Take 1 capsule (300 mg total) by mouth 3 (three) times a day    Welcome to Medicare preventive visit    Screening for cardiovascular condition    Need for hepatitis C screening test    Screening for colorectal cancer    Screening for prostate cancer  -     PSA, Total Screen; Future    Mixed hyperlipidemia  -     Lipid panel; Future    BMI 27 0-27 9,adult    Vitamin D deficiency                Time spent during encounter: 25 minutes (counseling, reviewing medications, and discussing treatment and plan)    Subjective:      Patient ID: Shannon Hamilton is a 77 y o  male  Chief Complaint   Patient presents with    Medicare Wellness Visit     Welcome to [facility]        40-year-old male is seen today for follow-up of chronic conditions  No laboratory studies to review today  He has been compliant with medication regimen  He follows up with his endocrinologist regularly  Diabetes well controlled  Diabetes  He presents for his follow-up diabetic visit  He has type 2 diabetes mellitus  His disease course has been stable  Pertinent negatives for hypoglycemia include no dizziness or headaches  There are no diabetic associated symptoms  Pertinent negatives for diabetes include no chest pain, no fatigue and no weakness  Symptoms are stable  Current diabetic treatment includes oral agent (triple therapy) and insulin injections  He is compliant with treatment all of the time  An ACE inhibitor/angiotensin II receptor blocker is being taken  Eye exam is current  Hypertension  This is a chronic problem  The current episode started more than 1 year ago  The problem is unchanged  The problem is controlled  Pertinent negatives include no chest pain, headaches, palpitations or shortness of breath  Past treatments include diuretics, calcium channel blockers, ACE inhibitors and alpha 1 blockers  The current treatment provides moderate improvement  There are no compliance problems  Hyperlipidemia  This is a chronic problem  The current episode started more than 1 year ago  The problem is controlled   Recent lipid tests were reviewed and are normal  Pertinent negatives include no chest pain or shortness of breath  Current antihyperlipidemic treatment includes statins  The current treatment provides moderate improvement of lipids  There are no compliance problems  The following portions of the patient's history were reviewed and updated as appropriate: allergies, current medications, past family history, past medical history, past social history, past surgical history and problem list     Review of Systems   Constitutional: Negative for activity change, appetite change, chills, diaphoresis, fatigue and fever  HENT: Negative for congestion, postnasal drip, rhinorrhea, sinus pressure, sinus pain, sneezing and sore throat  Eyes: Negative for visual disturbance  Respiratory: Negative for apnea, cough, choking, chest tightness, shortness of breath and wheezing  Cardiovascular: Negative for chest pain, palpitations and leg swelling  Gastrointestinal: Negative for abdominal distention, abdominal pain, anal bleeding, blood in stool, constipation, diarrhea, nausea and vomiting  Endocrine: Negative for cold intolerance and heat intolerance  Genitourinary: Negative for difficulty urinating, dysuria and hematuria  Musculoskeletal: Negative  Skin: Negative  Neurological: Negative for dizziness, weakness, light-headedness, numbness and headaches  Hematological: Negative for adenopathy  Psychiatric/Behavioral: Negative for agitation, sleep disturbance and suicidal ideas  All other systems reviewed and are negative          Past Medical History:   Diagnosis Date    Arthritis     Last assessed 5/24/2013    Avitaminosis D 2012    [facility]     Displacement of cervical intervertebral disc 2014    GERD (gastroesophageal reflux disease)     Glaucoma     Normal Pressure Glaucoma    HTN (hypertension) 2007    Nephrolithiasis 5/24/2013    Pituitary microadenoma ([facility] 2010    Spinal stenosis in cervical region 2014    Sprain and strain of calcaneofibular (ligament) 12/5/2013    Uric acid nephrolithiasis 1990         Current Outpatient Medications:     amLODIPine (NORVASC) 10 mg tablet, Take 1 tablet (10 mg total) by mouth daily, Disp: 90 tablet, Rfl: 1    Apidra SoloStar 100 units/mL injection pen, INJECT 20 UNITS UNDER THE SKIN DAILY BEFORE BREAKFAST, Disp: 15 mL, Rfl: 1    aspirin (ECOTRIN LOW STRENGTH) 81 mg EC tablet, Take 81 mg by mouth daily , Disp: , Rfl:     B Complex Vitamins (VITAMIN B-COMPLEX PO), Take 1 capsule by mouth daily , Disp: , Rfl:     BD PEN NEEDLE MILDRED U/F 32G X 4 MM MISC, Use 2 per day, Disp: 200 each, Rfl: 3    Blood Glucose Monitoring Suppl (ONETOUCH VERIO) w/Device KIT, by Does not apply route once for 1 dose Dispense 1 meter, Disp: 1 kit, Rfl: 1    brimonidine-timolol (COMBIGAN) 0 2-0 5 %, Administer 1 drop to both eyes every 12 (twelve) hours, Disp: , Rfl:     cholecalciferol (VITAMIN D3) 1,000 units tablet, Take 1,000 Units by mouth 2 (two) times a day , Disp: , Rfl:     doxazosin (CARDURA) 4 mg tablet, Take 1 tablet (4 mg total) by mouth daily, Disp: 90 tablet, Rfl: 1    Empagliflozin (Jardiance) 25 MG TABS, Take 1 tablet (25 mg total) by mouth daily, Disp: 90 tablet, Rfl: 1    gabapentin (NEURONTIN) 300 mg capsule, Take 1 capsule (300 mg total) by mouth 3 (three) times a day, Disp: 270 capsule, Rfl: 3    glipiZIDE (GLUCOTROL XL) 5 mg 24 hr tablet, Take 1 tablet (5 mg total) by mouth daily, Disp: 90 tablet, Rfl: 1    Glucosamine-Chondroitin (OSTEO BI-FLEX REGULAR STRENGTH) 250-200 MG TABS, Take 1 tablet by mouth 2 (two) times a day, Disp: , Rfl:     hydrochlorothiazide (HYDRODIURIL) 25 mg tablet, Take 1 tablet (25 mg total) by mouth daily, Disp: 90 tablet, Rfl: 3    ibuprofen (MOTRIN) 200 mg tablet, Take 200 mg by mouth as needed , Disp: , Rfl:     insulin glargine (Lantus SoloStar) 100 units/mL injection pen, Inject 30 Units under the skin daily at bedtime, Disp: 30 mL, Rfl: 1    insulin lispro (HumaLOG KwikPen) 100 units/mL injection pen, Take 25 units daily as directed, Disp: 8 pen, Rfl: 1    lisinopril (ZESTRIL) 40 mg tablet, Take 1 tablet (40 mg total) by mouth daily, Disp: 90 tablet, Rfl: 1    loratadine (CLARITIN) 10 mg tablet, Take 10 mg by mouth daily, Disp: , Rfl:     MULTIPLE VITAMIN PO, Take 1 tablet by mouth daily , Disp: , Rfl:     omeprazole (PriLOSEC) 10 mg delayed release capsule, Take 1 capsule (10 mg total) by mouth daily, Disp: 90 capsule, Rfl: 1    ONETOUCH VERIO test strip, Test blood sugars 4 x daily as directed, Disp: 400 each, Rfl: 3    other medication, see sig,, Medication/product name: Medical Marijuana, Disp: , Rfl:     Probiotic Product (PROBIOTIC-10) CAPS, Take 1 capsule by mouth daily , Disp: , Rfl:     simvastatin (ZOCOR) 20 mg tablet, Take 1 tablet (20 mg total) by mouth daily at bedtime, Disp: 90 tablet, Rfl: 1    sitaGLIPtin-metFORMIN (Janumet)  MG per tablet, Take 1 tablet by mouth 2 (two) times a day with meals, Disp: 180 tablet, Rfl: 3    SYRINGE-NEEDLE, DISP, 3 ML (B-D 3CC LUER-DESHAUN SYR 23GX1") 23G X 1" 3 ML MISC, USE AND DISCARD 1 SYRINGE PER WEEK, Disp: 12 each, Rfl: 3    vitamin E, tocopherol, 400 units capsule, Take 400 Units by mouth 2 (two) times a day , Disp: , Rfl:     mometasone (NASONEX) 50 mcg/act nasal spray, 2 sprays into each nostril daily (Patient not taking: Reported on 2/8/2021), Disp: 1 Act, Rfl: 0    Allergies   Allergen Reactions    Clonidine Other (See Comments)     Diaphoresis, hot flashes    Augmentin [Amoxicillin-Pot Clavulanate] Abdominal Pain    Biaxin [Clarithromycin] Abdominal Pain    Dust Mite Extract     Insulin Aspart GI Intolerance     Novolog     Molds & Smuts     Nuts     Seasonal Ic [Cholestatin] Headache       Social History   Past Surgical History:   Procedure Laterality Date    ASPIRATION / INJECTION RENAL CYST      Renal Cyst Aspiration    CATARACT EXTRACTION      12/2017- right eye, 01/2018- left eye    EYE SURGERY Bilateral Cataract Surgery    TONSILLECTOMY       Family History   Problem Relation Age of Onset    Diabetes unspecified Mother     Heart disease Mother     Nephrolithiasis Mother     Kidney disease Mother     Other Mother         Back Disorder    Thyroid disease Mother     Diabetes unspecified Father     Heart disease Father     Diabetes unspecified Sister     Heart disease Sister     Stroke Sister     Diabetes unspecified Brother     Heart disease Brother     Nephrolithiasis Brother     Lung cancer Brother     Other Brother         COPD    Diabetes unspecified Sister     Heart disease Sister     Diabetes unspecified Sister     Diabetes unspecified Brother     Heart disease Brother     Diabetes unspecified Brother     Other Brother         Back Disorder    Diabetes unspecified Brother     Other Brother         Back Disorder    Diabetes unspecified Brother     Stomach cancer Paternal Aunt        Objective:  /70 (BP Location: Left arm, Patient Position: Sitting, Cuff Size: Large)   Pulse 82   Temp (!) 41 °F (5 °C)   Ht 5' 3" (1 6 m)   Wt 75 8 kg (167 lb 3 2 oz)   SpO2 98%   BMI 29 62 kg/m²     No results found for this or any previous visit (from the past 1344 hour(s))  Physical Exam  Vitals signs and nursing note reviewed  Constitutional:       General: He is not in acute distress  Appearance: He is well-developed  He is not diaphoretic  HENT:      Head: Normocephalic and atraumatic  Eyes:      General: No scleral icterus  Right eye: No discharge  Left eye: No discharge  Conjunctiva/sclera: Conjunctivae normal       Pupils: Pupils are equal, round, and reactive to light  Neck:      Musculoskeletal: Normal range of motion and neck supple  Thyroid: No thyromegaly  Vascular: No JVD  Cardiovascular:      Rate and Rhythm: Normal rate and regular rhythm        Pulses: no weak pulses          Dorsalis pedis pulses are 2+ on the right side and 2+ on the left side  Posterior tibial pulses are 2+ on the right side and 2+ on the left side  Heart sounds: Normal heart sounds  No murmur  No friction rub  No gallop  Pulmonary:      Effort: Pulmonary effort is normal  No respiratory distress  Breath sounds: Normal breath sounds  No wheezing or rales  Chest:      Chest wall: No tenderness  Abdominal:      General: Bowel sounds are normal  There is no distension  Palpations: Abdomen is soft  There is no mass  Tenderness: There is no abdominal tenderness  There is no guarding or rebound  Musculoskeletal: Normal range of motion  General: No tenderness or deformity  Feet:      Right foot:      Skin integrity: No ulcer, skin breakdown, erythema, warmth, callus or dry skin  Left foot:      Skin integrity: No ulcer, skin breakdown, erythema, warmth, callus or dry skin  Lymphadenopathy:      Cervical: No cervical adenopathy  Skin:     General: Skin is warm and dry  Coloration: Skin is not pale  Findings: No erythema or rash  Neurological:      Mental Status: He is alert and oriented to person, place, and time  Cranial Nerves: No cranial nerve deficit  Coordination: Coordination normal       Deep Tendon Reflexes: Reflexes are normal and symmetric  Psychiatric:         Behavior: Behavior normal          Thought Content: Thought content normal          Judgment: Judgment normal          Patient's shoes and socks removed  Right Foot/Ankle   Right Foot Inspection  Skin Exam: skin normal and skin intact no dry skin, no warmth, no callus, no erythema, no maceration, no abnormal color, no pre-ulcer, no ulcer and no callus                          Toe Exam: ROM and strength within normal limits  Sensory       Monofilament testing: intact  Vascular  Capillary refills: < 3 seconds  The right DP pulse is 2+  The right PT pulse is 2+       Left Foot/Ankle  Left Foot Inspection  Skin Exam: skin normal and skin intactno dry skin, no warmth, no erythema, no maceration, normal color, no pre-ulcer, no ulcer and no callus                         Toe Exam: ROM and strength within normal limits                   Sensory       Monofilament: intact  Vascular  Capillary refills: < 3 seconds  The left DP pulse is 2+  The left PT pulse is 2+  Assign Risk Category:  No deformity present; No loss of protective sensation;  No weak pulses       Risk: 0

## 2021-02-08 NOTE — TELEPHONE ENCOUNTER
Called and notified patient that med was being switched  He said he goes back and fourth depending was insurance covers so it wasn't a problem  Please authorize attached Rx  Thanks!

## 2021-02-08 NOTE — ASSESSMENT & PLAN NOTE
Lab Results   Component Value Date    HGBA1C 6 3 (H) 11/23/2020   Controlled  Continue follow-up with endocrinology  Continue current diabetic regimen:  Glipizide 5 mg daily, Janumet, Jardiance 25 mg daily, Lantus 30 units daily, Humalog with sliding scale coverage, and Apidra 20 units daily  Leidy Serum

## 2021-02-08 NOTE — ASSESSMENT & PLAN NOTE
Controlled  Continue amlodipine 10 mg daily, doxazosin 4 mg daily, lisinopril 40 mg daily, and hydrochlorothiazide 25 mg daily

## 2021-02-08 NOTE — TELEPHONE ENCOUNTER
Received notice from [employer] that Glyburide is no longer covered by insurance  Preferred covered alternative is Glipizide  Did you want to switch or have me submit PA? Please advise

## 2021-02-08 NOTE — PATIENT INSTRUCTIONS

## 2021-02-17 DIAGNOSIS — E11.9 TYPE 2 DIABETES MELLITUS WITHOUT COMPLICATION, WITH LONG-TERM CURRENT USE OF INSULIN (HCC): ICD-10-CM

## 2021-02-17 DIAGNOSIS — Z79.4 TYPE 2 DIABETES MELLITUS WITH HYPERGLYCEMIA, WITH LONG-TERM CURRENT USE OF INSULIN (HCC): ICD-10-CM

## 2021-02-17 DIAGNOSIS — Z79.4 TYPE 2 DIABETES MELLITUS WITHOUT COMPLICATION, WITH LONG-TERM CURRENT USE OF INSULIN (HCC): ICD-10-CM

## 2021-02-17 DIAGNOSIS — E11.65 TYPE 2 DIABETES MELLITUS WITH HYPERGLYCEMIA, WITH LONG-TERM CURRENT USE OF INSULIN (HCC): ICD-10-CM

## 2021-02-17 RX ORDER — BLOOD SUGAR DIAGNOSTIC
STRIP MISCELLANEOUS
Qty: 400 EACH | Refills: 3 | Status: SHIPPED | OUTPATIENT
Start: 2021-02-17 | End: 2021-08-17

## 2021-02-17 RX ORDER — PEN NEEDLE, DIABETIC 32GX 5/32"
NEEDLE, DISPOSABLE MISCELLANEOUS
Qty: 200 EACH | Refills: 3 | Status: SHIPPED | OUTPATIENT
Start: 2021-02-17 | End: 2021-05-27 | Stop reason: SDUPTHER

## 2021-03-04 ENCOUNTER — TELEPHONE (OUTPATIENT)
Dept: PAIN MEDICINE | Facility: MEDICAL CENTER | Age: 66
End: 2021-03-04

## 2021-03-04 NOTE — TELEPHONE ENCOUNTER
S/w the patient today and he stated he was shoveling two weeks ago and he hurt his back  The pain goes across his LB and down into his hip and leg  Reviewed the previous office note form 9/9/20 and he was previously treated for cervical pain and I then inquired to see if we had ever treated him for lower back pain and he stated no  He was never seen previously for that  Not sure how to schedule  Please advise   Thanks

## 2021-03-04 NOTE — TELEPHONE ENCOUNTER
Pt called stating that he is having a lot of pain and would like to know what can be done     Pt can be reached at [phone]

## 2021-03-08 DIAGNOSIS — K21.9 GASTROESOPHAGEAL REFLUX DISEASE WITHOUT ESOPHAGITIS: ICD-10-CM

## 2021-03-08 RX ORDER — OMEPRAZOLE 10 MG/1
CAPSULE, DELAYED RELEASE ORAL
Qty: 90 CAPSULE | Refills: 1 | Status: SHIPPED | OUTPATIENT
Start: 2021-03-08 | End: 2021-08-17 | Stop reason: SDUPTHER

## 2021-03-10 DIAGNOSIS — Z23 ENCOUNTER FOR IMMUNIZATION: ICD-10-CM

## 2021-03-12 DIAGNOSIS — I10 BENIGN ESSENTIAL HYPERTENSION: ICD-10-CM

## 2021-03-12 RX ORDER — DOXAZOSIN MESYLATE 4 MG/1
TABLET ORAL
Qty: 90 TABLET | Refills: 1 | OUTPATIENT
Start: 2021-03-12

## 2021-03-18 ENCOUNTER — OFFICE VISIT (OUTPATIENT)
Dept: PAIN MEDICINE | Facility: CLINIC | Age: 66
End: 2021-03-18
Payer: MEDICARE

## 2021-03-18 ENCOUNTER — APPOINTMENT (OUTPATIENT)
Dept: RADIOLOGY | Age: 66
End: 2021-03-18
Payer: MEDICARE

## 2021-03-18 VITALS — HEIGHT: 63 IN | BODY MASS INDEX: 29.59 KG/M2 | WEIGHT: 167 LBS | TEMPERATURE: 97.9 F

## 2021-03-18 DIAGNOSIS — M54.40 LOW BACK PAIN WITH SCIATICA, SCIATICA LATERALITY UNSPECIFIED, UNSPECIFIED BACK PAIN LATERALITY, UNSPECIFIED CHRONICITY: ICD-10-CM

## 2021-03-18 DIAGNOSIS — M54.12 CERVICAL RADICULOPATHY: ICD-10-CM

## 2021-03-18 DIAGNOSIS — M47.812 SPONDYLOSIS OF CERVICAL REGION WITHOUT MYELOPATHY OR RADICULOPATHY: ICD-10-CM

## 2021-03-18 DIAGNOSIS — G89.4 CHRONIC PAIN SYNDROME: ICD-10-CM

## 2021-03-18 DIAGNOSIS — M48.02 CERVICAL STENOSIS OF SPINAL CANAL: ICD-10-CM

## 2021-03-18 DIAGNOSIS — M54.16 LUMBAR RADICULOPATHY: Primary | ICD-10-CM

## 2021-03-18 DIAGNOSIS — M54.16 LUMBAR RADICULOPATHY: ICD-10-CM

## 2021-03-18 PROCEDURE — 99214 OFFICE O/P EST MOD 30 MIN: CPT | Performed by: ANESTHESIOLOGY

## 2021-03-18 PROCEDURE — 72100 X-RAY EXAM L-S SPINE 2/3 VWS: CPT

## 2021-03-18 NOTE — PROGRESS NOTES
Assessment  1  Lumbar radiculopathy    2  Low back pain with sciatica, sciatica laterality unspecified, unspecified back pain laterality, unspecified chronicity    3  Spondylosis of cervical region without myelopathy or radiculopathy    4  Chronic pain syndrome    5  Cervical radiculopathy    6  Cervical stenosis of spinal canal        Plan    49-year-old male last seen March 24, 2020 with a history of cervical degenerative disc disease, spondylosis, stenosis, and cervical radiculopathy presenting for interval consultation regarding new complaint of lumbosacral back pain that radiates into the lateral aspect of the right lower extremity to the ankle with associated subjective weakness  Pain began when he was shoveling snow a few months ago  Patient's neck and arm symptoms are fairly well controlled at this point  He is currently taking gabapentin 300 mg t i d  and using medical marijuana with significant relief of his neck and arm symptoms, however no relief of his low back and lower extremity symptoms  The patient's symptoms are consistent with lumbar radiculopathy predominantly in the L5 distribution  The patient does have significant weakness particularly of the right quadriceps, dorsiflexion, and EHL as well as a positive straight leg raise on the right  1  Will order an x-ray of the lumbar spine as well as an MRI of the lumbar spine without contrast considering significant  Unilateral weakness of the right lower extremity  2  I advised the patient he may increase his gabapentin to 300 mg in the morning, 300 mg in the afternoon, and 600 mg at bedtime for his neuropathic complaints  3  Patient will continue with medical marijuana as prescribed by certified provider  4  The patient was provided with a prescription for physical therapy as I feel he may benefit from Ruthie Collie based exercises  5   I will follow up the patient in 4-6 weeks or sooner if needed        My impressions and treatment recommendations were discussed in detail with the patient who verbalized understanding and had no further questions  Discharge instructions were provided  I personally saw and examined the patient and I agree with the above discussed plan of care  No orders of the defined types were placed in this encounter  No orders of the defined types were placed in this encounter  History of Present Illness    Tri Chow is a 77 y o  male  last seen March 24, 2020 with a history of cervical degenerative disc disease, spondylosis, stenosis, and cervical radiculopathy presenting for interval consultation regarding new complaint of lumbosacral back pain that radiates into the lateral aspect of the right lower extremity to the ankle with associated subjective weakness  He denies any left lower extremity symptoms, bladder bowel incontinence, or saddle anesthesia  Pain began when he was shoveling snow a few months ago  Patient's neck and arm symptoms are fairly well controlled at this point  He is currently taking gabapentin 300 mg t i d  and using medical marijuana with significant relief of his neck and arm symptoms, however no relief of his low back and lower extremity symptoms  The patient rates his pain as 7/10 on the pain is worse in the morning and at night  The pain is intermittent and described as dull, aching, sharp, and shooting  The pain is worse with standing, walking, bending, and exercise  The pain is alleviated with sitting, relaxation, and lying down  Other than as stated above, the patient denies any interval changes in medications, medical condition, mental condition, symptoms, or allergies since the last office visit  I have personally reviewed and/or updated the patient's past medical history, past surgical history, family history, social history, current medications, allergies, and vital signs today  Review of Systems   Constitutional: Negative for fever and unexpected weight change  HENT: Negative for trouble swallowing  Eyes: Negative for visual disturbance  Respiratory: Negative for shortness of breath and wheezing  Cardiovascular: Negative for chest pain and palpitations  Gastrointestinal: Negative for constipation, diarrhea, nausea and vomiting  Endocrine: Negative for cold intolerance, heat intolerance and polydipsia  Genitourinary: Negative for difficulty urinating and frequency  Musculoskeletal: Positive for gait problem and joint swelling  Negative for arthralgias and myalgias  Decreased ROM, pain in extremity   Skin: Negative for rash  Neurological: Positive for weakness  Negative for dizziness, seizures, syncope and headaches  Hematological: Does not bruise/bleed easily  Psychiatric/Behavioral: Negative for dysphoric mood  All other systems reviewed and are negative        Patient Active Problem List   Diagnosis    Benign essential hypertension    Carpal tunnel syndrome    Cervical herniated disc    Cervical radiculopathy    Cervical stenosis of spinal canal    Type 2 diabetes mellitus with hyperglycemia, with long-term current use of insulin (HCC)    Hyperlipidemia    Hypogonadotropic hypogonadism (HCC)    Pituitary microadenoma (HCC)    Seborrheic dermatitis    Obstructive sleep apnea syndrome    Spondylosis of cervical region without myelopathy or radiculopathy    Vitamin D deficiency    Sacroiliitis (HCC)    BMI 27 0-27 9,adult    Inflamed seborrheic keratosis    Gastroesophageal reflux disease without esophagitis    Submandibular lymphadenopathy    Chronic pain syndrome    Salivary gland adenitis    Arthralgia of right hand    Dyslipidemia       Past Medical History:   Diagnosis Date    Arthritis     Last assessed 5/24/2013    Avitaminosis D 2012    Diabetes mellitus ([facility] [address] [facility]     Displacement of cervical intervertebral disc 2014    GERD (gastroesophageal reflux disease)     Glaucoma     Normal Pressure Glaucoma    HTN (hypertension) 2007    Nephrolithiasis 2013    Pituitary microadenoma ([address] ) 2010    Spinal stenosis in cervical region     Sprain and strain of calcaneofibular (ligament) 2013    Uric acid nephrolithiasis        Past Surgical History:   Procedure Laterality Date    ASPIRATION / INJECTION RENAL CYST      Renal Cyst Aspiration    CATARACT EXTRACTION      2017- right eye, 2018- left eye    EYE SURGERY Bilateral     Cataract Surgery    TONSILLECTOMY         Family History   Problem Relation Age of Onset    Diabetes unspecified Mother     Heart disease Mother     Nephrolithiasis Mother     Kidney disease Mother     Other Mother         Back Disorder    Thyroid disease Mother     Diabetes unspecified Father     Heart disease Father     Diabetes unspecified Sister     Heart disease Sister     Stroke Sister     Diabetes unspecified Brother     Heart disease Brother     Nephrolithiasis Brother     Lung cancer Brother     Other Brother         COPD    Diabetes unspecified Sister     Heart disease Sister     Diabetes unspecified Sister     Diabetes unspecified Brother     Heart disease Brother     Diabetes unspecified Brother     Other Brother         Back Disorder    Diabetes unspecified Brother     Other Brother         Back Disorder    Diabetes unspecified Brother     Stomach cancer Paternal [de-identified]        Social History     Occupational History    Occupation:    Tobacco Use    Smoking status: Former Smoker     Packs/day: 0 25     Years: 2 00     Pack years: 0 50     Types: Cigarettes     Quit date:      Years since quittin 2    Smokeless tobacco: Never Used   Substance and Sexual Activity    Alcohol use:  Yes     Alcohol/week: 2 0 standard drinks     Types: 2 Cans of beer per week     Frequency: 2-4 times a month     Drinks per session: 1 or 2     Binge frequency: Never     Comment: social    Drug use: Yes     Types: Marijuana Comment: legal / medical     Sexual activity: Yes     Partners: Female     Birth control/protection: Female Sterilization       Current Outpatient Medications on File Prior to Visit   Medication Sig    amLODIPine (NORVASC) 10 mg tablet Take 1 tablet (10 mg total) by mouth daily    Apidra SoloStar 100 units/mL injection pen INJECT 20 UNITS UNDER THE SKIN DAILY BEFORE BREAKFAST    aspirin (ECOTRIN LOW STRENGTH) 81 mg EC tablet Take 81 mg by mouth daily     B Complex Vitamins (VITAMIN B-COMPLEX PO) Take 1 capsule by mouth daily     BD Pen Needle Harriett U/F 32G X 4 MM MISC Use 2 per day    brimonidine-timolol (COMBIGAN) 0 2-0 5 % Administer 1 drop to both eyes every 12 (twelve) hours    cholecalciferol (VITAMIN D3) 1,000 units tablet Take 1,000 Units by mouth 2 (two) times a day     doxazosin (CARDURA) 4 mg tablet Take 1 tablet (4 mg total) by mouth daily    Empagliflozin (Jardiance) 25 MG TABS Take 1 tablet (25 mg total) by mouth daily    gabapentin (NEURONTIN) 300 mg capsule Take 1 capsule (300 mg total) by mouth 3 (three) times a day    glipiZIDE (GLUCOTROL XL) 5 mg 24 hr tablet Take 1 tablet (5 mg total) by mouth daily    Glucosamine-Chondroitin (OSTEO BI-FLEX REGULAR STRENGTH) 250-200 MG TABS Take 1 tablet by mouth 2 (two) times a day    hydrochlorothiazide (HYDRODIURIL) 25 mg tablet Take 1 tablet (25 mg total) by mouth daily    ibuprofen (MOTRIN) 200 mg tablet Take 200 mg by mouth as needed     insulin glargine (Lantus SoloStar) 100 units/mL injection pen Inject 30 Units under the skin daily at bedtime    insulin lispro (HumaLOG KwikPen) 100 units/mL injection pen Take 25 units daily as directed    lisinopril (ZESTRIL) 40 mg tablet Take 1 tablet (40 mg total) by mouth daily    loratadine (CLARITIN) 10 mg tablet Take 10 mg by mouth daily    mometasone (NASONEX) 50 mcg/act nasal spray 2 sprays into each nostril daily    MULTIPLE VITAMIN PO Take 1 tablet by mouth daily     omeprazole (PriLOSEC) 10 mg delayed release capsule TAKE ONE CAPSULE BY MOUTH DAILY    OneTouch Verio test strip Test blood sugars 4 x daily as directed    other medication, see sig, Medication/product name: Medical Marijuana    Probiotic Product (PROBIOTIC-10) CAPS Take 1 capsule by mouth daily     simvastatin (ZOCOR) 20 mg tablet Take 1 tablet (20 mg total) by mouth daily at bedtime    sitaGLIPtin-metFORMIN (Janumet)  MG per tablet Take 1 tablet by mouth 2 (two) times a day with meals    SYRINGE-NEEDLE, DISP, 3 ML (B-D 3CC LUER-DESHAUN SYR 23GX1") 23G X 1" 3 ML MISC USE AND DISCARD 1 SYRINGE PER WEEK    vitamin E, tocopherol, 400 units capsule Take 400 Units by mouth 2 (two) times a day     Blood Glucose Monitoring Suppl (ONETOUCH VERIO) w/Device KIT by Does not apply route once for 1 dose Dispense 1 meter     No current facility-administered medications on file prior to visit  Allergies   Allergen Reactions    Clonidine Other (See Comments)     Diaphoresis, hot flashes    Augmentin [Amoxicillin-Pot Clavulanate] Abdominal Pain    Biaxin [Clarithromycin] Abdominal Pain    Dust Mite Extract     Insulin Aspart GI Intolerance     Novolog     Molds & Smuts     Nuts     Seasonal Ic [Cholestatin] Headache       Physical Exam    Temp 97 9 °F (36 6 °C) (Oral)   Ht 5' 3" (1 6 m)   Wt 75 8 kg (167 lb)   BMI 29 58 kg/m²     Constitutional: normal, well developed, well nourished, alert, in no distress and non-toxic and no overt pain behavior  Eyes: anicteric  HEENT: grossly intact  Neck: supple, symmetric, trachea midline and no masses   Pulmonary:even and unlabored  Cardiovascular:No edema or pitting edema present  Skin:Normal without rashes or lesions and well hydrated  Psychiatric:Mood and affect appropriate  Neurologic:Cranial Nerves II-XII grossly intact  Musculoskeletal:antalgic  Gait  Right lumbar paraspinals tender to palpation from L4-L5  Bilateral SI joints nontender to palpation    Bilateral patellar and Achilles reflexes were 2/4 and symmetrical   Left lower extremity strength 5/5 in all muscle groups  Right plantar flexion 5/5 strength  Right dorsiflexion and EHL 4-5 strength  Right quadriceps 3-5 strength  Right hamstrings 4-5 strength  Right iliopsoas 5/5 strength  Sensation intact to light touch in L3 through S1 dermatomes bilaterally  Positive straight leg raise on the right and negative on the left  Negative Nain's test bilaterally      Imaging

## 2021-04-06 ENCOUNTER — HOSPITAL ENCOUNTER (OUTPATIENT)
Dept: RADIOLOGY | Age: 66
Discharge: HOME | End: 2021-04-06
Payer: MEDICARE

## 2021-04-06 DIAGNOSIS — M54.16 LUMBAR RADICULOPATHY: ICD-10-CM

## 2021-04-06 PROCEDURE — 72148 MRI LUMBAR SPINE W/O DYE: CPT

## 2021-04-06 PROCEDURE — G1004 CDSM NDSC: HCPCS

## 2021-04-09 ENCOUNTER — TELEPHONE (OUTPATIENT)
Dept: PAIN MEDICINE | Facility: CLINIC | Age: 66
End: 2021-04-09

## 2021-04-09 NOTE — TELEPHONE ENCOUNTER
Please notify the patient the MRI of his lumbar spine reveals degenerative disc disease and arthritis at L4-5 and L5-S1  There is mild-to-moderate right and mild left foraminal stenosis at L5-S1    I can offer the patient a right L5 TFESI

## 2021-04-12 NOTE — TELEPHONE ENCOUNTER
S/ws the patient and reviewed  He takes 81 mg ASA daily and had both COVID immunizations  Please schedule and then send to JW to review  He stated he continues with pain in his neck and upper back area and he is aware that he failed the blocks but would like to know what to do for the pain in his upper back and neck area  Encouraged him to schedule the LB epidural and he does have an ovs scheduled for 4/29 but JW to advise otherwise   Thanks

## 2021-04-14 ENCOUNTER — OFFICE VISIT (OUTPATIENT)
Dept: ENDOCRINOLOGY | Facility: CLINIC | Age: 66
End: 2021-04-14
Payer: MEDICARE

## 2021-04-14 VITALS
DIASTOLIC BLOOD PRESSURE: 58 MMHG | SYSTOLIC BLOOD PRESSURE: 138 MMHG | HEIGHT: 63 IN | WEIGHT: 166.9 LBS | BODY MASS INDEX: 29.57 KG/M2 | HEART RATE: 68 BPM

## 2021-04-14 DIAGNOSIS — E78.2 MIXED HYPERLIPIDEMIA: ICD-10-CM

## 2021-04-14 DIAGNOSIS — E55.9 VITAMIN D DEFICIENCY: ICD-10-CM

## 2021-04-14 DIAGNOSIS — D35.2 PITUITARY MICROADENOMA (HCC): ICD-10-CM

## 2021-04-14 DIAGNOSIS — G47.33 OBSTRUCTIVE SLEEP APNEA SYNDROME: ICD-10-CM

## 2021-04-14 DIAGNOSIS — Z79.4 TYPE 2 DIABETES MELLITUS WITH HYPERGLYCEMIA, WITH LONG-TERM CURRENT USE OF INSULIN (HCC): Primary | ICD-10-CM

## 2021-04-14 DIAGNOSIS — E23.0 HYPOGONADOTROPIC HYPOGONADISM (HCC): ICD-10-CM

## 2021-04-14 DIAGNOSIS — I10 BENIGN ESSENTIAL HYPERTENSION: ICD-10-CM

## 2021-04-14 DIAGNOSIS — E11.65 TYPE 2 DIABETES MELLITUS WITH HYPERGLYCEMIA, WITH LONG-TERM CURRENT USE OF INSULIN (HCC): Primary | ICD-10-CM

## 2021-04-14 LAB — SL AMB POCT HEMOGLOBIN AIC: 6.9 (ref ?–6.5)

## 2021-04-14 PROCEDURE — 83036 HEMOGLOBIN GLYCOSYLATED A1C: CPT | Performed by: INTERNAL MEDICINE

## 2021-04-14 PROCEDURE — 99214 OFFICE O/P EST MOD 30 MIN: CPT | Performed by: INTERNAL MEDICINE

## 2021-04-14 RX ORDER — INSULIN LISPRO 100 [IU]/ML
10 INJECTION, SOLUTION INTRAVENOUS; SUBCUTANEOUS
Qty: 30 ML | Refills: 3 | Status: SHIPPED | OUTPATIENT
Start: 2021-04-14 | End: 2022-04-12

## 2021-04-14 NOTE — PROGRESS NOTES
Marianela Mariscal 77 y o  male MRN: [id number]    Encounter: [id number]      Assessment/Plan     Assessment: This is a 77y o -year-old male with diabetes with hyperglycemia , hypogonadotropic hypogonadism, pituitary microadenoma, obstructive sleep apnea, hypertension, hyperlipidemia and vitamin-D deficiency  Plan:  1  Type 2 diabetes with hyperglycemia- since Janumet is cost prohibitive, change Humalog to 10 units with each meal   Continue basal insulin  He is on four insulin injections per day and checking his blood sugars 4 times a day per with making adjustments to his insulin based on the blood sugar  Therefore, he would be a good candidate for continuous glucose monitor  Have asked him to send me his blood sugar log in a few weeks  Based on A1c of  Less than 7%, his diabetes is under reasonable control  2   Hypogonadotropic hypogonadism- he is going to research which testosterone is covered by his insurance  3   Pituitary microadenoma- no need for pituitary MRI at this time  4   Obstructive sleep apnea-continue to follow with sleep specialist and use CPAP  5  Hypertension- the blood pressure is under good control  6   Hyperlipidemia -continue current regimen  Check TSH and free T4     7   Vitamin-D deficiency- continue replacement  CC: Diabetes    History of Present Illness     HPI:    69-year-old male with type 2 diabetes for many years presents for follow-up  He is currently on two insulin injections per day  He has had hyper and hypoglycemia  For vitamin-D deficiency, he is on supplementation  For hypogonadotropic hypogonadism, he is currently not on any replacement  For obstructive sleep apnea, he uses CPAP  He may be due for a new machine  He is on multiple medications for hypertension  He denies any headaches  He is on a statin for hyperlipidemia  He denies any myalgia  Review of Systems   Constitutional: Negative for chills and fever  Respiratory: Negative for shortness of breath  Cardiovascular: Negative for chest pain  Gastrointestinal: Negative for constipation, diarrhea, nausea and vomiting  Endocrine: Negative for polydipsia and polyuria  All other systems reviewed and are negative        Historical Information   Past Medical History:   Diagnosis Date    Arthritis     Last assessed 2013    Avitaminosis D     Diabetes mellitus ([facility] )     Displacement of cervical intervertebral disc     GERD (gastroesophageal reflux disease)     Glaucoma     Normal Pressure Glaucoma    HTN (hypertension) 2007    Nephrolithiasis 2013    Pituitary microadenoma ([facility] ) 2010    Spinal stenosis in cervical region     Sprain and strain of calcaneofibular (ligament) 2013    Uric acid nephrolithiasis      Past Surgical History:   Procedure Laterality Date    ASPIRATION / INJECTION RENAL CYST      Renal Cyst Aspiration    CATARACT EXTRACTION      2017- right eye, 2018- left eye    EYE SURGERY Bilateral     Cataract Surgery    TONSILLECTOMY       Social History   Social History     Substance and Sexual Activity   Alcohol Use Yes    Alcohol/week: 2 0 standard drinks    Types: 2 Cans of beer per week    Frequency: 2-4 times a month    Drinks per session: 1 or 2    Binge frequency: Never    Comment: social     Social History     Substance and Sexual Activity   Drug Use Yes    Types: Marijuana    Comment: legal / medical      Social History     Tobacco Use   Smoking Status Former Smoker    Packs/day: 0 25    Years: 2 00    Pack years: 0 50    Types: Cigarettes    Quit date: 34    Years since quittin 3   Smokeless Tobacco Never Used     Family History:   Family History   Problem Relation Age of Onset    Diabetes unspecified Mother     Heart disease Mother     Nephrolithiasis Mother     Kidney disease Mother     Other Mother         Back Disorder    Thyroid disease Mother     Diabetes unspecified Father     Heart disease Father     Diabetes unspecified Sister     Heart disease Sister     Stroke Sister     Diabetes unspecified Brother     Heart disease Brother     Nephrolithiasis Brother     Lung cancer Brother     Other Brother         COPD    Diabetes unspecified Sister     Heart disease Sister     Diabetes unspecified Sister     Diabetes unspecified Brother     Heart disease Brother     Diabetes unspecified Brother     Other Brother         Back Disorder    Diabetes unspecified Brother     Other Brother         Back Disorder    Diabetes unspecified Brother     Stomach cancer Paternal Aunt        Meds/Allergies   Current Outpatient Medications   Medication Sig Dispense Refill    amLODIPine (NORVASC) 10 mg tablet Take 1 tablet (10 mg total) by mouth daily 90 tablet 1    aspirin (ECOTRIN LOW STRENGTH) 81 mg EC tablet Take 81 mg by mouth daily       B Complex Vitamins (VITAMIN B-COMPLEX PO) Take 1 capsule by mouth daily       BD Pen Needle Harriett U/F 32G X 4 MM MISC Use 2 per day 200 each 3    brimonidine-timolol (COMBIGAN) 0 2-0 5 % Administer 1 drop to both eyes every 12 (twelve) hours      cholecalciferol (VITAMIN D3) 1,000 units tablet Take 1,000 Units by mouth 2 (two) times a day       doxazosin (CARDURA) 4 mg tablet Take 1 tablet (4 mg total) by mouth daily 90 tablet 1    Empagliflozin (Jardiance) 25 MG TABS Take 1 tablet (25 mg total) by mouth daily 90 tablet 1    gabapentin (NEURONTIN) 300 mg capsule Take 1 capsule (300 mg total) by mouth 3 (three) times a day 270 capsule 3    glipiZIDE (GLUCOTROL XL) 5 mg 24 hr tablet Take 1 tablet (5 mg total) by mouth daily 90 tablet 1    Glucosamine-Chondroitin (OSTEO BI-FLEX REGULAR STRENGTH) 250-200 MG TABS Take 1 tablet by mouth 2 (two) times a day      hydrochlorothiazide (HYDRODIURIL) 25 mg tablet Take 1 tablet (25 mg total) by mouth daily 90 tablet 3    insulin glargine (Lantus SoloStar) 100 units/mL injection pen Inject 30 Units under the skin daily at bedtime 30 mL 1    insulin lispro (HumaLOG KwikPen) 100 units/mL injection pen Take 25 units daily as directed 8 pen 1    lisinopril (ZESTRIL) 40 mg tablet Take 1 tablet (40 mg total) by mouth daily 90 tablet 1    loratadine (CLARITIN) 10 mg tablet Take 10 mg by mouth daily      MULTIPLE VITAMIN PO Take 1 tablet by mouth daily       omeprazole (PriLOSEC) 10 mg delayed release capsule TAKE ONE CAPSULE BY MOUTH DAILY 90 capsule 1    other medication, see sig, Medication/product name: Medical Marijuana      Probiotic Product (PROBIOTIC-10) CAPS Take 1 capsule by mouth daily       simvastatin (ZOCOR) 20 mg tablet Take 1 tablet (20 mg total) by mouth daily at bedtime 90 tablet 1    SYRINGE-NEEDLE, DISP, 3 ML (B-D 3CC LUER-DESHAUN SYR 23GX1") 23G X 1" 3 ML MISC USE AND DISCARD 1 SYRINGE PER WEEK 12 each 3    vitamin E, tocopherol, 400 units capsule Take 400 Units by mouth 2 (two) times a day       Apidra SoloStar 100 units/mL injection pen INJECT 20 UNITS UNDER THE SKIN DAILY BEFORE BREAKFAST 15 mL 1    Blood Glucose Monitoring Suppl (Janette Fuentes) w/Device KIT by Does not apply route once for 1 dose Dispense 1 meter 1 kit 1    ibuprofen (MOTRIN) 200 mg tablet Take 200 mg by mouth as needed       mometasone (NASONEX) 50 mcg/act nasal spray 2 sprays into each nostril daily 1 Act 0    OneTouch Verio test strip Test blood sugars 4 x daily as directed (Patient not taking: Reported on 4/14/2021) 400 each 3    sitaGLIPtin-metFORMIN (Janumet)  MG per tablet Take 1 tablet by mouth 2 (two) times a day with meals (Patient not taking: Reported on 4/14/2021) 180 tablet 3     No current facility-administered medications for this visit        Allergies   Allergen Reactions    Clonidine Other (See Comments)     Diaphoresis, hot flashes    Augmentin [Amoxicillin-Pot Clavulanate] Abdominal Pain    Biaxin [Clarithromycin] Abdominal Pain    Dust Mite Extract     Insulin Aspart GI Intolerance     Novolog     Molds & Smuts     Nuts - Food Allergy     Seasonal Ic [Cholestatin] Headache       Objective   Vitals: Blood pressure 138/58, pulse 68, height 5' 3" (1 6 m), weight 75 7 kg (166 lb 14 4 oz)  Physical Exam  Vitals signs reviewed  Constitutional:       General: He is not in acute distress  Appearance: He is well-developed  He is not diaphoretic  HENT:      Head: Normocephalic and atraumatic  Eyes:      General: Lids are normal  No scleral icterus  Right eye: No discharge  Left eye: No discharge  Conjunctiva/sclera: Conjunctivae normal    Neck:      Musculoskeletal: Neck supple  Thyroid: No thyromegaly  Cardiovascular:      Rate and Rhythm: Normal rate and regular rhythm  Heart sounds: Normal heart sounds  No murmur  No friction rub  No gallop  Pulmonary:      Effort: Pulmonary effort is normal  No respiratory distress  Breath sounds: Normal breath sounds  No wheezing  Abdominal:      General: Bowel sounds are normal  There is no distension  Palpations: Abdomen is soft  Tenderness: There is no abdominal tenderness  Musculoskeletal: Normal range of motion  General: No tenderness or deformity  Lymphadenopathy:      Head:      Right side of head: No occipital adenopathy  Left side of head: No occipital adenopathy  Upper Body:      Right upper body: No supraclavicular adenopathy  Left upper body: No supraclavicular adenopathy  Skin:     General: Skin is warm  Findings: No erythema or rash  Neurological:      Mental Status: He is alert and oriented to person, place, and time  Coordination: Coordination normal    Psychiatric:         Mood and Affect: Mood normal          Behavior: Behavior normal          The history was obtained from the review of the chart, patient      Lab Results:   Lab Results   Component Value Date/Time    Hemoglobin A1C 6 3 (H) 11/23/2020 08:34 AM    Hemoglobin A1C 5 9 (H) 06/19/2020 08:01 AM    WBC 10 00 11/23/2020 08:34 AM    WBC 11 28 (H) 06/19/2020 08:01 AM    Hemoglobin 14 9 11/23/2020 08:34 AM    Hemoglobin 14 8 06/19/2020 08:01 AM    Hematocrit 45 7 11/23/2020 08:34 AM    Hematocrit 43 6 06/19/2020 08:01 AM    MCV 89 11/23/2020 08:34 AM    MCV 90 06/19/2020 08:01 AM    Platelets 592 57/45/7377 08:34 AM    Platelets 569 52/55/4711 08:01 AM    BUN 27 (H) 06/19/2020 08:01 AM    Potassium 3 6 06/19/2020 08:01 AM    Chloride 107 06/19/2020 08:01 AM    CO2 27 06/19/2020 08:01 AM    Creatinine 0 92 06/19/2020 08:01 AM    AST 23 06/19/2020 08:01 AM    ALT 58 06/19/2020 08:01 AM    Albumin 3 8 06/19/2020 08:01 AM    HDL, Direct 28 (L) 06/19/2020 08:01 AM    Triglycerides 150 06/19/2020 08:01 AM           Imaging Studies: I have personally reviewed pertinent reports  Portions of the record may have been created with voice recognition software  Occasional wrong word or "sound a like" substitutions may have occurred due to the inherent limitations of voice recognition software  Read the chart carefully and recognize, using context, where substitutions have occurred

## 2021-04-15 ENCOUNTER — TELEPHONE (OUTPATIENT)
Dept: ENDOCRINOLOGY | Facility: CLINIC | Age: 66
End: 2021-04-15

## 2021-04-19 DIAGNOSIS — M54.16 LUMBAR RADICULOPATHY: Primary | ICD-10-CM

## 2021-04-19 NOTE — TELEPHONE ENCOUNTER
Patient was contacted and scheduled for right L5 TFESI  All pre procedure instructions were given to patient   Nothing to eat or drink for 1 hour prior  Loose fitting clothing   Denies Anti Coag's   NSAIDS okay, ASA okay  Denies Antibx   Needs    Patient instructed to continue all routine medications   Patient instructed to contact our office if becomes sick   Refrain from any vaccines 2 weeks before & 2 weeks after  Insurance auth received but is not a guarantee of payment per your insurance company's authorization disclaimer and it is your responsibility to verify your benefits   COVID -19 screening complete

## 2021-04-20 ENCOUNTER — TELEPHONE (OUTPATIENT)
Dept: ENDOCRINOLOGY | Facility: CLINIC | Age: 66
End: 2021-04-20

## 2021-04-20 DIAGNOSIS — E11.65 TYPE 2 DIABETES MELLITUS WITH HYPERGLYCEMIA, WITH LONG-TERM CURRENT USE OF INSULIN (HCC): Primary | ICD-10-CM

## 2021-04-20 DIAGNOSIS — Z79.4 TYPE 2 DIABETES MELLITUS WITH HYPERGLYCEMIA, WITH LONG-TERM CURRENT USE OF INSULIN (HCC): Primary | ICD-10-CM

## 2021-04-20 NOTE — TELEPHONE ENCOUNTER
Pt called today, he had an appointment on 04/14/2021, Per pt  Janumet medication was D/C and replaced for  Metformin ,Pt went to his pharmacy [facility] , they don't have this RX on file

## 2021-04-21 RX ORDER — METFORMIN HYDROCHLORIDE 500 MG/1
TABLET, EXTENDED RELEASE ORAL
Qty: 360 TABLET | Refills: 1 | Status: SHIPPED | OUTPATIENT
Start: 2021-04-21 | End: 2021-07-26 | Stop reason: SDUPTHER

## 2021-04-27 ENCOUNTER — HOSPITAL ENCOUNTER (OUTPATIENT)
Dept: RADIOLOGY | Facility: CLINIC | Age: 66
Discharge: HOME | End: 2021-04-27
Attending: ANESTHESIOLOGY
Payer: MEDICARE

## 2021-04-27 VITALS
TEMPERATURE: 98.7 F | HEART RATE: 67 BPM | SYSTOLIC BLOOD PRESSURE: 136 MMHG | RESPIRATION RATE: 18 BRPM | OXYGEN SATURATION: 96 % | DIASTOLIC BLOOD PRESSURE: 72 MMHG

## 2021-04-27 DIAGNOSIS — M54.16 LUMBAR RADICULOPATHY: ICD-10-CM

## 2021-04-27 PROCEDURE — 64483 NJX AA&/STRD TFRM EPI L/S 1: CPT | Performed by: ANESTHESIOLOGY

## 2021-04-27 RX ORDER — PAPAVERINE HCL 150 MG
10 CAPSULE, EXTENDED RELEASE ORAL ONCE
Status: COMPLETED | OUTPATIENT
Start: 2021-04-27 | End: 2021-04-27

## 2021-04-27 RX ADMIN — LIDOCAINE HYDROCHLORIDE 2 ML: 20 INJECTION, SOLUTION EPIDURAL; INFILTRATION; INTRACAUDAL; PERINEURAL at 15:47

## 2021-04-27 RX ADMIN — IOHEXOL 1 ML: 300 INJECTION, SOLUTION INTRAVENOUS at 15:48

## 2021-04-27 RX ADMIN — DEXAMETHASONE SODIUM PHOSPHATE 10 MG: 10 INJECTION, SOLUTION INTRAMUSCULAR; INTRAVENOUS at 15:48

## 2021-04-27 NOTE — DISCHARGE INSTR - LAB
Epidural Steroid Injection   WHAT YOU NEED TO KNOW:   An epidural steroid injection (LUIS MIGUEL) is a procedure to inject steroid medicine into the epidural space  The epidural space is between your spinal cord and vertebrae  Steroids reduce inflammation and fluid buildup in your spine that may be causing pain  You may be given pain medicine along with the steroids  ACTIVITY  · Do not drive or operate machinery today  · No strenuous activity today - bending, lifting, etc   · You may resume normal activites starting tomorrow - start slowly and as tolerated  · You may shower today, but no tub baths or hot tubs  · You may have numbness for several hours from the local anesthetic  Please use caution and common sense, especially with weight-bearing activities  CARE OF THE INJECTION SITE  · If you have soreness or pain, apply ice to the area today (20 minutes on/20 minutes off)  · Starting tomorrow, you may use warm, moist heat or ice if needed  · You may have an increase or change in your discomfort for 36-48 hours after your treatment  · Apply ice and continue with any pain medication you have been prescribed  · Notify [facility] if you have any of the following: redness, drainage, swelling, headache, stiff neck or fever above 100°F     SPECIAL INSTRUCTIONS  · Our office will contact you in approximately 7 days for a progress report  MEDICATIONS  · Continue to take all routine medications  · Our office may have instructed you to hold some medications  As no general anesthesia was used in today's procedure, you should not experience any side effects related to anesthesia  If you have a problem specifically related to your procedure, please call our office at [phone]  Problems not related to your procedure should be directed to your primary care physician

## 2021-05-04 ENCOUNTER — TELEPHONE (OUTPATIENT)
Dept: PAIN MEDICINE | Facility: CLINIC | Age: 66
End: 2021-05-04

## 2021-05-11 ENCOUNTER — OFFICE VISIT (OUTPATIENT)
Dept: PAIN MEDICINE | Facility: CLINIC | Age: 66
End: 2021-05-11
Payer: MEDICARE

## 2021-05-11 VITALS
HEART RATE: 71 BPM | SYSTOLIC BLOOD PRESSURE: 112 MMHG | WEIGHT: 166 LBS | TEMPERATURE: 97.5 F | HEIGHT: 63 IN | DIASTOLIC BLOOD PRESSURE: 66 MMHG | BODY MASS INDEX: 29.41 KG/M2

## 2021-05-11 DIAGNOSIS — M54.40 LOW BACK PAIN WITH SCIATICA, SCIATICA LATERALITY UNSPECIFIED, UNSPECIFIED BACK PAIN LATERALITY, UNSPECIFIED CHRONICITY: ICD-10-CM

## 2021-05-11 DIAGNOSIS — M54.16 LUMBAR RADICULOPATHY: ICD-10-CM

## 2021-05-11 DIAGNOSIS — M50.20 CERVICAL HERNIATED DISC: ICD-10-CM

## 2021-05-11 DIAGNOSIS — M48.02 CERVICAL STENOSIS OF SPINAL CANAL: ICD-10-CM

## 2021-05-11 DIAGNOSIS — G89.4 CHRONIC PAIN SYNDROME: Primary | ICD-10-CM

## 2021-05-11 DIAGNOSIS — M47.812 SPONDYLOSIS OF CERVICAL REGION WITHOUT MYELOPATHY OR RADICULOPATHY: ICD-10-CM

## 2021-05-11 PROCEDURE — 99213 OFFICE O/P EST LOW 20 MIN: CPT | Performed by: NURSE PRACTITIONER

## 2021-05-11 NOTE — PROGRESS NOTES
Assessment:  1  Chronic pain syndrome    2  Low back pain with sciatica, sciatica laterality unspecified, unspecified back pain laterality, unspecified chronicity    3  Lumbar radiculopathy    4  Cervical herniated disc    5  Spondylosis of cervical region without myelopathy or radiculopathy    6  Cervical stenosis of spinal canal        Plan:  1  We can repeat right L5 TFESI p r n     I discussed with the patient that since there has been moderate to significant improvement in the pain symptoms, we will hold off on any repeat injections at this point in time  However, I reviewed with the patient that if their symptoms should return or worsen,  they should call our office to schedule to discuss repeating the injection  2  We can repeat cervical epidural steroid injection p r n   3  The patient may continue gabapentin as prescribed  4  The patient may continue medical marijuana as per certified Physician  I will avoid opioid medications secondary to medical marijuana use  5  The patient may continue Tylenol p r n  and should not exceed more than 3000 mg in 24 hours   6  Patient will continue with his home exercise program   He was also given a referral for physical therapy  7  The patient will follow up on a p r n  basis at this time     M*Modal software was used to dictate this note  It may contain errors with dictating incorrect words or incorrect spelling  Please contact the provider directly with any questions  History of Present Illness: The patient is a 77 y o  male last seen on 3/18/21 who presents for a follow up office visit in regards to chronic  Low back pain that radiates into the lateral aspect of the right lower extremity to the ankle with associated subjective weakness secondary to  Lumbar degenerative disc disease, lumbar spondylosis, lumbar stenosis, lumbar radiculopathy and chronic pain syndrome  The patient also follows with our office for neck pain with cervical radiculopathy     The patient  Denies bowel or bladder incontinence, balance issues or saddle anesthesia  The patient is status post right L5 TFESI with Dr Michael Neri on April 27, 2021 and currently reports a 30% improvement of his pain from the procedure  He states that since the procedure, the frequency of the pain has lessened and the severity has also lessened  He states he mostly has pain in the morning but dissipates throughout the day  He does not have any complaints of neck pain at this time  He has had relief in the past with cervical epidural steroid injections  The patient rates his pain currently a 5/10 on the numeric pain rating scale  States his pain is occasional in nature and bothersome the morning  He characterizes the pain as sharp and shooting  Current pain medications includes: gabapentin 300 mg 3 times a day and medical marijuana with significant relief      I have personally reviewed and/or updated the patient's past medical history, past surgical history, family history, social history, current medications, allergies, and vital signs today  Review of Systems:    Review of Systems   Respiratory: Negative for shortness of breath  Cardiovascular: Negative for chest pain  Gastrointestinal: Negative for constipation, diarrhea, nausea and vomiting  Musculoskeletal: Negative for arthralgias, gait problem, joint swelling and myalgias  Skin: Negative for rash  Neurological: Negative for dizziness, seizures and weakness  All other systems reviewed and are negative          Past Medical History:   Diagnosis Date    Arthritis     Last assessed 5/24/2013    Avitaminosis D 2012    Diabetes mellitus ([facility] [address] )     Displacement of cervical intervertebral disc 2014    GERD (gastroesophageal reflux disease)     Glaucoma     Normal Pressure Glaucoma    HTN (hypertension) 2007    Nephrolithiasis 5/24/2013    Pituitary microadenoma ([facility] [address] ) 2010    Spinal stenosis in cervical region 2014    Sprain and strain of calcaneofibular (ligament) 2013    Uric acid nephrolithiasis        Past Surgical History:   Procedure Laterality Date    ASPIRATION / INJECTION RENAL CYST      Renal Cyst Aspiration    CATARACT EXTRACTION      2017- right eye, 2018- left eye    EYE SURGERY Bilateral     Cataract Surgery    TONSILLECTOMY         Family History   Problem Relation Age of Onset    Diabetes unspecified Mother     Heart disease Mother     Nephrolithiasis Mother     Kidney disease Mother     Other Mother         Back Disorder    Thyroid disease Mother     Diabetes unspecified Father     Heart disease Father     Diabetes unspecified Sister     Heart disease Sister     Stroke Sister     Diabetes unspecified Brother     Heart disease Brother     Nephrolithiasis Brother     Lung cancer Brother     Other Brother         COPD    Diabetes unspecified Sister     Heart disease Sister     Diabetes unspecified Sister     Diabetes unspecified Brother     Heart disease Brother     Diabetes unspecified Brother     Other Brother         Back Disorder    Diabetes unspecified Brother     Other Brother         Back Disorder    Diabetes unspecified Brother     Stomach cancer Paternal [de-identified]        Social History     Occupational History    Occupation:    Tobacco Use    Smoking status: Former Smoker     Packs/day: 0 25     Years: 2 00     Pack years: 0 50     Types: Cigarettes     Quit date:      Years since quittin 3    Smokeless tobacco: Never Used   Substance and Sexual Activity    Alcohol use:  Yes     Alcohol/week: 2 0 standard drinks     Types: 2 Cans of beer per week     Frequency: 2-4 times a month     Drinks per session: 1 or 2     Binge frequency: Never     Comment: social    Drug use: Yes     Types: Marijuana     Comment: legal / medical     Sexual activity: Yes     Partners: Female     Birth control/protection: Female Sterilization         Current Outpatient Medications:     amLODIPine (NORVASC) 10 mg tablet, Take 1 tablet (10 mg total) by mouth daily, Disp: 90 tablet, Rfl: 1    Apidra SoloStar 100 units/mL injection pen, INJECT 20 UNITS UNDER THE SKIN DAILY BEFORE BREAKFAST, Disp: 15 mL, Rfl: 1    aspirin (ECOTRIN LOW STRENGTH) 81 mg EC tablet, Take 81 mg by mouth daily , Disp: , Rfl:     B Complex Vitamins (VITAMIN B-COMPLEX PO), Take 1 capsule by mouth daily , Disp: , Rfl:     BD Pen Needle Harriett U/F 32G X 4 MM MISC, Use 2 per day, Disp: 200 each, Rfl: 3    Blood Glucose Monitoring Suppl (ONETOUCH VERIO) w/Device KIT, by Does not apply route once for 1 dose Dispense 1 meter, Disp: 1 kit, Rfl: 1    brimonidine-timolol (COMBIGAN) 0 2-0 5 %, Administer 1 drop to both eyes every 12 (twelve) hours, Disp: , Rfl:     cholecalciferol (VITAMIN D3) 1,000 units tablet, Take 1,000 Units by mouth 2 (two) times a day , Disp: , Rfl:     doxazosin (CARDURA) 4 mg tablet, Take 1 tablet (4 mg total) by mouth daily, Disp: 90 tablet, Rfl: 1    Empagliflozin (Jardiance) 25 MG TABS, Take 1 tablet (25 mg total) by mouth daily, Disp: 90 tablet, Rfl: 1    gabapentin (NEURONTIN) 300 mg capsule, Take 1 capsule (300 mg total) by mouth 3 (three) times a day, Disp: 270 capsule, Rfl: 3    glipiZIDE (GLUCOTROL XL) 5 mg 24 hr tablet, Take 1 tablet (5 mg total) by mouth daily, Disp: 90 tablet, Rfl: 1    Glucosamine-Chondroitin (OSTEO BI-FLEX REGULAR STRENGTH) 250-200 MG TABS, Take 1 tablet by mouth 2 (two) times a day, Disp: , Rfl:     hydrochlorothiazide (HYDRODIURIL) 25 mg tablet, Take 1 tablet (25 mg total) by mouth daily, Disp: 90 tablet, Rfl: 3    ibuprofen (MOTRIN) 200 mg tablet, Take 200 mg by mouth as needed , Disp: , Rfl:     insulin glargine (Lantus SoloStar) 100 units/mL injection pen, Inject 30 Units under the skin daily at bedtime, Disp: 30 mL, Rfl: 1    insulin lispro (HumaLOG KwikPen) 100 units/mL injection pen, Inject 10 Units under the skin 3 (three) times a day with meals Take 25 units daily as directed, Disp: 30 mL, Rfl: 3    lisinopril (ZESTRIL) 40 mg tablet, Take 1 tablet (40 mg total) by mouth daily, Disp: 90 tablet, Rfl: 1    loratadine (CLARITIN) 10 mg tablet, Take 10 mg by mouth daily, Disp: , Rfl:     metFORMIN (GLUCOPHAGE-XR) 500 mg 24 hr tablet, Take 2 tablets twice daily, Disp: 360 tablet, Rfl: 1    MULTIPLE VITAMIN PO, Take 1 tablet by mouth daily , Disp: , Rfl:     omeprazole (PriLOSEC) 10 mg delayed release capsule, TAKE ONE CAPSULE BY MOUTH DAILY, Disp: 90 capsule, Rfl: 1    OneTouch Verio test strip, Test blood sugars 4 x daily as directed (Patient not taking: Reported on 4/14/2021), Disp: 400 each, Rfl: 3    other medication, see sig,, Medication/product name: Medical Marijuana, Disp: , Rfl:     Probiotic Product (PROBIOTIC-10) CAPS, Take 1 capsule by mouth daily , Disp: , Rfl:     simvastatin (ZOCOR) 20 mg tablet, Take 1 tablet (20 mg total) by mouth daily at bedtime, Disp: 90 tablet, Rfl: 1    SYRINGE-NEEDLE, DISP, 3 ML (B-D 3CC LUER-DESHAUN SYR 23GX1") 23G X 1" 3 ML MISC, USE AND DISCARD 1 SYRINGE PER WEEK, Disp: 12 each, Rfl: 3    vitamin E, tocopherol, 400 units capsule, Take 400 Units by mouth 2 (two) times a day , Disp: , Rfl:     Allergies   Allergen Reactions    Clonidine Other (See Comments)     Diaphoresis, hot flashes    Augmentin [Amoxicillin-Pot Clavulanate] Abdominal Pain    Biaxin [Clarithromycin] Abdominal Pain    Dust Mite Extract     Insulin Aspart GI Intolerance     Novolog     Molds & Smuts     Nuts - Food Allergy     Seasonal Ic [Cholestatin] Headache       Physical Exam:    /66   Pulse 71   Temp 97 5 °F (36 4 °C)   Ht 5' 3" (1 6 m)   Wt 75 3 kg (166 lb)   BMI 29 41 kg/m²     Constitutional:normal, well developed, well nourished, alert, in no distress and non-toxic and no overt pain behavior    Eyes:anicteric  HEENT:grossly intact  Neck:supple, symmetric, trachea midline and no masses   Pulmonary:even and unlabored  Cardiovascular:No edema or pitting edema present  Skin:Normal without rashes or lesions and well hydrated  Psychiatric:Mood and affect appropriate  Neurologic:Cranial Nerves II-XII grossly intact  Musculoskeletal:normal gait      Imaging  No orders to display         No orders of the defined types were placed in this encounter  MRI LUMBAR SPINE WITHOUT CONTRAST   INDICATION: [id number]: Radiculopathy, lumbar region  COMPARISON: X-ray lumbar spine 3/18/2020   TECHNIQUE: Sagittal T1, sagittal T2, sagittal inversion recovery, axial T1 and axial T2, coronal T2    IMAGE QUALITY: Diagnostic   FINDINGS:   VERTEBRAL BODIES: There are 5 lumbar type vertebral bodies  There is preserved normal lumbar lordosis  No spondylolisthesis  SACRUM: Normal signal within the sacrum  No evidence of insufficiency or stress fracture  Tiny right-sided Tarlov cyst is noted at level L2-3  DISTAL CORD AND CONUS: Normal size and signal within the distal cord and conus  PARASPINAL SOFT TISSUES: Paraspinal soft tissues are unremarkable  Tiny T2 hyperintense liver lesion statistically favoring hepatic cyst  Left renal upper pole cortical cyst    LOWER THORACIC DISC SPACES: Normal disc height and signal  No disc herniation, canal stenosis or foraminal narrowing  LUMBAR DISC SPACES:   L1-L2: No disc bulge  No canal or foraminal stenosis  L2-L3: No disc bulge  No canal or foraminal stenosis  L3-L4: No disc bulge  No canal or foraminal stenosis  L4-L5: Minimal left foraminal disc protrusion  No canal stenosis  No significant foraminal narrowing  L5-S1: There is mild disc bulge  Right greater than left facet arthropathy  No significant canal stenosis  There is mild to moderate right and mild left foraminal narrowing  IMPRESSION:   1  Mild degenerative changes at levels L4-5 and L5-S1 without significant canal stenosis  2  Mild to moderate right foraminal narrowing at L5-S1     Workstation performed: [id number]

## 2021-05-25 LAB — LEFT EYE DIABETIC RETINOPATHY: NORMAL

## 2021-05-26 ENCOUNTER — OFFICE VISIT (OUTPATIENT)
Dept: GASTROENTEROLOGY | Facility: CLINIC | Age: 66
End: 2021-05-26
Payer: MEDICARE

## 2021-05-26 VITALS
SYSTOLIC BLOOD PRESSURE: 138 MMHG | HEIGHT: 63 IN | DIASTOLIC BLOOD PRESSURE: 68 MMHG | HEART RATE: 78 BPM | TEMPERATURE: 98.2 F | BODY MASS INDEX: 29.2 KG/M2 | WEIGHT: 164.8 LBS

## 2021-05-26 DIAGNOSIS — R10.32 LEFT LOWER QUADRANT ABDOMINAL PAIN: Primary | ICD-10-CM

## 2021-05-26 DIAGNOSIS — R11.0 NAUSEA: ICD-10-CM

## 2021-05-26 DIAGNOSIS — R19.7 DIARRHEA, UNSPECIFIED TYPE: ICD-10-CM

## 2021-05-26 DIAGNOSIS — K21.9 GASTROESOPHAGEAL REFLUX DISEASE WITHOUT ESOPHAGITIS: ICD-10-CM

## 2021-05-26 DIAGNOSIS — R14.0 ABDOMINAL BLOATING: ICD-10-CM

## 2021-05-26 PROCEDURE — 99204 OFFICE O/P NEW MOD 45 MIN: CPT | Performed by: INTERNAL MEDICINE

## 2021-05-26 RX ORDER — GLYBURIDE 5 MG/1
5 TABLET ORAL
COMMUNITY
End: 2022-04-12 | Stop reason: SDUPTHER

## 2021-05-26 RX ORDER — POLYETHYLENE GLYCOL 3350 17 G/17G
POWDER, FOR SOLUTION ORAL
Qty: 238 G | Refills: 0 | Status: SHIPPED | OUTPATIENT
Start: 2021-05-26 | End: 2021-07-29 | Stop reason: ALTCHOICE

## 2021-05-26 RX ORDER — DICYCLOMINE HCL 20 MG
20 TABLET ORAL
Qty: 120 TABLET | Refills: 5 | Status: SHIPPED | OUTPATIENT
Start: 2021-05-26 | End: 2021-12-17 | Stop reason: SDUPTHER

## 2021-05-26 NOTE — PROGRESS NOTES
[facility] - Outpatient Consultation  Kassandra Mckeon 77 y o  male MRN: [id number]  Encounter: [id number]      PCP: Kevin Goyal MD  Referring: Kevin Goyal MD  [address] [place] [address]      ASSESSMENT AND PLAN:      1  Left lower quadrant abdominal pain  2  Diarrhea, unspecified type  3  Abdominal bloating  4  Nausea  Differential includes celiac versus inflammatory bowel disease versus infectious versus other, evaluation as below  - Colonoscopy; Future, evaluate for IBD, r/o malignancy, microscopic colitis or other  - polyethylene glycol (GLYCOLAX) 17 GM/SCOOP powder; At 5pm take 5mgx2 dulcolax  At 6pm mix 238 g miralax in 64oz gatorade  Drink 8oz glass every 5 mins until 32oz finished  Drink remaining as rec  Dispense: 238 g; Refill: 0  - Pancreatic elastase, fecal; Future  - Calprotectin,Fecal; Future  - Celiac Disease Antibody Profile; Future  - Clostridium difficile toxin by PCR with EIA; Future  - Giardia antigen; Future  - C-reactive protein; Future  - dicyclomine (BENTYL) 20 mg tablet; Take 1 tablet (20 mg total) by mouth 4 (four) times a day (before meals and at bedtime)  Dispense: 120 tablet; Refill: 5, discussed possible side effects of sedation or constipation, and recommend titration of medication    5  Gastroesophageal reflux disease without esophagitis  Long standing reflux on PPI therapy  - EGD; Future, screen for Brooke's esophagus  ______________________________________________________________________    CC:  Chief Complaint   Patient presents with    Abdominal Pain     lower left abd pain    Diarrhea     up to 4-5 x daily     Bloated    Nausea       HPI:      Patient is a 75-year-old male referred for abdominal pain, change in bowel habits, bloating, nausea    He has GERD, salivary gland added night is, T2 DM on insulin (last hba1c 6 9%), CHUYITA, pituitary macroadenoma/hypogonadotropic hypogonadism, lumbar radiculopathy with sciatica, carpal tunnel syndrome, cervical radiculopathy, chronic pain syndrome, HLD, spinal stenosis in the cervical region, vitamin-D deficiency  He describes three months of change in bowel habits, with loose and watery stools occurring 4-5 times per day  Most of his symptoms occur in the morning  It is associated with left left lower quadrant abdominal, cramping pain  Symptoms are also associated with fecal urgency, and improved with a bowel movement  He describes associated abdominal bloating, early satiety which is improved with bowel movement  He recognize a chronic history of GI distress related to peppers, spicy foods, vegetables which she avoids  He has also discontinued coffee, and caffeinated substances without relief  He denies any rectal bleeding or unintentional weight loss  He underwent Cologuard within the last two years which was negative  He has had occasional reflux symptoms, but feels this is well controlled with omeprazole  He has had a barium swallow in the remote past, has never had an endoscopy  His last colonoscopy was performed in 2005 at [facility] Dr Tanja Mcclain, with subcentimeter colonic polyp, and right-sided colonic diverticulosis  He does use medical marijuana  REVIEW OF SYSTEMS:    CONSTITUTIONAL: Denies any fever, chills, rigors, and weight loss  HEENT: No earache or tinnitus  Denies hearing loss or visual disturbances  CARDIOVASCULAR: No chest pain or palpitations  RESPIRATORY: Denies any cough, hemoptysis, shortness of breath or dyspnea on exertion  GASTROINTESTINAL: As noted in the History of Present Illness  GENITOURINARY: No problems with urination  Denies any hematuria or dysuria  NEUROLOGIC: No dizziness or vertigo, denies headaches  MUSCULOSKELETAL: Denies any muscle or joint pain  SKIN: Denies skin rashes or itching  ENDOCRINE: Denies excessive thirst  Denies intolerance to heat or cold  PSYCHOSOCIAL: Denies depression or anxiety   Denies any recent memory loss         Historical Information   Past Medical History:   Diagnosis Date    Arthritis     Last assessed 2013    Avitaminosis D     [facility]     Displacement of cervical intervertebral disc     GERD (gastroesophageal reflux disease)     Glaucoma     Normal Pressure Glaucoma    HTN (hypertension) 2007    Nephrolithiasis 2013    Pituitary microadenoma ([facility] ) 2010    Spinal stenosis in cervical region     Sprain and strain of calcaneofibular (ligament) 2013    Uric acid nephrolithiasis      Past Surgical History:   Procedure Laterality Date    ASPIRATION / INJECTION RENAL CYST      Renal Cyst Aspiration    CATARACT EXTRACTION      2017- right eye, 2018- left eye    COLONOSCOPY  2005    EYE SURGERY Bilateral     Cataract Surgery    TONSILLECTOMY       Social History   Social History     Substance and Sexual Activity   Alcohol Use Yes    Alcohol/week: 2 0 standard drinks    Types: 2 Cans of beer per week    Frequency: 2-4 times a month    Drinks per session: 1 or 2    Binge frequency: Never    Comment: social     Social History     Substance and Sexual Activity   Drug Use Yes    Types: Marijuana    Comment: legal / medical      Social History     Tobacco Use   Smoking Status Former Smoker    Packs/day: 0 25    Years: 2 00    Pack years: 0 50    Types: Cigarettes    Quit date: 34    Years since quittin 4   Smokeless Tobacco Never Used     Family History   Problem Relation Age of Onset    Diabetes unspecified Mother     Heart disease Mother     Nephrolithiasis Mother     Kidney disease Mother     Other Mother         Back Disorder    Thyroid disease Mother     Diabetes unspecified Father     Heart disease Father     Diabetes unspecified Sister     Heart disease Sister     Stroke Sister     Diabetes unspecified Brother     Heart disease Brother     Nephrolithiasis Brother     Lung cancer Brother     Other Brother COPD    Diabetes unspecified Sister     Heart disease Sister     Diabetes unspecified Sister     Diabetes unspecified Brother     Heart disease Brother     Diabetes unspecified Brother     Other Brother         Back Disorder    Diabetes unspecified Brother     Other Brother         Back Disorder    Diabetes unspecified Brother     Stomach cancer Paternal Aunt        Meds/Allergies       Current Outpatient Medications:     amLODIPine (NORVASC) 10 mg tablet    aspirin (ECOTRIN LOW STRENGTH) 81 mg EC tablet    B Complex Vitamins (VITAMIN B-COMPLEX PO)    BD Pen Needle Harriett U/F 32G X 4 MM MISC    brimonidine-timolol (COMBIGAN) 0 2-0 5 %    cholecalciferol (VITAMIN D3) 1,000 units tablet    doxazosin (CARDURA) 4 mg tablet    Empagliflozin (Jardiance) 25 MG TABS    gabapentin (NEURONTIN) 300 mg capsule    Glucosamine-Chondroitin (OSTEO BI-FLEX REGULAR STRENGTH) 250-200 MG TABS    glyBURIDE (DIABETA) 5 mg tablet    hydrochlorothiazide (HYDRODIURIL) 25 mg tablet    ibuprofen (MOTRIN) 200 mg tablet    insulin glargine (Lantus SoloStar) 100 units/mL injection pen    insulin lispro (HumaLOG KwikPen) 100 units/mL injection pen    lisinopril (ZESTRIL) 40 mg tablet    loratadine (CLARITIN) 10 mg tablet    metFORMIN (GLUCOPHAGE-XR) 500 mg 24 hr tablet    MULTIPLE VITAMIN PO    omeprazole (PriLOSEC) 10 mg delayed release capsule    other medication, see sig,    Probiotic Product (PROBIOTIC-10) CAPS    simvastatin (ZOCOR) 20 mg tablet    vitamin E, tocopherol, 400 units capsule    Apidra SoloStar 100 units/mL injection pen    Blood Glucose Monitoring Suppl (ONETOUCH VERIO) w/Device KIT    glipiZIDE (GLUCOTROL XL) 5 mg 24 hr tablet    OneTouch Verio test strip    SYRINGE-NEEDLE, DISP, 3 ML (B-D 3CC LUER-DESHAUN SYR 23GX1") 23G X 1" 3 ML MISC    Allergies   Allergen Reactions    Clonidine Other (See Comments)     Diaphoresis, hot flashes    Augmentin [Amoxicillin-Pot Clavulanate] Abdominal Pain    Biaxin [Clarithromycin] Abdominal Pain    Dust Mite Extract     Insulin Aspart GI Intolerance     Novolog     Molds & Smuts     Nuts - Food Allergy     Seasonal Ic [Cholestatin] Headache           Objective     Blood pressure 138/68, pulse 78, temperature 98 2 °F (36 8 °C), temperature source Tympanic, height 5' 3" (1 6 m), weight 74 8 kg (164 lb 12 8 oz)  Body mass index is 29 19 kg/m²  PHYSICAL EXAM:      General Appearance:   Alert, cooperative, no distress   HEENT:   Normocephalic, atraumatic, anicteric  Neck:  Supple, symmetrical, trachea midline   Lungs:   Clear to auscultation bilaterally; no rales, rhonchi or wheezing; respirations unlabored    Heart[de-identified]   Regular rate and rhythm; no murmur, rub, or gallop     Abdomen:   Soft, non-tender, non-distended; normal bowel sounds; no masses, no organomegaly    Genitalia:   Deferred    Rectal:   Deferred    Extremities:  No cyanosis, clubbing or edema    Pulses:  2+ and symmetric    Skin:  No jaundice, rashes, or lesions    Lymph nodes:  No palpable cervical lymphadenopathy        Lab Results:     Lab Results   Component Value Date    WBC 10 00 11/23/2020    HGB 14 9 11/23/2020    HCT 45 7 11/23/2020    MCV 89 11/23/2020     11/23/2020       Lab Results   Component Value Date     10/28/2017    K 3 6 06/19/2020     06/19/2020    CO2 27 06/19/2020    ANIONGAP 10 09/15/2015    BUN 27 (H) 06/19/2020    CREATININE 0 92 06/19/2020    GLUCOSE 127 09/15/2015    GLUF 126 (H) 06/19/2020    CALCIUM 8 6 06/19/2020    AST 23 06/19/2020    ALT 58 06/19/2020    ALKPHOS 54 06/19/2020    PROT 8 1 10/28/2017    BILITOT 1 2 10/28/2017    EGFR 86 06/19/2020       Lab Results   Component Value Date    INR 0 95 07/14/2014    PROTIME 12 5 07/14/2014         Radiology Results:   [facility] Procedure    Result Date: 4/27/2021  Narrative: 1 Level Transforaminal Epidural Steroid Injection Indication:  Low back and leg pain Preoperative diagnosis: Lumbar radiculitis Postoperative diagnosis:  Lumbar radiculitis Procedure: Fluoroscopically-guided right L5 transforaminal epidural steroid injection under fluoroscopy After discussing the risks, benefits, and alternatives to the procedure, the patient expressed understanding and wished to proceed  The patient was brought to the fluoroscopy suite and placed in the prone position  A procedural pause was conducted to verify:  correct patient identity, procedure to be performed and as applicable, correct side and site, correct patient position, and availability of implants, special equipment and special requirements  After identifying the right L5 pedicle fluoroscopically with an oblique view, the skin was sterilely prepped and draped in the usual fashion using Chloraprep skin prep  The skin and subcutaneous tissue were anesthetized with 1% lidocaine  A 3 5 inch 22 gauge spinal needle was then advanced under fluoroscopic guidance to the posterior aspect of the right L5 neural foramen  Appropriate foraminal depth was determined with a lateral fluoroscopic view, and AP visualization confirmed needle positioning at approximately the 6 o'clock position relative to the pedicle  After negative aspiration, 1 mL of Omnipaque 300 contrast was injected using live fluoroscopy/digital subtraction angiography, confirming appropriate transforaminal spread without evidence of intravascular or intrathecal uptake  Next, a local anesthetic test dose consisting of 1 mL of 2% lidocaine was injected through the needle  After an appropriate period of observation, a directed neurological exam was performed which revealed no new neurologic deficits  Next, a 1 5 ml solution consisting of 10 mg of dexamethasone in sterile saline was injected slowly and incrementally into the epidural space  Following the injection the needle was withdrawn slightly and flushed with lidocaine as it was fully extracted    The patient tolerated the procedure well and there were no apparent complications  The patient did not develop any new neurologic deficits  After appropriate observation, the patient was dismissed from the clinic in good condition under their own power  COMMENTS The patient received a total steroid dose of 10 mg of dexamethasone  EBL:  Minimal Specimen:  None           Portions of the record may have been created with voice recognition software  Occasional wrong word or "sound a like" substitutions may have occurred due to the inherent limitations of voice recognition software  Read the chart carefully and recognize, using context, where substitutions have occurred

## 2021-05-27 DIAGNOSIS — Z79.4 TYPE 2 DIABETES MELLITUS WITH HYPERGLYCEMIA, WITH LONG-TERM CURRENT USE OF INSULIN (HCC): ICD-10-CM

## 2021-05-27 DIAGNOSIS — E11.65 TYPE 2 DIABETES MELLITUS WITH HYPERGLYCEMIA, WITH LONG-TERM CURRENT USE OF INSULIN (HCC): ICD-10-CM

## 2021-05-27 RX ORDER — PEN NEEDLE, DIABETIC 32GX 5/32"
NEEDLE, DISPOSABLE MISCELLANEOUS
Qty: 200 EACH | Refills: 3 | Status: SHIPPED | OUTPATIENT
Start: 2021-05-27 | End: 2021-10-06 | Stop reason: SDUPTHER

## 2021-06-07 DIAGNOSIS — Z79.4 TYPE 2 DIABETES MELLITUS WITH HYPERGLYCEMIA, WITH LONG-TERM CURRENT USE OF INSULIN (HCC): ICD-10-CM

## 2021-06-07 DIAGNOSIS — E11.65 TYPE 2 DIABETES MELLITUS WITH HYPERGLYCEMIA, WITH LONG-TERM CURRENT USE OF INSULIN (HCC): ICD-10-CM

## 2021-06-07 RX ORDER — SIMVASTATIN 20 MG
20 TABLET ORAL
Qty: 90 TABLET | Refills: 1 | Status: SHIPPED | OUTPATIENT
Start: 2021-06-07 | End: 2021-10-06 | Stop reason: SDUPTHER

## 2021-06-07 RX ORDER — AMLODIPINE BESYLATE 10 MG/1
10 TABLET ORAL DAILY
Qty: 90 TABLET | Refills: 1 | Status: SHIPPED | OUTPATIENT
Start: 2021-06-07 | End: 2021-08-17 | Stop reason: SDUPTHER

## 2021-06-07 RX ORDER — LISINOPRIL 40 MG/1
40 TABLET ORAL DAILY
Qty: 90 TABLET | Refills: 1 | Status: SHIPPED | OUTPATIENT
Start: 2021-06-07 | End: 2021-10-06 | Stop reason: SDUPTHER

## 2021-06-10 ENCOUNTER — LAB (OUTPATIENT)
Dept: LAB | Age: 66
End: 2021-06-10
Payer: MEDICARE

## 2021-06-10 DIAGNOSIS — E78.2 MIXED HYPERLIPIDEMIA: ICD-10-CM

## 2021-06-10 DIAGNOSIS — R10.32 LEFT LOWER QUADRANT ABDOMINAL PAIN: ICD-10-CM

## 2021-06-10 DIAGNOSIS — R19.7 DIARRHEA, UNSPECIFIED TYPE: ICD-10-CM

## 2021-06-10 DIAGNOSIS — R14.0 ABDOMINAL BLOATING: ICD-10-CM

## 2021-06-10 LAB
CRP SERPL QL: <3 MG/L
T4 FREE SERPL-MCNC: 0.97 NG/DL (ref 0.76–1.46)
TSH SERPL DL<=0.05 MIU/L-ACNC: 3.02 UIU/ML (ref 0.36–3.74)

## 2021-06-10 PROCEDURE — 84439 ASSAY OF FREE THYROXINE: CPT

## 2021-06-10 PROCEDURE — 86255 FLUORESCENT ANTIBODY SCREEN: CPT

## 2021-06-10 PROCEDURE — 82784 ASSAY IGA/IGD/IGG/IGM EACH: CPT

## 2021-06-10 PROCEDURE — 83516 IMMUNOASSAY NONANTIBODY: CPT

## 2021-06-10 PROCEDURE — 84443 ASSAY THYROID STIM HORMONE: CPT

## 2021-06-10 PROCEDURE — 36415 COLL VENOUS BLD VENIPUNCTURE: CPT

## 2021-06-10 PROCEDURE — 86140 C-REACTIVE PROTEIN: CPT

## 2021-06-12 LAB
ENDOMYSIUM IGA SER QL: NEGATIVE
GLIADIN PEPTIDE IGA SER-ACNC: 3 UNITS (ref 0–19)
GLIADIN PEPTIDE IGG SER-ACNC: 3 UNITS (ref 0–19)
IGA SERPL-MCNC: 203 MG/DL (ref 61–437)
TTG IGA SER-ACNC: <2 U/ML (ref 0–3)
TTG IGG SER-ACNC: <2 U/ML (ref 0–5)

## 2021-06-17 ENCOUNTER — APPOINTMENT (OUTPATIENT)
Dept: LAB | Age: 66
End: 2021-06-17
Payer: MEDICARE

## 2021-06-17 DIAGNOSIS — R19.7 DIARRHEA, UNSPECIFIED TYPE: ICD-10-CM

## 2021-06-17 DIAGNOSIS — R10.32 LEFT LOWER QUADRANT ABDOMINAL PAIN: ICD-10-CM

## 2021-06-17 DIAGNOSIS — Z12.5 SCREENING FOR PROSTATE CANCER: ICD-10-CM

## 2021-06-17 DIAGNOSIS — Z79.4 TYPE 2 DIABETES MELLITUS WITH HYPERGLYCEMIA, WITH LONG-TERM CURRENT USE OF INSULIN (HCC): ICD-10-CM

## 2021-06-17 DIAGNOSIS — E78.2 MIXED HYPERLIPIDEMIA: ICD-10-CM

## 2021-06-17 DIAGNOSIS — E11.65 TYPE 2 DIABETES MELLITUS WITH HYPERGLYCEMIA, WITH LONG-TERM CURRENT USE OF INSULIN (HCC): ICD-10-CM

## 2021-06-17 DIAGNOSIS — R14.0 ABDOMINAL BLOATING: ICD-10-CM

## 2021-06-17 LAB — C DIFF TOX B TCDB STL QL NAA+PROBE: NEGATIVE

## 2021-06-17 PROCEDURE — 83993 ASSAY FOR CALPROTECTIN FECAL: CPT

## 2021-06-17 PROCEDURE — 82656 EL-1 FECAL QUAL/SEMIQ: CPT

## 2021-06-17 PROCEDURE — 87493 C DIFF AMPLIFIED PROBE: CPT

## 2021-06-17 PROCEDURE — 87329 GIARDIA AG IA: CPT

## 2021-06-18 LAB — G LAMBLIA AG STL QL IA: NEGATIVE

## 2021-06-21 LAB
CALPROTECTIN STL-MCNT: 55 UG/G (ref 0–120)
ELASTASE PANC STL-MCNT: 247 UG ELAST./G

## 2021-07-08 ENCOUNTER — PREPPED CHART (OUTPATIENT)
Dept: URBAN - METROPOLITAN AREA CLINIC 6 | Facility: CLINIC | Age: 66
End: 2021-07-08

## 2021-07-26 ENCOUNTER — NURSE TRIAGE (OUTPATIENT)
Dept: OTHER | Facility: OTHER | Age: 66
End: 2021-07-26

## 2021-07-26 DIAGNOSIS — E11.65 TYPE 2 DIABETES MELLITUS WITH HYPERGLYCEMIA, WITH LONG-TERM CURRENT USE OF INSULIN (HCC): ICD-10-CM

## 2021-07-26 DIAGNOSIS — Z79.4 TYPE 2 DIABETES MELLITUS WITH HYPERGLYCEMIA, WITH LONG-TERM CURRENT USE OF INSULIN (HCC): ICD-10-CM

## 2021-07-26 NOTE — TELEPHONE ENCOUNTER
Attempted to call patient  Voicemail was received and a message was left that the call will be attempted again in about 15 minutes

## 2021-07-26 NOTE — TELEPHONE ENCOUNTER
Regardin76 Y/O PRE OP QUESTIONS CLARIFICATION ON AVOIDING FOODS  ----- Message from Noreen Rothman sent at 2021 10:23 AM EDT -----  "Questions regarding his colonoscopy scheduled on Thursday - avoiding nuts and seeds, fruits with skins wanted to clarify what is ok and not also I drink  Mark milk was not sure if that is considered to avoid nuts "

## 2021-07-26 NOTE — TELEPHONE ENCOUNTER
Reason for Disposition   Bowel prep for colonoscopy, questions about    Answer Assessment - Initial Assessment Questions  1  DATE/TIME: "When did you have your colonoscopy?"       Upcoming appointment  2  MAIN CONCERN: "What is your main concern right now?" "What questions do you have?"      Can I eat cherries, blue berries and apples  Can I drink almond milk      Protocols used: COLONOSCOPY SYMPTOMS AND QUESTIONS-ADULT-

## 2021-07-27 RX ORDER — METFORMIN HYDROCHLORIDE 500 MG/1
TABLET, EXTENDED RELEASE ORAL
Qty: 360 TABLET | Refills: 1 | Status: SHIPPED | OUTPATIENT
Start: 2021-07-27 | End: 2021-10-06 | Stop reason: ALTCHOICE

## 2021-07-28 ENCOUNTER — TELEPHONE (OUTPATIENT)
Dept: GASTROENTEROLOGY | Facility: HOSPITAL | Age: 66
End: 2021-07-28

## 2021-07-29 ENCOUNTER — HOSPITAL ENCOUNTER (OUTPATIENT)
Dept: GASTROENTEROLOGY | Facility: HOSPITAL | Age: 66
Setting detail: OUTPATIENT SURGERY
Discharge: HOME | End: 2021-07-29
Attending: INTERNAL MEDICINE | Admitting: INTERNAL MEDICINE
Payer: MEDICARE

## 2021-07-29 ENCOUNTER — ANESTHESIA EVENT (OUTPATIENT)
Dept: GASTROENTEROLOGY | Facility: HOSPITAL | Age: 66
End: 2021-07-29

## 2021-07-29 ENCOUNTER — ANESTHESIA (OUTPATIENT)
Dept: GASTROENTEROLOGY | Facility: HOSPITAL | Age: 66
End: 2021-07-29

## 2021-07-29 VITALS
OXYGEN SATURATION: 97 % | TEMPERATURE: 97.2 F | HEART RATE: 69 BPM | HEIGHT: 63 IN | BODY MASS INDEX: 27.96 KG/M2 | SYSTOLIC BLOOD PRESSURE: 139 MMHG | RESPIRATION RATE: 20 BRPM | WEIGHT: 157.8 LBS | DIASTOLIC BLOOD PRESSURE: 72 MMHG

## 2021-07-29 DIAGNOSIS — R19.7 DIARRHEA, UNSPECIFIED TYPE: ICD-10-CM

## 2021-07-29 DIAGNOSIS — R14.0 ABDOMINAL BLOATING: ICD-10-CM

## 2021-07-29 DIAGNOSIS — R11.0 NAUSEA: ICD-10-CM

## 2021-07-29 DIAGNOSIS — R10.32 LEFT LOWER QUADRANT ABDOMINAL PAIN: ICD-10-CM

## 2021-07-29 LAB — GLUCOSE SERPL-MCNC: 106 MG/DL (ref 65–140)

## 2021-07-29 PROCEDURE — 88305 TISSUE EXAM BY PATHOLOGIST: CPT | Performed by: PATHOLOGY

## 2021-07-29 PROCEDURE — 82948 REAGENT STRIP/BLOOD GLUCOSE: CPT

## 2021-07-29 PROCEDURE — 45385 COLONOSCOPY W/LESION REMOVAL: CPT | Performed by: INTERNAL MEDICINE

## 2021-07-29 PROCEDURE — 43239 EGD BIOPSY SINGLE/MULTIPLE: CPT | Performed by: INTERNAL MEDICINE

## 2021-07-29 RX ORDER — SODIUM CHLORIDE, SODIUM LACTATE, POTASSIUM CHLORIDE, CALCIUM CHLORIDE 600; 310; 30; 20 MG/100ML; MG/100ML; MG/100ML; MG/100ML
INJECTION, SOLUTION INTRAVENOUS CONTINUOUS PRN
Status: DISCONTINUED | OUTPATIENT
Start: 2021-07-29 | End: 2021-07-29

## 2021-07-29 RX ORDER — PROPOFOL 10 MG/ML
INJECTION, EMULSION INTRAVENOUS AS NEEDED
Status: DISCONTINUED | OUTPATIENT
Start: 2021-07-29 | End: 2021-07-29

## 2021-07-29 RX ORDER — LIDOCAINE HYDROCHLORIDE 10 MG/ML
INJECTION, SOLUTION EPIDURAL; INFILTRATION; INTRACAUDAL; PERINEURAL AS NEEDED
Status: DISCONTINUED | OUTPATIENT
Start: 2021-07-29 | End: 2021-07-29

## 2021-07-29 RX ADMIN — PROPOFOL 50 MG: 10 INJECTION, EMULSION INTRAVENOUS at 08:26

## 2021-07-29 RX ADMIN — PROPOFOL 50 MG: 10 INJECTION, EMULSION INTRAVENOUS at 08:31

## 2021-07-29 RX ADMIN — SODIUM CHLORIDE, SODIUM LACTATE, POTASSIUM CHLORIDE, AND CALCIUM CHLORIDE: .6; .31; .03; .02 INJECTION, SOLUTION INTRAVENOUS at 08:54

## 2021-07-29 RX ADMIN — PROPOFOL 50 MG: 10 INJECTION, EMULSION INTRAVENOUS at 08:52

## 2021-07-29 RX ADMIN — LIDOCAINE HYDROCHLORIDE 50 MG: 10 INJECTION, SOLUTION EPIDURAL; INFILTRATION; INTRACAUDAL at 08:22

## 2021-07-29 RX ADMIN — PROPOFOL 100 MG: 10 INJECTION, EMULSION INTRAVENOUS at 08:22

## 2021-07-29 RX ADMIN — PROPOFOL 50 MG: 10 INJECTION, EMULSION INTRAVENOUS at 08:35

## 2021-07-29 RX ADMIN — SODIUM CHLORIDE, SODIUM LACTATE, POTASSIUM CHLORIDE, AND CALCIUM CHLORIDE: .6; .31; .03; .02 INJECTION, SOLUTION INTRAVENOUS at 08:20

## 2021-07-29 NOTE — H&P
History and Physical -  [facility]  Shaheed Cheema 77 y o  male MRN: [id number]                  HPI: Shaheed Cheema is a 77y o  year old male who presents for abdominal pain, bloating, diarrhea, screening      REVIEW OF SYSTEMS: Per the HPI, and otherwise unremarkable      Historical Information   Past Medical History:   Diagnosis Date    Arthritis     Last assessed 2013    Avitaminosis D 2012    Colon polyp     Diabetes [facility]     Displacement of cervical intervertebral disc     GERD (gastroesophageal reflux disease)     Glaucoma     Normal Pressure Glaucoma    HTN (hypertension) 2007    Nephrolithiasis 2013    Pituitary microadenoma ([facility] ) 2010    Spinal stenosis in cervical region 2014    Sprain and strain of calcaneofibular (ligament) 2013    Uric acid nephrolithiasis      Past Surgical History:   Procedure Laterality Date    ASPIRATION / INJECTION RENAL CYST      Renal Cyst Aspiration    CATARACT EXTRACTION      2017- right eye, 2018- left eye    COLONOSCOPY  2005    EYE SURGERY Bilateral     Cataract Surgery    TONSILLECTOMY       Social History   Social History     Substance and Sexual Activity   Alcohol Use Yes    Alcohol/week: 2 0 standard drinks    Types: 2 Cans of beer per week    Comment: social     Social History     Substance and Sexual Activity   Drug Use Yes    Types: Marijuana    Comment: legal / medical      Social History     Tobacco Use   Smoking Status Former Smoker    Packs/day: 0 25    Years: 2 00    Pack years: 0 50    Types: Cigarettes    Quit date: 36    Years since quittin 6   Smokeless Tobacco Never Used     Family History   Problem Relation Age of Onset    Diabetes unspecified Mother     Heart disease Mother     Nephrolithiasis Mother     Kidney disease Mother     Other Mother         Back Disorder    Thyroid disease Mother     Diabetes unspecified Father     Heart disease Father    Arline Jacobs Diabetes unspecified Sister     Heart disease Sister     Stroke Sister     Diabetes unspecified Brother     Heart disease Brother     Nephrolithiasis Brother     Lung cancer Brother     Other Brother         COPD    Diabetes unspecified Sister     Heart disease Sister     Diabetes unspecified Sister     Diabetes unspecified Brother     Heart disease Brother     Diabetes unspecified Brother     Other Brother         Back Disorder    Diabetes unspecified Brother     Other Brother         Back Disorder    Diabetes unspecified Brother     Stomach cancer Paternal Aunt        Meds/Allergies       Current Outpatient Medications:     amLODIPine (NORVASC) 10 mg tablet    aspirin (ECOTRIN LOW STRENGTH) 81 mg EC tablet    B Complex Vitamins (VITAMIN B-COMPLEX PO)    brimonidine-timolol (COMBIGAN) 0 2-0 5 %    cholecalciferol (VITAMIN D3) 1,000 units tablet    doxazosin (CARDURA) 4 mg tablet    Empagliflozin (Jardiance) 25 MG TABS    gabapentin (NEURONTIN) 300 mg capsule    Glucosamine-Chondroitin (OSTEO BI-FLEX REGULAR STRENGTH) 250-200 MG TABS    glyBURIDE (DIABETA) 5 mg tablet    hydrochlorothiazide (HYDRODIURIL) 25 mg tablet    ibuprofen (MOTRIN) 200 mg tablet    insulin glargine (Lantus SoloStar) 100 units/mL injection pen    insulin lispro (HumaLOG KwikPen) 100 units/mL injection pen    lisinopril (ZESTRIL) 40 mg tablet    loratadine (CLARITIN) 10 mg tablet    metFORMIN (GLUCOPHAGE-XR) 500 mg 24 hr tablet    MULTIPLE VITAMIN PO    omeprazole (PriLOSEC) 10 mg delayed release capsule    Probiotic Product (PROBIOTIC-10) CAPS    simvastatin (ZOCOR) 20 mg tablet    vitamin E, tocopherol, 400 units capsule    BD Pen Needle Harriett U/F 32G X 4 MM MISC    Blood Glucose Monitoring Suppl (ONETOUCH VERIO) w/Device KIT    dicyclomine (BENTYL) 20 mg tablet    OneTouch Verio test strip    other medication, see sig,    SYRINGE-NEEDLE, DISP, 3 ML (B-D 3CC LUER-DESHAUN SYR 23GX1") 23G X 1" 3 ML MISC    Allergies   Allergen Reactions    Clonidine Other (See Comments)     Diaphoresis, hot flashes    Augmentin [Amoxicillin-Pot Clavulanate] Abdominal Pain    Biaxin [Clarithromycin] Abdominal Pain    Dust Mite Extract     Insulin Aspart GI Intolerance     Novolog     Molds & Smuts     Nuts - Food Allergy     Seasonal Ic [Cholestatin] Headache       Objective     /61   Pulse 72   Temp (!) 96 8 °F (36 °C) (Temporal)   Resp 18   Ht 5' 3" (1 6 m)   Wt 71 6 kg (157 lb 12 8 oz)   SpO2 95%   BMI 27 95 kg/m²       PHYSICAL EXAM    Gen: NAD  Head: NCAT  CV: RRR  CHEST: Clear  ABD: soft, NT/ND  EXT: no edema      ASSESSMENT/PLAN:  This is a 77y o  year old male here for EGD and colonoscopy, and he is stable and optimized for his procedure

## 2021-07-29 NOTE — ANESTHESIA POSTPROCEDURE EVALUATION
Post-Op Assessment Note    CV Status:  Stable    Pain management: adequate     Mental Status:  Alert and awake   Hydration Status:  Euvolemic   PONV Controlled:  Controlled   Airway Patency:  Patent      Post Op Vitals Reviewed: Yes      Staff: CRNA         No complications documented      /60 (07/29/21 0903)    Temp (!) 97 2 °F (36 2 °C) (07/29/21 0903)    Pulse 71 (07/29/21 0903)   Resp 18 (07/29/21 0903)    SpO2 94 % (07/29/21 0903)

## 2021-07-29 NOTE — ANESTHESIA PREPROCEDURE EVALUATION
Procedure:  COLONOSCOPY  EGD    Relevant Problems   ANESTHESIA (within normal limits)   (-) History of anesthesia complications      CARDIO   (+) Benign essential hypertension   (+) Hyperlipidemia   (-) Chest pain   (-) AC (dyspnea on exertion)      ENDO   (+) Type 2 diabetes mellitus with hyperglycemia, with long-term current use of insulin (HCC)      GI/HEPATIC   (+) Gastroesophageal reflux disease without esophagitis      MUSCULOSKELETAL   (+) Low back pain with sciatica   (+) Sacroiliitis (HCC)   (+) Spondylosis of cervical region without myelopathy or radiculopathy      NEURO/PSYCH   (+) Chronic pain syndrome      PULMONARY   (+) Obstructive sleep apnea syndrome   (-) Shortness of breath   (-) URI (upper respiratory infection)      Other   (+) Submandibular lymphadenopathy             Anesthesia Plan  ASA Score- 3     Anesthesia Type- IV sedation with anesthesia with ASA Monitors  Additional Monitors:   Airway Plan:           Plan Factors-Exercise tolerance (METS): >4 METS  Chart reviewed  EKG reviewed  Existing labs reviewed  Induction- intravenous  Postoperative Plan-     Informed Consent- Anesthetic plan and risks discussed with patient  I personally reviewed this patient with the CRNA  Discussed and agreed on the Anesthesia Plan with the CRNA  Arlen Brunson

## 2021-08-06 ENCOUNTER — APPOINTMENT (OUTPATIENT)
Dept: LAB | Age: 66
End: 2021-08-06
Payer: MEDICARE

## 2021-08-06 LAB
CHOLEST SERPL-MCNC: 92 MG/DL (ref 50–200)
CREAT UR-MCNC: 81.9 MG/DL
HDLC SERPL-MCNC: 33 MG/DL
LDLC SERPL CALC-MCNC: 29 MG/DL (ref 0–100)
MICROALBUMIN UR-MCNC: 5.8 MG/L (ref 0–20)
MICROALBUMIN/CREAT 24H UR: 7 MG/G CREATININE (ref 0–30)
NONHDLC SERPL-MCNC: 59 MG/DL
PSA SERPL-MCNC: 0.6 NG/ML (ref 0–4)
TRIGL SERPL-MCNC: 151 MG/DL

## 2021-08-06 PROCEDURE — 80061 LIPID PANEL: CPT

## 2021-08-06 PROCEDURE — 82043 UR ALBUMIN QUANTITATIVE: CPT

## 2021-08-06 PROCEDURE — 36415 COLL VENOUS BLD VENIPUNCTURE: CPT

## 2021-08-06 PROCEDURE — G0103 PSA SCREENING: HCPCS

## 2021-08-06 PROCEDURE — 82570 ASSAY OF URINE CREATININE: CPT

## 2021-08-08 DIAGNOSIS — Z79.4 TYPE 2 DIABETES MELLITUS WITH HYPERGLYCEMIA, WITH LONG-TERM CURRENT USE OF INSULIN (HCC): ICD-10-CM

## 2021-08-08 DIAGNOSIS — E11.65 TYPE 2 DIABETES MELLITUS WITH HYPERGLYCEMIA, WITH LONG-TERM CURRENT USE OF INSULIN (HCC): ICD-10-CM

## 2021-08-09 RX ORDER — EMPAGLIFLOZIN 25 MG/1
TABLET, FILM COATED ORAL
Qty: 90 TABLET | Refills: 3 | Status: SHIPPED | OUTPATIENT
Start: 2021-08-09 | End: 2022-04-12 | Stop reason: SDUPTHER

## 2021-08-17 ENCOUNTER — OFFICE VISIT (OUTPATIENT)
Dept: INTERNAL MEDICINE CLINIC | Facility: CLINIC | Age: 66
End: 2021-08-17
Payer: MEDICARE

## 2021-08-17 VITALS
HEIGHT: 63 IN | DIASTOLIC BLOOD PRESSURE: 74 MMHG | WEIGHT: 162 LBS | TEMPERATURE: 98.1 F | SYSTOLIC BLOOD PRESSURE: 120 MMHG | OXYGEN SATURATION: 98 % | BODY MASS INDEX: 28.7 KG/M2 | HEART RATE: 66 BPM

## 2021-08-17 DIAGNOSIS — I10 BENIGN ESSENTIAL HYPERTENSION: ICD-10-CM

## 2021-08-17 DIAGNOSIS — G89.4 CHRONIC PAIN SYNDROME: ICD-10-CM

## 2021-08-17 DIAGNOSIS — E11.65 TYPE 2 DIABETES MELLITUS WITH HYPERGLYCEMIA, WITH LONG-TERM CURRENT USE OF INSULIN (HCC): Primary | ICD-10-CM

## 2021-08-17 DIAGNOSIS — Z79.4 TYPE 2 DIABETES MELLITUS WITH HYPERGLYCEMIA, WITH LONG-TERM CURRENT USE OF INSULIN (HCC): Primary | ICD-10-CM

## 2021-08-17 DIAGNOSIS — E78.2 MIXED HYPERLIPIDEMIA: ICD-10-CM

## 2021-08-17 DIAGNOSIS — M54.2 NECK PAIN: ICD-10-CM

## 2021-08-17 DIAGNOSIS — M48.02 CERVICAL STENOSIS OF SPINAL CANAL: ICD-10-CM

## 2021-08-17 DIAGNOSIS — M54.12 CERVICAL RADICULOPATHY: ICD-10-CM

## 2021-08-17 DIAGNOSIS — K21.9 GASTROESOPHAGEAL REFLUX DISEASE WITHOUT ESOPHAGITIS: ICD-10-CM

## 2021-08-17 DIAGNOSIS — E55.9 VITAMIN D DEFICIENCY: ICD-10-CM

## 2021-08-17 PROBLEM — E78.5 DYSLIPIDEMIA: Status: RESOLVED | Noted: 2021-02-08 | Resolved: 2021-08-17

## 2021-08-17 PROBLEM — R59.0 SUBMANDIBULAR LYMPHADENOPATHY: Status: RESOLVED | Noted: 2019-08-08 | Resolved: 2021-08-17

## 2021-08-17 LAB — SL AMB POCT HEMOGLOBIN AIC: 5.7 (ref ?–6.5)

## 2021-08-17 PROCEDURE — 99214 OFFICE O/P EST MOD 30 MIN: CPT | Performed by: INTERNAL MEDICINE

## 2021-08-17 PROCEDURE — 83036 HEMOGLOBIN GLYCOSYLATED A1C: CPT | Performed by: INTERNAL MEDICINE

## 2021-08-17 RX ORDER — OMEPRAZOLE 40 MG/1
40 CAPSULE, DELAYED RELEASE ORAL DAILY
Qty: 30 CAPSULE | Refills: 0 | Status: SHIPPED | OUTPATIENT
Start: 2021-08-17 | End: 2021-09-16 | Stop reason: SDUPTHER

## 2021-08-17 RX ORDER — DOXAZOSIN MESYLATE 4 MG/1
4 TABLET ORAL DAILY
Qty: 90 TABLET | Refills: 1 | Status: SHIPPED | OUTPATIENT
Start: 2021-08-17 | End: 2022-02-22 | Stop reason: SDUPTHER

## 2021-08-17 RX ORDER — AMLODIPINE BESYLATE 10 MG/1
10 TABLET ORAL DAILY
Qty: 90 TABLET | Refills: 1 | Status: SHIPPED | OUTPATIENT
Start: 2021-08-17 | End: 2022-04-14 | Stop reason: SDUPTHER

## 2021-08-17 RX ORDER — GABAPENTIN 300 MG/1
300 CAPSULE ORAL 3 TIMES DAILY
Qty: 270 CAPSULE | Refills: 1 | Status: SHIPPED | OUTPATIENT
Start: 2021-08-17 | End: 2022-02-22 | Stop reason: SDUPTHER

## 2021-08-17 RX ORDER — HYDROCHLOROTHIAZIDE 25 MG/1
25 TABLET ORAL DAILY
Qty: 90 TABLET | Refills: 1 | Status: SHIPPED | OUTPATIENT
Start: 2021-08-17 | End: 2022-02-22 | Stop reason: SDUPTHER

## 2021-08-17 NOTE — ASSESSMENT & PLAN NOTE
Controlled, POC A1c today is 5 7%  Continue current diabetic regimen  Continue follow-up endocrinology

## 2021-08-17 NOTE — PROGRESS NOTES
Assessment/Plan:    Type 2 diabetes mellitus with hyperglycemia, with long-term current use of insulin ([facility])  Controlled, POC A1c today is 5 7%  Continue current diabetic regimen  Continue follow-up endocrinology  Benign essential hypertension  Well controlled  Continue lisinopril 40 mg daily, amlodipine 10 mg daily, and hydrochlorothiazide 25 mg daily, doxazosin 4 mg daily  Hyperlipidemia  Controlled  Continue simvastatin 20 mg daily  Vitamin D deficiency  Continue vitamin supplementation  BMI 27 0-27 9,adult  Discussed lifestyle modifications with diet, exercise, and weight loss  Chronic pain syndrome  Controlled  continue gabapentin 300 mg TID and medical marijuana  Gastroesophageal reflux disease without esophagitis  Will increase omeprazole to 40 mg daily  Follow up with GI  Diagnoses and all orders for this visit:    Type 2 diabetes mellitus with hyperglycemia, with long-term current use of insulin ([facility])  -     amLODIPine (NORVASC) 10 mg tablet; Take 1 tablet (10 mg total) by mouth daily  -     POCT hemoglobin A1c    Gastroesophageal reflux disease without esophagitis  -     omeprazole (PriLOSEC) 40 MG capsule; Take 1 capsule (40 mg total) by mouth daily    Benign essential hypertension  -     hydrochlorothiazide (HYDRODIURIL) 25 mg tablet; Take 1 tablet (25 mg total) by mouth daily  -     doxazosin (CARDURA) 4 mg tablet; Take 1 tablet (4 mg total) by mouth daily    Neck pain  -     gabapentin (NEURONTIN) 300 mg capsule; Take 1 capsule (300 mg total) by mouth 3 (three) times a day    Cervical stenosis of spinal canal  -     gabapentin (NEURONTIN) 300 mg capsule; Take 1 capsule (300 mg total) by mouth 3 (three) times a day    Cervical radiculopathy  -     gabapentin (NEURONTIN) 300 mg capsule; Take 1 capsule (300 mg total) by mouth 3 (three) times a day    Mixed hyperlipidemia    Vitamin D deficiency    BMI 27 0-27 9,adult    Chronic pain syndrome          BMI Counseling:  Body mass index is 28 7 kg/m²  The BMI is above normal  Nutrition recommendations include decreasing portion sizes, encouraging healthy choices of fruits and vegetables, decreasing fast food intake, consuming healthier snacks, limiting drinks that contain sugar, moderation in carbohydrate intake, increasing intake of lean protein, reducing intake of saturated and trans fat and reducing intake of cholesterol  Exercise recommendations include moderate physical activity 150 minutes/week and exercising 3-5 times per week  No pharmacotherapy was ordered  Patient referred to PCP due to patient being overweight  Depression Screening and Follow-up Plan: Patient's depression screening was positive with a PHQ-2 score of 0  Clincally patient does not have depression  No treatment is required  Patient advised to follow-up with PCP for further management  Subjective:      Patient ID: Sima Mendez is a 77 y o  male  Chief Complaint   Patient presents with    Follow-up     6 month follow up     Diabetes     blood woprk done on 8/6/2021    Hypertension       57-year-old male is seen today for follow-up of chronic conditions  Laboratory studies reviewed today, microalbumin:  Creatinine ratio, lipid panel, and PSA are within acceptable range  He recently underwent EGD and colonoscopy for persistent diarrhea  He was started on dicyclomine which provided some relief  He follows up with Gastroenterology  EGD and colonoscopy biopsies were negative for malignancy however did show gastritis and esophagitis  He admits to persistent GERD symptoms despite taking omeprazole 10 mg daily  He also underwent laboratory workup for inflammatory or infectious etiology of diarrhea to which were all negative  CRP was also negative  Otherwise, he has no active complaints or concerns at this time  He follows up with his endocrinologist regularly  He has been compliant with his medication regimen        Diabetes  He presents for his follow-up diabetic visit  He has type 2 diabetes mellitus  His disease course has been stable  There are no hypoglycemic associated symptoms  Pertinent negatives for hypoglycemia include no confusion  There are no diabetic associated symptoms  Pertinent negatives for diabetes include no chest pain and no fatigue  There are no hypoglycemic complications  Symptoms are stable  There are no diabetic complications  Risk factors for coronary artery disease include dyslipidemia, diabetes mellitus, hypertension and male sex  He is compliant with treatment all of the time  His weight is stable  He is following a generally healthy diet  He participates in exercise intermittently  There is no change in his home blood glucose trend  His overall blood glucose range is 130-140 mg/dl  An ACE inhibitor/angiotensin II receptor blocker is being taken  He does not see a podiatrist Eye exam is current  Hypertension  This is a chronic problem  The current episode started more than 1 year ago  The problem is unchanged  The problem is controlled  Pertinent negatives include no chest pain, palpitations or shortness of breath  Past treatments include ACE inhibitors, calcium channel blockers and diuretics  The current treatment provides moderate improvement  There are no compliance problems  Heartburn  He reports no abdominal pain, no chest pain, no coughing, no heartburn, no nausea, no sore throat or no wheezing  This is a chronic problem  The current episode started more than 1 year ago  The problem occurs frequently  The symptoms are aggravated by certain foods  Pertinent negatives include no fatigue  He has tried a PPI for the symptoms  The treatment provided mild relief         The following portions of the patient's history were reviewed and updated as appropriate: allergies, current medications, past family history, past medical history, past social history, past surgical history and problem list     Review of Systems Constitutional: Negative  Negative for chills, fatigue and fever  HENT: Negative for congestion, ear pain, postnasal drip, rhinorrhea and sore throat  Eyes: Negative  Respiratory: Negative for cough, chest tightness, shortness of breath and wheezing  Cardiovascular: Negative for chest pain and palpitations  Gastrointestinal: Negative for abdominal distention, abdominal pain, blood in stool, constipation, diarrhea, heartburn and nausea  Endocrine: Negative  Genitourinary: Negative for difficulty urinating, dysuria and hematuria  Musculoskeletal: Negative  Skin: Negative  Allergic/Immunologic: Negative for environmental allergies and food allergies  Neurological: Negative  Hematological: Negative for adenopathy  Psychiatric/Behavioral: Negative for agitation, behavioral problems, confusion and sleep disturbance  All other systems reviewed and are negative          Past Medical History:   Diagnosis Date    Arthritis     Last assessed 5/24/2013    Avitaminosis D 2012    Colon polyp     Diabetes mellitus ([facility] [address] [facility] )     Displacement of cervical intervertebral disc 2014    GERD (gastroesophageal reflux disease)     Glaucoma     Normal Pressure Glaucoma    HTN (hypertension) 2007    Nephrolithiasis 5/24/2013    Pituitary microadenoma ([facility] [address] ) 2010    Spinal stenosis in cervical region 2014    Sprain and strain of calcaneofibular (ligament) 12/5/2013    Submandibular lymphadenopathy 8/8/2019    Uric acid nephrolithiasis 1990         Current Outpatient Medications:     amLODIPine (NORVASC) 10 mg tablet, Take 1 tablet (10 mg total) by mouth daily, Disp: 90 tablet, Rfl: 1    aspirin (ECOTRIN LOW STRENGTH) 81 mg EC tablet, Take 81 mg by mouth daily , Disp: , Rfl:     B Complex Vitamins (VITAMIN B-COMPLEX PO), Take 1 capsule by mouth daily , Disp: , Rfl:     BD Pen Needle Harriett U/F 32G X 4 MM MISC, Use 4x per day, Disp: 200 each, Rfl: 3    brimonidine-timolol (COMBIGAN) 0 2-0 5 %, Administer 1 drop to both eyes every 12 (twelve) hours, Disp: , Rfl:     cholecalciferol (VITAMIN D3) 1,000 units tablet, Take 1,000 Units by mouth 2 (two) times a day , Disp: , Rfl:     dicyclomine (BENTYL) 20 mg tablet, Take 1 tablet (20 mg total) by mouth 4 (four) times a day (before meals and at bedtime), Disp: 120 tablet, Rfl: 5    doxazosin (CARDURA) 4 mg tablet, Take 1 tablet (4 mg total) by mouth daily, Disp: 90 tablet, Rfl: 1    gabapentin (NEURONTIN) 300 mg capsule, Take 1 capsule (300 mg total) by mouth 3 (three) times a day, Disp: 270 capsule, Rfl: 1    Glucosamine-Chondroitin (OSTEO BI-FLEX REGULAR STRENGTH) 250-200 MG TABS, Take 1 tablet by mouth 2 (two) times a day, Disp: , Rfl:     glyBURIDE (DIABETA) 5 mg tablet, Take 5 mg by mouth daily with breakfast, Disp: , Rfl:     hydrochlorothiazide (HYDRODIURIL) 25 mg tablet, Take 1 tablet (25 mg total) by mouth daily, Disp: 90 tablet, Rfl: 1    ibuprofen (MOTRIN) 200 mg tablet, Take 200 mg by mouth as needed , Disp: , Rfl:     insulin glargine (Lantus SoloStar) 100 units/mL injection pen, Inject 30 Units under the skin daily at bedtime, Disp: 30 mL, Rfl: 1    insulin lispro (HumaLOG KwikPen) 100 units/mL injection pen, Inject 10 Units under the skin 3 (three) times a day with meals Take 25 units daily as directed, Disp: 30 mL, Rfl: 3    Jardiance 25 MG TABS, TAKE 1 TABLET BY MOUTH  DAILY, Disp: 90 tablet, Rfl: 3    lisinopril (ZESTRIL) 40 mg tablet, Take 1 tablet (40 mg total) by mouth daily, Disp: 90 tablet, Rfl: 1    loratadine (CLARITIN) 10 mg tablet, Take 10 mg by mouth daily, Disp: , Rfl:     metFORMIN (GLUCOPHAGE-XR) 500 mg 24 hr tablet, Take 2 tablets twice daily, Disp: 360 tablet, Rfl: 1    MULTIPLE VITAMIN PO, Take 1 tablet by mouth daily , Disp: , Rfl:     omeprazole (PriLOSEC) 40 MG capsule, Take 1 capsule (40 mg total) by mouth daily, Disp: 30 capsule, Rfl: 0    other medication, see sig,, Medication/product name: Medical Marijuana, Disp: , Rfl:     Probiotic Product (PROBIOTIC-10) CAPS, Take 1 capsule by mouth daily , Disp: , Rfl:     simvastatin (ZOCOR) 20 mg tablet, Take 1 tablet (20 mg total) by mouth daily at bedtime, Disp: 90 tablet, Rfl: 1    SYRINGE-NEEDLE, DISP, 3 ML (B-D 3CC LUER-DESHAUN SYR 23GX1") 23G X 1" 3 ML MISC, USE AND DISCARD 1 SYRINGE PER WEEK, Disp: 12 each, Rfl: 3    vitamin E, tocopherol, 400 units capsule, Take 400 Units by mouth 2 (two) times a day , Disp: , Rfl:     Allergies   Allergen Reactions    Clonidine Other (See Comments)     Diaphoresis, hot flashes    Augmentin [Amoxicillin-Pot Clavulanate] Abdominal Pain    Biaxin [Clarithromycin] Abdominal Pain    Dust Mite Extract     Insulin Aspart GI Intolerance     Novolog     Molds & Smuts     Nuts - Food Allergy     Seasonal Ic [Cholestatin] Headache       Social History   Past Surgical History:   Procedure Laterality Date    ASPIRATION / INJECTION RENAL CYST      Renal Cyst Aspiration    CATARACT EXTRACTION      12/2017- right eye, 01/2018- left eye    COLONOSCOPY  2005    EYE SURGERY Bilateral     Cataract Surgery    TONSILLECTOMY       Family History   Problem Relation Age of Onset    Diabetes unspecified Mother     Heart disease Mother     Nephrolithiasis Mother     Kidney disease Mother     Other Mother         Back Disorder    Thyroid disease Mother     Diabetes unspecified Father     Heart disease Father     Diabetes unspecified Sister     Heart disease Sister     Stroke Sister     Diabetes unspecified Brother     Heart disease Brother     Nephrolithiasis Brother     Lung cancer Brother     Other Brother         COPD    Diabetes unspecified Sister     Heart disease Sister     Diabetes unspecified Sister     Diabetes unspecified Brother     Heart disease Brother     Diabetes unspecified Brother     Other Brother         Back Disorder    Diabetes unspecified Brother     Other Brother         Back Disorder    Diabetes unspecified Brother     Stomach cancer Paternal Aunt        Objective:  /74 (BP Location: Left arm, Patient Position: Sitting, Cuff Size: Adult)   Pulse 66   Temp 98 1 °F (36 7 °C) (Oral)   Ht 5' 3" (1 6 m)   Wt 73 5 kg (162 lb)   SpO2 98% Comment: Room Air  BMI 28 70 kg/m²     Recent Results (from the past 1344 hour(s))   Fingerstick Glucose (POCT)    Collection Time: 07/29/21  7:35 AM   Result Value Ref Range    POC Glucose 106 65 - 140 mg/dl   Tissue Exam    Collection Time: 07/29/21  8:25 AM   Result Value Ref Range    Case Report       Surgical Pathology Report                         Case: [id number]                                   Authorizing Provider:  Shad Crenshaw MD          Collected:           07/29/2021 0825              Ordering Location:     [facility] [place]        Received:            07/29/2021 [facility]                                                           Pathologist:           Lynette Barnett MD                                                         Specimens:   A) - Duodenum, Cold BX Duodenum                                                                     B) - Stomach, Cold BX Gastric                                                                       C) - Stomach, Cold BX Gastric Polyp                                                                 D) - Esophagogastric junction, Cold BX GE Junction                                                  E) - Large Intestine, Right/Ascending Colon, Cold Snare Ascending Colon Polypectomy                  F) - Large Intestine, Transverse Colon, Cold Snare Transverse Colon Polypectomy  x2                 G) - Large Intestine, Left/Descending Colon, Cold Snare Descending Colon Polypectomy                x2                                                                                         Final Diagnosis       A    Duodenum, biopsy:     - No significant pathologic abnormalities  - No villous atrophy, increased intraepithelial lymphocytes or crypt hyperplasia to suggest       malabsorptive enteropathy      - No active inflammation, granulomas, organisms, dysplasia or neoplasia identified  B   Stomach, biopsy:     - Chronic inactive oxyntic gastritis  - No H  pylori organisms identified on H&E stained sections      - No intestinal metaplasia, dysplasia or neoplasia identified  C   Stomach, polyp:     - Fundic gland polyp      - No intestinal metaplasia, dysplasia or neoplasia identified  D   Esophagogastric junction, biopsy:     - Cardiac gastric and squamous junctional mucosa with mild chronic inflammation      - No squamous intraepithelial eosinophils noted  - No intestinal metaplasia, dysplasia or neoplasia identified  E   Colon, ascending, polyp:     - Tubular adenoma, fragments      - No high grade dysplasia or carcinoma identified  F   Colon, transverse, polyps:     - Tubular adenomas (x2)  - No high grade dysplasia or carcinoma identified  G   Colon, descending, polyp:     - Tubular adenoma, fragments      - No high grade dysplasia or carcinoma identified  Additional Information       All reported additional testing was performed with appropriately reactive controls  These tests were developed and their performance characteristics determined by [facility] or appropriate performing facility, though some tests may be performed on tissues which have not been validated for performance characteristics (such as staining performed on alcohol exposed cell blocks and decalcified tissues)  Results should be interpreted with caution and in the context of the patients clinical condition  These tests may not be cleared or approved by the [place]  Food and Drug Administration, though the FDA has determined that such clearance or approval is not necessary   These tests are used for clinical purposes and they should not be regarded as investigational or for research  This laboratory has been approved by CLIA 88, designated as a high-complexity laboratory and is qualified to perform these tests     - Interpretation performed at [facility], [address] [place] [address] Drive         (COLON/RECTUM POLYP FORM - GI - E, F, G)             :    Adenoma(s)      Gross Description          A  The specimen is received in formalin, labeled with the patient's name and hospital number, and is designated "cold biopsy duodenum    It consists of three 0 2-0 4 cm pieces of soft, tan tissue  Entirely submitted  Screened cassette  B  The specimen is received in formalin, labeled with the patient's name and hospital number, and is designated "cold biopsy gastric    It consists of three 0 1-0 3 cm pieces of soft, tan-pink tissue  Entirely submitted  Screened cassette  C  The specimen is received in formalin, labeled with the patient's name and hospital number, and is designated "cold biopsy gastric polyp    It consists of a single 0 4 cm soft, tan, polypoid tissue  Entirely submitted  Screened cassette  D  The specimen is received in formalin, labeled with the patient's name and hospital number, and is designated "cold biopsy GE junction    It consists of two pieces of soft, tan pink tissue each measuring 0 3 cm  Entirely submitted  Screened cassette  E  The specimen is received in formalin, labeled with the patient's name and hospital number, and is designated "cold snare ascending colon polypectomy    It consists of multiple 0 1-0 3 cm pieces of soft, tan-yellow to green, friable tissue  Entirely submitted  Screened cassette  F  The specimen is received in formalin, labeled with the patient's name and hospital number, and is designated "cold snare transverse colon polypectomy x2 " It consists of multiple 0 1-0 2 cm pieces of soft, tan-yellow, friable tissue    Due to the size and consistency of the specimen, the tissue may not survive processing  Entirely submitted  Screened cassette  G  The specimen is received in formalin, labeled with the patient's name and hospital number, and is designated "cold snare descending colon polypectomy x2    It consists of multiple 0 1-0 3 cm pieces of soft, tan-pink to green-yellow, friable tissue  Entirely submitted  Screened cassette  Note: The estimated total formalin fixation time based upon information provided by the submitting clinician and the standard processing schedule is under 72 hours  Isiah           Clinical Information R/O Celiac    PSA, Total Screen    Collection Time: 08/06/21  8:17 AM   Result Value Ref Range    PSA 0 6 0 0 - 4 0 ng/mL   Microalbumin / creatinine urine ratio    Collection Time: 08/06/21  8:17 AM   Result Value Ref Range    Creatinine, Ur 81 9 mg/dL    Microalbum  ,U,Random 5 8 0 0 - 20 0 mg/L    Microalb Creat Ratio 7 0 - 30 mg/g creatinine   Lipid panel    Collection Time: 08/06/21  8:17 AM   Result Value Ref Range    Cholesterol 92 50 - 200 mg/dL    Triglycerides 151 (H) <=150 mg/dL    HDL, Direct 33 (L) >=40 mg/dL    LDL Calculated 29 0 - 100 mg/dL    Non-HDL-Chol (CHOL-HDL) 59 mg/dl   POCT hemoglobin A1c    Collection Time: 08/17/21 11:00 AM   Result Value Ref Range    Hemoglobin A1C 5 7 6 5            Physical Exam  Vitals and nursing note reviewed  Constitutional:       General: He is not in acute distress  Appearance: He is well-developed  He is not diaphoretic  HENT:      Head: Normocephalic and atraumatic  Eyes:      General: No scleral icterus  Right eye: No discharge  Left eye: No discharge  Conjunctiva/sclera: Conjunctivae normal       Pupils: Pupils are equal, round, and reactive to light  Neck:      Thyroid: No thyromegaly  Vascular: No JVD  Cardiovascular:      Rate and Rhythm: Normal rate and regular rhythm        Heart sounds: Normal heart sounds  No murmur heard  No friction rub  No gallop  Pulmonary:      Effort: Pulmonary effort is normal  No respiratory distress  Breath sounds: Normal breath sounds  No wheezing or rales  Chest:      Chest wall: No tenderness  Abdominal:      General: Bowel sounds are normal  There is no distension  Palpations: Abdomen is soft  There is no mass  Tenderness: There is no abdominal tenderness  There is no guarding or rebound  Musculoskeletal:         General: No tenderness or deformity  Normal range of motion  Cervical back: Normal range of motion and neck supple  Lymphadenopathy:      Cervical: No cervical adenopathy  Skin:     General: Skin is warm and dry  Coloration: Skin is not pale  Findings: No erythema or rash  Neurological:      Mental Status: He is alert and oriented to person, place, and time  Cranial Nerves: No cranial nerve deficit  Coordination: Coordination normal       Deep Tendon Reflexes: Reflexes are normal and symmetric  Psychiatric:         Behavior: Behavior normal          Thought Content:  Thought content normal          Judgment: Judgment normal

## 2021-08-17 NOTE — ASSESSMENT & PLAN NOTE
Well controlled  Continue lisinopril 40 mg daily, amlodipine 10 mg daily, and hydrochlorothiazide 25 mg daily, doxazosin 4 mg daily

## 2021-08-26 ENCOUNTER — OFFICE VISIT (OUTPATIENT)
Dept: GASTROENTEROLOGY | Facility: CLINIC | Age: 66
End: 2021-08-26
Payer: MEDICARE

## 2021-08-26 VITALS
HEART RATE: 75 BPM | BODY MASS INDEX: 28.39 KG/M2 | SYSTOLIC BLOOD PRESSURE: 122 MMHG | HEIGHT: 63 IN | DIASTOLIC BLOOD PRESSURE: 72 MMHG | TEMPERATURE: 97.2 F | WEIGHT: 160.2 LBS

## 2021-08-26 DIAGNOSIS — K58.0 IRRITABLE BOWEL SYNDROME WITH DIARRHEA: Primary | ICD-10-CM

## 2021-08-26 DIAGNOSIS — R63.4 ABNORMAL WEIGHT LOSS: ICD-10-CM

## 2021-08-26 DIAGNOSIS — K21.9 GASTROESOPHAGEAL REFLUX DISEASE WITHOUT ESOPHAGITIS: ICD-10-CM

## 2021-08-26 DIAGNOSIS — R10.32 LEFT LOWER QUADRANT ABDOMINAL PAIN: ICD-10-CM

## 2021-08-26 PROCEDURE — 99214 OFFICE O/P EST MOD 30 MIN: CPT | Performed by: PHYSICIAN ASSISTANT

## 2021-08-26 NOTE — PATIENT INSTRUCTIONS
You can stop dicyclomine (Bentyl)  Start Imodium 1-2 tablets in the morning after waking up  Try this for 2 weeks, and if no relief, call the office or send us a [employer] message and we can prescribe Xifaxan     Another option is amitriptyline (Elavil) for chronic abdominal pain  Low FODMAP diet  Can try Gas-X or Beano before meals to help with digestion      CT scheduled 09/01/21 @[facility]

## 2021-08-26 NOTE — LETTER
August 26, 2021     Brooklynn Triana, [address] [facility]    Patient: Glo David   YOB: 1955   Date of Visit: 8/26/2021       Dear Dr Gloria Solares:    Thank you for referring Cody Craven to me for evaluation  Below are my notes for this consultation  If you have questions, please do not hesitate to call me  I look forward to following your patient along with you  Sincerely,        Montserrat Nixon PA-C        CC: No Recipients  Montserrat Nixon PA-C  8/26/2021  1:10 PM  Sign when Signing Visit  [facility] - Outpatient Follow-up Note  Glo Offer 77 y o  male MRN: [id number]  Encounter: [id number]          ASSESSMENT AND PLAN:      1  Irritable bowel syndrome with diarrhea  Suspect diarrhea predominant irritable bowel syndrome given negative work-up for infection, celiac disease, thyroid disease, inflammatory bowel disease, pancreatic insufficiency  Since he has already tried and failed Bentyl, would recommend 2 week trial of Imodium  If Imodium is not helpful, would recommend Xifaxan and/or Elavil for management of visceral hypersensitivity  He can also use Gas-X or Beano  Discussed low FODMAP diet and gave handout  Recommend avoiding artificial sweeteners  2  Left lower quadrant abdominal pain  He has moderate to severe tenderness in his LUQ on exam  He appears nontoxic and his vital signs are stable  Would recommend CT scan to rule out diverticulitis  - CT abdomen pelvis w contrast; Future    3  Abnormal weight loss  Check CT abdomen pelvis to rule out intra-abdominal etiology including malignancy since EGD and colonoscopy were unremarkable  If CT is negative, would recommend gastric emptying study  4  GERD without esophagitis  Recent EGD concerning for Brooke's esophagus, although biopsies were negative  Biopsies did show some mild chronic inflammation  Continue omeprazole 40 mg daily   Continue to avoid reflux triggers like citrus, garlic, onions, caffeine  Follow-up in 2 months  ______________________________________________________________________    SUBJECTIVE:    26-year-old male with type 2 diabetes mellitus, hypertension, pituitary microadenoma, GERD, lumbar radiculopathy presenting for follow-up of diarrhea  and abdominal pain  He saw Dr Apple Emerson in the office in May for diarrhea, bloating, and left lower quadrant pain  The diarrhea has been ongoing since February 2021  He has at least 3-5 bowel movements between 7-8 AM and 12 PM   Occasionally he will have more diarrhea in the afternoon  He also reports left lower quadrant pain any time during the day  Today, he has constant pain  His pain is unrelieved with Bentyl  He has been taking Bentyl 4 times daily for the past 3 months without relief  He denies any blood in the stool  He does admit to 8 lb of abnormal weight loss and attributes this to poor appetite  He does have some reflux and belching  REVIEW OF SYSTEMS IS OTHERWISE NEGATIVE        Historical Information   Past Medical History:   Diagnosis Date    Arthritis     Last assessed 5/24/2013    Avitaminosis D 2012    Colon polyp     Diabetes mellitus ([facility] [address] )     Displacement of cervical intervertebral disc 2014    GERD (gastroesophageal reflux disease)     Glaucoma     Normal Pressure Glaucoma    HTN (hypertension) 2007    Hypertension     Nephrolithiasis 5/24/2013    Pituitary microadenoma ([facility] [address] ) 2010    Spinal stenosis in cervical region 2014    Sprain and strain of calcaneofibular (ligament) 12/5/2013    Submandibular lymphadenopathy 8/8/2019    Uric acid nephrolithiasis 1990     Past Surgical History:   Procedure Laterality Date    ASPIRATION / INJECTION RENAL CYST      Renal Cyst Aspiration    CATARACT EXTRACTION      12/2017- right eye, 01/2018- left eye    COLONOSCOPY  2005    EYE SURGERY Bilateral     Cataract Surgery    TONSILLECTOMY      UPPER GASTROINTESTINAL ENDOSCOPY       Social History   Social History     Substance and Sexual Activity   Alcohol Use Yes    Alcohol/week: 2 0 standard drinks    Types: 2 Cans of beer per week    Comment: social     Social History     Substance and Sexual Activity   Drug Use Yes    Types: Marijuana    Comment: legal / medical      Social History     Tobacco Use   Smoking Status Former Smoker    Packs/day: 0 25    Years: 2 00    Pack years: 0 50    Types: Cigarettes    Quit date: 36    Years since quittin 6   Smokeless Tobacco Never Used     Family History   Problem Relation Age of Onset    Diabetes unspecified Mother     Heart disease Mother     Nephrolithiasis Mother     Kidney disease Mother     Other Mother         Back Disorder    Thyroid disease Mother     Diabetes unspecified Father     Heart disease Father     Diabetes unspecified Sister     Heart disease Sister     Stroke Sister     Diabetes unspecified Brother     Heart disease Brother     Nephrolithiasis Brother     Lung cancer Brother     Other Brother         COPD    Diabetes unspecified Sister     Heart disease Sister     Diabetes unspecified Sister     Diabetes unspecified Brother     Heart disease Brother     Diabetes unspecified Brother     Other Brother         Back Disorder    Diabetes unspecified Brother     Other Brother         Back Disorder    Diabetes unspecified Brother     Stomach cancer Paternal Aunt        Meds/Allergies       Current Outpatient Medications:     amLODIPine (NORVASC) 10 mg tablet    aspirin (ECOTRIN LOW STRENGTH) 81 mg EC tablet    B Complex Vitamins (VITAMIN B-COMPLEX PO)    BD Pen Needle Harriett U/F 32G X 4 MM MISC    brimonidine-timolol (COMBIGAN) 0 2-0 5 %    cholecalciferol (VITAMIN D3) 1,000 units tablet    dicyclomine (BENTYL) 20 mg tablet    doxazosin (CARDURA) 4 mg tablet    gabapentin (NEURONTIN) 300 mg capsule    Glucosamine-Chondroitin (OSTEO BI-FLEX REGULAR STRENGTH) 250-200 MG TABS    glyBURIDE (DIABETA) 5 mg tablet    hydrochlorothiazide (HYDRODIURIL) 25 mg tablet    ibuprofen (MOTRIN) 200 mg tablet    insulin glargine (Lantus SoloStar) 100 units/mL injection pen    insulin lispro (HumaLOG KwikPen) 100 units/mL injection pen    Jardiance 25 MG TABS    lisinopril (ZESTRIL) 40 mg tablet    loratadine (CLARITIN) 10 mg tablet    metFORMIN (GLUCOPHAGE-XR) 500 mg 24 hr tablet    MULTIPLE VITAMIN PO    omeprazole (PriLOSEC) 40 MG capsule    Probiotic Product (PROBIOTIC-10) CAPS    simvastatin (ZOCOR) 20 mg tablet    SYRINGE-NEEDLE, DISP, 3 ML (B-D 3CC LUER-DESHAUN SYR 23GX1") 23G X 1" 3 ML MISC    vitamin E, tocopherol, 400 units capsule    other medication, see sig,    Allergies   Allergen Reactions    Clonidine Other (See Comments)     Diaphoresis, hot flashes    Augmentin [Amoxicillin-Pot Clavulanate] Abdominal Pain    Biaxin [Clarithromycin] Abdominal Pain    Dust Mite Extract     Insulin Aspart GI Intolerance     Novolog     Molds & Smuts     Nuts - Food Allergy     Seasonal Ic [Cholestatin] Headache           Objective     Blood pressure 122/72, pulse 75, temperature (!) 97 2 °F (36 2 °C), temperature source Tympanic, height 5' 3" (1 6 m), weight 72 7 kg (160 lb 3 2 oz)  Body mass index is 28 38 kg/m²  PHYSICAL EXAM:      General Appearance:   Alert, cooperative, no distress   HEENT:   Normocephalic, atraumatic, anicteric      Neck:  Supple, symmetrical, trachea midline   Lungs:   Clear to auscultation bilaterally; no rales, rhonchi or wheezing; respirations unlabored    Heart[de-identified]   Regular rate and rhythm; no murmur, rub, or gallop  Abdomen:     Nondistended  Normal bowel sounds  Soft  Moderate to severe left lower quadrant tenderness to palpation    No rebound or guarding     Genitalia:   Deferred    Rectal:   Deferred    Extremities:  No cyanosis, clubbing or edema    Pulses:  2+ and symmetric    Skin:  No jaundice, rashes, or lesions    Lymph nodes:  No palpable cervical lymphadenopathy        Lab Results:   No visits with results within 1 Day(s) from this visit  Latest known visit with results is:   Office Visit on 08/17/2021   Component Date Value    Hemoglobin A1C 08/17/2021 5 7          Radiology Results:   EGD    Result Date: 7/29/2021  Narrative: [address] [place] [address] [phone] DATE OF SERVICE: 7/29/21 PHYSICIAN(S): Shad Crenshaw MD - Attending Physician INDICATION: Nausea, Abdominal bloating POST-OP DIAGNOSIS: See the impression below  PREPROCEDURE: Informed consent was obtained for the procedure, including sedation  Risks of perforation, hemorrhage, adverse drug reaction and aspiration were discussed  The patient was placed in the left lateral decubitus position  Patient was explained about the risks and benefits of the procedure  Risks including but not limited to bleeding, infection, and perforation were explained in detail  Also explained about less than 100% sensitivity with the exam and other alternatives  DETAILS OF PROCEDURE: Patient was taken to the procedure room where a time out was performed to confirm correct patient and correct procedure  The patient underwent monitored anesthesia care, which was administered by an anesthesia professional  The patient's blood pressure, heart rate, level of consciousness, respirations and oxygen were monitored throughout the procedure  The scope was advanced to the second part of the duodenum  Retroflexion was performed in the fundus  The patient experienced no blood loss  The procedure was not difficult  The patient tolerated the procedure well  There were no apparent complications   ANESTHESIA INFORMATION: ASA: III Anesthesia Type: IV Sedation with Anesthesia MEDICATIONS: No administrations occurring from 0755 to 0829 on 07/29/21 FINDINGS: [place]-colored mucosa in the GE junction; performed cold forceps biopsy to rule out Brooke's esophagus Mild erythematous mucosa in the antrum; performed cold forceps biopsy to rule out H  pylori Polyp measuring smaller than 5 mm in the fundus of the stomach; performed cold forceps biopsy The duodenal bulb and 2nd part of the duodenum appeared normal  Performed random biopsy  SPECIMENS: ID Type Source Tests Collected by Time Destination 1 : Cold BX Duodenum Tissue Duodenum TISSUE EXAM Garrett Minor MD 7/29/2021  8:25 AM  2 : Cold BX Gastric Tissue Stomach TISSUE EXAM Garrett Minor MD 7/29/2021  8:26 AM  3 : Cold BX Gastric Polyp Tissue Stomach TISSUE EXAM Garrett Minor MD 7/29/2021  8:27 AM      Impression: [place]-colored mucosa in the GE junction; performed cold forceps biopsy to rule out Brooke's esophagus Mild erythematous mucosa in the antrum; performed cold forceps biopsy to rule out H  pylori Polyp measuring smaller than 5 mm in the fundus of the stomach; performed cold forceps biopsy The duodenal bulb and 2nd part of the duodenum appeared normal  Performed random biopsy  RECOMMENDATION: Await pathology results  Garrett Minor MD     Colonoscopy    Result Date: 7/29/2021  Narrative: [facility] [address] [place] [address] [phone] DATE OF SERVICE: 7/29/21 PHYSICIAN(S): Garrett Minor MD - Attending Physician INDICATION: Diarrhea, unspecified type, Left lower quadrant abdominal pain Colonoscopy performed for a diagnostic indication  POST-OP DIAGNOSIS: See the impression below  HISTORY: Prior colonoscopy: More than 10 years ago  BOWEL PREPARATION: Miralax/Dulcolax PREPROCEDURE: Informed consent was obtained for the procedure, including sedation  Risks including but not limited to bleeding, infection, perforation, adverse drug reaction and aspiration were explained in detail  Also explained about less than 100% sensitivity with the exam and other alternatives  The patient was placed in the left lateral decubitus position   DETAILS OF PROCEDURE: Patient was taken to the procedure room where a time out was performed to confirm correct patient and correct procedure  The patient underwent monitored anesthesia care, which was administered by an anesthesia professional  The patient's blood pressure, heart rate, level of consciousness, oxygen and respirations were monitored throughout the procedure  A digital rectal exam was performed  The scope was introduced through the anus and advanced to the cecum  Retroflexion was performed in the rectum  The quality of bowel preparation was evaluated using the [facility] Bowel Preparation Scale with scores of: right colon = 2, transverse colon = 2, left colon = 2  The total BBPS score was 6  Bowel prep was adequate  The patient experienced no blood loss  The procedure was not difficult  The patient tolerated the procedure well  There were no apparent complications   ANESTHESIA INFORMATION: ASA: III Anesthesia Type: IV Sedation with Anesthesia MEDICATIONS: No administrations occurring from 0755 to 0900 on 07/29/21 FINDINGS: Five polyps measuring 5-9 mm in the ascending colon, transverse colon and descending colon; removed en bloc by cold snare and retrieved specimen Few moderate pancolonic diverticula Small, protruding, internal hemorrhoids EVENTS: Procedure Events Event Event Time ENDO CECUM REACHED 7/29/2021  8:37 AM ENDO SCOPE OUT TIME 7/29/2021  8:59 AM SPECIMENS: ID Type Source Tests Collected by Time Destination 1 : Cold BX Duodenum Tissue Duodenum TISSUE EXAM Asim Caro MD 7/29/2021  8:25 AM  2 : Cold BX Gastric Tissue Stomach TISSUE EXAM Asim Caro MD 7/29/2021  8:26 AM  3 : Cold BX Gastric Polyp Tissue Stomach TISSUE EXAM Asim Caro MD 7/29/2021  8:27 AM  4 : Cold BX GE Junction Tissue Esophagogastric junction TISSUE EXAM Asim Caro MD 7/29/2021  8:29 AM  5 : Cold Snare Ascending Colon Polypectomy Tissue Large Intestine, Right/Ascending Colon TISSUE EXAM Asim Caro MD 7/29/2021  8:48 AM  6 : Cold Snare Transverse Colon Polypectomy  x2 Tissue Large Intestine, Transverse Colon TISSUE EXAM Edel Watt MD 7/29/2021  8:51 AM  7 : Cold Snare Descending Colon Polypectomy x2 Tissue Large Intestine, Left/Descending Colon TISSUE EXAM Edel Watt MD 7/29/2021  8:54 AM  EQUIPMENT: [id number] ENDOCUFF VISION MED BLUE ID [id number]     Impression: Five polyps measuring 5-9 mm in the ascending colon, transverse colon and descending colon; removed en bloc by cold snare and retrieved specimen Few moderate pancolonic diverticula Small, protruding, internal hemorrhoids RECOMMENDATION: Await pathology results Repeat colonoscopy in 3 years due to a personal history of colon polyps   Obtain gastric emptying study Resume regular diet Discharge home once standard parameters are met Edel Watt MD

## 2021-08-26 NOTE — PROGRESS NOTES
[facility] - Outpatient Follow-up Note  Latonia Marte 77 y o  male MRN: [id number]  Encounter: [id number]          ASSESSMENT AND PLAN:      1  Irritable bowel syndrome with diarrhea  Suspect diarrhea predominant irritable bowel syndrome given negative work-up for infection, celiac disease, thyroid disease, inflammatory bowel disease, pancreatic insufficiency  Since he has already tried and failed Bentyl, would recommend 2 week trial of Imodium  If Imodium is not helpful, would recommend Xifaxan and/or Elavil for management of visceral hypersensitivity  He can also use Gas-X or Beano  Discussed low FODMAP diet and gave handout  Recommend avoiding artificial sweeteners  2  Left lower quadrant abdominal pain  He has moderate to severe tenderness in his LUQ on exam  He appears nontoxic and his vital signs are stable  Would recommend CT scan to rule out diverticulitis  - CT abdomen pelvis w contrast; Future    3  Abnormal weight loss  Check CT abdomen pelvis to rule out intra-abdominal etiology including malignancy since EGD and colonoscopy were unremarkable  If CT is negative, would recommend gastric emptying study  4  GERD without esophagitis  Recent EGD concerning for Brooke's esophagus, although biopsies were negative  Biopsies did show some mild chronic inflammation  Continue omeprazole 40 mg daily  Continue to avoid reflux triggers like citrus, garlic, onions, caffeine  Follow-up in 2 months  ______________________________________________________________________    SUBJECTIVE:    57-year-old male with type 2 diabetes mellitus, hypertension, pituitary microadenoma, GERD, lumbar radiculopathy presenting for follow-up of diarrhea  and abdominal pain  He saw Dr Roper Cancer in the office in May for diarrhea, bloating, and left lower quadrant pain  The diarrhea has been ongoing since February 2021    He has at least 3-5 bowel movements between 7-8 AM and 12 PM   Occasionally he will have more diarrhea in the afternoon  He also reports left lower quadrant pain any time during the day  Today, he has constant pain  His pain is unrelieved with Bentyl  He has been taking Bentyl 4 times daily for the past 3 months without relief  He denies any blood in the stool  He does admit to 8 lb of abnormal weight loss and attributes this to poor appetite  He does have some reflux and belching  REVIEW OF SYSTEMS IS OTHERWISE NEGATIVE        Historical Information   Past Medical History:   Diagnosis Date    Arthritis     Last assessed 2013    Avitaminosis D     Colon polyp     Diabetes mellitus ([facility] [address] [facility]     Displacement of cervical intervertebral disc     GERD (gastroesophageal reflux disease)     Glaucoma     Normal Pressure Glaucoma    HTN (hypertension)     Hypertension     Nephrolithiasis 2013    Pituitary microadenoma ([facility] [address] [facility]     Spinal stenosis in cervical region 2014    Sprain and strain of calcaneofibular (ligament) 2013    Submandibular lymphadenopathy 2019    Uric acid nephrolithiasis      Past Surgical History:   Procedure Laterality Date    ASPIRATION / INJECTION RENAL CYST      Renal Cyst Aspiration    CATARACT EXTRACTION      2017- right eye, 2018- left eye    COLONOSCOPY  2005    EYE SURGERY Bilateral     Cataract Surgery    TONSILLECTOMY      UPPER GASTROINTESTINAL ENDOSCOPY       Social History   Social History     Substance and Sexual Activity   Alcohol Use Yes    Alcohol/week: 2 0 standard drinks    Types: 2 Cans of beer per week    Comment: social     Social History     Substance and Sexual Activity   Drug Use Yes    Types: Marijuana    Comment: legal / medical      Social History     Tobacco Use   Smoking Status Former Smoker    Packs/day: 0 25    Years: 2 00    Pack years: 0 50    Types: Cigarettes    Quit date: 34    Years since quittin 6   Smokeless Tobacco Never Used     Family History   Problem Relation Age of Onset    Diabetes unspecified Mother     Heart disease Mother     Nephrolithiasis Mother     Kidney disease Mother     Other Mother         Back Disorder    Thyroid disease Mother     Diabetes unspecified Father     Heart disease Father     Diabetes unspecified Sister     Heart disease Sister     Stroke Sister     Diabetes unspecified Brother     Heart disease Brother     Nephrolithiasis Brother     Lung cancer Brother     Other Brother         COPD    Diabetes unspecified Sister     Heart disease Sister     Diabetes unspecified Sister     Diabetes unspecified Brother     Heart disease Brother     Diabetes unspecified Brother     Other Brother         Back Disorder    Diabetes unspecified Brother     Other Brother         Back Disorder    Diabetes unspecified Brother     Stomach cancer Paternal Aunt        Meds/Allergies       Current Outpatient Medications:     amLODIPine (NORVASC) 10 mg tablet    aspirin (ECOTRIN LOW STRENGTH) 81 mg EC tablet    B Complex Vitamins (VITAMIN B-COMPLEX PO)    BD Pen Needle Harriett U/F 32G X 4 MM MISC    brimonidine-timolol (COMBIGAN) 0 2-0 5 %    cholecalciferol (VITAMIN D3) 1,000 units tablet    dicyclomine (BENTYL) 20 mg tablet    doxazosin (CARDURA) 4 mg tablet    gabapentin (NEURONTIN) 300 mg capsule    Glucosamine-Chondroitin (OSTEO BI-FLEX REGULAR STRENGTH) 250-200 MG TABS    glyBURIDE (DIABETA) 5 mg tablet    hydrochlorothiazide (HYDRODIURIL) 25 mg tablet    ibuprofen (MOTRIN) 200 mg tablet    insulin glargine (Lantus SoloStar) 100 units/mL injection pen    insulin lispro (HumaLOG KwikPen) 100 units/mL injection pen    Jardiance 25 MG TABS    lisinopril (ZESTRIL) 40 mg tablet    loratadine (CLARITIN) 10 mg tablet    metFORMIN (GLUCOPHAGE-XR) 500 mg 24 hr tablet    MULTIPLE VITAMIN PO    omeprazole (PriLOSEC) 40 MG capsule    Probiotic Product (PROBIOTIC-10) CAPS    simvastatin (ZOCOR) 20 mg tablet   SYRINGE-NEEDLE, DISP, 3 ML (B-D 3CC LUER-DESHAUN SYR 23GX1") 23G X 1" 3 ML MISC    vitamin E, tocopherol, 400 units capsule    other medication, see sig,    Allergies   Allergen Reactions    Clonidine Other (See Comments)     Diaphoresis, hot flashes    Augmentin [Amoxicillin-Pot Clavulanate] Abdominal Pain    Biaxin [Clarithromycin] Abdominal Pain    Dust Mite Extract     Insulin Aspart GI Intolerance     Novolog     Molds & Smuts     Nuts - Food Allergy     Seasonal Ic [Cholestatin] Headache           Objective     Blood pressure 122/72, pulse 75, temperature (!) 97 2 °F (36 2 °C), temperature source Tympanic, height 5' 3" (1 6 m), weight 72 7 kg (160 lb 3 2 oz)  Body mass index is 28 38 kg/m²  PHYSICAL EXAM:      General Appearance:   Alert, cooperative, no distress   HEENT:   Normocephalic, atraumatic, anicteric      Neck:  Supple, symmetrical, trachea midline   Lungs:   Clear to auscultation bilaterally; no rales, rhonchi or wheezing; respirations unlabored    Heart[de-identified]   Regular rate and rhythm; no murmur, rub, or gallop  Abdomen:     Nondistended  Normal bowel sounds  Soft  Moderate to severe left lower quadrant tenderness to palpation  No rebound or guarding     Genitalia:   Deferred    Rectal:   Deferred    Extremities:  No cyanosis, clubbing or edema    Pulses:  2+ and symmetric    Skin:  No jaundice, rashes, or lesions    Lymph nodes:  No palpable cervical lymphadenopathy        Lab Results:   No visits with results within 1 Day(s) from this visit     Latest known visit with results is:   Office Visit on 08/17/2021   Component Date Value    Hemoglobin A1C 08/17/2021 5 7          Radiology Results:   EGD    Result Date: 7/29/2021  Narrative: [address] [place] [address] [phone] DATE OF SERVICE: 7/29/21 PHYSICIAN(S): Shad Crenshaw MD - Attending Physician INDICATION: Nausea, Abdominal bloating POST-OP DIAGNOSIS: See the impression below  PREPROCEDURE: Informed consent was obtained for the procedure, including sedation  Risks of perforation, hemorrhage, adverse drug reaction and aspiration were discussed  The patient was placed in the left lateral decubitus position  Patient was explained about the risks and benefits of the procedure  Risks including but not limited to bleeding, infection, and perforation were explained in detail  Also explained about less than 100% sensitivity with the exam and other alternatives  DETAILS OF PROCEDURE: Patient was taken to the procedure room where a time out was performed to confirm correct patient and correct procedure  The patient underwent monitored anesthesia care, which was administered by an anesthesia professional  The patient's blood pressure, heart rate, level of consciousness, respirations and oxygen were monitored throughout the procedure  The scope was advanced to the second part of the duodenum  Retroflexion was performed in the fundus  The patient experienced no blood loss  The procedure was not difficult  The patient tolerated the procedure well  There were no apparent complications  ANESTHESIA INFORMATION: ASA: III Anesthesia Type: IV Sedation with Anesthesia MEDICATIONS: No administrations occurring from 0755 to 0829 on 07/29/21 FINDINGS: Bethel-colored mucosa in the GE junction; performed cold forceps biopsy to rule out Brooke's esophagus Mild erythematous mucosa in the antrum; performed cold forceps biopsy to rule out H  pylori Polyp measuring smaller than 5 mm in the fundus of the stomach; performed cold forceps biopsy The duodenal bulb and 2nd part of the duodenum appeared normal  Performed random biopsy   SPECIMENS: ID Type Source Tests Collected by Time Destination 1 : Cold BX Duodenum Tissue Duodenum TISSUE EXAM Cecy Delacruz MD 7/29/2021  8:25 AM  2 : Cold BX Gastric Tissue Stomach TISSUE EXAM Cecy Delacruz MD 7/29/2021  8:26 AM  3 : Cold BX Gastric Polyp Tissue Stomach TISSUE EXAM Pelon Abbasi MD 7/29/2021  8:27 AM      Impression: Ault-colored mucosa in the GE junction; performed cold forceps biopsy to rule out Brooke's esophagus Mild erythematous mucosa in the antrum; performed cold forceps biopsy to rule out H  pylori Polyp measuring smaller than 5 mm in the fundus of the stomach; performed cold forceps biopsy The duodenal bulb and 2nd part of the duodenum appeared normal  Performed random biopsy  RECOMMENDATION: Await pathology results  Pelon Abbasi MD     Colonoscopy    Result Date: 7/29/2021  Narrative: [facility] [address] [phone] DATE OF SERVICE: 7/29/21 PHYSICIAN(S): Pelon Abbasi MD - Attending Physician INDICATION: Diarrhea, unspecified type, Left lower quadrant abdominal pain Colonoscopy performed for a diagnostic indication  POST-OP DIAGNOSIS: See the impression below  HISTORY: Prior colonoscopy: More than 10 years ago  BOWEL PREPARATION: Miralax/Dulcolax PREPROCEDURE: Informed consent was obtained for the procedure, including sedation  Risks including but not limited to bleeding, infection, perforation, adverse drug reaction and aspiration were explained in detail  Also explained about less than 100% sensitivity with the exam and other alternatives  The patient was placed in the left lateral decubitus position  DETAILS OF PROCEDURE: Patient was taken to the procedure room where a time out was performed to confirm correct patient and correct procedure  The patient underwent monitored anesthesia care, which was administered by an anesthesia professional  The patient's blood pressure, heart rate, level of consciousness, oxygen and respirations were monitored throughout the procedure  A digital rectal exam was performed  The scope was introduced through the anus and advanced to the cecum  Retroflexion was performed in the rectum   The quality of bowel preparation was evaluated using the [facility] Bowel Preparation Scale with scores of: right colon = 2, transverse colon = 2, left colon = 2  The total BBPS score was 6  Bowel prep was adequate  The patient experienced no blood loss  The procedure was not difficult  The patient tolerated the procedure well  There were no apparent complications   ANESTHESIA INFORMATION: ASA: III Anesthesia Type: IV Sedation with Anesthesia MEDICATIONS: No administrations occurring from 0755 to 0900 on 07/29/21 FINDINGS: Five polyps measuring 5-9 mm in the ascending colon, transverse colon and descending colon; removed en bloc by cold snare and retrieved specimen Few moderate pancolonic diverticula Small, protruding, internal hemorrhoids EVENTS: Procedure Events Event Event Time ENDO CECUM REACHED 7/29/2021  8:37 AM ENDO SCOPE OUT TIME 7/29/2021  8:59 AM SPECIMENS: ID Type Source Tests Collected by Time Destination 1 : Cold BX Duodenum Tissue Duodenum TISSUE EXAM Margo Mcdonald MD 7/29/2021  8:25 AM  2 : Cold BX Gastric Tissue Stomach TISSUE EXAM Margo Mcdonald MD 7/29/2021  8:26 AM  3 : Cold BX Gastric Polyp Tissue Stomach TISSUE EXAM Margo Mcdonald MD 7/29/2021  8:27 AM  4 : Cold BX GE Junction Tissue Esophagogastric junction TISSUE EXAM Margo Mcdonald MD 7/29/2021  8:29 AM  5 : Cold Snare Ascending Colon Polypectomy Tissue Large Intestine, Right/Ascending Colon TISSUE EXAM Margo Mcdonald MD 7/29/2021  8:48 AM  6 : Cold Snare Transverse Colon Polypectomy  x2 Tissue Large Intestine, Transverse Colon TISSUE EXAM Margo Mcdonald MD 7/29/2021  8:51 AM  7 : Cold Snare Descending Colon Polypectomy x2 Tissue Large Intestine, Left/Descending Colon TISSUE EXAM Margo Mcdonald MD 7/29/2021  8:54 AM  EQUIPMENT: [id number] ENDOCUFF VISION MED BLUE ID [id number]     Impression: Five polyps measuring 5-9 mm in the ascending colon, transverse colon and descending colon; removed en bloc by cold snare and retrieved specimen Few moderate pancolonic diverticula Small, protruding, internal hemorrhoids RECOMMENDATION: Await pathology results Repeat colonoscopy in 3 years due to a personal history of colon polyps   Obtain gastric emptying study Resume regular diet Discharge home once standard parameters are met Maddie Keys MD

## 2021-08-27 ENCOUNTER — DOCUMENTATION (OUTPATIENT)
Dept: GASTROENTEROLOGY | Facility: CLINIC | Age: 66
End: 2021-08-27

## 2021-08-27 ENCOUNTER — APPOINTMENT (OUTPATIENT)
Dept: LAB | Age: 66
End: 2021-08-27
Payer: MEDICARE

## 2021-08-27 DIAGNOSIS — R63.4 WEIGHT LOSS: Primary | ICD-10-CM

## 2021-08-27 DIAGNOSIS — R63.4 WEIGHT LOSS: ICD-10-CM

## 2021-08-27 DIAGNOSIS — R10.32 LEFT LOWER QUADRANT ABDOMINAL PAIN: Primary | ICD-10-CM

## 2021-08-27 LAB
ALBUMIN SERPL BCP-MCNC: 3.5 G/DL (ref 3.5–5)
ALP SERPL-CCNC: 64 U/L (ref 46–116)
ALT SERPL W P-5'-P-CCNC: 50 U/L (ref 12–78)
ANION GAP SERPL CALCULATED.3IONS-SCNC: 3 MMOL/L (ref 4–13)
AST SERPL W P-5'-P-CCNC: 18 U/L (ref 5–45)
BILIRUB SERPL-MCNC: 0.91 MG/DL (ref 0.2–1)
BUN SERPL-MCNC: 28 MG/DL (ref 5–25)
CALCIUM SERPL-MCNC: 8.9 MG/DL (ref 8.3–10.1)
CHLORIDE SERPL-SCNC: 105 MMOL/L (ref 100–108)
CK SERPL-CCNC: 95 U/L (ref 39–308)
CO2 SERPL-SCNC: 30 MMOL/L (ref 21–32)
CREAT SERPL-MCNC: 1.05 MG/DL (ref 0.6–1.3)
GFR SERPL CREATININE-BSD FRML MDRD: 73 ML/MIN/1.73SQ M
GLUCOSE P FAST SERPL-MCNC: 99 MG/DL (ref 65–99)
POTASSIUM SERPL-SCNC: 4.1 MMOL/L (ref 3.5–5.3)
PROT SERPL-MCNC: 6.9 G/DL (ref 6.4–8.2)
SODIUM SERPL-SCNC: 138 MMOL/L (ref 136–145)

## 2021-08-27 PROCEDURE — 36415 COLL VENOUS BLD VENIPUNCTURE: CPT

## 2021-08-27 PROCEDURE — 80053 COMPREHEN METABOLIC PANEL: CPT

## 2021-08-27 PROCEDURE — 82550 ASSAY OF CK (CPK): CPT

## 2021-08-31 ENCOUNTER — NURSE TRIAGE (OUTPATIENT)
Dept: OTHER | Facility: OTHER | Age: 66
End: 2021-08-31

## 2021-08-31 NOTE — TELEPHONE ENCOUNTER
Regarding: CT abdomen pelvis w contrast question  ----- Message from [email or web address] sent at 8/31/2021  2:33 PM EDT -----  " I would like to know what the side effects would be when I have CT abdomen pelvis w contrast "

## 2021-08-31 NOTE — TELEPHONE ENCOUNTER
Reason for Disposition   [1] Caller has URGENT medicine question about med that PCP or specialist prescribed AND [2] triager unable to answer question    Answer Assessment - Initial Assessment Questions  1  NAME of MEDICATION: "What medicine are you calling about?"      Barium to drink  2  QUESTION: "What is your question?" (e g , medication refill, side effect)      I was to drink it prior to my CT scan of abdomen and pelvis  Scheduling dept  Called me and told me that is not how the prep is done  Patient is confused  3  PRESCRIBING HCP: "Who prescribed it?" Reason: if prescribed by specialist, call should be referred to that group  GI office center [place]  4  SYMPTOMS: "Do you have any symptoms?"      No  5  SEVERITY: If symptoms are present, ask "Are they mild, moderate or severe?"      NA  6   PREGNANCY:  "Is there any chance that you are pregnant?" "When was your last menstrual period?"      NA    Protocols used: MEDICATION QUESTION CALL-ADULT-

## 2021-08-31 NOTE — TELEPHONE ENCOUNTER
I TC Balinda Hammans PA-C -this patient is calling because he is scheduled tomorrow afternoon for a CT Abdomen/Pelvis with contrast  Scheduling dept  just called him and told him that they will do the prep when he comes  He then told him that he was given by the office bottles of barium to drink prior to the scan  He states they told him that is not how they do it  He is now confused and worried about side effects of the Barium  Please give him a call if you are able?  Angela Kapadia

## 2021-08-31 NOTE — TELEPHONE ENCOUNTER
I tried calling [facility] but was on hold for 15 minutes and couldn't get through  I instructed Christian to follow the written oral contrast instructions provided by [facility] on the CT order  If there are any issues tomorrow, I told Christian that [facility] can call me

## 2021-08-31 NOTE — TELEPHONE ENCOUNTER
I called patient back and he stated that he heard from [facility] and that she was calling [facility] who called him to see about what they told him regarding the prep  He will await her call back

## 2021-09-01 ENCOUNTER — HOSPITAL ENCOUNTER (OUTPATIENT)
Dept: RADIOLOGY | Age: 66
Discharge: HOME | End: 2021-09-01
Payer: MEDICARE

## 2021-09-01 DIAGNOSIS — R10.32 LEFT LOWER QUADRANT ABDOMINAL PAIN: ICD-10-CM

## 2021-09-01 PROCEDURE — G1004 CDSM NDSC: HCPCS

## 2021-09-01 PROCEDURE — 74177 CT ABD & PELVIS W/CONTRAST: CPT

## 2021-09-01 RX ADMIN — IOHEXOL 100 ML: 350 INJECTION, SOLUTION INTRAVENOUS at 12:37

## 2021-09-16 DIAGNOSIS — K21.9 GASTROESOPHAGEAL REFLUX DISEASE WITHOUT ESOPHAGITIS: ICD-10-CM

## 2021-09-16 RX ORDER — OMEPRAZOLE 40 MG/1
40 CAPSULE, DELAYED RELEASE ORAL DAILY
Qty: 90 CAPSULE | Refills: 0 | Status: SHIPPED | OUTPATIENT
Start: 2021-09-16 | End: 2021-12-14 | Stop reason: SDUPTHER

## 2021-09-29 ENCOUNTER — OFFICE VISIT (OUTPATIENT)
Dept: INTERNAL MEDICINE CLINIC | Facility: CLINIC | Age: 66
End: 2021-09-29
Payer: MEDICARE

## 2021-09-29 VITALS
HEART RATE: 70 BPM | TEMPERATURE: 98 F | HEIGHT: 63 IN | DIASTOLIC BLOOD PRESSURE: 60 MMHG | WEIGHT: 161.5 LBS | RESPIRATION RATE: 20 BRPM | SYSTOLIC BLOOD PRESSURE: 130 MMHG | OXYGEN SATURATION: 97 % | BODY MASS INDEX: 28.62 KG/M2

## 2021-09-29 DIAGNOSIS — B35.9 TINEA: Primary | ICD-10-CM

## 2021-09-29 PROCEDURE — 99213 OFFICE O/P EST LOW 20 MIN: CPT | Performed by: INTERNAL MEDICINE

## 2021-09-29 RX ORDER — CLOTRIMAZOLE AND BETAMETHASONE DIPROPIONATE 10; .64 MG/G; MG/G
CREAM TOPICAL 2 TIMES DAILY
Qty: 45 G | Refills: 0 | Status: SHIPPED | OUTPATIENT
Start: 2021-09-29 | End: 2022-01-12 | Stop reason: ALTCHOICE

## 2021-09-29 NOTE — PROGRESS NOTES
Assessment/Plan:    Tinea  Will trial clotrimazole-betamethasone cream BID for 10-14 days  He is to contact our office for worsening symptoms  Diagnoses and all orders for this visit:    Tinea  -     clotrimazole-betamethasone (LOTRISONE) 1-0 05 % cream; Apply topically 2 (two) times a day To rash on legs  Subjective:      Patient ID: Shaheed Cheema is a 77 y o  male  Chief Complaint   Patient presents with    Rash     recived a rash 2 weeks ago by cutting grass, went away but came back 9/26 on both lower legs  did have a rash 2 yrs ago this time on the legs  legs scalp and bottom of his feet also itch    health maintenance     A1C due after 11/17/21    GI Problem     just an FYI has been seeing a GI Dr for [place] and stomach distress  Nothing showing up in tests they are saying IBS       77year old male is seen today with concern for a recurrent rash  He first noticed the rash 2 weeks ago on the medial aspect of his distal left leg after cutting the grass  The rash ultimately resolved after 3-4 days with application of Neosporin  Then, he went to a fair this past weekend and noticed a recurrence of the rash however now on both lower extremities that evening  He has applied calamine lotion, neosporin, hydrating lotion without improvement of rash  He had a similar occurrence 2 years ago after going to the same fair  He also reports itchiness of his feet and scalp since 2 week  The following portions of the patient's history were reviewed and updated as appropriate: allergies, current medications, past family history, past medical history, past social history, past surgical history and problem list     Review of Systems   Constitutional: Negative for activity change, appetite change, chills, diaphoresis, fatigue and fever  HENT: Negative for congestion, postnasal drip, rhinorrhea, sinus pressure, sinus pain, sneezing and sore throat      Eyes: Negative for visual disturbance  Respiratory: Negative for apnea, cough, choking, chest tightness, shortness of breath and wheezing  Cardiovascular: Negative for chest pain, palpitations and leg swelling  Gastrointestinal: Negative for abdominal distention, abdominal pain, anal bleeding, blood in stool, constipation, diarrhea, nausea and vomiting  Endocrine: Negative for cold intolerance and heat intolerance  Genitourinary: Negative for difficulty urinating, dysuria and hematuria  Musculoskeletal: Negative  Skin: Positive for rash  Neurological: Negative for dizziness, weakness, light-headedness, numbness and headaches  Hematological: Negative for adenopathy  Psychiatric/Behavioral: Negative for agitation, sleep disturbance and suicidal ideas  All other systems reviewed and are negative          Past Medical History:   Diagnosis Date    Arthritis     Last assessed 5/24/2013    Avitaminosis D 2012    Colon polyp     Diabetes mellitus ([facility] [address] )     Displacement of cervical intervertebral disc 2014    GERD (gastroesophageal reflux disease)     Glaucoma     Normal Pressure Glaucoma    HTN (hypertension) 2007    Hypertension     IBS (irritable bowel syndrome) 2021    Nephrolithiasis 5/24/2013    Pituitary microadenoma ([facility] [address] ) 2010    Spinal stenosis in cervical region 2014    Sprain and strain of calcaneofibular (ligament) 12/5/2013    Submandibular lymphadenopathy 8/8/2019    Uric acid nephrolithiasis 1990         Current Outpatient Medications:     amLODIPine (NORVASC) 10 mg tablet, Take 1 tablet (10 mg total) by mouth daily, Disp: 90 tablet, Rfl: 1    aspirin (ECOTRIN LOW STRENGTH) 81 mg EC tablet, Take 81 mg by mouth daily , Disp: , Rfl:     B Complex Vitamins (VITAMIN B-COMPLEX PO), Take 1 capsule by mouth daily , Disp: , Rfl:     BD Pen Needle Harriett U/F 32G X 4 MM MISC, Use 4x per day, Disp: 200 each, Rfl: 3    brimonidine-timolol (COMBIGAN) 0 2-0 5 %, Administer 1 drop to both eyes every 12 (twelve) hours, Disp: , Rfl:     cholecalciferol (VITAMIN D3) 1,000 units tablet, Take 1,000 Units by mouth 2 (two) times a day , Disp: , Rfl:     dicyclomine (BENTYL) 20 mg tablet, Take 1 tablet (20 mg total) by mouth 4 (four) times a day (before meals and at bedtime), Disp: 120 tablet, Rfl: 5    doxazosin (CARDURA) 4 mg tablet, Take 1 tablet (4 mg total) by mouth daily, Disp: 90 tablet, Rfl: 1    gabapentin (NEURONTIN) 300 mg capsule, Take 1 capsule (300 mg total) by mouth 3 (three) times a day, Disp: 270 capsule, Rfl: 1    Glucosamine-Chondroitin (OSTEO BI-FLEX REGULAR STRENGTH) 250-200 MG TABS, Take 1 tablet by mouth 2 (two) times a day, Disp: , Rfl:     glyBURIDE (DIABETA) 5 mg tablet, Take 5 mg by mouth daily with breakfast, Disp: , Rfl:     hydrochlorothiazide (HYDRODIURIL) 25 mg tablet, Take 1 tablet (25 mg total) by mouth daily, Disp: 90 tablet, Rfl: 1    ibuprofen (MOTRIN) 200 mg tablet, Take 200 mg by mouth as needed , Disp: , Rfl:     insulin glargine (Lantus SoloStar) 100 units/mL injection pen, Inject 30 Units under the skin daily at bedtime, Disp: 30 mL, Rfl: 1    insulin lispro (HumaLOG KwikPen) 100 units/mL injection pen, Inject 10 Units under the skin 3 (three) times a day with meals Take 25 units daily as directed (Patient taking differently: Inject 10 Units under the skin 3 (three) times a day with meals ), Disp: 30 mL, Rfl: 3    Jardiance 25 MG TABS, TAKE 1 TABLET BY MOUTH  DAILY, Disp: 90 tablet, Rfl: 3    lisinopril (ZESTRIL) 40 mg tablet, Take 1 tablet (40 mg total) by mouth daily, Disp: 90 tablet, Rfl: 1    loratadine (CLARITIN) 10 mg tablet, Take 10 mg by mouth daily, Disp: , Rfl:     metFORMIN (GLUCOPHAGE-XR) 500 mg 24 hr tablet, Take 2 tablets twice daily, Disp: 360 tablet, Rfl: 1    MULTIPLE VITAMIN PO, Take 1 tablet by mouth daily , Disp: , Rfl:     omeprazole (PriLOSEC) 40 MG capsule, Take 1 capsule (40 mg total) by mouth daily, Disp: 90 capsule, Rfl: 0    other medication, see sig,, Medication/product name: Medical Marijuana, Disp: , Rfl:     Probiotic Product (PROBIOTIC-10) CAPS, Take 1 capsule by mouth daily , Disp: , Rfl:     simvastatin (ZOCOR) 20 mg tablet, Take 1 tablet (20 mg total) by mouth daily at bedtime, Disp: 90 tablet, Rfl: 1    SYRINGE-NEEDLE, DISP, 3 ML (B-D 3CC LUER-DESHAUN SYR 23GX1") 23G X 1" 3 ML MISC, USE AND DISCARD 1 SYRINGE PER WEEK, Disp: 12 each, Rfl: 3    vitamin E, tocopherol, 400 units capsule, Take 400 Units by mouth 2 (two) times a day , Disp: , Rfl:     clotrimazole-betamethasone (LOTRISONE) 1-0 05 % cream, Apply topically 2 (two) times a day To rash on legs  , Disp: 45 g, Rfl: 0    Allergies   Allergen Reactions    Clonidine Other (See Comments)     Diaphoresis, hot flashes    Augmentin [Amoxicillin-Pot Clavulanate] Abdominal Pain    Biaxin [Clarithromycin] Abdominal Pain    Dust Mite Extract     Insulin Aspart GI Intolerance     Novolog     Molds & Smuts     Nuts - Food Allergy     Seasonal Ic [Cholestatin] Headache       Social History   Past Surgical History:   Procedure Laterality Date    ASPIRATION / INJECTION RENAL CYST      Renal Cyst Aspiration    CATARACT EXTRACTION      12/2017- right eye, 01/2018- left eye    COLONOSCOPY  2005    EYE SURGERY Bilateral     Cataract Surgery    TONSILLECTOMY      UPPER GASTROINTESTINAL ENDOSCOPY       Family History   Problem Relation Age of Onset    Diabetes unspecified Mother     Heart disease Mother     Nephrolithiasis Mother     Kidney disease Mother     Other Mother         Back Disorder    Thyroid disease Mother     Diabetes unspecified Father     Heart disease Father     Diabetes unspecified Sister     Heart disease Sister     Stroke Sister     Diabetes unspecified Brother     Heart disease Brother     Nephrolithiasis Brother     Lung cancer Brother     Other Brother         COPD    Diabetes unspecified Sister     Heart disease Sister     Diabetes unspecified Sister     Diabetes unspecified Brother     Heart disease Brother     Diabetes unspecified Brother     Other Brother         Back Disorder    Diabetes unspecified Brother     Other Brother         Back Disorder    Diabetes unspecified Brother     Stomach cancer Paternal Aunt        Objective:  /60 (BP Location: Left arm, Patient Position: Sitting, Cuff Size: Standard)   Pulse 70   Temp 98 °F (36 7 °C) (Temporal)   Resp 20   Ht 5' 3" (1 6 m)   Wt 73 3 kg (161 lb 8 oz)   SpO2 97%   BMI 28 61 kg/m²     Recent Results (from the past 1344 hour(s))   PSA, Total Screen    Collection Time: 08/06/21  8:17 AM   Result Value Ref Range    PSA 0 6 0 0 - 4 0 ng/mL   Microalbumin / creatinine urine ratio    Collection Time: 08/06/21  8:17 AM   Result Value Ref Range    Creatinine, Ur 81 9 mg/dL    Microalbum  ,U,Random 5 8 0 0 - 20 0 mg/L    Microalb Creat Ratio 7 0 - 30 mg/g creatinine   Lipid panel    Collection Time: 08/06/21  8:17 AM   Result Value Ref Range    Cholesterol 92 50 - 200 mg/dL    Triglycerides 151 (H) <=150 mg/dL    HDL, Direct 33 (L) >=40 mg/dL    LDL Calculated 29 0 - 100 mg/dL    Non-HDL-Chol (CHOL-HDL) 59 mg/dl   POCT hemoglobin A1c    Collection Time: 08/17/21 11:00 AM   Result Value Ref Range    Hemoglobin A1C 5 7 6 5   Comprehensive metabolic panel    Collection Time: 08/27/21  8:28 AM   Result Value Ref Range    Sodium 138 136 - 145 mmol/L    Potassium 4 1 3 5 - 5 3 mmol/L    Chloride 105 100 - 108 mmol/L    CO2 30 21 - 32 mmol/L    ANION GAP 3 (L) 4 - 13 mmol/L    BUN 28 (H) 5 - 25 mg/dL    Creatinine 1 05 0 60 - 1 30 mg/dL    Glucose, Fasting 99 65 - 99 mg/dL    Calcium 8 9 8 3 - 10 1 mg/dL    AST 18 5 - 45 U/L    ALT 50 12 - 78 U/L    Alkaline Phosphatase 64 46 - 116 U/L    Total Protein 6 9 6 4 - 8 2 g/dL    Albumin 3 5 3 5 - 5 0 g/dL    Total Bilirubin 0 91 0 20 - 1 00 mg/dL    eGFR 73 ml/min/1 73sq m   CK (with reflex to MB)    Collection Time: 08/27/21  8:28 AM   Result Value Ref Range    Total CK 95 39 - 308 U/L            Physical Exam  Vitals and nursing note reviewed  Constitutional:       General: He is not in acute distress  Appearance: He is well-developed  He is not diaphoretic  HENT:      Head: Normocephalic and atraumatic  Eyes:      General: No scleral icterus  Right eye: No discharge  Left eye: No discharge  Conjunctiva/sclera: Conjunctivae normal       Pupils: Pupils are equal, round, and reactive to light  Neck:      Thyroid: No thyromegaly  Vascular: No JVD  Cardiovascular:      Rate and Rhythm: Normal rate and regular rhythm  Heart sounds: Normal heart sounds  No murmur heard  No friction rub  No gallop  Pulmonary:      Effort: Pulmonary effort is normal  No respiratory distress  Breath sounds: Normal breath sounds  No wheezing or rales  Chest:      Chest wall: No tenderness  Abdominal:      General: Bowel sounds are normal  There is no distension  Palpations: Abdomen is soft  There is no mass  Tenderness: There is no abdominal tenderness  There is no guarding or rebound  Musculoskeletal:         General: No tenderness or deformity  Normal range of motion  Cervical back: Normal range of motion and neck supple  Lymphadenopathy:      Cervical: No cervical adenopathy  Skin:     General: Skin is warm and dry  Coloration: Skin is not pale  Findings: No erythema or rash  Neurological:      Mental Status: He is alert and oriented to person, place, and time  Cranial Nerves: No cranial nerve deficit  Coordination: Coordination normal       Deep Tendon Reflexes: Reflexes are normal and symmetric  Psychiatric:         Behavior: Behavior normal          Thought Content:  Thought content normal          Judgment: Judgment normal

## 2021-09-29 NOTE — ASSESSMENT & PLAN NOTE
Will trial clotrimazole-betamethasone cream BID for 10-14 days  He is to contact our office for worsening symptoms

## 2021-10-04 ENCOUNTER — TELEPHONE (OUTPATIENT)
Dept: OTHER | Facility: OTHER | Age: 66
End: 2021-10-04

## 2021-10-05 ENCOUNTER — TELEPHONE (OUTPATIENT)
Dept: INTERNAL MEDICINE CLINIC | Facility: CLINIC | Age: 66
End: 2021-10-05

## 2021-10-06 ENCOUNTER — OFFICE VISIT (OUTPATIENT)
Dept: ENDOCRINOLOGY | Facility: CLINIC | Age: 66
End: 2021-10-06
Payer: MEDICARE

## 2021-10-06 VITALS
WEIGHT: 164 LBS | BODY MASS INDEX: 29.06 KG/M2 | DIASTOLIC BLOOD PRESSURE: 60 MMHG | HEART RATE: 75 BPM | SYSTOLIC BLOOD PRESSURE: 120 MMHG | HEIGHT: 63 IN

## 2021-10-06 DIAGNOSIS — R53.83 OTHER FATIGUE: ICD-10-CM

## 2021-10-06 DIAGNOSIS — E55.9 VITAMIN D DEFICIENCY: ICD-10-CM

## 2021-10-06 DIAGNOSIS — I10 BENIGN ESSENTIAL HYPERTENSION: ICD-10-CM

## 2021-10-06 DIAGNOSIS — E23.0 HYPOGONADOTROPIC HYPOGONADISM (HCC): Primary | ICD-10-CM

## 2021-10-06 DIAGNOSIS — Z79.4 TYPE 2 DIABETES MELLITUS WITH HYPERGLYCEMIA, WITH LONG-TERM CURRENT USE OF INSULIN (HCC): ICD-10-CM

## 2021-10-06 DIAGNOSIS — E78.2 MIXED HYPERLIPIDEMIA: ICD-10-CM

## 2021-10-06 DIAGNOSIS — E11.65 TYPE 2 DIABETES MELLITUS WITH HYPERGLYCEMIA, WITH LONG-TERM CURRENT USE OF INSULIN (HCC): ICD-10-CM

## 2021-10-06 PROCEDURE — 99214 OFFICE O/P EST MOD 30 MIN: CPT | Performed by: PHYSICIAN ASSISTANT

## 2021-10-06 PROCEDURE — 95251 CONT GLUC MNTR ANALYSIS I&R: CPT | Performed by: PHYSICIAN ASSISTANT

## 2021-10-06 RX ORDER — PEN NEEDLE, DIABETIC 32GX 5/32"
NEEDLE, DISPOSABLE MISCELLANEOUS
Qty: 400 EACH | Refills: 3 | Status: SHIPPED | OUTPATIENT
Start: 2021-10-06 | End: 2022-04-12 | Stop reason: SDUPTHER

## 2021-10-06 RX ORDER — SIMVASTATIN 20 MG
20 TABLET ORAL
Qty: 90 TABLET | Refills: 1 | Status: SHIPPED | OUTPATIENT
Start: 2021-10-06 | End: 2022-04-25

## 2021-10-06 RX ORDER — INSULIN GLARGINE 100 [IU]/ML
30 INJECTION, SOLUTION SUBCUTANEOUS
Qty: 30 ML | Refills: 3 | Status: SHIPPED | OUTPATIENT
Start: 2021-10-06 | End: 2022-04-12 | Stop reason: SDUPTHER

## 2021-10-06 RX ORDER — LISINOPRIL 40 MG/1
40 TABLET ORAL DAILY
Qty: 90 TABLET | Refills: 1 | Status: SHIPPED | OUTPATIENT
Start: 2021-10-06 | End: 2022-04-12 | Stop reason: SDUPTHER

## 2021-10-26 ENCOUNTER — TELEPHONE (OUTPATIENT)
Dept: GASTROENTEROLOGY | Facility: AMBULARY SURGERY CENTER | Age: 66
End: 2021-10-26

## 2021-10-26 ENCOUNTER — TELEMEDICINE (OUTPATIENT)
Dept: INTERNAL MEDICINE CLINIC | Facility: CLINIC | Age: 66
End: 2021-10-26
Payer: MEDICARE

## 2021-10-26 VITALS — WEIGHT: 161 LBS | BODY MASS INDEX: 28.53 KG/M2 | TEMPERATURE: 98 F | HEIGHT: 63 IN

## 2021-10-26 DIAGNOSIS — J01.00 ACUTE NON-RECURRENT MAXILLARY SINUSITIS: Primary | ICD-10-CM

## 2021-10-26 PROCEDURE — 99213 OFFICE O/P EST LOW 20 MIN: CPT | Performed by: INTERNAL MEDICINE

## 2021-10-26 RX ORDER — FLUTICASONE PROPIONATE 50 MCG
1 SPRAY, SUSPENSION (ML) NASAL DAILY PRN
Qty: 18 ML | Refills: 0 | Status: SHIPPED | OUTPATIENT
Start: 2021-10-26

## 2021-10-27 ENCOUNTER — TELEMEDICINE (OUTPATIENT)
Dept: GASTROENTEROLOGY | Facility: CLINIC | Age: 66
End: 2021-10-27
Payer: MEDICARE

## 2021-10-27 DIAGNOSIS — K58.0 IRRITABLE BOWEL SYNDROME WITH DIARRHEA: Primary | ICD-10-CM

## 2021-10-27 DIAGNOSIS — K57.90 DIVERTICULAR DISEASE: ICD-10-CM

## 2021-10-27 DIAGNOSIS — K21.9 GASTROESOPHAGEAL REFLUX DISEASE WITHOUT ESOPHAGITIS: ICD-10-CM

## 2021-10-27 PROCEDURE — 99214 OFFICE O/P EST MOD 30 MIN: CPT | Performed by: INTERNAL MEDICINE

## 2021-11-01 ENCOUNTER — TELEPHONE (OUTPATIENT)
Dept: ENDOCRINOLOGY | Facility: CLINIC | Age: 66
End: 2021-11-01

## 2021-12-14 DIAGNOSIS — K21.9 GASTROESOPHAGEAL REFLUX DISEASE WITHOUT ESOPHAGITIS: ICD-10-CM

## 2021-12-14 RX ORDER — OMEPRAZOLE 40 MG/1
40 CAPSULE, DELAYED RELEASE ORAL DAILY
Qty: 90 CAPSULE | Refills: 0 | Status: SHIPPED | OUTPATIENT
Start: 2021-12-14 | End: 2022-02-22 | Stop reason: SDUPTHER

## 2021-12-17 DIAGNOSIS — R10.32 LEFT LOWER QUADRANT ABDOMINAL PAIN: ICD-10-CM

## 2021-12-17 DIAGNOSIS — R14.0 ABDOMINAL BLOATING: ICD-10-CM

## 2021-12-17 DIAGNOSIS — R19.7 DIARRHEA, UNSPECIFIED TYPE: ICD-10-CM

## 2021-12-17 RX ORDER — DICYCLOMINE HCL 20 MG
20 TABLET ORAL
Qty: 120 TABLET | Refills: 5 | Status: SHIPPED | OUTPATIENT
Start: 2021-12-17 | End: 2022-05-04 | Stop reason: SDUPTHER

## 2021-12-21 ENCOUNTER — FOLLOW UP (OUTPATIENT)
Dept: URBAN - METROPOLITAN AREA CLINIC 6 | Facility: CLINIC | Age: 66
End: 2021-12-21

## 2021-12-21 DIAGNOSIS — E11.9: ICD-10-CM

## 2021-12-21 DIAGNOSIS — H40.1121: ICD-10-CM

## 2021-12-21 DIAGNOSIS — H40.1112: ICD-10-CM

## 2021-12-21 PROCEDURE — 92020 GONIOSCOPY: CPT

## 2021-12-21 PROCEDURE — 92202 OPSCPY EXTND ON/MAC DRAW: CPT

## 2021-12-21 PROCEDURE — 3072F LOW RISK FOR RETINOPATHY: CPT

## 2021-12-21 PROCEDURE — 1036F TOBACCO NON-USER: CPT

## 2021-12-21 PROCEDURE — 2022F DILAT RTA XM EVC RTNOPTHY: CPT

## 2021-12-21 PROCEDURE — 92014 COMPRE OPH EXAM EST PT 1/>: CPT

## 2021-12-21 PROCEDURE — 92133 CPTRZD OPH DX IMG PST SGM ON: CPT

## 2021-12-21 PROCEDURE — G8427 DOCREV CUR MEDS BY ELIG CLIN: HCPCS

## 2021-12-21 ASSESSMENT — TONOMETRY
OD_IOP_MMHG: 16
OS_IOP_MMHG: 12

## 2021-12-21 ASSESSMENT — VISUAL ACUITY
OS_SC: 20/25-1
OD_SC: 20/30-1

## 2022-01-05 ENCOUNTER — APPOINTMENT (OUTPATIENT)
Dept: LAB | Age: 67
End: 2022-01-05
Payer: MEDICARE

## 2022-01-05 DIAGNOSIS — E55.9 VITAMIN D DEFICIENCY: ICD-10-CM

## 2022-01-05 DIAGNOSIS — R53.83 OTHER FATIGUE: ICD-10-CM

## 2022-01-05 DIAGNOSIS — Z79.4 TYPE 2 DIABETES MELLITUS WITH HYPERGLYCEMIA, WITH LONG-TERM CURRENT USE OF INSULIN (HCC): ICD-10-CM

## 2022-01-05 DIAGNOSIS — E23.0 HYPOGONADOTROPIC HYPOGONADISM (HCC): ICD-10-CM

## 2022-01-05 DIAGNOSIS — E11.65 TYPE 2 DIABETES MELLITUS WITH HYPERGLYCEMIA, WITH LONG-TERM CURRENT USE OF INSULIN (HCC): ICD-10-CM

## 2022-01-05 LAB
25(OH)D3 SERPL-MCNC: 70.1 NG/ML (ref 30–100)
ALBUMIN SERPL BCP-MCNC: 4 G/DL (ref 3.5–5)
ALP SERPL-CCNC: 79 U/L (ref 46–116)
ALT SERPL W P-5'-P-CCNC: 37 U/L (ref 12–78)
ANION GAP SERPL CALCULATED.3IONS-SCNC: 6 MMOL/L (ref 4–13)
AST SERPL W P-5'-P-CCNC: 15 U/L (ref 5–45)
BASOPHILS # BLD AUTO: 0.03 THOUSANDS/ΜL (ref 0–0.1)
BASOPHILS NFR BLD AUTO: 0 % (ref 0–1)
BILIRUB SERPL-MCNC: 1.05 MG/DL (ref 0.2–1)
BUN SERPL-MCNC: 32 MG/DL (ref 5–25)
CALCIUM SERPL-MCNC: 9.5 MG/DL (ref 8.3–10.1)
CHLORIDE SERPL-SCNC: 103 MMOL/L (ref 100–108)
CO2 SERPL-SCNC: 30 MMOL/L (ref 21–32)
CREAT SERPL-MCNC: 1.3 MG/DL (ref 0.6–1.3)
EOSINOPHIL # BLD AUTO: 0.33 THOUSAND/ΜL (ref 0–0.61)
EOSINOPHIL NFR BLD AUTO: 3 % (ref 0–6)
ERYTHROCYTE [DISTWIDTH] IN BLOOD BY AUTOMATED COUNT: 14.4 % (ref 11.6–15.1)
EST. AVERAGE GLUCOSE BLD GHB EST-MCNC: 143 MG/DL
FSH SERPL-ACNC: 6.7 MIU/ML (ref 0.7–10.8)
GFR SERPL CREATININE-BSD FRML MDRD: 56 ML/MIN/1.73SQ M
GLUCOSE P FAST SERPL-MCNC: 121 MG/DL (ref 65–99)
HBA1C MFR BLD: 6.6 %
HCT VFR BLD AUTO: 45.3 % (ref 36.5–49.3)
HGB BLD-MCNC: 15.9 G/DL (ref 12–17)
IMM GRANULOCYTES # BLD AUTO: 0.04 THOUSAND/UL (ref 0–0.2)
IMM GRANULOCYTES NFR BLD AUTO: 0 % (ref 0–2)
LH SERPL-ACNC: 4.4 MIU/ML (ref 1.2–10.6)
LYMPHOCYTES # BLD AUTO: 3.48 THOUSANDS/ΜL (ref 0.6–4.47)
LYMPHOCYTES NFR BLD AUTO: 33 % (ref 14–44)
MCH RBC QN AUTO: 30.5 PG (ref 26.8–34.3)
MCHC RBC AUTO-ENTMCNC: 35.1 G/DL (ref 31.4–37.4)
MCV RBC AUTO: 87 FL (ref 82–98)
MONOCYTES # BLD AUTO: 0.9 THOUSAND/ΜL (ref 0.17–1.22)
MONOCYTES NFR BLD AUTO: 9 % (ref 4–12)
NEUTROPHILS # BLD AUTO: 5.83 THOUSANDS/ΜL (ref 1.85–7.62)
NEUTS SEG NFR BLD AUTO: 55 % (ref 43–75)
NRBC BLD AUTO-RTO: 0 /100 WBCS
PLATELET # BLD AUTO: 270 THOUSANDS/UL (ref 149–390)
PMV BLD AUTO: 9.8 FL (ref 8.9–12.7)
POTASSIUM SERPL-SCNC: 4.1 MMOL/L (ref 3.5–5.3)
PROLACTIN SERPL-MCNC: 6.2 NG/ML (ref 2.5–17.4)
PROT SERPL-MCNC: 7.5 G/DL (ref 6.4–8.2)
RBC # BLD AUTO: 5.22 MILLION/UL (ref 3.88–5.62)
SODIUM SERPL-SCNC: 139 MMOL/L (ref 136–145)
WBC # BLD AUTO: 10.61 THOUSAND/UL (ref 4.31–10.16)

## 2022-01-05 PROCEDURE — 83001 ASSAY OF GONADOTROPIN (FSH): CPT

## 2022-01-05 PROCEDURE — 82306 VITAMIN D 25 HYDROXY: CPT

## 2022-01-05 PROCEDURE — 84146 ASSAY OF PROLACTIN: CPT

## 2022-01-05 PROCEDURE — 80053 COMPREHEN METABOLIC PANEL: CPT

## 2022-01-05 PROCEDURE — 84403 ASSAY OF TOTAL TESTOSTERONE: CPT

## 2022-01-05 PROCEDURE — 85025 COMPLETE CBC W/AUTO DIFF WBC: CPT

## 2022-01-05 PROCEDURE — 83036 HEMOGLOBIN GLYCOSYLATED A1C: CPT

## 2022-01-05 PROCEDURE — 83002 ASSAY OF GONADOTROPIN (LH): CPT

## 2022-01-05 PROCEDURE — 36415 COLL VENOUS BLD VENIPUNCTURE: CPT

## 2022-01-05 PROCEDURE — 84402 ASSAY OF FREE TESTOSTERONE: CPT

## 2022-01-06 LAB
TESTOST FREE SERPL-MCNC: 3.6 PG/ML (ref 6.6–18.1)
TESTOST SERPL-MCNC: 145 NG/DL (ref 264–916)

## 2022-01-12 ENCOUNTER — TELEMEDICINE (OUTPATIENT)
Dept: INTERNAL MEDICINE CLINIC | Facility: OTHER | Age: 67
End: 2022-01-12
Payer: MEDICARE

## 2022-01-12 ENCOUNTER — TELEMEDICINE (OUTPATIENT)
Dept: ENDOCRINOLOGY | Facility: CLINIC | Age: 67
End: 2022-01-12
Payer: MEDICARE

## 2022-01-12 VITALS
HEART RATE: 75 BPM | SYSTOLIC BLOOD PRESSURE: 127 MMHG | BODY MASS INDEX: 28.53 KG/M2 | TEMPERATURE: 96.8 F | WEIGHT: 161 LBS | DIASTOLIC BLOOD PRESSURE: 69 MMHG | HEIGHT: 63 IN

## 2022-01-12 DIAGNOSIS — B34.9 VIRAL INFECTION, UNSPECIFIED: Primary | ICD-10-CM

## 2022-01-12 DIAGNOSIS — Z79.4 TYPE 2 DIABETES MELLITUS WITH HYPERGLYCEMIA, WITH LONG-TERM CURRENT USE OF INSULIN (HCC): Primary | ICD-10-CM

## 2022-01-12 DIAGNOSIS — I10 BENIGN ESSENTIAL HYPERTENSION: ICD-10-CM

## 2022-01-12 DIAGNOSIS — E11.65 TYPE 2 DIABETES MELLITUS WITH HYPERGLYCEMIA, WITH LONG-TERM CURRENT USE OF INSULIN (HCC): Primary | ICD-10-CM

## 2022-01-12 DIAGNOSIS — E78.2 MIXED HYPERLIPIDEMIA: ICD-10-CM

## 2022-01-12 DIAGNOSIS — E55.9 VITAMIN D DEFICIENCY: ICD-10-CM

## 2022-01-12 DIAGNOSIS — E23.0 HYPOGONADOTROPIC HYPOGONADISM (HCC): ICD-10-CM

## 2022-01-12 PROCEDURE — U0003 INFECTIOUS AGENT DETECTION BY NUCLEIC ACID (DNA OR RNA); SEVERE ACUTE RESPIRATORY SYNDROME CORONAVIRUS 2 (SARS-COV-2) (CORONAVIRUS DISEASE [COVID-19]), AMPLIFIED PROBE TECHNIQUE, MAKING USE OF HIGH THROUGHPUT TECHNOLOGIES AS DESCRIBED BY CMS-2020-01-R: HCPCS | Performed by: INTERNAL MEDICINE

## 2022-01-12 PROCEDURE — 95251 CONT GLUC MNTR ANALYSIS I&R: CPT | Performed by: PHYSICIAN ASSISTANT

## 2022-01-12 PROCEDURE — 99214 OFFICE O/P EST MOD 30 MIN: CPT | Performed by: PHYSICIAN ASSISTANT

## 2022-01-12 PROCEDURE — 99213 OFFICE O/P EST LOW 20 MIN: CPT | Performed by: INTERNAL MEDICINE

## 2022-01-12 PROCEDURE — U0005 INFEC AGEN DETEC AMPLI PROBE: HCPCS | Performed by: INTERNAL MEDICINE

## 2022-01-12 RX ORDER — SITAGLIPTIN AND METFORMIN HYDROCHLORIDE 50; 500 MG/1; MG/1
TABLET, FILM COATED, EXTENDED RELEASE ORAL
Qty: 60 TABLET | Refills: 5 | Status: SHIPPED | OUTPATIENT
Start: 2022-01-12 | End: 2022-07-22

## 2022-01-12 NOTE — PROGRESS NOTES
COVID-19 Outpatient Progress Note    Assessment/Plan:    Problem List Items Addressed This Visit        Other    Viral infection, unspecified - Primary     Discussed over-the-counter medications for symptom relief  Will send for COVID testing  Discussed isolation/quarantine protocol as per CDC guidelines  I recommend contacting our office for worsening symptoms  Relevant Orders    COVID Only- Collected at   [facility] or [facility]          Disposition:     Referred patient to centralized site to test for COVID-19  I have spent 15 minutes directly with the patient  Greater than 50% of this time was spent in counseling/coordination of care regarding: prognosis, risks and benefits of treatment options, instructions for management, patient and family education, importance of treatment compliance, risk factor reductions and impressions  Encounter provider Jhoan Veronica MD    Provider located at [address] [place] [address]    Recent Visits  No visits were found meeting these conditions  Showing recent visits within past 7 days and meeting all other requirements  Today's Visits  Date Type Provider Dept   01/12/22 Telemedicine Jhoan Veronica MD [facility]   Showing today's visits and meeting all other requirements  Future Appointments  No visits were found meeting these conditions  Showing future appointments within next 150 days and meeting all other requirements     This virtual check-in was done via [facility] and patient was informed that this is a secure, HIPAA-compliant platform  He agrees to proceed  Patient agrees to participate in a virtual check in via telephone or video visit instead of presenting to the office to address urgent/immediate medical needs  Patient is aware this is a billable service      After connecting through [facility], the patient was identified by name and date of birth  Tha Burnett was informed that this was a telemedicine visit and that the exam was being conducted confidentially over secure lines  My office door was closed  No one else was in the room  Tha Burnett acknowledged consent and understanding of privacy and security of the telemedicine visit  I informed the patient that I have reviewed his record in Epic and presented the opportunity for him to ask any questions regarding the visit today  The patient agreed to participate  Verification of patient location:  Patient is located in the following state in which I hold an active license: [place]    Subjective:   Tha Burnett is a 74 y o  male who is concerned about COVID-19  Patient's symptoms include fatigue, nasal congestion and headache  Patient denies fever, chills, rhinorrhea, sore throat, cough, shortness of breath, chest tightness, abdominal pain, nausea, vomiting and diarrhea  - Date of symptom onset: 1/11/2022      COVID-19 vaccination status: Fully vaccinated with booster    Exposure:   Contact with a person who is under investigation (PUI) for or who is positive for COVID-19 within the last 14 days?: No    Hospitalized recently for fever and/or lower respiratory symptoms?: No      Currently a healthcare worker that is involved in direct patient care?: No      Works in a special setting where the risk of COVID-19 transmission may be high? (this may include long-term care, correctional and MCC facilities; homeless shelters; assisted-living facilities and group homes ): No      Resident in a special setting where the risk of COVID-19 transmission may be high? (this may include long-term care, correctional and MCC facilities; homeless shelters; assisted-living facilities and group homes ): No      74year old male is seen today with concern for acute symptoms of headache and sinus/nasal congestion       Lab Results   Component Value Date [facility] 98 Not Detected 06/12/2020     Past Medical History:   Diagnosis Date    Arthritis     Last assessed 5/24/2013    Avitaminosis D 2012    Colon polyp     Diabetes mellitus ([facility] [address] )     Displacement of cervical intervertebral disc 2014    GERD (gastroesophageal reflux disease)     Glaucoma     Normal Pressure Glaucoma    HTN (hypertension) 2007    Hypertension     IBS (irritable bowel syndrome) 2021    Nephrolithiasis 5/24/2013    Pituitary microadenoma ([facility] [address] ) 2010    Spinal stenosis in cervical region 2014    Sprain and strain of calcaneofibular (ligament) 12/5/2013    Submandibular lymphadenopathy 8/8/2019    Uric acid nephrolithiasis 1990     Past Surgical History:   Procedure Laterality Date    ASPIRATION / INJECTION RENAL CYST      Renal Cyst Aspiration    CATARACT EXTRACTION      12/2017- right eye, 01/2018- left eye    COLONOSCOPY  2005    EYE SURGERY Bilateral     Cataract Surgery    TONSILLECTOMY      UPPER GASTROINTESTINAL ENDOSCOPY       Current Outpatient Medications   Medication Sig Dispense Refill    amLODIPine (NORVASC) 10 mg tablet Take 1 tablet (10 mg total) by mouth daily 90 tablet 1    aspirin (ECOTRIN LOW STRENGTH) 81 mg EC tablet Take 81 mg by mouth daily       B Complex Vitamins (VITAMIN B-COMPLEX PO) Take 1 capsule by mouth daily       BD Pen Needle Harriett U/F 32G X 4 MM MISC Use 4x per day 400 each 3    brimonidine-timolol (COMBIGAN) 0 2-0 5 % Administer 1 drop to both eyes every 12 (twelve) hours      cholecalciferol (VITAMIN D3) 1,000 units tablet Take 1,000 Units by mouth 2 (two) times a day       dicyclomine (BENTYL) 20 mg tablet Take 1 tablet (20 mg total) by mouth 4 (four) times a day (before meals and at bedtime) 120 tablet 5    doxazosin (CARDURA) 4 mg tablet Take 1 tablet (4 mg total) by mouth daily 90 tablet 1    fluticasone (FLONASE) 50 mcg/act nasal spray 1 spray into each nostril daily as needed for rhinitis (Acute URI/sinusitis) 18 mL 0    gabapentin (NEURONTIN) 300 mg capsule Take 1 capsule (300 mg total) by mouth 3 (three) times a day 270 capsule 1    Glucosamine-Chondroitin (OSTEO BI-FLEX REGULAR STRENGTH) 250-200 MG TABS Take 1 tablet by mouth 2 (two) times a day      glyBURIDE (DIABETA) 5 mg tablet Take 5 mg by mouth daily with breakfast      hydrochlorothiazide (HYDRODIURIL) 25 mg tablet Take 1 tablet (25 mg total) by mouth daily 90 tablet 1    ibuprofen (MOTRIN) 200 mg tablet Take 200 mg by mouth as needed       insulin glargine (Lantus SoloStar) 100 units/mL injection pen Inject 30 Units under the skin daily at bedtime 30 mL 3    insulin lispro (HumaLOG KwikPen) 100 units/mL injection pen Inject 10 Units under the skin 3 (three) times a day with meals Take 25 units daily as directed (Patient taking differently: Inject 10 Units under the skin 3 (three) times a day with meals ) 30 mL 3    Jardiance 25 MG TABS TAKE 1 TABLET BY MOUTH  DAILY 90 tablet 3    lisinopril (ZESTRIL) 40 mg tablet Take 1 tablet (40 mg total) by mouth daily 90 tablet 1    loratadine (CLARITIN) 10 mg tablet Take 10 mg by mouth daily      MULTIPLE VITAMIN PO Take 1 tablet by mouth daily       omeprazole (PriLOSEC) 40 MG capsule Take 1 capsule (40 mg total) by mouth daily 90 capsule 0    other medication, see sig, Medication/product name: Medical Marijuana      Probiotic Product (PROBIOTIC-10) CAPS Take 1 capsule by mouth daily       simvastatin (ZOCOR) 20 mg tablet Take 1 tablet (20 mg total) by mouth daily at bedtime 90 tablet 1    SITagliptin-metFORMIN HCl ER (Janumet XR)  MG TB24 1 tab twice per day with food 60 tablet 5    vitamin E, tocopherol, 400 units capsule Take 400 Units by mouth 2 (two) times a day        No current facility-administered medications for this visit       Allergies   Allergen Reactions    Clonidine Other (See Comments)     Diaphoresis, hot flashes    Augmentin [Amoxicillin-Pot Clavulanate] Abdominal Pain    Biaxin [Clarithromycin] Abdominal Pain    Dust Mite Extract     Insulin Aspart GI Intolerance     Novolog     Liraglutide Abdominal Pain and Nausea Only    Molds & Smuts     Nuts - Food Allergy     Seasonal Ic [Cholestatin] Headache       Review of Systems   Constitutional: Positive for fatigue  Negative for activity change, appetite change, chills, diaphoresis and fever  HENT: Positive for congestion  Negative for postnasal drip, rhinorrhea, sinus pressure, sinus pain, sneezing and sore throat  Eyes: Negative for visual disturbance  Respiratory: Negative for apnea, cough, choking, chest tightness, shortness of breath and wheezing  Cardiovascular: Negative for chest pain, palpitations and leg swelling  Gastrointestinal: Negative for abdominal distention, abdominal pain, anal bleeding, blood in stool, constipation, diarrhea, nausea and vomiting  Endocrine: Negative for cold intolerance and heat intolerance  Genitourinary: Negative for difficulty urinating, dysuria and hematuria  Musculoskeletal: Negative  Skin: Negative  Neurological: Positive for headaches  Negative for dizziness, weakness, light-headedness and numbness  Hematological: Negative for adenopathy  Psychiatric/Behavioral: Negative for agitation, sleep disturbance and suicidal ideas  All other systems reviewed and are negative  Objective:    Vitals:    01/12/22 1504   BP: 127/69   BP Location: Left arm   Patient Position: Sitting   Cuff Size: Adult   Pulse: 75   Temp: (!) 96 8 °F (36 °C)   TempSrc: Oral   Weight: 73 kg (161 lb)   Height: 5' 3" (1 6 m)       Physical Exam  Vitals and nursing note reviewed  Constitutional:       General: He is not in acute distress  Appearance: Normal appearance  He is not ill-appearing  HENT:      Head: Normocephalic and atraumatic        Nose: Nose normal       Mouth/Throat:      Mouth: Mucous membranes are moist       Pharynx: No oropharyngeal exudate or posterior oropharyngeal erythema  Eyes:      General: No scleral icterus  Extraocular Movements: Extraocular movements intact  Conjunctiva/sclera: Conjunctivae normal       Pupils: Pupils are equal, round, and reactive to light  Pulmonary:      Effort: Pulmonary effort is normal  No respiratory distress  Breath sounds: No wheezing  Abdominal:      General: Abdomen is flat  There is no distension  Tenderness: There is no abdominal tenderness  Musculoskeletal:         General: No swelling, deformity or signs of injury  Cervical back: Normal range of motion  Skin:     General: Skin is warm and dry  Coloration: Skin is not jaundiced or pale  Findings: No bruising, erythema or rash  Neurological:      General: No focal deficit present  Mental Status: He is alert and oriented to person, place, and time  Mental status is at baseline  Cranial Nerves: No cranial nerve deficit  Psychiatric:         Mood and Affect: Mood normal          Behavior: Behavior normal          Thought Content: Thought content normal          Judgment: Judgment normal          VIRTUAL VISIT DISCLAIMER    Maddie Karina verbally agrees to participate in [employer]  Pt is aware that [employer] could be limited without vital signs or the ability to perform a full hands-on physical Lorita Millbury understands he or the provider may request at any time to terminate the video visit and request the patient to seek care or treatment in person

## 2022-01-12 NOTE — PROGRESS NOTES
Virtual Regular Visit    Verification of patient location:    Patient is located in the following state in which I hold an active license [place]      Assessment/Plan:    Problem List Items Addressed This Visit        Endocrine    Type 2 diabetes mellitus with hyperglycemia, with long-term current use of insulin ([facility] ) - Primary     Diabetes under good control but CGM readings running higher  He is feeling better off the metformin ER but would like to try Janumet XR again now that it is covered by his insurance  Will try at lower dose Janumet Xr 50/500mg-- 1 tab daily x 1 week and if tolerating with no hypoglycemia and no GI side effects will increase to 2 tabs daily  Would avoid increasing glyburide due to history of overnight hypoglycemia and will consider stopping based on response to janumet  Advised him to keep well hydrated and remain off jardiance if feeling sick and appetite is poor  Advised him to call in 2 weeks for review of CGM and med adjustment if needed  Lab Results   Component Value Date    HGBA1C 6 6 (H) 01/05/2022            Relevant Medications    SITagliptin-metFORMIN HCl ER (Janumet XR)  MG TB24    Hypogonadotropic hypogonadism ([facility] )     He remains off testosterone since 1/2020 due to elevated [address] [place] [address] [address] up to 19 1 and uncontrolled HTN  Now that H&H is normal and HTN under better control we could try low dose with close monitoring  He did not tolerate Patch due to rash and had dificulty with IM injections  We could try topical testosterone gel or low dose xysoted subcutaneous injection with close monitoring of H&H  Right now he is concerned about possibility of COVID  I advised him to get evaluated and treated if necessary by his PCP and wait until symptoms fully resolve as I would not want to start testosterone at this time due to increased risk of blood clots               Cardiovascular and Mediastinum    Benign essential hypertension     This has been under good control at the past few medical visits  Other    Hyperlipidemia     Continue simvastatin  Vitamin D deficiency     Continue supplement                    Reason for visit is   Chief Complaint   Patient presents with    Virtual Regular Visit        Encounter provider Grazyna Hunter PA-C    Provider located at [address] [place] [id number]      Recent Visits  No visits were found meeting these conditions  Showing recent visits within past 7 days and meeting all other requirements  Today's Visits  Date Type Provider Dept   01/12/22 Telemedicine Earl Freire MD [facility]   01/12/22 Telemedicine Grazyna Hunter PA-C [facility] [facility] today's visits and meeting all other requirements  Future Appointments  No visits were found meeting these conditions  Showing future appointments within next 150 days and meeting all other requirements       The patient was identified by name and date of birth  Trinity Contreras was informed that this is a telemedicine visit and that the visit is being conducted through [facility] and patient was informed this is a secure, HIPAA-complaint platform  He agrees to proceed     My office door was closed  No one else was in the room  He acknowledged consent and understanding of privacy and security of the video platform  The patient has agreed to participate and understands they can discontinue the visit at any time  Patient is aware this is a billable service  Subjective  Trinity Contreras is a 74 y o  male with Type 2 Diabetes with long term use of insulin since many years ago  He is using dexcom G6 and does report some skin irritation /rash at sensor site  GI problems significantly better off the Metformin   He was taken off janumet in the past due to insurance but it is now covered and worked well so would like to try again  Previously treated with higher doses of glyburide so wondering if he could increase that as well  He has not tolerated GLP-1 agonists  Current Diabetes regimen:   Lantus 30 units daily at bedtime  Humalog 10-15  units 2-3x per day   Glyburide 5mg daily  Jardiance 25mg    CGM Interpretation  Ti[place] Brisa   Device used Dexcom G6, Home use   Indication: Type 2 Diabetes  More than 72 hours of data was reviewed  Report to be scanned to chart  Date Range: 12/23/2021-1/12/2021  Analysis of data:   Average Glucose: 183   SD : 56mg/dl   Time in Target Range: 48%   Time Above Range: 39% high, 11% very high   Time Below Range: 1%   Interpretation of data:   Blood sugars declining overnight but often above target throughout the day and especially after breakfast      For HTN, taking amlodipine, HCTZ    For hyperlipidemia, takes simvastatin    The the hypogonadism, he remains off testosterone for the past 2 years  Continues to have fatigue and decreased libido       HPI     Past Medical History:   Diagnosis Date    Arthritis     Last assessed 5/24/2013    Avitaminosis D 2012    Colon polyp     Diabetes mellitus ([address] )     Displacement of cervical intervertebral disc 2014    GERD (gastroesophageal reflux disease)     Glaucoma     Normal Pressure Glaucoma    HTN (hypertension) 2007    Hypertension     IBS (irritable bowel syndrome) 2021    Nephrolithiasis 5/24/2013    Pituitary microadenoma ([address] ) 2010    Spinal stenosis in cervical region 2014    Sprain and strain of calcaneofibular (ligament) 12/5/2013    Submandibular lymphadenopathy 8/8/2019    Uric acid nephrolithiasis 1990       Past Surgical History:   Procedure Laterality Date    ASPIRATION / INJECTION RENAL CYST      Renal Cyst Aspiration    CATARACT EXTRACTION      12/2017- right eye, 01/2018- left eye    COLONOSCOPY  2005    EYE SURGERY Bilateral     Cataract Surgery    TONSILLECTOMY      UPPER GASTROINTESTINAL ENDOSCOPY         Current Outpatient Medications   Medication Sig Dispense Refill    amLODIPine (NORVASC) 10 mg tablet Take 1 tablet (10 mg total) by mouth daily 90 tablet 1    aspirin (ECOTRIN LOW STRENGTH) 81 mg EC tablet Take 81 mg by mouth daily       B Complex Vitamins (VITAMIN B-COMPLEX PO) Take 1 capsule by mouth daily       BD Pen Needle Harriett U/F 32G X 4 MM MISC Use 4x per day 400 each 3    brimonidine-timolol (COMBIGAN) 0 2-0 5 % Administer 1 drop to both eyes every 12 (twelve) hours      cholecalciferol (VITAMIN D3) 1,000 units tablet Take 1,000 Units by mouth 2 (two) times a day       dicyclomine (BENTYL) 20 mg tablet Take 1 tablet (20 mg total) by mouth 4 (four) times a day (before meals and at bedtime) 120 tablet 5    doxazosin (CARDURA) 4 mg tablet Take 1 tablet (4 mg total) by mouth daily 90 tablet 1    fluticasone (FLONASE) 50 mcg/act nasal spray 1 spray into each nostril daily as needed for rhinitis (Acute URI/sinusitis) 18 mL 0    gabapentin (NEURONTIN) 300 mg capsule Take 1 capsule (300 mg total) by mouth 3 (three) times a day 270 capsule 1    Glucosamine-Chondroitin (OSTEO BI-FLEX REGULAR STRENGTH) 250-200 MG TABS Take 1 tablet by mouth 2 (two) times a day      glyBURIDE (DIABETA) 5 mg tablet Take 5 mg by mouth daily with breakfast      hydrochlorothiazide (HYDRODIURIL) 25 mg tablet Take 1 tablet (25 mg total) by mouth daily 90 tablet 1    ibuprofen (MOTRIN) 200 mg tablet Take 200 mg by mouth as needed       insulin glargine (Lantus SoloStar) 100 units/mL injection pen Inject 30 Units under the skin daily at bedtime 30 mL 3    insulin lispro (HumaLOG KwikPen) 100 units/mL injection pen Inject 10 Units under the skin 3 (three) times a day with meals Take 25 units daily as directed (Patient taking differently: Inject 10 Units under the skin 3 (three) times a day with meals ) 30 mL 3    Jardiance 25 MG TABS TAKE 1 TABLET BY MOUTH  DAILY 90 tablet 3    lisinopril (ZESTRIL) 40 mg tablet Take 1 tablet (40 mg total) by mouth daily 90 tablet 1    loratadine (CLARITIN) 10 mg tablet Take 10 mg by mouth daily      MULTIPLE VITAMIN PO Take 1 tablet by mouth daily       omeprazole (PriLOSEC) 40 MG capsule Take 1 capsule (40 mg total) by mouth daily 90 capsule 0    other medication, see sig, Medication/product name: Medical Marijuana      Probiotic Product (PROBIOTIC-10) CAPS Take 1 capsule by mouth daily       simvastatin (ZOCOR) 20 mg tablet Take 1 tablet (20 mg total) by mouth daily at bedtime 90 tablet 1    SITagliptin-metFORMIN HCl ER (Janumet XR)  MG TB24 1 tab twice per day with food 60 tablet 5    vitamin E, tocopherol, 400 units capsule Take 400 Units by mouth 2 (two) times a day        No current facility-administered medications for this visit  Allergies   Allergen Reactions    Clonidine Other (See Comments)     Diaphoresis, hot flashes    Augmentin [Amoxicillin-Pot Clavulanate] Abdominal Pain    Biaxin [Clarithromycin] Abdominal Pain    Dust Mite Extract     Insulin Aspart GI Intolerance     Novolog     Liraglutide Abdominal Pain and Nausea Only    Molds & Smuts     Nuts - Food Allergy     Seasonal Ic [Cholestatin] Headache       Review of Systems   Constitutional: Positive for fatigue  Negative for activity change and appetite change  HENT: Positive for congestion  Negative for sore throat, trouble swallowing and voice change  Eyes: Negative for visual disturbance  Respiratory: Negative for choking, chest tightness and shortness of breath  Cardiovascular: Negative for chest pain, palpitations and leg swelling  Gastrointestinal: Negative for abdominal pain, constipation and diarrhea  Endocrine: Negative for cold intolerance, heat intolerance, polydipsia, polyphagia and polyuria  Genitourinary: Negative for frequency  Musculoskeletal: Negative for arthralgias and myalgias  Skin: Negative for rash     Neurological: Positive for headaches  Negative for dizziness and syncope  Hematological: Negative for adenopathy  Psychiatric/Behavioral: Negative for sleep disturbance  All other systems reviewed and are negative  Video Exam    There were no vitals filed for this visit  Physical Exam     Physical Exam   Constitutional: He is oriented to person, place, and time  He appears well-developed and well-nourished  No distress  HENT:   Head: Normocephalic and atraumatic  Neck: Normal range of motion  Pulmonary/Chest: Effort normal    Musculoskeletal: Normal range of motion  Neurological: He is alert and oriented to person, place, and time  Skin: He is not diaphoretic  Psychiatric: He has a normal mood and affect   His behavior is normal    Component      Latest Ref Rng & Units 1/5/2022 1/5/2022 1/5/2022           8:33 AM  8:33 AM  8:33 AM   WBC      4 31 - 10 16 Thousand/uL 10 61 (H)     Red Blood Cell Count      3 88 - 5 62 Million/uL 5 22     Hemoglobin      12 0 - 17 0 g/dL 15 9     HCT      36 5 - 49 3 % 45 3     MCV      82 - 98 fL 87     MCH      26 8 - 34 3 pg 30 5     MCHC      31 4 - 37 4 g/dL 35 1     RDW      11 6 - 15 1 % 14 4     MPV      8 9 - 12 7 fL 9 8     Platelet Count      855 - 390 Thousands/uL 270     nRBC      /100 WBCs 0     Neutrophils %      43 - 75 % 55     Immat GRANS %      0 - 2 % 0     Lymphocytes Relative      14 - 44 % 33     Monocytes Relative      4 - 12 % 9     Eosinophils      0 - 6 % 3     Basophils Relative      0 - 1 % 0     Absolute Neutrophils      1 85 - 7 62 Thousands/µL 5 83     Immature Grans Absolute      0 00 - 0 20 Thousand/uL 0 04     Lymphocytes Absolute      0 60 - 4 47 Thousands/µL 3 48     Absolute Monocytes      0 17 - 1 22 Thousand/µL 0 90     Absolute Eosinophils      0 00 - 0 61 Thousand/µL 0 33     Basophils Absolute      0 00 - 0 10 Thousands/µL 0 03     Sodium      136 - 145 mmol/L   139   Potassium      3 5 - 5 3 mmol/L   4 1   Chloride      100 - 108 mmol/L 103   CO2      21 - 32 mmol/L   30   Anion Gap      4 - 13 mmol/L   6   BUN      5 - 25 mg/dL   32 (H)   Creatinine      0 60 - 1 30 mg/dL   1 30   GLUCOSE FASTING      65 - 99 mg/dL   121 (H)   Calcium      8 3 - 10 1 mg/dL   9 5   AST      5 - 45 U/L   15   ALT      12 - 78 U/L   37   Alkaline Phosphatase      46 - 116 U/L   79   Total Protein      6 4 - 8 2 g/dL   7 5   Albumin      3 5 - 5 0 g/dL   4 0   TOTAL BILIRUBIN      0 20 - 1 00 mg/dL   1 05 (H)   eGFR      ml/min/1 73sq m   56   Hemoglobin A1C      Normal 3 8-5 6%; PreDiabetic 5 7-6 4%;  Diabetic >=6 5%; Glycemic control for adults with diabetes <7 0% %      eAG, EST AVG Glucose      mg/dl      TESTOSTERONE FREE      6 6 - 18 1 pg/mL      Testosterone, Total, LC/MS      264 - 916 ng/dL      LUTEINIZING HORMONE      1 2 - 10 6 mIU/mL      FSH, POC      0 7 - 10 8 mIU/mL      PROLACTIN      2 5 - 17 4 ng/mL      Vit D, 25-Hydroxy      30 0 - 100 0 ng/mL  70 1      Component      Latest Ref Rng & Units 1/5/2022 1/5/2022 1/5/2022           8:33 AM  8:33 AM  8:33 AM   WBC      4 31 - 10 16 Thousand/uL      Red Blood Cell Count      3 88 - 5 62 Million/uL      Hemoglobin      12 0 - 17 0 g/dL      HCT      36 5 - 49 3 %      MCV      82 - 98 fL      MCH      26 8 - 34 3 pg      MCHC      31 4 - 37 4 g/dL      RDW      11 6 - 15 1 %      MPV      8 9 - 12 7 fL      Platelet Count      037 - 390 Thousands/uL      nRBC      /100 WBCs      Neutrophils %      43 - 75 %      Immat GRANS %      0 - 2 %      Lymphocytes Relative      14 - 44 %      Monocytes Relative      4 - 12 %      Eosinophils      0 - 6 %      Basophils Relative      0 - 1 %      Absolute Neutrophils      1 85 - 7 62 Thousands/µL      Immature Grans Absolute      0 00 - 0 20 Thousand/uL      Lymphocytes Absolute      0 60 - 4 47 Thousands/µL      Absolute Monocytes      0 17 - 1 22 Thousand/µL      Absolute Eosinophils      0 00 - 0 61 Thousand/µL      Basophils Absolute      0 00 - 0 10 Thousands/µL      Sodium      136 - 145 mmol/L      Potassium      3 5 - 5 3 mmol/L      Chloride      100 - 108 mmol/L      CO2      21 - 32 mmol/L      Anion Gap      4 - 13 mmol/L      BUN      5 - 25 mg/dL      Creatinine      0 60 - 1 30 mg/dL      GLUCOSE FASTING      65 - 99 mg/dL      Calcium      8 3 - 10 1 mg/dL      AST      5 - 45 U/L      ALT      12 - 78 U/L      Alkaline Phosphatase      46 - 116 U/L      Total Protein      6 4 - 8 2 g/dL      Albumin      3 5 - 5 0 g/dL      TOTAL BILIRUBIN      0 20 - 1 00 mg/dL      eGFR      ml/min/1 73sq m      Hemoglobin A1C      Normal 3 8-5 6%; PreDiabetic 5 7-6 4%;  Diabetic >=6 5%; Glycemic control for adults with diabetes <7 0% %      eAG, EST AVG Glucose      mg/dl      TESTOSTERONE FREE      6 6 - 18 1 pg/mL      Testosterone, Total, LC/MS      264 - 916 ng/dL      LUTEINIZING HORMONE      1 2 - 10 6 mIU/mL 4 4     FSH, POC      0 7 - 10 8 mIU/mL  6 7    PROLACTIN      2 5 - 17 4 ng/mL   6 2   Vit D, 25-Hydroxy      30 0 - 100 0 ng/mL        Component      Latest Ref Rng & Units 1/5/2022 1/5/2022           8:33 AM  8:33 AM   WBC      4 31 - 10 16 Thousand/uL     Red Blood Cell Count      3 88 - 5 62 Million/uL     Hemoglobin      12 0 - 17 0 g/dL     HCT      36 5 - 49 3 %     MCV      82 - 98 fL     MCH      26 8 - 34 3 pg     MCHC      31 4 - 37 4 g/dL     RDW      11 6 - 15 1 %     MPV      8 9 - 12 7 fL     Platelet Count      249 - 390 Thousands/uL     nRBC      /100 WBCs     Neutrophils %      43 - 75 %     Immat GRANS %      0 - 2 %     Lymphocytes Relative      14 - 44 %     Monocytes Relative      4 - 12 %     Eosinophils      0 - 6 %     Basophils Relative      0 - 1 %     Absolute Neutrophils      1 85 - 7 62 Thousands/µL     Immature Grans Absolute      0 00 - 0 20 Thousand/uL     Lymphocytes Absolute      0 60 - 4 47 Thousands/µL     Absolute Monocytes      0 17 - 1 22 Thousand/µL     Absolute Eosinophils      0 00 - 0 61 Thousand/µL Basophils Absolute      0 00 - 0 10 Thousands/µL     Sodium      136 - 145 mmol/L     Potassium      3 5 - 5 3 mmol/L     Chloride      100 - 108 mmol/L     CO2      21 - 32 mmol/L     Anion Gap      4 - 13 mmol/L     BUN      5 - 25 mg/dL     Creatinine      0 60 - 1 30 mg/dL     GLUCOSE FASTING      65 - 99 mg/dL     Calcium      8 3 - 10 1 mg/dL     AST      5 - 45 U/L     ALT      12 - 78 U/L     Alkaline Phosphatase      46 - 116 U/L     Total Protein      6 4 - 8 2 g/dL     Albumin      3 5 - 5 0 g/dL     TOTAL BILIRUBIN      0 20 - 1 00 mg/dL     eGFR      ml/min/1 73sq m     Hemoglobin A1C      Normal 3 8-5 6%; PreDiabetic 5 7-6 4%; Diabetic >=6 5%; Glycemic control for adults with diabetes <7 0% % 6 6 (H)    eAG, EST AVG Glucose      mg/dl 143    TESTOSTERONE FREE      6 6 - 18 1 pg/mL  3 6 (L)   Testosterone, Total, LC/MS      264 - 916 ng/dL  145 (L)   LUTEINIZING HORMONE      1 2 - 10 6 mIU/mL     FSH, POC      0 7 - 10 8 mIU/mL     PROLACTIN      2 5 - 17 4 ng/mL     Vit D, 25-Hydroxy      30 0 - 100 0 ng/mL         I spent 30 minutes directly with the patient during this visit    VIRTUAL VISIT [id number] verbally agrees to participate in [employer]  Pt is aware that [employer] could be limited without vital signs or the ability to perform a full hands-on physical [facility] [place] understands he or the provider may request at any time to terminate the video visit and request the patient to seek care or treatment in person

## 2022-01-12 NOTE — ASSESSMENT & PLAN NOTE
Diabetes under good control but CGM readings running higher  He is feeling better off the metformin ER but would like to try Janumet XR again now that it is covered by his insurance  Will try at lower dose Janumet Xr 50/500mg-- 1 tab daily x 1 week and if tolerating with no hypoglycemia and no GI side effects will increase to 2 tabs daily  Would avoid increasing glyburide due to history of overnight hypoglycemia and will consider stopping based on response to janumet  Advised him to keep well hydrated and remain off jardiance if feeling sick and appetite is poor  Advised him to call in 2 weeks for review of CGM and med adjustment if needed     Lab Results   Component Value Date    HGBA1C 6 6 (H) 01/05/2022

## 2022-01-12 NOTE — ASSESSMENT & PLAN NOTE
Discussed over-the-counter medications for symptom relief  Will send for COVID testing  Discussed isolation/quarantine protocol as per CDC guidelines  I recommend contacting our office for worsening symptoms

## 2022-01-12 NOTE — ASSESSMENT & PLAN NOTE
He remains off testosterone since 1/2020 due to elevated [address] up to 19 1 and uncontrolled HTN  Now that H&H is normal and HTN under better control we could try low dose with close monitoring  He did not tolerate Patch due to rash and had dificulty with IM injections  We could try topical testosterone gel or low dose xysoted subcutaneous injection with close monitoring of H&H  Right now he is concerned about possibility of COVID  I advised him to get evaluated and treated if necessary by his PCP and wait until symptoms fully resolve as I would not want to start testosterone at this time due to increased risk of blood clots

## 2022-01-19 ENCOUNTER — OFFICE VISIT (OUTPATIENT)
Dept: SLEEP CENTER | Facility: CLINIC | Age: 67
End: 2022-01-19
Payer: MEDICARE

## 2022-01-19 VITALS
WEIGHT: 166.2 LBS | DIASTOLIC BLOOD PRESSURE: 50 MMHG | BODY MASS INDEX: 29.45 KG/M2 | SYSTOLIC BLOOD PRESSURE: 112 MMHG | HEIGHT: 63 IN

## 2022-01-19 DIAGNOSIS — E66.3 OVERWEIGHT (BMI 25.0-29.9): ICD-10-CM

## 2022-01-19 DIAGNOSIS — G47.33 OBSTRUCTIVE SLEEP APNEA SYNDROME: Primary | ICD-10-CM

## 2022-01-19 DIAGNOSIS — R41.3 MEMORY DIFFICULTIES: ICD-10-CM

## 2022-01-19 DIAGNOSIS — Z79.899 MEDICAL MARIJUANA USE: ICD-10-CM

## 2022-01-19 DIAGNOSIS — G89.4 CHRONIC PAIN SYNDROME: ICD-10-CM

## 2022-01-19 DIAGNOSIS — I10 BENIGN ESSENTIAL HYPERTENSION: ICD-10-CM

## 2022-01-19 PROCEDURE — 99214 OFFICE O/P EST MOD 30 MIN: CPT | Performed by: INTERNAL MEDICINE

## 2022-01-19 NOTE — PATIENT INSTRUCTIONS

## 2022-01-19 NOTE — PROGRESS NOTES
Follow-Up Note - [address]  74 y o  male  :1955  BBA:[id number]  DOS:2022    CC: I saw this patient for follow-up in clinic today for Sleep disordered breathing, Coexisting Sleep and Medical Problems  He is using a ResMed  machine that is over 11years old  Results of prior studies in : The diagnostic study demonstrated an AHI of 24 per hour, higher during stage REM and while supine  Minimum oxygen saturation was 90%  During the subsequent therapeutic study, sleep disordered breathing was successfully treated with CPAP at 6 cm H2O  PFSH, Problem List, Medications & Allergies were reviewed in EMR  Interval changes: [none reported ]   He  has a past medical history of Arthritis, Avitaminosis D (), Colon polyp, Diabetes mellitus ([facility] ), Displacement of cervical intervertebral disc (), GERD (gastroesophageal reflux disease), Glaucoma, HTN (hypertension) (), Hypertension, IBS (irritable bowel syndrome) (), Nephrolithiasis (2013), Pituitary microadenoma ([facility] ) (), Spinal stenosis in cervical region (), Sprain and strain of calcaneofibular (ligament) (2013), Submandibular lymphadenopathy (2019), and Uric acid nephrolithiasis ()  He has a current medication list which includes the following prescription(s): amlodipine, aspirin, b complex vitamins, bd pen needle eunice u/f, brimonidine-timolol, cholecalciferol, dicyclomine, doxazosin, fluticasone, gabapentin, glucosamine-chondroitin, glyburide, ibuprofen, lantus solostar, insulin lispro, jardiance, lisinopril, loratadine, multiple vitamin, omeprazole, other medication (see sig), probiotic-10, simvastatin, janumet xr, vitamin e (tocopherol), and hydrochlorothiazide  PHYSIOLOGICAL DATA REVIEW AND INTERPRETATION: [ ] No data was available, but he reports regular use of the device for > 4 hours/night  ; Patient has not been using ozone based devices to sanitize the machine      SUBJECTIVE: Regarding use of PAP, Carmelita Huerta reports:   · He is experiencing some adverse effects: dry mouth/throat; has [not] noticed any fibres or foreign material in air line  · He is[ ]benefiting from use: sleeping better   · He uses a dental guard for bruxism  Sleep Routine: Carmelita Huerta reports getting 8 hrs sleep[  ]; he has no difficulty initiating or maintaining sleep   He arises requiring aid of someone to awaken and feels refreshed  Carmelita Deanna reports [Excessive] [Daytime] Sleepiness, [feels drowsy [in the afternoons] and naps occasionally ] He rated [himself] at Total score: 9 /24 on the Tucker Sleepiness Scale  Habits:[ reports that he quit smoking about 42 years ago  His smoking use included cigarettes  He has a 0 50 pack-year smoking history  He has never used smokeless tobacco ], [ reports current alcohol use of about 2 0 standard drinks of alcohol per week ], [ reports current drug use  Drug: Marijuana ], Caffeine use: limited[ ], Exercise routine: regular [ ]  ROS: reviewed & as attached  [Significant for weight has been stable  He reported no nasal, respiratory or cardiac symptoms  Acid reflux is controlled with Prilosec  He has musculoskeletal aches and pains for which she is using medical marijuana  Memory difficulties have improved somewhat ]     EXAM: /50   Ht 5' 3" (1 6 m)   Wt 75 4 kg (166 lb 3 2 oz)   BMI 29 44 kg/m²     Patient is well groomed; well appearing  CNS: Alert, orientated, [clear & coherent] speech  Psych: cooperative[and in no distress]  Mental state:[appears normal]  H&N: EOMI; NC/AT:[no] facial pressure marks, [no] rashes  Skin/Extrem: col & hydration [normal]; [no] edema  Resp: Respiratory effort [is normal]  [Physical findings otherwise essentially unchanged from previous ]    IMPRESSION: Problem List Items & Comorbidities Addressed this Visit    1  Obstructive sleep apnea syndrome  Cpap DME    PAP DME Resupply/Reorder   2  Memory difficulties     3   Chronic pain syndrome     4  Medical marijuana use     5  Benign essential hypertension     6  Overweight (BMI 25 0-29  9)         PLAN:  1  I reviewed [results of prior studies and] [physiologic data ]with the patient  2  [I discussed treatment options with risks and benefits ]  3  Treatment with  PAP is medically necessary and Aaron Olivarez is agreable to continue use  4  Care of equipment, methods to improve comfort using PAP and importance of compliance with therapy were discussed  5  The patient's current unit has now reached its 5 yr reasonable, useful life and needs to be replaced  6  Pressure setting: continue 7 cmH2O     7  Rx provided to replace[ ] supplies and Care coordinated with DME provider  8  [Discussed] strategies for weight [reduction]  9  Follow-up is advised in [1 [address] Tom [or sooner if needed] [to monitor progress, compliance and to adjust therapy]  Thank you for allowing me to participate in the care of this patient  Sincerely,    Authenticated electronically by Deysi Townsend MD on 69/24/07   Board Certified Specialist     Portions of the record may have been created with voice recognition software  Occasional wrong word or "sound a like" substitutions may have occurred due to the inherent limitations of voice recognition software  There may also be notations and random deletions of words or characters from malfunctioning software  Read the chart carefully and recognize, using context, where substitutions/deletions have occurred

## 2022-01-19 NOTE — PROGRESS NOTES
Review of Systems      Genitourinary none   Cardiology ankle/leg swelling   Gastrointestinal frequent heartburn/acid reflux   Neurology none   Constitutional fatigue   Integumentary rash or dry skin   Psychiatry none   Musculoskeletal joint pain, muscle aches and back pain   Pulmonary none   ENT none   Endocrine none   Hematological none

## 2022-01-20 ENCOUNTER — TELEPHONE (OUTPATIENT)
Dept: SLEEP CENTER | Facility: CLINIC | Age: 67
End: 2022-01-20

## 2022-02-04 NOTE — TELEPHONE ENCOUNTER
Patient left voice message asking for copy of CPAP script emailed to [email or web address]  Script emailed as requested

## 2022-02-17 ENCOUNTER — OFFICE VISIT (OUTPATIENT)
Dept: INTERNAL MEDICINE CLINIC | Facility: OTHER | Age: 67
End: 2022-02-17
Payer: MEDICARE

## 2022-02-17 VITALS
DIASTOLIC BLOOD PRESSURE: 60 MMHG | OXYGEN SATURATION: 99 % | BODY MASS INDEX: 29.77 KG/M2 | HEIGHT: 63 IN | TEMPERATURE: 98.2 F | WEIGHT: 168 LBS | SYSTOLIC BLOOD PRESSURE: 118 MMHG | HEART RATE: 68 BPM

## 2022-02-17 DIAGNOSIS — Z13.6 SCREENING FOR CARDIOVASCULAR CONDITION: ICD-10-CM

## 2022-02-17 DIAGNOSIS — E66.3 OVERWEIGHT (BMI 25.0-29.9): ICD-10-CM

## 2022-02-17 DIAGNOSIS — G47.33 OBSTRUCTIVE SLEEP APNEA SYNDROME: ICD-10-CM

## 2022-02-17 DIAGNOSIS — L23.1 ALLERGIC CONTACT DERMATITIS DUE TO ADHESIVES: ICD-10-CM

## 2022-02-17 DIAGNOSIS — Z12.5 SCREENING FOR PROSTATE CANCER: ICD-10-CM

## 2022-02-17 DIAGNOSIS — K21.9 GASTROESOPHAGEAL REFLUX DISEASE WITHOUT ESOPHAGITIS: ICD-10-CM

## 2022-02-17 DIAGNOSIS — Z79.4 TYPE 2 DIABETES MELLITUS WITH HYPERGLYCEMIA, WITH LONG-TERM CURRENT USE OF INSULIN (HCC): ICD-10-CM

## 2022-02-17 DIAGNOSIS — Z00.00 MEDICARE ANNUAL WELLNESS VISIT, SUBSEQUENT: ICD-10-CM

## 2022-02-17 DIAGNOSIS — I10 BENIGN ESSENTIAL HYPERTENSION: Primary | ICD-10-CM

## 2022-02-17 DIAGNOSIS — E11.65 TYPE 2 DIABETES MELLITUS WITH HYPERGLYCEMIA, WITH LONG-TERM CURRENT USE OF INSULIN (HCC): ICD-10-CM

## 2022-02-17 DIAGNOSIS — Z12.11 SCREENING FOR COLORECTAL CANCER: ICD-10-CM

## 2022-02-17 DIAGNOSIS — E78.2 MIXED HYPERLIPIDEMIA: ICD-10-CM

## 2022-02-17 DIAGNOSIS — Z12.12 SCREENING FOR COLORECTAL CANCER: ICD-10-CM

## 2022-02-17 PROBLEM — L21.9 SEBORRHEIC DERMATITIS: Status: RESOLVED | Noted: 2017-11-27 | Resolved: 2022-02-17

## 2022-02-17 PROBLEM — B34.9 VIRAL INFECTION, UNSPECIFIED: Status: RESOLVED | Noted: 2020-06-12 | Resolved: 2022-02-17

## 2022-02-17 PROBLEM — B35.9 TINEA: Status: RESOLVED | Noted: 2021-09-29 | Resolved: 2022-02-17

## 2022-02-17 PROBLEM — J01.00 ACUTE NON-RECURRENT MAXILLARY SINUSITIS: Status: RESOLVED | Noted: 2021-10-26 | Resolved: 2022-02-17

## 2022-02-17 PROCEDURE — 99214 OFFICE O/P EST MOD 30 MIN: CPT | Performed by: INTERNAL MEDICINE

## 2022-02-17 PROCEDURE — G0439 PPPS, SUBSEQ VISIT: HCPCS | Performed by: INTERNAL MEDICINE

## 2022-02-17 PROCEDURE — 1123F ACP DISCUSS/DSCN MKR DOCD: CPT | Performed by: INTERNAL MEDICINE

## 2022-02-17 RX ORDER — CLOBETASOL PROPIONATE 0.5 MG/G
CREAM TOPICAL 2 TIMES DAILY PRN
Qty: 30 G | Refills: 3 | Status: SHIPPED | OUTPATIENT
Start: 2022-02-17

## 2022-02-17 NOTE — ASSESSMENT & PLAN NOTE
Lab Results   Component Value Date    HGBA1C 6 6 (H) 01/05/2022   Stable, controlled  Continue current diabetic regimen  Continue follow-up with endocrinology

## 2022-02-17 NOTE — PROGRESS NOTES
Diabetic Foot Exam    Patient's shoes and socks removed  Right Foot/Ankle   Right Foot Inspection  Skin Exam: skin normal and skin intact  No dry skin, no warmth, no callus, no erythema, no maceration, no abnormal color, no pre-ulcer, no ulcer and no callus  Toe Exam: ROM and strength within normal limits  Sensory   Vibration: intact  Proprioception: intact  Monofilament testing: intact    Vascular  Capillary refills: < 3 seconds  The right DP pulse is 2+  The right PT pulse is 2+  Left Foot/Ankle  Left Foot Inspection  Skin Exam: skin normal and skin intact  No dry skin, no warmth, no erythema, no maceration, normal color, no pre-ulcer, no ulcer and no callus  Toe Exam: ROM and strength within normal limits  Sensory   Vibration: intact  Proprioception: intact  Monofilament testing: intact    Vascular  Capillary refills: < 3 seconds  The left DP pulse is 2+  The left PT pulse is 2+  Assign Risk Category  No deformity present  No loss of protective sensation  No weak pulses  Risk: 0    Answers for HPI/ROS submitted by the patient on 2/16/2022  How would you rate your overall health?: good  Compared to last year, how is your physical health?: slightly better  In general, how satisfied are you with your life?: satisfied  Compared to last year, how is your eyesight?: same  Compared to last year, how is your hearing?: same  Compared to last year, how is your emotional/mental health?: same  How often is anger a problem for you?: never, rarely  How often do you feel unusually tired/fatigued?: often  In the past 7 days, how much pain have you experienced?: some  If you answered "some" or "a lot", please rate the severity of your pain on a scale of 1 to 10 (1 being the least severe pain and 10 being the most intense pain)  : 4/10  In the past 6 months, have you lost or gained 10 pounds without trying?: No  One or more falls in the last year: No  Do you have trouble with the stairs inside or outside your home?: No  Does your home have working smoke alarms?: Yes  Does your home have a carbon monoxide monitor?: Yes  Which safety hazards (if any) have you experienced in your home? Please select all that apply : medications that cause fatigue  Additional Comments: trouble bending over to put on socks, tie shoes  How would you describe your current diet?  Please select all that apply : Regular, Diabetic  In addition to prescription medications, are you taking any over-the-counter supplements?: Yes  If yes, what supplements are you taking?: see listing  Can you manage your medications?: Yes  Are you currently taking any opioid medications?: No  Can you walk and transfer into and out of your bed and chair?: Yes  Can you dress and groom yourself?: Yes  Can you bathe or shower yourself?: Yes  Can you feed yourself?: Yes  Can you do your laundry/ housekeeping?: Yes  Can you manage your money, pay your bills, and track your expenses?: Yes  Can you make your own meals?: Yes  Can you do your own shopping?: Yes  Please list your DME (Durable Medical Equipment) supplier, if you use one : looking for new CPAP supplier  Within the last 12 months, have you had any hospitalizations or Emergency Department visits?: No  Do you have a living will?: No  Do you have a Durable POA (Power of ) for healthcare decisions?: No  Do you have an Advanced Directive for end of life decisions?: No  How often have you used an illegal drug (including marijuana) or a prescription medication for non-medical reasons in the past year?: never  What is the typical number of drinks you consume in a day?: 1  What is the typical number of drinks you consume in a week?: 3  How often did you have a drink containing alcohol in the past year?: 2 to 3 times a week  How many drinks did you have on a typical day  when you were drinking in the past year?: 1 to 2  How often did you have 6 or more drinks on one occasion in the past year?: never

## 2022-02-17 NOTE — PATIENT INSTRUCTIONS
Medicare Preventive Visit Patient Instructions  Thank you for completing your Welcome to Medicare Visit or Medicare Annual Wellness Visit today  Your next wellness visit will be due in one year (2/18/2023)  The screening/preventive services that you may require over the next 5-10 years are detailed below  Some tests may not apply to you based off risk factors and/or age  Screening tests ordered at today's visit but not completed yet may show as past due  Also, please note that scanned in results may not display below  Preventive Screenings:  Service Recommendations Previous Testing/Comments   Colorectal Cancer Screening  · Colonoscopy    · Fecal Occult Blood Test (FOBT)/Fecal Immunochemical Test (FIT)  · Fecal DNA/Cologuard Test  · Flexible Sigmoidoscopy Age: 54-65 years old   Colonoscopy: every 10 years (May be performed more frequently if at higher risk)  OR  FOBT/FIT: every 1 year  OR  Cologuard: every 3 years  OR  Sigmoidoscopy: every 5 years  Screening may be recommended earlier than age 48 if at higher risk for colorectal cancer  Also, an individualized decision between you and your healthcare provider will decide whether screening between the ages of 74-80 would be appropriate   Colonoscopy: 07/29/2021  FOBT/FIT: Not on file  Cologuard: 02/17/2020  Sigmoidoscopy: Not on file    Screening Current     Prostate Cancer Screening Individualized decision between patient and health care provider in men between ages of 53-78   Medicare will cover every 12 months beginning on the day after your 50th birthday PSA: 0 6 ng/mL     Screening Current     Hepatitis C Screening Once for adults born between 1945 and 1965  More frequently in patients at high risk for Hepatitis C Hep C Antibody: 12/31/2019    Screening Current   Diabetes Screening 1-2 times per year if you're at risk for diabetes or have pre-diabetes Fasting glucose: 121 mg/dL   A1C: 6 6 %    Screening Not Indicated  History Diabetes   Cholesterol Screening Once every 5 years if you don't have a lipid disorder  May order more often based on risk factors  Lipid panel: 08/06/2021    Screening Not Indicated  History Lipid Disorder      Other Preventive Screenings Covered by Medicare:  1  Abdominal Aortic Aneurysm (AAA) Screening: covered once if your at risk  You're considered to be at risk if you have a family history of AAA or a male between the age of 73-68 who smoking at least 100 cigarettes in your lifetime  2  Lung Cancer Screening: covers low dose CT scan once per year if you meet all of the following conditions: (1) Age 50-69; (2) No signs or symptoms of lung cancer; (3) Current smoker or have quit smoking within the last 15 years; (4) You have a tobacco smoking history of at least 30 pack years (packs per day x number of years you smoked); (5) You get a written order from a healthcare provider  3  Glaucoma Screening: covered annually if you're considered high risk: (1) You have diabetes OR (2) Family history of glaucoma OR (3)  aged 48 and older OR (3)  American aged 72 and older  3  Osteoporosis Screening: covered every 2 years if you meet one of the following conditions: (1) Have a vertebral abnormality; (2) On glucocorticoid therapy for more than 3 months; (3) Have primary hyperparathyroidism; (4) On osteoporosis medications and need to assess response to drug therapy  5  HIV Screening: covered annually if you're between the age of 12-76  Also covered annually if you are younger than 13 and older than 72 with risk factors for HIV infection  For pregnant patients, it is covered up to 3 times per pregnancy      Immunizations:  Immunization Recommendations   Influenza Vaccine Annual influenza vaccination during flu season is recommended for all persons aged >= 6 months who do not have contraindications   Pneumococcal Vaccine (Prevnar and Pneumovax)  * Prevnar = PCV13  * Pneumovax = PPSV23 Adults 25-60 years old: 1-3 doses may be recommended based on certain risk factors  Adults 72 years old: Prevnar (PCV13) vaccine recommended followed by Pneumovax (PPSV23) vaccine  If already received PPSV23 since turning 65, then PCV13 recommended at least one year after PPSV23 dose  Hepatitis B Vaccine 3 dose series if at intermediate or high risk (ex: diabetes, end stage renal disease, liver disease)   Tetanus (Td) Vaccine - COST NOT COVERED BY MEDICARE PART B Following completion of primary series, a booster dose should be given every 10 years to maintain immunity against tetanus  Td may also be given as tetanus wound prophylaxis  Tdap Vaccine - COST NOT COVERED BY MEDICARE PART B Recommended at least once for all adults  For pregnant patients, recommended with each pregnancy  Shingles Vaccine (Shingrix) - COST NOT COVERED BY MEDICARE PART B  2 shot series recommended in those aged 48 and above     Health Maintenance Due:      Topic Date Due    Colorectal Cancer Screening  07/28/2024    Hepatitis C Screening  Completed     Immunizations Due:      Topic Date Due    DTaP,Tdap,and Td Vaccines (2 - Td or Tdap) 06/05/2018    Pneumococcal Vaccine: 65+ Years (1 of 2 - PPSV23) 04/29/2020     Advance Directives   What are advance directives? Advance directives are legal documents that state your wishes and plans for medical care  These plans are made ahead of time in case you lose your ability to make decisions for yourself  Advance directives can apply to any medical decision, such as the treatments you want, and if you want to donate organs  What are the types of advance directives? There are many types of advance directives, and each state has rules about how to use them  You may choose a combination of any of the following:  · Living will: This is a written record of the treatment you want  You can also choose which treatments you do not want, which to limit, and which to stop at a certain time   This includes surgery, medicine, IV fluid, and tube feedings  · Durable power of  for healthcare [facility]): This is a written record that states who you want to make healthcare choices for you when you are unable to make them for yourself  This person, called a proxy, is usually a family member or a friend  You may choose more than 1 proxy  · Do not resuscitate (DNR) order:  A DNR order is used in case your heart stops beating or you stop breathing  It is a request not to have certain forms of treatment, such as CPR  A DNR order may be included in other types of advance directives  · Medical directive: This covers the care that you want if you are in a coma, near death, or unable to make decisions for yourself  You can list the treatments you want for each condition  Treatment may include pain medicine, surgery, blood transfusions, dialysis, IV or tube feedings, and a ventilator (breathing machine)  · Values history: This document has questions about your views, beliefs, and how you feel and think about life  This information can help others choose the care that you would choose  Why are advance directives important? An advance directive helps you control your care  Although spoken wishes may be used, it is better to have your wishes written down  Spoken wishes can be misunderstood, or not followed  Treatments may be given even if you do not want them  An advance directive may make it easier for your family to make difficult choices about your care  Weight Management   Why it is important to manage your weight:  Being overweight increases your risk of health conditions such as heart disease, high blood pressure, type 2 diabetes, and certain types of cancer  It can also increase your risk for osteoarthritis, sleep apnea, and other respiratory problems  Aim for a slow, steady weight loss  Even a small amount of weight loss can lower your risk of health problems    How to lose weight safely:  A safe and healthy way to lose weight is to eat fewer calories and get regular exercise  You can lose up about 1 pound a week by decreasing the number of calories you eat by 500 calories each day  Healthy meal plan for weight management:  A healthy meal plan includes a variety of foods, contains fewer calories, and helps you stay healthy  A healthy meal plan includes the following:  · Eat whole-grain foods more often  A healthy meal plan should contain fiber  Fiber is the part of grains, fruits, and vegetables that is not broken down by your body  Whole-grain foods are healthy and provide extra fiber in your diet  Some examples of whole-grain foods are whole-wheat breads and pastas, oatmeal, brown rice, and bulgur  · Eat a variety of vegetables every day  Include dark, leafy greens such as spinach, kale, jt greens, and mustard greens  Eat yellow and orange vegetables such as carrots, sweet potatoes, and winter squash  · Eat a variety of fruits every day  Choose fresh or canned fruit (canned in its own juice or light syrup) instead of juice  Fruit juice has very little or no fiber  · Eat low-fat dairy foods  Drink fat-free (skim) milk or 1% milk  Eat fat-free yogurt and low-fat cottage cheese  Try low-fat cheeses such as mozzarella and other reduced-fat cheeses  · Choose meat and other protein foods that are low in fat  Choose beans or other legumes such as split peas or lentils  Choose fish, skinless poultry (chicken or turkey), or lean cuts of red meat (beef or pork)  Before you cook meat or poultry, cut off any visible fat  · Use less fat and oil  Try baking foods instead of frying them  Add less fat, such as margarine, sour cream, regular salad dressing and mayonnaise to foods  Eat fewer high-fat foods  Some examples of high-fat foods include french fries, doughnuts, ice cream, and cakes  · Eat fewer sweets  Limit foods and drinks that are high in sugar  This includes candy, cookies, regular soda, and sweetened drinks    Exercise:  Exercise at least 30 minutes per day on most days of the week  Some examples of exercise include walking, biking, dancing, and swimming  You can also fit in more physical activity by taking the stairs instead of the elevator or parking farther away from stores  Ask your healthcare provider about the best exercise plan for you  © Copyright [address] 2018 Information is for End User's use only and may not be sold, redistributed or otherwise used for commercial purposes  All illustrations and images included in CareNotes® are the copyrighted property of [employer]  or [facility] Preventive Visit Patient Instructions  Thank you for completing your Welcome to Medicare Visit or Medicare Annual Wellness Visit today  Your next wellness visit will be due in one year (2/18/2023)  The screening/preventive services that you may require over the next 5-10 years are detailed below  Some tests may not apply to you based off risk factors and/or age  Screening tests ordered at today's visit but not completed yet may show as past due  Also, please note that scanned in results may not display below  Preventive Screenings:  Service Recommendations Previous Testing/Comments   Colorectal Cancer Screening  · Colonoscopy    · Fecal Occult Blood Test (FOBT)/Fecal Immunochemical Test (FIT)  · Fecal DNA/Cologuard Test  · Flexible Sigmoidoscopy Age: 54-65 years old   Colonoscopy: every 10 years (May be performed more frequently if at higher risk)  OR  FOBT/FIT: every 1 year  OR  Cologuard: every 3 years  OR  Sigmoidoscopy: every 5 years  Screening may be recommended earlier than age 48 if at higher risk for colorectal cancer  Also, an individualized decision between you and your healthcare provider will decide whether screening between the ages of 74-80 would be appropriate   Colonoscopy: 07/29/2021  FOBT/FIT: Not on file  Cologuard: 02/17/2020  Sigmoidoscopy: Not on file    Screening Current     Prostate Cancer Screening Individualized decision between patient and health care provider in men between ages of 53-78   Medicare will cover every 12 months beginning on the day after your 50th birthday PSA: 0 6 ng/mL     Screening Current     Hepatitis C Screening Once for adults born between 1945 and 1965  More frequently in patients at high risk for Hepatitis C Hep C Antibody: 12/31/2019    Screening Current   Diabetes Screening 1-2 times per year if you're at risk for diabetes or have pre-diabetes Fasting glucose: 121 mg/dL   A1C: 6 6 %    Screening Not Indicated  History Diabetes   Cholesterol Screening Once every 5 years if you don't have a lipid disorder  May order more often based on risk factors  Lipid panel: 08/06/2021    Screening Not Indicated  History Lipid Disorder      Other Preventive Screenings Covered by Medicare:  6  Abdominal Aortic Aneurysm (AAA) Screening: covered once if your at risk  You're considered to be at risk if you have a family history of AAA or a male between the age of 73-68 who smoking at least 100 cigarettes in your lifetime  7  Lung Cancer Screening: covers low dose CT scan once per year if you meet all of the following conditions: (1) Age 50-69; (2) No signs or symptoms of lung cancer; (3) Current smoker or have quit smoking within the last 15 years; (4) You have a tobacco smoking history of at least 30 pack years (packs per day x number of years you smoked); (5) You get a written order from a healthcare provider  8  Glaucoma Screening: covered annually if you're considered high risk: (1) You have diabetes OR (2) Family history of glaucoma OR (3)  aged 48 and older OR (3)  American aged 72 and older  5   Osteoporosis Screening: covered every 2 years if you meet one of the following conditions: (1) Have a vertebral abnormality; (2) On glucocorticoid therapy for more than 3 months; (3) Have primary hyperparathyroidism; (4) On osteoporosis medications and need to assess response to drug therapy  10  HIV Screening: covered annually if you're between the age of 12-76  Also covered annually if you are younger than 13 and older than 72 with risk factors for HIV infection  For pregnant patients, it is covered up to 3 times per pregnancy  Immunizations:  Immunization Recommendations   Influenza Vaccine Annual influenza vaccination during flu season is recommended for all persons aged >= 6 months who do not have contraindications   Pneumococcal Vaccine (Prevnar and Pneumovax)  * Prevnar = PCV13  * Pneumovax = PPSV23 Adults 25-60 years old: 1-3 doses may be recommended based on certain risk factors  Adults 72 years old: Prevnar (PCV13) vaccine recommended followed by Pneumovax (PPSV23) vaccine  If already received PPSV23 since turning 65, then PCV13 recommended at least one year after PPSV23 dose  Hepatitis B Vaccine 3 dose series if at intermediate or high risk (ex: diabetes, end stage renal disease, liver disease)   Tetanus (Td) Vaccine - COST NOT COVERED BY MEDICARE PART B Following completion of primary series, a booster dose should be given every 10 years to maintain immunity against tetanus  Td may also be given as tetanus wound prophylaxis  Tdap Vaccine - COST NOT COVERED BY MEDICARE PART B Recommended at least once for all adults  For pregnant patients, recommended with each pregnancy  Shingles Vaccine (Shingrix) - COST NOT COVERED BY MEDICARE PART B  2 shot series recommended in those aged 48 and above     Health Maintenance Due:      Topic Date Due    Colorectal Cancer Screening  07/28/2024    Hepatitis C Screening  Completed     Immunizations Due:      Topic Date Due    DTaP,Tdap,and Td Vaccines (2 - Td or Tdap) 06/05/2018    Pneumococcal Vaccine: 65+ Years (1 of 2 - PPSV23) 04/29/2020     Advance Directives   What are advance directives? Advance directives are legal documents that state your wishes and plans for medical care   These plans are made ahead of time in case you lose your ability to make decisions for yourself  Advance directives can apply to any medical decision, such as the treatments you want, and if you want to donate organs  What are the types of advance directives? There are many types of advance directives, and each state has rules about how to use them  You may choose a combination of any of the following:  · Living will: This is a written record of the treatment you want  You can also choose which treatments you do not want, which to limit, and which to stop at a certain time  This includes surgery, medicine, IV fluid, and tube feedings  · Durable power of  for healthcare [place] [facility]): This is a written record that states who you want to make healthcare choices for you when you are unable to make them for yourself  This person, called a proxy, is usually a family member or a friend  You may choose more than 1 proxy  · Do not resuscitate (DNR) order:  A DNR order is used in case your heart stops beating or you stop breathing  It is a request not to have certain forms of treatment, such as CPR  A DNR order may be included in other types of advance directives  · Medical directive: This covers the care that you want if you are in a coma, near death, or unable to make decisions for yourself  You can list the treatments you want for each condition  Treatment may include pain medicine, surgery, blood transfusions, dialysis, IV or tube feedings, and a ventilator (breathing machine)  · Values history: This document has questions about your views, beliefs, and how you feel and think about life  This information can help others choose the care that you would choose  Why are advance directives important? An advance directive helps you control your care  Although spoken wishes may be used, it is better to have your wishes written down  Spoken wishes can be misunderstood, or not followed  Treatments may be given even if you do not want them   An advance directive may make it easier for your family to make difficult choices about your care  Weight Management   Why it is important to manage your weight:  Being overweight increases your risk of health conditions such as heart disease, high blood pressure, type 2 diabetes, and certain types of cancer  It can also increase your risk for osteoarthritis, sleep apnea, and other respiratory problems  Aim for a slow, steady weight loss  Even a small amount of weight loss can lower your risk of health problems  How to lose weight safely:  A safe and healthy way to lose weight is to eat fewer calories and get regular exercise  You can lose up about 1 pound a week by decreasing the number of calories you eat by 500 calories each day  Healthy meal plan for weight management:  A healthy meal plan includes a variety of foods, contains fewer calories, and helps you stay healthy  A healthy meal plan includes the following:  · Eat whole-grain foods more often  A healthy meal plan should contain fiber  Fiber is the part of grains, fruits, and vegetables that is not broken down by your body  Whole-grain foods are healthy and provide extra fiber in your diet  Some examples of whole-grain foods are whole-wheat breads and pastas, oatmeal, brown rice, and bulgur  · Eat a variety of vegetables every day  Include dark, leafy greens such as spinach, kale, jt greens, and mustard greens  Eat yellow and orange vegetables such as carrots, sweet potatoes, and winter squash  · Eat a variety of fruits every day  Choose fresh or canned fruit (canned in its own juice or light syrup) instead of juice  Fruit juice has very little or no fiber  · Eat low-fat dairy foods  Drink fat-free (skim) milk or 1% milk  Eat fat-free yogurt and low-fat cottage cheese  Try low-fat cheeses such as mozzarella and other reduced-fat cheeses  · Choose meat and other protein foods that are low in fat  Choose beans or other legumes such as split peas or lentils  Choose fish, skinless poultry (chicken or turkey), or lean cuts of red meat (beef or pork)  Before you cook meat or poultry, cut off any visible fat  · Use less fat and oil  Try baking foods instead of frying them  Add less fat, such as margarine, sour cream, regular salad dressing and mayonnaise to foods  Eat fewer high-fat foods  Some examples of high-fat foods include french fries, doughnuts, ice cream, and cakes  · Eat fewer sweets  Limit foods and drinks that are high in sugar  This includes candy, cookies, regular soda, and sweetened drinks  Exercise:  Exercise at least 30 minutes per day on most days of the week  Some examples of exercise include walking, biking, dancing, and swimming  You can also fit in more physical activity by taking the stairs instead of the elevator or parking farther away from stores  Ask your healthcare provider about the best exercise plan for you  © Copyright [employer] 2018 Information is for End User's use only and may not be sold, redistributed or otherwise used for commercial purposes   All illustrations and images included in CareNotes® are the copyrighted property of [employer]  or [address] [place] [address] [employer]

## 2022-02-17 NOTE — PROGRESS NOTES
Assessment and Plan:     Problem List Items Addressed This Visit        Digestive    Gastroesophageal reflux disease without esophagitis     Controlled  Continue omeprazole 40 mg daily  Endocrine    Type 2 diabetes mellitus with hyperglycemia, with long-term current use of insulin ([facility])       Lab Results   Component Value Date    HGBA1C 6 6 (H) 01/05/2022   Stable, controlled  Continue current diabetic regimen  Continue follow-up with endocrinology  Respiratory    Obstructive sleep apnea syndrome     Continue follow-up with sleep medicine  Cardiovascular and Mediastinum    Benign essential hypertension - Primary     Controlled  Continue current antihypertensive regimen: Amlodipine 10 mg daily, doxazosin 4 mg daily, hydrochlorothiazide 25 mg daily, and lisinopril 40 mg daily  Musculoskeletal and Integument    Allergic contact dermatitis due to adhesives     Will prescribe clobetasol cream          Relevant Medications    clobetasol (TEMOVATE) 0 05 % cream       Other    Hyperlipidemia    Overweight (BMI 25 0-29  9)     Discussed diet and exercise  Other Visit Diagnoses     Medicare annual wellness visit, subsequent        Screening for cardiovascular condition        Screening for colorectal cancer        Screening for prostate cancer        Relevant Orders    PSA, Total Screen           Preventive health issues were discussed with patient, and age appropriate screening tests were ordered as noted in patient's After Visit Summary  Personalized health advice and appropriate referrals for health education or preventive services given if needed, as noted in patient's After Visit Summary       History of Present Illness:     Patient presents for Medicare Annual Wellness visit    Patient Care Team:  Yudi Barnhart MD as PCP - Luz Maria Harvey MD as PCP - Endocrinology (Endocrinology)  Yudi Barnhart MD as PCP - PCP-[facility] Attributed-Nini Chiang MD Lara Beard DO (Pain Medicine)  DO Darron Yun Res, MD     Problem List:     Patient Active Problem List   Diagnosis    Benign essential hypertension    Carpal tunnel syndrome    Cervical herniated disc    Cervical radiculopathy    Cervical stenosis of spinal canal    Type 2 diabetes mellitus with hyperglycemia, with long-term current use of insulin ([address]    Hyperlipidemia    Hypogonadotropic hypogonadism (HCC)    Pituitary microadenoma ([address]    Obstructive sleep apnea syndrome    Spondylosis of cervical region without myelopathy or radiculopathy    Vitamin D deficiency    Low back pain with sciatica    Sacroiliitis ([address] )    Overweight (BMI 25 0-29  9)    Inflamed seborrheic keratosis    Gastroesophageal reflux disease without esophagitis    Chronic pain syndrome    Salivary gland adenitis    Arthralgia of right hand    Lumbar radiculopathy    Other fatigue    Allergic contact dermatitis due to adhesives      Past Medical and Surgical History:     Past Medical History:   Diagnosis Date    Arthritis     Last assessed 5/24/2013    Avitaminosis D 2012    Colon polyp     Diabetes mellitus ([address] )     Displacement of cervical intervertebral disc 2014    GERD (gastroesophageal reflux disease)     Glaucoma     Normal Pressure Glaucoma    HTN (hypertension) 2007    Hypertension     IBS (irritable bowel syndrome) 2021    Nephrolithiasis 5/24/2013    Pituitary microadenoma ([address] ) 2010    Spinal stenosis in cervical region 2014    Sprain and strain of calcaneofibular (ligament) 12/5/2013    Submandibular lymphadenopathy 8/8/2019    Uric acid nephrolithiasis 1990     Past Surgical History:   Procedure Laterality Date    ASPIRATION / INJECTION RENAL CYST      Renal Cyst Aspiration    CATARACT EXTRACTION      12/2017- right eye, 01/2018- left eye    COLONOSCOPY  2005    EYE SURGERY Bilateral     Cataract Surgery    TONSILLECTOMY      UPPER GASTROINTESTINAL ENDOSCOPY        Family History:     Family History   Problem Relation Age of Onset    Diabetes unspecified Mother     Heart disease Mother     Nephrolithiasis Mother     Kidney disease Mother     Other Mother         Back Disorder    Thyroid disease Mother     Diabetes unspecified Father     Heart disease Father     Diabetes unspecified Sister     Heart disease Sister     Stroke Sister     Diabetes unspecified Brother     Heart disease Brother     Nephrolithiasis Brother     Lung cancer Brother     Other Brother         COPD    Diabetes unspecified Sister     Heart disease Sister     Diabetes unspecified Sister     Diabetes unspecified Brother     Heart disease Brother     Diabetes unspecified Brother     Other Brother         Back Disorder    Diabetes unspecified Brother     Other Brother         Back Disorder    Diabetes unspecified Brother     Stomach cancer Paternal Aunt       Social History:     Social History     Socioeconomic History    Marital status: /Civil Union     Spouse name: None    Number of children: None    Years of education: None    Highest education level: None   Occupational History    Occupation:    Tobacco Use    Smoking status: Former Smoker     Packs/day: 0 25     Years: 2 00     Pack years: 0 50     Types: Cigarettes     Quit date:      Years since quittin 1    Smokeless tobacco: Never Used   Vaping Use    Vaping Use: Never used   Substance and Sexual Activity    Alcohol use:  Yes     Alcohol/week: 2 0 standard drinks     Types: 2 Cans of beer per week     Comment: social    Drug use: Yes     Types: Marijuana     Comment: legal / medical     Sexual activity: Yes     Partners: Female     Birth control/protection: Female Sterilization   Other Topics Concern    None   Social History Narrative    None     Social Determinants of Health     Financial Resource Strain: Not on file   Food Insecurity: Not on file Transportation Needs: Not on file   Physical Activity: Not on file   Stress: Not on file   Social Connections: Not on file   Intimate Partner Violence: Not on file   Housing Stability: Not on file      Medications and Allergies:     Current Outpatient Medications   Medication Sig Dispense Refill    amLODIPine (NORVASC) 10 mg tablet Take 1 tablet (10 mg total) by mouth daily 90 tablet 1    aspirin (ECOTRIN LOW STRENGTH) 81 mg EC tablet Take 81 mg by mouth daily       B Complex Vitamins (VITAMIN B-COMPLEX PO) Take 1 capsule by mouth daily       BD Pen Needle Harriett U/F 32G X 4 MM MISC Use 4x per day 400 each 3    brimonidine-timolol (COMBIGAN) 0 2-0 5 % Administer 1 drop to both eyes every 12 (twelve) hours      cholecalciferol (VITAMIN D3) 1,000 units tablet Take 1,000 Units by mouth 2 (two) times a day       dicyclomine (BENTYL) 20 mg tablet Take 1 tablet (20 mg total) by mouth 4 (four) times a day (before meals and at bedtime) 120 tablet 5    doxazosin (CARDURA) 4 mg tablet Take 1 tablet (4 mg total) by mouth daily 90 tablet 1    fluticasone (FLONASE) 50 mcg/act nasal spray 1 spray into each nostril daily as needed for rhinitis (Acute URI/sinusitis) 18 mL 0    gabapentin (NEURONTIN) 300 mg capsule Take 1 capsule (300 mg total) by mouth 3 (three) times a day 270 capsule 1    Glucosamine-Chondroitin (OSTEO BI-FLEX REGULAR STRENGTH) 250-200 MG TABS Take 1 tablet by mouth 2 (two) times a day      glyBURIDE (DIABETA) 5 mg tablet Take 5 mg by mouth daily with breakfast      hydrochlorothiazide (HYDRODIURIL) 25 mg tablet Take 1 tablet (25 mg total) by mouth daily 90 tablet 1    ibuprofen (MOTRIN) 200 mg tablet Take 200 mg by mouth as needed       insulin lispro (HumaLOG KwikPen) 100 units/mL injection pen Inject 10 Units under the skin 3 (three) times a day with meals Take 25 units daily as directed (Patient taking differently: Inject 10 Units under the skin 3 (three) times a day with meals ) 30 mL 3    Jardiance 25 MG TABS TAKE 1 TABLET BY MOUTH  DAILY 90 tablet 3    lisinopril (ZESTRIL) 40 mg tablet Take 1 tablet (40 mg total) by mouth daily 90 tablet 1    loratadine (CLARITIN) 10 mg tablet Take 10 mg by mouth daily      MULTIPLE VITAMIN PO Take 1 tablet by mouth daily       omeprazole (PriLOSEC) 40 MG capsule Take 1 capsule (40 mg total) by mouth daily 90 capsule 0    other medication, see sig, Medication/product name: Medical Marijuana      Probiotic Product (PROBIOTIC-10) CAPS Take 1 capsule by mouth daily       simvastatin (ZOCOR) 20 mg tablet Take 1 tablet (20 mg total) by mouth daily at bedtime 90 tablet 1    SITagliptin-metFORMIN HCl ER (Janumet XR)  MG TB24 1 tab twice per day with food 60 tablet 5    vitamin E, tocopherol, 400 units capsule Take 400 Units by mouth 2 (two) times a day       clobetasol (TEMOVATE) 0 05 % cream Apply topically 2 (two) times a day as needed (Rash) 30 g 3    insulin glargine (Lantus SoloStar) 100 units/mL injection pen Inject 30 Units under the skin daily at bedtime 30 mL 3     No current facility-administered medications for this visit       Allergies   Allergen Reactions    Clonidine Other (See Comments)     Diaphoresis, hot flashes    Augmentin [Amoxicillin-Pot Clavulanate] Abdominal Pain    Biaxin [Clarithromycin] Abdominal Pain    Dust Mite Extract     Insulin Aspart GI Intolerance     Novolog     Liraglutide Abdominal Pain and Nausea Only    Molds & Smuts     Nuts - Food Allergy     Seasonal Ic [Cholestatin] Headache      Immunizations:     Immunization History   Administered Date(s) Administered    COVID-19 PFIZER VACCINE 0 3 ML IM 03/02/2021, 03/22/2021, 10/11/2021    H1N1, All Formulations 11/16/2009    INFLUENZA 09/29/2009, 10/07/2016, 10/14/2017, 10/09/2018, 10/04/2019, 11/12/2021    Influenza Quadrivalent Preservative Free 3 years and older IM 10/14/2017    Influenza, injectable, quadrivalent, preservative free 0 5 mL 10/09/2018    Pneumococcal Conjugate 13-Valent 03/04/2020    Tdap 06/05/2008    Zoster 09/08/2016    Zoster Vaccine Recombinant 12/20/2019, 06/10/2020      Health Maintenance:         Topic Date Due    Colorectal Cancer Screening  07/28/2024    Hepatitis C Screening  Completed         Topic Date Due    DTaP,Tdap,and Td Vaccines (2 - Td or Tdap) 06/05/2018    Pneumococcal Vaccine: 65+ Years (1 of 2 - PPSV23) 04/29/2020      Medicare Health Risk Assessment:     /60 (BP Location: Left arm, Patient Position: Sitting, Cuff Size: Large)   Pulse 68   Temp 98 2 °F (36 8 °C) (Temporal)   Ht 5' 3" (1 6 m)   Wt 76 2 kg (168 lb)   SpO2 99%   BMI 29 76 kg/m²      Sylvia Judge is here for his Subsequent Wellness visit  Last Medicare Wellness visit information reviewed, patient interviewed, no change since last AWV  Health Risk Assessment:   Patient rates overall health as good  Patient feels that their physical health rating is slightly better  Patient is satisfied with their life  Eyesight was rated as same  Hearing was rated as same  Patient feels that their emotional and mental health rating is same  Patients states they are never, rarely angry  Patient states they are often unusually tired/fatigued  Pain experienced in the last 7 days has been some  Patient's pain rating has been 4/10  Patient states that he has experienced no weight loss or gain in last 6 months  Depression Screening:   PHQ-2 Score: 0      Fall Risk Screening: In the past year, patient has experienced: no history of falling in past year      Home Safety:  Patient does not have trouble with stairs inside or outside of their home  Patient has working smoke alarms and has working carbon monoxide detector  Home safety hazards include: medications that cause fatigue  trouble bending over to put on socks, tie shoes    Nutrition:   Current diet is Regular and Diabetic  Medications:   Patient is currently taking over-the-counter supplements   OTC medications include: see listing  Patient is able to manage medications  Activities of Daily Living (ADLs)/Instrumental Activities of Daily Living (IADLs):   Walk and transfer into and out of bed and chair?: Yes  Dress and groom yourself?: Yes    Bathe or shower yourself?: Yes    Feed yourself? Yes  Do your laundry/housekeeping?: Yes  Manage your money, pay your bills and track your expenses?: Yes  Make your own meals?: Yes    Do your own shopping?: Yes    Durable Medical Equipment Suppliers  looking for new CPAP supplier    Previous Hospitalizations:   Any hospitalizations or ED visits within the last 12 months?: No      Advance Care Planning:   Living will: No    Durable POA for healthcare: No    Advanced directive: No    Advanced directive counseling given: Yes    End of Life Decisions reviewed with patient: Yes    Provider agrees with end of life decisions: Yes      Comments: Discussed with patient: Full code      Cognitive Screening:   Provider or family/friend/caregiver concerned regarding cognition?: No    PREVENTIVE SCREENINGS      Cardiovascular Screening:    General: Screening Not Indicated, History Lipid Disorder, Risks and Benefits Discussed and Screening Current      Diabetes Screening:     General: Screening Not Indicated, History Diabetes, Risks and Benefits Discussed and Screening Current      Colorectal Cancer Screening:     General: Screening Current      Prostate Cancer Screening:    General: Screening Current and Risks and Benefits Discussed    Due for: PSA      Osteoporosis Screening:    General: Screening Not Indicated      Abdominal Aortic Aneurysm (AAA) Screening:    Risk factors include: age between 73-69 yo and tobacco use        Lung Cancer Screening:     General: Screening Not Indicated      Hepatitis C Screening:    General: Screening Current    Screening, Brief Intervention, and Referral to Treatment (SBIRT)    Screening  Typical number of drinks in a day: 1  Typical number of drinks in a week: 3  Interpretation: Low risk drinking behavior  AUDIT-C Screenin) How often did you have a drink containing alcohol in the past year? 2 to 3 times a week  2) How many drinks did you have on a typical day when you were drinking in the past year? 1 to 2  3) How often did you have 6 or more drinks on one occasion in the past year? never    AUDIT-C Score: 3  Interpretation: Score 0-3 (male): Negative screen for alcohol misuse    Single Item Drug Screening:  How often have you used an illegal drug (including marijuana) or a prescription medication for non-medical reasons in the past year? never    Single Item Drug Screen Score: 0  Interpretation: Negative screen for possible drug use disorder    Brief Intervention  Alcohol & drug use screenings were reviewed  No concerns regarding substance use disorder identified  Other Counseling Topics:   Car/seat belt/driving safety, skin self-exam, sunscreen and calcium and vitamin D intake and regular weightbearing exercise         Shelba Meigs, MD

## 2022-02-17 NOTE — ASSESSMENT & PLAN NOTE
Controlled  Continue current antihypertensive regimen: Amlodipine 10 mg daily, doxazosin 4 mg daily, hydrochlorothiazide 25 mg daily, and lisinopril 40 mg daily

## 2022-02-17 NOTE — PROGRESS NOTES
Assessment/Plan:    Gastroesophageal reflux disease without esophagitis  Controlled  Continue omeprazole 40 mg daily  Type 2 diabetes mellitus with hyperglycemia, with long-term current use of insulin (HCC)    Lab Results   Component Value Date    HGBA1C 6 6 (H) 01/05/2022   Stable, controlled  Continue current diabetic regimen  Continue follow-up with endocrinology  Obstructive sleep apnea syndrome  Continue follow-up with sleep medicine  Benign essential hypertension  Controlled  Continue current antihypertensive regimen: Amlodipine 10 mg daily, doxazosin 4 mg daily, hydrochlorothiazide 25 mg daily, and lisinopril 40 mg daily  Overweight (BMI 25 0-29  9)  Discussed diet and exercise  Allergic contact dermatitis due to adhesives  Will prescribe clobetasol cream        Diagnoses and all orders for this visit:    Benign essential hypertension    Gastroesophageal reflux disease without esophagitis    Type 2 diabetes mellitus with hyperglycemia, with long-term current use of insulin (HCC)    Allergic contact dermatitis due to adhesives  -     clobetasol (TEMOVATE) 0 05 % cream; Apply topically 2 (two) times a day as needed (Rash)    Mixed hyperlipidemia    Obstructive sleep apnea syndrome    Overweight (BMI 25 0-29  9)            Depression Screening and Follow-up Plan: Patient was screened for depression during today's encounter  They screened negative with a PHQ-2 score of 0  Subjective:      Patient ID: Nimisha Abbasi is a 74 y o  male  Chief Complaint   Patient presents with    Follow-up     6 month f/u labs  1/5,     Medicare Wellness Visit    HM     depression screening , DB FOOT EXAM ,AWV       74year old male is seen today for follow up of chronic conditions  Lab reviewed today, A1c controlled at 6 6%  Testosterone level was low at 145  LH, FSH, and prolactin levels are within acceptable range  He has been compliant with his medication regimen     He has been comploant with follow up with his endocrinologist, sleep medicine, and gastroenterology  He has been experiencing GI discomfort with taking janumet XR BID  He has been monitoring his diet and avoiding foods that may cause IBS triggers  He has also been experiencing adhesive dermatitis due to the adhesive from his continues glucose monitor  He has been applying fluticasone from the nasal spray without improvement of dermatitis  Hyperlipidemia  This is a chronic problem  The current episode started more than 1 year ago  The problem is controlled  Recent lipid tests were reviewed and are normal  Pertinent negatives include no chest pain or shortness of breath  Current antihyperlipidemic treatment includes statins  The current treatment provides moderate improvement of lipids  There are no compliance problems  Diabetes  He presents for his follow-up diabetic visit  He has type 2 diabetes mellitus  His disease course has been stable  There are no hypoglycemic associated symptoms  Pertinent negatives for hypoglycemia include no dizziness or headaches  There are no diabetic associated symptoms  Pertinent negatives for diabetes include no chest pain, no fatigue and no weakness  There are no hypoglycemic complications  Symptoms are stable  Risk factors for coronary artery disease include hypertension, diabetes mellitus, dyslipidemia and male sex  Current diabetic treatment includes oral agent (triple therapy), insulin injections and diet  He is compliant with treatment all of the time  There is no change in his home blood glucose trend  An ACE inhibitor/angiotensin II receptor blocker is being taken  He sees a podiatrist Eye exam is current  Hypertension  This is a chronic problem  The current episode started more than 1 year ago  The problem is unchanged  The problem is controlled  Pertinent negatives include no chest pain, headaches, palpitations or shortness of breath   Past treatments include calcium channel blockers, ACE inhibitors, direct vasodilators and diuretics  The current treatment provides moderate improvement  There are no compliance problems  Heartburn  He reports no abdominal pain, no chest pain, no choking, no coughing, no nausea, no sore throat or no wheezing  This is a chronic problem  The current episode started more than 1 year ago  The problem occurs occasionally  Pertinent negatives include no fatigue  He has tried a PPI for the symptoms  The treatment provided moderate relief  The following portions of the patient's history were reviewed and updated as appropriate: allergies, current medications, past family history, past medical history, past social history, past surgical history and problem list Answers for HPI/ROS submitted by the patient on 2/16/2022  How would you rate your overall health?: good  Compared to last year, how is your physical health?: slightly better  In general, how satisfied are you with your life?: satisfied  Compared to last year, how is your eyesight?: same  Compared to last year, how is your hearing?: same  Compared to last year, how is your emotional/mental health?: same  How often is anger a problem for you?: never, rarely  How often do you feel unusually tired/fatigued?: often  In the past 7 days, how much pain have you experienced?: some  If you answered "some" or "a lot", please rate the severity of your pain on a scale of 1 to 10 (1 being the least severe pain and 10 being the most intense pain)  : 4/10  In the past 6 months, have you lost or gained 10 pounds without trying?: No  One or more falls in the last year: No  Do you have trouble with the stairs inside or outside your home?: No  Does your home have working smoke alarms?: Yes  Does your home have a carbon monoxide monitor?: Yes  Which safety hazards (if any) have you experienced in your home?  Please select all that apply : medications that cause fatigue  Additional Comments: trouble bending over to put on socks, tie shoes  How would you describe your current diet? Please select all that apply : Regular, Diabetic  In addition to prescription medications, are you taking any over-the-counter supplements?: Yes  If yes, what supplements are you taking?: see listing  Can you manage your medications?: Yes  Are you currently taking any opioid medications?: No  Can you walk and transfer into and out of your bed and chair?: Yes  Can you dress and groom yourself?: Yes  Can you bathe or shower yourself?: Yes  Can you feed yourself?: Yes  Can you do your laundry/ housekeeping?: Yes  Can you manage your money, pay your bills, and track your expenses?: Yes  Can you make your own meals?: Yes  Can you do your own shopping?: Yes  Please list your DME (Durable Medical Equipment) supplier, if you use one : looking for new CPAP supplier  Within the last 12 months, have you had any hospitalizations or Emergency Department visits?: No  Do you have a living will?: No  Do you have a Durable POA (Power of ) for healthcare decisions?: No  Do you have an Advanced Directive for end of life decisions?: No  How often have you used an illegal drug (including marijuana) or a prescription medication for non-medical reasons in the past year?: never  What is the typical number of drinks you consume in a day?: 1  What is the typical number of drinks you consume in a week?: 3  How often did you have a drink containing alcohol in the past year?: 2 to 3 times a week  How many drinks did you have on a typical day  when you were drinking in the past year?: 1 to 2  How often did you have 6 or more drinks on one occasion in the past year?: never      Review of Systems   Constitutional: Negative for activity change, appetite change, chills, diaphoresis, fatigue and fever  HENT: Negative for congestion, postnasal drip, rhinorrhea, sinus pressure, sinus pain, sneezing and sore throat  Eyes: Negative for visual disturbance     Respiratory: Negative for apnea, cough, choking, chest tightness, shortness of breath and wheezing  Cardiovascular: Negative for chest pain, palpitations and leg swelling  Gastrointestinal: Negative for abdominal distention, abdominal pain, anal bleeding, blood in stool, constipation, diarrhea, nausea and vomiting  Endocrine: Negative for cold intolerance and heat intolerance  Genitourinary: Negative for difficulty urinating, dysuria and hematuria  Musculoskeletal: Negative  Skin: Negative  Neurological: Negative for dizziness, weakness, light-headedness, numbness and headaches  Hematological: Negative for adenopathy  Psychiatric/Behavioral: Negative for agitation, sleep disturbance and suicidal ideas  All other systems reviewed and are negative          Past Medical History:   Diagnosis Date    Arthritis     Last assessed 5/24/2013    Avitaminosis D 2012    Colon polyp     Diabetes mellitus ([facility] [address] )     Displacement of cervical intervertebral disc 2014    GERD (gastroesophageal reflux disease)     Glaucoma     Normal Pressure Glaucoma    HTN (hypertension) 2007    Hypertension     IBS (irritable bowel syndrome) 2021    Nephrolithiasis 5/24/2013    Pituitary microadenoma ([facility] [address] ) 2010    Spinal stenosis in cervical region 2014    Sprain and strain of calcaneofibular (ligament) 12/5/2013    Submandibular lymphadenopathy 8/8/2019    Uric acid nephrolithiasis 1990         Current Outpatient Medications:     amLODIPine (NORVASC) 10 mg tablet, Take 1 tablet (10 mg total) by mouth daily, Disp: 90 tablet, Rfl: 1    aspirin (ECOTRIN LOW STRENGTH) 81 mg EC tablet, Take 81 mg by mouth daily , Disp: , Rfl:     B Complex Vitamins (VITAMIN B-COMPLEX PO), Take 1 capsule by mouth daily , Disp: , Rfl:     BD Pen Needle Harriett U/F 32G X 4 MM MISC, Use 4x per day, Disp: 400 each, Rfl: 3    brimonidine-timolol (COMBIGAN) 0 2-0 5 %, Administer 1 drop to both eyes every 12 (twelve) hours, Disp: , Rfl:     cholecalciferol (VITAMIN D3) 1,000 units tablet, Take 1,000 Units by mouth 2 (two) times a day , Disp: , Rfl:     dicyclomine (BENTYL) 20 mg tablet, Take 1 tablet (20 mg total) by mouth 4 (four) times a day (before meals and at bedtime), Disp: 120 tablet, Rfl: 5    doxazosin (CARDURA) 4 mg tablet, Take 1 tablet (4 mg total) by mouth daily, Disp: 90 tablet, Rfl: 1    fluticasone (FLONASE) 50 mcg/act nasal spray, 1 spray into each nostril daily as needed for rhinitis (Acute URI/sinusitis), Disp: 18 mL, Rfl: 0    gabapentin (NEURONTIN) 300 mg capsule, Take 1 capsule (300 mg total) by mouth 3 (three) times a day, Disp: 270 capsule, Rfl: 1    Glucosamine-Chondroitin (OSTEO BI-FLEX REGULAR STRENGTH) 250-200 MG TABS, Take 1 tablet by mouth 2 (two) times a day, Disp: , Rfl:     glyBURIDE (DIABETA) 5 mg tablet, Take 5 mg by mouth daily with breakfast, Disp: , Rfl:     hydrochlorothiazide (HYDRODIURIL) 25 mg tablet, Take 1 tablet (25 mg total) by mouth daily, Disp: 90 tablet, Rfl: 1    ibuprofen (MOTRIN) 200 mg tablet, Take 200 mg by mouth as needed , Disp: , Rfl:     insulin lispro (HumaLOG KwikPen) 100 units/mL injection pen, Inject 10 Units under the skin 3 (three) times a day with meals Take 25 units daily as directed (Patient taking differently: Inject 10 Units under the skin 3 (three) times a day with meals ), Disp: 30 mL, Rfl: 3    Jardiance 25 MG TABS, TAKE 1 TABLET BY MOUTH  DAILY, Disp: 90 tablet, Rfl: 3    lisinopril (ZESTRIL) 40 mg tablet, Take 1 tablet (40 mg total) by mouth daily, Disp: 90 tablet, Rfl: 1    loratadine (CLARITIN) 10 mg tablet, Take 10 mg by mouth daily, Disp: , Rfl:     MULTIPLE VITAMIN PO, Take 1 tablet by mouth daily , Disp: , Rfl:     omeprazole (PriLOSEC) 40 MG capsule, Take 1 capsule (40 mg total) by mouth daily, Disp: 90 capsule, Rfl: 0    other medication, see sig,, Medication/product name: Medical Marijuana, Disp: , Rfl:     Probiotic Product (PROBIOTIC-10) CAPS, Take 1 capsule by mouth daily , Disp: , Rfl:     simvastatin (ZOCOR) 20 mg tablet, Take 1 tablet (20 mg total) by mouth daily at bedtime, Disp: 90 tablet, Rfl: 1    SITagliptin-metFORMIN HCl ER (Janumet XR)  MG TB24, 1 tab twice per day with food, Disp: 60 tablet, Rfl: 5    vitamin E, tocopherol, 400 units capsule, Take 400 Units by mouth 2 (two) times a day , Disp: , Rfl:     clobetasol (TEMOVATE) 0 05 % cream, Apply topically 2 (two) times a day as needed (Rash), Disp: 30 g, Rfl: 3    insulin glargine (Lantus SoloStar) 100 units/mL injection pen, Inject 30 Units under the skin daily at bedtime, Disp: 30 mL, Rfl: 3    Allergies   Allergen Reactions    Clonidine Other (See Comments)     Diaphoresis, hot flashes    Augmentin [Amoxicillin-Pot Clavulanate] Abdominal Pain    Biaxin [Clarithromycin] Abdominal Pain    Dust Mite Extract     Insulin Aspart GI Intolerance     Novolog     Liraglutide Abdominal Pain and Nausea Only    Molds & Smuts     Nuts - Food Allergy     Seasonal Ic [Cholestatin] Headache       Social History   Past Surgical History:   Procedure Laterality Date    ASPIRATION / INJECTION RENAL CYST      Renal Cyst Aspiration    CATARACT EXTRACTION      12/2017- right eye, 01/2018- left eye    COLONOSCOPY  2005    EYE SURGERY Bilateral     Cataract Surgery    TONSILLECTOMY      UPPER GASTROINTESTINAL ENDOSCOPY       Family History   Problem Relation Age of Onset    Diabetes unspecified Mother     Heart disease Mother     Nephrolithiasis Mother     Kidney disease Mother     Other Mother         Back Disorder    Thyroid disease Mother     Diabetes unspecified Father     Heart disease Father     Diabetes unspecified Sister     Heart disease Sister     Stroke Sister     Diabetes unspecified Brother     Heart disease Brother     Nephrolithiasis Brother     Lung cancer Brother     Other Brother         COPD    Diabetes unspecified Sister     Heart disease Sister     Diabetes unspecified Sister     Diabetes unspecified Brother     Heart disease Brother     Diabetes unspecified Brother     Other Brother         Back Disorder    Diabetes unspecified Brother     Other Brother         Back Disorder    Diabetes unspecified Brother     Stomach cancer Paternal Aunt        Objective:  /60 (BP Location: Left arm, Patient Position: Sitting, Cuff Size: Large)   Pulse 68   Temp 98 2 °F (36 8 °C) (Temporal)   Ht 5' 3" (1 6 m)   Wt 76 2 kg (168 lb)   SpO2 99%   BMI 29 76 kg/m²     Recent Results (from the past 1344 hour(s))   Comprehensive metabolic panel    Collection Time: 01/05/22  8:33 AM   Result Value Ref Range    Sodium 139 136 - 145 mmol/L    Potassium 4 1 3 5 - 5 3 mmol/L    Chloride 103 100 - 108 mmol/L    CO2 30 21 - 32 mmol/L    ANION GAP 6 4 - 13 mmol/L    BUN 32 (H) 5 - 25 mg/dL    Creatinine 1 30 0 60 - 1 30 mg/dL    Glucose, Fasting 121 (H) 65 - 99 mg/dL    Calcium 9 5 8 3 - 10 1 mg/dL    AST 15 5 - 45 U/L    ALT 37 12 - 78 U/L    Alkaline Phosphatase 79 46 - 116 U/L    Total Protein 7 5 6 4 - 8 2 g/dL    Albumin 4 0 3 5 - 5 0 g/dL    Total Bilirubin 1 05 (H) 0 20 - 1 00 mg/dL    eGFR 56 ml/min/1 73sq m   Hemoglobin A1C    Collection Time: 01/05/22  8:33 AM   Result Value Ref Range    Hemoglobin A1C 6 6 (H) Normal 3 8-5 6%; PreDiabetic 5 7-6 4%;  Diabetic >=6 5%; Glycemic control for adults with diabetes <7 0% %     mg/dl   Testosterone, free, total    Collection Time: 01/05/22  8:33 AM   Result Value Ref Range    Testosterone, Free 3 6 (L) 6 6 - 18 1 pg/mL    TESTOSTERONE TOTAL 145 (L) 264 - 916 ng/dL   Luteinizing hormone Lab Collect    Collection Time: 01/05/22  8:33 AM   Result Value Ref Range    LH 4 4 1 2 - 05 0 mIU/mL   Follicle stimulating hormone Lab Collect    Collection Time: 01/05/22  8:33 AM   Result Value Ref Range    FSH 6 7 0 7 - 10 8 mIU/mL   Prolactin Lab Collect    Collection Time: 01/05/22  8:33 AM   Result Value Ref Range    Prolactin 6 2 2 5 - 17 4 ng/mL   CBC and differential    Collection Time: 01/05/22  8:33 AM   Result Value Ref Range    WBC 10 61 (H) 4 31 - 10 16 Thousand/uL    RBC 5 22 3 88 - 5 62 Million/uL    Hemoglobin 15 9 12 0 - 17 0 g/dL    Hematocrit 45 3 36 5 - 49 3 %    MCV 87 82 - 98 fL    MCH 30 5 26 8 - 34 3 pg    MCHC 35 1 31 4 - 37 4 g/dL    RDW 14 4 11 6 - 15 1 %    MPV 9 8 8 9 - 12 7 fL    Platelets 655 690 - 620 Thousands/uL    nRBC 0 /100 WBCs    Neutrophils Relative 55 43 - 75 %    Immat GRANS % 0 0 - 2 %    Lymphocytes Relative 33 14 - 44 %    Monocytes Relative 9 4 - 12 %    Eosinophils Relative 3 0 - 6 %    Basophils Relative 0 0 - 1 %    Neutrophils Absolute 5 83 1 85 - 7 62 Thousands/µL    Immature Grans Absolute 0 04 0 00 - 0 20 Thousand/uL    Lymphocytes Absolute 3 48 0 60 - 4 47 Thousands/µL    Monocytes Absolute 0 90 0 17 - 1 22 Thousand/µL    Eosinophils Absolute 0 33 0 00 - 0 61 Thousand/µL    Basophils Absolute 0 03 0 00 - 0 10 Thousands/µL   Vitamin D 25 hydroxy    Collection Time: 01/05/22  8:33 AM   Result Value Ref Range    Vit D, 25-Hydroxy 70 1 30 0 - 100 0 ng/mL   COVID Only- Collected at [facility] or [facility]    Collection Time: 01/12/22  4:27 PM    Specimen: Nose; Nares   Result Value Ref Range    SARS-CoV-2 Negative Negative            Physical Exam  Vitals and nursing note reviewed  Constitutional:       General: He is not in acute distress  Appearance: He is well-developed  He is not diaphoretic  HENT:      Head: Normocephalic and atraumatic  Eyes:      General: No scleral icterus  Right eye: No discharge  Left eye: No discharge  Conjunctiva/sclera: Conjunctivae normal       Pupils: Pupils are equal, round, and reactive to light  Neck:      Thyroid: No thyromegaly  Vascular: No JVD  Cardiovascular:      Rate and Rhythm: Normal rate and regular rhythm  Heart sounds: Normal heart sounds  No murmur heard  No friction rub  No gallop      Pulmonary:      Effort: Pulmonary effort is normal  No respiratory distress  Breath sounds: Normal breath sounds  No wheezing or rales  Chest:      Chest wall: No tenderness  Abdominal:      General: Bowel sounds are normal  There is no distension  Palpations: Abdomen is soft  There is no mass  Tenderness: There is no abdominal tenderness  There is no guarding or rebound  Musculoskeletal:         General: No tenderness or deformity  Normal range of motion  Cervical back: Normal range of motion and neck supple  Lymphadenopathy:      Cervical: No cervical adenopathy  Skin:     General: Skin is warm and dry  Coloration: Skin is not pale  Findings: No erythema or rash  Neurological:      Mental Status: He is alert and oriented to person, place, and time  Cranial Nerves: No cranial nerve deficit  Coordination: Coordination normal       Deep Tendon Reflexes: Reflexes are normal and symmetric  Psychiatric:         Behavior: Behavior normal          Thought Content:  Thought content normal          Judgment: Judgment normal

## 2022-02-21 ENCOUNTER — TELEPHONE (OUTPATIENT)
Dept: SLEEP CENTER | Facility: CLINIC | Age: 67
End: 2022-02-21

## 2022-02-21 NOTE — TELEPHONE ENCOUNTER
Returned the patient's call  Jasmina Strong does not participate with Medicare   Patient will use  [address] for new machine,resupply order  and sleep study given to [facility] from [facility]

## 2022-02-22 DIAGNOSIS — K21.9 GASTROESOPHAGEAL REFLUX DISEASE WITHOUT ESOPHAGITIS: ICD-10-CM

## 2022-02-22 DIAGNOSIS — I10 BENIGN ESSENTIAL HYPERTENSION: ICD-10-CM

## 2022-02-22 DIAGNOSIS — M54.12 CERVICAL RADICULOPATHY: ICD-10-CM

## 2022-02-22 DIAGNOSIS — M48.02 CERVICAL STENOSIS OF SPINAL CANAL: ICD-10-CM

## 2022-02-22 DIAGNOSIS — M54.2 NECK PAIN: ICD-10-CM

## 2022-02-22 RX ORDER — GABAPENTIN 300 MG/1
300 CAPSULE ORAL 3 TIMES DAILY
Qty: 270 CAPSULE | Refills: 1 | Status: SHIPPED | OUTPATIENT
Start: 2022-02-22 | End: 2022-08-01 | Stop reason: SDUPTHER

## 2022-02-22 RX ORDER — DOXAZOSIN MESYLATE 4 MG/1
4 TABLET ORAL DAILY
Qty: 90 TABLET | Refills: 1 | Status: SHIPPED | OUTPATIENT
Start: 2022-02-22 | End: 2022-08-01 | Stop reason: SDUPTHER

## 2022-02-22 RX ORDER — OMEPRAZOLE 40 MG/1
40 CAPSULE, DELAYED RELEASE ORAL DAILY
Qty: 90 CAPSULE | Refills: 1 | Status: SHIPPED | OUTPATIENT
Start: 2022-02-22 | End: 2022-08-01 | Stop reason: SDUPTHER

## 2022-02-22 RX ORDER — HYDROCHLOROTHIAZIDE 25 MG/1
25 TABLET ORAL DAILY
Qty: 90 TABLET | Refills: 1 | Status: SHIPPED | OUTPATIENT
Start: 2022-02-22 | End: 2022-08-01 | Stop reason: SDUPTHER

## 2022-02-25 ENCOUNTER — TELEPHONE (OUTPATIENT)
Dept: ENDOCRINOLOGY | Facility: CLINIC | Age: 67
End: 2022-02-25

## 2022-03-16 ENCOUNTER — NURSE TRIAGE (OUTPATIENT)
Dept: OTHER | Facility: OTHER | Age: 67
End: 2022-03-16

## 2022-03-16 NOTE — TELEPHONE ENCOUNTER
Reason for Disposition   [1] Prescription not at pharmacy AND [2] was prescribed by PCP recently (Exception: triager has access to EMR and prescription is recorded there  Go to Home Care and confirm for pharmacy )    Answer Assessment - Initial Assessment Questions  1  NAME of MEDICATION: "What medicine are you calling about?"      Percocet and Amoxicillin    2  QUESTION: "What is your question?" (e g , medication refill, side effect)      Patient was told he would be prescribed these medications after his surgery and they are not at his pharmacy    3  PRESCRIBING HCP: "Who prescribed it?" Reason: if prescribed by specialist, call should be referred to that group  [facility]- Dental surgeon    Protocols used: MEDICATION QUESTION CALL-ADULT-    On call provider contacted regarding patient's post-op prescriptions  Provider states he will order them now

## 2022-04-01 ENCOUNTER — TELEPHONE (OUTPATIENT)
Dept: ENDOCRINOLOGY | Facility: CLINIC | Age: 67
End: 2022-04-01

## 2022-04-04 DIAGNOSIS — E11.65 TYPE 2 DIABETES MELLITUS WITH HYPERGLYCEMIA, WITH LONG-TERM CURRENT USE OF INSULIN (HCC): Primary | ICD-10-CM

## 2022-04-04 DIAGNOSIS — Z79.4 TYPE 2 DIABETES MELLITUS WITH HYPERGLYCEMIA, WITH LONG-TERM CURRENT USE OF INSULIN (HCC): Primary | ICD-10-CM

## 2022-04-08 ENCOUNTER — APPOINTMENT (OUTPATIENT)
Dept: LAB | Age: 67
End: 2022-04-08
Payer: MEDICARE

## 2022-04-08 DIAGNOSIS — E11.65 TYPE 2 DIABETES MELLITUS WITH HYPERGLYCEMIA, WITH LONG-TERM CURRENT USE OF INSULIN (HCC): ICD-10-CM

## 2022-04-08 DIAGNOSIS — Z79.4 TYPE 2 DIABETES MELLITUS WITH HYPERGLYCEMIA, WITH LONG-TERM CURRENT USE OF INSULIN (HCC): ICD-10-CM

## 2022-04-08 LAB
ALBUMIN SERPL BCP-MCNC: 3.9 G/DL (ref 3.5–5)
ALP SERPL-CCNC: 73 U/L (ref 46–116)
ALT SERPL W P-5'-P-CCNC: 40 U/L (ref 12–78)
ANION GAP SERPL CALCULATED.3IONS-SCNC: 3 MMOL/L (ref 4–13)
AST SERPL W P-5'-P-CCNC: 21 U/L (ref 5–45)
BILIRUB SERPL-MCNC: 0.72 MG/DL (ref 0.2–1)
BUN SERPL-MCNC: 27 MG/DL (ref 5–25)
CALCIUM SERPL-MCNC: 9.3 MG/DL (ref 8.3–10.1)
CHLORIDE SERPL-SCNC: 104 MMOL/L (ref 100–108)
CO2 SERPL-SCNC: 33 MMOL/L (ref 21–32)
CREAT SERPL-MCNC: 1.24 MG/DL (ref 0.6–1.3)
EST. AVERAGE GLUCOSE BLD GHB EST-MCNC: 123 MG/DL
GFR SERPL CREATININE-BSD FRML MDRD: 59 ML/MIN/1.73SQ M
GLUCOSE P FAST SERPL-MCNC: 138 MG/DL (ref 65–99)
HBA1C MFR BLD: 5.9 %
POTASSIUM SERPL-SCNC: 4.3 MMOL/L (ref 3.5–5.3)
PROT SERPL-MCNC: 7.5 G/DL (ref 6.4–8.2)
SODIUM SERPL-SCNC: 140 MMOL/L (ref 136–145)

## 2022-04-08 PROCEDURE — 80053 COMPREHEN METABOLIC PANEL: CPT

## 2022-04-08 PROCEDURE — 36415 COLL VENOUS BLD VENIPUNCTURE: CPT

## 2022-04-08 PROCEDURE — 83036 HEMOGLOBIN GLYCOSYLATED A1C: CPT

## 2022-04-12 ENCOUNTER — OFFICE VISIT (OUTPATIENT)
Dept: ENDOCRINOLOGY | Facility: CLINIC | Age: 67
End: 2022-04-12
Payer: MEDICARE

## 2022-04-12 VITALS
HEIGHT: 63 IN | SYSTOLIC BLOOD PRESSURE: 130 MMHG | DIASTOLIC BLOOD PRESSURE: 60 MMHG | WEIGHT: 164.8 LBS | HEART RATE: 84 BPM | BODY MASS INDEX: 29.2 KG/M2

## 2022-04-12 DIAGNOSIS — E23.0 HYPOGONADOTROPIC HYPOGONADISM (HCC): Primary | ICD-10-CM

## 2022-04-12 DIAGNOSIS — Z51.81 ENCOUNTER FOR MONITORING TESTOSTERONE REPLACEMENT THERAPY: ICD-10-CM

## 2022-04-12 DIAGNOSIS — I10 BENIGN ESSENTIAL HYPERTENSION: ICD-10-CM

## 2022-04-12 DIAGNOSIS — E78.2 MIXED HYPERLIPIDEMIA: ICD-10-CM

## 2022-04-12 DIAGNOSIS — Z79.4 TYPE 2 DIABETES MELLITUS WITH HYPERGLYCEMIA, WITH LONG-TERM CURRENT USE OF INSULIN (HCC): ICD-10-CM

## 2022-04-12 DIAGNOSIS — G47.33 OBSTRUCTIVE SLEEP APNEA SYNDROME: ICD-10-CM

## 2022-04-12 DIAGNOSIS — E11.65 TYPE 2 DIABETES MELLITUS WITH HYPERGLYCEMIA, WITH LONG-TERM CURRENT USE OF INSULIN (HCC): ICD-10-CM

## 2022-04-12 DIAGNOSIS — E55.9 VITAMIN D DEFICIENCY: ICD-10-CM

## 2022-04-12 DIAGNOSIS — Z79.890 ENCOUNTER FOR MONITORING TESTOSTERONE REPLACEMENT THERAPY: ICD-10-CM

## 2022-04-12 PROCEDURE — 99214 OFFICE O/P EST MOD 30 MIN: CPT | Performed by: INTERNAL MEDICINE

## 2022-04-12 RX ORDER — GLYBURIDE 5 MG/1
5 TABLET ORAL
Qty: 30 TABLET | Refills: 2 | Status: SHIPPED | OUTPATIENT
Start: 2022-04-12 | End: 2022-04-14

## 2022-04-12 RX ORDER — LISINOPRIL 40 MG/1
40 TABLET ORAL DAILY
Qty: 90 TABLET | Refills: 1 | Status: SHIPPED | OUTPATIENT
Start: 2022-04-12 | End: 2022-08-01 | Stop reason: SDUPTHER

## 2022-04-12 RX ORDER — PEN NEEDLE, DIABETIC 32GX 5/32"
NEEDLE, DISPOSABLE MISCELLANEOUS
Qty: 400 EACH | Refills: 3 | Status: SHIPPED | OUTPATIENT
Start: 2022-04-12

## 2022-04-12 RX ORDER — INSULIN GLARGINE 100 [IU]/ML
30 INJECTION, SOLUTION SUBCUTANEOUS
Qty: 30 ML | Refills: 3 | Status: SHIPPED | OUTPATIENT
Start: 2022-04-12 | End: 2022-08-01

## 2022-04-12 RX ORDER — INSULIN LISPRO 100 [IU]/ML
10 INJECTION, SOLUTION INTRAVENOUS; SUBCUTANEOUS
Qty: 30 ML | Refills: 3 | Status: SHIPPED | OUTPATIENT
Start: 2022-04-12 | End: 2022-04-15 | Stop reason: SDUPTHER

## 2022-04-12 RX ORDER — TESTOSTERONE 12.5 MG/1.25G
25 GEL TOPICAL DAILY
Qty: 90 PACKET | Refills: 1 | Status: SHIPPED | OUTPATIENT
Start: 2022-04-12 | End: 2022-07-22 | Stop reason: SDUPTHER

## 2022-04-12 NOTE — PROGRESS NOTES
Progress Note  Cc: diabetes     Referring Provider  Md Dolly Castillo [address] [place] [address]     History of Present Illness:   Fer Grigsby is a 74 y o  male with a history of type 2 diabetes with long term use of insulin who presented for follow up  Last seen in the office in January 2022  Current regimen: lantus 30 units qhs  Humalog 10 units tid  ac  Jardiance 25 mg once daily  Janumet  mg once daily, he gets diarrhea and abdominal pain with it  Glyburide 5 mg daily  Victoza, couldn't tolerate, GI symptoms     Lannis Emms use       Indication     Type 2 Diabetes    More than 72 hours of data was reviewed  Report to be scanned to chart  Date Range:March 30th - April 12th     Analysis of data:   Average Glucose: 155 mg/dl  Coefficient of Variation: x   SD : 43 mg dl   Time in Target Range: 74 %   Time Above Range: 25%   Time Below Range: 1%     Interpretation of data:     Hypoglycemic episodes:   H/o of hypoglycemia causing hospitalization or Intervention such as glucagon injection  or ambulance call : no   Hypoglycemia symptoms: shaky   Treatment of hypoglycemia:regular soda       Opthamology: UTD; no retinopathy, no macular edema  Podiatry: no      Has hypertension: followed by PCP; on ACE inhibitor  Has hyperlipidemia: followed by PCP; on statin - tolerating well, no myalgias  Thyroid disorders: no  History of pancreatitis: no      Hypogonadotrophic hypogonadism:  He did not tolerate Patch due to rash and had dificulty with IM injections, however remains off therapy since 1/2020    She feels fatigued, decreased sexual drive, reports no erectile dysfunction or decreased frequency of shaving his face,  She has sleep apnea and on CPAP,       Patient Active Problem List   Diagnosis    Benign essential hypertension    Carpal tunnel syndrome    Cervical herniated disc    Cervical radiculopathy    Cervical stenosis of spinal canal    Type 2 diabetes mellitus with hyperglycemia, with long-term current use of insulin (HCC)    Hyperlipidemia    Hypogonadotropic hypogonadism (HCC)    Pituitary microadenoma ([address] )    Obstructive sleep apnea syndrome    Spondylosis of cervical region without myelopathy or radiculopathy    Vitamin D deficiency    Low back pain with sciatica    Sacroiliitis ([address] )    Overweight (BMI 25 0-29  9)    Inflamed seborrheic keratosis    Gastroesophageal reflux disease without esophagitis    Chronic pain syndrome    Salivary gland adenitis    Arthralgia of right hand    Lumbar radiculopathy    Other fatigue    Allergic contact dermatitis due to adhesives      Past Medical History:   Diagnosis Date    Arthritis     Last assessed 5/24/2013    Avitaminosis D 2012    Colon polyp     Diabetes mellitus ([address] )     Displacement of cervical intervertebral disc 2014    GERD (gastroesophageal reflux disease)     Glaucoma     Normal Pressure Glaucoma    HTN (hypertension) 2007    Hypertension     IBS (irritable bowel syndrome) 2021    Nephrolithiasis 5/24/2013    Pituitary microadenoma ([address] ) 2010    Spinal stenosis in cervical region 2014    Sprain and strain of calcaneofibular (ligament) 12/5/2013    Submandibular lymphadenopathy 8/8/2019    Uric acid nephrolithiasis 1990      Past Surgical History:   Procedure Laterality Date    ASPIRATION / INJECTION RENAL CYST      Renal Cyst Aspiration    CATARACT EXTRACTION      12/2017- right eye, 01/2018- left eye    COLONOSCOPY  2005    EYE SURGERY Bilateral     Cataract Surgery    TONSILLECTOMY      UPPER GASTROINTESTINAL ENDOSCOPY        Family History   Problem Relation Age of Onset    Diabetes unspecified Mother     Heart disease Mother     Nephrolithiasis Mother     Kidney disease Mother     Other Mother         Back Disorder    Thyroid disease Mother     Diabetes unspecified Father     Heart disease Father     Diabetes unspecified Sister     Heart disease Sister    [facility] Stroke Sister     Diabetes unspecified Brother     Heart disease Brother     Nephrolithiasis Brother     Lung cancer Brother     Other Brother         COPD    Diabetes unspecified Sister     Heart disease Sister     Diabetes unspecified Sister     Diabetes unspecified Brother     Heart disease Brother     Diabetes unspecified Brother     Other Brother         Back Disorder    Diabetes unspecified Brother     Other Brother         Back Disorder    Diabetes unspecified Brother     Stomach cancer Paternal Aunt      Social History     Tobacco Use    Smoking status: Former Smoker     Packs/day: 0 25     Years: 2 00     Pack years: 0 50     Types: Cigarettes     Quit date:      Years since quittin 3    Smokeless tobacco: Never Used   Substance Use Topics    Alcohol use:  Yes     Alcohol/week: 2 0 standard drinks     Types: 2 Cans of beer per week     Comment: social     Allergies   Allergen Reactions    Clonidine Other (See Comments)     Diaphoresis, hot flashes    Augmentin [Amoxicillin-Pot Clavulanate] Abdominal Pain    Biaxin [Clarithromycin] Abdominal Pain    Dust Mite Extract     Insulin Aspart GI Intolerance     Novolog     Liraglutide Abdominal Pain and Nausea Only    Molds & Smuts     Nuts - Food Allergy     Seasonal Ic [Cholestatin] Headache         Current Outpatient Medications:     amLODIPine (NORVASC) 10 mg tablet, Take 1 tablet (10 mg total) by mouth daily, Disp: 90 tablet, Rfl: 1    aspirin (ECOTRIN LOW STRENGTH) 81 mg EC tablet, Take 81 mg by mouth daily , Disp: , Rfl:     B Complex Vitamins (VITAMIN B-COMPLEX PO), Take 1 capsule by mouth daily , Disp: , Rfl:     BD Pen Needle Harriett U/F 32G X 4 MM MISC, Use 4x per day, Disp: 400 each, Rfl: 3    brimonidine-timolol (COMBIGAN) 0 2-0 5 %, Administer 1 drop to both eyes every 12 (twelve) hours, Disp: , Rfl:     cholecalciferol (VITAMIN D3) 1,000 units tablet, Take 1,000 Units by mouth 2 (two) times a day , Disp: , Rfl:   clobetasol (TEMOVATE) 0 05 % cream, Apply topically 2 (two) times a day as needed (Rash), Disp: 30 g, Rfl: 3    dicyclomine (BENTYL) 20 mg tablet, Take 1 tablet (20 mg total) by mouth 4 (four) times a day (before meals and at bedtime), Disp: 120 tablet, Rfl: 5    doxazosin (CARDURA) 4 mg tablet, Take 1 tablet (4 mg total) by mouth daily, Disp: 90 tablet, Rfl: 1    fluticasone (FLONASE) 50 mcg/act nasal spray, 1 spray into each nostril daily as needed for rhinitis (Acute URI/sinusitis), Disp: 18 mL, Rfl: 0    gabapentin (NEURONTIN) 300 mg capsule, Take 1 capsule (300 mg total) by mouth 3 (three) times a day, Disp: 270 capsule, Rfl: 1    Glucosamine-Chondroitin (OSTEO BI-FLEX REGULAR STRENGTH) 250-200 MG TABS, Take 1 tablet by mouth 2 (two) times a day, Disp: , Rfl:     glyBURIDE (DIABETA) 5 mg tablet, Take 5 mg by mouth daily with breakfast, Disp: , Rfl:     hydrochlorothiazide (HYDRODIURIL) 25 mg tablet, Take 1 tablet (25 mg total) by mouth daily, Disp: 90 tablet, Rfl: 1    ibuprofen (MOTRIN) 200 mg tablet, Take 200 mg by mouth as needed , Disp: , Rfl:     insulin glargine (Lantus SoloStar) 100 units/mL injection pen, Inject 30 Units under the skin daily at bedtime, Disp: 30 mL, Rfl: 3    insulin lispro (HumaLOG KwikPen) 100 units/mL injection pen, Inject 10 Units under the skin 3 (three) times a day with meals Take 25 units daily as directed (Patient taking differently: Inject 10 Units under the skin 3 (three) times a day with meals ), Disp: 30 mL, Rfl: 3    Jardiance 25 MG TABS, TAKE 1 TABLET BY MOUTH  DAILY, Disp: 90 tablet, Rfl: 3    lisinopril (ZESTRIL) 40 mg tablet, Take 1 tablet (40 mg total) by mouth daily, Disp: 90 tablet, Rfl: 1    loratadine (CLARITIN) 10 mg tablet, Take 10 mg by mouth daily, Disp: , Rfl:     MULTIPLE VITAMIN PO, Take 1 tablet by mouth daily , Disp: , Rfl:     omeprazole (PriLOSEC) 40 MG capsule, Take 1 capsule (40 mg total) by mouth daily, Disp: 90 capsule, Rfl: 1   other medication, see sig,, Medication/product name: Medical Marijuana, Disp: , Rfl:     Probiotic Product (PROBIOTIC-10) CAPS, Take 1 capsule by mouth daily , Disp: , Rfl:     simvastatin (ZOCOR) 20 mg tablet, Take 1 tablet (20 mg total) by mouth daily at bedtime, Disp: 90 tablet, Rfl: 1    SITagliptin-metFORMIN HCl ER (Janumet XR)  MG TB24, 1 tab twice per day with food, Disp: 60 tablet, Rfl: 5    vitamin E, tocopherol, 400 units capsule, Take 400 Units by mouth 2 (two) times a day , Disp: , Rfl:   Review of Systems   Constitutional: Positive for fatigue  Negative for activity change, appetite change, chills, diaphoresis, fever and unexpected weight change  HENT: Negative for congestion, drooling, ear discharge, ear pain, trouble swallowing and voice change  Eyes: Negative for photophobia, pain, discharge, redness, itching and visual disturbance  Respiratory: Negative for cough, chest tightness, shortness of breath and wheezing  Cardiovascular: Negative for chest pain, palpitations and leg swelling  Gastrointestinal: Negative for abdominal distention, abdominal pain, blood in stool, diarrhea, nausea and vomiting  Endocrine: Negative for cold intolerance, heat intolerance, polydipsia, polyphagia and polyuria  Genitourinary: Negative for dysuria, flank pain, frequency and hematuria  Musculoskeletal: Negative for back pain, gait problem, joint swelling, myalgias, neck pain and neck stiffness  Skin: Negative for color change, pallor, rash and wound  Neurological: Negative for dizziness, tremors, seizures, syncope, speech difficulty, weakness, numbness and headaches  Psychiatric/Behavioral: Positive for sleep disturbance  Negative for agitation  All other systems reviewed and are negative  Physical Exam:  There is no height or weight on file to calculate BMI  There were no vitals taken for this visit     Wt Readings from Last 3 Encounters:   02/17/22 76 2 kg (168 lb)   01/19/22 75 4 kg (166 lb 3 2 oz)   01/12/22 73 kg (161 lb)       GEN: NAD  E/n/m nl facies, hearing intact bilat, tongue midline, lips nl  Eyes: no stare or proptosis, nl lids and conjunctiva, EOMI  Neck: trachea midline, thyroid NT to palpation, nl in size, no nodules or neck masses noted, no cervical LAD  CV; heart reg rate s1s2 nl, no m/r/g appreciated, no CHRISTINE  Resp: CTAB, good effort  Ab+BS  Neuro: no tremor, 2+ DTRs in BUE  MS: no c/c in digits, moves all 4 ext, nl muscle bulk, gait nl  Skin: warm and dry, no palmar erythema  Ext: no c/c in digits, no edema bilaterally, 2+ DP and PT pulses bilat  Psych: nl mood and affect, no gross lapses in memory  Patient's shoes and socks removed  Right Foot/Ankle   Right Foot Inspection  Skin Exam: skin normal  Skin not intact, no dry skin, no warmth, no callus, no erythema, no maceration, no abnormal color, no pre-ulcer, no ulcer and no callus  Toe Exam: No swelling, no tenderness, erythema and  no right toe deformity    Sensory   Vibration: intact  Proprioception: intact  Monofilament testing: intact    Vascular  Capillary refills: < 3 seconds  The right DP pulse is 2+  The right PT pulse is 2+  Left Foot/Ankle  Left Foot Inspection  Skin Exam: skin normal  Skin not intact, no dry skin, no warmth, no erythema, no maceration, normal color, no pre-ulcer, no ulcer and no callus  Toe Exam: No swelling, no tenderness, no erythema and no left toe deformity  Sensory   Vibration: intact  Proprioception: intact  Monofilament testing: intact    Vascular  Capillary refills: < 3 seconds  The left DP pulse is 2+  The left PT pulse is 2+       Assign Risk Category  No deformity present  No loss of protective sensation  No weak pulses  Risk: 0      Labs:   No components found for: HA1C  No components found for: GLU    Lab Results   Component Value Date    CREATININE 1 24 04/08/2022    CREATININE 1 30 01/05/2022    CREATININE 1 05 08/27/2021    BUN 27 (H) 04/08/2022     10/28/2017    K 4 3 04/08/2022     04/08/2022    CO2 33 (H) 04/08/2022     eGFR   Date Value Ref Range Status   04/08/2022 59 ml/min/1 73sq m Final     No components found for: [facility]    Lab Results   Component Value Date    CHOL 141 10/28/2017    HDL 33 (L) 08/06/2021    TRIG 151 (H) 08/06/2021       Lab Results   Component Value Date    ALT 40 04/08/2022    AST 21 04/08/2022    ALKPHOS 73 04/08/2022    BILITOT 1 2 10/28/2017       Lab Results   Component Value Date    FREET4 0 97 06/10/2021       Impression:  1  Hypogonadotropic hypogonadism ([facility] )    2  Type 2 diabetes mellitus with hyperglycemia, with long-term current use of insulin (HCC)    3  Benign essential hypertension    4  Obstructive sleep apnea syndrome    5  Mixed hyperlipidemia    6  Vitamin D deficiency           Plan:    Diagnoses and all orders for this visit:    Hypogonadotropic hypogonadism ([facility] ): Off testosterone replacement therapy since 2020 due to polycythemia  Currently experiencing fatigue and decrease libido with low free and total testosterone is agreeable to start replacement therapy for which Androgel 2 5 % started, he was counseled regarding risks and benefits about testosterone treatment including potential effects on prostate and need to monitor  We iscussed potential side effects of replacement, including dyslipidemia, erthyrocytosis, prostate enlargement or unmasking of prostate CA, development of sleep apnea, moodiness and agresssion due to higher testosterone  Will check free and total testosterone along with CBC in 6 weeks-      Patient has stable 3 mm pituitary micro adenoma, stable in most recent MRI in 2018  Will repeat MRI if any change in symptoms or concern for overproduction or under production of anterior pituitary hormones arise  testosterone (ANDROGEL) 25 MG/2 5GM (1%) GEL; Place 1 packet (25 mg total) on the skin daily  -     CBC and differential Lab Collect;  Future  -     Testosterone, free, total Lab Collect; Future    fast    Type 2 diabetes mellitus, with long-term current use of insulin ([address] ):  Well controlled with the most recent A1c of 5 9%   He is using Dexcom and detail report noted in HPI  Did not tolerate Janumet due to GI symptoms for which was advised to discontinue  Will continue rest of the management including Lantus 30 units q h s , Humalog 10 units t i d  a c , deliberate 5 mg once daily and Jardiance 25 mg once daily  -     insulin lispro (HumaLOG KwikPen) 100 units/mL injection pen; Inject 10 Units under the skin 3 (three) times a day with meals Take 25 units daily as directed  -     insulin glargine (Lantus SoloStar) 100 units/mL injection pen; Inject 30 Units under the skin daily at bedtime  -     glyBURIDE (DIABETA) 5 mg tablet; Take 1 tablet (5 mg total) by mouth daily with breakfast  -     Empagliflozin (Jardiance) 25 MG TABS; Take 1 tablet (25 mg total) by mouth daily  -     lisinopril (ZESTRIL) 40 mg tablet; Take 1 tablet (40 mg total) by mouth daily  -     BD Pen Needle Harriett U/F 32G X 4 MM MISC; Use 4x per day    Benign essential hypertension:  On lisinopril and amlodipine  Blood pressure at goal     Continue current management  Check microalbumin/creatinine before next visit  Obstructive sleep apnea syndrome:  Patient was instructed to follow-up with Sleep Medicine to investigate if history of elevated hemoglobin/hematocrit related to sleep apnea  Mixed hyperlipidemia:  On simvastatin 20 mg once daily, tolerated well  Check lipid profile before next visit  Vitamin D deficiency:  Vitamin-D level  Discussed with the patient and all questioned fully answered  He will call me if any problems arise      Counseled patient on diagnostic results, prognosis, risk and benefit of treatment options, instruction for management, importance of treatment compliance, Risk  factor reduction and impressions

## 2022-04-14 ENCOUNTER — OFFICE VISIT (OUTPATIENT)
Dept: INTERNAL MEDICINE CLINIC | Facility: OTHER | Age: 67
End: 2022-04-14
Payer: MEDICARE

## 2022-04-14 ENCOUNTER — TELEPHONE (OUTPATIENT)
Dept: ENDOCRINOLOGY | Facility: CLINIC | Age: 67
End: 2022-04-14

## 2022-04-14 VITALS
OXYGEN SATURATION: 98 % | HEART RATE: 78 BPM | DIASTOLIC BLOOD PRESSURE: 72 MMHG | HEIGHT: 63 IN | SYSTOLIC BLOOD PRESSURE: 124 MMHG | RESPIRATION RATE: 18 BRPM | BODY MASS INDEX: 28.74 KG/M2 | WEIGHT: 162.2 LBS | TEMPERATURE: 99 F

## 2022-04-14 DIAGNOSIS — J06.9 ACUTE URI: Primary | ICD-10-CM

## 2022-04-14 DIAGNOSIS — K21.9 GASTROESOPHAGEAL REFLUX DISEASE WITHOUT ESOPHAGITIS: ICD-10-CM

## 2022-04-14 DIAGNOSIS — D35.2 PITUITARY MICROADENOMA (HCC): ICD-10-CM

## 2022-04-14 DIAGNOSIS — Z79.4 TYPE 2 DIABETES MELLITUS WITHOUT COMPLICATION, WITH LONG-TERM CURRENT USE OF INSULIN (HCC): Primary | ICD-10-CM

## 2022-04-14 DIAGNOSIS — E11.9 TYPE 2 DIABETES MELLITUS WITHOUT COMPLICATION, WITH LONG-TERM CURRENT USE OF INSULIN (HCC): Primary | ICD-10-CM

## 2022-04-14 DIAGNOSIS — E11.65 TYPE 2 DIABETES MELLITUS WITH HYPERGLYCEMIA, WITH LONG-TERM CURRENT USE OF INSULIN (HCC): ICD-10-CM

## 2022-04-14 DIAGNOSIS — M46.1 SACROILIITIS (HCC): ICD-10-CM

## 2022-04-14 DIAGNOSIS — Z79.4 TYPE 2 DIABETES MELLITUS WITH HYPERGLYCEMIA, WITH LONG-TERM CURRENT USE OF INSULIN (HCC): ICD-10-CM

## 2022-04-14 PROCEDURE — 99213 OFFICE O/P EST LOW 20 MIN: CPT | Performed by: INTERNAL MEDICINE

## 2022-04-14 RX ORDER — AMLODIPINE BESYLATE 10 MG/1
10 TABLET ORAL DAILY
Qty: 90 TABLET | Refills: 1 | Status: SHIPPED | OUTPATIENT
Start: 2022-04-14 | End: 2022-08-01 | Stop reason: SDUPTHER

## 2022-04-14 RX ORDER — GLIPIZIDE 5 MG/1
5 TABLET ORAL
Qty: 60 TABLET | Refills: 2 | Status: SHIPPED | OUTPATIENT
Start: 2022-04-14 | End: 2022-08-01

## 2022-04-14 RX ORDER — AZITHROMYCIN 250 MG/1
TABLET, FILM COATED ORAL
Qty: 6 TABLET | Refills: 0 | Status: SHIPPED | OUTPATIENT
Start: 2022-04-14 | End: 2022-04-18

## 2022-04-14 NOTE — TELEPHONE ENCOUNTER
Received fax from [facility] that glyburide is not covered by insurance only glipizide

## 2022-04-14 NOTE — PROGRESS NOTES
Assessment/Plan:    Acute URI  Will prescribe azithromycin  Continue loratadine, coricidine, and Flonase  He is to contact out office for worsening symptoms  Diagnoses and all orders for this visit:    Acute URI  -     azithromycin (ZITHROMAX) 250 mg tablet; Take 2 tablets today then 1 tablet daily x 4 days    Gastroesophageal reflux disease without esophagitis    Type 2 diabetes mellitus with hyperglycemia, with long-term current use of insulin (HCC)  -     amLODIPine (NORVASC) 10 mg tablet; Take 1 tablet (10 mg total) by mouth daily    Sacroiliitis (HCC)    Pituitary microadenoma (HCC)                  Subjective:      Patient ID: Oly Moreno is a 74 y o  male  Chief Complaint   Patient presents with    Sinusitis     Started Tuesday afternoon, nasal congestion, scratchy throat, pressure and pain, nasal drip, started with coughing, trouble with talking with the post nasal drip  COVID vaccine, took a COVID test at home this morning it negative    health maintenance     bmi f/u plan, A! C due after 7/8/22    antibiotic     was in amoxicillin for 2 weeks from an [facility] dr around 3/16 for a tooth removal finished them around 31       74year old male is seen today with concern for acute URI since 3 days  Sinusitis  This is a new problem  The current episode started in the past 7 days  The problem is unchanged  There has been no fever  Associated symptoms include congestion, coughing, sinus pressure and a sore throat  Pertinent negatives include no chills, diaphoresis, headaches, shortness of breath or sneezing  Past treatments include oral decongestants  The treatment provided no relief         The following portions of the patient's history were reviewed and updated as appropriate: allergies, current medications, past family history, past medical history, past social history, past surgical history and problem list     Review of Systems   Constitutional: Negative for activity change, appetite change, chills, diaphoresis, fatigue and fever  HENT: Positive for congestion, sinus pressure and sore throat  Negative for postnasal drip, rhinorrhea, sinus pain and sneezing  Eyes: Negative for visual disturbance  Respiratory: Positive for cough  Negative for apnea, choking, chest tightness, shortness of breath and wheezing  Cardiovascular: Negative for chest pain, palpitations and leg swelling  Gastrointestinal: Negative for abdominal distention, abdominal pain, anal bleeding, blood in stool, constipation, diarrhea, nausea and vomiting  Endocrine: Negative for cold intolerance and heat intolerance  Genitourinary: Negative for difficulty urinating, dysuria and hematuria  Musculoskeletal: Negative  Skin: Negative  Neurological: Negative for dizziness, weakness, light-headedness, numbness and headaches  Hematological: Negative for adenopathy  Psychiatric/Behavioral: Negative for agitation, sleep disturbance and suicidal ideas  All other systems reviewed and are negative          Past Medical History:   Diagnosis Date    Arthritis     Last assessed 5/24/2013    Avitaminosis D 2012    Colon polyp     [facility]     Displacement of cervical intervertebral disc 2014    GERD (gastroesophageal reflux disease)     Glaucoma     Normal Pressure Glaucoma    HTN (hypertension) 2007    Hypertension     IBS (irritable bowel syndrome) 2021    Nephrolithiasis 5/24/2013    Pituitary microadenoma ([facility] ) 2010    Spinal stenosis in cervical region 2014    Sprain and strain of calcaneofibular (ligament) 12/5/2013    Submandibular lymphadenopathy 8/8/2019    Uric acid nephrolithiasis 1990         Current Outpatient Medications:     amLODIPine (NORVASC) 10 mg tablet, Take 1 tablet (10 mg total) by mouth daily, Disp: 90 tablet, Rfl: 1    aspirin (ECOTRIN LOW STRENGTH) 81 mg EC tablet, Take 81 mg by mouth daily , Disp: , Rfl:     B Complex Vitamins (VITAMIN B-COMPLEX PO), Take 1 capsule by mouth daily , Disp: , Rfl:     BD Pen Needle Harriett U/F 32G X 4 MM MISC, Use 4x per day, Disp: 400 each, Rfl: 3    brimonidine-timolol (COMBIGAN) 0 2-0 5 %, Administer 1 drop to both eyes every 12 (twelve) hours, Disp: , Rfl:     cholecalciferol (VITAMIN D3) 1,000 units tablet, Take 1,000 Units by mouth 2 (two) times a day , Disp: , Rfl:     clobetasol (TEMOVATE) 0 05 % cream, Apply topically 2 (two) times a day as needed (Rash), Disp: 30 g, Rfl: 3    dicyclomine (BENTYL) 20 mg tablet, Take 1 tablet (20 mg total) by mouth 4 (four) times a day (before meals and at bedtime), Disp: 120 tablet, Rfl: 5    doxazosin (CARDURA) 4 mg tablet, Take 1 tablet (4 mg total) by mouth daily, Disp: 90 tablet, Rfl: 1    Empagliflozin (Jardiance) 25 MG TABS, Take 1 tablet (25 mg total) by mouth daily, Disp: 90 tablet, Rfl: 3    fluticasone (FLONASE) 50 mcg/act nasal spray, 1 spray into each nostril daily as needed for rhinitis (Acute URI/sinusitis), Disp: 18 mL, Rfl: 0    gabapentin (NEURONTIN) 300 mg capsule, Take 1 capsule (300 mg total) by mouth 3 (three) times a day, Disp: 270 capsule, Rfl: 1    Glucosamine-Chondroitin (OSTEO BI-FLEX REGULAR STRENGTH) 250-200 MG TABS, Take 1 tablet by mouth 2 (two) times a day, Disp: , Rfl:     glyBURIDE (DIABETA) 5 mg tablet, Take 1 tablet (5 mg total) by mouth daily with breakfast, Disp: 30 tablet, Rfl: 2    hydrochlorothiazide (HYDRODIURIL) 25 mg tablet, Take 1 tablet (25 mg total) by mouth daily, Disp: 90 tablet, Rfl: 1    ibuprofen (MOTRIN) 200 mg tablet, Take 200 mg by mouth as needed , Disp: , Rfl:     insulin glargine (Lantus SoloStar) 100 units/mL injection pen, Inject 30 Units under the skin daily at bedtime, Disp: 30 mL, Rfl: 3    insulin lispro (HumaLOG KwikPen) 100 units/mL injection pen, Inject 10 Units under the skin 3 (three) times a day with meals Take 25 units daily as directed, Disp: 30 mL, Rfl: 3    lisinopril (ZESTRIL) 40 mg tablet, Take 1 tablet (40 mg total) by mouth daily, Disp: 90 tablet, Rfl: 1    loratadine (CLARITIN) 10 mg tablet, Take 10 mg by mouth daily, Disp: , Rfl:     MULTIPLE VITAMIN PO, Take 1 tablet by mouth daily , Disp: , Rfl:     omeprazole (PriLOSEC) 40 MG capsule, Take 1 capsule (40 mg total) by mouth daily, Disp: 90 capsule, Rfl: 1    other medication, see sig,, Medication/product name: Medical Marijuana, Disp: , Rfl:     Probiotic Product (PROBIOTIC-10) CAPS, Take 1 capsule by mouth daily , Disp: , Rfl:     simvastatin (ZOCOR) 20 mg tablet, Take 1 tablet (20 mg total) by mouth daily at bedtime, Disp: 90 tablet, Rfl: 1    testosterone (ANDROGEL) 25 MG/2 5GM (1%) GEL, Place 1 packet (25 mg total) on the skin daily, Disp: 90 packet, Rfl: 1    vitamin E, tocopherol, 400 units capsule, Take 400 Units by mouth 2 (two) times a day , Disp: , Rfl:     azithromycin (ZITHROMAX) 250 mg tablet, Take 2 tablets today then 1 tablet daily x 4 days, Disp: 6 tablet, Rfl: 0    SITagliptin-metFORMIN HCl ER (Janumet XR)  MG TB24, 1 tab twice per day with food, Disp: 60 tablet, Rfl: 5    Allergies   Allergen Reactions    Clonidine Other (See Comments)     Diaphoresis, hot flashes    Augmentin [Amoxicillin-Pot Clavulanate] Abdominal Pain    Biaxin [Clarithromycin] Abdominal Pain    Dust Mite Extract     Insulin Aspart GI Intolerance     Novolog     Liraglutide Abdominal Pain and Nausea Only    Molds & Smuts     Nuts - Food Allergy     Seasonal Ic [Cholestatin] Headache       Social History   Past Surgical History:   Procedure Laterality Date    ASPIRATION / INJECTION RENAL CYST      Renal Cyst Aspiration    CATARACT EXTRACTION      12/2017- right eye, 01/2018- left eye    COLONOSCOPY  2005    EYE SURGERY Bilateral     Cataract Surgery    TONSILLECTOMY      UPPER GASTROINTESTINAL ENDOSCOPY       Family History   Problem Relation Age of Onset    Diabetes unspecified Mother     Heart disease Mother     Nephrolithiasis Mother    Deandre Lee Kidney disease Mother     Other Mother         Back Disorder    Thyroid disease Mother     Diabetes unspecified Father     Heart disease Father     Diabetes unspecified Sister     Heart disease Sister     Stroke Sister     Diabetes unspecified Brother     Heart disease Brother     Nephrolithiasis Brother     Lung cancer Brother     Other Brother         COPD    Diabetes unspecified Sister     Heart disease Sister     Diabetes unspecified Sister     Diabetes unspecified Brother     Heart disease Brother     Diabetes unspecified Brother     Other Brother         Back Disorder    Diabetes unspecified Brother     Other Brother         Back Disorder    Diabetes unspecified Brother     Stomach cancer Paternal Aunt        Objective:  /72 (BP Location: Left arm, Patient Position: Sitting, Cuff Size: Standard)   Pulse 78   Temp 99 °F (37 2 °C) (Temporal)   Resp 18   Ht 5' 3" (1 6 m)   Wt 73 6 kg (162 lb 3 2 oz)   SpO2 98%   BMI 28 73 kg/m²     Recent Results (from the past 1344 hour(s))   Hemoglobin A1C    Collection Time: 04/08/22  8:20 AM   Result Value Ref Range    Hemoglobin A1C 5 9 (H) Normal 3 8-5 6%; PreDiabetic 5 7-6 4%; Diabetic >=6 5%; Glycemic control for adults with diabetes <7 0% %     mg/dl   Comprehensive metabolic panel    Collection Time: 04/08/22  8:20 AM   Result Value Ref Range    Sodium 140 136 - 145 mmol/L    Potassium 4 3 3 5 - 5 3 mmol/L    Chloride 104 100 - 108 mmol/L    CO2 33 (H) 21 - 32 mmol/L    ANION GAP 3 (L) 4 - 13 mmol/L    BUN 27 (H) 5 - 25 mg/dL    Creatinine 1 24 0 60 - 1 30 mg/dL    Glucose, Fasting 138 (H) 65 - 99 mg/dL    Calcium 9 3 8 3 - 10 1 mg/dL    AST 21 5 - 45 U/L    ALT 40 12 - 78 U/L    Alkaline Phosphatase 73 46 - 116 U/L    Total Protein 7 5 6 4 - 8 2 g/dL    Albumin 3 9 3 5 - 5 0 g/dL    Total Bilirubin 0 72 0 20 - 1 00 mg/dL    eGFR 59 ml/min/1 73sq m            Physical Exam  Vitals and nursing note reviewed     Constitutional: General: He is not in acute distress  Appearance: He is well-developed  He is ill-appearing  He is not diaphoretic  HENT:      Head: Normocephalic and atraumatic  Eyes:      General: No scleral icterus  Right eye: No discharge  Left eye: No discharge  Conjunctiva/sclera: Conjunctivae normal       Pupils: Pupils are equal, round, and reactive to light  Neck:      Thyroid: No thyromegaly  Vascular: No JVD  Cardiovascular:      Rate and Rhythm: Normal rate and regular rhythm  Heart sounds: Normal heart sounds  No murmur heard  No friction rub  No gallop  Pulmonary:      Effort: Pulmonary effort is normal  No respiratory distress  Breath sounds: Normal breath sounds  No wheezing or rales  Chest:      Chest wall: No tenderness  Abdominal:      General: Bowel sounds are normal  There is no distension  Palpations: Abdomen is soft  There is no mass  Tenderness: There is no abdominal tenderness  There is no guarding or rebound  Musculoskeletal:         General: No tenderness or deformity  Normal range of motion  Cervical back: Normal range of motion and neck supple  Lymphadenopathy:      Head:      Right side of head: Submandibular adenopathy present  Cervical: No cervical adenopathy  Skin:     General: Skin is warm and dry  Coloration: Skin is not pale  Findings: No erythema or rash  Neurological:      Mental Status: He is alert and oriented to person, place, and time  Cranial Nerves: No cranial nerve deficit  Coordination: Coordination normal       Deep Tendon Reflexes: Reflexes are normal and symmetric  Psychiatric:         Behavior: Behavior normal          Thought Content:  Thought content normal          Judgment: Judgment normal

## 2022-04-14 NOTE — ASSESSMENT & PLAN NOTE
Will prescribe azithromycin  Continue loratadine, coricidine, and Flonase  He is to contact out office for worsening symptoms

## 2022-04-15 DIAGNOSIS — E11.65 TYPE 2 DIABETES MELLITUS WITH HYPERGLYCEMIA, WITH LONG-TERM CURRENT USE OF INSULIN (HCC): ICD-10-CM

## 2022-04-15 DIAGNOSIS — Z79.4 TYPE 2 DIABETES MELLITUS WITH HYPERGLYCEMIA, WITH LONG-TERM CURRENT USE OF INSULIN (HCC): ICD-10-CM

## 2022-04-15 RX ORDER — INSULIN LISPRO 100 [IU]/ML
10 INJECTION, SOLUTION INTRAVENOUS; SUBCUTANEOUS
Qty: 30 ML | Refills: 3 | Status: SHIPPED | OUTPATIENT
Start: 2022-04-15

## 2022-04-25 DIAGNOSIS — E11.65 TYPE 2 DIABETES MELLITUS WITH HYPERGLYCEMIA, WITH LONG-TERM CURRENT USE OF INSULIN (HCC): ICD-10-CM

## 2022-04-25 DIAGNOSIS — Z79.4 TYPE 2 DIABETES MELLITUS WITH HYPERGLYCEMIA, WITH LONG-TERM CURRENT USE OF INSULIN (HCC): ICD-10-CM

## 2022-04-25 RX ORDER — SIMVASTATIN 20 MG
TABLET ORAL
Qty: 90 TABLET | Refills: 3 | Status: SHIPPED | OUTPATIENT
Start: 2022-04-25 | End: 2022-08-01 | Stop reason: SDUPTHER

## 2022-05-03 ENCOUNTER — TELEPHONE (OUTPATIENT)
Dept: ENDOCRINOLOGY | Facility: CLINIC | Age: 67
End: 2022-05-03

## 2022-05-04 DIAGNOSIS — R19.7 DIARRHEA, UNSPECIFIED TYPE: ICD-10-CM

## 2022-05-04 DIAGNOSIS — R10.32 LEFT LOWER QUADRANT ABDOMINAL PAIN: ICD-10-CM

## 2022-05-04 DIAGNOSIS — R14.0 ABDOMINAL BLOATING: ICD-10-CM

## 2022-05-04 RX ORDER — DICYCLOMINE HCL 20 MG
20 TABLET ORAL
Qty: 120 TABLET | Refills: 5 | Status: SHIPPED | OUTPATIENT
Start: 2022-05-04 | End: 2022-08-01 | Stop reason: SDUPTHER

## 2022-05-18 ENCOUNTER — FOLLOW UP (OUTPATIENT)
Dept: URBAN - METROPOLITAN AREA CLINIC 6 | Facility: CLINIC | Age: 67
End: 2022-05-18

## 2022-05-18 DIAGNOSIS — H40.1112: ICD-10-CM

## 2022-05-18 DIAGNOSIS — H40.1121: ICD-10-CM

## 2022-05-18 DIAGNOSIS — Z96.1: ICD-10-CM

## 2022-05-18 PROCEDURE — 92083 EXTENDED VISUAL FIELD XM: CPT

## 2022-05-18 PROCEDURE — 92012 INTRM OPH EXAM EST PATIENT: CPT

## 2022-05-18 ASSESSMENT — VISUAL ACUITY
OU_SC: J3
OS_SC: 20/25
OD_SC: 20/30+1

## 2022-05-18 ASSESSMENT — TONOMETRY
OD_IOP_MMHG: 11
OS_IOP_MMHG: 11

## 2022-05-27 ENCOUNTER — APPOINTMENT (OUTPATIENT)
Dept: LAB | Age: 67
End: 2022-05-27
Payer: MEDICARE

## 2022-05-27 DIAGNOSIS — Z79.890 ENCOUNTER FOR MONITORING TESTOSTERONE REPLACEMENT THERAPY: ICD-10-CM

## 2022-05-27 DIAGNOSIS — Z51.81 ENCOUNTER FOR MONITORING TESTOSTERONE REPLACEMENT THERAPY: ICD-10-CM

## 2022-05-27 DIAGNOSIS — E23.0 HYPOGONADOTROPIC HYPOGONADISM (HCC): ICD-10-CM

## 2022-05-27 LAB
BASOPHILS # BLD AUTO: 0.04 THOUSANDS/ΜL (ref 0–0.1)
BASOPHILS NFR BLD AUTO: 1 % (ref 0–1)
EOSINOPHIL # BLD AUTO: 0.27 THOUSAND/ΜL (ref 0–0.61)
EOSINOPHIL NFR BLD AUTO: 3 % (ref 0–6)
ERYTHROCYTE [DISTWIDTH] IN BLOOD BY AUTOMATED COUNT: 14.7 % (ref 11.6–15.1)
HCT VFR BLD AUTO: 43.9 % (ref 36.5–49.3)
HGB BLD-MCNC: 15.3 G/DL (ref 12–17)
IMM GRANULOCYTES # BLD AUTO: 0.03 THOUSAND/UL (ref 0–0.2)
IMM GRANULOCYTES NFR BLD AUTO: 0 % (ref 0–2)
LYMPHOCYTES # BLD AUTO: 3.1 THOUSANDS/ΜL (ref 0.6–4.47)
LYMPHOCYTES NFR BLD AUTO: 37 % (ref 14–44)
MCH RBC QN AUTO: 29.7 PG (ref 26.8–34.3)
MCHC RBC AUTO-ENTMCNC: 34.9 G/DL (ref 31.4–37.4)
MCV RBC AUTO: 85 FL (ref 82–98)
MONOCYTES # BLD AUTO: 0.97 THOUSAND/ΜL (ref 0.17–1.22)
MONOCYTES NFR BLD AUTO: 12 % (ref 4–12)
NEUTROPHILS # BLD AUTO: 3.9 THOUSANDS/ΜL (ref 1.85–7.62)
NEUTS SEG NFR BLD AUTO: 47 % (ref 43–75)
NRBC BLD AUTO-RTO: 0 /100 WBCS
PLATELET # BLD AUTO: 288 THOUSANDS/UL (ref 149–390)
PMV BLD AUTO: 9.7 FL (ref 8.9–12.7)
RBC # BLD AUTO: 5.15 MILLION/UL (ref 3.88–5.62)
WBC # BLD AUTO: 8.31 THOUSAND/UL (ref 4.31–10.16)

## 2022-05-27 PROCEDURE — 36415 COLL VENOUS BLD VENIPUNCTURE: CPT

## 2022-05-27 PROCEDURE — 84403 ASSAY OF TOTAL TESTOSTERONE: CPT

## 2022-05-27 PROCEDURE — 84402 ASSAY OF FREE TESTOSTERONE: CPT

## 2022-05-27 PROCEDURE — 85025 COMPLETE CBC W/AUTO DIFF WBC: CPT

## 2022-05-28 LAB
TESTOST FREE SERPL-MCNC: 2.1 PG/ML (ref 6.6–18.1)
TESTOST SERPL-MCNC: 120 NG/DL (ref 264–916)

## 2022-07-08 DIAGNOSIS — D35.2 PITUITARY MICROADENOMA (HCC): ICD-10-CM

## 2022-07-08 DIAGNOSIS — E78.2 MIXED HYPERLIPIDEMIA: ICD-10-CM

## 2022-07-08 DIAGNOSIS — Z79.4 TYPE 2 DIABETES MELLITUS WITHOUT COMPLICATION, WITH LONG-TERM CURRENT USE OF INSULIN (HCC): Primary | ICD-10-CM

## 2022-07-08 DIAGNOSIS — E66.3 OVERWEIGHT (BMI 25.0-29.9): ICD-10-CM

## 2022-07-08 DIAGNOSIS — E11.9 TYPE 2 DIABETES MELLITUS WITHOUT COMPLICATION, WITH LONG-TERM CURRENT USE OF INSULIN (HCC): Primary | ICD-10-CM

## 2022-07-08 DIAGNOSIS — R53.83 OTHER FATIGUE: ICD-10-CM

## 2022-07-18 ENCOUNTER — LAB (OUTPATIENT)
Dept: LAB | Age: 67
End: 2022-07-18
Payer: MEDICARE

## 2022-07-18 DIAGNOSIS — E78.2 MIXED HYPERLIPIDEMIA: ICD-10-CM

## 2022-07-18 DIAGNOSIS — E66.3 OVERWEIGHT (BMI 25.0-29.9): ICD-10-CM

## 2022-07-18 DIAGNOSIS — E11.9 TYPE 2 DIABETES MELLITUS WITHOUT COMPLICATION, WITH LONG-TERM CURRENT USE OF INSULIN (HCC): ICD-10-CM

## 2022-07-18 DIAGNOSIS — R53.83 OTHER FATIGUE: ICD-10-CM

## 2022-07-18 DIAGNOSIS — D35.2 PITUITARY MICROADENOMA (HCC): ICD-10-CM

## 2022-07-18 DIAGNOSIS — Z79.4 TYPE 2 DIABETES MELLITUS WITHOUT COMPLICATION, WITH LONG-TERM CURRENT USE OF INSULIN (HCC): ICD-10-CM

## 2022-07-18 LAB
ALBUMIN SERPL BCP-MCNC: 3.7 G/DL (ref 3.5–5)
ALP SERPL-CCNC: 78 U/L (ref 46–116)
ALT SERPL W P-5'-P-CCNC: 33 U/L (ref 12–78)
ANION GAP SERPL CALCULATED.3IONS-SCNC: 6 MMOL/L (ref 4–13)
AST SERPL W P-5'-P-CCNC: 18 U/L (ref 5–45)
BILIRUB SERPL-MCNC: 0.92 MG/DL (ref 0.2–1)
BUN SERPL-MCNC: 31 MG/DL (ref 5–25)
CALCIUM SERPL-MCNC: 9.2 MG/DL (ref 8.3–10.1)
CHLORIDE SERPL-SCNC: 103 MMOL/L (ref 96–108)
CHOLEST SERPL-MCNC: 99 MG/DL
CO2 SERPL-SCNC: 28 MMOL/L (ref 21–32)
CREAT SERPL-MCNC: 1.41 MG/DL (ref 0.6–1.3)
CREAT UR-MCNC: 74.8 MG/DL
EST. AVERAGE GLUCOSE BLD GHB EST-MCNC: 128 MG/DL
GFR SERPL CREATININE-BSD FRML MDRD: 50 ML/MIN/1.73SQ M
GLUCOSE P FAST SERPL-MCNC: 212 MG/DL (ref 65–99)
HBA1C MFR BLD: 6.1 %
HDLC SERPL-MCNC: 32 MG/DL
LDLC SERPL CALC-MCNC: 33 MG/DL (ref 0–100)
MICROALBUMIN UR-MCNC: 5 MG/L (ref 0–20)
MICROALBUMIN/CREAT 24H UR: 7 MG/G CREATININE (ref 0–30)
NONHDLC SERPL-MCNC: 67 MG/DL
POTASSIUM SERPL-SCNC: 3.9 MMOL/L (ref 3.5–5.3)
PROT SERPL-MCNC: 7.5 G/DL (ref 6.4–8.4)
SODIUM SERPL-SCNC: 137 MMOL/L (ref 135–147)
T4 FREE SERPL-MCNC: 1.03 NG/DL (ref 0.76–1.46)
TRIGL SERPL-MCNC: 172 MG/DL
TSH SERPL DL<=0.05 MIU/L-ACNC: 4.35 UIU/ML (ref 0.45–4.5)

## 2022-07-18 PROCEDURE — 80053 COMPREHEN METABOLIC PANEL: CPT

## 2022-07-18 PROCEDURE — 84439 ASSAY OF FREE THYROXINE: CPT

## 2022-07-18 PROCEDURE — 83036 HEMOGLOBIN GLYCOSYLATED A1C: CPT

## 2022-07-18 PROCEDURE — 84443 ASSAY THYROID STIM HORMONE: CPT

## 2022-07-18 PROCEDURE — 82570 ASSAY OF URINE CREATININE: CPT

## 2022-07-18 PROCEDURE — 80061 LIPID PANEL: CPT

## 2022-07-18 PROCEDURE — 82043 UR ALBUMIN QUANTITATIVE: CPT

## 2022-07-18 PROCEDURE — 36415 COLL VENOUS BLD VENIPUNCTURE: CPT

## 2022-07-22 ENCOUNTER — OFFICE VISIT (OUTPATIENT)
Dept: ENDOCRINOLOGY | Facility: CLINIC | Age: 67
End: 2022-07-22
Payer: MEDICARE

## 2022-07-22 ENCOUNTER — LAB (OUTPATIENT)
Dept: LAB | Facility: CLINIC | Age: 67
End: 2022-07-22
Payer: MEDICARE

## 2022-07-22 VITALS
HEIGHT: 63 IN | WEIGHT: 164 LBS | DIASTOLIC BLOOD PRESSURE: 64 MMHG | SYSTOLIC BLOOD PRESSURE: 122 MMHG | BODY MASS INDEX: 29.06 KG/M2 | HEART RATE: 78 BPM

## 2022-07-22 DIAGNOSIS — E55.9 VITAMIN D DEFICIENCY: ICD-10-CM

## 2022-07-22 DIAGNOSIS — E61.1 IRON DEFICIENCY: ICD-10-CM

## 2022-07-22 DIAGNOSIS — D35.2 PITUITARY MICROADENOMA (HCC): ICD-10-CM

## 2022-07-22 DIAGNOSIS — E11.9 TYPE 2 DIABETES MELLITUS WITHOUT COMPLICATION, WITH LONG-TERM CURRENT USE OF INSULIN (HCC): Primary | ICD-10-CM

## 2022-07-22 DIAGNOSIS — Z79.4 TYPE 2 DIABETES MELLITUS WITHOUT COMPLICATION, WITH LONG-TERM CURRENT USE OF INSULIN (HCC): Primary | ICD-10-CM

## 2022-07-22 DIAGNOSIS — G47.33 OBSTRUCTIVE SLEEP APNEA SYNDROME: ICD-10-CM

## 2022-07-22 DIAGNOSIS — E23.0 HYPOGONADOTROPIC HYPOGONADISM (HCC): ICD-10-CM

## 2022-07-22 LAB
FERRITIN SERPL-MCNC: 58 NG/ML (ref 8–388)
IRON SATN MFR SERPL: 20 % (ref 20–50)
IRON SERPL-MCNC: 63 UG/DL (ref 65–175)
TIBC SERPL-MCNC: 321 UG/DL (ref 250–450)

## 2022-07-22 PROCEDURE — 82728 ASSAY OF FERRITIN: CPT

## 2022-07-22 PROCEDURE — 83550 IRON BINDING TEST: CPT

## 2022-07-22 PROCEDURE — 83540 ASSAY OF IRON: CPT

## 2022-07-22 PROCEDURE — 99214 OFFICE O/P EST MOD 30 MIN: CPT | Performed by: INTERNAL MEDICINE

## 2022-07-22 PROCEDURE — 36415 COLL VENOUS BLD VENIPUNCTURE: CPT

## 2022-07-22 PROCEDURE — 95251 CONT GLUC MNTR ANALYSIS I&R: CPT | Performed by: INTERNAL MEDICINE

## 2022-07-22 RX ORDER — TESTOSTERONE 12.5 MG/1.25G
50 GEL TOPICAL DAILY
Qty: 180 PACKET | Refills: 1 | Status: SHIPPED | OUTPATIENT
Start: 2022-07-22 | End: 2023-01-18

## 2022-07-22 NOTE — PROGRESS NOTES
Marianela Mariscal 74 y o  male MRN: [id number]    Encounter: [id number]      Assessment/Plan     Assessment: This is a 74y o -year-old male with diabetes with hyperglycemia, hypogonadotropic hypogonadism, pituitary microadenoma, obstructive sleep apnea, vitamin-D deficiency and iron deficiency  Plan:  1  Type 2 diabetes with hyperglycemia-based on hemoglobin A1c, his diabetes is under control  Based on his continuous glucose monitor he is having significant hyperglycemia  He is not anemic but I wonder if he has iron deficiency causing the A1c to be inaccurate  I have ordered iron, TIBC, ferritin and% saturation  2  Hypogonadotropic hypogonadism-continue testosterone supplementation  He has noticed an improvement in his energy  3  Pituitary microadenoma has been stable  4  Obstructive sleep apnea-continue CPAP  5  Vitamin-D deficiency-continue supplementation  6  Iron deficiency-labs ordered as above  CC: Diabetes    History of Present Illness     HPI:  78-year-old man with type 2 diabetes for many years presents for follow-up  He is on multiple medications  He has been taking the glipizide only once a day instead of twice a day  He has rare hypoglycemia  He does not have any complications of diabetes  For hypogonadotropic hypogonadism, he is on testosterone supplementation  He has noticed energy improvement with testosterone  He continues on vitamin-D supplements for vitamin-D deficiency  Review of Systems   Constitutional: Negative for chills and fever  Respiratory: Negative for shortness of breath  Cardiovascular: Negative for chest pain  Gastrointestinal: Negative for constipation, diarrhea, nausea and vomiting  Endocrine: Negative for polydipsia and polyuria  All other systems reviewed and are negative        Historical Information   Past Medical History:   Diagnosis Date    Arthritis     Last assessed 5/24/2013   Dorothy LACKEY 2012    Colon polyp     Diabetes mellitus ([facility]     Displacement of cervical intervertebral disc     GERD (gastroesophageal reflux disease)     Glaucoma     Normal Pressure Glaucoma    HTN (hypertension)     Hypertension     IBS (irritable bowel syndrome)     Nephrolithiasis 2013    Pituitary microadenoma ([facility]     Spinal stenosis in cervical region     Sprain and strain of calcaneofibular (ligament) 2013    Submandibular lymphadenopathy 2019    Uric acid nephrolithiasis      Past Surgical History:   Procedure Laterality Date    ASPIRATION / INJECTION RENAL CYST      Renal Cyst Aspiration    CATARACT EXTRACTION      2017- right eye, 2018- left eye    COLONOSCOPY  2005    EYE SURGERY Bilateral     Cataract Surgery    TONSILLECTOMY      UPPER GASTROINTESTINAL ENDOSCOPY       Social History   Social History     Substance and Sexual Activity   Alcohol Use Yes    Alcohol/week: 2 0 standard drinks    Types: 2 Cans of beer per week    Comment: social     Social History     Substance and Sexual Activity   Drug Use Yes    Types: Marijuana    Comment: legal / medical      Social History     Tobacco Use   Smoking Status Former Smoker    Packs/day: 0 25    Years: 2 00    Pack years: 0 50    Types: Cigarettes    Quit date: 34    Years since quittin 5   Smokeless Tobacco Never Used     Family History:   Family History   Problem Relation Age of Onset    Diabetes unspecified Mother     Heart disease Mother     Nephrolithiasis Mother     Kidney disease Mother     Other Mother         Back Disorder    Thyroid disease Mother     Diabetes unspecified Father     Heart disease Father     Diabetes unspecified Sister     Heart disease Sister     Stroke Sister     Diabetes unspecified Brother     Heart disease Brother     Nephrolithiasis Brother     Lung cancer Brother     Other Brother         COPD    Diabetes unspecified Sister     Heart disease Sister     Diabetes unspecified Sister     Diabetes unspecified Brother     Heart disease Brother     Diabetes unspecified Brother     Other Brother         Back Disorder    Diabetes unspecified Brother     Other Brother         Back Disorder    Diabetes unspecified Brother     Stomach cancer Paternal Aunt        Meds/Allergies   Current Outpatient Medications   Medication Sig Dispense Refill    amLODIPine (NORVASC) 10 mg tablet Take 1 tablet (10 mg total) by mouth daily 90 tablet 1    aspirin (ECOTRIN LOW STRENGTH) 81 mg EC tablet Take 81 mg by mouth daily       B Complex Vitamins (VITAMIN B-COMPLEX PO) Take 1 capsule by mouth daily       BD Pen Needle Harriett U/F 32G X 4 MM MISC Use 4x per day 400 each 3    brimonidine-timolol (COMBIGAN) 0 2-0 5 % Administer 1 drop to both eyes every 12 (twelve) hours      cholecalciferol (VITAMIN D3) 1,000 units tablet Take 1,000 Units by mouth 2 (two) times a day       clobetasol (TEMOVATE) 0 05 % cream Apply topically 2 (two) times a day as needed (Rash) 30 g 3    dicyclomine (BENTYL) 20 mg tablet Take 1 tablet (20 mg total) by mouth 4 (four) times a day (before meals and at bedtime) 120 tablet 5    doxazosin (CARDURA) 4 mg tablet Take 1 tablet (4 mg total) by mouth daily 90 tablet 1    Empagliflozin (Jardiance) 25 MG TABS Take 1 tablet (25 mg total) by mouth daily 90 tablet 3    fluticasone (FLONASE) 50 mcg/act nasal spray 1 spray into each nostril daily as needed for rhinitis (Acute URI/sinusitis) 18 mL 0    gabapentin (NEURONTIN) 300 mg capsule Take 1 capsule (300 mg total) by mouth 3 (three) times a day 270 capsule 1    glipiZIDE (GLUCOTROL) 5 mg tablet Take 1 tablet (5 mg total) by mouth 2 (two) times a day before meals (Patient taking differently: Take 5 mg by mouth in the morning) 60 tablet 2    Glucosamine-Chondroitin 250-200 MG TABS Take 1 tablet by mouth 2 (two) times a day      hydrochlorothiazide (HYDRODIURIL) 25 mg tablet Take 1 tablet (25 mg total) by mouth daily 90 tablet 1    ibuprofen (MOTRIN) 200 mg tablet Take 200 mg by mouth as needed       insulin glargine (Lantus SoloStar) 100 units/mL injection pen Inject 30 Units under the skin daily at bedtime 30 mL 3    insulin lispro (HumaLOG KwikPen) 100 units/mL injection pen Inject 10 Units under the skin 3 (three) times a day with meals 30 mL 3    lisinopril (ZESTRIL) 40 mg tablet Take 1 tablet (40 mg total) by mouth daily 90 tablet 1    loratadine (CLARITIN) 10 mg tablet Take 10 mg by mouth daily      MULTIPLE VITAMIN PO Take 1 tablet by mouth daily       omeprazole (PriLOSEC) 40 MG capsule Take 1 capsule (40 mg total) by mouth daily 90 capsule 1    other medication, see sig, Medication/product name: Medical Marijuana      Probiotic Product (PROBIOTIC-10) CAPS Take 1 capsule by mouth daily       simvastatin (ZOCOR) 20 mg tablet TAKE 1 TABLET BY MOUTH  DAILY AT BEDTIME 90 tablet 3    testosterone (ANDROGEL) 25 MG/2 5GM (1%) GEL Place 1 packet (25 mg total) on the skin daily (Patient taking differently: Place 50 mg on the skin daily) 90 packet 1    vitamin E, tocopherol, 400 units capsule Take 400 Units by mouth 2 (two) times a day       SITagliptin-metFORMIN HCl ER (Janumet XR)  MG TB24 1 tab twice per day with food 60 tablet 5     No current facility-administered medications for this visit  Allergies   Allergen Reactions    Clonidine Other (See Comments)     Diaphoresis, hot flashes    Augmentin [Amoxicillin-Pot Clavulanate] Abdominal Pain    Biaxin [Clarithromycin] Abdominal Pain    Dust Mite Extract     Insulin Aspart GI Intolerance     Novolog     Liraglutide Abdominal Pain and Nausea Only    Molds & Smuts     Nuts - Food Allergy     Seasonal Ic [Cholestatin] Headache       Objective   Vitals: Blood pressure 122/64, pulse 78, height 5' 3" (1 6 m), weight 74 4 kg (164 lb)  Physical Exam  Vitals reviewed  Constitutional:       General: He is not in acute distress       Appearance: He is well-developed  He is not diaphoretic  HENT:      Head: Normocephalic and atraumatic  Mouth/Throat:      Pharynx: No oropharyngeal exudate  Eyes:      General: Lids are normal  No scleral icterus  Right eye: No discharge  Left eye: No discharge  Conjunctiva/sclera: Conjunctivae normal    Neck:      Thyroid: No thyromegaly  Cardiovascular:      Rate and Rhythm: Normal rate and regular rhythm  Heart sounds: Normal heart sounds  No murmur heard  No friction rub  No gallop  Pulmonary:      Effort: Pulmonary effort is normal  No respiratory distress  Breath sounds: Normal breath sounds  No wheezing  Chest:   Breasts:      Right: No supraclavicular adenopathy  Left: No supraclavicular adenopathy  Abdominal:      General: Bowel sounds are normal  There is no distension  Palpations: Abdomen is soft  Tenderness: There is no abdominal tenderness  Musculoskeletal:         General: No tenderness or deformity  Normal range of motion  Cervical back: Neck supple  Lymphadenopathy:      Head:      Right side of head: No occipital adenopathy  Left side of head: No occipital adenopathy  Upper Body:      Right upper body: No supraclavicular adenopathy  Left upper body: No supraclavicular adenopathy  Skin:     General: Skin is warm  Findings: No erythema or rash  Neurological:      Mental Status: He is alert and oriented to person, place, and time  Cranial Nerves: No cranial nerve deficit  Coordination: Coordination normal    Psychiatric:         Mood and Affect: Mood normal          Behavior: Behavior normal          The history was obtained from the review of the chart, patient      Lab Results:   Lab Results   Component Value Date/Time    Hemoglobin A1C 6 1 (H) 07/18/2022 08:25 AM    Hemoglobin A1C 5 9 (H) 04/08/2022 08:20 AM    Hemoglobin A1C 6 6 (H) 01/05/2022 08:33 AM    WBC 8 31 05/27/2022 08:35 AM    WBC 10 61 (H) 01/05/2022 08:33 AM    Hemoglobin 15 3 05/27/2022 08:35 AM    Hemoglobin 15 9 01/05/2022 08:33 AM    Hematocrit 43 9 05/27/2022 08:35 AM    Hematocrit 45 3 01/05/2022 08:33 AM    MCV 85 05/27/2022 08:35 AM    MCV 87 01/05/2022 08:33 AM    Platelets 218 09/82/8467 08:35 AM    Platelets 520 20/70/9937 08:33 AM    BUN 31 (H) 07/18/2022 08:25 AM    BUN 27 (H) 04/08/2022 08:20 AM    BUN 32 (H) 01/05/2022 08:33 AM    Potassium 3 9 07/18/2022 08:25 AM    Potassium 4 3 04/08/2022 08:20 AM    Potassium 4 1 01/05/2022 08:33 AM    Chloride 103 07/18/2022 08:25 AM    Chloride 104 04/08/2022 08:20 AM    Chloride 103 01/05/2022 08:33 AM    CO2 28 07/18/2022 08:25 AM    CO2 33 (H) 04/08/2022 08:20 AM    CO2 30 01/05/2022 08:33 AM    Creatinine 1 41 (H) 07/18/2022 08:25 AM    Creatinine 1 24 04/08/2022 08:20 AM    Creatinine 1 30 01/05/2022 08:33 AM    AST 18 07/18/2022 08:25 AM    AST 21 04/08/2022 08:20 AM    AST 15 01/05/2022 08:33 AM    ALT 33 07/18/2022 08:25 AM    ALT 40 04/08/2022 08:20 AM    ALT 37 01/05/2022 08:33 AM    Albumin 3 7 07/18/2022 08:25 AM    Albumin 3 9 04/08/2022 08:20 AM    Albumin 4 0 01/05/2022 08:33 AM    HDL, Direct 32 (L) 07/18/2022 08:25 AM    HDL, Direct 33 (L) 08/06/2021 08:17 AM    Triglycerides 172 (H) 07/18/2022 08:25 AM    Triglycerides 151 (H) 08/06/2021 08:17 AM     Rubio Gutter   Device used Dexcom       Indication     Type 2 Diabetes    More than 72 hours of data was reviewed  Report to be scanned to chart  Date Range:  July 9, 2022 to July 22, 2022    Analysis of data:   % time CGM used:  93%  Average Glucose:  181 mg/dL  SD :  69 mg/dL   Time in Target Range:  52%   Time Above Range:  30% high and 16% very high   Time Below Range:  1% low and less than 1% very low     Interpretation of data:  He is having hyperglycemia overnight and then hyperglycemia around 9:00 a m     I suspect this may be due to taking the glipizide only once a day  I encouraged to start taking it twice a day    I asked him to call us in about two weeks so I can look at the day continuous glucose monitor and make further adjustments if necessary  Imaging Studies: I have personally reviewed pertinent reports  Portions of the record may have been created with voice recognition software  Occasional wrong word or "sound a like" substitutions may have occurred due to the inherent limitations of voice recognition software  Read the chart carefully and recognize, using context, where substitutions have occurred

## 2022-07-24 NOTE — RESULT ENCOUNTER NOTE
Iron levels and ferritin levels are low  Please reach out to your primary care physician to start treatment  The target ferritin level should be more than 75

## 2022-07-26 ENCOUNTER — RA CDI HCC (OUTPATIENT)
Dept: OTHER | Facility: HOSPITAL | Age: 67
End: 2022-07-26

## 2022-07-26 NOTE — PROGRESS NOTES
[address]  coding opportunities       Chart reviewed, no opportunity found: CHART REVIEWED, NO OPPORTUNITY FOUND        Patients Insurance     Medicare Insurance: Medicare

## 2022-07-29 ENCOUNTER — APPOINTMENT (OUTPATIENT)
Dept: LAB | Age: 67
End: 2022-07-29
Payer: MEDICARE

## 2022-07-29 DIAGNOSIS — Z12.5 SCREENING FOR PROSTATE CANCER: ICD-10-CM

## 2022-07-29 LAB — PSA SERPL-MCNC: 0.9 NG/ML (ref 0–4)

## 2022-07-29 PROCEDURE — G0103 PSA SCREENING: HCPCS

## 2022-07-29 PROCEDURE — 36415 COLL VENOUS BLD VENIPUNCTURE: CPT

## 2022-08-01 ENCOUNTER — OFFICE VISIT (OUTPATIENT)
Dept: INTERNAL MEDICINE CLINIC | Facility: OTHER | Age: 67
End: 2022-08-01
Payer: MEDICARE

## 2022-08-01 VITALS
HEART RATE: 60 BPM | BODY MASS INDEX: 29.06 KG/M2 | TEMPERATURE: 98 F | HEIGHT: 63 IN | WEIGHT: 164 LBS | SYSTOLIC BLOOD PRESSURE: 116 MMHG | OXYGEN SATURATION: 98 % | DIASTOLIC BLOOD PRESSURE: 60 MMHG

## 2022-08-01 DIAGNOSIS — M54.2 NECK PAIN: ICD-10-CM

## 2022-08-01 DIAGNOSIS — R19.7 DIARRHEA, UNSPECIFIED TYPE: ICD-10-CM

## 2022-08-01 DIAGNOSIS — M54.16 LUMBAR RADICULOPATHY: ICD-10-CM

## 2022-08-01 DIAGNOSIS — M48.02 CERVICAL STENOSIS OF SPINAL CANAL: ICD-10-CM

## 2022-08-01 DIAGNOSIS — R14.0 ABDOMINAL BLOATING: ICD-10-CM

## 2022-08-01 DIAGNOSIS — I10 BENIGN ESSENTIAL HYPERTENSION: ICD-10-CM

## 2022-08-01 DIAGNOSIS — M54.12 CERVICAL RADICULOPATHY: ICD-10-CM

## 2022-08-01 DIAGNOSIS — K21.9 GASTROESOPHAGEAL REFLUX DISEASE WITHOUT ESOPHAGITIS: ICD-10-CM

## 2022-08-01 DIAGNOSIS — D50.8 IRON DEFICIENCY ANEMIA SECONDARY TO INADEQUATE DIETARY IRON INTAKE: Primary | ICD-10-CM

## 2022-08-01 DIAGNOSIS — N18.30 STAGE 3 CHRONIC KIDNEY DISEASE, UNSPECIFIED WHETHER STAGE 3A OR 3B CKD (HCC): ICD-10-CM

## 2022-08-01 DIAGNOSIS — E11.65 TYPE 2 DIABETES MELLITUS WITH HYPERGLYCEMIA, WITH LONG-TERM CURRENT USE OF INSULIN (HCC): ICD-10-CM

## 2022-08-01 DIAGNOSIS — Z79.4 TYPE 2 DIABETES MELLITUS WITH HYPERGLYCEMIA, WITH LONG-TERM CURRENT USE OF INSULIN (HCC): ICD-10-CM

## 2022-08-01 DIAGNOSIS — E66.3 OVERWEIGHT (BMI 25.0-29.9): ICD-10-CM

## 2022-08-01 DIAGNOSIS — R10.32 LEFT LOWER QUADRANT ABDOMINAL PAIN: ICD-10-CM

## 2022-08-01 PROBLEM — L82.0 INFLAMED SEBORRHEIC KERATOSIS: Status: RESOLVED | Noted: 2019-08-08 | Resolved: 2022-08-01

## 2022-08-01 PROBLEM — R53.83 OTHER FATIGUE: Status: RESOLVED | Noted: 2021-10-06 | Resolved: 2022-08-01

## 2022-08-01 PROBLEM — L23.1 ALLERGIC CONTACT DERMATITIS DUE TO ADHESIVES: Status: RESOLVED | Noted: 2022-02-17 | Resolved: 2022-08-01

## 2022-08-01 PROCEDURE — 99214 OFFICE O/P EST MOD 30 MIN: CPT | Performed by: INTERNAL MEDICINE

## 2022-08-01 RX ORDER — AMLODIPINE BESYLATE 10 MG/1
10 TABLET ORAL DAILY
Qty: 90 TABLET | Refills: 1 | Status: SHIPPED | OUTPATIENT
Start: 2022-08-01

## 2022-08-01 RX ORDER — GABAPENTIN 300 MG/1
300 CAPSULE ORAL 3 TIMES DAILY
Qty: 270 CAPSULE | Refills: 1 | Status: SHIPPED | OUTPATIENT
Start: 2022-08-01

## 2022-08-01 RX ORDER — OMEPRAZOLE 40 MG/1
40 CAPSULE, DELAYED RELEASE ORAL DAILY
Qty: 90 CAPSULE | Refills: 1 | Status: SHIPPED | OUTPATIENT
Start: 2022-08-01

## 2022-08-01 RX ORDER — LISINOPRIL 40 MG/1
40 TABLET ORAL DAILY
Qty: 90 TABLET | Refills: 1 | Status: SHIPPED | OUTPATIENT
Start: 2022-08-01

## 2022-08-01 RX ORDER — HYDROCHLOROTHIAZIDE 25 MG/1
25 TABLET ORAL DAILY
Qty: 90 TABLET | Refills: 1 | Status: SHIPPED | OUTPATIENT
Start: 2022-08-01

## 2022-08-01 RX ORDER — DOXAZOSIN MESYLATE 4 MG/1
4 TABLET ORAL DAILY
Qty: 90 TABLET | Refills: 1 | Status: SHIPPED | OUTPATIENT
Start: 2022-08-01

## 2022-08-01 RX ORDER — SIMVASTATIN 20 MG
20 TABLET ORAL
Qty: 90 TABLET | Refills: 3 | Status: SHIPPED | OUTPATIENT
Start: 2022-08-01

## 2022-08-01 RX ORDER — QUINIDINE GLUCONATE 324 MG
240 TABLET, EXTENDED RELEASE ORAL DAILY
Qty: 90 TABLET | Refills: 1
Start: 2022-08-01

## 2022-08-01 RX ORDER — DICYCLOMINE HCL 20 MG
20 TABLET ORAL
Qty: 120 TABLET | Refills: 5 | Status: SHIPPED | OUTPATIENT
Start: 2022-08-01

## 2022-08-01 NOTE — ASSESSMENT & PLAN NOTE
Lab Results   Component Value Date    EGFR 50 07/18/2022    EGFR 59 04/08/2022    EGFR 56 01/05/2022    CREATININE 1 41 (H) 07/18/2022    CREATININE 1 24 04/08/2022    CREATININE 1 30 01/05/2022   Continue to trend kidney function  Avoid nephrotoxic agents

## 2022-08-01 NOTE — PROGRESS NOTES
Assessment/Plan:    Type 2 diabetes mellitus, with long-term current use of insulin ([facility])  Continue current diabetic regimen and follow-up with endocrinology  Lab Results   Component Value Date    HGBA1C 6 1 (H) 07/18/2022       Gastroesophageal reflux disease without esophagitis  Stable, controlled  Continue omeprazole 40 mg daily  Benign essential hypertension  Stable, controlled  Continue amlodipine 10 mg daily, doxazosin 4 mg daily, hydrochlorothiazide 25 mg daily, and lisinopril 40 mg daily  Lumbar radiculopathy  Continue gabapentin 300 mg 3 times a day  Stage 3 chronic kidney disease, unspecified whether stage 3a or 3b CKD ([facility] )  Lab Results   Component Value Date    EGFR 50 07/18/2022    EGFR 59 04/08/2022    EGFR 56 01/05/2022    CREATININE 1 41 (H) 07/18/2022    CREATININE 1 24 04/08/2022    CREATININE 1 30 01/05/2022   Continue to trend kidney function  Avoid nephrotoxic agents  Overweight (BMI 25 0-29  9)  Discussed diet and exercise  Iron deficiency anemia secondary to inadequate dietary iron intake  Recommend starting ferrous gluconate daily  Will recheck iron panel in 3 months  Diagnoses and all orders for this visit:    Iron deficiency anemia secondary to inadequate dietary iron intake  -     ferrous gluconate (FERGON) 240 (27 FE) MG tablet; Take 1 tablet (240 mg total) by mouth in the morning  -     Iron Panel (Includes Ferritin, Iron Sat%, Iron, and TIBC); Future    Stage 3 chronic kidney disease, unspecified whether stage 3a or 3b CKD (HCC)    Type 2 diabetes mellitus with hyperglycemia, with long-term current use of insulin ([facility])  -     amLODIPine (NORVASC) 10 mg tablet; Take 1 tablet (10 mg total) by mouth daily  -     lisinopril (ZESTRIL) 40 mg tablet; Take 1 tablet (40 mg total) by mouth daily  -     simvastatin (ZOCOR) 20 mg tablet; Take 1 tablet (20 mg total) by mouth daily at bedtime    Benign essential hypertension  -     doxazosin (CARDURA) 4 mg tablet;  Take 1 tablet (4 mg total) by mouth daily  -     hydrochlorothiazide (HYDRODIURIL) 25 mg tablet; Take 1 tablet (25 mg total) by mouth daily    Left lower quadrant abdominal pain  -     dicyclomine (BENTYL) 20 mg tablet; Take 1 tablet (20 mg total) by mouth 4 (four) times a day (before meals and at bedtime)    Diarrhea, unspecified type  -     dicyclomine (BENTYL) 20 mg tablet; Take 1 tablet (20 mg total) by mouth 4 (four) times a day (before meals and at bedtime)    Abdominal bloating  -     dicyclomine (BENTYL) 20 mg tablet; Take 1 tablet (20 mg total) by mouth 4 (four) times a day (before meals and at bedtime)    Neck pain  -     gabapentin (NEURONTIN) 300 mg capsule; Take 1 capsule (300 mg total) by mouth 3 (three) times a day    Cervical stenosis of spinal canal  -     gabapentin (NEURONTIN) 300 mg capsule; Take 1 capsule (300 mg total) by mouth 3 (three) times a day    Cervical radiculopathy  -     gabapentin (NEURONTIN) 300 mg capsule; Take 1 capsule (300 mg total) by mouth 3 (three) times a day    Gastroesophageal reflux disease without esophagitis  -     omeprazole (PriLOSEC) 40 MG capsule; Take 1 capsule (40 mg total) by mouth daily    Lumbar radiculopathy    Overweight (BMI 25 0-29  9)                Subjective:      Patient ID: Michael Hunter is a 74 y o  male  Chief Complaint   Patient presents with    Follow-up     Discuss iron levels, endo provider feels iron level has been resulting in his A1C in not accurate    lab work done on 7/29/2022    Hypertension    Diabetes     Type 3       74year old male seen today for follow-up of chronic conditions  Laboratory studies reviewed today, CBC and CMP are within acceptable range  Hemoglobin A1c 6 1% to which his endocrinologist feels his anemia may be causing and inaccurate A1c reading  He reports that his average home glucose readings are 160s  Iron panel shows an iron level of 63  TIBC and iron saturation are within acceptable range      Lipid panel shows a slight increase in triglycerides however total cholesterol and LDL are within acceptable range  He has been compliant with simvastatin 20 mg daily  He experiences occasional lightheadedness when standing up after bending forward  The orthostatic dizziness last momentarily  He denies any recent falls  Otherwise, he has no active complaints or concerns at this time  Hypertension  This is a chronic problem  The current episode started more than 1 year ago  The problem is unchanged  The problem is controlled  Pertinent negatives include no chest pain, headaches, palpitations or shortness of breath  Past treatments include calcium channel blockers, alpha 1 blockers and diuretics  The current treatment provides moderate improvement  There are no compliance problems  Diabetes  He presents for his follow-up diabetic visit  He has type 2 diabetes mellitus  His disease course has been stable  There are no hypoglycemic associated symptoms  Pertinent negatives for hypoglycemia include no dizziness or headaches  There are no diabetic associated symptoms  Pertinent negatives for diabetes include no chest pain, no fatigue and no weakness  There are no hypoglycemic complications  Symptoms are stable  There are no diabetic complications  Risk factors for coronary artery disease include dyslipidemia, diabetes mellitus, hypertension and male sex  Current diabetic treatment includes diet and insulin injections  He is compliant with treatment all of the time  There is no change in his home blood glucose trend  His overall blood glucose range is 140-180 mg/dl  An ACE inhibitor/angiotensin II receptor blocker is being taken  Hyperlipidemia  This is a chronic problem  The current episode started more than 1 year ago  The problem is controlled  Recent lipid tests were reviewed and are normal  Pertinent negatives include no chest pain or shortness of breath  Current antihyperlipidemic treatment includes statins   The current treatment provides moderate improvement of lipids  There are no compliance problems  The following portions of the patient's history were reviewed and updated as appropriate: allergies, current medications, past family history, past medical history, past social history, past surgical history and problem list     Review of Systems   Constitutional: Negative for activity change, appetite change, chills, diaphoresis, fatigue and fever  HENT: Negative for congestion, postnasal drip, rhinorrhea, sinus pressure, sinus pain, sneezing and sore throat  Eyes: Negative for visual disturbance  Respiratory: Negative for apnea, cough, choking, chest tightness, shortness of breath and wheezing  Cardiovascular: Negative for chest pain, palpitations and leg swelling  Gastrointestinal: Negative for abdominal distention, abdominal pain, anal bleeding, blood in stool, constipation, diarrhea, nausea and vomiting  Endocrine: Negative for cold intolerance and heat intolerance  Genitourinary: Negative for difficulty urinating, dysuria and hematuria  Musculoskeletal: Negative  Skin: Negative  Neurological: Negative for dizziness, weakness, light-headedness, numbness and headaches  Hematological: Negative for adenopathy  Psychiatric/Behavioral: Negative for agitation, sleep disturbance and suicidal ideas  All other systems reviewed and are negative          Past Medical History:   Diagnosis Date    Arthritis     Last assessed 5/24/2013    Avitaminosis D 2012    Colon polyp     Diabetes mellitus ([facility] [address] )     Displacement of cervical intervertebral disc 2014    GERD (gastroesophageal reflux disease)     Glaucoma     Normal Pressure Glaucoma    HTN (hypertension) 2007    Hypertension     IBS (irritable bowel syndrome) 2021    Nephrolithiasis 5/24/2013    Pituitary microadenoma ([facility] ) 2010    Spinal stenosis in cervical region 2014    Sprain and strain of calcaneofibular (ligament) 12/5/2013  Submandibular lymphadenopathy 8/8/2019    Uric acid nephrolithiasis 1990         Current Outpatient Medications:     amLODIPine (NORVASC) 10 mg tablet, Take 1 tablet (10 mg total) by mouth daily, Disp: 90 tablet, Rfl: 1    aspirin (ECOTRIN LOW STRENGTH) 81 mg EC tablet, Take 81 mg by mouth daily , Disp: , Rfl:     B Complex Vitamins (VITAMIN B-COMPLEX PO), Take 1 capsule by mouth daily , Disp: , Rfl:     BD Pen Needle Harriett U/F 32G X 4 MM MISC, Use 4x per day, Disp: 400 each, Rfl: 3    brimonidine-timolol (COMBIGAN) 0 2-0 5 %, Administer 1 drop to both eyes every 12 (twelve) hours, Disp: , Rfl:     cholecalciferol (VITAMIN D3) 1,000 units tablet, Take 1,000 Units by mouth 2 (two) times a day , Disp: , Rfl:     clobetasol (TEMOVATE) 0 05 % cream, Apply topically 2 (two) times a day as needed (Rash), Disp: 30 g, Rfl: 3    dicyclomine (BENTYL) 20 mg tablet, Take 1 tablet (20 mg total) by mouth 4 (four) times a day (before meals and at bedtime), Disp: 120 tablet, Rfl: 5    doxazosin (CARDURA) 4 mg tablet, Take 1 tablet (4 mg total) by mouth daily, Disp: 90 tablet, Rfl: 1    Empagliflozin (Jardiance) 25 MG TABS, Take 1 tablet (25 mg total) by mouth daily, Disp: 90 tablet, Rfl: 3    ferrous gluconate (FERGON) 240 (27 FE) MG tablet, Take 1 tablet (240 mg total) by mouth in the morning, Disp: 90 tablet, Rfl: 1    fluticasone (FLONASE) 50 mcg/act nasal spray, 1 spray into each nostril daily as needed for rhinitis (Acute URI/sinusitis), Disp: 18 mL, Rfl: 0    gabapentin (NEURONTIN) 300 mg capsule, Take 1 capsule (300 mg total) by mouth 3 (three) times a day, Disp: 270 capsule, Rfl: 1    glipiZIDE (GLUCOTROL) 5 mg tablet, Take 1 tablet (5 mg total) by mouth 2 (two) times a day before meals, Disp: 60 tablet, Rfl: 2    Glucosamine-Chondroitin 250-200 MG TABS, Take 1 tablet by mouth 2 (two) times a day, Disp: , Rfl:     hydrochlorothiazide (HYDRODIURIL) 25 mg tablet, Take 1 tablet (25 mg total) by mouth daily, Disp: 90 tablet, Rfl: 1    ibuprofen (MOTRIN) 200 mg tablet, Take 200 mg by mouth as needed , Disp: , Rfl:     insulin glargine (Lantus SoloStar) 100 units/mL injection pen, Inject 30 Units under the skin daily at bedtime, Disp: 30 mL, Rfl: 3    insulin lispro (HumaLOG KwikPen) 100 units/mL injection pen, Inject 10 Units under the skin 3 (three) times a day with meals, Disp: 30 mL, Rfl: 3    lisinopril (ZESTRIL) 40 mg tablet, Take 1 tablet (40 mg total) by mouth daily, Disp: 90 tablet, Rfl: 1    loratadine (CLARITIN) 10 mg tablet, Take 10 mg by mouth daily, Disp: , Rfl:     MULTIPLE VITAMIN PO, Take 1 tablet by mouth daily , Disp: , Rfl:     omeprazole (PriLOSEC) 40 MG capsule, Take 1 capsule (40 mg total) by mouth daily, Disp: 90 capsule, Rfl: 1    other medication, see sig,, Medication/product name: Medical Marijuana, Disp: , Rfl:     Probiotic Product (PROBIOTIC-10) CAPS, Take 1 capsule by mouth daily , Disp: , Rfl:     simvastatin (ZOCOR) 20 mg tablet, Take 1 tablet (20 mg total) by mouth daily at bedtime, Disp: 90 tablet, Rfl: 3    testosterone (ANDROGEL) 25 MG/2 5GM (1%) GEL, Place 2 packets (50 mg total) on the skin daily, Disp: 180 packet, Rfl: 1    vitamin E, tocopherol, 400 units capsule, Take 400 Units by mouth 2 (two) times a day , Disp: , Rfl:     Allergies   Allergen Reactions    Clonidine Other (See Comments)     Diaphoresis, hot flashes    Augmentin [Amoxicillin-Pot Clavulanate] Abdominal Pain    Biaxin [Clarithromycin] Abdominal Pain    Dust Mite Extract     Insulin Aspart GI Intolerance     Novolog     Liraglutide Abdominal Pain and Nausea Only    Metformin And Related Diarrhea and GI Intolerance    Molds & Smuts     Nuts - Food Allergy     Seasonal Ic [Cholestatin] Headache    Sitagliptin-Metformin Hcl Er Abdominal Pain, Diarrhea and GI Intolerance       Social History   Past Surgical History:   Procedure Laterality Date    ASPIRATION / INJECTION RENAL CYST      Renal Cyst Aspiration    CATARACT EXTRACTION      12/2017- right eye, 01/2018- left eye    COLONOSCOPY  2005    EYE SURGERY Bilateral     Cataract Surgery    TONSILLECTOMY      UPPER GASTROINTESTINAL ENDOSCOPY       Family History   Problem Relation Age of Onset    Diabetes unspecified Mother     Heart disease Mother     Nephrolithiasis Mother     Kidney disease Mother     Other Mother         Back Disorder    Thyroid disease Mother     Diabetes unspecified Father     Heart disease Father     Diabetes unspecified Sister     Heart disease Sister     Stroke Sister     Diabetes unspecified Brother     Heart disease Brother     Nephrolithiasis Brother     Lung cancer Brother     Other Brother         COPD    Diabetes unspecified Sister     Heart disease Sister     Diabetes unspecified Sister     Diabetes unspecified Brother     Heart disease Brother     Diabetes unspecified Brother     Other Brother         Back Disorder    Diabetes unspecified Brother     Other Brother         Back Disorder    Diabetes unspecified Brother     Stomach cancer Paternal Aunt        Objective:  /60 (BP Location: Left arm, Patient Position: Sitting, Cuff Size: Adult)   Pulse 60   Temp 98 °F (36 7 °C) (Temporal)   Ht 5' 3" (1 6 m)   Wt 74 4 kg (164 lb)   SpO2 98%   BMI 29 05 kg/m²     Recent Results (from the past 1344 hour(s))   Comprehensive metabolic panel Lab Collect    Collection Time: 07/18/22  8:25 AM   Result Value Ref Range    Sodium 137 135 - 147 mmol/L    Potassium 3 9 3 5 - 5 3 mmol/L    Chloride 103 96 - 108 mmol/L    CO2 28 21 - 32 mmol/L    ANION GAP 6 4 - 13 mmol/L    BUN 31 (H) 5 - 25 mg/dL    Creatinine 1 41 (H) 0 60 - 1 30 mg/dL    Glucose, Fasting 212 (H) 65 - 99 mg/dL    Calcium 9 2 8 3 - 10 1 mg/dL    AST 18 5 - 45 U/L    ALT 33 12 - 78 U/L    Alkaline Phosphatase 78 46 - 116 U/L    Total Protein 7 5 6 4 - 8 4 g/dL    Albumin 3 7 3 5 - 5 0 g/dL    Total Bilirubin 0 92 0 20 - 1 00 mg/dL eGFR 50 ml/min/1 73sq m   HEMOGLOBIN A1C W/ EAG ESTIMATION Lab Collect    Collection Time: 07/18/22  8:25 AM   Result Value Ref Range    Hemoglobin A1C 6 1 (H) Normal 3 8-5 6%; PreDiabetic 5 7-6 4%; Diabetic >=6 5%; Glycemic control for adults with diabetes <7 0% %     mg/dl   Lipid panel Lab Collect Lab Collect    Collection Time: 07/18/22  8:25 AM   Result Value Ref Range    Cholesterol 99 See Comment mg/dL    Triglycerides 172 (H) See Comment mg/dL    HDL, Direct 32 (L) >=40 mg/dL    LDL Calculated 33 0 - 100 mg/dL    Non-HDL-Chol (CHOL-HDL) 67 mg/dl   Microalbumin / creatinine urine ratio Lab Collect    Collection Time: 07/18/22  8:25 AM   Result Value Ref Range    Creatinine, Ur 74 8 mg/dL    Microalbum  ,U,Random 5 0 0 0 - 20 0 mg/L    Microalb Creat Ratio 7 0 - 30 mg/g creatinine   TSH, 3rd generation Lab Collect    Collection Time: 07/18/22  8:25 AM   Result Value Ref Range    TSH 3RD GENERATON 4 350 0 450 - 4 500 uIU/mL   T4, free Lab Collect    Collection Time: 07/18/22  8:25 AM   Result Value Ref Range    Free T4 1 03 0 76 - 1 46 ng/dL   Iron Saturation %    Collection Time: 07/22/22 11:10 AM   Result Value Ref Range    Iron Saturation 20 20 - 50 %    TIBC 321 250 - 450 ug/dL    Iron 63 (L) 65 - 175 ug/dL   Ferritin    Collection Time: 07/22/22 11:10 AM   Result Value Ref Range    Ferritin 58 8 - 388 ng/mL   PSA, Total Screen    Collection Time: 07/29/22 10:34 AM   Result Value Ref Range    PSA 0 9 0 0 - 4 0 ng/mL            Physical Exam  Vitals and nursing note reviewed  Constitutional:       General: He is not in acute distress  Appearance: He is well-developed  He is not diaphoretic  HENT:      Head: Normocephalic and atraumatic  Eyes:      General: No scleral icterus  Right eye: No discharge  Left eye: No discharge  Conjunctiva/sclera: Conjunctivae normal       Pupils: Pupils are equal, round, and reactive to light  Neck:      Thyroid: No thyromegaly  Vascular: No JVD  Cardiovascular:      Rate and Rhythm: Normal rate and regular rhythm  Heart sounds: Normal heart sounds  No murmur heard  No friction rub  No gallop  Pulmonary:      Effort: Pulmonary effort is normal  No respiratory distress  Breath sounds: Normal breath sounds  No wheezing or rales  Chest:      Chest wall: No tenderness  Abdominal:      General: Bowel sounds are normal  There is no distension  Palpations: Abdomen is soft  There is no mass  Tenderness: There is no abdominal tenderness  There is no guarding or rebound  Musculoskeletal:         General: No tenderness or deformity  Normal range of motion  Cervical back: Normal range of motion and neck supple  Lymphadenopathy:      Cervical: No cervical adenopathy  Skin:     General: Skin is warm and dry  Coloration: Skin is not pale  Findings: No erythema or rash  Neurological:      Mental Status: He is alert and oriented to person, place, and time  Cranial Nerves: No cranial nerve deficit  Coordination: Coordination normal       Deep Tendon Reflexes: Reflexes are normal and symmetric  Psychiatric:         Behavior: Behavior normal          Thought Content:  Thought content normal          Judgment: Judgment normal

## 2022-08-01 NOTE — ASSESSMENT & PLAN NOTE
Stable, controlled  Continue amlodipine 10 mg daily, doxazosin 4 mg daily, hydrochlorothiazide 25 mg daily, and lisinopril 40 mg daily

## 2022-08-01 NOTE — ASSESSMENT & PLAN NOTE
Continue current diabetic regimen and follow-up with endocrinology    Lab Results   Component Value Date    HGBA1C 6 1 (H) 07/18/2022

## 2022-10-05 ENCOUNTER — FOLLOW UP (OUTPATIENT)
Dept: URBAN - METROPOLITAN AREA CLINIC 6 | Facility: CLINIC | Age: 67
End: 2022-10-05

## 2022-10-05 DIAGNOSIS — H40.1112: ICD-10-CM

## 2022-10-05 DIAGNOSIS — H04.123: ICD-10-CM

## 2022-10-05 DIAGNOSIS — Z79.4: ICD-10-CM

## 2022-10-05 DIAGNOSIS — H40.1121: ICD-10-CM

## 2022-10-05 DIAGNOSIS — Z96.1: ICD-10-CM

## 2022-10-05 DIAGNOSIS — E11.3293: ICD-10-CM

## 2022-10-05 LAB
LEFT EYE DIABETIC RETINOPATHY: NORMAL
RIGHT EYE DIABETIC RETINOPATHY: NORMAL

## 2022-10-05 PROCEDURE — 92202 OPSCPY EXTND ON/MAC DRAW: CPT

## 2022-10-05 PROCEDURE — 92133 CPTRZD OPH DX IMG PST SGM ON: CPT

## 2022-10-05 PROCEDURE — 92014 COMPRE OPH EXAM EST PT 1/>: CPT

## 2022-10-05 ASSESSMENT — VISUAL ACUITY
OU_SC: J2
OD_SC: 20/30
OS_SC: 20/25

## 2022-10-05 ASSESSMENT — TONOMETRY
OS_IOP_MMHG: 15
OD_IOP_MMHG: 22
OS_IOP_MMHG: 21
OD_IOP_MMHG: 14

## 2022-10-06 DIAGNOSIS — D50.8 IRON DEFICIENCY ANEMIA SECONDARY TO INADEQUATE DIETARY IRON INTAKE: ICD-10-CM

## 2022-10-06 DIAGNOSIS — E23.0 HYPOGONADOTROPIC HYPOGONADISM (HCC): Primary | ICD-10-CM

## 2022-10-11 ENCOUNTER — APPOINTMENT (OUTPATIENT)
Dept: LAB | Age: 67
End: 2022-10-11
Payer: MEDICARE

## 2022-10-11 DIAGNOSIS — E23.0 HYPOGONADOTROPIC HYPOGONADISM (HCC): ICD-10-CM

## 2022-10-11 DIAGNOSIS — D50.8 IRON DEFICIENCY ANEMIA SECONDARY TO INADEQUATE DIETARY IRON INTAKE: ICD-10-CM

## 2022-10-11 PROBLEM — J06.9 ACUTE URI: Status: RESOLVED | Noted: 2018-01-15 | Resolved: 2022-10-11

## 2022-10-11 LAB
FERRITIN SERPL-MCNC: 113 NG/ML (ref 8–388)
HCT VFR BLD AUTO: 47.7 % (ref 36.5–49.3)
HGB BLD-MCNC: 15.9 G/DL (ref 12–17)
IRON SATN MFR SERPL: 34 % (ref 20–50)
IRON SERPL-MCNC: 85 UG/DL (ref 65–175)
TIBC SERPL-MCNC: 251 UG/DL (ref 250–450)

## 2022-10-11 PROCEDURE — 83550 IRON BINDING TEST: CPT

## 2022-10-11 PROCEDURE — 84403 ASSAY OF TOTAL TESTOSTERONE: CPT

## 2022-10-11 PROCEDURE — 36415 COLL VENOUS BLD VENIPUNCTURE: CPT

## 2022-10-11 PROCEDURE — 84402 ASSAY OF FREE TESTOSTERONE: CPT

## 2022-10-11 PROCEDURE — 83540 ASSAY OF IRON: CPT

## 2022-10-11 PROCEDURE — 85014 HEMATOCRIT: CPT

## 2022-10-11 PROCEDURE — 82728 ASSAY OF FERRITIN: CPT

## 2022-10-11 PROCEDURE — 85018 HEMOGLOBIN: CPT

## 2022-10-12 LAB
TESTOST FREE SERPL-MCNC: 4.6 PG/ML (ref 6.6–18.1)
TESTOST SERPL-MCNC: 305 NG/DL (ref 264–916)

## 2022-10-14 ENCOUNTER — OFFICE VISIT (OUTPATIENT)
Dept: ENDOCRINOLOGY | Facility: CLINIC | Age: 67
End: 2022-10-14
Payer: MEDICARE

## 2022-10-14 ENCOUNTER — TELEPHONE (OUTPATIENT)
Dept: ADMINISTRATIVE | Facility: OTHER | Age: 67
End: 2022-10-14

## 2022-10-14 VITALS
WEIGHT: 163 LBS | SYSTOLIC BLOOD PRESSURE: 110 MMHG | HEART RATE: 71 BPM | DIASTOLIC BLOOD PRESSURE: 60 MMHG | BODY MASS INDEX: 28.88 KG/M2 | HEIGHT: 63 IN

## 2022-10-14 DIAGNOSIS — Z79.4 TYPE 2 DIABETES MELLITUS WITH BOTH EYES AFFECTED BY MILD NONPROLIFERATIVE RETINOPATHY WITHOUT MACULAR EDEMA, WITH LONG-TERM CURRENT USE OF INSULIN (HCC): Primary | ICD-10-CM

## 2022-10-14 DIAGNOSIS — N18.30 STAGE 3 CHRONIC KIDNEY DISEASE, UNSPECIFIED WHETHER STAGE 3A OR 3B CKD (HCC): ICD-10-CM

## 2022-10-14 DIAGNOSIS — D35.2 PITUITARY MICROADENOMA (HCC): ICD-10-CM

## 2022-10-14 DIAGNOSIS — E23.0 HYPOGONADOTROPIC HYPOGONADISM (HCC): ICD-10-CM

## 2022-10-14 DIAGNOSIS — G47.33 OBSTRUCTIVE SLEEP APNEA SYNDROME: ICD-10-CM

## 2022-10-14 DIAGNOSIS — E11.3293 TYPE 2 DIABETES MELLITUS WITH BOTH EYES AFFECTED BY MILD NONPROLIFERATIVE RETINOPATHY WITHOUT MACULAR EDEMA, WITH LONG-TERM CURRENT USE OF INSULIN (HCC): Primary | ICD-10-CM

## 2022-10-14 PROCEDURE — 95251 CONT GLUC MNTR ANALYSIS I&R: CPT | Performed by: INTERNAL MEDICINE

## 2022-10-14 PROCEDURE — 99214 OFFICE O/P EST MOD 30 MIN: CPT | Performed by: INTERNAL MEDICINE

## 2022-10-14 RX ORDER — SEMAGLUTIDE 1.34 MG/ML
0.5 INJECTION, SOLUTION SUBCUTANEOUS WEEKLY
Qty: 3 ML | Refills: 0 | Status: SHIPPED | COMMUNITY
Start: 2022-10-14

## 2022-10-14 RX ORDER — INSULIN LISPRO 100 [IU]/ML
INJECTION, SOLUTION INTRAVENOUS; SUBCUTANEOUS
Qty: 30 ML | Refills: 3 | Status: SHIPPED | OUTPATIENT
Start: 2022-10-14

## 2022-10-14 NOTE — TELEPHONE ENCOUNTER
----- Message from Bonnie Zee sent at 10/14/2022 11:12 AM EDT -----  Regarding: HM DM EYE EXAM  10/14/22 11:13 AM    Hello, our patient Ramiro Morales has had Diabetic Eye Exam completed/performed  Please assist in updating the patient chart , report under Media      Thank you,  Shelly Boss  [facility]

## 2022-10-14 NOTE — PROGRESS NOTES
Edie Barrera 74 y o  male MRN: [id number]    Encounter: [id number]      Assessment/Plan     Assessment: This is a 74y o -year-old male with diabetes with hyperglycemia complicated by retinopathy, pituitary microadenoma, hypogonadotropic hypogonadism, obstructive sleep apnea and stage 3 chronic kidney disease  Plan:  1  Type 2 diabetes hyperglycemia complicated by retinopathy-he would like to try Ozempic which I have given him a sample of  He is going to let me know if he develops any side effects  In the meantime, increase his Humalog to 15 units with breakfast   He will continue on Humalog 10 units with lunch and dinner along with Lantus 30 units at bedtime  He is to send me his blood sugar download in two weeks so I can make further adjustments  2   Pituitary microadenoma is stable  3   Hypogonadotropic hypogonadism has improved with testosterone supplementation  4   Obstructive sleep apnea-continue CPAP  5  Stage 3 chronic kidney disease appears to be stable  CC: Diabetes    History of Present Illness     HPI:  75-year-old man with type 2 diabetes presents for follow-up  He has noticed an increase in his blood sugars particularly in the morning  He denies any hypoglycemia except when he is active most of the day  His diabetes has been complicated by retinopathy which is new  He is currently on basal bolus insulin therapy along with oral agents  Review of Systems   Constitutional: Negative for chills and fever  Respiratory: Negative for shortness of breath  Cardiovascular: Negative for chest pain  Gastrointestinal: Negative for constipation, diarrhea, nausea and vomiting  Endocrine: Negative for polydipsia and polyuria  All other systems reviewed and are negative        Historical Information   Past Medical History:   Diagnosis Date   • Arthritis     Last assessed 5/24/2013   • Avitaminosis D 2012   • Colon polyp    • [facility] )    • Displacement of cervical intervertebral disc    • GERD (gastroesophageal reflux disease)    • Glaucoma     Normal Pressure Glaucoma   • HTN (hypertension)    • Hypertension    • IBS (irritable bowel syndrome)    • Nephrolithiasis 2013   • Pituitary microadenoma ([address] )    • Spinal stenosis in cervical region    • Sprain and strain of calcaneofibular (ligament) 2013   • Submandibular lymphadenopathy 2019   • Uric acid nephrolithiasis      Past Surgical History:   Procedure Laterality Date   • ASPIRATION / INJECTION RENAL CYST      Renal Cyst Aspiration   • CATARACT EXTRACTION      2017- right eye, 2018- left eye   • COLONOSCOPY     • EYE SURGERY Bilateral     Cataract Surgery   • TONSILLECTOMY     • UPPER GASTROINTESTINAL ENDOSCOPY       Social History   Social History     Substance and Sexual Activity   Alcohol Use Yes   • Alcohol/week: 4 0 standard drinks   • Types: 2 Cans of beer, 2 Standard drinks or equivalent per week    Comment: social     Social History     Substance and Sexual Activity   Drug Use Yes   • Frequency: 7 0 times per week   • Types: Marijuana    Comment: Use Medical Marijuana daily (1-3 capsules or tincture)     Social History     Tobacco Use   Smoking Status Former Smoker   • Packs/day: 0 25   • Years: 2 00   • Pack years: 0 50   • Types: Cigarettes   • Quit date: 1980   • Years since quittin 8   Smokeless Tobacco Never Used     Family History:   Family History   Problem Relation Age of Onset   • Diabetes unspecified Mother    • Heart disease Mother    • Nephrolithiasis Mother    • Kidney disease Mother    • Other Mother         Back Disorder   • Thyroid disease Mother    • Diabetes type II Mother    • Hypertension Mother    • Thyroid disease unspecified Mother    • Diabetes unspecified Father    • Heart disease Father    • Hypertension Father    • Diabetes unspecified Sister    • Heart disease Sister    • Stroke Sister    • Diabetes unspecified Brother • Heart disease Brother    • Nephrolithiasis Brother    • Lung cancer Brother    • Other Brother         COPD   • Diabetes unspecified Sister    • Heart disease Sister    • Diabetes unspecified Sister    • Diabetes unspecified Brother    • Heart disease Brother    • Diabetes unspecified Brother    • Other Brother         Back Disorder   • Diabetes unspecified Brother    • Other Brother         Back Disorder   • Diabetes unspecified Brother    • Stomach cancer Paternal Aunt        Meds/Allergies   Current Outpatient Medications   Medication Sig Dispense Refill   • amLODIPine (NORVASC) 10 mg tablet Take 1 tablet (10 mg total) by mouth daily 90 tablet 1   • aspirin (ECOTRIN LOW STRENGTH) 81 mg EC tablet Take 81 mg by mouth daily      • B Complex Vitamins (VITAMIN B-COMPLEX PO) Take 1 capsule by mouth daily      • BD Pen Needle Harriett U/F 32G X 4 MM MISC Use 4x per day 400 each 3   • brimonidine-timolol (COMBIGAN) 0 2-0 5 % Administer 1 drop to both eyes every 12 (twelve) hours     • cholecalciferol (VITAMIN D3) 1,000 units tablet Take 1,000 Units by mouth 2 (two) times a day      • clobetasol (TEMOVATE) 0 05 % cream Apply topically 2 (two) times a day as needed (Rash) 30 g 3   • dicyclomine (BENTYL) 20 mg tablet Take 1 tablet (20 mg total) by mouth 4 (four) times a day (before meals and at bedtime) 120 tablet 5   • doxazosin (CARDURA) 4 mg tablet Take 1 tablet (4 mg total) by mouth daily 90 tablet 1   • Empagliflozin (Jardiance) 25 MG TABS Take 1 tablet (25 mg total) by mouth daily 90 tablet 3   • ferrous gluconate (FERGON) 240 (27 FE) MG tablet Take 1 tablet (240 mg total) by mouth in the morning 90 tablet 1   • fluticasone (FLONASE) 50 mcg/act nasal spray 1 spray into each nostril daily as needed for rhinitis (Acute URI/sinusitis) 18 mL 0   • gabapentin (NEURONTIN) 300 mg capsule Take 1 capsule (300 mg total) by mouth 3 (three) times a day 270 capsule 1   • glipiZIDE (GLUCOTROL) 5 mg tablet Take 1 tablet (5 mg total) by mouth 2 (two) times a day before meals 60 tablet 2   • Glucosamine-Chondroitin 250-200 MG TABS Take 1 tablet by mouth 2 (two) times a day     • hydrochlorothiazide (HYDRODIURIL) 25 mg tablet Take 1 tablet (25 mg total) by mouth daily 90 tablet 1   • ibuprofen (MOTRIN) 200 mg tablet Take 200 mg by mouth as needed      • insulin glargine (Lantus SoloStar) 100 units/mL injection pen Inject 30 Units under the skin daily at bedtime 30 mL 3   • insulin lispro (HumaLOG KwikPen) 100 units/mL injection pen Inject 10 Units under the skin 3 (three) times a day with meals 30 mL 3   • lisinopril (ZESTRIL) 40 mg tablet Take 1 tablet (40 mg total) by mouth daily 90 tablet 1   • loratadine (CLARITIN) 10 mg tablet Take 10 mg by mouth daily     • MULTIPLE VITAMIN PO Take 1 tablet by mouth daily      • omeprazole (PriLOSEC) 40 MG capsule Take 1 capsule (40 mg total) by mouth daily 90 capsule 1   • other medication, see sig, Medication/product name: Medical Marijuana     • Probiotic Product (PROBIOTIC-10) CAPS Take 1 capsule by mouth daily      • simvastatin (ZOCOR) 20 mg tablet Take 1 tablet (20 mg total) by mouth daily at bedtime 90 tablet 3   • testosterone (ANDROGEL) 25 MG/2 5GM (1%) GEL Place 2 packets (50 mg total) on the skin daily 180 packet 1   • vitamin E, tocopherol, 400 units capsule Take 400 Units by mouth 2 (two) times a day        No current facility-administered medications for this visit       Allergies   Allergen Reactions   • Clonidine Other (See Comments)     Diaphoresis, hot flashes   • Augmentin [Amoxicillin-Pot Clavulanate] Abdominal Pain   • Biaxin [Clarithromycin] Abdominal Pain   • Dust Mite Extract    • Insulin Aspart GI Intolerance     Novolog    • Liraglutide Abdominal Pain and Nausea Only   • Metformin And Related Diarrhea and GI Intolerance   • Molds & Smuts    • Nuts - Food Allergy    • Seasonal Ic [Cholestatin] Headache   • Sitagliptin-Metformin Hcl Er Abdominal Pain, Diarrhea and GI Intolerance Objective   Vitals: Blood pressure 110/60, pulse 71, height 5' 3" (1 6 m), weight 73 9 kg (163 lb)  Physical Exam  Vitals reviewed  Constitutional:       General: He is not in acute distress  Appearance: He is well-developed  He is not diaphoretic  HENT:      Head: Normocephalic and atraumatic  Mouth/Throat:      Pharynx: No oropharyngeal exudate  Eyes:      General: Lids are normal  No scleral icterus  Right eye: No discharge  Left eye: No discharge  Conjunctiva/sclera: Conjunctivae normal    Neck:      Thyroid: No thyromegaly  Cardiovascular:      Rate and Rhythm: Normal rate and regular rhythm  Heart sounds: Normal heart sounds  No murmur heard  No friction rub  No gallop  Pulmonary:      Effort: Pulmonary effort is normal  No respiratory distress  Breath sounds: Normal breath sounds  No wheezing  Chest:   Breasts:      Right: No supraclavicular adenopathy  Left: No supraclavicular adenopathy  Abdominal:      General: Bowel sounds are normal  There is no distension  Palpations: Abdomen is soft  Tenderness: There is no abdominal tenderness  Musculoskeletal:         General: No tenderness or deformity  Normal range of motion  Cervical back: Neck supple  Lymphadenopathy:      Head:      Right side of head: No occipital adenopathy  Left side of head: No occipital adenopathy  Upper Body:      Right upper body: No supraclavicular adenopathy  Left upper body: No supraclavicular adenopathy  Skin:     General: Skin is warm  Findings: No erythema or rash  Neurological:      Mental Status: He is alert and oriented to person, place, and time  Cranial Nerves: No cranial nerve deficit  Coordination: Coordination normal    Psychiatric:         Mood and Affect: Mood normal          Behavior: Behavior normal          The history was obtained from the review of the chart, patient      Lab Results:   Lab Results   Component Value Date/Time    Hemoglobin A1C 6 1 (H) 07/18/2022 08:25 AM    Hemoglobin A1C 5 9 (H) 04/08/2022 08:20 AM    Hemoglobin A1C 6 6 (H) 01/05/2022 08:33 AM    WBC 8 31 05/27/2022 08:35 AM    WBC 10 61 (H) 01/05/2022 08:33 AM    Hemoglobin 15 9 10/11/2022 08:30 AM    Hemoglobin 15 3 05/27/2022 08:35 AM    Hemoglobin 15 9 01/05/2022 08:33 AM    Hematocrit 47 7 10/11/2022 08:30 AM    Hematocrit 43 9 05/27/2022 08:35 AM    Hematocrit 45 3 01/05/2022 08:33 AM    MCV 85 05/27/2022 08:35 AM    MCV 87 01/05/2022 08:33 AM    Platelets 937 70/71/9141 08:35 AM    Platelets 478 17/34/8970 08:33 AM    BUN 31 (H) 07/18/2022 08:25 AM    BUN 27 (H) 04/08/2022 08:20 AM    BUN 32 (H) 01/05/2022 08:33 AM    Potassium 3 9 07/18/2022 08:25 AM    Potassium 4 3 04/08/2022 08:20 AM    Potassium 4 1 01/05/2022 08:33 AM    Chloride 103 07/18/2022 08:25 AM    Chloride 104 04/08/2022 08:20 AM    Chloride 103 01/05/2022 08:33 AM    CO2 28 07/18/2022 08:25 AM    CO2 33 (H) 04/08/2022 08:20 AM    CO2 30 01/05/2022 08:33 AM    Creatinine 1 41 (H) 07/18/2022 08:25 AM    Creatinine 1 24 04/08/2022 08:20 AM    Creatinine 1 30 01/05/2022 08:33 AM    AST 18 07/18/2022 08:25 AM    AST 21 04/08/2022 08:20 AM    AST 15 01/05/2022 08:33 AM    ALT 33 07/18/2022 08:25 AM    ALT 40 04/08/2022 08:20 AM    ALT 37 01/05/2022 08:33 AM    Albumin 3 7 07/18/2022 08:25 AM    Albumin 3 9 04/08/2022 08:20 AM    Albumin 4 0 01/05/2022 08:33 AM    HDL, Direct 32 (L) 07/18/2022 08:25 AM    Triglycerides 172 (H) 07/18/2022 08:25 AM       Macario Felty   Device used excom  Home use       Indication     Type 2 Diabetes    More than 72 hours of data was reviewed  Report to be scanned to chart       Date Range:  October 1, 2022 to October 14, 2022    Analysis of data:   % time CGM used:  93%  Average Glucose:  189 mg/dL  SD :  62 mg/dL   Time in Target Range:  49%   Time Above Range:  36% high and 14% very high   Time Below Range:  Less than 1% low Interpretation of data:  He is having significant hyperglycemia after breakfast   His Humalog at breakfast was increased to 15 units  Ozempic was also added  Imaging Studies: I have personally reviewed pertinent reports  Portions of the record may have been created with voice recognition software  Occasional wrong word or "sound a like" substitutions may have occurred due to the inherent limitations of voice recognition software  Read the chart carefully and recognize, using context, where substitutions have occurred

## 2022-10-18 NOTE — TELEPHONE ENCOUNTER
Upon review of the In Basket request we were able to locate, review, and update the patient chart as requested for Diabetic Eye Exam     Any additional questions or concerns should be emailed to the Practice Liaisons via the appropriate education email address, please do not reply via In Basket      Thank you  Lino Horton

## 2022-10-21 ENCOUNTER — IMMUNIZATIONS (OUTPATIENT)
Dept: INTERNAL MEDICINE CLINIC | Age: 67
End: 2022-10-21
Payer: MEDICARE

## 2022-10-21 DIAGNOSIS — Z23 ENCOUNTER FOR IMMUNIZATION: Primary | ICD-10-CM

## 2022-10-21 PROCEDURE — G0008 ADMIN INFLUENZA VIRUS VAC: HCPCS

## 2022-10-21 PROCEDURE — 90662 IIV NO PRSV INCREASED AG IM: CPT

## 2022-10-27 ENCOUNTER — APPOINTMENT (EMERGENCY)
Dept: CT IMAGING | Facility: HOSPITAL | Age: 67
End: 2022-10-27
Payer: MEDICARE

## 2022-10-27 ENCOUNTER — HOSPITAL ENCOUNTER (EMERGENCY)
Facility: HOSPITAL | Age: 67
Discharge: HOME | End: 2022-10-27
Attending: EMERGENCY MEDICINE
Payer: MEDICARE

## 2022-10-27 ENCOUNTER — APPOINTMENT (EMERGENCY)
Dept: RADIOLOGY | Facility: HOSPITAL | Age: 67
End: 2022-10-27
Payer: MEDICARE

## 2022-10-27 VITALS
TEMPERATURE: 98.1 F | SYSTOLIC BLOOD PRESSURE: 146 MMHG | HEART RATE: 69 BPM | OXYGEN SATURATION: 99 % | RESPIRATION RATE: 18 BRPM | DIASTOLIC BLOOD PRESSURE: 64 MMHG

## 2022-10-27 DIAGNOSIS — M70.70 HIP BURSITIS: Primary | ICD-10-CM

## 2022-10-27 PROCEDURE — 73502 X-RAY EXAM HIP UNI 2-3 VIEWS: CPT

## 2022-10-27 PROCEDURE — G1004 CDSM NDSC: HCPCS

## 2022-10-27 PROCEDURE — 72192 CT PELVIS W/O DYE: CPT

## 2022-10-27 RX ORDER — SENNOSIDES 8.6 MG
650 CAPSULE ORAL EVERY 8 HOURS PRN
Qty: 30 TABLET | Refills: 0 | Status: SHIPPED | OUTPATIENT
Start: 2022-10-27

## 2022-10-27 RX ORDER — OXYCODONE HYDROCHLORIDE AND ACETAMINOPHEN 5; 325 MG/1; MG/1
1 TABLET ORAL ONCE
Status: COMPLETED | OUTPATIENT
Start: 2022-10-27 | End: 2022-10-27

## 2022-10-27 RX ORDER — ACETAMINOPHEN 325 MG/1
650 TABLET ORAL ONCE
Status: DISCONTINUED | OUTPATIENT
Start: 2022-10-27 | End: 2022-10-27 | Stop reason: HOSPADM

## 2022-10-27 RX ORDER — LIDOCAINE 50 MG/G
1 PATCH TOPICAL ONCE
Status: DISCONTINUED | OUTPATIENT
Start: 2022-10-27 | End: 2022-10-27 | Stop reason: HOSPADM

## 2022-10-27 RX ORDER — LIDOCAINE 50 MG/G
1 PATCH TOPICAL DAILY
Qty: 6 PATCH | Refills: 0 | Status: SHIPPED | OUTPATIENT
Start: 2022-10-27

## 2022-10-27 RX ADMIN — OXYCODONE HYDROCHLORIDE AND ACETAMINOPHEN 1 TABLET: 5; 325 TABLET ORAL at 11:43

## 2022-10-27 NOTE — ED PROVIDER NOTES
History  Chief Complaint   Patient presents with   • Hip Pain     Left hip pain going down left leg worsening since tuesday     Patient reports that on Sunday he was stepping over his cat to avoid tripping over, and landed on his left leg more hard than normal   Patient reports that he did not fall that time  Patient denies a his trauma from falling  Patient reports that then has had increasing worsening pain  Patient reports pain with palpation on greater trochanter  Pt denies urinary symptoms, abdominal pain, nausea/vomiting  Prior to Admission Medications   Prescriptions Last Dose Informant Patient Reported? Taking?    B Complex Vitamins (VITAMIN B-COMPLEX PO)  Self Yes No   Sig: Take 1 capsule by mouth daily    BD Pen Needle Harriett U/F 32G X 4 MM MISC  Self No No   Sig: Use 4x per day   Empagliflozin (Jardiance) 25 MG TABS  Self No No   Sig: Take 1 tablet (25 mg total) by mouth daily   Glucosamine-Chondroitin 250-200 MG TABS  Self Yes No   Sig: Take 1 tablet by mouth 2 (two) times a day   MULTIPLE VITAMIN PO  Self Yes No   Sig: Take 1 tablet by mouth daily    Probiotic Product (PROBIOTIC-10) CAPS  Self Yes No   Sig: Take 1 capsule by mouth daily    Semaglutide,0 25 or 0 5MG/DOS, (Ozempic, 0 25 or 0 5 MG/DOSE,) 2 MG/1 5ML SOPN   No No   Sig: Inject 0 5 mg under the skin once a week   amLODIPine (NORVASC) 10 mg tablet  Self No No   Sig: Take 1 tablet (10 mg total) by mouth daily   aspirin (ECOTRIN LOW STRENGTH) 81 mg EC tablet  Self Yes No   Sig: Take 81 mg by mouth daily    brimonidine-timolol (COMBIGAN) 0 2-0 5 %  Self Yes No   Sig: Administer 1 drop to both eyes every 12 (twelve) hours   cholecalciferol (VITAMIN D3) 1,000 units tablet  Self Yes No   Sig: Take 1,000 Units by mouth 2 (two) times a day    clobetasol (TEMOVATE) 0 05 % cream  Self No No   Sig: Apply topically 2 (two) times a day as needed (Rash)   dicyclomine (BENTYL) 20 mg tablet  Self No No   Sig: Take 1 tablet (20 mg total) by mouth 4 (four) times a day (before meals and at bedtime)   doxazosin (CARDURA) 4 mg tablet  Self No No   Sig: Take 1 tablet (4 mg total) by mouth daily   ferrous gluconate (FERGON) 240 (27 FE) MG tablet  Self No No   Sig: Take 1 tablet (240 mg total) by mouth in the morning   fluticasone (FLONASE) 50 mcg/act nasal spray  Self No No   Si spray into each nostril daily as needed for rhinitis (Acute URI/sinusitis)   gabapentin (NEURONTIN) 300 mg capsule  Self No No   Sig: Take 1 capsule (300 mg total) by mouth 3 (three) times a day   glipiZIDE (GLUCOTROL) 5 mg tablet  Self No No   Sig: Take 1 tablet (5 mg total) by mouth 2 (two) times a day before meals   hydrochlorothiazide (HYDRODIURIL) 25 mg tablet  Self No No   Sig: Take 1 tablet (25 mg total) by mouth daily   ibuprofen (MOTRIN) 200 mg tablet  Self Yes No   Sig: Take 200 mg by mouth as needed    insulin glargine (Lantus SoloStar) 100 units/mL injection pen  Self No No   Sig: Inject 30 Units under the skin daily at bedtime   insulin lispro (HumaLOG KwikPen) 100 units/mL injection pen   No No   Sig: Inject 15 units with breakfast and 10 units with lunch and dinner   lisinopril (ZESTRIL) 40 mg tablet  Self No No   Sig: Take 1 tablet (40 mg total) by mouth daily   loratadine (CLARITIN) 10 mg tablet  Self Yes No   Sig: Take 10 mg by mouth daily   omeprazole (PriLOSEC) 40 MG capsule  Self No No   Sig: Take 1 capsule (40 mg total) by mouth daily   other medication, see sig,  Self Yes No   Sig: Medication/product name: Medical Marijuana   simvastatin (ZOCOR) 20 mg tablet  Self No No   Sig: Take 1 tablet (20 mg total) by mouth daily at bedtime   testosterone (ANDROGEL) 25 MG/2 5GM (1%) GEL  Self No No   Sig: Place 2 packets (50 mg total) on the skin daily   vitamin E, tocopherol, 400 units capsule  Self Yes No   Sig: Take 400 Units by mouth 2 (two) times a day       Facility-Administered Medications: None       Past Medical History:   Diagnosis Date   • Arthritis     Last assessed 5/24/2013   • Avitaminosis D 2012   • Colon polyp    • Diabetes mellitus ([address] )    • Displacement of cervical intervertebral disc 2014   • GERD (gastroesophageal reflux disease)    • Glaucoma     Normal Pressure Glaucoma   • HTN (hypertension) 2007   • Hypertension    • IBS (irritable bowel syndrome) 2021   • Nephrolithiasis 5/24/2013   • Pituitary microadenoma ([address] ) 2010   • Spinal stenosis in cervical region 2014   • Sprain and strain of calcaneofibular (ligament) 12/5/2013   • Submandibular lymphadenopathy 8/8/2019   • Uric acid nephrolithiasis 1990       Past Surgical History:   Procedure Laterality Date   • ASPIRATION / INJECTION RENAL CYST      Renal Cyst Aspiration   • CATARACT EXTRACTION      12/2017- right eye, 01/2018- left eye   • COLONOSCOPY  2005   • EYE SURGERY Bilateral     Cataract Surgery   • TONSILLECTOMY     • UPPER GASTROINTESTINAL ENDOSCOPY         Family History   Problem Relation Age of Onset   • Diabetes unspecified Mother    • Heart disease Mother    • Nephrolithiasis Mother    • Kidney disease Mother    • Other Mother         Back Disorder   • Thyroid disease Mother    • Diabetes type II Mother    • Hypertension Mother    • Thyroid disease unspecified Mother    • Diabetes unspecified Father    • Heart disease Father    • Hypertension Father    • Diabetes unspecified Sister    • Heart disease Sister    • Stroke Sister    • Diabetes unspecified Brother    • Heart disease Brother    • Nephrolithiasis Brother    • Lung cancer Brother    • Other Brother         COPD   • Diabetes unspecified Sister    • Heart disease Sister    • Diabetes unspecified Sister    • Diabetes unspecified Brother    • Heart disease Brother    • Diabetes unspecified Brother    • Other Brother         Back Disorder   • Diabetes unspecified Brother    • Other Brother         Back Disorder   • Diabetes unspecified Brother    • Stomach cancer Paternal Aunt      I have reviewed and agree with the history as documented  E-Cigarette/Vaping   • E-Cigarette Use Never User      E-Cigarette/Vaping Substances   • Nicotine No    • THC No    • CBD No    • Flavoring No    • Other No    • Unknown No      Social History     Tobacco Use   • Smoking status: Former Smoker     Packs/day: 0 25     Years: 2 00     Pack years: 0 50     Types: Cigarettes     Quit date: 1980     Years since quittin 8   • Smokeless tobacco: Never Used   Vaping Use   • Vaping Use: Never used   Substance Use Topics   • Alcohol use: Yes     Alcohol/week: 4 0 standard drinks     Types: 2 Cans of beer, 2 Standard drinks or equivalent per week     Comment: social   • Drug use: Yes     Frequency: 7 0 times per week     Types: Marijuana     Comment: Use Medical Marijuana daily (1-3 capsules or tincture)       Review of Systems   Constitutional: Positive for activity change  HENT: Negative  Eyes: Negative  Respiratory: Negative  Cardiovascular: Negative  Gastrointestinal: Negative  Endocrine: Negative  Genitourinary: Negative  Musculoskeletal: Negative  Skin: Negative  Allergic/Immunologic: Negative  Neurological: Negative  Hematological: Negative  Psychiatric/Behavioral: Negative  All other systems reviewed and are negative  Physical Exam  Physical Exam  Vitals and nursing note reviewed  Constitutional:       General: He is not in acute distress  Appearance: Normal appearance  He is normal weight  HENT:      Head: Normocephalic  Right Ear: External ear normal       Left Ear: External ear normal       Nose: Nose normal       Mouth/Throat:      Mouth: Mucous membranes are moist    Eyes:      Extraocular Movements: Extraocular movements intact  Pupils: Pupils are equal, round, and reactive to light  Cardiovascular:      Rate and Rhythm: Normal rate and regular rhythm  Pulses: Normal pulses  Heart sounds: Normal heart sounds     Pulmonary:      Effort: Pulmonary effort is normal  Breath sounds: Normal breath sounds  Abdominal:      General: Abdomen is flat  Bowel sounds are normal  There is no distension  Palpations: Abdomen is soft  There is no mass  Tenderness: There is no abdominal tenderness  There is no right CVA tenderness, left CVA tenderness, guarding or rebound  Hernia: No hernia is present  Musculoskeletal:         General: Tenderness and signs of injury present  Cervical back: Normal range of motion and neck supple  Legs:       Comments: Pain over greater trochanter of right hip area  No groin pain  Pain worsened with palpation and movement  Skin:     General: Skin is warm  Capillary Refill: Capillary refill takes less than 2 seconds  Neurological:      General: No focal deficit present  Mental Status: He is alert and oriented to person, place, and time  Psychiatric:         Mood and Affect: Mood normal          Vital Signs  ED Triage Vitals [10/27/22 1107]   Temperature Pulse Respirations Blood Pressure SpO2   98 1 °F (36 7 °C) 72 18 127/61 100 %      Temp Source Heart Rate Source Patient Position - Orthostatic VS BP Location FiO2 (%)   Oral Monitor Sitting Right arm --      Pain Score       8           Vitals:    10/27/22 1107 10/27/22 1300   BP: 127/61 146/64   Pulse: 72 69   Patient Position - Orthostatic VS: Sitting          Visual Acuity      ED Medications  Medications   oxyCODONE-acetaminophen (PERCOCET) 5-325 mg per tablet 1 tablet (1 tablet Oral Given 10/27/22 1143)       Diagnostic Studies  Results Reviewed     None                 CT pelvis wo contrast   Final Result by Yo Mckeon MD (10/27 0192)      1  Central mesenteric masses with calcifications, increased in size since 9/1/2021  Differential diagnosis includes desmoplastic reaction related to carcinoid tumor and chronic sclerosing mesenteritis  Consider further evaluation with nuclear medicine    Ga-68 DOTATATE PET/CT scan        2   Unchanged mild osteoarthrosis of the left hip  No acute fracture  3   Unchanged nonobstructing right ureterolithiasis  The study was marked in EPIC for significant notification  Workstation performed: [id number]         XR hip/pelv 2-3 vws left   Final Result by April Jones MD (10/27 3733)   Minimal osteoarthrosis of the left hip  No acute osseous abnormality  Workstation performed: [id number]                    Procedures  Procedures         ED Course                               SBIRT 20yo+    Flowsheet Row Most Recent Value   SBIRT (23 yo +)    In order to provide better care to our patients, we are screening all of our patients for alcohol and drug use  Would it be okay to ask you these screening questions? Unable to answer at this time Filed at: 10/27/2022 1200                    MDM  Number of Diagnoses or Management Options  Hip bursitis  Diagnosis management comments: DDx: Hip bursitis, hip fracture  Due to pt's pain after an apparent injury that did not involve a fall but a misstep to avoid a cat at home, will check xray  Pt's left hip xray is unremarkable  Pt reports slight improvement to pain after percocet  Due to previous creatinine readings, NSAIDs will be avoided at today's visit  Due to continued pain, will order CT pelvis to r/o fracture  No swelling or erythema noted at right hip, no concern for infectious cause to pain  Pt has a clear injury that cause pain today  Pt n/v intact  Pt visualized with favoring his left leg while walking due to hip pain, but was able to ambulate  No acute findings on CT pelvis  Pt reports has crutches at home  Ambulatory referral to ortho placed for hip bursitis as this is most consistent with pt's pain today  Pt states previous hx of shoulder bursitis with similar sensation  Ortho phone number provided for follow up  Discussed strict return precautions  Discussed tylenol regimen at home  Lidoderm prescription patches provided as well  Incidental finding of  Central mesenteric masses with calcifications, increased in size since 9/1/2021  Differential diagnosis includes desmoplastic reaction related to carcinoid tumor and chronic sclerosing mesenteritis  Consider further evaluation with nuclear medicine  Pt is aware and states will continue following up with his GI specialist  Pt is aware without continued following diagnosis such as but not limited to cancer can be missed  Pt aware of kidney stones noted as well  Amount and/or Complexity of Data Reviewed  Tests in the radiology section of CPT®: ordered and reviewed    Risk of Complications, Morbidity, and/or Mortality  General comments: Pt arrived for left hip pain without radiographic finding, most consistent with hip bursitis  Ortho follow up provided  Reviewed reasons to return to ed  Patient verbalized understanding of diagnosis and agreement with discharge plan of care as well as understanding of reasons to return to ed  Disposition  Final diagnoses:   Hip bursitis     Time reflects when diagnosis was documented in both MDM as applicable and the Disposition within this note     Time User Action Codes Description Comment    10/27/2022  2:59 PM Lili Godinez [M70 70] Hip bursitis       ED Disposition     ED Disposition   Discharge    Condition   Stable    Date/Time   Thu Oct 27, 2022  2:58 PM    [facility] discharge to home/self care                 Follow-up Information     Follow up With Specialties Details Why Contact Info Additional Information    Erroll Dancer, MD Internal Medicine Schedule an appointment as soon as possible for a visit  abnormal CT findings [address] [facility]       [address] [facility] Orthopedic Surgery Schedule an appointment as soon as possible for a visit in 2 days For further evaluation of symptoms [address] [phone]  [address] [place] [address] Specialists [address], [address] [place], [place], [address] [address] Emergency Department Emergency Medicine Go to  If symptoms worsen [address] [facility] [address] [facility] Emergency Department, [address], [place], [place], [address]          Discharge Medication List as of 10/27/2022  3:09 PM      START taking these medications    Details   acetaminophen (TYLENOL) 650 mg CR tablet Take 1 tablet (650 mg total) by mouth every 8 (eight) hours as needed for mild pain, Starting Thu 10/27/2022, Normal      lidocaine (Lidoderm) 5 % Apply 1 patch topically daily Remove & Discard patch within 12 hours or as directed by MD, Starting Thu 10/27/2022, Normal         CONTINUE these medications which have NOT CHANGED    Details   amLODIPine (NORVASC) 10 mg tablet Take 1 tablet (10 mg total) by mouth daily, Starting Mon 8/1/2022, Normal      aspirin (ECOTRIN LOW STRENGTH) 81 mg EC tablet Take 81 mg by mouth daily , Historical Med      B Complex Vitamins (VITAMIN B-COMPLEX PO) Take 1 capsule by mouth daily , Historical Med      BD Pen Needle Harriett U/F 32G X 4 MM MISC Use 4x per day, Normal      brimonidine-timolol (COMBIGAN) 0 2-0 5 % Administer 1 drop to both eyes every 12 (twelve) hours, Historical Med      cholecalciferol (VITAMIN D3) 1,000 units tablet Take 1,000 Units by mouth 2 (two) times a day , Historical Med      clobetasol (TEMOVATE) 0 05 % cream Apply topically 2 (two) times a day as needed (Rash), Starting Thu 2/17/2022, Normal      dicyclomine (BENTYL) 20 mg tablet Take 1 tablet (20 mg total) by mouth 4 (four) times a day (before meals and at bedtime), Starting Mon 8/1/2022, Normal      doxazosin (CARDURA) 4 mg tablet Take 1 tablet (4 mg total) by mouth daily, Starting Mon 8/1/2022, Normal      Empagliflozin (Jardiance) 25 MG TABS Take 1 tablet (25 mg total) by mouth daily, Starting Tue 4/12/2022, Normal      ferrous gluconate (FERGON) 240 (27 FE) MG tablet Take 1 tablet (240 mg total) by mouth in the morning, Starting Mon 8/1/2022, No Print      fluticasone (FLONASE) 50 mcg/act nasal spray 1 spray into each nostril daily as needed for rhinitis (Acute URI/sinusitis), Starting Tue 10/26/2021, Normal      gabapentin (NEURONTIN) 300 mg capsule Take 1 capsule (300 mg total) by mouth 3 (three) times a day, Starting Mon 8/1/2022, Normal      glipiZIDE (GLUCOTROL) 5 mg tablet Take 1 tablet (5 mg total) by mouth 2 (two) times a day before meals, Starting Thu 4/14/2022, Until Fri 10/14/2022, Normal      Glucosamine-Chondroitin 250-200 MG TABS Take 1 tablet by mouth 2 (two) times a day, Historical Med      hydrochlorothiazide (HYDRODIURIL) 25 mg tablet Take 1 tablet (25 mg total) by mouth daily, Starting Mon 8/1/2022, Normal      ibuprofen (MOTRIN) 200 mg tablet Take 200 mg by mouth as needed , Historical Med      insulin glargine (Lantus SoloStar) 100 units/mL injection pen Inject 30 Units under the skin daily at bedtime, Starting Tue 4/12/2022, Until Fri 10/14/2022, Normal      insulin lispro (HumaLOG KwikPen) 100 units/mL injection pen Inject 15 units with breakfast and 10 units with lunch and dinner, Normal      lisinopril (ZESTRIL) 40 mg tablet Take 1 tablet (40 mg total) by mouth daily, Starting Mon 8/1/2022, Normal      loratadine (CLARITIN) 10 mg tablet Take 10 mg by mouth daily, Historical Med      MULTIPLE VITAMIN PO Take 1 tablet by mouth daily , Historical Med      omeprazole (PriLOSEC) 40 MG capsule Take 1 capsule (40 mg total) by mouth daily, Starting Mon 8/1/2022, Normal      other medication, see sig, Medication/product name: Medical Marijuana, Historical Med      Probiotic Product (PROBIOTIC-10) CAPS Take 1 capsule by mouth daily , Historical Med      Semaglutide,0 25 or 0 5MG/DOS, (Ozempic, 0 25 or 0 5 MG/DOSE,) 2 MG/1 5ML SOPN Inject 0 5 mg under the skin once a week, Starting Fri 10/14/2022, Sample      simvastatin (ZOCOR) 20 mg tablet Take 1 tablet (20 mg total) by mouth daily at bedtime, Starting Mon 8/1/2022, Normal      testosterone (ANDROGEL) 25 MG/2 5GM (1%) GEL Place 2 packets (50 mg total) on the skin daily, Starting Fri 7/22/2022, Until Wed 1/18/2023, Normal      vitamin E, tocopherol, 400 units capsule Take 400 Units by mouth 2 (two) times a day , Historical Med                 PDMP Review       Value Time User    PDMP Reviewed  Yes 12/23/2019 11:19 AM Tico Dubois PA-C          ED Provider  Electronically Signed by           Ebony Maldonado  11/02/22 [id number]

## 2022-10-27 NOTE — DISCHARGE INSTRUCTIONS
Incidental finding of Central mesenteric masses with calcifications, increased in size since 9/1/2021  Differential diagnosis includes desmoplastic reaction related to carcinoid tumor and chronic sclerosing mesenteritis  Consider further evaluation with nuclear medicine,  Ga-68 DOTATATE PET/CT scan  Please continue to follow up with your PCP  Without following up diagnosis including but not limited to cancer can be missed

## 2022-11-01 ENCOUNTER — OFFICE VISIT (OUTPATIENT)
Dept: OBGYN CLINIC | Facility: MEDICAL CENTER | Age: 67
End: 2022-11-01

## 2022-11-01 ENCOUNTER — TELEPHONE (OUTPATIENT)
Dept: ENDOCRINOLOGY | Facility: CLINIC | Age: 67
End: 2022-11-01

## 2022-11-01 VITALS
SYSTOLIC BLOOD PRESSURE: 118 MMHG | HEART RATE: 72 BPM | BODY MASS INDEX: 28 KG/M2 | HEIGHT: 63 IN | DIASTOLIC BLOOD PRESSURE: 72 MMHG | WEIGHT: 158 LBS

## 2022-11-01 DIAGNOSIS — M25.552 LEFT HIP PAIN: ICD-10-CM

## 2022-11-01 DIAGNOSIS — M54.16 RADICULOPATHY, LUMBAR REGION: ICD-10-CM

## 2022-11-01 DIAGNOSIS — M16.12 PRIMARY OSTEOARTHRITIS OF ONE HIP, LEFT: Primary | ICD-10-CM

## 2022-11-01 DIAGNOSIS — M70.70 HIP BURSITIS: ICD-10-CM

## 2022-11-01 RX ORDER — MELOXICAM 15 MG/1
15 TABLET ORAL DAILY
Qty: 30 TABLET | Refills: 2 | Status: SHIPPED | OUTPATIENT
Start: 2022-11-01

## 2022-11-01 NOTE — PROGRESS NOTES
Hip New Office Note    Assessment:     1  Primary osteoarthritis of one hip, left    2  Radiculopathy, lumbar region    3  Left hip pain    4  Hip bursitis        Plan:     Problem List Items Addressed This Visit    None     Visit Diagnoses     Primary osteoarthritis of one hip, left    -  Primary    Relevant Medications    meloxicam (Mobic) 15 mg tablet    Radiculopathy, lumbar region        Left hip pain        Relevant Medications    meloxicam (Mobic) 15 mg tablet    Hip bursitis              75 y/o male with left hip and leg pain secondary to left hip OA as well as lumbar radiculopathy  Xrays of the left hip reviewed today showing moderate arthritic changes  On exam he does have some limitations in his motion with pain  He is no longer tender over the GT bursa  He does have a +SLR on exam   Discussed conservative treatment options to include cortisone injections, oral NSAIDs, Tylenol, activity modifications, staying active with low impact exercises, and weight loss  Will start with prescription for oral Mobic to decrease his inflammation and pain  We will see him back on an as needed basis  If no improvement in his symptoms, can consider IA cortisone injection  Subjective:     Patient ID: Claudene Marines is a 74 y o  male  Patient seen in consultation at the request of CHRISTIANO Jiménez    Chief Complaint:  HPI:  Patient is a 75 y/o male who presents today for an evaluation of his LEFT hip  He states that he started having pain in the left hip last week that got so severe that he was unable to bear weight on the leg  He was seen at the ER where he had xrays of the left hip as well as a CT of the abdomen and pelvis  He was diagnosed with left hip bursitis  He states that he had a specific spot on the lateral hip that was tender to palpation, which has improved  His pain is now in the buttock and back  He has been trying Tylenol for pain   He does have known issues with his lumbar spine and has been getting treatment including injections      Allergy:  Allergies   Allergen Reactions   • Clonidine Other (See Comments)     Diaphoresis, hot flashes   • Augmentin [Amoxicillin-Pot Clavulanate] Abdominal Pain   • Biaxin [Clarithromycin] Abdominal Pain   • Dust Mite Extract    • Insulin Aspart GI Intolerance     Novolog    • Liraglutide Abdominal Pain and Nausea Only   • Metformin And Related Diarrhea and GI Intolerance   • Molds & Smuts    • Nuts - Food Allergy    • Seasonal Ic [Cholestatin] Headache   • Sitagliptin-Metformin Hcl Er Abdominal Pain, Diarrhea and GI Intolerance     Medications:  all current active meds have been reviewed  Past Medical History:  Past Medical History:   Diagnosis Date   • Arthritis     Last assessed 5/24/2013   • Avitaminosis D 2012   • Colon polyp    • Diabetes mellitus ([facility] [address] )    • Displacement of cervical intervertebral disc 2014   • GERD (gastroesophageal reflux disease)    • Glaucoma     Normal Pressure Glaucoma   • HTN (hypertension) 2007   • Hypertension    • IBS (irritable bowel syndrome) 2021   • Nephrolithiasis 5/24/2013   • Pituitary microadenoma ([facility] [address] ) 2010   • Spinal stenosis in cervical region 2014   • Sprain and strain of calcaneofibular (ligament) 12/5/2013   • Submandibular lymphadenopathy 8/8/2019   • Uric acid nephrolithiasis 1990     Past Surgical History:  Past Surgical History:   Procedure Laterality Date   • ASPIRATION / INJECTION RENAL CYST      Renal Cyst Aspiration   • CATARACT EXTRACTION      12/2017- right eye, 01/2018- left eye   • COLONOSCOPY  2005   • EYE SURGERY Bilateral     Cataract Surgery   • TONSILLECTOMY     • UPPER GASTROINTESTINAL ENDOSCOPY       Family History:  Family History   Problem Relation Age of Onset   • Diabetes unspecified Mother    • Heart disease Mother    • Nephrolithiasis Mother    • Kidney disease Mother    • Other Mother         Back Disorder   • Thyroid disease Mother    • Diabetes type II Mother    • Hypertension Mother    • Thyroid disease unspecified Mother    • Diabetes unspecified Father    • Heart disease Father    • Hypertension Father    • Diabetes unspecified Sister    • Heart disease Sister    • Stroke Sister    • Diabetes unspecified Brother    • Heart disease Brother    • Nephrolithiasis Brother    • Lung cancer Brother    • Other Brother         COPD   • Diabetes unspecified Sister    • Heart disease Sister    • Diabetes unspecified Sister    • Diabetes unspecified Brother    • Heart disease Brother    • Diabetes unspecified Brother    • Other Brother         Back Disorder   • Diabetes unspecified Brother    • Other Brother         Back Disorder   • Diabetes unspecified Brother    • Stomach cancer Paternal Aunt      Social History:  Social History     Substance and Sexual Activity   Alcohol Use Yes   • Alcohol/week: 4 0 standard drinks   • Types: 2 Cans of beer, 2 Standard drinks or equivalent per week    Comment: social     Social History     Substance and Sexual Activity   Drug Use Yes   • Frequency: 7 0 times per week   • Types: Marijuana    Comment: Use Medical Marijuana daily (1-3 capsules or tincture)     Social History     Tobacco Use   Smoking Status Former Smoker   • Packs/day: 0 25   • Years: 2 00   • Pack years: 0 50   • Types: Cigarettes   • Quit date: 1980   • Years since quittin 8   Smokeless Tobacco Never Used           ROS:  General: Per HPI  Skin: Negative, except if noted below  HEENT: Negative  Respiratory: Negative  Cardiovascular: Negative  Gastrointestinal: Negative  Urinary: Negative  Vascular: Negative  Musculoskeletal: Positive per HPI   Neurologic: Positive per HPI  Endocrine: Negative    Objective:  BP Readings from Last 1 Encounters:   22 118/72      Wt Readings from Last 1 Encounters:   22 71 7 kg (158 lb)        Respiratory:   non-labored respirations    Lymphatics:  no palpable lymph nodes    Gait and Station:   normal    Neurologic:   Alert and oriented times 3  Patient with normal sensation except as noted below  Deep tendon reflexes 2+ except as noted in MSK exam    Bilateral Lower Extremity:  Left Hip     Inspection: skin intact    Range of Motion: 10-30 with pain at end ranges    + log roll    + SLR    -TTP GT bursa    Motor: 5/5 IP/Q/HS/TA/GS    Pulses: 2+ DP / 2+ PT    SILT DP/SP/S/S/TN    Right Hip     Inspection: skin intact    Range of Motion: full  wo pain    - log roll      Imaging:  My interpretation XR AP pelvis/ left hip: moderate joint space narrowing, subchondral sclerosis, subchondral cysts, osteophyte formation  No fracture or dislocation  BMI:   Estimated body mass index is 27 99 kg/m² as calculated from the following:    Height as of this encounter: 5' 3" (1 6 m)  Weight as of this encounter: 71 7 kg (158 lb)  BSA:   Estimated body surface area is 1 75 meters squared as calculated from the following:    Height as of this encounter: 5' 3" (1 6 m)  Weight as of this encounter: 71 7 kg (158 lb)             Scribe Attestation    I,:  Ashley Solorzano PA-C am acting as a scribe while in the presence of the attending physician :       I,:  Pato Torres, DO personally performed the services described in this documentation    as scribed in my presence :

## 2022-11-08 ENCOUNTER — TELEMEDICINE (OUTPATIENT)
Dept: INTERNAL MEDICINE CLINIC | Age: 67
End: 2022-11-08

## 2022-11-08 DIAGNOSIS — J06.9 VIRAL UPPER RESPIRATORY TRACT INFECTION: Primary | ICD-10-CM

## 2022-11-08 RX ORDER — AZITHROMYCIN 250 MG/1
TABLET, FILM COATED ORAL
Qty: 6 TABLET | Refills: 0 | Status: SHIPPED | OUTPATIENT
Start: 2022-11-08 | End: 2022-11-12

## 2022-11-08 RX ORDER — METHYLPREDNISOLONE 4 MG/1
TABLET ORAL
Qty: 21 EACH | Refills: 0 | Status: SHIPPED | OUTPATIENT
Start: 2022-11-08

## 2022-11-08 NOTE — PROGRESS NOTES
Virtual Regular Visit    Verification of patient location:    Patient is located in the following state in which I hold an active license [place]      Assessment/Plan:    Problem List Items Addressed This Visit        Respiratory    Upper respiratory infection - Primary    Relevant Medications    methylPREDNISolone 4 MG tablet therapy pack    azithromycin (ZITHROMAX) 250 mg tablet        Continue with arcus also +cold  Start Zithromax and low-dose prednisone  If not better in 1-2 days repeat COVID test   If positive COVID he will need Paxil bid so he must call us right away  He understands the above  Reason for visit is   Chief Complaint   Patient presents with   • Cough     Coughing, sinus drainage, not feeling well after coming back from traveling, slight fever, congestion, sore throat--Will be doing the AT home COVID test prior to visit NEGATIVE    • Sore Throat   • HM     BMI, HEMOGLOBIN A1C         Encounter provider Carey Chavarria DO    Provider located at [address] [id number]      Recent Visits  No visits were found meeting these conditions  Showing recent visits within past 7 days and meeting all other requirements  Today's Visits  Date Type Provider Dept   11/08/22 Micheline[address]arfisab[address], DO [facility]   Showing today's visits and meeting all other requirements  Future Appointments  No visits were found meeting these conditions  Showing future appointments within next 150 days and meeting all other requirements       The patient was identified by name and date of birth  Lashae Bill was informed that this is a telemedicine visit and that the visit is being conducted through the [address] [employer] platform  He agrees to proceed     My office door was closed  No one else was in the room  He acknowledged consent and understanding of privacy and security of the video platform   The patient has agreed to participate and understands they can discontinue the visit at any time  Patient is aware this is a billable service  Subjective  Charlene Santiago is a 74 y o  male    HPI     Patient was at traveling in [place] and came back yesterday on a very long flight  He started getting sick 2 days ago but yesterday felt the worst   Felt feverish but did not actually have a fever  Has a productive cough and last week had a little bit of upset stomach  No known COVID exposures  He is having slight wheezing and shortness of breath and is a former smoker  He has face pain in his sinuses and slight runny nose and congestion  Has fatigue and has been resting today since he is tired  Took Ruthie-Coats plus cold last night for his symptoms      Past Medical History:   Diagnosis Date   • Arthritis     Last assessed 5/24/2013   • Avitaminosis D 2012   • Colon polyp    • [facility] )    • Displacement of cervical intervertebral disc 2014   • GERD (gastroesophageal reflux disease)    • Glaucoma     Normal Pressure Glaucoma   • HTN (hypertension) 2007   • Hypertension    • IBS (irritable bowel syndrome) 2021   • Nephrolithiasis 5/24/2013   • Pituitary microadenoma ([facility] [address] ) 2010   • Spinal stenosis in cervical region 2014   • Sprain and strain of calcaneofibular (ligament) 12/5/2013   • Submandibular lymphadenopathy 8/8/2019   • Uric acid nephrolithiasis 1990       Past Surgical History:   Procedure Laterality Date   • ASPIRATION / INJECTION RENAL CYST      Renal Cyst Aspiration   • CATARACT EXTRACTION      12/2017- right eye, 01/2018- left eye   • COLONOSCOPY  2005   • EYE SURGERY Bilateral     Cataract Surgery   • TONSILLECTOMY     • UPPER GASTROINTESTINAL ENDOSCOPY         Current Outpatient Medications   Medication Sig Dispense Refill   • acetaminophen (TYLENOL) 650 mg CR tablet Take 1 tablet (650 mg total) by mouth every 8 (eight) hours as needed for mild pain 30 tablet 0   • amLODIPine (NORVASC) 10 mg tablet Take 1 tablet (10 mg total) by mouth daily 90 tablet 1   • aspirin (ECOTRIN LOW STRENGTH) 81 mg EC tablet Take 81 mg by mouth daily      • azithromycin (ZITHROMAX) 250 mg tablet Take 2 tablets today then 1 tablet daily x 4 days 6 tablet 0   • B Complex Vitamins (VITAMIN B-COMPLEX PO) Take 1 capsule by mouth daily      • BD Pen Needle Harriett U/F 32G X 4 MM MISC Use 4x per day 400 each 3   • brimonidine-timolol (COMBIGAN) 0 2-0 5 % Administer 1 drop to both eyes every 12 (twelve) hours     • cholecalciferol (VITAMIN D3) 1,000 units tablet Take 1,000 Units by mouth 2 (two) times a day      • clobetasol (TEMOVATE) 0 05 % cream Apply topically 2 (two) times a day as needed (Rash) 30 g 3   • dicyclomine (BENTYL) 20 mg tablet Take 1 tablet (20 mg total) by mouth 4 (four) times a day (before meals and at bedtime) 120 tablet 5   • doxazosin (CARDURA) 4 mg tablet Take 1 tablet (4 mg total) by mouth daily 90 tablet 1   • Empagliflozin (Jardiance) 25 MG TABS Take 1 tablet (25 mg total) by mouth daily 90 tablet 3   • ferrous gluconate (FERGON) 240 (27 FE) MG tablet Take 1 tablet (240 mg total) by mouth in the morning 90 tablet 1   • fluticasone (FLONASE) 50 mcg/act nasal spray 1 spray into each nostril daily as needed for rhinitis (Acute URI/sinusitis) 18 mL 0   • gabapentin (NEURONTIN) 300 mg capsule Take 1 capsule (300 mg total) by mouth 3 (three) times a day 270 capsule 1   • Glucosamine-Chondroitin 250-200 MG TABS Take 1 tablet by mouth 2 (two) times a day     • hydrochlorothiazide (HYDRODIURIL) 25 mg tablet Take 1 tablet (25 mg total) by mouth daily 90 tablet 1   • ibuprofen (MOTRIN) 200 mg tablet Take 200 mg by mouth as needed      • insulin lispro (HumaLOG KwikPen) 100 units/mL injection pen Inject 15 units with breakfast and 10 units with lunch and dinner 30 mL 3   • lidocaine (Lidoderm) 5 % Apply 1 patch topically daily Remove & Discard patch within 12 hours or as directed by MD Eckert patch 0   • lisinopril (ZESTRIL) 40 mg tablet Take 1 tablet (40 mg total) by mouth daily 90 tablet 1   • loratadine (CLARITIN) 10 mg tablet Take 10 mg by mouth daily     • meloxicam (Mobic) 15 mg tablet Take 1 tablet (15 mg total) by mouth daily 30 tablet 2   • methylPREDNISolone 4 MG tablet therapy pack Use as directed on package 21 each 0   • MULTIPLE VITAMIN PO Take 1 tablet by mouth daily      • omeprazole (PriLOSEC) 40 MG capsule Take 1 capsule (40 mg total) by mouth daily 90 capsule 1   • other medication, see sig, Medication/product name: Medical Marijuana     • Probiotic Product (PROBIOTIC-10) CAPS Take 1 capsule by mouth daily      • Semaglutide,0 25 or 0 5MG/DOS, (Ozempic, 0 25 or 0 5 MG/DOSE,) 2 MG/1 5ML SOPN Inject 0 5 mg under the skin once a week 3 mL 0   • simvastatin (ZOCOR) 20 mg tablet Take 1 tablet (20 mg total) by mouth daily at bedtime 90 tablet 3   • testosterone (ANDROGEL) 25 MG/2 5GM (1%) GEL Place 2 packets (50 mg total) on the skin daily 180 packet 1   • vitamin E, tocopherol, 400 units capsule Take 400 Units by mouth 2 (two) times a day      • glipiZIDE (GLUCOTROL) 5 mg tablet Take 1 tablet (5 mg total) by mouth 2 (two) times a day before meals 60 tablet 2   • insulin glargine (Lantus SoloStar) 100 units/mL injection pen Inject 30 Units under the skin daily at bedtime 30 mL 3     No current facility-administered medications for this visit          Allergies   Allergen Reactions   • Clonidine Other (See Comments)     Diaphoresis, hot flashes   • Augmentin [Amoxicillin-Pot Clavulanate] Abdominal Pain   • Biaxin [Clarithromycin] Abdominal Pain   • Dust Mite Extract    • Insulin Aspart GI Intolerance     Novolog    • Liraglutide Abdominal Pain and Nausea Only   • Metformin And Related Diarrhea and GI Intolerance   • Molds & Smuts    • Nuts - Food Allergy    • Seasonal Ic [Cholestatin] Headache   • Sitagliptin-Metformin Hcl Er Abdominal Pain, Diarrhea and GI Intolerance       Review of Systems      Constitutional:  Denies fever or chills   Eyes:  Denies double , blurry vision or eye pain  HENT:  Denies sore throat   Respiratory:  has cough and shortness of breath and wheezing  Cardiovascular:  Denies palpitations or chest pain  GI:  Denies abdominal pain, nausea, or vomiting, no loose stools, no reflux  Integument:  Denies rash , no open areas  Neurologic:  Denies headache or focal weakness, no dizziness  : no dysuria, or hematuria    Video Exam    There were no vitals filed for this visit      Physical Exam     Constitutional:  Well developed, well nourished, no acute distress, non-toxic appearance , coughing  Eyes:  Is conjunctiva normal , non icteric sclera  HENT:  Atraumatic, non congested  Respiratory:  Nonlabored, appears comfortable, no conversational dyspnea  Cardiovascular:   no LE edema b/l  Neurologic:  no focal deficits noted   Psychiatric:  Speech and behavior appropriate , AAO x 3        I spent 7 minutes directly with the patient during this visit

## 2022-11-12 ENCOUNTER — PATIENT MESSAGE (OUTPATIENT)
Dept: INTERNAL MEDICINE CLINIC | Age: 67
End: 2022-11-12

## 2022-11-12 DIAGNOSIS — R19.7 DIARRHEA, UNSPECIFIED TYPE: ICD-10-CM

## 2022-11-12 DIAGNOSIS — R10.32 LEFT LOWER QUADRANT ABDOMINAL PAIN: ICD-10-CM

## 2022-11-12 DIAGNOSIS — R14.0 ABDOMINAL BLOATING: ICD-10-CM

## 2022-11-15 RX ORDER — DICYCLOMINE HCL 20 MG
20 TABLET ORAL
Qty: 120 TABLET | Refills: 5 | Status: SHIPPED | OUTPATIENT
Start: 2022-11-15

## 2022-11-29 DIAGNOSIS — E11.3293 TYPE 2 DIABETES MELLITUS WITH BOTH EYES AFFECTED BY MILD NONPROLIFERATIVE RETINOPATHY WITHOUT MACULAR EDEMA, WITH LONG-TERM CURRENT USE OF INSULIN (HCC): ICD-10-CM

## 2022-11-29 DIAGNOSIS — Z79.4 TYPE 2 DIABETES MELLITUS WITH BOTH EYES AFFECTED BY MILD NONPROLIFERATIVE RETINOPATHY WITHOUT MACULAR EDEMA, WITH LONG-TERM CURRENT USE OF INSULIN (HCC): ICD-10-CM

## 2022-11-29 NOTE — TELEPHONE ENCOUNTER
Requested medication(s) are due for refill today: Yes  Patient has already received a courtesy refill: No  Other reason request has been forwarded to provider: Not able to approve script

## 2022-11-30 RX ORDER — SEMAGLUTIDE 1.34 MG/ML
0.5 INJECTION, SOLUTION SUBCUTANEOUS WEEKLY
Qty: 4.5 ML | Refills: 2 | Status: SHIPPED | OUTPATIENT
Start: 2022-11-30

## 2022-12-02 ENCOUNTER — TELEPHONE (OUTPATIENT)
Dept: ENDOCRINOLOGY | Facility: CLINIC | Age: 67
End: 2022-12-02

## 2022-12-02 DIAGNOSIS — E23.0 HYPOGONADOTROPIC HYPOGONADISM (HCC): ICD-10-CM

## 2022-12-02 RX ORDER — TESTOSTERONE 12.5 MG/1.25G
50 GEL TOPICAL DAILY
Qty: 180 PACKET | Refills: 1 | Status: SHIPPED | OUTPATIENT
Start: 2022-12-02 | End: 2023-05-31

## 2022-12-02 NOTE — TELEPHONE ENCOUNTER
Pharmacy faxed clarification request for Ozempic dosing instructions  They state pt never filled the med at local or mail order pharmacy and they want to confirm if pt should be on 0 5 mg weekly or starting dose titration  Reviewed chart and see pt was given sample of Ozempic and tolerated the 0 25 mg dose fine  Pt is to titrate to next dose of 0 5 mg weekly  Clarification request completed and faxed to Rolanda @ [phone]

## 2022-12-02 NOTE — TELEPHONE ENCOUNTER
Pt received a call form his mail order, they are not able to get his Androgel delivery they are out stock   Pt called his local pharmacy and would like us to sent a new RX to [facility] [place]

## 2022-12-02 NOTE — TELEPHONE ENCOUNTER
Requested medication(s) are due for refill today: Yes  Patient has already received a courtesy refill: No  Other reason request has been forwarded to provider: Control Substance medication

## 2022-12-05 ENCOUNTER — HOSPITAL ENCOUNTER (EMERGENCY)
Facility: HOSPITAL | Age: 67
Discharge: HOME | End: 2022-12-05
Attending: EMERGENCY MEDICINE

## 2022-12-05 VITALS
OXYGEN SATURATION: 99 % | RESPIRATION RATE: 18 BRPM | TEMPERATURE: 98.1 F | HEART RATE: 68 BPM | DIASTOLIC BLOOD PRESSURE: 64 MMHG | SYSTOLIC BLOOD PRESSURE: 141 MMHG

## 2022-12-05 DIAGNOSIS — S61.012A LACERATION OF LEFT THUMB: Primary | ICD-10-CM

## 2022-12-05 RX ADMIN — TETANUS TOXOID, REDUCED DIPHTHERIA TOXOID AND ACELLULAR PERTUSSIS VACCINE, ADSORBED 0.5 ML: 5; 2.5; 8; 8; 2.5 SUSPENSION INTRAMUSCULAR at 12:47

## 2022-12-05 NOTE — DISCHARGE INSTRUCTIONS
Leave the dressing on for 48 hours    Your Steri-Strips or glue should fall off on its own in 5-7 days    Limited bending of your left thumb    Return with any worsening symptoms questions, or concerns

## 2022-12-05 NOTE — ED PROVIDER NOTES
History  Chief Complaint   Patient presents with   • Finger Laceration     Pt arrives c/o laceration to R thumb from , wife reports it's approx 3 inches long, bleeding controlled with gauze and acewrap at home, dressing currently in place  Pt not up to date on tetanus vaccine  This is a 78-year-old male patient who was using a  opening up kidney later  Patient has a approximate 4 cm vertical laceration to the dorsum of his thumb not involving tendons or numbness or tingling  Bleeding is controlled with direct pressure but does ooze  No spurting blood no numbness or tingling  Last tetanus is unknown he is right-hand dominant  At this time the wound will be cleansed and will require glue and Steri-Strips closure          Prior to Admission Medications   Prescriptions Last Dose Informant Patient Reported? Taking?    B Complex Vitamins (VITAMIN B-COMPLEX PO)   Yes No   Sig: Take 1 capsule by mouth daily    BD Pen Needle Harriett U/F 32G X 4 MM MISC   No No   Sig: Use 4x per day   Empagliflozin (Jardiance) 25 MG TABS   No No   Sig: Take 1 tablet (25 mg total) by mouth daily   Glucosamine-Chondroitin 250-200 MG TABS   Yes No   Sig: Take 1 tablet by mouth 2 (two) times a day   MULTIPLE VITAMIN PO   Yes No   Sig: Take 1 tablet by mouth daily    Probiotic Product (PROBIOTIC-10) CAPS   Yes No   Sig: Take 1 capsule by mouth daily    Semaglutide,0 25 or 0 5MG/DOS, (Ozempic, 0 25 or 0 5 MG/DOSE,) 2 MG/1 5ML SOPN   No No   Sig: Inject 0 5 mg under the skin once a week   acetaminophen (TYLENOL) 650 mg CR tablet   No No   Sig: Take 1 tablet (650 mg total) by mouth every 8 (eight) hours as needed for mild pain   amLODIPine (NORVASC) 10 mg tablet   No No   Sig: Take 1 tablet (10 mg total) by mouth daily   aspirin (ECOTRIN LOW STRENGTH) 81 mg EC tablet   Yes No   Sig: Take 81 mg by mouth daily    brimonidine-timolol (COMBIGAN) 0 2-0 5 %   Yes No   Sig: Administer 1 drop to both eyes every 12 (twelve) hours cholecalciferol (VITAMIN D3) 1,000 units tablet   Yes No   Sig: Take 1,000 Units by mouth 2 (two) times a day    clobetasol (TEMOVATE) 0 05 % cream   No No   Sig: Apply topically 2 (two) times a day as needed (Rash)   dicyclomine (BENTYL) 20 mg tablet   No No   Sig: Take 1 tablet (20 mg total) by mouth 4 (four) times a day (before meals and at bedtime)   doxazosin (CARDURA) 4 mg tablet   No No   Sig: Take 1 tablet (4 mg total) by mouth daily   ferrous gluconate (FERGON) 240 (27 FE) MG tablet   No No   Sig: Take 1 tablet (240 mg total) by mouth in the morning   fluticasone (FLONASE) 50 mcg/act nasal spray   No No   Si spray into each nostril daily as needed for rhinitis (Acute URI/sinusitis)   gabapentin (NEURONTIN) 300 mg capsule   No No   Sig: Take 1 capsule (300 mg total) by mouth 3 (three) times a day   glipiZIDE (GLUCOTROL) 5 mg tablet   No No   Sig: Take 1 tablet (5 mg total) by mouth 2 (two) times a day before meals   hydrochlorothiazide (HYDRODIURIL) 25 mg tablet   No No   Sig: Take 1 tablet (25 mg total) by mouth daily   ibuprofen (MOTRIN) 200 mg tablet   Yes No   Sig: Take 200 mg by mouth as needed    insulin glargine (Lantus SoloStar) 100 units/mL injection pen   No No   Sig: Inject 30 Units under the skin daily at bedtime   insulin lispro (HumaLOG KwikPen) 100 units/mL injection pen   No No   Sig: Inject 15 units with breakfast and 10 units with lunch and dinner   lidocaine (Lidoderm) 5 %   No No   Sig: Apply 1 patch topically daily Remove & Discard patch within 12 hours or as directed by MD   lisinopril (ZESTRIL) 40 mg tablet   No No   Sig: Take 1 tablet (40 mg total) by mouth daily   loratadine (CLARITIN) 10 mg tablet   Yes No   Sig: Take 10 mg by mouth daily   meloxicam (Mobic) 15 mg tablet   No No   Sig: Take 1 tablet (15 mg total) by mouth daily   methylPREDNISolone 4 MG tablet therapy pack   No No   Sig: Use as directed on package   omeprazole (PriLOSEC) 40 MG capsule   No No   Sig: Take 1 capsule (40 mg total) by mouth daily   other medication, see sig,   Yes No   Sig: Medication/product name: Medical Marijuana   simvastatin (ZOCOR) 20 mg tablet   No No   Sig: Take 1 tablet (20 mg total) by mouth daily at bedtime   testosterone (ANDROGEL) 25 MG/2 5GM (1%) GEL   No No   Sig: Place 2 packets (50 mg total) on the skin daily   vitamin E, tocopherol, 400 units capsule   Yes No   Sig: Take 400 Units by mouth 2 (two) times a day       Facility-Administered Medications: None       Past Medical History:   Diagnosis Date   • Arthritis     Last assessed 5/24/2013   • Avitaminosis D 2012   • Colon polyp    • Diabetes mellitus ([facility] [address] )    • Displacement of cervical intervertebral disc 2014   • GERD (gastroesophageal reflux disease)    • Glaucoma     Normal Pressure Glaucoma   • HTN (hypertension) 2007   • Hypertension    • IBS (irritable bowel syndrome) 2021   • Nephrolithiasis 5/24/2013   • Pituitary microadenoma ([facility] [address] ) 2010   • Spinal stenosis in cervical region 2014   • Sprain and strain of calcaneofibular (ligament) 12/5/2013   • Submandibular lymphadenopathy 8/8/2019   • Uric acid nephrolithiasis 1990       Past Surgical History:   Procedure Laterality Date   • ASPIRATION / INJECTION RENAL CYST      Renal Cyst Aspiration   • CATARACT EXTRACTION      12/2017- right eye, 01/2018- left eye   • COLONOSCOPY  2005   • EYE SURGERY Bilateral     Cataract Surgery   • TONSILLECTOMY     • UPPER GASTROINTESTINAL ENDOSCOPY         Family History   Problem Relation Age of Onset   • Diabetes unspecified Mother    • Heart disease Mother    • Nephrolithiasis Mother    • Kidney disease Mother    • Other Mother         Back Disorder   • Thyroid disease Mother    • Diabetes type II Mother    • Hypertension Mother    • Thyroid disease unspecified Mother    • Diabetes unspecified Father    • Heart disease Father    • Hypertension Father    • Diabetes unspecified Sister    • Heart disease Sister    • Stroke Sister    • Diabetes unspecified Brother    • Heart disease Brother    • Nephrolithiasis Brother    • Lung cancer Brother    • Other Brother         COPD   • Diabetes unspecified Sister    • Heart disease Sister    • Diabetes unspecified Sister    • Diabetes unspecified Brother    • Heart disease Brother    • Diabetes unspecified Brother    • Other Brother         Back Disorder   • Diabetes unspecified Brother    • Other Brother         Back Disorder   • Diabetes unspecified Brother    • Stomach cancer Paternal Aunt      I have reviewed and agree with the history as documented  E-Cigarette/Vaping   • E-Cigarette Use Never User      E-Cigarette/Vaping Substances   • Nicotine No    • THC No    • CBD No    • Flavoring No    • Other No    • Unknown No      Social History     Tobacco Use   • Smoking status: Former     Packs/day: 0 25     Years: 2 00     Pack years: 0 50     Types: Cigarettes     Quit date: 1980     Years since quittin 9   • Smokeless tobacco: Never   Vaping Use   • Vaping Use: Never used   Substance Use Topics   • Alcohol use: Yes     Alcohol/week: 4 0 standard drinks     Types: 2 Cans of beer, 2 Standard drinks or equivalent per week     Comment: social   • Drug use: Yes     Frequency: 7 0 times per week     Types: Marijuana     Comment: Use Medical Marijuana daily (1-3 capsules or tincture)       Review of Systems   Constitutional: Negative for chills, diaphoresis, fatigue and fever  HENT: Negative for congestion, ear pain, nosebleeds and sore throat  Eyes: Negative for photophobia, pain, discharge and visual disturbance  Respiratory: Negative for cough, choking, chest tightness, shortness of breath and wheezing  Cardiovascular: Negative for chest pain and palpitations  Gastrointestinal: Negative for abdominal distention, abdominal pain, diarrhea and vomiting  Genitourinary: Negative for dysuria, flank pain, frequency and hematuria     Musculoskeletal: Negative for arthralgias, back pain, gait problem and joint swelling  Skin: Positive for wound  Negative for color change and rash  Neurological: Negative for dizziness, seizures, syncope and headaches  Psychiatric/Behavioral: Negative for behavioral problems and confusion  The patient is not nervous/anxious  All other systems reviewed and are negative  Physical Exam  Physical Exam  Vitals and nursing note reviewed  Constitutional:       General: He is not in acute distress  Appearance: Normal appearance  He is well-developed  He is not ill-appearing, toxic-appearing or diaphoretic  HENT:      Head: Normocephalic and atraumatic  Right Ear: Tympanic membrane, ear canal and external ear normal       Left Ear: Tympanic membrane, ear canal and external ear normal       Nose: Nose normal       Mouth/Throat:      Mouth: Mucous membranes are moist       Pharynx: Oropharynx is clear  No oropharyngeal exudate or posterior oropharyngeal erythema  Eyes:      General: No scleral icterus  Right eye: No discharge  Left eye: No discharge  Conjunctiva/sclera: Conjunctivae normal       Pupils: Pupils are equal, round, and reactive to light  Cardiovascular:      Rate and Rhythm: Normal rate and regular rhythm  Pulmonary:      Effort: Pulmonary effort is normal       Breath sounds: Normal breath sounds  Abdominal:      General: Bowel sounds are normal       Palpations: Abdomen is soft  Tenderness: There is no abdominal tenderness  Musculoskeletal:         General: Normal range of motion  Hands:       Cervical back: Normal range of motion and neck supple  Right lower leg: No edema  Left lower leg: No edema  Skin:     General: Skin is warm  Capillary Refill: Capillary refill takes less than 2 seconds  Neurological:      General: No focal deficit present  Mental Status: He is alert and oriented to person, place, and time  Mental status is at baseline     Psychiatric:         Mood and Affect: Mood normal          Behavior: Behavior normal          Vital Signs  ED Triage Vitals [12/05/22 1229]   Temperature Pulse Respirations Blood Pressure SpO2   98 1 °F (36 7 °C) 68 18 141/64 99 %      Temp src Heart Rate Source Patient Position - Orthostatic VS BP Location FiO2 (%)   -- -- -- -- --      Pain Score       No Pain           Vitals:    12/05/22 1229   BP: 141/64   Pulse: 68         Visual Acuity      ED Medications  Medications   tetanus-diphtheria-acellular pertussis (BOOSTRIX) IM injection 0 5 mL (0 5 mL Intramuscular Given 12/5/22 1247)       Diagnostic Studies  Results Reviewed     None                 No orders to display              Procedures  Laceration repair    Date/Time: 12/5/2022 1:15 PM  Performed by: Jose Cruz PA-C  Authorized by: Jose Cruz PA-C   Consent: Verbal consent obtained    Risks and benefits: risks, benefits and alternatives were discussed  Consent given by: patient  Patient understanding: patient states understanding of the procedure being performed  Patient identity confirmed: verbally with patient  Location: Left thumb   Laceration length: 4 cm  Foreign bodies: no foreign bodies  Tendon involvement: none  Nerve involvement: none  Vascular damage: no      Procedure Details:  Irrigation solution: saline and tap water  Irrigation method: tap  Amount of cleaning: standard  Debridement: none  Degree of undermining: none  Skin closure: glue and Steri-Strips  Approximation: close  Approximation difficulty: simple  Dressing: 4x4 sterile gauze and gauze roll  Patient tolerance: patient tolerated the procedure well with no immediate complications               ED Course                                             MDM    Disposition  Final diagnoses:   Laceration of left thumb     Time reflects when diagnosis was documented in both MDM as applicable and the Disposition within this note     Time User Action Codes Description Comment    12/5/2022  1:16 PM Audrie Riedel Add [[id number]] Laceration of left thumb       ED Disposition     ED Disposition   Discharge    Condition   Stable    Date/Time   Mon Dec 5, 2022  1:16 PM    Comment   Jaz Gregg discharge to home/self care                 Follow-up Information     Follow up With Specialties Details Why Contact Info    Amira Vásquez MD Internal Medicine Schedule an appointment as soon as possible for a visit   [address] [place] [address]  [phone]            Discharge Medication List as of 12/5/2022  1:17 PM      CONTINUE these medications which have NOT CHANGED    Details   acetaminophen (TYLENOL) 650 mg CR tablet Take 1 tablet (650 mg total) by mouth every 8 (eight) hours as needed for mild pain, Starting Thu 10/27/2022, Normal      amLODIPine (NORVASC) 10 mg tablet Take 1 tablet (10 mg total) by mouth daily, Starting Mon 8/1/2022, Normal      aspirin (ECOTRIN LOW STRENGTH) 81 mg EC tablet Take 81 mg by mouth daily , Historical Med      B Complex Vitamins (VITAMIN B-COMPLEX PO) Take 1 capsule by mouth daily , Historical Med      BD Pen Needle Harriett U/F 32G X 4 MM MISC Use 4x per day, Normal      brimonidine-timolol (COMBIGAN) 0 2-0 5 % Administer 1 drop to both eyes every 12 (twelve) hours, Historical Med      cholecalciferol (VITAMIN D3) 1,000 units tablet Take 1,000 Units by mouth 2 (two) times a day , Historical Med      clobetasol (TEMOVATE) 0 05 % cream Apply topically 2 (two) times a day as needed (Rash), Starting Thu 2/17/2022, Normal      dicyclomine (BENTYL) 20 mg tablet Take 1 tablet (20 mg total) by mouth 4 (four) times a day (before meals and at bedtime), Starting Tue 11/15/2022, Normal      doxazosin (CARDURA) 4 mg tablet Take 1 tablet (4 mg total) by mouth daily, Starting Mon 8/1/2022, Normal      Empagliflozin (Jardiance) 25 MG TABS Take 1 tablet (25 mg total) by mouth daily, Starting Tue 4/12/2022, Normal      ferrous gluconate (FERGON) 240 (27 FE) MG tablet Take 1 tablet (240 mg total) by mouth in the morning, Starting Mon 8/1/2022, No Print      fluticasone (FLONASE) 50 mcg/act nasal spray 1 spray into each nostril daily as needed for rhinitis (Acute URI/sinusitis), Starting Tue 10/26/2021, Normal      gabapentin (NEURONTIN) 300 mg capsule Take 1 capsule (300 mg total) by mouth 3 (three) times a day, Starting Mon 8/1/2022, Normal      glipiZIDE (GLUCOTROL) 5 mg tablet Take 1 tablet (5 mg total) by mouth 2 (two) times a day before meals, Starting Thu 4/14/2022, Until Fri 10/14/2022, Normal      Glucosamine-Chondroitin 250-200 MG TABS Take 1 tablet by mouth 2 (two) times a day, Historical Med      hydrochlorothiazide (HYDRODIURIL) 25 mg tablet Take 1 tablet (25 mg total) by mouth daily, Starting Mon 8/1/2022, Normal      ibuprofen (MOTRIN) 200 mg tablet Take 200 mg by mouth as needed , Historical Med      insulin glargine (Lantus SoloStar) 100 units/mL injection pen Inject 30 Units under the skin daily at bedtime, Starting Tue 4/12/2022, Until Fri 10/14/2022, Normal      insulin lispro (HumaLOG KwikPen) 100 units/mL injection pen Inject 15 units with breakfast and 10 units with lunch and dinner, Normal      lidocaine (Lidoderm) 5 % Apply 1 patch topically daily Remove & Discard patch within 12 hours or as directed by MD, Starting Thu 10/27/2022, Normal      lisinopril (ZESTRIL) 40 mg tablet Take 1 tablet (40 mg total) by mouth daily, Starting Mon 8/1/2022, Normal      loratadine (CLARITIN) 10 mg tablet Take 10 mg by mouth daily, Historical Med      meloxicam (Mobic) 15 mg tablet Take 1 tablet (15 mg total) by mouth daily, Starting Tue 11/1/2022, Normal      methylPREDNISolone 4 MG tablet therapy pack Use as directed on package, Normal      MULTIPLE VITAMIN PO Take 1 tablet by mouth daily , Historical Med      omeprazole (PriLOSEC) 40 MG capsule Take 1 capsule (40 mg total) by mouth daily, Starting Mon 8/1/2022, Normal      other medication, see sig, Medication/product name: Medical Marijuana, Historical Med      Probiotic Product (PROBIOTIC-10) CAPS Take 1 capsule by mouth daily , Historical Med      Semaglutide,0 25 or 0 5MG/DOS, (Ozempic, 0 25 or 0 5 MG/DOSE,) 2 MG/1 5ML SOPN Inject 0 5 mg under the skin once a week, Starting Wed 11/30/2022, Normal      simvastatin (ZOCOR) 20 mg tablet Take 1 tablet (20 mg total) by mouth daily at bedtime, Starting Mon 8/1/2022, Normal      testosterone (ANDROGEL) 25 MG/2 5GM (1%) GEL Place 2 packets (50 mg total) on the skin daily, Starting Fri 12/2/2022, Until Wed 5/31/2023, Normal      vitamin E, tocopherol, 400 units capsule Take 400 Units by mouth 2 (two) times a day , Historical Med             No discharge procedures on file      PDMP Review       Value Time User    PDMP Reviewed  Yes 12/23/2019 11:19 AM Rene Evans PA-C          ED Provider  Electronically Signed by           Willow Muhammad PA-C  12/05/22 2032

## 2022-12-16 ENCOUNTER — OFFICE VISIT (OUTPATIENT)
Dept: DIABETES SERVICES | Facility: CLINIC | Age: 67
End: 2022-12-16

## 2022-12-16 VITALS — BODY MASS INDEX: 28.24 KG/M2 | WEIGHT: 159.4 LBS

## 2022-12-16 DIAGNOSIS — E11.3293 TYPE 2 DIABETES MELLITUS WITH BOTH EYES AFFECTED BY MILD NONPROLIFERATIVE RETINOPATHY WITHOUT MACULAR EDEMA, WITH LONG-TERM CURRENT USE OF INSULIN (HCC): Primary | ICD-10-CM

## 2022-12-16 DIAGNOSIS — Z79.4 TYPE 2 DIABETES MELLITUS WITH BOTH EYES AFFECTED BY MILD NONPROLIFERATIVE RETINOPATHY WITHOUT MACULAR EDEMA, WITH LONG-TERM CURRENT USE OF INSULIN (HCC): Primary | ICD-10-CM

## 2022-12-16 NOTE — PROGRESS NOTES
Carbohydrate Counting Instruction    Met with Rocío Mercedes for carbohydrate counting  Catherine Silveira is currently on the following insulin regimen: Lantus 30 units at bedtime, Humalog 15 units with breakfast and 10 units with lunch and dinner  Patient also taking the following diabetes medications - Glipizide 5 mg one tablet 2 times a day, Jardiance 25 mg and ozempic 0 5mg once a week  Patient instructed on: Carbohydrate counting  Instruction was provided using power point slides, food labels and an interactive carbohydrate counting activity  Patient appeared to comprehend the materials presented at the visit  Patient demonstrates good math skills and did well on the carbohydrate counting activity they completed at the visit  Patient agreed to keep a 3 day food record and return it in one week for assessment  Comments: Catherine Silveira has good understanding of carbohydrate counting  Diabetes Education Record: Catherine Silveira was given the following education material: Portions Booklet and food record  Patient response to instruction  Comprehension: good  Motivation: good  Expected Compliance: good  Readiness: action  Barriers to Learning: none known    Start- Stop: 12:10-1:10  Total Minutes: 60 Minutes  Group or Individual Instruction: DSMT-I  Other: Magalis Owens MD        Thank you for referring your patient to [address] [place] [address], it was a pleasure working with them today  Please feel free to call with any questions or concerns      Freddy Garrisonspan  [address] [place] [address] Drive [phone]

## 2022-12-21 DIAGNOSIS — Z79.4 TYPE 2 DIABETES MELLITUS WITHOUT COMPLICATION, WITH LONG-TERM CURRENT USE OF INSULIN (HCC): ICD-10-CM

## 2022-12-21 DIAGNOSIS — E11.9 TYPE 2 DIABETES MELLITUS WITHOUT COMPLICATION, WITH LONG-TERM CURRENT USE OF INSULIN (HCC): ICD-10-CM

## 2022-12-21 RX ORDER — GLIPIZIDE 5 MG/1
5 TABLET ORAL
Qty: 180 TABLET | Refills: 0 | Status: SHIPPED | OUTPATIENT
Start: 2022-12-21 | End: 2023-03-21

## 2022-12-21 NOTE — TELEPHONE ENCOUNTER
RX approved  Could you please call deja to reschedule appt that is on 1/18? I need to leave at by 3 on that day and I think this appt would be cutting it too close   Thanks

## 2023-01-10 ENCOUNTER — OFFICE VISIT (OUTPATIENT)
Dept: DIABETES SERVICES | Facility: CLINIC | Age: 68
End: 2023-01-10

## 2023-01-10 VITALS — WEIGHT: 153.8 LBS | BODY MASS INDEX: 27.24 KG/M2

## 2023-01-10 DIAGNOSIS — E11.3293 TYPE 2 DIABETES MELLITUS WITH BOTH EYES AFFECTED BY MILD NONPROLIFERATIVE RETINOPATHY WITHOUT MACULAR EDEMA, WITH LONG-TERM CURRENT USE OF INSULIN (HCC): Primary | ICD-10-CM

## 2023-01-10 DIAGNOSIS — Z79.4 TYPE 2 DIABETES MELLITUS WITH BOTH EYES AFFECTED BY MILD NONPROLIFERATIVE RETINOPATHY WITHOUT MACULAR EDEMA, WITH LONG-TERM CURRENT USE OF INSULIN (HCC): Primary | ICD-10-CM

## 2023-01-11 ENCOUNTER — TELEPHONE (OUTPATIENT)
Dept: DIABETES SERVICES | Facility: CLINIC | Age: 68
End: 2023-01-11

## 2023-01-12 ENCOUNTER — DOCUMENTATION (OUTPATIENT)
Dept: ENDOCRINOLOGY | Facility: CLINIC | Age: 68
End: 2023-01-12

## 2023-01-12 DIAGNOSIS — Z79.4 TYPE 2 DIABETES MELLITUS WITH BOTH EYES AFFECTED BY MILD NONPROLIFERATIVE RETINOPATHY WITHOUT MACULAR EDEMA, WITH LONG-TERM CURRENT USE OF INSULIN (HCC): Primary | ICD-10-CM

## 2023-01-12 DIAGNOSIS — E11.3293 TYPE 2 DIABETES MELLITUS WITH BOTH EYES AFFECTED BY MILD NONPROLIFERATIVE RETINOPATHY WITHOUT MACULAR EDEMA, WITH LONG-TERM CURRENT USE OF INSULIN (HCC): Primary | ICD-10-CM

## 2023-01-12 NOTE — PROGRESS NOTES
Flexible Insulin Instruction     Met with Rosa Razo for flexible insulin teaching  Cimarronaayush Kraus is currently taking the following diabetes medications: Lantus 30 units at bedtime, Humalog 15 units with breakfast and 10 units with lunch and dinner  Patient also taking the following diabetes medications - Glipizide 5 mg one tablet 2 times a day, Jardiance 25 mg and ozempic 0 5mg once a week  Once starting flexible insulin, Chadl will use the following ratios to calculate insulin requirements: target 120, insulin to carb ratio: 7, and correction factor: 25      Comments: Patient was taught how to do flexible insulin  using  determined insulin carb ratio, insulin sensitivity factor, and preprandial blood glucose target  Patient was able to correctly calculate flexible insulin doses using determined flexible regimen when provided with sample carbohydrate amounts and blood glucose readings  Frankie Kraus will keep a 3 day food record that includes calculated carbohydrates of foods at each meal, preprandial blood glucose readings, and calculated flexible insulin dose administered      Patient instructed on: Insulin types; onset, peak, and duration of both his presecribed bolus and basal insulin  Comments: Frankie Kraus has good understanding of flexible insulin regimen and was instructed start new regimen      Diabetes Education Record:  Frankie Kraus was given the following education material: Flexible Insulin Workbook  Lab Results   Component Value Date    HGBA1C 6 1 (H) 07/18/2022       Weight - Scale: 69 8 kg (153 lb 12 8 oz)             Patient response to instruction    Comprehensiongood  Motivationgood  Expected Compliancegood      Thank you for referring your patient to [address], it was a pleasure working with them today  Please feel free to call with any questions or concerns      Scar Bess  [address] [place] [address] Drive [phone]

## 2023-01-12 NOTE — PROGRESS NOTES
Spoke to pt  Notified of Dr Malcom Krabbe message  Pt understood  Pt mentioned that about 2 days ago his BG level dropped <40 around 9:30pm-10pm and finally went up to 70 around 12:30pm  That evening he only used 6 unit of Humalog and 30 units Lantus  He was very drowsy and his wife was trying to keep him awake  He does not have any glucagon on hand, but drank some soda to help try and bring his sugar up  He is scheduled to see Debbie March next week on 1/19/23  Please advise

## 2023-01-12 NOTE — PROGRESS NOTES
Johndiana Dizay   Device used Dexcom  Home use       Indication     Type 2 Diabetes    More than 72 hours of data was reviewed  Report to be scanned to chart  Date Range: December 28, 2022 to January 10, 2023    Analysis of data:   % time CGM used: 100%  Average Glucose: 135 mg/dL  SD : 44 mg/dL  Time in Target Range: 80%  Time Above Range: 16% high and 1% very high  Time Below Range: 2% low and less than 1% very low    Interpretation of data: Overall, his blood sugars are doing quite well  Continue current therapy

## 2023-01-16 DIAGNOSIS — Z79.4 TYPE 2 DIABETES MELLITUS WITH BOTH EYES AFFECTED BY MILD NONPROLIFERATIVE RETINOPATHY WITHOUT MACULAR EDEMA, WITH LONG-TERM CURRENT USE OF INSULIN (HCC): Primary | ICD-10-CM

## 2023-01-16 DIAGNOSIS — E11.3293 TYPE 2 DIABETES MELLITUS WITH BOTH EYES AFFECTED BY MILD NONPROLIFERATIVE RETINOPATHY WITHOUT MACULAR EDEMA, WITH LONG-TERM CURRENT USE OF INSULIN (HCC): Primary | ICD-10-CM

## 2023-01-16 RX ORDER — GLUCAGON 3 MG/1
1 POWDER NASAL AS NEEDED
Qty: 1 EACH | Refills: 1 | Status: SHIPPED | OUTPATIENT
Start: 2023-01-16

## 2023-01-19 ENCOUNTER — OFFICE VISIT (OUTPATIENT)
Dept: ENDOCRINOLOGY | Facility: CLINIC | Age: 68
End: 2023-01-19

## 2023-01-19 VITALS
HEART RATE: 75 BPM | WEIGHT: 152.4 LBS | OXYGEN SATURATION: 97 % | DIASTOLIC BLOOD PRESSURE: 52 MMHG | HEIGHT: 63 IN | BODY MASS INDEX: 27 KG/M2 | SYSTOLIC BLOOD PRESSURE: 120 MMHG

## 2023-01-19 DIAGNOSIS — Z79.4 TYPE 2 DIABETES MELLITUS WITHOUT COMPLICATION, WITH LONG-TERM CURRENT USE OF INSULIN (HCC): ICD-10-CM

## 2023-01-19 DIAGNOSIS — D35.2 PITUITARY MICROADENOMA (HCC): ICD-10-CM

## 2023-01-19 DIAGNOSIS — Z79.4 TYPE 2 DIABETES MELLITUS WITH HYPERGLYCEMIA, WITH LONG-TERM CURRENT USE OF INSULIN (HCC): ICD-10-CM

## 2023-01-19 DIAGNOSIS — Z79.4 TYPE 2 DIABETES MELLITUS WITH BOTH EYES AFFECTED BY MILD NONPROLIFERATIVE RETINOPATHY WITHOUT MACULAR EDEMA, WITH LONG-TERM CURRENT USE OF INSULIN (HCC): Primary | ICD-10-CM

## 2023-01-19 DIAGNOSIS — E11.65 TYPE 2 DIABETES MELLITUS WITH HYPERGLYCEMIA, WITH LONG-TERM CURRENT USE OF INSULIN (HCC): ICD-10-CM

## 2023-01-19 DIAGNOSIS — D50.8 IRON DEFICIENCY ANEMIA SECONDARY TO INADEQUATE DIETARY IRON INTAKE: ICD-10-CM

## 2023-01-19 DIAGNOSIS — E55.9 VITAMIN D DEFICIENCY: ICD-10-CM

## 2023-01-19 DIAGNOSIS — E11.9 TYPE 2 DIABETES MELLITUS WITHOUT COMPLICATION, WITH LONG-TERM CURRENT USE OF INSULIN (HCC): ICD-10-CM

## 2023-01-19 DIAGNOSIS — E11.3293 TYPE 2 DIABETES MELLITUS WITH BOTH EYES AFFECTED BY MILD NONPROLIFERATIVE RETINOPATHY WITHOUT MACULAR EDEMA, WITH LONG-TERM CURRENT USE OF INSULIN (HCC): Primary | ICD-10-CM

## 2023-01-19 DIAGNOSIS — E23.0 HYPOGONADOTROPIC HYPOGONADISM (HCC): ICD-10-CM

## 2023-01-19 RX ORDER — INSULIN LISPRO 100 [IU]/ML
INJECTION, SOLUTION INTRAVENOUS; SUBCUTANEOUS
Qty: 30 ML | Refills: 3 | Status: SHIPPED | OUTPATIENT
Start: 2023-01-19

## 2023-01-19 RX ORDER — GLIPIZIDE 5 MG/1
5 TABLET ORAL DAILY
Qty: 90 TABLET | Refills: 2 | Status: SHIPPED | OUTPATIENT
Start: 2023-01-19 | End: 2023-04-19

## 2023-01-19 RX ORDER — INSULIN GLARGINE 100 [IU]/ML
25 INJECTION, SOLUTION SUBCUTANEOUS
Qty: 15 ML | Refills: 3 | Status: SHIPPED | OUTPATIENT
Start: 2023-01-19 | End: 2023-04-19

## 2023-01-19 NOTE — PATIENT INSTRUCTIONS
Diabetes regimen:   Ozempic 0 5 mg weekly  Humalog target 120, insulin to carb ratio: 7, and correction factor: 25  Lantus 25 units  Glipizide 5 mg morning only   Jardiance 25 mg

## 2023-01-19 NOTE — PROGRESS NOTES
Established Patient Progress Note      CC: Diabetes Mellitus, Type 2     History of Present Illness:   Joseph Lai is a 69 y o  male with a history of type 2 diabetes without long term use of insulin  Reports complications of retinopathy  Last office visit October 2022  Hemoglobin A1c was 5 4% January 19, 2023  Denies recent illness or hospitalizations  Hugh Vargas recently experienced an severe hypoglycemic episode January 10, 2023, dexcom read low and by fingerstick glucose levels in 40s and was symptomatic  During this event it took over 2 hours to improve glucose levels to normal    Lantus was decreased from 30units to 25 units following this episode  He was prescribed nasal glucagon following this event  Denies any issues with his current regimen  Home glucose monitoring: are performed regularly with Dexcom G6 CGM therapy  Recently seen by diabetes education on January 10, 2023 for flexible insulin regimen  CGM Interpretation  Joseph Lai   Device used Dexcom G6 CGM  Home use   Indication: Type 2 Diabetes  More than 72 hours of data was reviewed  Report to be scanned to chart  Date Range: 1/6/2023-1/19/2023  Analysis of data:   Average Glucose: 137 mg/dL  SD : 45 mg/dL   Time in Target Range: 80%   Time Above Range: 18%   Time Below Range: 2%   Interpretation of data: In range a majority of the time  Severe low on 1/10/2023 already addressed with lantus reduction and baqsimi  Continued concern for overnight hypoglycemia  Current regimen:   Ozempic 0 5 mg weekly  Humalog target 120, insulin to carb ratio: 7, and correction factor: 25  Lantus 25 units  Glipizide 5 mg BID   Jardiance 25 mg     Adverse reactions: Metformin GI issues  Last Eye Exam: 10/5/2022  Last Foot Exam: 4/12/2022    Has hypertension: Taking lisinopril  Has hyperlipidemia: Taking simvastatin   Hypogonadism: On androgel, tolerating well  Continues to be asymptomatic     Sleep apnea: follows up with sleep specialist next week     Patient Active Problem List   Diagnosis   • Benign essential hypertension   • Carpal tunnel syndrome   • Cervical herniated disc   • Cervical radiculopathy   • Cervical stenosis of spinal canal   • Type 2 diabetes mellitus with both eyes affected by mild nonproliferative retinopathy without macular edema, with long-term current use of insulin ([facility])   • Hyperlipidemia   • Hypogonadotropic hypogonadism ([facility])   • Pituitary microadenoma ([facility])   • Obstructive sleep apnea syndrome   • Spondylosis of cervical region without myelopathy or radiculopathy   • Vitamin D deficiency   • Low back pain with sciatica   • Sacroiliitis ([facility] [address] )   • Overweight (BMI 25 0-29  9)   • Gastroesophageal reflux disease without esophagitis   • Chronic pain syndrome   • Salivary gland adenitis   • Arthralgia of right hand   • Lumbar radiculopathy   • Stage 3 chronic kidney disease, unspecified whether stage 3a or 3b CKD ([facility])   • Iron deficiency anemia secondary to inadequate dietary iron intake   • Upper respiratory infection      Past Medical History:   Diagnosis Date   • Arthritis     Last assessed 5/24/2013   • Avitaminosis D 2012   • Colon polyp    • Diabetes mellitus ([facility] [address] )    • Displacement of cervical intervertebral disc 2014   • GERD (gastroesophageal reflux disease)    • Glaucoma     Normal Pressure Glaucoma   • HTN (hypertension) 2007   • Hypertension    • IBS (irritable bowel syndrome) 2021   • Nephrolithiasis 5/24/2013   • Pituitary microadenoma ([facility] [address] ) 2010   • Spinal stenosis in cervical region 2014   • Sprain and strain of calcaneofibular (ligament) 12/5/2013   • Submandibular lymphadenopathy 8/8/2019   • Uric acid nephrolithiasis 1990      Past Surgical History:   Procedure Laterality Date   • ASPIRATION / INJECTION RENAL CYST      Renal Cyst Aspiration   • CATARACT EXTRACTION      12/2017- right eye, 01/2018- left eye   • COLONOSCOPY  2005   • EYE SURGERY Bilateral     Cataract Surgery   • TONSILLECTOMY     • UPPER GASTROINTESTINAL ENDOSCOPY        Family History   Problem Relation Age of Onset   • Diabetes unspecified Mother    • Heart disease Mother    • Nephrolithiasis Mother    • Kidney disease Mother    • Other Mother         Back Disorder   • Thyroid disease Mother    • Diabetes type II Mother    • Hypertension Mother    • Thyroid disease unspecified Mother    • Diabetes unspecified Father    • Heart disease Father    • Hypertension Father    • Diabetes unspecified Sister    • Heart disease Sister    • Stroke Sister    • Diabetes unspecified Brother    • Heart disease Brother    • Nephrolithiasis Brother    • Lung cancer Brother    • Other Brother         COPD   • Diabetes unspecified Sister    • Heart disease Sister    • Diabetes unspecified Sister    • Diabetes unspecified Brother    • Heart disease Brother    • Diabetes unspecified Brother    • Other Brother         Back Disorder   • Diabetes unspecified Brother    • Other Brother         Back Disorder   • Diabetes unspecified Brother    • Stomach cancer Paternal Aunt      Social History     Tobacco Use   • Smoking status: Former     Packs/day: 0 25     Years: 2 00     Pack years: 0 50     Types: Cigarettes     Quit date: 1980     Years since quittin 0   • Smokeless tobacco: Never   Substance Use Topics   • Alcohol use:  Yes     Alcohol/week: 4 0 standard drinks     Types: 2 Cans of beer, 2 Standard drinks or equivalent per week     Comment: social     Allergies   Allergen Reactions   • Clonidine Other (See Comments)     Diaphoresis, hot flashes   • Augmentin [Amoxicillin-Pot Clavulanate] Abdominal Pain   • Biaxin [Clarithromycin] Abdominal Pain   • Dust Mite Extract    • Insulin Aspart GI Intolerance     Novolog    • Liraglutide Abdominal Pain and Nausea Only   • Metformin And Related Diarrhea and GI Intolerance   • Molds & Smuts    • Nuts - Food Allergy    • Peanut (Diagnostic) - Food Allergy Abdominal Pain, Diarrhea and GI Intolerance   • Seasonal Ic [Cholestatin] Headache   • Sitagliptin-Metformin Hcl Er Abdominal Pain, Diarrhea and GI Intolerance         Current Outpatient Medications:   •  acetaminophen (TYLENOL) 650 mg CR tablet, Take 1 tablet (650 mg total) by mouth every 8 (eight) hours as needed for mild pain, Disp: 30 tablet, Rfl: 0  •  amLODIPine (NORVASC) 10 mg tablet, Take 1 tablet (10 mg total) by mouth daily, Disp: 90 tablet, Rfl: 1  •  aspirin (ECOTRIN LOW STRENGTH) 81 mg EC tablet, Take 81 mg by mouth daily , Disp: , Rfl:   •  B Complex Vitamins (VITAMIN B-COMPLEX PO), Take 1 capsule by mouth daily , Disp: , Rfl:   •  BD Pen Needle Harriett U/F 32G X 4 MM MISC, Use 4x per day, Disp: 400 each, Rfl: 3  •  brimonidine-timolol (COMBIGAN) 0 2-0 5 %, Administer 1 drop to both eyes every 12 (twelve) hours, Disp: , Rfl:   •  cholecalciferol (VITAMIN D3) 1,000 units tablet, Take 1,000 Units by mouth 2 (two) times a day , Disp: , Rfl:   •  clobetasol (TEMOVATE) 0 05 % cream, Apply topically 2 (two) times a day as needed (Rash), Disp: 30 g, Rfl: 3  •  dicyclomine (BENTYL) 20 mg tablet, Take 1 tablet (20 mg total) by mouth 4 (four) times a day (before meals and at bedtime), Disp: 120 tablet, Rfl: 5  •  doxazosin (CARDURA) 4 mg tablet, Take 1 tablet (4 mg total) by mouth daily, Disp: 90 tablet, Rfl: 1  •  Empagliflozin (Jardiance) 25 MG TABS, Take 1 tablet (25 mg total) by mouth daily, Disp: 90 tablet, Rfl: 3  •  ferrous gluconate (FERGON) 240 (27 FE) MG tablet, Take 1 tablet (240 mg total) by mouth in the morning, Disp: 90 tablet, Rfl: 1  •  fluticasone (FLONASE) 50 mcg/act nasal spray, 1 spray into each nostril daily as needed for rhinitis (Acute URI/sinusitis), Disp: 18 mL, Rfl: 0  •  gabapentin (NEURONTIN) 300 mg capsule, Take 1 capsule (300 mg total) by mouth 3 (three) times a day, Disp: 270 capsule, Rfl: 1  •  glipiZIDE (GLUCOTROL) 5 mg tablet, Take 1 tablet (5 mg total) by mouth in the morning, Disp: 90 tablet, Rfl: 2  •  Glucagon (Baqsimi Two Pack) 3 MG/DOSE POWD, Use 1 actuation as needed (low blood sugar) into 1 nostril, Disp: 1 each, Rfl: 1  •  hydrochlorothiazide (HYDRODIURIL) 25 mg tablet, Take 1 tablet (25 mg total) by mouth daily, Disp: 90 tablet, Rfl: 1  •  ibuprofen (MOTRIN) 200 mg tablet, Take 200 mg by mouth as needed , Disp: , Rfl:   •  Insulin Glargine Solostar (Lantus SoloStar) 100 UNIT/ML SOPN, Inject 0 25 mL (25 Units total) under the skin daily at bedtime, Disp: 15 mL, Rfl: 3  •  insulin lispro (HumaLOG KwikPen) 100 units/mL injection pen, Inject per insulin scales up to 30 units per day , Disp: 30 mL, Rfl: 3  •  lidocaine (Lidoderm) 5 %, Apply 1 patch topically daily Remove & Discard patch within 12 hours or as directed by MD, Disp: 6 patch, Rfl: 0  •  lisinopril (ZESTRIL) 40 mg tablet, Take 1 tablet (40 mg total) by mouth daily, Disp: 90 tablet, Rfl: 1  •  loratadine (CLARITIN) 10 mg tablet, Take 10 mg by mouth daily, Disp: , Rfl:   •  MULTIPLE VITAMIN PO, Take 1 tablet by mouth daily , Disp: , Rfl:   •  omeprazole (PriLOSEC) 40 MG capsule, Take 1 capsule (40 mg total) by mouth daily, Disp: 90 capsule, Rfl: 1  •  other medication, see sig,, Medication/product name: Medical Marijuana, Disp: , Rfl:   •  Probiotic Product (PROBIOTIC-10) CAPS, Take 1 capsule by mouth daily , Disp: , Rfl:   •  Semaglutide,0 25 or 0 5MG/DOS, (Ozempic, 0 25 or 0 5 MG/DOSE,) 2 MG/1 5ML SOPN, Inject 0 5 mg under the skin once a week, Disp: 4 5 mL, Rfl: 2  •  simvastatin (ZOCOR) 20 mg tablet, Take 1 tablet (20 mg total) by mouth daily at bedtime, Disp: 90 tablet, Rfl: 3  •  testosterone (ANDROGEL) 25 MG/2 5GM (1%) GEL, Place 2 packets (50 mg total) on the skin daily, Disp: 180 packet, Rfl: 1  •  vitamin E, tocopherol, 400 units capsule, Take 400 Units by mouth 2 (two) times a day , Disp: , Rfl:   •  Glucosamine-Chondroitin 250-200 MG TABS, Take 1 tablet by mouth 2 (two) times a day, Disp: , Rfl:     Review of Systems   Constitutional: Positive for fatigue   Negative for activity change, appetite change, chills and unexpected weight change  HENT: Negative for ear pain, sore throat, trouble swallowing and voice change  Eyes: Negative for visual disturbance  Respiratory: Negative for cough and shortness of breath  Cardiovascular: Negative for chest pain and palpitations  Gastrointestinal: Negative for abdominal pain and vomiting  Endocrine: Negative for heat intolerance, polydipsia and polyuria  Musculoskeletal: Negative for arthralgias and back pain  Skin: Negative for color change and rash  Neurological: Negative for dizziness, seizures, syncope, facial asymmetry and headaches  All other systems reviewed and are negative  Physical Exam:  Body mass index is 27 kg/m²  /52 (BP Location: Left arm, Patient Position: Sitting, Cuff Size: Extra-Large)   Pulse 75   Ht 5' 3" (1 6 m)   Wt 69 1 kg (152 lb 6 4 oz)   SpO2 97%   BMI 27 00 kg/m²    Wt Readings from Last 3 Encounters:   01/19/23 69 1 kg (152 lb 6 4 oz)   01/10/23 69 8 kg (153 lb 12 8 oz)   12/16/22 72 3 kg (159 lb 6 4 oz)       Physical Exam  Vitals reviewed  Constitutional:       Appearance: Normal appearance  Cardiovascular:      Rate and Rhythm: Normal rate and regular rhythm  Pulses: Normal pulses  Heart sounds: Normal heart sounds  Pulmonary:      Effort: Pulmonary effort is normal       Breath sounds: Normal breath sounds  Abdominal:      General: Abdomen is flat  Palpations: Abdomen is soft  Musculoskeletal:         General: Normal range of motion  Cervical back: Neck supple  Skin:     General: Skin is warm and dry  Capillary Refill: Capillary refill takes less than 2 seconds  Neurological:      General: No focal deficit present  Mental Status: He is alert and oriented to person, place, and time     Psychiatric:         Mood and Affect: Mood normal          Behavior: Behavior normal          Labs:   Lab Results   Component Value Date    HGBA1C 6 1 (H) 07/18/2022    HGBA1C 5 9 (H) 04/08/2022    HGBA1C 6 6 (H) 01/05/2022     Lab Results   Component Value Date    CREATININE 1 41 (H) 07/18/2022    CREATININE 1 24 04/08/2022    CREATININE 1 30 01/05/2022    BUN 31 (H) 07/18/2022     10/28/2017    K 3 9 07/18/2022     07/18/2022    CO2 28 07/18/2022     eGFR   Date Value Ref Range Status   07/18/2022 50 ml/min/1 73sq m Final     Lab Results   Component Value Date    CHOL 141 10/28/2017    HDL 32 (L) 07/18/2022    TRIG 172 (H) 07/18/2022     Lab Results   Component Value Date    ALT 33 07/18/2022    AST 18 07/18/2022    ALKPHOS 78 07/18/2022    BILITOT 1 2 10/28/2017     Lab Results   Component Value Date    JHO7GYGYDPYR 4 350 07/18/2022    AWA7YCRWAYUE 3 020 06/10/2021    NZD3USUCNSZN 2 700 12/31/2019     Lab Results   Component Value Date    FREET4 1 03 07/18/2022       Impression & Plan:    Problem List Items Addressed This Visit        Endocrine    Type 2 diabetes mellitus with both eyes affected by mild nonproliferative retinopathy without macular edema, with long-term current use of insulin ([address] ) - Primary     HGA1C very tightly controlled at 5 4% today in the office  He had one severe, symptomatic hypoglycemic episode in the past week  Lantus decreased to 25 units and provided baqsimi (nasal glucagon)  Still concern for overnight hypoglycemia  BGL Reviewed:CGM download reviewed  Treatment regimen: Stop evening glimepiride dose  Recently seem by diabetes education 1/2023  Advised lifestyle modifications including attention to diet including the amount and types of carbohydrates consumed and regular activity  Call for blood sugars less than 70 mg/dl or over 300 mg/dl  Discussed symptoms and treatment of hypoglycemia  Routine follow up for diabetic eye and foot exams  Ordered blood work to complete prior to next visit  Follow up in 3 months              Relevant Medications    Empagliflozin (Jardiance) 25 MG TABS    glipiZIDE (GLUCOTROL) 5 mg tablet    Insulin Glargine Solostar (Lantus SoloStar) 100 UNIT/ML SOPN    insulin lispro (HumaLOG KwikPen) 100 units/mL injection pen    Other Relevant Orders    HEMOGLOBIN A1C W/ EAG ESTIMATION Lab Collect    Hypogonadotropic hypogonadism (HCC)     Currently clinically asymptomatic with testosterone level in goal from 10/2022  Continues androgel without concern  Will follow up hemoglobin/hematocrit, testosterone a few days before next appointment in 4/2023  Relevant Orders    Testosterone, free, total- Lab Collect    Pituitary microadenoma [facility])     Currently asymptomatic with some occasional headaches  Has been clinically stable for several years  Will repeat lab work            Relevant Orders    Follicle stimulating hormone Lab Collect    Luteinizing hormone Lab Collect    Prolactin    TSH, 3rd generation    T4, free- Lab Collect       Other    Vitamin D deficiency    Relevant Orders    Vitamin D 25 hydroxy    Iron deficiency anemia secondary to inadequate dietary iron intake    Relevant Orders    Iron Panel (Includes Ferritin, Iron Sat%, Iron, and TIBC)    Hemoglobin and hematocrit, blood Lab Collect   Other Visit Diagnoses     Type 2 diabetes mellitus without complication, with long-term current use of insulin ([facility])        Relevant Medications    Empagliflozin (Jardiance) 25 MG TABS    glipiZIDE (GLUCOTROL) 5 mg tablet    Insulin Glargine Solostar (Lantus SoloStar) 100 UNIT/ML SOPN    insulin lispro (HumaLOG KwikPen) 100 units/mL injection pen    Other Relevant Orders    POCT hemoglobin A1c    Comprehensive metabolic panel    Type 2 diabetes mellitus with hyperglycemia, with long-term current use of insulin ([facility])        Relevant Medications    Empagliflozin (Jardiance) 25 MG TABS    glipiZIDE (GLUCOTROL) 5 mg tablet    Insulin Glargine Solostar (Lantus SoloStar) 100 UNIT/ML SOPN    insulin lispro (HumaLOG KwikPen) 100 units/mL injection pen        Discussed with the patient and all questioned fully answered  He will call me if any problems arise  Follow-up appointment in 3 months       Counseled patient on diagnostic results, prognosis, risk and benefit of treatment options, instruction for management, importance of treatment compliance, Risk  factor reduction and impressions    CHRISTIANO Butler

## 2023-01-20 LAB — SL AMB POCT HEMOGLOBIN AIC: 5.4 (ref ?–6.5)

## 2023-01-20 NOTE — ASSESSMENT & PLAN NOTE
Currently clinically asymptomatic with testosterone level in goal from 10/2022  Continues androgel without concern  Will follow up hemoglobin/hematocrit, testosterone a few days before next appointment in 4/2023

## 2023-01-20 NOTE — ASSESSMENT & PLAN NOTE
HGA1C very tightly controlled at 5 4% today in the office  He had one severe, symptomatic hypoglycemic episode in the past week  Lantus decreased to 25 units and provided baqsimi (nasal glucagon)  Still concern for overnight hypoglycemia  BGL Reviewed:CGM download reviewed  Treatment regimen: Stop evening glimepiride dose  Recently seem by diabetes education 1/2023  Advised lifestyle modifications including attention to diet including the amount and types of carbohydrates consumed and regular activity  Call for blood sugars less than 70 mg/dl or over 300 mg/dl  Discussed symptoms and treatment of hypoglycemia  Routine follow up for diabetic eye and foot exams  Ordered blood work to complete prior to next visit  Follow up in 3 months

## 2023-01-20 NOTE — ASSESSMENT & PLAN NOTE
Currently asymptomatic with some occasional headaches  Has been clinically stable for several years  Will repeat lab work

## 2023-01-23 ENCOUNTER — OFFICE VISIT (OUTPATIENT)
Dept: SLEEP CENTER | Facility: CLINIC | Age: 68
End: 2023-01-23

## 2023-01-23 VITALS
DIASTOLIC BLOOD PRESSURE: 82 MMHG | HEIGHT: 63 IN | WEIGHT: 154.8 LBS | HEART RATE: 96 BPM | SYSTOLIC BLOOD PRESSURE: 137 MMHG | BODY MASS INDEX: 27.43 KG/M2

## 2023-01-23 DIAGNOSIS — G89.4 CHRONIC PAIN SYNDROME: ICD-10-CM

## 2023-01-23 DIAGNOSIS — Z79.899 MEDICAL MARIJUANA USE: ICD-10-CM

## 2023-01-23 DIAGNOSIS — E66.3 OVERWEIGHT (BMI 25.0-29.9): ICD-10-CM

## 2023-01-23 DIAGNOSIS — G47.33 OBSTRUCTIVE SLEEP APNEA SYNDROME: Primary | ICD-10-CM

## 2023-01-23 DIAGNOSIS — R41.3 MEMORY DIFFICULTIES: ICD-10-CM

## 2023-01-23 DIAGNOSIS — Z91.09 ENVIRONMENTAL ALLERGIES: ICD-10-CM

## 2023-01-23 DIAGNOSIS — R68.2 DRY MOUTH: ICD-10-CM

## 2023-01-23 DIAGNOSIS — I10 BENIGN ESSENTIAL HYPERTENSION: ICD-10-CM

## 2023-01-23 NOTE — PROGRESS NOTES
Follow-Up Note - Sleep Jose Danielachester  69 y o  male  :1955  HKY:[id number]  DOS:2023    CC: I saw this patient for follow-up in clinic today for Sleep disordered breathing, Coexisting Sleep and Medical Problems  He is using a ResMed machine    Results of prior studies in : The diagnostic study demonstrated an AHI of 24 per hour, higher during stage REM and while supine  Minimum oxygen saturation was 90%  During the subsequent therapeutic study, sleep disordered breathing was successfully treated with CPAP at 6 cm H2O  PFSH, Problem List, Medications & Allergies were reviewed in EMR  Interval changes: None reported  He  has a past medical history of Arthritis, Avitaminosis D (), Colon polyp, Diabetes mellitus ([facility] [address] ), Displacement of cervical intervertebral disc (), GERD (gastroesophageal reflux disease), Glaucoma, HTN (hypertension) (), Hypertension, IBS (irritable bowel syndrome) (), Nephrolithiasis (2013), Pituitary microadenoma ([facility] [address] ) (), Spinal stenosis in cervical region (), Sprain and strain of calcaneofibular (ligament) (2013), Submandibular lymphadenopathy (2019), and Uric acid nephrolithiasis ()  He has a current medication list which includes the following prescription(s): acetaminophen, amlodipine, aspirin, b complex vitamins, bd pen needle eunice u/f, brimonidine-timolol, cholecalciferol, clobetasol, dicyclomine, doxazosin, empagliflozin, ferrous gluconate, fluticasone, gabapentin, glipizide, baqsimi two pack, glucosamine-chondroitin, hydrochlorothiazide, ibuprofen, insulin glargine solostar, insulin lispro, lidocaine, lisinopril, loratadine, multiple vitamin, omeprazole, other medication (see sig), probiotic-10, ozempic (0 25 or 0 5 mg/dose), simvastatin, testosterone, and vitamin e (tocopherol)  PHYSIOLOGICAL DATA REVIEW AND INTERPRETATION: Using PAP > 4 hours/night 97%   Estimated YVETTE 0 6/hour with pressure of 7cm [employer] percentile; Patient has not been using ozone based devices to sanitize the machine  SUBJECTIVE: With respect to use of PAP, Hossein Barnett reports benefiting from use  He is experiencing some adverse effects: dry mouth/throat;   Sleep Routine: Hossein Barnett reports getting >8 hrs sleep; he has no difficulty initiating or maintaining sleep   He arises spontaneously and feels refreshed  Hossein Barnett reports episodic   daytime sleepiness, and naps occasionally  He rated [himself] at Total score: 7 /24 on the [place] Sleepiness Scale  Habits:[ reports that he quit smoking about 43 years ago  His smoking use included cigarettes  He has a 0 50 pack-year smoking history  He has never used smokeless tobacco ], [ reports current alcohol use of about 4 0 standard drinks per week ], [ reports current drug use  Frequency: 7 00 times per week  Drug: Marijuana ], Caffeine use:limited; Exercise routine: regular  ROS: reviewed & significant for weight has been stable  He has nasal symptoms due to allergies for which he uses Claritin  He reported no cardiac or respiratory symptoms  He has chronic pain that is controlled with use of medical marijuana  He is not reporting further deterioration in memory  Diabetes is controlled  EXAM: /82 (BP Location: Right arm, Patient Position: Sitting, Cuff Size: Large)   Pulse 96   Ht 5' 3" (1 6 m)   Wt 70 2 kg (154 lb 12 8 oz)   BMI 27 42 kg/m²     Wt Readings from Last 3 Encounters:   01/23/23 70 2 kg (154 lb 12 8 oz)   01/19/23 69 1 kg (152 lb 6 4 oz)   01/10/23 69 8 kg (153 lb 12 8 oz)      Patient is well groomed; well appearing  CNS: Alert, orientated, clear & coherent speech  Psych: cooperative and in no distress  Mental state: Appears normal   H&N: EOMI; NC/AT: No facial pressure marks, no rashes  Skin/Extrem: col & hydration normal; no edema  Resp: Respiratory effort is normal  Physical findings otherwise essentially unchanged from previous      IMPRESSION: Problem List Items & Comorbidities Addressed this Visit    1  Obstructive sleep apnea syndrome  PAP DME Resupply/Reorder      2  Dry mouth        3  Environmental allergies        4  Memory difficulties        5  Chronic pain syndrome        6  Medical marijuana use        7  Benign essential hypertension        8  Overweight (BMI 25 0-29  9)            PLAN:  1  I reviewed results of prior studies and physiologic data with the patient  2  I discussed treatment options with risks and benefits  3  Treatment with  PAP is medically necessary and Gaurav Muhammad is agreable to continue use  4  Care of equipment, methods to improve comfort using PAP and importance of compliance with therapy were discussed  5  Pressure setting: continue 7 cmH2O     6  Rx provided to replace supplies and Care coordinated with DME provider  7  Discussed strategies for weight reduction  8  Follow-up is advised in 1 year or sooner if needed to monitor progress, compliance and to adjust therapy  Thank you for allowing me to participate in the care of this patient  Sincerely,     Authenticated electronically on 30/85/55   Board Certified Specialist     Portions of the record may have been created with voice recognition software  Occasional wrong word or "sound a like" substitutions may have occurred due to the inherent limitations of voice recognition software  There may also be notations and random deletions of words or characters from malfunctioning software  Read the chart carefully and recognize, using context, where substitutions/deletions have occurred

## 2023-01-23 NOTE — PATIENT INSTRUCTIONS

## 2023-01-24 ENCOUNTER — TELEPHONE (OUTPATIENT)
Dept: SLEEP CENTER | Facility: CLINIC | Age: 68
End: 2023-01-24

## 2023-01-24 ENCOUNTER — APPOINTMENT (OUTPATIENT)
Dept: LAB | Age: 68
End: 2023-01-24

## 2023-01-24 DIAGNOSIS — E11.9 TYPE 2 DIABETES MELLITUS WITHOUT COMPLICATION, WITH LONG-TERM CURRENT USE OF INSULIN (HCC): ICD-10-CM

## 2023-01-24 DIAGNOSIS — Z79.4 TYPE 2 DIABETES MELLITUS WITHOUT COMPLICATION, WITH LONG-TERM CURRENT USE OF INSULIN (HCC): ICD-10-CM

## 2023-01-24 DIAGNOSIS — D50.8 IRON DEFICIENCY ANEMIA SECONDARY TO INADEQUATE DIETARY IRON INTAKE: ICD-10-CM

## 2023-01-24 DIAGNOSIS — E55.9 VITAMIN D DEFICIENCY: ICD-10-CM

## 2023-01-24 DIAGNOSIS — D35.2 PITUITARY MICROADENOMA (HCC): ICD-10-CM

## 2023-01-24 DIAGNOSIS — E11.3293 TYPE 2 DIABETES MELLITUS WITH BOTH EYES AFFECTED BY MILD NONPROLIFERATIVE RETINOPATHY WITHOUT MACULAR EDEMA, WITH LONG-TERM CURRENT USE OF INSULIN (HCC): ICD-10-CM

## 2023-01-24 DIAGNOSIS — E23.0 HYPOGONADOTROPIC HYPOGONADISM (HCC): ICD-10-CM

## 2023-01-24 DIAGNOSIS — Z79.4 TYPE 2 DIABETES MELLITUS WITH BOTH EYES AFFECTED BY MILD NONPROLIFERATIVE RETINOPATHY WITHOUT MACULAR EDEMA, WITH LONG-TERM CURRENT USE OF INSULIN (HCC): ICD-10-CM

## 2023-01-24 LAB
25(OH)D3 SERPL-MCNC: 60.2 NG/ML (ref 30–100)
ALBUMIN SERPL BCP-MCNC: 4 G/DL (ref 3.5–5)
ALP SERPL-CCNC: 78 U/L (ref 46–116)
ALT SERPL W P-5'-P-CCNC: 38 U/L (ref 12–78)
ANION GAP SERPL CALCULATED.3IONS-SCNC: 7 MMOL/L (ref 4–13)
AST SERPL W P-5'-P-CCNC: 17 U/L (ref 5–45)
BILIRUB SERPL-MCNC: 0.74 MG/DL (ref 0.2–1)
BUN SERPL-MCNC: 21 MG/DL (ref 5–25)
CALCIUM SERPL-MCNC: 9.3 MG/DL (ref 8.3–10.1)
CHLORIDE SERPL-SCNC: 103 MMOL/L (ref 96–108)
CO2 SERPL-SCNC: 28 MMOL/L (ref 21–32)
CREAT SERPL-MCNC: 1.23 MG/DL (ref 0.6–1.3)
FSH SERPL-ACNC: <0.2 MIU/ML (ref 0.7–10.8)
GFR SERPL CREATININE-BSD FRML MDRD: 59 ML/MIN/1.73SQ M
GLUCOSE P FAST SERPL-MCNC: 168 MG/DL (ref 65–99)
LH SERPL-ACNC: <0.2 MIU/ML (ref 1.2–10.6)
POTASSIUM SERPL-SCNC: 3.9 MMOL/L (ref 3.5–5.3)
PROLACTIN SERPL-MCNC: 9.8 NG/ML (ref 2.5–17.4)
PROT SERPL-MCNC: 7.6 G/DL (ref 6.4–8.4)
SODIUM SERPL-SCNC: 138 MMOL/L (ref 135–147)
T4 FREE SERPL-MCNC: 0.97 NG/DL (ref 0.76–1.46)
TSH SERPL DL<=0.05 MIU/L-ACNC: 3.71 UIU/ML (ref 0.45–4.5)

## 2023-01-25 ENCOUNTER — TELEPHONE (OUTPATIENT)
Dept: ENDOCRINOLOGY | Facility: CLINIC | Age: 68
End: 2023-01-25

## 2023-01-25 LAB
DME PARACHUTE DELIVERY DATE ACTUAL: NORMAL
DME PARACHUTE DELIVERY DATE REQUESTED: NORMAL
DME PARACHUTE ITEM DESCRIPTION: NORMAL
DME PARACHUTE ORDER STATUS: NORMAL
DME PARACHUTE SUPPLIER NAME: NORMAL
DME PARACHUTE SUPPLIER PHONE: NORMAL
EST. AVERAGE GLUCOSE BLD GHB EST-MCNC: 108 MG/DL
HBA1C MFR BLD: 5.4 %

## 2023-01-25 NOTE — TELEPHONE ENCOUNTER
----- Message from Jesus Alberto Cr, [address] sent at 1/19/2023  2:53 PM EST -----  Please pull dexcom download  Thanks!   Bradford Hernandez

## 2023-02-02 ENCOUNTER — TELEPHONE (OUTPATIENT)
Dept: ENDOCRINOLOGY | Facility: CLINIC | Age: 68
End: 2023-02-02

## 2023-02-02 NOTE — TELEPHONE ENCOUNTER
----- Message from Rosemary Hernandez, [address] sent at 1/30/2023  3:05 PM EST -----  Hemoglobin A1c was low end normal which is a decrease from six months ago-- he had a hypoglycemic event in January 2023, lantus reduced and baqsimi ordered  At visit recently stopped evening glipizide  Please pull dexcom for review to see if hypoglycemia resolved  Prolactin normal  Thyroid function normal  FSH and LH are suppressed secondary testosterone replacement therapy  Vitamin D level in normal range and stable

## 2023-02-06 NOTE — TELEPHONE ENCOUNTER
Reviewed continuous glucose monitor data, overall blood sugars improved off of evening Glipizide dose  Please confirm if he has been experiencing overnight lows and treating for if they are self-resolving  Thank you!

## 2023-02-06 NOTE — TELEPHONE ENCOUNTER
[employer] message w/ results/recommendations read by pt  Dexcom report printed   to scan in

## 2023-02-20 ENCOUNTER — OFFICE VISIT (OUTPATIENT)
Dept: INTERNAL MEDICINE CLINIC | Facility: OTHER | Age: 68
End: 2023-02-20

## 2023-02-20 VITALS
HEART RATE: 78 BPM | RESPIRATION RATE: 18 BRPM | TEMPERATURE: 98.1 F | BODY MASS INDEX: 26.65 KG/M2 | SYSTOLIC BLOOD PRESSURE: 104 MMHG | HEIGHT: 63 IN | WEIGHT: 150.4 LBS | OXYGEN SATURATION: 98 % | DIASTOLIC BLOOD PRESSURE: 62 MMHG

## 2023-02-20 DIAGNOSIS — K21.9 GASTROESOPHAGEAL REFLUX DISEASE WITHOUT ESOPHAGITIS: ICD-10-CM

## 2023-02-20 DIAGNOSIS — N18.30 STAGE 3 CHRONIC KIDNEY DISEASE, UNSPECIFIED WHETHER STAGE 3A OR 3B CKD (HCC): ICD-10-CM

## 2023-02-20 DIAGNOSIS — Z12.12 SCREENING FOR COLORECTAL CANCER: ICD-10-CM

## 2023-02-20 DIAGNOSIS — Z71.89 ADVANCED CARE PLANNING/COUNSELING DISCUSSION: ICD-10-CM

## 2023-02-20 DIAGNOSIS — R14.0 ABDOMINAL BLOATING: ICD-10-CM

## 2023-02-20 DIAGNOSIS — M54.12 CERVICAL RADICULOPATHY: ICD-10-CM

## 2023-02-20 DIAGNOSIS — M46.1 SACROILIITIS (HCC): ICD-10-CM

## 2023-02-20 DIAGNOSIS — Z12.11 SCREENING FOR COLORECTAL CANCER: ICD-10-CM

## 2023-02-20 DIAGNOSIS — E11.3293 TYPE 2 DIABETES MELLITUS WITH BOTH EYES AFFECTED BY MILD NONPROLIFERATIVE RETINOPATHY WITHOUT MACULAR EDEMA, WITH LONG-TERM CURRENT USE OF INSULIN (HCC): Primary | ICD-10-CM

## 2023-02-20 DIAGNOSIS — Z79.4 TYPE 2 DIABETES MELLITUS WITH BOTH EYES AFFECTED BY MILD NONPROLIFERATIVE RETINOPATHY WITHOUT MACULAR EDEMA, WITH LONG-TERM CURRENT USE OF INSULIN (HCC): Primary | ICD-10-CM

## 2023-02-20 DIAGNOSIS — Z00.00 MEDICARE ANNUAL WELLNESS VISIT, SUBSEQUENT: ICD-10-CM

## 2023-02-20 DIAGNOSIS — Z12.5 SCREENING FOR PROSTATE CANCER: ICD-10-CM

## 2023-02-20 DIAGNOSIS — Z79.4 TYPE 2 DIABETES MELLITUS WITH HYPERGLYCEMIA, WITH LONG-TERM CURRENT USE OF INSULIN (HCC): ICD-10-CM

## 2023-02-20 DIAGNOSIS — R19.7 DIARRHEA, UNSPECIFIED TYPE: ICD-10-CM

## 2023-02-20 DIAGNOSIS — R10.32 LEFT LOWER QUADRANT ABDOMINAL PAIN: ICD-10-CM

## 2023-02-20 DIAGNOSIS — E23.0 HYPOGONADOTROPIC HYPOGONADISM (HCC): ICD-10-CM

## 2023-02-20 DIAGNOSIS — Z13.6 SCREENING FOR CARDIOVASCULAR CONDITION: ICD-10-CM

## 2023-02-20 DIAGNOSIS — Z13.1 SCREENING FOR DIABETES MELLITUS: ICD-10-CM

## 2023-02-20 DIAGNOSIS — I10 BENIGN ESSENTIAL HYPERTENSION: ICD-10-CM

## 2023-02-20 DIAGNOSIS — E11.65 TYPE 2 DIABETES MELLITUS WITH HYPERGLYCEMIA, WITH LONG-TERM CURRENT USE OF INSULIN (HCC): ICD-10-CM

## 2023-02-20 DIAGNOSIS — M48.02 CERVICAL STENOSIS OF SPINAL CANAL: ICD-10-CM

## 2023-02-20 DIAGNOSIS — M54.2 NECK PAIN: ICD-10-CM

## 2023-02-20 DIAGNOSIS — E78.2 MIXED HYPERLIPIDEMIA: ICD-10-CM

## 2023-02-20 PROBLEM — J06.9 UPPER RESPIRATORY INFECTION: Status: RESOLVED | Noted: 2022-11-08 | Resolved: 2023-02-20

## 2023-02-20 RX ORDER — HYDROCHLOROTHIAZIDE 25 MG/1
25 TABLET ORAL DAILY
Qty: 90 TABLET | Refills: 1 | Status: SHIPPED | OUTPATIENT
Start: 2023-02-20

## 2023-02-20 RX ORDER — DICYCLOMINE HCL 20 MG
20 TABLET ORAL
Qty: 120 TABLET | Refills: 5 | Status: SHIPPED | OUTPATIENT
Start: 2023-02-20

## 2023-02-20 RX ORDER — OMEPRAZOLE 40 MG/1
40 CAPSULE, DELAYED RELEASE ORAL DAILY
Qty: 90 CAPSULE | Refills: 1 | Status: SHIPPED | OUTPATIENT
Start: 2023-02-20

## 2023-02-20 RX ORDER — SENNOSIDES 8.6 MG
650 CAPSULE ORAL EVERY 8 HOURS PRN
COMMUNITY

## 2023-02-20 RX ORDER — SIMVASTATIN 20 MG
20 TABLET ORAL
Qty: 90 TABLET | Refills: 3 | Status: SHIPPED | OUTPATIENT
Start: 2023-02-20

## 2023-02-20 RX ORDER — AMLODIPINE BESYLATE 10 MG/1
10 TABLET ORAL DAILY
Qty: 90 TABLET | Refills: 1 | Status: SHIPPED | OUTPATIENT
Start: 2023-02-20

## 2023-02-20 RX ORDER — LISINOPRIL 40 MG/1
40 TABLET ORAL DAILY
Qty: 90 TABLET | Refills: 1 | Status: SHIPPED | OUTPATIENT
Start: 2023-02-20

## 2023-02-20 RX ORDER — DOXAZOSIN MESYLATE 4 MG/1
4 TABLET ORAL DAILY
Qty: 90 TABLET | Refills: 1 | Status: SHIPPED | OUTPATIENT
Start: 2023-02-20

## 2023-02-20 RX ORDER — GABAPENTIN 300 MG/1
300 CAPSULE ORAL 3 TIMES DAILY
Qty: 270 CAPSULE | Refills: 1 | Status: SHIPPED | OUTPATIENT
Start: 2023-02-20

## 2023-02-20 NOTE — ASSESSMENT & PLAN NOTE
----- Message from Sd Steward MD sent at 9/4/2020  4:40 PM CDT -----  Patient is medically cleared for surgery   Controlled  Continue omeprazole 40 mg daily

## 2023-02-20 NOTE — PROGRESS NOTES
Diabetic Foot Exam    Patient's shoes and socks removed  Right Foot/Ankle   Right Foot Inspection  Skin Exam: skin normal and skin intact  No warmth, no callus, no erythema, no maceration, no abnormal color, no pre-ulcer, no ulcer and no callus  Toe Exam: ROM and strength within normal limits  Sensory   Monofilament testing: intact    Vascular  Capillary refills: < 3 seconds  The right DP pulse is 2+  The right PT pulse is 2+  Left Foot/Ankle  Left Foot Inspection  Skin Exam: skin normal and skin intact  No dry skin, no warmth, no erythema, no maceration, normal color, no pre-ulcer, no ulcer and no callus  Toe Exam: ROM and strength within normal limits  Sensory   Monofilament testing: intact    Vascular  Capillary refills: < 3 seconds  The left DP pulse is 2+  The left PT pulse is 2+       Assign Risk Category  No deformity present  No loss of protective sensation  No weak pulses  Risk: 0

## 2023-02-20 NOTE — PROGRESS NOTES
Assessment and Plan:     Problem List Items Addressed This Visit        Digestive    Gastroesophageal reflux disease without esophagitis     Controlled  Continue omeprazole 40 mg daily  Relevant Medications    omeprazole (PriLOSEC) 40 MG capsule    dicyclomine (BENTYL) 20 mg tablet       Endocrine    Type 2 diabetes mellitus with both eyes affected by mild nonproliferative retinopathy without macular edema, with long-term current use of insulin ([facility] 75 ) - Primary     Controlled  Continue follow-up with endocrinology  Continue current diabetic regimen  Lab Results   Component Value Date    HGBA1C 5 4 01/24/2023            Relevant Medications    lisinopril (ZESTRIL) 40 mg tablet    amLODIPine (NORVASC) 10 mg tablet    simvastatin (ZOCOR) 20 mg tablet    Other Relevant Orders    Lipid panel    Comprehensive metabolic panel    Hypogonadotropic hypogonadism ([facility] )     Management as per endocrinology  Continue testosterone replacement therapy  Cardiovascular and Mediastinum    Benign essential hypertension     Controlled  Continue current antihypertensive regimen  Relevant Medications    lisinopril (ZESTRIL) 40 mg tablet    hydrochlorothiazide (HYDRODIURIL) 25 mg tablet    amLODIPine (NORVASC) 10 mg tablet    doxazosin (CARDURA) 4 mg tablet    Other Relevant Orders    Lipid panel    Comprehensive metabolic panel       Nervous and Auditory    Cervical radiculopathy    Relevant Medications    gabapentin (NEURONTIN) 300 mg capsule       Musculoskeletal and Integument    Sacroiliitis (HCC)       Genitourinary    Stage 3 chronic kidney disease, unspecified whether stage 3a or 3b CKD (HCC)     Lab Results   Component Value Date    EGFR 59 01/24/2023    EGFR 50 07/18/2022    EGFR 59 04/08/2022    CREATININE 1 23 01/24/2023    CREATININE 1 41 (H) 07/18/2022    CREATININE 1 24 04/08/2022   Continue to trend kidney function  Avoid nephrotoxic agents           Relevant Medications hydrochlorothiazide (HYDRODIURIL) 25 mg tablet    Other Relevant Orders    Comprehensive metabolic panel       Other    Cervical stenosis of spinal canal    Relevant Medications    gabapentin (NEURONTIN) 300 mg capsule    Hyperlipidemia     Controlled  Continue simvastatin 20 mg daily  Relevant Medications    simvastatin (ZOCOR) 20 mg tablet    Other Relevant Orders    Lipid panel    Comprehensive metabolic panel    Advanced care planning/counseling discussion     Refer to ACP notes  Other Visit Diagnoses     Type 2 diabetes mellitus with hyperglycemia, with long-term current use of insulin (HCC)        Relevant Medications    lisinopril (ZESTRIL) 40 mg tablet    amLODIPine (NORVASC) 10 mg tablet    simvastatin (ZOCOR) 20 mg tablet    Neck pain        Relevant Medications    gabapentin (NEURONTIN) 300 mg capsule    Left lower quadrant abdominal pain        Relevant Medications    dicyclomine (BENTYL) 20 mg tablet    Diarrhea, unspecified type        Relevant Medications    dicyclomine (BENTYL) 20 mg tablet    Abdominal bloating        Relevant Medications    dicyclomine (BENTYL) 20 mg tablet    Medicare annual wellness visit, subsequent        Screening for diabetes mellitus        Screening for cardiovascular condition        Screening for colorectal cancer        Screening for prostate cancer        Relevant Orders    PSA, Total Screen        Serious Illness Conversation    1  What is your understanding now of where you are with your illness? Prognostic Understanding: appropriate understanding of prognosis     2  How much information about what is likely to be ahead with your illness would you like to have? Information: patient wants to be fully informed     3  What did you (clinician) communicate to the patient? Prognostic Communication: Uncertain - It can be difficult to predict what will happen with your illness   I hope you will continue to live well for a long time but I’m worried that you could get sick quickly, and I think it is important to prepare for that possibility  4  If your health situation worsens, what are your most important goals? Goals: have my medical decisions respected, live as long as possible, no matter what     5  What are the biggest fears and worries about the future and your health? Fears/Worries: loss of control     6  What abilities are so critical to your life that you cannot imagine living without them? Unacceptable Function: being unconscious     7  What gives you strength as you think about the future with your illness? 8  If you become sicker, how much are you willing to go through for the possibility of gaining more time? Be in the hospital: Yes Have a feeding tube: Yes   Be in the ICU: Yes Live in a nursing home: Yes   Be on a ventilator: Yes Be uncomfortable: Yes   Be on dialysis: Yes Undergo aggressive test and/or procedures: Yes   9  How much does your proxy and family know about your priorities and wishes? Discussion Discussion: extensive discussion with family about goals and wishes        How does this plan sound to you? I will do everything I can to help you through this  Patient verbalized understanding of the plan     I have spent 15 minutes speaking with my patient on advanced care planning today or during this visit     Advanced directives  Five Wishes: Patient does not have Five Wishes- would like information            Preventive health issues were discussed with patient, and age appropriate screening tests were ordered as noted in patient's After Visit Summary  Personalized health advice and appropriate referrals for health education or preventive services given if needed, as noted in patient's After Visit Summary  History of Present Illness:     Patient presents for a Medicare Wellness Visit    61-year-old male seen today for follow-up of chronic conditions    Recent laboratory studies reviewed today: FSH and LH are low however prolactin levels within acceptable range  CMP, Vitamin D level, TSH and free T4 are also within acceptable range  A1c is 5 4% however this could be a false reading due to iron deficiency  He has been compliant with his medication regimen  He has no active complaints or concerns at this time  He follows up with his endocrinologist regularly, diabetic regimen was adjusted recently  He has been compliant with testosterone replacement therapy, which was recently increased  Hypertension  Pertinent negatives include no chest pain, headaches, palpitations or shortness of breath  Past treatments include calcium channel blockers, ACE inhibitors, diuretics and direct vasodilators  Diabetes  He presents for his follow-up diabetic visit  He has type 2 diabetes mellitus  His disease course has been improving  There are no hypoglycemic associated symptoms  Pertinent negatives for hypoglycemia include no dizziness or headaches  There are no diabetic associated symptoms  Pertinent negatives for diabetes include no chest pain, no fatigue and no weakness  There are no hypoglycemic complications  Symptoms are stable  There are no diabetic complications  Current diabetic treatment includes diet, insulin injections and oral agent (dual therapy)  He is compliant with treatment all of the time  An ACE inhibitor/angiotensin II receptor blocker is being taken  Heartburn  He reports no abdominal pain, no chest pain, no choking, no coughing, no nausea, no sore throat or no wheezing  This is a chronic problem  The current episode started more than 1 year ago  The problem occurs rarely  The symptoms are aggravated by certain foods  Pertinent negatives include no fatigue  He has tried a PPI for the symptoms  The treatment provided moderate relief  Hyperlipidemia  This is a chronic problem  The current episode started more than 1 year ago  The problem is controlled   Recent lipid tests were reviewed and are normal  Pertinent negatives include no chest pain or shortness of breath  Current antihyperlipidemic treatment includes statins  The current treatment provides moderate improvement of lipids  There are no compliance problems  Patient Care Team:  Arcelia Cameron MD as PCP - Anil Rutherford MD as PCP - PCP-[facility] Attributed-MD Ramiro Mullins DO (Pain Medicine)  DO Artis Beth MD Garnell Sellar, MD (Endocrinology)  Asif Christian as Diabetes Educator (Nutrition)     Review of Systems:     Review of Systems   Constitutional: Negative for activity change, appetite change, chills, diaphoresis, fatigue and fever  HENT: Negative for congestion, postnasal drip, rhinorrhea, sinus pressure, sinus pain, sneezing and sore throat  Eyes: Negative for visual disturbance  Respiratory: Negative for apnea, cough, choking, chest tightness, shortness of breath and wheezing  Cardiovascular: Negative for chest pain, palpitations and leg swelling  Gastrointestinal: Negative for abdominal distention, abdominal pain, anal bleeding, blood in stool, constipation, diarrhea, nausea and vomiting  Endocrine: Negative for cold intolerance and heat intolerance  Genitourinary: Negative for difficulty urinating, dysuria and hematuria  Musculoskeletal: Negative  Skin: Negative  Neurological: Negative for dizziness, weakness, light-headedness, numbness and headaches  Hematological: Negative for adenopathy  Psychiatric/Behavioral: Negative for agitation, sleep disturbance and suicidal ideas  All other systems reviewed and are negative         Problem List:     Patient Active Problem List   Diagnosis   • Benign essential hypertension   • Carpal tunnel syndrome   • Cervical herniated disc   • Cervical radiculopathy   • Cervical stenosis of spinal canal   • Type 2 diabetes mellitus with both eyes affected by mild nonproliferative retinopathy without macular edema, with long-term current use of insulin ([place] [address] )   • Hyperlipidemia   • Hypogonadotropic hypogonadism ([place] [address] )   • Pituitary microadenoma ([place] [address] )   • Obstructive sleep apnea syndrome   • Spondylosis of cervical region without myelopathy or radiculopathy   • Vitamin D deficiency   • Low back pain with sciatica   • Sacroiliitis ([place] [address] )   • Overweight (BMI 25 0-29  9)   • Gastroesophageal reflux disease without esophagitis   • Chronic pain syndrome   • Salivary gland adenitis   • Arthralgia of right hand   • Lumbar radiculopathy   • Stage 3 chronic kidney disease, unspecified whether stage 3a or 3b CKD (HCC)   • Iron deficiency anemia secondary to inadequate dietary iron intake   • Advanced care planning/counseling discussion      Past Medical and Surgical History:     Past Medical History:   Diagnosis Date   • Arthritis     Last assessed 5/24/2013   • Avitaminosis D 2012   • Colon polyp    • Diabetes mellitus ([place] [address] )    • Displacement of cervical intervertebral disc 2014   • GERD (gastroesophageal reflux disease)    • Glaucoma     Normal Pressure Glaucoma   • HTN (hypertension) 2007   • Hypertension    • IBS (irritable bowel syndrome) 2021   • Nephrolithiasis 5/24/2013   • Pituitary microadenoma ([place] [address] ) 2010   • Spinal stenosis in cervical region 2014   • Sprain and strain of calcaneofibular (ligament) 12/5/2013   • Submandibular lymphadenopathy 8/8/2019   • Uric acid nephrolithiasis 1990     Past Surgical History:   Procedure Laterality Date   • ASPIRATION / INJECTION RENAL CYST      Renal Cyst Aspiration   • CATARACT EXTRACTION      12/2017- right eye, 01/2018- left eye   • COLONOSCOPY  2005   • EYE SURGERY Bilateral     Cataract Surgery   • TONSILLECTOMY     • UPPER GASTROINTESTINAL ENDOSCOPY        Family History:     Family History   Problem Relation Age of Onset   • Diabetes unspecified Mother    • Heart disease Mother    • Nephrolithiasis Mother    • Kidney disease Mother    • Other Mother         Back Disorder   • Thyroid disease Mother    • Diabetes type II Mother    • Hypertension Mother    • Thyroid disease unspecified Mother    • Diabetes unspecified Father    • Heart disease Father    • Hypertension Father    • Diabetes unspecified Sister    • Heart disease Sister    • Stroke Sister    • Diabetes unspecified Brother    • Heart disease Brother    • Nephrolithiasis Brother    • Lung cancer Brother    • Other Brother         COPD   • Diabetes unspecified Sister    • Heart disease Sister    • Diabetes unspecified Sister    • Diabetes unspecified Brother    • Heart disease Brother    • Diabetes unspecified Brother    • Other Brother         Back Disorder   • Diabetes unspecified Brother    • Other Brother         Back Disorder   • Diabetes unspecified Brother    • Stomach cancer Paternal Aunt       Social History:     Social History     Socioeconomic History   • Marital status: /Civil Union     Spouse name: None   • Number of children: None   • Years of education: None   • Highest education level: None   Occupational History   • Occupation:    Tobacco Use   • Smoking status: Former     Packs/day: 0 25     Years: 2 00     Pack years: 0 50     Types: Cigarettes     Start date: 1     Quit date: 1980     Years since quittin 1   • Smokeless tobacco: Never   Vaping Use   • Vaping Use: Never used   Substance and Sexual Activity   • Alcohol use:  Yes     Alcohol/week: 4 0 standard drinks     Types: 2 Cans of beer, 2 Standard drinks or equivalent per week     Comment: social   • Drug use: Yes     Frequency: 7 0 times per week     Types: Marijuana     Comment: Use Medical Marijuana daily (1-3 capsules or tincture)   • Sexual activity: Yes     Partners: Female     Birth control/protection: Female Sterilization   Other Topics Concern   • None   Social History Narrative   • None     Social Determinants of Health     Financial Resource Strain: Low Risk    • Difficulty of Paying Living Expenses: Not very hard   Food Insecurity: Not on file   Transportation Needs: No Transportation Needs   • Lack of Transportation (Medical): No   • Lack of Transportation (Non-Medical):  No   Physical Activity: Not on file   Stress: Not on file   Social Connections: Not on file   Intimate Partner Violence: Not on file   Housing Stability: Not on file      Medications and Allergies:     Current Outpatient Medications   Medication Sig Dispense Refill   • acetaminophen (Tylenol 8 Hour Arthritis Pain) 650 mg CR tablet Take 650 mg by mouth every 8 (eight) hours as needed for mild pain     • amLODIPine (NORVASC) 10 mg tablet Take 1 tablet (10 mg total) by mouth daily 90 tablet 1   • aspirin (ECOTRIN LOW STRENGTH) 81 mg EC tablet Take 81 mg by mouth daily      • B Complex Vitamins (VITAMIN B-COMPLEX PO) Take 1 capsule by mouth daily      • BD Pen Needle Harriett U/F 32G X 4 MM MISC Use 4x per day 400 each 3   • brimonidine-timolol (COMBIGAN) 0 2-0 5 % Administer 1 drop to both eyes every 12 (twelve) hours     • cholecalciferol (VITAMIN D3) 1,000 units tablet Take 1,000 Units by mouth 2 (two) times a day      • clobetasol (TEMOVATE) 0 05 % cream Apply topically 2 (two) times a day as needed (Rash) 30 g 3   • dicyclomine (BENTYL) 20 mg tablet Take 1 tablet (20 mg total) by mouth 4 (four) times a day (before meals and at bedtime) 120 tablet 5   • doxazosin (CARDURA) 4 mg tablet Take 1 tablet (4 mg total) by mouth daily 90 tablet 1   • Empagliflozin (Jardiance) 25 MG TABS Take 1 tablet (25 mg total) by mouth daily 90 tablet 3   • ferrous gluconate (FERGON) 240 (27 FE) MG tablet Take 1 tablet (240 mg total) by mouth in the morning 90 tablet 1   • fluticasone (FLONASE) 50 mcg/act nasal spray 1 spray into each nostril daily as needed for rhinitis (Acute URI/sinusitis) 18 mL 0   • gabapentin (NEURONTIN) 300 mg capsule Take 1 capsule (300 mg total) by mouth 3 (three) times a day 270 capsule 1   • glipiZIDE (GLUCOTROL) 5 mg tablet Take 1 tablet (5 mg total) by mouth in the morning 90 tablet 2   • Glucagon (Baqsimi Two Pack) 3 MG/DOSE POWD Use 1 actuation as needed (low blood sugar) into 1 nostril 1 each 1   • Glucosamine-Chondroitin 250-200 MG TABS Take 1 tablet by mouth 2 (two) times a day     • hydrochlorothiazide (HYDRODIURIL) 25 mg tablet Take 1 tablet (25 mg total) by mouth daily 90 tablet 1   • ibuprofen (MOTRIN) 200 mg tablet Take 200 mg by mouth as needed      • Insulin Glargine Solostar (Lantus SoloStar) 100 UNIT/ML SOPN Inject 0 25 mL (25 Units total) under the skin daily at bedtime 15 mL 3   • insulin lispro (HumaLOG KwikPen) 100 units/mL injection pen Inject per insulin scales up to 30 units per day  30 mL 3   • lidocaine (Lidoderm) 5 % Apply 1 patch topically daily Remove & Discard patch within 12 hours or as directed by MD 6 patch 0   • lisinopril (ZESTRIL) 40 mg tablet Take 1 tablet (40 mg total) by mouth daily 90 tablet 1   • loratadine (CLARITIN) 10 mg tablet Take 10 mg by mouth daily     • MULTIPLE VITAMIN PO Take 1 tablet by mouth daily      • omeprazole (PriLOSEC) 40 MG capsule Take 1 capsule (40 mg total) by mouth daily 90 capsule 1   • other medication, see sig, Medication/product name: Medical Marijuana     • Probiotic Product (PROBIOTIC-10) CAPS Take 1 capsule by mouth daily      • Semaglutide,0 25 or 0 5MG/DOS, (Ozempic, 0 25 or 0 5 MG/DOSE,) 2 MG/1 5ML SOPN Inject 0 5 mg under the skin once a week 4 5 mL 2   • simvastatin (ZOCOR) 20 mg tablet Take 1 tablet (20 mg total) by mouth daily at bedtime 90 tablet 3   • testosterone (ANDROGEL) 25 MG/2 5GM (1%) GEL Place 2 packets (50 mg total) on the skin daily 180 packet 1   • vitamin E, tocopherol, 400 units capsule Take 400 Units by mouth 2 (two) times a day      • acetaminophen (TYLENOL) 650 mg CR tablet Take 1 tablet (650 mg total) by mouth every 8 (eight) hours as needed for mild pain (Patient not taking: Reported on 2/20/2023) 30 tablet 0     No current facility-administered medications for this visit       Allergies Allergen Reactions   • Clonidine Other (See Comments)     Diaphoresis, hot flashes   • Augmentin [Amoxicillin-Pot Clavulanate] Abdominal Pain   • Biaxin [Clarithromycin] Abdominal Pain   • Dust Mite Extract    • Insulin Aspart GI Intolerance     Novolog    • Liraglutide Abdominal Pain and Nausea Only   • Metformin And Related Diarrhea and GI Intolerance   • Molds & Smuts    • Nuts - Food Allergy    • Peanut (Diagnostic) - Food Allergy Abdominal Pain, Diarrhea and GI Intolerance   • Seasonal Ic [Cholestatin] Headache   • Sitagliptin-Metformin Hcl Er Abdominal Pain, Diarrhea and GI Intolerance      Immunizations:     Immunization History   Administered Date(s) Administered   • COVID-19 PFIZER VACCINE 0 3 ML IM 03/02/2021, 03/22/2021, 10/11/2021, 10/05/2022, 10/05/2022   • H1N1, All Formulations 11/16/2009   • INFLUENZA 09/29/2009, 10/07/2016, 10/14/2017, 10/09/2018, 10/04/2019, 11/12/2021, 10/21/2022   • Influenza Quadrivalent Preservative Free 3 years and older IM 10/14/2017   • Influenza, high dose seasonal 0 7 mL 10/21/2022   • Influenza, injectable, quadrivalent, preservative free 0 5 mL 10/09/2018   • Pneumococcal Conjugate 13-Valent 03/04/2020   • Tdap 06/05/2008, 12/05/2022   • Zoster 09/08/2016   • Zoster Vaccine Recombinant 12/20/2019, 06/10/2020      Health Maintenance:         Topic Date Due   • Colorectal Cancer Screening  07/28/2024   • Hepatitis C Screening  Completed         Topic Date Due   • Pneumococcal Vaccine: 65+ Years (2 - PPSV23 if available, else PCV20) 03/04/2021   • COVID-19 Vaccine (5 - Booster for Pfizer series) 11/30/2022      Medicare Screening Tests and Risk Assessments:     Aaron Olivarez is here for his Subsequent Wellness visit  Last Medicare Wellness visit information reviewed, patient interviewed and updates made to the record today  Health Risk Assessment:   Patient rates overall health as good  Patient feels that their physical health rating is same   Patient is satisfied with their life  Daniella Dial was rated as slightly worse  Hearing was rated as same  Patient feels that their emotional and mental health rating is same  Patients states they are never, rarely angry  Patient states they are often unusually tired/fatigued  Pain experienced in the last 7 days has been some  Patient's pain rating has been 5/10  Patient states that he has experienced weight loss or gain in last 6 months  Depression Screening:   PHQ-2 Score: 0      Fall Risk Screening: In the past year, patient has experienced: no history of falling in past year      Home Safety:  Patient does not have trouble with stairs inside or outside of their home  Patient has working smoke alarms and has working carbon monoxide detector  Home safety hazards include: medications that cause fatigue  Nutrition:   Current diet is Diabetic  Medications:   Patient is currently taking over-the-counter supplements  OTC medications include: several - see list under medications  Patient is able to manage medications  Activities of Daily Living (ADLs)/Instrumental Activities of Daily Living (IADLs):   Walk and transfer into and out of bed and chair?: Yes  Dress and groom yourself?: Yes    Bathe or shower yourself?: Yes    Feed yourself?  Yes  Do your laundry/housekeeping?: Yes  Manage your money, pay your bills and track your expenses?: Yes  Make your own meals?: Yes    Do your own shopping?: Yes    Durable Medical Equipment Suppliers  [employer]    Previous Hospitalizations:   Any hospitalizations or ED visits within the last 12 months?: Yes    How many hospitalizations have you had in the last year?: 1-2    Hospitalization Comments: 2 Emergency visits - bursitis, artritis in hip; cut on hand    Advance Care Planning:   Living will: No    Durable POA for healthcare: No    Advanced directive: No    Advanced directive counseling given: Yes    End of Life Decisions reviewed with patient: Yes    Provider agrees with end of life decisions: Yes      Comments: Refer to ACP note  Cognitive Screening:   Provider or family/friend/caregiver concerned regarding cognition?: No    PREVENTIVE SCREENINGS      Cardiovascular Screening:    General: Screening Not Indicated, History Lipid Disorder, Risks and Benefits Discussed and Screening Current    Due for: Lipid Panel      Diabetes Screening:     General: Screening Not Indicated, History Diabetes, Risks and Benefits Discussed and Screening Current      Colorectal Cancer Screening:     General: Screening Current      Prostate Cancer Screening:    General: Screening Current and Risks and Benefits Discussed    Due for: PSA      Osteoporosis Screening:    General: Screening Not Indicated      Abdominal Aortic Aneurysm (AAA) Screening:    Risk factors include: age between 73-69 yo and tobacco use        General: Screening Not Indicated      Lung Cancer Screening:     General: Screening Not Indicated      Hepatitis C Screening:    General: Screening Current    Screening, Brief Intervention, and Referral to Treatment (SBIRT)    Screening  Typical number of drinks in a day: 1  Typical number of drinks in a week: 3  Interpretation: Low risk drinking behavior  AUDIT-C Screenin) How often did you have a drink containing alcohol in the past year? 2 to 3 times a week  2) How many drinks did you have on a typical day when you were drinking in the past year? 1 to 2  3) How often did you have 6 or more drinks on one occasion in the past year? never    AUDIT-C Score: 3  Interpretation: Score 0-3 (male): Negative screen for alcohol misuse    Single Item Drug Screening:  How often have you used an illegal drug (including marijuana) or a prescription medication for non-medical reasons in the past year? never    Single Item Drug Screen Score: 0  Interpretation: Negative screen for possible drug use disorder    Brief Intervention  Alcohol & drug use screenings were reviewed   No concerns regarding substance use disorder identified  Other Counseling Topics:   Car/seat belt/driving safety, skin self-exam, sunscreen and calcium and vitamin D intake and regular weightbearing exercise  No results found  Physical Exam:     /62 (BP Location: Left arm, Patient Position: Sitting, Cuff Size: Standard)   Pulse 78   Temp 98 1 °F (36 7 °C) (Temporal)   Resp 18   Ht 5' 3" (1 6 m)   Wt 68 2 kg (150 lb 6 4 oz)   SpO2 98%   BMI 26 64 kg/m²     Physical Exam  Vitals and nursing note reviewed  Constitutional:       General: He is not in acute distress  Appearance: Normal appearance  He is normal weight  He is not ill-appearing, toxic-appearing or diaphoretic  HENT:      Head: Normocephalic and atraumatic  Right Ear: Tympanic membrane, ear canal and external ear normal  There is no impacted cerumen  Left Ear: Tympanic membrane, ear canal and external ear normal  There is no impacted cerumen  Nose: Nose normal  No congestion or rhinorrhea  Mouth/Throat:      Mouth: Mucous membranes are moist       Pharynx: Oropharynx is clear  No oropharyngeal exudate or posterior oropharyngeal erythema  Eyes:      General: No scleral icterus  Right eye: No discharge  Left eye: No discharge  Extraocular Movements: Extraocular movements intact  Conjunctiva/sclera: Conjunctivae normal       Pupils: Pupils are equal, round, and reactive to light  Neck:      Vascular: No carotid bruit  Cardiovascular:      Rate and Rhythm: Normal rate and regular rhythm  Pulses: Normal pulses  Heart sounds: Normal heart sounds  No murmur heard  No friction rub  No gallop  Pulmonary:      Effort: Pulmonary effort is normal  No respiratory distress  Breath sounds: Normal breath sounds  No wheezing or rales  Abdominal:      General: Abdomen is flat  Bowel sounds are normal  There is no distension  Palpations: Abdomen is soft  There is no mass  Tenderness:  There is no abdominal tenderness  There is no guarding  Hernia: No hernia is present  Musculoskeletal:         General: No swelling, tenderness, deformity or signs of injury  Normal range of motion  Cervical back: Normal range of motion and neck supple  No rigidity  No muscular tenderness  Right lower leg: No edema  Left lower leg: No edema  Lymphadenopathy:      Cervical: No cervical adenopathy  Skin:     General: Skin is warm and dry  Capillary Refill: Capillary refill takes less than 2 seconds  Coloration: Skin is not jaundiced or pale  Findings: No bruising, erythema, lesion or rash  Neurological:      General: No focal deficit present  Mental Status: He is alert and oriented to person, place, and time  Mental status is at baseline  Cranial Nerves: No cranial nerve deficit  Sensory: No sensory deficit  Motor: No weakness  Coordination: Coordination normal       Gait: Gait normal       Deep Tendon Reflexes: Reflexes normal    Psychiatric:         Mood and Affect: Mood normal          Behavior: Behavior normal          Thought Content:  Thought content normal          Judgment: Judgment normal           Ashley Sanchez MD

## 2023-02-20 NOTE — ASSESSMENT & PLAN NOTE
Controlled  Continue follow-up with endocrinology  Continue current diabetic regimen    Lab Results   Component Value Date    HGBA1C 5 4 01/24/2023

## 2023-02-20 NOTE — PATIENT INSTRUCTIONS
Medicare Preventive Visit Patient Instructions  Thank you for completing your Welcome to Medicare Visit or Medicare Annual Wellness Visit today  Your next wellness visit will be due in one year (2/21/2024)  The screening/preventive services that you may require over the next 5-10 years are detailed below  Some tests may not apply to you based off risk factors and/or age  Screening tests ordered at today's visit but not completed yet may show as past due  Also, please note that scanned in results may not display below  Preventive Screenings:  Service Recommendations Previous Testing/Comments   Colorectal Cancer Screening  · Colonoscopy    · Fecal Occult Blood Test (FOBT)/Fecal Immunochemical Test (FIT)  · Fecal DNA/Cologuard Test  · Flexible Sigmoidoscopy Age: 39-70 years old   Colonoscopy: every 10 years (May be performed more frequently if at higher risk)  OR  FOBT/FIT: every 1 year  OR  Cologuard: every 3 years  OR  Sigmoidoscopy: every 5 years  Screening may be recommended earlier than age 39 if at higher risk for colorectal cancer  Also, an individualized decision between you and your healthcare provider will decide whether screening between the ages of 74-80 would be appropriate   Colonoscopy: 07/29/2021  FOBT/FIT: Not on file  Cologuard: Not on file  Sigmoidoscopy: Not on file    Screening Current     Prostate Cancer Screening Individualized decision between patient and health care provider in men between ages of 53-78   Medicare will cover every 12 months beginning on the day after your 50th birthday PSA: 0 9 ng/mL     Screening Current     Hepatitis C Screening Once for adults born between 1945 and 1965  More frequently in patients at high risk for Hepatitis C Hep C Antibody: 12/31/2019    Screening Current   Diabetes Screening 1-2 times per year if you're at risk for diabetes or have pre-diabetes Fasting glucose: 168 mg/dL (1/24/2023)  A1C: 5 4 % (1/24/2023)  Screening Not Indicated  History Diabetes Cholesterol Screening Once every 5 years if you don't have a lipid disorder  May order more often based on risk factors  Lipid panel: 07/18/2022  Screening Not Indicated  History Lipid Disorder      Other Preventive Screenings Covered by Medicare:  1  Abdominal Aortic Aneurysm (AAA) Screening: covered once if your at risk  You're considered to be at risk if you have a family history of AAA or a male between the age of 73-68 who smoking at least 100 cigarettes in your lifetime  2  Lung Cancer Screening: covers low dose CT scan once per year if you meet all of the following conditions: (1) Age 50-69; (2) No signs or symptoms of lung cancer; (3) Current smoker or have quit smoking within the last 15 years; (4) You have a tobacco smoking history of at least 20 pack years (packs per day x number of years you smoked); (5) You get a written order from a healthcare provider  3  Glaucoma Screening: covered annually if you're considered high risk: (1) You have diabetes OR (2) Family history of glaucoma OR (3)  aged 48 and older OR (3)  American aged 72 and older  3  Osteoporosis Screening: covered every 2 years if you meet one of the following conditions: (1) Have a vertebral abnormality; (2) On glucocorticoid therapy for more than 3 months; (3) Have primary hyperparathyroidism; (4) On osteoporosis medications and need to assess response to drug therapy  5  HIV Screening: covered annually if you're between the age of 12-76  Also covered annually if you are younger than 13 and older than 72 with risk factors for HIV infection  For pregnant patients, it is covered up to 3 times per pregnancy      Immunizations:  Immunization Recommendations   Influenza Vaccine Annual influenza vaccination during flu season is recommended for all persons aged >= 6 months who do not have contraindications   Pneumococcal Vaccine   * Pneumococcal conjugate vaccine = PCV13 (Prevnar 13), PCV15 (Vaxneuvance), PCV20 (Prevnar 20)  * Pneumococcal polysaccharide vaccine = PPSV23 (Pneumovax) Adults 2364 years old: 1-3 doses may be recommended based on certain risk factors  Adults 72 years old: 1-2 doses may be recommended based off what pneumonia vaccine you previously received   Hepatitis B Vaccine 3 dose series if at intermediate or high risk (ex: diabetes, end stage renal disease, liver disease)   Tetanus (Td) Vaccine - COST NOT COVERED BY MEDICARE PART B Following completion of primary series, a booster dose should be given every 10 years to maintain immunity against tetanus  Td may also be given as tetanus wound prophylaxis  Tdap Vaccine - COST NOT COVERED BY MEDICARE PART B Recommended at least once for all adults  For pregnant patients, recommended with each pregnancy  Shingles Vaccine (Shingrix) - COST NOT COVERED BY MEDICARE PART B  2 shot series recommended in those aged 48 and above     Health Maintenance Due:      Topic Date Due   • Colorectal Cancer Screening  07/28/2024   • Hepatitis C Screening  Completed     Immunizations Due:      Topic Date Due   • Pneumococcal Vaccine: 65+ Years (2 - PPSV23 if available, else PCV20) 03/04/2021   • COVID-19 Vaccine (5 - Booster for Ahuja Peter series) 11/30/2022     Advance Directives   What are advance directives? Advance directives are legal documents that state your wishes and plans for medical care  These plans are made ahead of time in case you lose your ability to make decisions for yourself  Advance directives can apply to any medical decision, such as the treatments you want, and if you want to donate organs  What are the types of advance directives? There are many types of advance directives, and each state has rules about how to use them  You may choose a combination of any of the following:  · Living will: This is a written record of the treatment you want  You can also choose which treatments you do not want, which to limit, and which to stop at a certain time   This includes surgery, medicine, IV fluid, and tube feedings  · Durable power of  for healthcare [facility]): This is a written record that states who you want to make healthcare choices for you when you are unable to make them for yourself  This person, called a proxy, is usually a family member or a friend  You may choose more than 1 proxy  · Do not resuscitate (DNR) order:  A DNR order is used in case your heart stops beating or you stop breathing  It is a request not to have certain forms of treatment, such as CPR  A DNR order may be included in other types of advance directives  · Medical directive: This covers the care that you want if you are in a coma, near death, or unable to make decisions for yourself  You can list the treatments you want for each condition  Treatment may include pain medicine, surgery, blood transfusions, dialysis, IV or tube feedings, and a ventilator (breathing machine)  · Values history: This document has questions about your views, beliefs, and how you feel and think about life  This information can help others choose the care that you would choose  Why are advance directives important? An advance directive helps you control your care  Although spoken wishes may be used, it is better to have your wishes written down  Spoken wishes can be misunderstood, or not followed  Treatments may be given even if you do not want them  An advance directive may make it easier for your family to make difficult choices about your care  Weight Management   Why it is important to manage your weight:  Being overweight increases your risk of health conditions such as heart disease, high blood pressure, type 2 diabetes, and certain types of cancer  It can also increase your risk for osteoarthritis, sleep apnea, and other respiratory problems  Aim for a slow, steady weight loss  Even a small amount of weight loss can lower your risk of health problems    How to lose weight safely:  A safe and healthy way to lose weight is to eat fewer calories and get regular exercise  You can lose up about 1 pound a week by decreasing the number of calories you eat by 500 calories each day  Healthy meal plan for weight management:  A healthy meal plan includes a variety of foods, contains fewer calories, and helps you stay healthy  A healthy meal plan includes the following:  · Eat whole-grain foods more often  A healthy meal plan should contain fiber  Fiber is the part of grains, fruits, and vegetables that is not broken down by your body  Whole-grain foods are healthy and provide extra fiber in your diet  Some examples of whole-grain foods are whole-wheat breads and pastas, oatmeal, brown rice, and bulgur  · Eat a variety of vegetables every day  Include dark, leafy greens such as spinach, kale, jt greens, and mustard greens  Eat yellow and orange vegetables such as carrots, sweet potatoes, and winter squash  · Eat a variety of fruits every day  Choose fresh or canned fruit (canned in its own juice or light syrup) instead of juice  Fruit juice has very little or no fiber  · Eat low-fat dairy foods  Drink fat-free (skim) milk or 1% milk  Eat fat-free yogurt and low-fat cottage cheese  Try low-fat cheeses such as mozzarella and other reduced-fat cheeses  · Choose meat and other protein foods that are low in fat  Choose beans or other legumes such as split peas or lentils  Choose fish, skinless poultry (chicken or turkey), or lean cuts of red meat (beef or pork)  Before you cook meat or poultry, cut off any visible fat  · Use less fat and oil  Try baking foods instead of frying them  Add less fat, such as margarine, sour cream, regular salad dressing and mayonnaise to foods  Eat fewer high-fat foods  Some examples of high-fat foods include french fries, doughnuts, ice cream, and cakes  · Eat fewer sweets  Limit foods and drinks that are high in sugar   This includes candy, cookies, regular soda, and sweetened drinks  Exercise:  Exercise at least 30 minutes per day on most days of the week  Some examples of exercise include walking, biking, dancing, and swimming  You can also fit in more physical activity by taking the stairs instead of the elevator or parking farther away from stores  Ask your healthcare provider about the best exercise plan for you  © Copyright [employer] 2018 Information is for End User's use only and may not be sold, redistributed or otherwise used for commercial purposes  All illustrations and images included in CareNotes® are the copyrighted property of [employer]  or [facility] Preventive Visit Patient Instructions  Thank you for completing your Welcome to Medicare Visit or Medicare Annual Wellness Visit today  Your next wellness visit will be due in one year (2/21/2024)  The screening/preventive services that you may require over the next 5-10 years are detailed below  Some tests may not apply to you based off risk factors and/or age  Screening tests ordered at today's visit but not completed yet may show as past due  Also, please note that scanned in results may not display below  Preventive Screenings:  Service Recommendations Previous Testing/Comments   Colorectal Cancer Screening  · Colonoscopy    · Fecal Occult Blood Test (FOBT)/Fecal Immunochemical Test (FIT)  · Fecal DNA/Cologuard Test  · Flexible Sigmoidoscopy Age: 39-70 years old   Colonoscopy: every 10 years (May be performed more frequently if at higher risk)  OR  FOBT/FIT: every 1 year  OR  Cologuard: every 3 years  OR  Sigmoidoscopy: every 5 years  Screening may be recommended earlier than age 39 if at higher risk for colorectal cancer  Also, an individualized decision between you and your healthcare provider will decide whether screening between the ages of 74-80 would be appropriate   Colonoscopy: 07/29/2021  FOBT/FIT: Not on file  Cologuard: Not on file  Sigmoidoscopy: Not on file    Screening Current     Prostate Cancer Screening Individualized decision between patient and health care provider in men between ages of 53-78   Medicare will cover every 12 months beginning on the day after your 50th birthday PSA: 0 9 ng/mL     Screening Current     Hepatitis C Screening Once for adults born between 1945 and 1965  More frequently in patients at high risk for Hepatitis C Hep C Antibody: 12/31/2019    Screening Current   Diabetes Screening 1-2 times per year if you're at risk for diabetes or have pre-diabetes Fasting glucose: 168 mg/dL (1/24/2023)  A1C: 5 4 % (1/24/2023)  Screening Not Indicated  History Diabetes   Cholesterol Screening Once every 5 years if you don't have a lipid disorder  May order more often based on risk factors  Lipid panel: 07/18/2022  Screening Not Indicated  History Lipid Disorder      Other Preventive Screenings Covered by Medicare:  6  Abdominal Aortic Aneurysm (AAA) Screening: covered once if your at risk  You're considered to be at risk if you have a family history of AAA or a male between the age of 73-68 who smoking at least 100 cigarettes in your lifetime  7  Lung Cancer Screening: covers low dose CT scan once per year if you meet all of the following conditions: (1) Age 50-69; (2) No signs or symptoms of lung cancer; (3) Current smoker or have quit smoking within the last 15 years; (4) You have a tobacco smoking history of at least 20 pack years (packs per day x number of years you smoked); (5) You get a written order from a healthcare provider  8  Glaucoma Screening: covered annually if you're considered high risk: (1) You have diabetes OR (2) Family history of glaucoma OR (3)  aged 48 and older OR (3)  American aged 72 and older  5   Osteoporosis Screening: covered every 2 years if you meet one of the following conditions: (1) Have a vertebral abnormality; (2) On glucocorticoid therapy for more than 3 months; (3) Have primary hyperparathyroidism; (4) On osteoporosis medications and need to assess response to drug therapy  10  HIV Screening: covered annually if you're between the age of 12-76  Also covered annually if you are younger than 13 and older than 72 with risk factors for HIV infection  For pregnant patients, it is covered up to 3 times per pregnancy  Immunizations:  Immunization Recommendations   Influenza Vaccine Annual influenza vaccination during flu season is recommended for all persons aged >= 6 months who do not have contraindications   Pneumococcal Vaccine   * Pneumococcal conjugate vaccine = PCV13 (Prevnar 13), PCV15 (Vaxneuvance), PCV20 (Prevnar 20)  * Pneumococcal polysaccharide vaccine = PPSV23 (Pneumovax) Adults 25-60 years old: 1-3 doses may be recommended based on certain risk factors  Adults 72 years old: 1-2 doses may be recommended based off what pneumonia vaccine you previously received   Hepatitis B Vaccine 3 dose series if at intermediate or high risk (ex: diabetes, end stage renal disease, liver disease)   Tetanus (Td) Vaccine - COST NOT COVERED BY MEDICARE PART B Following completion of primary series, a booster dose should be given every 10 years to maintain immunity against tetanus  Td may also be given as tetanus wound prophylaxis  Tdap Vaccine - COST NOT COVERED BY MEDICARE PART B Recommended at least once for all adults  For pregnant patients, recommended with each pregnancy  Shingles Vaccine (Shingrix) - COST NOT COVERED BY MEDICARE PART B  2 shot series recommended in those aged 48 and above     Health Maintenance Due:      Topic Date Due   • Colorectal Cancer Screening  07/28/2024   • Hepatitis C Screening  Completed     Immunizations Due:      Topic Date Due   • Pneumococcal Vaccine: 65+ Years (2 - PPSV23 if available, else PCV20) 03/04/2021   • COVID-19 Vaccine (5 - Booster for Ahuja Peter series) 11/30/2022     Advance Directives   What are advance directives?   Advance directives are legal documents that state your wishes and plans for medical care  These plans are made ahead of time in case you lose your ability to make decisions for yourself  Advance directives can apply to any medical decision, such as the treatments you want, and if you want to donate organs  What are the types of advance directives? There are many types of advance directives, and each state has rules about how to use them  You may choose a combination of any of the following:  · Living will: This is a written record of the treatment you want  You can also choose which treatments you do not want, which to limit, and which to stop at a certain time  This includes surgery, medicine, IV fluid, and tube feedings  · Durable power of  for healthcare [facility]): This is a written record that states who you want to make healthcare choices for you when you are unable to make them for yourself  This person, called a proxy, is usually a family member or a friend  You may choose more than 1 proxy  · Do not resuscitate (DNR) order:  A DNR order is used in case your heart stops beating or you stop breathing  It is a request not to have certain forms of treatment, such as CPR  A DNR order may be included in other types of advance directives  · Medical directive: This covers the care that you want if you are in a coma, near death, or unable to make decisions for yourself  You can list the treatments you want for each condition  Treatment may include pain medicine, surgery, blood transfusions, dialysis, IV or tube feedings, and a ventilator (breathing machine)  · Values history: This document has questions about your views, beliefs, and how you feel and think about life  This information can help others choose the care that you would choose  Why are advance directives important? An advance directive helps you control your care  Although spoken wishes may be used, it is better to have your wishes written down   Spoken wishes can be misunderstood, or not followed  Treatments may be given even if you do not want them  An advance directive may make it easier for your family to make difficult choices about your care  Weight Management   Why it is important to manage your weight:  Being overweight increases your risk of health conditions such as heart disease, high blood pressure, type 2 diabetes, and certain types of cancer  It can also increase your risk for osteoarthritis, sleep apnea, and other respiratory problems  Aim for a slow, steady weight loss  Even a small amount of weight loss can lower your risk of health problems  How to lose weight safely:  A safe and healthy way to lose weight is to eat fewer calories and get regular exercise  You can lose up about 1 pound a week by decreasing the number of calories you eat by 500 calories each day  Healthy meal plan for weight management:  A healthy meal plan includes a variety of foods, contains fewer calories, and helps you stay healthy  A healthy meal plan includes the following:  · Eat whole-grain foods more often  A healthy meal plan should contain fiber  Fiber is the part of grains, fruits, and vegetables that is not broken down by your body  Whole-grain foods are healthy and provide extra fiber in your diet  Some examples of whole-grain foods are whole-wheat breads and pastas, oatmeal, brown rice, and bulgur  · Eat a variety of vegetables every day  Include dark, leafy greens such as spinach, kale, jt greens, and mustard greens  Eat yellow and orange vegetables such as carrots, sweet potatoes, and winter squash  · Eat a variety of fruits every day  Choose fresh or canned fruit (canned in its own juice or light syrup) instead of juice  Fruit juice has very little or no fiber  · Eat low-fat dairy foods  Drink fat-free (skim) milk or 1% milk  Eat fat-free yogurt and low-fat cottage cheese  Try low-fat cheeses such as mozzarella and other reduced-fat cheeses    · Choose meat and other protein foods that are low in fat  Choose beans or other legumes such as split peas or lentils  Choose fish, skinless poultry (chicken or turkey), or lean cuts of red meat (beef or pork)  Before you cook meat or poultry, cut off any visible fat  · Use less fat and oil  Try baking foods instead of frying them  Add less fat, such as margarine, sour cream, regular salad dressing and mayonnaise to foods  Eat fewer high-fat foods  Some examples of high-fat foods include french fries, doughnuts, ice cream, and cakes  · Eat fewer sweets  Limit foods and drinks that are high in sugar  This includes candy, cookies, regular soda, and sweetened drinks  Exercise:  Exercise at least 30 minutes per day on most days of the week  Some examples of exercise include walking, biking, dancing, and swimming  You can also fit in more physical activity by taking the stairs instead of the elevator or parking farther away from stores  Ask your healthcare provider about the best exercise plan for you  © Copyright [employer] 2018 Information is for End User's use only and may not be sold, redistributed or otherwise used for commercial purposes   All illustrations and images included in CareNotes® are the copyrighted property of A D A M [employer]  or [address] [place] [address]

## 2023-02-20 NOTE — ASSESSMENT & PLAN NOTE
Lab Results   Component Value Date    EGFR 59 01/24/2023    EGFR 50 07/18/2022    EGFR 59 04/08/2022    CREATININE 1 23 01/24/2023    CREATININE 1 41 (H) 07/18/2022    CREATININE 1 24 04/08/2022   Continue to trend kidney function  Avoid nephrotoxic agents

## 2023-03-03 DIAGNOSIS — E23.0 HYPOGONADOTROPIC HYPOGONADISM (HCC): ICD-10-CM

## 2023-03-03 RX ORDER — TESTOSTERONE 12.5 MG/1.25G
50 GEL TOPICAL DAILY
Qty: 180 PACKET | Refills: 1 | Status: SHIPPED | OUTPATIENT
Start: 2023-03-03 | End: 2023-03-08 | Stop reason: SDUPTHER

## 2023-03-06 DIAGNOSIS — Z79.4 TYPE 2 DIABETES MELLITUS WITH BOTH EYES AFFECTED BY MILD NONPROLIFERATIVE RETINOPATHY WITHOUT MACULAR EDEMA, WITH LONG-TERM CURRENT USE OF INSULIN (HCC): ICD-10-CM

## 2023-03-06 DIAGNOSIS — E11.3293 TYPE 2 DIABETES MELLITUS WITH BOTH EYES AFFECTED BY MILD NONPROLIFERATIVE RETINOPATHY WITHOUT MACULAR EDEMA, WITH LONG-TERM CURRENT USE OF INSULIN (HCC): ICD-10-CM

## 2023-03-06 NOTE — TELEPHONE ENCOUNTER
Pt calling in requesting refill of Ozempic to [facility]  He was told by [facility] that they don't have any in stock  Pt does have some, but he wants the order placed to give them time to get it back in before he needs it again  3 month supply with no refills sent to [facility]

## 2023-03-08 DIAGNOSIS — E23.0 HYPOGONADOTROPIC HYPOGONADISM (HCC): ICD-10-CM

## 2023-03-08 NOTE — TELEPHONE ENCOUNTER
Patient called today and stated [facility] did not have testosterone and he will need prescription sent to [facility]

## 2023-03-09 RX ORDER — TESTOSTERONE 12.5 MG/1.25G
50 GEL TOPICAL DAILY
Qty: 180 PACKET | Refills: 1 | Status: SHIPPED | OUTPATIENT
Start: 2023-03-09 | End: 2023-09-05

## 2023-04-13 ENCOUNTER — ESTABLISHED COMPREHENSIVE EXAM (OUTPATIENT)
Dept: URBAN - METROPOLITAN AREA CLINIC 6 | Facility: CLINIC | Age: 68
End: 2023-04-13

## 2023-04-13 DIAGNOSIS — H04.123: ICD-10-CM

## 2023-04-13 DIAGNOSIS — H40.1122: ICD-10-CM

## 2023-04-13 DIAGNOSIS — H40.1113: ICD-10-CM

## 2023-04-13 DIAGNOSIS — Z79.4: ICD-10-CM

## 2023-04-13 DIAGNOSIS — Z96.1: ICD-10-CM

## 2023-04-13 DIAGNOSIS — E11.3293: ICD-10-CM

## 2023-04-13 LAB
LEFT EYE DIABETIC RETINOPATHY: POSITIVE
RIGHT EYE DIABETIC RETINOPATHY: POSITIVE

## 2023-04-13 PROCEDURE — 92202 OPSCPY EXTND ON/MAC DRAW: CPT

## 2023-04-13 PROCEDURE — 92020 GONIOSCOPY: CPT

## 2023-04-13 PROCEDURE — 92134 CPTRZ OPH DX IMG PST SGM RTA: CPT

## 2023-04-13 PROCEDURE — 92083 EXTENDED VISUAL FIELD XM: CPT

## 2023-04-13 PROCEDURE — 92014 COMPRE OPH EXAM EST PT 1/>: CPT

## 2023-04-13 ASSESSMENT — VISUAL ACUITY
OD_SC: 20/30
OU_SC: J3
OS_SC: 20/25-2

## 2023-04-13 ASSESSMENT — TONOMETRY
OD_IOP_MMHG: 13
OS_IOP_MMHG: 10

## 2023-04-14 ENCOUNTER — ESTABLISHED COMPREHENSIVE EXAM (OUTPATIENT)
Dept: URBAN - METROPOLITAN AREA CLINIC 6 | Facility: CLINIC | Age: 68
End: 2023-04-14

## 2023-04-14 DIAGNOSIS — E11.3293: ICD-10-CM

## 2023-04-14 DIAGNOSIS — Z79.4: ICD-10-CM

## 2023-04-14 PROCEDURE — 92134 CPTRZ OPH DX IMG PST SGM RTA: CPT

## 2023-04-14 PROCEDURE — 92202 OPSCPY EXTND ON/MAC DRAW: CPT

## 2023-04-14 PROCEDURE — 92014 COMPRE OPH EXAM EST PT 1/>: CPT

## 2023-04-14 ASSESSMENT — VISUAL ACUITY
OD_SC: 20/30
OS_SC: 20/25

## 2023-04-14 ASSESSMENT — TONOMETRY
OD_IOP_MMHG: 10
OS_IOP_MMHG: 10

## 2023-04-17 PROBLEM — K66.8 CALCIFIED MESENTERIC MASS: Status: ACTIVE | Noted: 2023-04-17

## 2023-05-02 ENCOUNTER — LAB (OUTPATIENT)
Dept: LAB | Age: 68
End: 2023-05-02

## 2023-05-02 DIAGNOSIS — Z79.4 TYPE 2 DIABETES MELLITUS WITH BOTH EYES AFFECTED BY MILD NONPROLIFERATIVE RETINOPATHY WITHOUT MACULAR EDEMA, WITH LONG-TERM CURRENT USE OF INSULIN (HCC): ICD-10-CM

## 2023-05-02 DIAGNOSIS — E11.3293 TYPE 2 DIABETES MELLITUS WITH BOTH EYES AFFECTED BY MILD NONPROLIFERATIVE RETINOPATHY WITHOUT MACULAR EDEMA, WITH LONG-TERM CURRENT USE OF INSULIN (HCC): ICD-10-CM

## 2023-05-02 LAB
CREAT UR-MCNC: 179 MG/DL
MICROALBUMIN UR-MCNC: 12.7 MG/L (ref 0–20)
MICROALBUMIN/CREAT 24H UR: 7 MG/G CREATININE (ref 0–30)

## 2023-05-03 LAB
EST. AVERAGE GLUCOSE BLD GHB EST-MCNC: 114 MG/DL
HBA1C MFR BLD: 5.6 %

## 2023-05-04 DIAGNOSIS — E23.0 HYPOGONADOTROPIC HYPOGONADISM (HCC): ICD-10-CM

## 2023-05-04 RX ORDER — TESTOSTERONE 12.5 MG/1.25G
50 GEL TOPICAL DAILY
Qty: 180 PACKET | Refills: 1 | Status: SHIPPED | OUTPATIENT
Start: 2023-05-04 | End: 2023-10-31

## 2023-05-04 NOTE — TELEPHONE ENCOUNTER
Requested medication(s) are due for refill today: Yes  Patient has already received a courtesy refill: Yes  Other reason request has been forwarded to provider:   Off-Protocol Failed 05/04/2023 11:24 AM   Protocol Details  Medication not assigned to a protocol, review manually

## 2023-05-04 NOTE — RESULT ENCOUNTER NOTE
Covering for Jessie  A1C is well controlled at 5 6%  Continue current treatment and send log if experiencing any lows

## 2023-05-04 NOTE — TELEPHONE ENCOUNTER
[facility] called stating that [facility] not [facility] and [facility], he would like it sent there

## 2023-05-09 ENCOUNTER — TELEPHONE (OUTPATIENT)
Dept: INTERNAL MEDICINE CLINIC | Age: 68
End: 2023-05-09

## 2023-05-09 DIAGNOSIS — K66.8 CALCIFIED MESENTERIC MASS: ICD-10-CM

## 2023-05-09 DIAGNOSIS — K63.89 MESENTERIC MASS: Primary | ICD-10-CM

## 2023-05-09 NOTE — TELEPHONE ENCOUNTER
Received a reminder from [address] [place] [address] stating that the CT scan of the pelvis patient had back on 10/27/2022 would need further testing to be done      Please advise and let patient know as well

## 2023-05-17 ENCOUNTER — TELEPHONE (OUTPATIENT)
Dept: HEMATOLOGY ONCOLOGY | Facility: CLINIC | Age: 68
End: 2023-05-17

## 2023-05-17 ENCOUNTER — OFFICE VISIT (OUTPATIENT)
Dept: GASTROENTEROLOGY | Facility: AMBULARY SURGERY CENTER | Age: 68
End: 2023-05-17

## 2023-05-17 VITALS
HEART RATE: 76 BPM | RESPIRATION RATE: 18 BRPM | WEIGHT: 144.2 LBS | DIASTOLIC BLOOD PRESSURE: 60 MMHG | BODY MASS INDEX: 25.55 KG/M2 | SYSTOLIC BLOOD PRESSURE: 102 MMHG | HEIGHT: 63 IN | OXYGEN SATURATION: 100 %

## 2023-05-17 DIAGNOSIS — R63.4 ABNORMAL WEIGHT LOSS: ICD-10-CM

## 2023-05-17 DIAGNOSIS — R19.4 CHANGE IN BOWEL HABITS: ICD-10-CM

## 2023-05-17 DIAGNOSIS — R10.32 LEFT LOWER QUADRANT ABDOMINAL PAIN: Primary | ICD-10-CM

## 2023-05-17 DIAGNOSIS — R93.5 ABNORMAL CT SCAN, PELVIS: ICD-10-CM

## 2023-05-17 DIAGNOSIS — R11.0 NAUSEA: ICD-10-CM

## 2023-05-17 NOTE — TELEPHONE ENCOUNTER
I called Rad[facility] Mc to schedule a new patient consultation with [facility] in response to a referral sent to our department  I left a voicemail making patient aware of their referral and instructing them to call [facility] at [phone] to schedule  Another attempt will be made to schedule patient

## 2023-05-17 NOTE — PROGRESS NOTES
[facility] - Outpatient Consultation  Lorena Younger 69 y o  male MRN: [id number]  Encounter: [id number]      PCP: Zev Griffin MD  Referring: Zev Griffin MD  [address] [place] [address]      ASSESSMENT AND PLAN:      1  Left lower quadrant abdominal pain  2  Abnormal weight loss  3  Nausea  4  Change in bowel habits  Suspect some upper GI symptoms may be contributed to Ozempic- delayed gastric emptying, nausea, bloating  Lower gi symptoms consistent with constipation with overflow  Optimize bowel habits with addition of miralax  Given his unintentional weight loss, and change in bowel habits, will plan for endoscopic and colonoscopy evaluation to rule out alternative etiologies  - Colonoscopy; Future  - EGD; Future  - sodium picosulfate, magnesium oxide, citric acid (Clenpiq) oral solution; Take 175 mL (1 bottle) the evening before the colonoscopy, between 5 PM and 9 PM, followed by a second 175 mL bottle 5 hours before the colonoscopy  Dispense: 350 mL; Refill: 0    5  Abnormal CT scan, pelvis  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 on CT pelvis  Imaging differential includes desmoplastic reaction versus related to carcinoid and chronic sclerosing mesenteritis  - Ambulatory Referral to Hematology / Oncology; Future  ______________________________________________________________________    CC:  Chief Complaint   Patient presents with   • CT showed mass   • Bloated   • Diarrhea   • Constipation       HPI:      Patient is a 64-year-old male who is seen in follow-up office visit after 2 years for abdominal bloating, irregular bowel habits  He has previously been treated for IBS-D  Evaluation has been negative for infectious, celiac, thyroid, inflammatory bowel disease, pancreatic insufficiency  His last EGD and colonoscopy were performed in 2021, he had 3 subcentimeter tubular adenomas with repeat colonoscopy in 3 years    Biopsies of the EGD were unremarkable with the exception of some mild esophagitis  He is present with his wife at today's visit, who contributes to history  Since starting Ozempic in January he has been experiencing increasing nausea, flatus and belching  He has constant left upper quadrant and left lower quadrant abdominal pain  Symptoms are worse after eating  They are associated with abdominal bloating as well as irregular bowel habits  He describes constipation, with straining to have a bowel movement and passage with incomplete evacuation, and then watery stool occurring throughout the rest of the day  Symptoms have been severe and debilitating, as he has been unable to get out of bed due to the severity of his abdominal pain  He has tried to use Bentyl without significant benefit  He is able to tolerate only simple carbohydrates  He reports 20+ pound weight loss over the last 7 to 8 months, which started prior to [address]  He underwent CT pelvis with evidence of 2 cm central mesenteric masses with calcification, concerning for desmoplastic reaction versus related to carcinoid and chronic sclerosing mesenteritis  Diarrhea   Associated symptoms include abdominal pain, arthralgias, headaches, increased flatus, myalgias and weight loss  Pertinent negatives include no fever or vomiting  Constipation  Associated symptoms include abdominal pain, anorexia, diarrhea, flatus, nausea and weight loss  Pertinent negatives include no fever, hematochezia, melena or vomiting  Abdominal Pain  This is a chronic problem  The current episode started more than 1 month ago  The onset quality is gradual  The problem occurs constantly  The most recent episode lasted 3 Hours  The problem has been gradually worsening  The pain is located in the LLQ and LUQ  The pain is at a severity of 8/10  The quality of the pain is cramping, a sensation of fullness and sharp   The abdominal pain radiates to the LLQ, LUQ and chest  Associated symptoms include anorexia, arthralgias, belching, constipation, diarrhea, flatus, frequency, headaches, myalgias, nausea and weight loss  Pertinent negatives include no dysuria, fever, hematochezia, hematuria, melena or vomiting  The pain is aggravated by eating and movement  The pain is relieved by belching, bowel movements and passing flatus  Prior diagnostic workup includes CT scan  REVIEW OF SYSTEMS:    CONSTITUTIONAL: Denies any fever, chills, rigors, and weight loss  HEENT: No earache or tinnitus  Denies hearing loss or visual disturbances  CARDIOVASCULAR: No chest pain or palpitations  RESPIRATORY: Denies any cough, hemoptysis, shortness of breath or dyspnea on exertion  GASTROINTESTINAL: As noted in the History of Present Illness  GENITOURINARY: No problems with urination  Denies any hematuria or dysuria  NEUROLOGIC: No dizziness or vertigo, denies headaches  MUSCULOSKELETAL: Denies any muscle or joint pain  SKIN: Denies skin rashes or itching  ENDOCRINE: Denies excessive thirst  Denies intolerance to heat or cold  PSYCHOSOCIAL: Denies depression or anxiety  Denies any recent memory loss         Historical Information   Past Medical History:   Diagnosis Date   • Arthritis     Last assessed 5/24/2013   • Avitaminosis D 2012   • Colon polyp    • Diabetes mellitus ([facility] )    • Displacement of cervical intervertebral disc 2014   • GERD (gastroesophageal reflux disease)    • Glaucoma     Normal Pressure Glaucoma   • HTN (hypertension) 2007   • Hypertension    • IBS (irritable bowel syndrome) 2021   • Nephrolithiasis 5/24/2013   • Pituitary microadenoma ([facility] ) 2010   • Spinal stenosis in cervical region 2014   • Sprain and strain of calcaneofibular (ligament) 12/5/2013   • Submandibular lymphadenopathy 8/8/2019   • Uric acid nephrolithiasis 1990     Past Surgical History:   Procedure Laterality Date   • ASPIRATION / INJECTION RENAL CYST      Renal Cyst Aspiration   • CATARACT EXTRACTION      12/2017- right eye, 01/2018- left eye   • COLONOSCOPY     • EYE SURGERY Bilateral     Cataract Surgery   • TONSILLECTOMY     • UPPER GASTROINTESTINAL ENDOSCOPY       Social History   Social History     Substance and Sexual Activity   Alcohol Use Yes   • Alcohol/week: 4 0 standard drinks   • Types: 2 Cans of beer, 2 Standard drinks or equivalent per week    Comment: social     Social History     Substance and Sexual Activity   Drug Use Yes   • Frequency: 7 0 times per week   • Types: Marijuana    Comment: Use Medical Marijuana daily (1-3 capsules or tincture)     Social History     Tobacco Use   Smoking Status Former   • Packs/day: 0 25   • Years: 2 00   • Pack years: 0 50   • Types: Cigarettes   • Start date: 1   • Quit date: 1980   • Years since quittin 4   Smokeless Tobacco Never     Family History   Problem Relation Age of Onset   • Diabetes unspecified Mother    • Heart disease Mother    • Nephrolithiasis Mother    • Kidney disease Mother    • Other Mother         Back Disorder   • Thyroid disease Mother    • Diabetes type II Mother    • Hypertension Mother    • Thyroid disease unspecified Mother    • Diabetes unspecified Father    • Heart disease Father    • Hypertension Father    • Diabetes unspecified Sister    • Heart disease Sister    • Stroke Sister    • Diabetes unspecified Brother    • Heart disease Brother    • Nephrolithiasis Brother    • Lung cancer Brother    • Other Brother         COPD   • Diabetes unspecified Sister    • Heart disease Sister    • Diabetes unspecified Sister    • Diabetes unspecified Brother    • Heart disease Brother    • Diabetes unspecified Brother    • Other Brother         Back Disorder   • Diabetes unspecified Brother    • Other Brother         Back Disorder   • Diabetes unspecified Brother    • Stomach cancer Paternal Aunt        Meds/Allergies       Current Outpatient Medications:   •  acetaminophen (TYLENOL) 650 mg CR tablet  •  amLODIPine (NORVASC) 10 mg tablet  •  aspirin (ECOTRIN LOW STRENGTH) 81 mg EC tablet  •  B Complex Vitamins (VITAMIN B-COMPLEX PO)  •  BD Pen Needle Harriett U/F 32G X 4 MM MISC  •  brimonidine-timolol (COMBIGAN) 0 2-0 5 %  •  cholecalciferol (VITAMIN D3) 1,000 units tablet  •  clobetasol (TEMOVATE) 0 05 % cream  •  dicyclomine (BENTYL) 20 mg tablet  •  doxazosin (CARDURA) 4 mg tablet  •  Empagliflozin (Jardiance) 25 MG TABS  •  ferrous gluconate (FERGON) 240 (27 FE) MG tablet  •  fluticasone (FLONASE) 50 mcg/act nasal spray  •  gabapentin (NEURONTIN) 300 mg capsule  •  Glucagon (Baqsimi Two Pack) 3 MG/DOSE POWD  •  Glucosamine-Chondroitin 250-200 MG TABS  •  hydrochlorothiazide (HYDRODIURIL) 25 mg tablet  •  insulin lispro (HumaLOG KwikPen) 100 units/mL injection pen  •  lidocaine (Lidoderm) 5 %  •  lisinopril (ZESTRIL) 40 mg tablet  •  loratadine (CLARITIN) 10 mg tablet  •  MULTIPLE VITAMIN PO  •  omeprazole (PriLOSEC) 40 MG capsule  •  other medication, see sig,  •  Probiotic Product (PROBIOTIC-10) CAPS  •  semaglutide, 0 25 or 0 5 mg/dose, (Ozempic, 0 25 or 0 5 MG/DOSE,) 2 mg/3 mL injection pen  •  simvastatin (ZOCOR) 20 mg tablet  •  testosterone (ANDROGEL) 25 MG/2 5GM (1%) GEL  •  vitamin E, tocopherol, 400 units capsule  •  acetaminophen (TYLENOL) 650 mg CR tablet  •  glipiZIDE (GLUCOTROL) 5 mg tablet  •  ibuprofen (MOTRIN) 200 mg tablet  •  Insulin Glargine Solostar (Lantus SoloStar) 100 UNIT/ML SOPN    Allergies   Allergen Reactions   • Clonidine Other (See Comments)     Diaphoresis, hot flashes   • Augmentin [Amoxicillin-Pot Clavulanate] Abdominal Pain   • Biaxin [Clarithromycin] Abdominal Pain   • Dust Mite Extract    • Insulin Aspart GI Intolerance     Novolog    • Liraglutide Abdominal Pain and Nausea Only   • Metformin And Related Diarrhea and GI Intolerance   • Molds & Smuts    • Peanut (Diagnostic) - Food Allergy Abdominal Pain, Diarrhea and GI Intolerance   • Seasonal Ic [Cholestatin] Headache   • Sitagliptin-Metformin Hcl Er Abdominal Pain, Diarrhea and GI Intolerance "          Objective     Blood pressure 102/60, pulse 76, resp  rate 18, height 5' 3\" (1 6 m), weight 65 4 kg (144 lb 3 2 oz), SpO2 100 %  Body mass index is 25 54 kg/m²  PHYSICAL EXAM:      General Appearance:   Alert, cooperative, no distress   HEENT:   Normocephalic, atraumatic, anicteric  Neck:  Supple, symmetrical, trachea midline   Lungs:   Clear to auscultation bilaterally; no rales, rhonchi or wheezing; respirations unlabored    Heart[de-identified]   Regular rate and rhythm; no murmur, rub, or gallop  Abdomen:   Soft, non-tender, non-distended; normal bowel sounds; no masses, no organomegaly    Genitalia:   Deferred    Rectal:   Deferred    Extremities:  No cyanosis, clubbing or edema    Pulses:  2+ and symmetric    Skin:  No jaundice, rashes, or lesions    Lymph nodes:  No palpable cervical lymphadenopathy        Lab Results:     Lab Results   Component Value Date    WBC 8 31 05/27/2022    HGB 15 1 04/12/2023    HCT 45 8 04/12/2023    MCV 85 05/27/2022     05/27/2022       Lab Results   Component Value Date     10/28/2017    K 3 9 01/24/2023     01/24/2023    CO2 28 01/24/2023    ANIONGAP 10 09/15/2015    BUN 21 01/24/2023    CREATININE 1 23 01/24/2023    GLUCOSE 127 09/15/2015    GLUF 168 (H) 01/24/2023    CALCIUM 9 3 01/24/2023    AST 17 01/24/2023    ALT 38 01/24/2023    ALKPHOS 78 01/24/2023    PROT 8 1 10/28/2017    BILITOT 1 2 10/28/2017    EGFR 59 01/24/2023       Lab Results   Component Value Date    INR 0 95 07/14/2014    PROTIME 12 5 07/14/2014         Radiology Results:   No results found  Portions of the record may have been created with voice recognition software  Occasional wrong word or \"sound a like\" substitutions may have occurred due to the inherent limitations of voice recognition software  Read the chart carefully and recognize, using context, where substitutions have occurred          "

## 2023-05-18 ENCOUNTER — CONSULT (OUTPATIENT)
Dept: HEMATOLOGY ONCOLOGY | Facility: CLINIC | Age: 68
End: 2023-05-18

## 2023-05-18 VITALS
BODY MASS INDEX: 25.69 KG/M2 | HEART RATE: 75 BPM | OXYGEN SATURATION: 98 % | WEIGHT: 145 LBS | SYSTOLIC BLOOD PRESSURE: 120 MMHG | DIASTOLIC BLOOD PRESSURE: 80 MMHG | TEMPERATURE: 98.8 F | HEIGHT: 63 IN

## 2023-05-18 DIAGNOSIS — R93.5 ABNORMAL CT SCAN, PELVIS: ICD-10-CM

## 2023-05-18 DIAGNOSIS — K66.8 CALCIFIED MESENTERIC MASS: Primary | ICD-10-CM

## 2023-05-18 NOTE — PROGRESS NOTES
Oncology Consult Note  Kami Noonan 69 y o  male MRN: [id number]  Unit/Bed#:  Encounter: [id number]      Presenting Complaint: Retroperitoneal/mesenteric mass    History of Presenting Illness: Kami Noonan is seen for initial consultation 5/18/2023 at the referral of Sera Soriano MD   64-year-old  male with hypogonadism, pituitary microadenoma, diabetes mellitus type 2, irritable bowel syndrome, obstructive sleep apnea, sacroiliitis, he had a CAT scan in September 2022 after he presented with abdominal pain noticed calcification and mass measuring 1 2 cm in the mesenteric area, he was put on Ozempic for diabetes mellitus controlled, with subsequent diarrhea, weight loss about 20 pounds in the past 3 to 4 months    With abdominal cramps, diarrhea he went to the ER repeat CAT scan of the pelvis area 1/10/2022 showed increase in the size  of the of 3 partially visualized mesenteric masses with calcification measuring up to 2 3 x 2 3 cm (1 7 x 1 5)    He denied any night sweats low-grade fever he reported flushing, weight loss, diarrhea denied any melena hematochezia hematuria skin rash, pruritus      Past Medical History:   Diagnosis Date   • Arthritis     Last assessed 5/24/2013   • Avitaminosis D 2012   • Colon polyp    • Diabetes mellitus ([facility] )    • Displacement of cervical intervertebral disc 2014   • GERD (gastroesophageal reflux disease)    • Glaucoma     Normal Pressure Glaucoma   • HTN (hypertension) 2007   • Hypertension    • IBS (irritable bowel syndrome) 2021   • Nephrolithiasis 5/24/2013   • Pituitary microadenoma ([facility] ) 2010   • Spinal stenosis in cervical region 2014   • Sprain and strain of calcaneofibular (ligament) 12/5/2013   • Submandibular lymphadenopathy 8/8/2019   • Uric acid nephrolithiasis 1990       Social History     Socioeconomic History   • Marital status: /Civil Union     Spouse name: Not on file   • Number of children: Not on file   • Years of education: Not on file • Highest education level: Not on file   Occupational History   • Occupation:    Tobacco Use   • Smoking status: Former     Packs/day: 0 25     Years: 2 00     Pack years: 0 50     Types: Cigarettes     Start date: 1     Quit date: 1980     Years since quittin 4   • Smokeless tobacco: Never   Vaping Use   • Vaping Use: Never used   Substance and Sexual Activity   • Alcohol use: Yes     Alcohol/week: 4 0 standard drinks     Types: 2 Cans of beer, 2 Standard drinks or equivalent per week     Comment: social   • Drug use: Yes     Frequency: 7 0 times per week     Types: Marijuana     Comment: Use Medical Marijuana daily (1-3 capsules or tincture)   • Sexual activity: Yes     Partners: Female     Birth control/protection: Female Sterilization   Other Topics Concern   • Not on file   Social History Narrative   • Not on file     Social Determinants of Health     Financial Resource Strain: Low Risk    • Difficulty of Paying Living Expenses: Not very hard   Food Insecurity: Not on file   Transportation Needs: No Transportation Needs   • Lack of Transportation (Medical): No   • Lack of Transportation (Non-Medical):  No   Physical Activity: Not on file   Stress: Not on file   Social Connections: Not on file   Intimate Partner Violence: Not on file   Housing Stability: Not on file       Family History   Problem Relation Age of Onset   • Diabetes unspecified Mother    • Heart disease Mother    • Nephrolithiasis Mother    • Kidney disease Mother    • Other Mother         Back Disorder   • Thyroid disease Mother    • Diabetes type II Mother    • Hypertension Mother    • Thyroid disease unspecified Mother    • Diabetes unspecified Father    • Heart disease Father    • Hypertension Father    • Diabetes unspecified Sister    • Heart disease Sister    • Stroke Sister    • Diabetes unspecified Brother    • Heart disease Brother    • Nephrolithiasis Brother    • Lung cancer Brother    • Other Brother COPD   • Diabetes unspecified Sister    • Heart disease Sister    • Diabetes unspecified Sister    • Diabetes unspecified Brother    • Heart disease Brother    • Diabetes unspecified Brother    • Other Brother         Back Disorder   • Diabetes unspecified Brother    • Other Brother         Back Disorder   • Diabetes unspecified Brother    • Stomach cancer Paternal Aunt        Allergies   Allergen Reactions   • Clonidine Other (See Comments)     Diaphoresis, hot flashes   • Augmentin [Amoxicillin-Pot Clavulanate] Abdominal Pain   • Biaxin [Clarithromycin] Abdominal Pain   • Dust Mite Extract    • Insulin Aspart GI Intolerance     Novolog    • Liraglutide Abdominal Pain and Nausea Only   • Metformin And Related Diarrhea and GI Intolerance   • Molds & Smuts    • Peanut (Diagnostic) - Food Allergy Abdominal Pain, Diarrhea and GI Intolerance   • Seasonal Ic [Cholestatin] Headache   • Sitagliptin-Metformin Hcl Er Abdominal Pain, Diarrhea and GI Intolerance         Current Outpatient Medications:   •  acetaminophen (TYLENOL) 650 mg CR tablet, Take 650 mg by mouth every 8 (eight) hours as needed for mild pain, Disp: , Rfl:   •  amLODIPine (NORVASC) 10 mg tablet, Take 1 tablet (10 mg total) by mouth daily, Disp: 90 tablet, Rfl: 1  •  aspirin (ECOTRIN LOW STRENGTH) 81 mg EC tablet, Take 81 mg by mouth daily , Disp: , Rfl:   •  B Complex Vitamins (VITAMIN B-COMPLEX PO), Take 1 capsule by mouth daily , Disp: , Rfl:   •  BD Pen Needle Harriett U/F 32G X 4 MM MISC, Use 4x per day, Disp: 400 each, Rfl: 3  •  brimonidine-timolol (COMBIGAN) 0 2-0 5 %, Administer 1 drop to both eyes every 12 (twelve) hours, Disp: , Rfl:   •  cholecalciferol (VITAMIN D3) 1,000 units tablet, Take 1,000 Units by mouth 2 (two) times a day , Disp: , Rfl:   •  clobetasol (TEMOVATE) 0 05 % cream, Apply topically 2 (two) times a day as needed (Rash), Disp: 30 g, Rfl: 3  •  dicyclomine (BENTYL) 20 mg tablet, Take 1 tablet (20 mg total) by mouth 4 (four) times a day (before meals and at bedtime), Disp: 120 tablet, Rfl: 5  •  doxazosin (CARDURA) 4 mg tablet, Take 1 tablet (4 mg total) by mouth daily, Disp: 90 tablet, Rfl: 1  •  Empagliflozin (Jardiance) 25 MG TABS, Take 1 tablet (25 mg total) by mouth daily, Disp: 90 tablet, Rfl: 3  •  ferrous gluconate (FERGON) 240 (27 FE) MG tablet, Take 1 tablet (240 mg total) by mouth in the morning, Disp: 90 tablet, Rfl: 1  •  fluticasone (FLONASE) 50 mcg/act nasal spray, 1 spray into each nostril daily as needed for rhinitis (Acute URI/sinusitis), Disp: 18 mL, Rfl: 0  •  gabapentin (NEURONTIN) 300 mg capsule, Take 1 capsule (300 mg total) by mouth 3 (three) times a day, Disp: 270 capsule, Rfl: 1  •  Glucagon (Baqsimi Two Pack) 3 MG/DOSE POWD, Use 1 actuation as needed (low blood sugar) into 1 nostril, Disp: 1 each, Rfl: 1  •  Glucosamine-Chondroitin 250-200 MG TABS, Take 1 tablet by mouth 2 (two) times a day, Disp: , Rfl:   •  hydrochlorothiazide (HYDRODIURIL) 25 mg tablet, Take 1 tablet (25 mg total) by mouth daily, Disp: 90 tablet, Rfl: 1  •  insulin lispro (HumaLOG KwikPen) 100 units/mL injection pen, Inject per insulin scales up to 30 units per day , Disp: 30 mL, Rfl: 3  •  lidocaine (Lidoderm) 5 %, Apply 1 patch topically daily Remove & Discard patch within 12 hours or as directed by MD, Disp: 6 patch, Rfl: 0  •  lisinopril (ZESTRIL) 40 mg tablet, Take 1 tablet (40 mg total) by mouth daily, Disp: 90 tablet, Rfl: 1  •  loratadine (CLARITIN) 10 mg tablet, Take 10 mg by mouth daily, Disp: , Rfl:   •  MULTIPLE VITAMIN PO, Take 1 tablet by mouth daily , Disp: , Rfl:   •  omeprazole (PriLOSEC) 40 MG capsule, Take 1 capsule (40 mg total) by mouth daily, Disp: 90 capsule, Rfl: 1  •  other medication, see sig,, Medication/product name: Medical Marijuana, Disp: , Rfl:   •  Probiotic Product (PROBIOTIC-10) CAPS, Take 1 capsule by mouth daily , Disp: , Rfl:   •  semaglutide, 0 25 or 0 5 mg/dose, (Ozempic, 0 25 or 0 5 MG/DOSE,) 2 mg/3 mL "injection pen, Inject 0 75 mL (0 5 mg total) under the skin every 7 days, Disp: 9 mL, Rfl: 1  •  simvastatin (ZOCOR) 20 mg tablet, Take 1 tablet (20 mg total) by mouth daily at bedtime, Disp: 90 tablet, Rfl: 3  •  sodium picosulfate, magnesium oxide, citric acid (Clenpiq) oral solution, Take 175 mL (1 bottle) the evening before the colonoscopy, between 5 PM and 9 PM, followed by a second 175 mL bottle 5 hours before the colonoscopy , Disp: 350 mL, Rfl: 0  •  testosterone (ANDROGEL) 25 MG/2 5GM (1%) GEL, Place 2 packets (50 mg total) on the skin daily, Disp: 180 packet, Rfl: 1  •  vitamin E, tocopherol, 400 units capsule, Take 400 Units by mouth 2 (two) times a day , Disp: , Rfl:   •  glipiZIDE (GLUCOTROL) 5 mg tablet, Take 1 tablet (5 mg total) by mouth in the morning, Disp: 90 tablet, Rfl: 2  •  Insulin Glargine Solostar (Lantus SoloStar) 100 UNIT/ML SOPN, Inject 0 25 mL (25 Units total) under the skin daily at bedtime, Disp: 15 mL, Rfl: 3      /80 (BP Location: Left arm, Patient Position: Sitting, Cuff Size: Standard)   Pulse 75   Temp 98 8 °F (37 1 °C) (Temporal)   Ht 5' 3\" (1 6 m)   Wt 65 8 kg (145 lb)   SpO2 98%   BMI 25 69 kg/m²       General Appearance:    Alert, oriented        Eyes:    PERRL   Ears:    Normal external ear canals, both ears   Nose:   Nares normal, septum midline   Throat:   Mucosa moist  Pharynx without injection  Neck:   Supple       Lungs:     Clear to auscultation bilaterally   Chest Wall:    No tenderness or deformity    Heart:    Regular rate and rhythm       Abdomen:     Soft, non-tender, bowel sounds +, no organomegaly           Extremities:   Extremities no cyanosis or edema       Skin:   no rash or icterus  Lymph nodes:   Cervical, supraclavicular, and axillary nodes normal   Neurologic:   CNII-XII intact, normal strength, sensation and reflexes     Throughout               No results found for this or any previous visit (from the past 48 hour(s))        No results " found   ECOG :0      Assessment and plan:    61-year-old  male with diabetes mellitus type 2, irritable bowel syndrome, presented to the ER in September 2022 with abdominal pain, was found to have 1 7 cm calcified lesion in the mesentery area, 2 months later on this mass has increased in size to 2 3 cm with more calcification suggestive of neuroendocrine tumor    He has diarrhea that might be related to Ozempic also he lost about 20 pounds    Physical examination still have with tender abdominal pain, I could not appreciate any lymphadenopathy or hepatosplenomegaly    We will do serotonin, chromogranin A and PET scan dotatate to rule out a neuroendocrine tumor of the small bowel otherwise he needs to be seen by surgical oncology

## 2023-05-19 NOTE — ASSESSMENT & PLAN NOTE
A1C 6 4- at goal but blood sugars highly variable with some hypoglycemia  He is taking large dose of apidra with breakfast only and often has hypoglycemia after breakfast   Discussed starting GLP-1 agonist- patient agreeable  Will start daily victoza 0 6mg daily x 1 week, 1 2mg daily x 1 week, then 1 8mg daily after that  For the first week he should reduce apidra by half then when on 1 2mg dose of victoza stop the apidra  Send BG log in two weeks  Reviewed risks/Side effects of victoza  IF doing well on victoza when due for refill will change janumet to plain metformin  Will also reduce Glyburide to 5mg twice per day  Discussed risk of hypoglycemia on insulin, glyburide, and glp-1 agonist and need to monitor personally  He is currently checking bG 4x per day-- Discussed option of CGM for personal use  and he will check with insurance and let us know if he is interested 
Continue simvastatin 
Continue supplements 
He is asking about alternatives to testosterone injections- suggest trial of clomiphene  He will think about it and let me know 
Improved  Continue current regimen 
Stable in 2017 compared to 2015 MRI 
no family history

## 2023-05-31 NOTE — PROGRESS NOTES
Assessment:  1  Chronic pain syndrome    2  Cervical radiculopathy    3  Low back pain with sciatica, sciatica laterality unspecified, unspecified back pain laterality, unspecified chronicity    4  Lumbar radiculopathy    5  Cervical herniated disc    6  Spondylosis of cervical region without myelopathy or radiculopathy    7  Sacroiliitis ([facility] [address] )    8  Cervical stenosis of spinal canal        Plan:  1  Patient will be scheduled for a right L5 TFESI to address the inflammatory component of the patient's pain  Complete risks and benefits including bleeding, infection, tissue reaction, nerve injury and allergic reaction were discussed  The patient was agreeable and verbalized an understanding  2  I will increase gabapentin to 400 mg 3 times a day for neuropathic complaints  I advised the patient that if they experience any side effects or issues with the changes in their medication regiment, they should give our office a call to discuss  I also advised the patient not to drive or operate machinery until they see how the changes in the medication regimen affects them  The patient was agreeable and verbalized an understanding  3   Patient needs updated imaging of the cervical spine before we will perform any further cervical epidural steroid injections as last MRI was from 2014  Patient is currently scheduled for a PET scan June 6, 2023 to rule in/out abdominal cancer  Depending on results, will order either contrasted or noncontrasted MRI cervical spine   4  Patient avoids NSAIDs secondary to GI effects  5  Patient may continue Tylenol as needed and should not exceed more than 3000 mg in 24 hours  6  Patient not interested in physical therapy as it has never improved his pain in the past  7  Follow-up after procedure or sooner if needed    History of Present Illness:     The patient is a 69 y o  male last seen on 05/11/2021 who presents for a follow up office visit in regards to chronic lumbosacral back pain that radiates into the right lower extremity and neck pain  He denies bowel or bladder incontinence or saddle anesthesia  Of note, he is currently undergoing work-up for evaluation of retroperitoneal/mesenteric mass and has a PET scan scheduled for June 6, 2023  Has had good relief in the past with right L5 TFESI on April 27, 2021 and cervical epidural steroid injection July 2019 both which provided relief up until recently  Pain returned without inciting event or trauma  Physical therapy in the past which did not provide relief  Uses Tylenol on a as needed basis with some relief and gabapentin 300 mg 3 times a day  This provides 80% improvement of his neck pain and 30% improvement of his low back pain  He avoidsNSAIDs secondary to GI issues    The patient rates his pain an 8 out of 10 on the numeric pain rating scale  He intermittently has pain in the morning and at night which is described as dull aching, sharp and shooting    I have personally reviewed and/or updated the patient's past medical history, past surgical history, family history, social history, current medications, allergies, and vital signs today  Review of Systems:    Review of Systems   Respiratory: Negative for shortness of breath  Cardiovascular: Negative for chest pain  Gastrointestinal: Negative for constipation, diarrhea, nausea and vomiting  Musculoskeletal: Positive for gait problem  Negative for arthralgias, joint swelling and myalgias  Skin: Negative for rash  Neurological: Negative for dizziness, seizures and weakness  All other systems reviewed and are negative          Past Medical History:   Diagnosis Date   • Arthritis     Last assessed 5/24/2013   • Avitaminosis D 2012   • Colon polyp    • Diabetes mellitus ([facility] [address] )    • Displacement of cervical intervertebral disc 2014   • GERD (gastroesophageal reflux disease)    • Glaucoma     Normal Pressure Glaucoma   • HTN (hypertension) 2007   • Hypertension    • IBS (irritable bowel syndrome)    • Nephrolithiasis 2013   • Pituitary microadenoma ([address] )    • Spinal stenosis in cervical region    • Sprain and strain of calcaneofibular (ligament) 2013   • Submandibular lymphadenopathy 2019   • Uric acid nephrolithiasis        Past Surgical History:   Procedure Laterality Date   • ASPIRATION / INJECTION RENAL CYST      Renal Cyst Aspiration   • CATARACT EXTRACTION      2017- right eye, 2018- left eye   • COLONOSCOPY     • EYE SURGERY Bilateral     Cataract Surgery   • TONSILLECTOMY     • UPPER GASTROINTESTINAL ENDOSCOPY         Family History   Problem Relation Age of Onset   • Diabetes unspecified Mother    • Heart disease Mother    • Nephrolithiasis Mother    • Kidney disease Mother    • Other Mother         Back Disorder   • Thyroid disease Mother    • Diabetes type II Mother    • Hypertension Mother    • Thyroid disease unspecified Mother    • Diabetes unspecified Father    • Heart disease Father    • Hypertension Father    • Diabetes unspecified Sister    • Heart disease Sister    • Stroke Sister    • Diabetes unspecified Brother    • Heart disease Brother    • Nephrolithiasis Brother    • Lung cancer Brother    • Other Brother         COPD   • Diabetes unspecified Sister    • Heart disease Sister    • Diabetes unspecified Sister    • Diabetes unspecified Brother    • Heart disease Brother    • Diabetes unspecified Brother    • Other Brother         Back Disorder   • Diabetes unspecified Brother    • Other Brother         Back Disorder   • Diabetes unspecified Brother    • Stomach cancer Paternal Aunt        Social History     Occupational History   • Occupation:    Tobacco Use   • Smoking status: Former     Packs/day: 0 25     Years: 2 00     Total pack years: 0 50     Types: Cigarettes     Start date:      Quit date: 1980     Years since quittin 4   • Smokeless tobacco: Never   Vaping Use   • Vaping Use: Never used   Substance and Sexual Activity   • Alcohol use:  Yes     Alcohol/week: 4 0 standard drinks of alcohol     Types: 2 Cans of beer, 2 Standard drinks or equivalent per week     Comment: social   • Drug use: Yes     Frequency: 7 0 times per week     Types: Marijuana     Comment: Use Medical Marijuana daily (1-3 capsules or tincture)   • Sexual activity: Yes     Partners: Female     Birth control/protection: Female Sterilization         Current Outpatient Medications:   •  gabapentin (NEURONTIN) 400 mg capsule, Take 1 capsule (400 mg total) by mouth 3 (three) times a day, Disp: 270 capsule, Rfl: 0  •  acetaminophen (TYLENOL) 650 mg CR tablet, Take 650 mg by mouth every 8 (eight) hours as needed for mild pain, Disp: , Rfl:   •  amLODIPine (NORVASC) 10 mg tablet, Take 1 tablet (10 mg total) by mouth daily, Disp: 90 tablet, Rfl: 1  •  aspirin (ECOTRIN LOW STRENGTH) 81 mg EC tablet, Take 81 mg by mouth daily , Disp: , Rfl:   •  B Complex Vitamins (VITAMIN B-COMPLEX PO), Take 1 capsule by mouth daily , Disp: , Rfl:   •  BD Pen Needle Harriett U/F 32G X 4 MM MISC, Use 4x per day, Disp: 400 each, Rfl: 3  •  brimonidine-timolol (COMBIGAN) 0 2-0 5 %, Administer 1 drop to both eyes every 12 (twelve) hours, Disp: , Rfl:   •  cholecalciferol (VITAMIN D3) 1,000 units tablet, Take 1,000 Units by mouth 2 (two) times a day , Disp: , Rfl:   •  clobetasol (TEMOVATE) 0 05 % cream, Apply topically 2 (two) times a day as needed (Rash), Disp: 30 g, Rfl: 3  •  dicyclomine (BENTYL) 20 mg tablet, Take 1 tablet (20 mg total) by mouth 4 (four) times a day (before meals and at bedtime), Disp: 120 tablet, Rfl: 5  •  doxazosin (CARDURA) 4 mg tablet, Take 1 tablet (4 mg total) by mouth daily, Disp: 90 tablet, Rfl: 1  •  Empagliflozin (Jardiance) 25 MG TABS, Take 1 tablet (25 mg total) by mouth daily, Disp: 90 tablet, Rfl: 3  •  ferrous gluconate (FERGON) 240 (27 FE) MG tablet, Take 1 tablet (240 mg total) by mouth in the morning, Disp: 90 tablet, Rfl: 1  •  fluticasone (FLONASE) 50 mcg/act nasal spray, 1 spray into each nostril daily as needed for rhinitis (Acute URI/sinusitis), Disp: 18 mL, Rfl: 0  •  glipiZIDE (GLUCOTROL) 5 mg tablet, Take 1 tablet (5 mg total) by mouth in the morning, Disp: 90 tablet, Rfl: 2  •  Glucagon (Baqsimi Two Pack) 3 MG/DOSE POWD, Use 1 actuation as needed (low blood sugar) into 1 nostril, Disp: 1 each, Rfl: 1  •  Glucosamine-Chondroitin 250-200 MG TABS, Take 1 tablet by mouth 2 (two) times a day, Disp: , Rfl:   •  hydrochlorothiazide (HYDRODIURIL) 25 mg tablet, Take 1 tablet (25 mg total) by mouth daily, Disp: 90 tablet, Rfl: 1  •  Insulin Glargine Solostar (Lantus SoloStar) 100 UNIT/ML SOPN, Inject 0 25 mL (25 Units total) under the skin daily at bedtime, Disp: 15 mL, Rfl: 3  •  insulin lispro (HumaLOG KwikPen) 100 units/mL injection pen, Inject per insulin scales up to 30 units per day , Disp: 30 mL, Rfl: 3  •  lidocaine (Lidoderm) 5 %, Apply 1 patch topically daily Remove & Discard patch within 12 hours or as directed by MD, Disp: 6 patch, Rfl: 0  •  lisinopril (ZESTRIL) 40 mg tablet, Take 1 tablet (40 mg total) by mouth daily, Disp: 90 tablet, Rfl: 1  •  loratadine (CLARITIN) 10 mg tablet, Take 10 mg by mouth daily, Disp: , Rfl:   •  MULTIPLE VITAMIN PO, Take 1 tablet by mouth daily , Disp: , Rfl:   •  omeprazole (PriLOSEC) 40 MG capsule, Take 1 capsule (40 mg total) by mouth daily, Disp: 90 capsule, Rfl: 1  •  other medication, see sig,, Medication/product name: Medical Marijuana, Disp: , Rfl:   •  Probiotic Product (PROBIOTIC-10) CAPS, Take 1 capsule by mouth daily , Disp: , Rfl:   •  semaglutide, 0 25 or 0 5 mg/dose, (Ozempic, 0 25 or 0 5 MG/DOSE,) 2 mg/3 mL injection pen, Inject 0 75 mL (0 5 mg total) under the skin every 7 days, Disp: 9 mL, Rfl: 1  •  simvastatin (ZOCOR) 20 mg tablet, Take 1 tablet (20 mg total) by mouth daily at bedtime, Disp: 90 tablet, Rfl: 3  •  sodium picosulfate, magnesium oxide, citric "acid (Clenpiq) oral solution, Take 175 mL (1 bottle) the evening before the colonoscopy, between 5 PM and 9 PM, followed by a second 175 mL bottle 5 hours before the colonoscopy , Disp: 350 mL, Rfl: 0  •  testosterone (ANDROGEL) 25 MG/2 5GM (1%) GEL, Place 2 packets (50 mg total) on the skin daily, Disp: 180 packet, Rfl: 1  •  vitamin E, tocopherol, 400 units capsule, Take 400 Units by mouth 2 (two) times a day , Disp: , Rfl:     Allergies   Allergen Reactions   • Clonidine Other (See Comments)     Diaphoresis, hot flashes   • Augmentin [Amoxicillin-Pot Clavulanate] Abdominal Pain   • Biaxin [Clarithromycin] Abdominal Pain   • Dust Mite Extract    • Insulin Aspart GI Intolerance     Novolog    • Liraglutide Abdominal Pain and Nausea Only   • Metformin And Related Diarrhea and GI Intolerance   • Molds & Smuts    • Peanut (Diagnostic) - Food Allergy Abdominal Pain, Diarrhea and GI Intolerance   • Seasonal Ic [Cholestatin] Headache   • Sitagliptin-Metformin Hcl Er Abdominal Pain, Diarrhea and GI Intolerance       Physical Exam:    /63   Pulse 71   Ht 5' 3\" (1 6 m)   Wt 65 8 kg (145 lb)   BMI 25 69 kg/m²     Constitutional:normal, well developed, well nourished, alert, in no distress and non-toxic and no overt pain behavior  Eyes:anicteric  HEENT:grossly intact  Neck:supple, symmetric, trachea midline and no masses   Pulmonary:even and unlabored  Cardiovascular:No edema or pitting edema present  Skin:Normal without rashes or lesions and well hydrated  Psychiatric:Mood and affect appropriate  Neurologic:Cranial Nerves II-XII grossly intact  Musculoskeletal:Slightly antalgic gait but steady without assistive devices  Bilateral lumbar paraspinal musculature mildly tender to palpation right SI joint tender to palpation  Bilateral lower extremity strength 5 out of 5 in all muscle groups  Positive straight leg raise on the right and negative on the left  Positive Nain's and Gaenslen's test on the right   " Bilateral cervical paraspinal and trapezii musculature mildly tender to palpation  Bilateral upper extremity strength 5 out of 5 in all muscle groups  Sensation intact to light touch in C5-T1 dermatomes bilaterally  Decreased range of motion with right rotation positive Spurling's to the right and negative to the left    Negative Sams's reflex      Imaging  FL spine and pain procedure    (Results Pending)         Orders Placed This Encounter   Procedures   • FL spine and pain procedure

## 2023-06-01 ENCOUNTER — OFFICE VISIT (OUTPATIENT)
Dept: PAIN MEDICINE | Facility: CLINIC | Age: 68
End: 2023-06-01

## 2023-06-01 VITALS
HEART RATE: 71 BPM | DIASTOLIC BLOOD PRESSURE: 63 MMHG | WEIGHT: 145 LBS | BODY MASS INDEX: 25.69 KG/M2 | SYSTOLIC BLOOD PRESSURE: 101 MMHG | HEIGHT: 63 IN

## 2023-06-01 DIAGNOSIS — M47.812 SPONDYLOSIS OF CERVICAL REGION WITHOUT MYELOPATHY OR RADICULOPATHY: ICD-10-CM

## 2023-06-01 DIAGNOSIS — M54.12 CERVICAL RADICULOPATHY: ICD-10-CM

## 2023-06-01 DIAGNOSIS — M46.1 SACROILIITIS (HCC): ICD-10-CM

## 2023-06-01 DIAGNOSIS — M54.16 LUMBAR RADICULOPATHY: ICD-10-CM

## 2023-06-01 DIAGNOSIS — M48.02 CERVICAL STENOSIS OF SPINAL CANAL: ICD-10-CM

## 2023-06-01 DIAGNOSIS — M54.40 LOW BACK PAIN WITH SCIATICA, SCIATICA LATERALITY UNSPECIFIED, UNSPECIFIED BACK PAIN LATERALITY, UNSPECIFIED CHRONICITY: ICD-10-CM

## 2023-06-01 DIAGNOSIS — G89.4 CHRONIC PAIN SYNDROME: Primary | ICD-10-CM

## 2023-06-01 DIAGNOSIS — M50.20 CERVICAL HERNIATED DISC: ICD-10-CM

## 2023-06-01 RX ORDER — GABAPENTIN 400 MG/1
400 CAPSULE ORAL 3 TIMES DAILY
Qty: 270 CAPSULE | Refills: 0 | Status: SHIPPED | OUTPATIENT
Start: 2023-06-01

## 2023-06-05 ENCOUNTER — APPOINTMENT (OUTPATIENT)
Dept: LAB | Age: 68
End: 2023-06-05
Payer: MEDICARE

## 2023-06-05 DIAGNOSIS — K66.8 CALCIFIED MESENTERIC MASS: ICD-10-CM

## 2023-06-05 DIAGNOSIS — R93.5 ABNORMAL CT SCAN, PELVIS: ICD-10-CM

## 2023-06-05 PROCEDURE — 84260 ASSAY OF SEROTONIN: CPT

## 2023-06-05 PROCEDURE — 86316 IMMUNOASSAY TUMOR OTHER: CPT

## 2023-06-05 PROCEDURE — 36415 COLL VENOUS BLD VENIPUNCTURE: CPT

## 2023-06-06 ENCOUNTER — HOSPITAL ENCOUNTER (OUTPATIENT)
Dept: RADIOLOGY | Age: 68
Discharge: HOME | End: 2023-06-06
Payer: MEDICARE

## 2023-06-06 DIAGNOSIS — K66.8 CALCIFIED MESENTERIC MASS: ICD-10-CM

## 2023-06-06 DIAGNOSIS — D48.4 NEOPLASM OF UNCERTAIN BEHAVIOR OF MESENTERY: ICD-10-CM

## 2023-06-06 DIAGNOSIS — R93.5 ABNORMAL CT SCAN, PELVIS: ICD-10-CM

## 2023-06-06 PROCEDURE — 78815 PET IMAGE W/CT SKULL-THIGH: CPT

## 2023-06-06 PROCEDURE — G1004 CDSM NDSC: HCPCS

## 2023-06-06 PROCEDURE — A9587 GALLIUM GA-68: HCPCS

## 2023-06-08 ENCOUNTER — TELEPHONE (OUTPATIENT)
Dept: OTHER | Facility: OTHER | Age: 68
End: 2023-06-08

## 2023-06-08 DIAGNOSIS — M54.12 CERVICAL RADICULOPATHY: Primary | ICD-10-CM

## 2023-06-09 ENCOUNTER — TELEPHONE (OUTPATIENT)
Dept: PAIN MEDICINE | Facility: CLINIC | Age: 68
End: 2023-06-09

## 2023-06-09 ENCOUNTER — APPOINTMENT (OUTPATIENT)
Dept: LAB | Age: 68
End: 2023-06-09
Payer: MEDICARE

## 2023-06-09 DIAGNOSIS — Z01.818 ENCOUNTER FOR PREADMISSION TESTING: ICD-10-CM

## 2023-06-09 LAB
ANION GAP SERPL CALCULATED.3IONS-SCNC: 3 MMOL/L (ref 4–13)
BUN SERPL-MCNC: 26 MG/DL (ref 5–25)
CALCIUM SERPL-MCNC: 9.2 MG/DL (ref 8.3–10.1)
CHLORIDE SERPL-SCNC: 106 MMOL/L (ref 96–108)
CO2 SERPL-SCNC: 29 MMOL/L (ref 21–32)
CREAT SERPL-MCNC: 1.2 MG/DL (ref 0.6–1.3)
GFR SERPL CREATININE-BSD FRML MDRD: 61 ML/MIN/1.73SQ M
GLUCOSE P FAST SERPL-MCNC: 172 MG/DL (ref 65–99)
POTASSIUM SERPL-SCNC: 4.1 MMOL/L (ref 3.5–5.3)
SEROTONIN PLAS-MCNC: 95 NG/ML (ref 23–230)
SODIUM SERPL-SCNC: 138 MMOL/L (ref 135–147)

## 2023-06-09 PROCEDURE — 80048 BASIC METABOLIC PNL TOTAL CA: CPT

## 2023-06-09 PROCEDURE — 36415 COLL VENOUS BLD VENIPUNCTURE: CPT

## 2023-06-09 NOTE — TELEPHONE ENCOUNTER
Caller: yuliana    Doctor: Alejandro Prior    Reason for call: pt is calling in asking if its ok to have his procedure on 6/26/23  And then have his Colonoscopy on 6/2823?  Please follow up with     Call back#: [phone]

## 2023-06-11 ENCOUNTER — HOSPITAL ENCOUNTER (OUTPATIENT)
Dept: RADIOLOGY | Facility: HOSPITAL | Age: 68
Discharge: HOME | End: 2023-06-11
Payer: MEDICARE

## 2023-06-11 DIAGNOSIS — M54.12 CERVICAL RADICULOPATHY: ICD-10-CM

## 2023-06-11 PROCEDURE — 72156 MRI NECK SPINE W/O & W/DYE: CPT

## 2023-06-11 PROCEDURE — G1004 CDSM NDSC: HCPCS

## 2023-06-11 PROCEDURE — A9585 GADOBUTROL INJECTION: HCPCS | Performed by: NURSE PRACTITIONER

## 2023-06-11 RX ADMIN — GADOBUTROL 6.5 ML: 604.72 INJECTION INTRAVENOUS at 13:10

## 2023-06-13 LAB — CGA SERPL-MCNC: 981.5 NG/ML (ref 0–101.8)

## 2023-06-15 ENCOUNTER — TELEPHONE (OUTPATIENT)
Dept: PAIN MEDICINE | Facility: CLINIC | Age: 68
End: 2023-06-15

## 2023-06-15 NOTE — TELEPHONE ENCOUNTER
----- Message from [address] [employer] sent at 6/15/2023  9:07 AM EDT -----  Any radicular symptoms? Which side is his pain?

## 2023-06-15 NOTE — TELEPHONE ENCOUNTER
----- Message from [address] [employer] sent at 6/15/2023  9:07 AM EDT -----  Any radicular symptoms? Which side is his pain?
Yes

## 2023-06-15 NOTE — TELEPHONE ENCOUNTER
----- Message from [address] [employer] sent at 6/15/2023  9:07 AM EDT -----  Any radicular symptoms? Which side is his pain?

## 2023-06-15 NOTE — TELEPHONE ENCOUNTER
Attempted to call the patient and left a detailed mom in regards to the previous message   LMOM to CB

## 2023-06-16 NOTE — TELEPHONE ENCOUNTER
S/w pt who states that his lumbar pain is right lower back radiating down the posterior aspect of his leg to his foot    Pt's cervical pain is B/L, R>L that radiates to his head shoulder and back

## 2023-06-16 NOTE — TELEPHONE ENCOUNTER
Patient is already scheduled for a lumbar LUIS MIGUEL on 6/26   Can offer XIAO at least 2 weeks following that injection

## 2023-06-16 NOTE — TELEPHONE ENCOUNTER
S/w pt and advised of same  Pt does not feel that he had great relief with last XIAO  He will think about it and cb to schedule, if needed  Pt verbalized understanding and appreciation

## 2023-06-16 NOTE — TELEPHONE ENCOUNTER
Pt returned phone call     Pt states he doesn't want to cancel his appt     Pt can be reached at [phone]

## 2023-06-22 ENCOUNTER — OFFICE VISIT (OUTPATIENT)
Dept: HEMATOLOGY ONCOLOGY | Facility: CLINIC | Age: 68
End: 2023-06-22
Payer: MEDICARE

## 2023-06-22 VITALS
TEMPERATURE: 97.4 F | HEIGHT: 63 IN | WEIGHT: 145 LBS | OXYGEN SATURATION: 98 % | HEART RATE: 59 BPM | SYSTOLIC BLOOD PRESSURE: 110 MMHG | BODY MASS INDEX: 25.69 KG/M2 | DIASTOLIC BLOOD PRESSURE: 52 MMHG

## 2023-06-22 DIAGNOSIS — K66.8 CALCIFIED MESENTERIC MASS: Primary | ICD-10-CM

## 2023-06-22 PROCEDURE — 99214 OFFICE O/P EST MOD 30 MIN: CPT | Performed by: INTERNAL MEDICINE

## 2023-06-22 RX ORDER — POLYETHYLENE GLYCOL 3350 17 G/17G
17 POWDER, FOR SOLUTION ORAL DAILY
COMMUNITY
Start: 2023-05-18

## 2023-06-22 NOTE — PROGRESS NOTES
Hematology Outpatient Follow - Up Note  Jessica Martinez 69 y o  male MRN: @ Encounter: [id number]        Date:  6/22/2023        Assessment/ Plan:    77-year-old  male with diabetes mellitus type 2, irritable bowel syndrome, presented to the ER in September 2022 with abdominal pain, was found to have 1 7 cm calcified lesion in the mesentery area, 2 months later on this mass has increased in size to 2 3 cm with more calcification suggestive of neuroendocrine tumor     He has diarrhea that might be related to Ozempic also he lost about 20 pounds     Physical examination still have with tender abdominal pain, I could not appreciate any lymphadenopathy or hepatosplenomegaly    Normal serotonin    Elevated chromogranin A in the range of 900 (0-100)    Dotatate scan showed avid uptake in the calcified mesenteric area with SUV of 4    We will ask IR for biopsy to rule out carcinoid tumor    Patient to be seen by surgical oncology    We will follow-up in 3 months with chromogranin A        Labs and imaging studies are reviewed by ordering provider once results are available  If there are findings that need immediate attention, you will be contacted when results available  Discussing results and the implication on your healthcare is best discussed in person at your follow-up visit         HPI:    77-year-old  male with hypogonadism, pituitary microadenoma, diabetes mellitus type 2, irritable bowel syndrome, obstructive sleep apnea, sacroiliitis, he had a CAT scan in September 2022 after he presented with abdominal pain noticed calcification and mass measuring 1 2 cm in the mesenteric area, he was put on Ozempic for diabetes mellitus controlled, with subsequent diarrhea, weight loss about 20 pounds in the past 3 to 4 months     With abdominal cramps, diarrhea he went to the ER repeat CAT scan of the pelvis area 1/10/2022 showed increase in the size  of the of 3 partially visualized mesenteric masses with calcification measuring up to 2 3 x 2 3 cm (1 7 x 1 5)     He denied any night sweats low-grade fever he reported flushing, weight loss, diarrhea denied any melena hematochezia hematuria skin rash, pruritus    Chromogranin A is elevated at 981 (0-101)    Dotatate scan on 6/2023 showed partially calcified mesenteric mass with SUV of 4, no evidence of radiotracer avid intra-abdominal adenopathy, no evidence of metastatic disease in the liver, there is an increased pancreatic tail activity SUV of 12, enlarged prostate  Interval History:        Previous Treatment:         Test Results:    Imaging: MRI cervical spine w wo contrast    Result Date: 6/14/2023  Narrative: MRI CERVICAL SPINE WITH AND WITHOUT CONTRAST INDICATION: M54 12: Radiculopathy, cervical region  COMPARISON: 7/6/2023 and 7/8/2014 TECHNIQUE:  Multiplanar, multisequence imaging of the cervical spine was performed before and after gadolinium administration    IV Contrast:  6 5 mL of Gadobutrol injection (SINGLE-DOSE) IMAGE QUALITY:  Diagnostic  FINDINGS: ALIGNMENT:  Normal alignment of the cervical spine  No compression fracture  No subluxation  No scoliosis  MARROW SIGNAL:  Normal marrow signal is identified within the visualized bony structures  No discrete marrow lesion  CERVICAL AND VISUALIZED UPPER THORACIC CORD:  Normal signal within the visualized cord  PREVERTEBRAL AND PARASPINAL SOFT TISSUES:  Normal  VISUALIZED POSTERIOR FOSSA:  The visualized posterior fossa demonstrates no abnormal signal  CERVICAL DISC SPACES: C2-C3: There is left-sided uncovertebral spurring  There is facet arthrosis  There is moderate left foraminal narrowing  There is no significant canal stenosis or right foraminal narrowing  C3-C4: There is a disc osteophyte complex with uncovertebral spurring  There is facet arthrosis  There is mild canal stenosis and cord flattening  There is severe left foraminal narrowing  There is no significant right foraminal narrowing   C4-C5: There is a disc osteophyte complex  There is facet arthrosis  There is mild canal stenosis  There is mild foraminal narrowing  C5-C6: There is a disc osteophyte complex with a superimposed left paracentral disc protrusion  There is effacement of the ventral thecal sac with contact of the cord  There is no significant foraminal narrowing  C6-C7: There is a left foraminal disc protrusion  There is mild left foraminal narrowing  There is no significant canal stenosis or right foraminal narrowing  C7-T1:  Normal  UPPER THORACIC DISC SPACES:  Normal  POSTCONTRAST IMAGING:  Normal  OTHER FINDINGS:  None  Impression: Multilevel cervical spondylosis, as described above  Most notable level is at C3-C4 where multifactorial disease results in overall mild canal stenosis and cord flattening  Severe left foraminal narrowing is present at this level  Workstation performed: [id number]     NM PET CT skull base to mid thigh    Result Date: 6/6/2023  Narrative: NETSPOT PET/CT SCAN INDICATION: Increasing size of a central mesenteric mass with calcification concerning for carcinoid tumor  Further evaluation MODIFIER: PI COMPARISON:  CT pelvis 10/27/2022, CT abdomen and pelvis 9/1/2021 CELL TYPE:  N/A TECHNIQUE:   3 5 mCi Gallium-68 Dotatate Netspot administered IV  Multiplanar attenuation corrected and non-attenuation corrected PET images were acquired 60 minutes post injection  Contiguous, low dose, axial CT sections were obtained from the vertex through the femurs for anatomic localization  Intravenous contrast was not utilized  FINDINGS: BRAIN: Normal pituitary gland uptake is demonstrated  No acute abnormalities are seen  HEAD/NECK:  There is a physiologic distribution of the radiotracer  Normal salivary gland and thyroid uptake is demonstrated  CT images:  Unremarkable  CHEST:   There is a physiologic distribution of the radiotracer  CT images:  there is mild coronary artery calcification   No pericardial or pleural effusion  ABDOMEN: -Partially calcified mesenteric mass in question demonstrates mild radiotracer avidity, with SUV max 4 0, image 197  -There is no evidence of radiotracer avid intra-abdominal adenopathy  -No evidence of radiotracer avid metastatic disease involving the liver - There is increased pancreatic tail activity identified, with lack of any significant activity elsewhere in the pancreas including the uncinate process  The SUV max on image 160 is 12 0  No abnormality is seen on the limited noncontrast CT CT images: There is a right kidney lower pole calculus, measuring 0 5 cm  PELVIS:  There is a physiologic distribution of the radiotracer  CT images: Persistent distalmost right ureteral calculus image 244 measuring approximately 0 4 cm  No significant upstream dilatation  Scattered colonic diverticula  Mildly enlarged prostate gland  The prostate gland does show diffuse increased radiotracer activity with SUV max of 11 0  OSSEOUS STRUCTURES:   There is a physiologic distribution of the radiotracer  CT images: No significant findings  Impression: 1  The partially calcified mesenteric mass in question demonstrates mild radiotracer activity with SUV max of 4 0  While nonspecific as inflammatory change can be radiotracer avid, carcinoid spectrum lesion should be excluded and soft tissue sampling is  recommended  2  No evidence of radiotracer avid adenopathy or distant metastatic disease 3  Increased radiotracer activity within the pancreatic tail, with no significant activity elsewhere in the pancreas  No obvious finding on noncontrast CT  This may represent a normal variant however consider MRI or contrast-enhanced CT of the pancreas to assure there is no subtle lesion  4  Enlarged prostate gland with diffusely increased radiotracer activity  This is again nonspecific and can be seen in the setting of chronic inflammation/BPH   Correlation with PSA is recommended 5  0 4 cm distalmost right ureteral "calculus, as on prior pelvic CT from 10/27/2022    Right kidney lower pole 0 5 cm calculus  Workstation performed: [id number]       Labs:   Lab Results   Component Value Date    WBC 8 31 05/27/2022    HGB 15 1 04/12/2023    HCT 45 8 04/12/2023    MCV 85 05/27/2022     05/27/2022     Lab Results   Component Value Date     10/28/2017    K 4 1 06/09/2023     06/09/2023    CO2 29 06/09/2023    ANIONGAP 10 09/15/2015    BUN 26 (H) 06/09/2023    CREATININE 1 20 06/09/2023    GLUCOSE 127 09/15/2015    GLUF 172 (H) 06/09/2023    CALCIUM 9 2 06/09/2023    AST 17 01/24/2023    ALT 38 01/24/2023    ALKPHOS 78 01/24/2023    PROT 8 1 10/28/2017    BILITOT 1 2 10/28/2017    EGFR 61 06/09/2023       Lab Results   Component Value Date    IRON 96 04/12/2023    TIBC 245 (L) 04/12/2023    FERRITIN 132 04/12/2023       No results found for: \"[id number]\"      ROS: Review of Systems   Constitutional: Positive for unexpected weight change  Negative for appetite change, chills, diaphoresis, fatigue and fever  HENT:   Negative for hearing loss, lump/mass, mouth sores, nosebleeds, sore throat, trouble swallowing and voice change  Eyes: Negative  Negative for eye problems and icterus  Respiratory: Negative  Negative for chest tightness, cough, hemoptysis and shortness of breath  Cardiovascular: Negative for chest pain and leg swelling  Gastrointestinal: Positive for diarrhea  Negative for abdominal distention, abdominal pain, blood in stool, constipation and nausea  Endocrine: Negative  Genitourinary: Negative for dysuria, frequency, hematuria and pelvic pain  Musculoskeletal: Negative  Negative for arthralgias, back pain, flank pain, gait problem, myalgias and neck stiffness  Skin: Negative for itching and rash  Neurological: Negative for dizziness, gait problem, headaches, light-headedness, numbness and speech difficulty  Hematological: Negative for adenopathy  Does not bruise/bleed easily   " Psychiatric/Behavioral: Negative for confusion, decreased concentration, depression and sleep disturbance  The patient is not nervous/anxious  Current Medications: Reviewed  Allergies: Reviewed  PMH/FH/SH:  Reviewed      Physical Exam:    Body surface area is 1 69 meters squared  Wt Readings from Last 3 Encounters:   06/22/23 65 8 kg (145 lb)   06/01/23 65 8 kg (145 lb)   05/18/23 65 8 kg (145 lb)        Temp Readings from Last 3 Encounters:   06/22/23 (!) 97 4 °F (36 3 °C) (Temporal)   05/18/23 98 8 °F (37 1 °C) (Temporal)   02/20/23 98 1 °F (36 7 °C) (Temporal)        BP Readings from Last 3 Encounters:   06/22/23 110/52   06/01/23 101/63   05/18/23 120/80         Pulse Readings from Last 3 Encounters:   06/22/23 59   06/01/23 71   05/18/23 75        Physical Exam  Vitals reviewed  Constitutional:       General: He is not in acute distress  Appearance: He is well-developed  He is not diaphoretic  HENT:      Head: Normocephalic and atraumatic  Eyes:      Conjunctiva/sclera: Conjunctivae normal    Neck:      Trachea: No tracheal deviation  Cardiovascular:      Rate and Rhythm: Normal rate and regular rhythm  Heart sounds: No murmur heard  No friction rub  No gallop  Pulmonary:      Effort: Pulmonary effort is normal  No respiratory distress  Breath sounds: Normal breath sounds  No wheezing or rales  Chest:      Chest wall: No tenderness  Abdominal:      General: There is no distension  Palpations: Abdomen is soft  Tenderness: There is no abdominal tenderness  Musculoskeletal:      Cervical back: Normal range of motion and neck supple  Lymphadenopathy:      Cervical: No cervical adenopathy  Skin:     General: Skin is warm and dry  Coloration: Skin is not pale  Findings: No erythema  Neurological:      Mental Status: He is alert and oriented to person, place, and time     Psychiatric:         Behavior: Behavior normal          Thought Content: Thought content normal          Judgment: Judgment normal          ECO    Goals and Barriers:  Current Goal: Minimize effects of disease  Barriers: None  Patient's Capacity to Self Care:  Patient is able to self care      Code Status: @[facility]@

## 2023-06-26 ENCOUNTER — HOSPITAL ENCOUNTER (OUTPATIENT)
Dept: RADIOLOGY | Facility: CLINIC | Age: 68
Discharge: HOME | End: 2023-06-26
Admitting: ANESTHESIOLOGY
Payer: MEDICARE

## 2023-06-26 ENCOUNTER — PREP FOR PROCEDURE (OUTPATIENT)
Dept: INTERVENTIONAL RADIOLOGY/VASCULAR | Facility: HOSPITAL | Age: 68
End: 2023-06-26

## 2023-06-26 VITALS
DIASTOLIC BLOOD PRESSURE: 67 MMHG | TEMPERATURE: 96.9 F | OXYGEN SATURATION: 98 % | RESPIRATION RATE: 18 BRPM | SYSTOLIC BLOOD PRESSURE: 126 MMHG | HEART RATE: 55 BPM

## 2023-06-26 DIAGNOSIS — M54.16 LUMBAR RADICULOPATHY: ICD-10-CM

## 2023-06-26 DIAGNOSIS — K86.89 MASS OF PANCREAS: ICD-10-CM

## 2023-06-26 DIAGNOSIS — K66.8 CALCIFIED MESENTERIC MASS: Primary | ICD-10-CM

## 2023-06-26 PROCEDURE — 64483 NJX AA&/STRD TFRM EPI L/S 1: CPT | Performed by: ANESTHESIOLOGY

## 2023-06-26 RX ORDER — PAPAVERINE HCL 150 MG
10 CAPSULE, EXTENDED RELEASE ORAL ONCE
Status: COMPLETED | OUTPATIENT
Start: 2023-06-26 | End: 2023-06-26

## 2023-06-26 RX ADMIN — DEXAMETHASONE SODIUM PHOSPHATE 10 MG: 10 INJECTION, SOLUTION INTRAMUSCULAR; INTRAVENOUS at 14:50

## 2023-06-26 RX ADMIN — IOHEXOL 2 ML: 300 INJECTION, SOLUTION INTRAVENOUS at 14:50

## 2023-06-26 RX ADMIN — LIDOCAINE HYDROCHLORIDE 1 ML: 20 INJECTION, SOLUTION EPIDURAL; INFILTRATION; INTRACAUDAL; PERINEURAL at 14:49

## 2023-06-26 NOTE — DISCHARGE INSTR - LAB
Epidural Steroid Injection   WHAT YOU NEED TO KNOW:   An epidural steroid injection (LUIS MIGUEL) is a procedure to inject steroid medicine into the epidural space  The epidural space is between your spinal cord and vertebrae  Steroids reduce inflammation and fluid buildup in your spine that may be causing pain  You may be given pain medicine along with the steroids  ACTIVITY  Do not drive or operate machinery today  No strenuous activity today - bending, lifting, etc   You may resume normal activites starting tomorrow - start slowly and as tolerated  You may shower today, but no tub baths or hot tubs  You may have numbness for several hours from the local anesthetic  Please use caution and common sense, especially with weight-bearing activities  CARE OF THE INJECTION SITE  If you have soreness or pain, apply ice to the area today (20 minutes on/20 minutes off)  Starting tomorrow, you may use warm, moist heat or ice if needed  You may have an increase or change in your discomfort for 36-48 hours after your treatment  Apply ice and continue with any pain medication you have been prescribed  Notify the [facility] if you have any of the following: redness, drainage, swelling, headache, stiff neck or fever above 100°F     SPECIAL INSTRUCTIONS  Our office will contact you in approximately 7 days for a progress report  MEDICATIONS  Continue to take all routine medications  Our office may have instructed you to hold some medications  As no general anesthesia was used in today's procedure, you should not experience any side effects related to anesthesia  If you are diabetic, the steroids used in today's injection may temporarily increase your blood sugar levels after the first few days after your injection  Please keep a close eye on your sugars and alert the doctor who manages your diabetes if your sugars are significantly high from your baseline or you are symptomatic       If you have a problem specifically related to your procedure, please call our office at [phone]  Problems not related to your procedure should be directed to your primary care physician

## 2023-06-26 NOTE — H&P
History of Present Illness: The patient is a 69 y o  male who presents with complaints of low back and leg pain      Past Medical History:   Diagnosis Date   • Abdominal pain    • Arthritis     Last assessed 5/24/2013   • Avitaminosis D 2012   • Chronic pain disorder     lumbar-having LESI on 6/26/23   • Colon polyp    • [facility]    • Displacement of cervical intervertebral disc 2014   • GERD (gastroesophageal reflux disease)    • Glaucoma     Normal Pressure Glaucoma   • HTN (hypertension) 2007   • Hypertension    • IBS (irritable bowel syndrome) 2021   • Marijuana use     daily for chronic pain   • Nephrolithiasis 05/24/2013   • Pituitary microadenoma ([facility] ) 2010   • Spinal stenosis in cervical region 2014   • Sprain and strain of calcaneofibular (ligament) 12/05/2013   • Submandibular lymphadenopathy 08/08/2019   • Uric acid nephrolithiasis 1990       Past Surgical History:   Procedure Laterality Date   • ASPIRATION / INJECTION RENAL CYST      Renal Cyst Aspiration   • CATARACT EXTRACTION      12/2017- right eye, 01/2018- left eye   • COLONOSCOPY  2005   • EYE SURGERY Bilateral     Cataract Surgery   • TONSILLECTOMY     • UPPER GASTROINTESTINAL ENDOSCOPY           Current Outpatient Medications:   •  acetaminophen (TYLENOL) 650 mg CR tablet, Take 650 mg by mouth every 8 (eight) hours as needed for mild pain, Disp: , Rfl:   •  amLODIPine (NORVASC) 10 mg tablet, Take 1 tablet (10 mg total) by mouth daily (Patient taking differently: Take 10 mg by mouth daily at bedtime), Disp: 90 tablet, Rfl: 1  •  aspirin (ECOTRIN LOW STRENGTH) 81 mg EC tablet, Take 81 mg by mouth daily , Disp: , Rfl:   •  B Complex Vitamins (VITAMIN B-COMPLEX PO), Take 1 capsule by mouth daily , Disp: , Rfl:   •  BD Pen Needle Harriett U/F 32G X 4 MM MISC, Use 4x per day, Disp: 400 each, Rfl: 3  •  brimonidine-timolol (COMBIGAN) 0 2-0 5 %, Administer 1 drop to both eyes every 12 (twelve) hours, Disp: , Rfl:   •  cholecalciferol (VITAMIN D3) 1,000 units tablet, Take 1,000 Units by mouth 2 (two) times a day , Disp: , Rfl:   •  Continuous Blood Gluc  (Dexcom G6 ) AGATA, Use, Disp: , Rfl:   •  Continuous Blood Gluc Sensor (Dexcom G6 Sensor) MISC, Use, Disp: , Rfl:   •  dicyclomine (BENTYL) 20 mg tablet, Take 1 tablet (20 mg total) by mouth 4 (four) times a day (before meals and at bedtime), Disp: 120 tablet, Rfl: 5  •  doxazosin (CARDURA) 4 mg tablet, Take 1 tablet (4 mg total) by mouth daily (Patient taking differently: Take 4 mg by mouth every morning), Disp: 90 tablet, Rfl: 1  •  Empagliflozin (Jardiance) 25 MG TABS, Take 1 tablet (25 mg total) by mouth daily (Patient taking differently: Take 25 mg by mouth every morning Last dose 6/23), Disp: 90 tablet, Rfl: 3  •  ferrous gluconate (FERGON) 240 (27 FE) MG tablet, Take 1 tablet (240 mg total) by mouth in the morning (Patient taking differently: Take 240 mg by mouth in the morning Last dose 6/20), Disp: 90 tablet, Rfl: 1  •  fluticasone (FLONASE) 50 mcg/act nasal spray, 1 spray into each nostril daily as needed for rhinitis (Acute URI/sinusitis), Disp: 18 mL, Rfl: 0  •  gabapentin (NEURONTIN) 400 mg capsule, Take 1 capsule (400 mg total) by mouth 3 (three) times a day, Disp: 270 capsule, Rfl: 0  •  glipiZIDE (GLUCOTROL) 5 mg tablet, Take 1 tablet (5 mg total) by mouth in the morning, Disp: 90 tablet, Rfl: 2  •  Glucagon (Baqsimi Two Pack) 3 MG/DOSE POWD, Use 1 actuation as needed (low blood sugar) into 1 nostril, Disp: 1 each, Rfl: 1  •  Glucosamine-Chondroitin 250-200 MG TABS, Take 1 tablet by mouth 2 (two) times a day, Disp: , Rfl:   •  hydrochlorothiazide (HYDRODIURIL) 25 mg tablet, Take 1 tablet (25 mg total) by mouth daily (Patient taking differently: Take 25 mg by mouth every morning), Disp: 90 tablet, Rfl: 1  •  Insulin Glargine Solostar (Lantus SoloStar) 100 UNIT/ML SOPN, Inject 0 25 mL (25 Units total) under the skin daily at bedtime, Disp: 15 mL, Rfl: 3  •  insulin lispro (HumaLOG KwikPen) 100 units/mL injection pen, Inject per insulin scales up to 30 units per day  (Patient taking differently: Inject per insulin scales up to 30 units per day  --Dexcom system), Disp: 30 mL, Rfl: 3  •  lidocaine (Lidoderm) 5 %, Apply 1 patch topically daily Remove & Discard patch within 12 hours or as directed by MD, Disp: 6 patch, Rfl: 0  •  lisinopril (ZESTRIL) 40 mg tablet, Take 1 tablet (40 mg total) by mouth daily (Patient taking differently: Take 40 mg by mouth every morning), Disp: 90 tablet, Rfl: 1  •  loratadine (CLARITIN) 10 mg tablet, Take 10 mg by mouth daily, Disp: , Rfl:   •  MULTIPLE VITAMIN PO, Take 1 tablet by mouth daily , Disp: , Rfl:   •  omeprazole (PriLOSEC) 40 MG capsule, Take 1 capsule (40 mg total) by mouth daily (Patient taking differently: Take 40 mg by mouth every morning), Disp: 90 capsule, Rfl: 1  •  other medication, see sig,, Medication/product name: Medical Marijuana, Disp: , Rfl:   •  polyethylene glycol (MiraLax) 17 GM/SCOOP powder, 17 g daily, Disp: , Rfl:   •  Probiotic Product (PROBIOTIC-10) CAPS, Take 1 capsule by mouth daily , Disp: , Rfl:   •  semaglutide, 0 25 or 0 5 mg/dose, (Ozempic, 0 25 or 0 5 MG/DOSE,) 2 mg/3 mL injection pen, Inject 0 75 mL (0 5 mg total) under the skin every 7 days (Patient taking differently: Inject 0 5 mg under the skin every 7 days On Monday), Disp: 9 mL, Rfl: 1  •  simvastatin (ZOCOR) 20 mg tablet, Take 1 tablet (20 mg total) by mouth daily at bedtime, Disp: 90 tablet, Rfl: 3  •  sodium picosulfate, magnesium oxide, citric acid (Clenpiq) oral solution, Take 175 mL (1 bottle) the evening before the colonoscopy, between 5 PM and 9 PM, followed by a second 175 mL bottle 5 hours before the colonoscopy , Disp: 350 mL, Rfl: 0  •  testosterone (ANDROGEL) 25 MG/2 5GM (1%) GEL, Place 2 packets (50 mg total) on the skin daily, Disp: 180 packet, Rfl: 1  •  vitamin E, tocopherol, 400 units capsule, Take 400 Units by mouth 2 (two) times a day , Disp: , Rfl:     Allergies   Allergen Reactions   • Clonidine Other (See Comments)     Diaphoresis, hot flashes   • Augmentin [Amoxicillin-Pot Clavulanate] Abdominal Pain   • Biaxin [Clarithromycin] Abdominal Pain   • Peanut (Diagnostic) - Food Allergy Diarrhea, GI Intolerance and Abdominal Pain   • Sitagliptin-Metformin Hcl Er Diarrhea, GI Intolerance and Abdominal Pain   • Dust Mite Extract Allergic Rhinitis   • Insulin Aspart GI Intolerance     Novolog    • Liraglutide Nausea Only and Abdominal Pain   • Metformin And Related Diarrhea and GI Intolerance   • Molds & Smuts Allergic Rhinitis   • Seasonal Ic [Cholestatin] Headache       Physical Exam:   Vitals:    06/26/23 1433   BP: 132/57   Pulse: (!) 54   Resp: 18   Temp: (!) 96 9 °F (36 1 °C)   SpO2: 97%     General: Awake, Alert, Oriented x 3, Mood and affect appropriate  Respiratory: Respirations even and unlabored  Cardiovascular: Peripheral pulses intact; no edema  Musculoskeletal Exam: Right lumbar paraspinals tender to palpation    ASA Score: 3    Patient/Chart Verification  Patient ID Verified: Verbal  ID Band Applied: No  Consents Confirmed: Procedural, To be obtained in the Pre-Procedure area  H&P( within 30 days) Verified: To be obtained in the Pre-Procedure area  Interval H&P(within 24 hr) Complete (required for Outpatients and Surgery Admit only): To be obtained in the Pre-Procedure area  Allergies Reviewed: Yes  Anticoag/NSAID held?: NA  Currently on antibiotics?: No    Assessment:   1   Lumbar radiculopathy        Plan: Right L5 TFESI

## 2023-06-27 ENCOUNTER — TELEPHONE (OUTPATIENT)
Dept: HEMATOLOGY ONCOLOGY | Facility: CLINIC | Age: 68
End: 2023-06-27

## 2023-06-27 NOTE — TELEPHONE ENCOUNTER
Patient Call    Who are you speaking with? Patient    If it is not the patient, are they listed on an active communication consent form? N/A   What is the reason for this call? I called the patient in regards to a referral we received for Surgical Oncology from Dr Shey Post  The patient was unsure when Dr Shey Post would like him to schedule as he states the doctor had sent him for some tests  The patient requests to be advised when he should schedule  Does this require a call back? Yes   If a call back is required, please list best call back number [phone]   If a call back is required, advise that a message will be forwarded to their care team and someone will return their call as soon as possible  Did you relay this information to the patient?  Yes

## 2023-06-27 NOTE — TELEPHONE ENCOUNTER
I called Norah Leroy in response to a referral that was received for patient to establish care with Surgical Oncology  Outreach was made to schedule a consultation       The patient stated he was unsure on when Dr Shey Post would like him to schedule with Surgical Oncology due to tests that he has scheduled  At the patient's request, I sent a message to Dr Davied Schirmer team asking when Dr Shey Post would like the patient to be scheduled

## 2023-06-28 ENCOUNTER — ANESTHESIA (OUTPATIENT)
Dept: GASTROENTEROLOGY | Facility: AMBULARY SURGERY CENTER | Age: 68
End: 2023-06-28

## 2023-06-28 ENCOUNTER — ANESTHESIA EVENT (OUTPATIENT)
Dept: GASTROENTEROLOGY | Facility: AMBULARY SURGERY CENTER | Age: 68
End: 2023-06-28

## 2023-06-28 ENCOUNTER — HOSPITAL ENCOUNTER (OUTPATIENT)
Dept: GASTROENTEROLOGY | Facility: AMBULARY SURGERY CENTER | Age: 68
Setting detail: OUTPATIENT SURGERY
Discharge: HOME | End: 2023-06-28
Attending: INTERNAL MEDICINE
Payer: MEDICARE

## 2023-06-28 VITALS
OXYGEN SATURATION: 97 % | DIASTOLIC BLOOD PRESSURE: 67 MMHG | HEART RATE: 58 BPM | RESPIRATION RATE: 16 BRPM | SYSTOLIC BLOOD PRESSURE: 148 MMHG | TEMPERATURE: 97.6 F

## 2023-06-28 DIAGNOSIS — R19.4 CHANGE IN BOWEL HABITS: ICD-10-CM

## 2023-06-28 DIAGNOSIS — R10.32 LEFT LOWER QUADRANT ABDOMINAL PAIN: ICD-10-CM

## 2023-06-28 DIAGNOSIS — R63.4 ABNORMAL WEIGHT LOSS: ICD-10-CM

## 2023-06-28 DIAGNOSIS — R11.0 NAUSEA: ICD-10-CM

## 2023-06-28 LAB
GLUCOSE SERPL-MCNC: 139 MG/DL (ref 65–140)
GLUCOSE SERPL-MCNC: 84 MG/DL (ref 65–140)

## 2023-06-28 PROCEDURE — 88313 SPECIAL STAINS GROUP 2: CPT | Performed by: PATHOLOGY

## 2023-06-28 PROCEDURE — 82948 REAGENT STRIP/BLOOD GLUCOSE: CPT

## 2023-06-28 PROCEDURE — 88341 IMHCHEM/IMCYTCHM EA ADD ANTB: CPT | Performed by: PATHOLOGY

## 2023-06-28 PROCEDURE — 88342 IMHCHEM/IMCYTCHM 1ST ANTB: CPT | Performed by: PATHOLOGY

## 2023-06-28 PROCEDURE — 88305 TISSUE EXAM BY PATHOLOGIST: CPT | Performed by: PATHOLOGY

## 2023-06-28 RX ORDER — PROPOFOL 10 MG/ML
INJECTION, EMULSION INTRAVENOUS AS NEEDED
Status: DISCONTINUED | OUTPATIENT
Start: 2023-06-28 | End: 2023-06-28

## 2023-06-28 RX ORDER — DEXTROSE, SODIUM CHLORIDE, SODIUM LACTATE, POTASSIUM CHLORIDE, AND CALCIUM CHLORIDE 5; .6; .31; .03; .02 G/100ML; G/100ML; G/100ML; G/100ML; G/100ML
50 INJECTION, SOLUTION INTRAVENOUS CONTINUOUS
Status: DISCONTINUED | OUTPATIENT
Start: 2023-06-28 | End: 2023-07-02 | Stop reason: HOSPADM

## 2023-06-28 RX ORDER — LIDOCAINE HYDROCHLORIDE 10 MG/ML
INJECTION, SOLUTION EPIDURAL; INFILTRATION; INTRACAUDAL; PERINEURAL AS NEEDED
Status: DISCONTINUED | OUTPATIENT
Start: 2023-06-28 | End: 2023-06-28

## 2023-06-28 RX ADMIN — PROPOFOL 140 MG: 10 INJECTION, EMULSION INTRAVENOUS at 07:42

## 2023-06-28 RX ADMIN — PROPOFOL 20 MG: 10 INJECTION, EMULSION INTRAVENOUS at 07:55

## 2023-06-28 RX ADMIN — PROPOFOL 20 MG: 10 INJECTION, EMULSION INTRAVENOUS at 08:05

## 2023-06-28 RX ADMIN — PROPOFOL 50 MG: 10 INJECTION, EMULSION INTRAVENOUS at 07:44

## 2023-06-28 RX ADMIN — DEXTROSE, SODIUM CHLORIDE, SODIUM LACTATE, POTASSIUM CHLORIDE, AND CALCIUM CHLORIDE: 5; .6; .31; .03; .02 INJECTION, SOLUTION INTRAVENOUS at 07:36

## 2023-06-28 RX ADMIN — LIDOCAINE HYDROCHLORIDE 50 MG: 10 INJECTION, SOLUTION EPIDURAL; INFILTRATION; INTRACAUDAL; PERINEURAL at 07:42

## 2023-06-28 RX ADMIN — PROPOFOL 50 MG: 10 INJECTION, EMULSION INTRAVENOUS at 07:47

## 2023-06-28 NOTE — ANESTHESIA PREPROCEDURE EVALUATION
Procedure:  COLONOSCOPY  EGD    Relevant Problems   CARDIO   (+) Benign essential hypertension   (+) Hyperlipidemia      ENDO   (+) Type 2 diabetes mellitus with both eyes affected by mild nonproliferative retinopathy without macular edema, with long-term current use of insulin (HCC)      GI/HEPATIC   (+) Gastroesophageal reflux disease without esophagitis      /RENAL   (+) Stage 3 chronic kidney disease, unspecified whether stage 3a or 3b CKD (HCC)      HEMATOLOGY   (+) Iron deficiency anemia secondary to inadequate dietary iron intake      MUSCULOSKELETAL   (+) Low back pain with sciatica   (+) Sacroiliitis (HCC)   (+) Spondylosis of cervical region without myelopathy or radiculopathy      NEURO/PSYCH   (+) Chronic pain syndrome      PULMONARY   (+) Obstructive sleep apnea syndrome             Anesthesia Plan  ASA Score- 3     Anesthesia Type- IV sedation with anesthesia with ASA Monitors  Additional Monitors:   Airway Plan:           Plan Factors-    Chart reviewed  Induction- intravenous  Postoperative Plan-     Informed Consent- Anesthetic plan and risks discussed with patient  I personally reviewed this patient with the CRNA  Discussed and agreed on the Anesthesia Plan with the CRNA  Rekha Ivy

## 2023-06-28 NOTE — ANESTHESIA POSTPROCEDURE EVALUATION
Post-Op Assessment Note    CV Status:  Stable  Pain Score: 0    Pain management: adequate     Mental Status:  Alert and awake   Hydration Status:  Euvolemic and stable   PONV Controlled:  Controlled   Airway Patency:  Patent      Post Op Vitals Reviewed: Yes      Staff: CRNA         No notable events documented      BP   136/62   Temp      Pulse  65   Resp   14   SpO2   98% on RA

## 2023-06-28 NOTE — H&P
History and Physical -  [facility]  Amena Jang 69 y.o. male MRN: [id number]                  HPI: Amena Jang is a 69y.o. year old male who presents for LLQ pain, weight loss, change in bowel habits. REVIEW OF SYSTEMS: Per the HPI, and otherwise unremarkable.     Historical Information   Past Medical History:   Diagnosis Date   • Abdominal pain    • Arthritis     Last assessed 5/24/2013   • Avitaminosis D 2012   • Chronic pain disorder     lumbar-having LESI on 6/26/23   • Colon polyp    • Diabetes mellitus ([address])    • Displacement of cervical intervertebral disc 2014   • GERD (gastroesophageal reflux disease)    • Glaucoma     Normal Pressure Glaucoma   • HTN (hypertension) 2007   • Hypertension    • IBS (irritable bowel syndrome) 2021   • Marijuana use     daily for chronic pain   • Nephrolithiasis 05/24/2013   • Pituitary microadenoma ([address]) 2010   • Spinal stenosis in cervical region 2014   • Sprain and strain of calcaneofibular (ligament) 12/05/2013   • Submandibular lymphadenopathy 08/08/2019   • Uric acid nephrolithiasis 1990     Past Surgical History:   Procedure Laterality Date   • ASPIRATION / INJECTION RENAL CYST      Renal Cyst Aspiration   • CATARACT EXTRACTION      12/2017- right eye, 01/2018- left eye   • COLONOSCOPY  2005   • EYE SURGERY Bilateral     Cataract Surgery   • TONSILLECTOMY     • UPPER GASTROINTESTINAL ENDOSCOPY       Social History   Social History     Substance and Sexual Activity   Alcohol Use Yes   • Alcohol/week: 4.0 standard drinks of alcohol   • Types: 2 Cans of beer, 2 Standard drinks or equivalent per week    Comment: social     Social History     Substance and Sexual Activity   Drug Use Yes   • Frequency: 7.0 times per week   • Types: Marijuana    Comment: Use Medical Marijuana daily (1-3 capsules or tincture)     Social History     Tobacco Use   Smoking Status Former   • Packs/day: 0.25   • Years: 2.00   • Total pack years: 0.50   • Types: Cigarettes   • Start date: 1   • Quit date: 1980   • Years since quittin.5   Smokeless Tobacco Never     Family History   Problem Relation Age of Onset   • Diabetes unspecified Mother    • Heart disease Mother    • Nephrolithiasis Mother    • Kidney disease Mother    • Other Mother         Back Disorder   • Thyroid disease Mother    • Diabetes type II Mother    • Hypertension Mother    • Thyroid disease unspecified Mother    • Diabetes unspecified Father    • Heart disease Father    • Hypertension Father    • Diabetes unspecified Sister    • Heart disease Sister    • Stroke Sister    • Diabetes unspecified Brother    • Heart disease Brother    • Nephrolithiasis Brother    • Lung cancer Brother    • Other Brother         COPD   • Diabetes unspecified Sister    • Heart disease Sister    • Diabetes unspecified Sister    • Diabetes unspecified Brother    • Heart disease Brother    • Diabetes unspecified Brother    • Other Brother         Back Disorder   • Diabetes unspecified Brother    • Other Brother         Back Disorder   • Diabetes unspecified Brother    • Stomach cancer Paternal Aunt        Meds/Allergies       Current Outpatient Medications:   •  BD Pen Needle Harriett U/F 32G X 4 MM MISC  •  brimonidine-timolol (COMBIGAN) 0.2-0.5 %  •  Continuous Blood Gluc  (Dexcom G6 ) AGATA  •  Continuous Blood Gluc Sensor (Dexcom G6 Sensor) MISC  •  fluticasone (FLONASE) 50 mcg/act nasal spray  •  gabapentin (NEURONTIN) 400 mg capsule  •  Insulin Glargine Solostar (Lantus SoloStar) 100 UNIT/ML SOPN  •  insulin lispro (HumaLOG KwikPen) 100 units/mL injection pen  •  polyethylene glycol (MiraLax) 17 GM/SCOOP powder  •  sodium picosulfate, magnesium oxide, citric acid (Clenpiq) oral solution  •  testosterone (ANDROGEL) 25 MG/2.5GM (1%) GEL  •  acetaminophen (TYLENOL) 650 mg CR tablet  •  amLODIPine (NORVASC) 10 mg tablet  •  aspirin (ECOTRIN LOW STRENGTH) 81 mg EC tablet  •  B Complex Vitamins (VITAMIN B-COMPLEX PO)  •  cholecalciferol (VITAMIN D3) 1,000 units tablet  •  dicyclomine (BENTYL) 20 mg tablet  •  doxazosin (CARDURA) 4 mg tablet  •  Empagliflozin (Jardiance) 25 MG TABS  •  ferrous gluconate (FERGON) 240 (27 FE) MG tablet  •  glipiZIDE (GLUCOTROL) 5 mg tablet  •  Glucagon (Baqsimi Two Pack) 3 MG/DOSE POWD  •  Glucosamine-Chondroitin 250-200 MG TABS  •  hydrochlorothiazide (HYDRODIURIL) 25 mg tablet  •  lidocaine (Lidoderm) 5 %  •  lisinopril (ZESTRIL) 40 mg tablet  •  loratadine (CLARITIN) 10 mg tablet  •  MULTIPLE VITAMIN PO  •  omeprazole (PriLOSEC) 40 MG capsule  •  other medication, see sig,  •  Probiotic Product (PROBIOTIC-10) CAPS  •  semaglutide, 0.25 or 0.5 mg/dose, (Ozempic, 0.25 or 0.5 MG/DOSE,) 2 mg/3 mL injection pen  •  simvastatin (ZOCOR) 20 mg tablet  •  vitamin E, tocopherol, 400 units capsule    Current Facility-Administered Medications:   •  dextrose 5 % in lactated Ringer's infusion, 50 mL/hr, Intravenous, Continuous    Allergies   Allergen Reactions   • Clonidine Other (See Comments)     Diaphoresis, hot flashes   • Augmentin [Amoxicillin-Pot Clavulanate] Abdominal Pain   • Biaxin [Clarithromycin] Abdominal Pain   • Peanut (Diagnostic) - Food Allergy Diarrhea, GI Intolerance and Abdominal Pain   • Sitagliptin-Metformin Hcl Er Diarrhea, GI Intolerance and Abdominal Pain   • Dust Mite Extract Allergic Rhinitis   • Insulin Aspart GI Intolerance     Novolog    • Liraglutide Nausea Only and Abdominal Pain   • Metformin And Related Diarrhea and GI Intolerance   • Molds & Smuts Allergic Rhinitis   • Seasonal Ic [Cholestatin] Headache       Objective     /69   Pulse 61   Temp 97.5 °F (36.4 °C) (Temporal)   Resp 18   SpO2 97%       PHYSICAL EXAM    Gen: NAD  Head: NCAT  CV: RRR  CHEST: Clear  ABD: soft, NT/ND  EXT: no edema      ASSESSMENT/PLAN:  This is a 69y.o. year old male here for EGD & colonoscopy, and he is stable and optimized for his procedure.

## 2023-06-29 PROCEDURE — 88341 IMHCHEM/IMCYTCHM EA ADD ANTB: CPT | Performed by: PATHOLOGY

## 2023-06-29 PROCEDURE — 88313 SPECIAL STAINS GROUP 2: CPT | Performed by: PATHOLOGY

## 2023-06-29 PROCEDURE — 88305 TISSUE EXAM BY PATHOLOGIST: CPT | Performed by: PATHOLOGY

## 2023-06-29 PROCEDURE — 88342 IMHCHEM/IMCYTCHM 1ST ANTB: CPT | Performed by: PATHOLOGY

## 2023-07-03 ENCOUNTER — TELEPHONE (OUTPATIENT)
Dept: PAIN MEDICINE | Facility: CLINIC | Age: 68
End: 2023-07-03

## 2023-07-05 ENCOUNTER — HOSPITAL ENCOUNTER (OUTPATIENT)
Dept: RADIOLOGY | Facility: HOSPITAL | Age: 68
Discharge: HOME | End: 2023-07-05
Attending: RADIOLOGY
Payer: MEDICARE

## 2023-07-05 DIAGNOSIS — K86.89 MASS OF PANCREAS: ICD-10-CM

## 2023-07-05 DIAGNOSIS — K66.8 CALCIFIED MESENTERIC MASS: ICD-10-CM

## 2023-07-05 PROCEDURE — 74160 CT ABDOMEN W/CONTRAST: CPT

## 2023-07-05 PROCEDURE — G1004 CDSM NDSC: HCPCS

## 2023-07-05 RX ADMIN — IOHEXOL 100 ML: 350 INJECTION, SOLUTION INTRAVENOUS at 14:17

## 2023-07-07 RX ORDER — SODIUM CHLORIDE 9 MG/ML
75 INJECTION, SOLUTION INTRAVENOUS CONTINUOUS
OUTPATIENT
Start: 2023-07-07

## 2023-07-07 NOTE — PRE-PROCEDURE INSTRUCTIONS
Pre-procedure Instructions for Interventional Radiology  [address] [address] [place]  [phone]    You are scheduled for a/an abdominal mass biopsy    On Friday 7-14-23. Your tentative arrival time is 0800. Short stay will notify you the day before your procedure with the exact arrival time and the location to arrive. To prepare for your procedure:  1. Please arrange for someone to drive you home after the procedure and stay with you until the next morning if you are instructed to do so. This is typically for patients receiving some type of sedative or anesthetic for the procedure. 2. DO NOT EAT OR DRINK ANYTHING after midnight on the evening before your procedure including candy & gum.  3. ONLY SIPS OF WATER with your medications are allowed on the morning of your procedure. 4. TAKE ALL OF YOUR REGULAR MEDICATIONS THE MORNING OF YOUR PROCEDURE with sips of water! We may call you to stop some of your blood sugar, blood pressure and blood thinning medications depending on the procedure. Please take all of these medications unless we instruct you to stop them. 5. If you have an allergy to x-ray dye, please contact Interventional Radiology for an x-ray dye preparation which usually consists of an oral steroid and Benadryl. The day of your procedure:  1. Bring a list of the medications you take at home. 2. Bring medications you take for breathing problems (such as inhalers), medications for chest pain, or both. 3. Bring a case for your glasses or contacts. 4. Bring your insurance card and a form of photo ID.  5. Please leave all valuables such as credit cards and jewelry at home. 6. Report to the registration desk in the main lobby [facility] [place]. Ask to be directed to [facility]. 7. While your procedure is being performed, your family may wait in the Radiology Waiting Room on the 1st floor in Radiology. if they need to leave, they may provide a number to be called following the procedure. 8. Be prepared to stay overnight just in case. Sometimes procedures will indicate the need for further observation or treatment. 9. If you are scheduled for a follow-up visit with the Interventional Radiologist after your procedure, you will be called with a date and time. Special Instructions (Medications to stop taking before your procedure etc.):  Hold Baby ASA. (LD 7-8-23)  Restart 7-15-23. Hold Humalog morning of procedure.

## 2023-07-11 DIAGNOSIS — K21.9 GASTROESOPHAGEAL REFLUX DISEASE WITHOUT ESOPHAGITIS: ICD-10-CM

## 2023-07-11 DIAGNOSIS — I10 BENIGN ESSENTIAL HYPERTENSION: ICD-10-CM

## 2023-07-11 RX ORDER — OMEPRAZOLE 40 MG/1
CAPSULE, DELAYED RELEASE ORAL
Qty: 90 CAPSULE | Refills: 3 | OUTPATIENT
Start: 2023-07-11

## 2023-07-11 RX ORDER — DOXAZOSIN MESYLATE 4 MG/1
TABLET ORAL
Qty: 90 TABLET | Refills: 3 | OUTPATIENT
Start: 2023-07-11

## 2023-07-13 ENCOUNTER — TELEPHONE (OUTPATIENT)
Dept: INTERVENTIONAL RADIOLOGY/VASCULAR | Facility: CLINIC | Age: 68
End: 2023-07-13

## 2023-07-13 NOTE — TELEPHONE ENCOUNTER
Mr Calvin Ivy was on our schedule tomorrow for a mesenteric biopsy. I reviewed his imaging. 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 Referred for percutaneous biopsy. He had a contrast-enhanced CT ordered prior to his biopsy which I reviewed today. The mass encases the proximal branches of the superior mesenteric artery and vein branches and is surrounded by small bowel. I reviewed this CT with several my colleagues and we all agree that the lesion is not only poorly accessible, it is too high risk for percutaneous biopsy due to risk of vascular injury. I relayed this information to Dr. Carlos Killian by [facility] text. I also spoke personally to the patient and explained the reasons for his biopsy cancellation. I also took the liberty of asking our advanced interventional GI colleagues to review the case to see if this lesion could potentially be sampled by EUS.

## 2023-07-14 ENCOUNTER — HOSPITAL ENCOUNTER (OUTPATIENT)
Dept: RADIOLOGY | Facility: HOSPITAL | Age: 68
Discharge: HOME | End: 2023-07-14
Attending: RADIOLOGY

## 2023-07-14 DIAGNOSIS — E23.0 HYPOGONADOTROPIC HYPOGONADISM (HCC): ICD-10-CM

## 2023-07-17 RX ORDER — TESTOSTERONE 12.5 MG/1.25G
GEL TOPICAL
Qty: 180 G | Refills: 1 | Status: SHIPPED | OUTPATIENT
Start: 2023-07-17

## 2023-07-21 ENCOUNTER — TELEPHONE (OUTPATIENT)
Dept: SURGICAL ONCOLOGY | Facility: CLINIC | Age: 68
End: 2023-07-21

## 2023-07-21 ENCOUNTER — TELEPHONE (OUTPATIENT)
Dept: HEMATOLOGY ONCOLOGY | Facility: CLINIC | Age: 68
End: 2023-07-21

## 2023-07-21 NOTE — TELEPHONE ENCOUNTER
Call Transfer   Who are you speaking with? self   If it is not the patient, are they listed on an active communication consent form? self   Who is the patients HemOnc/SurgOnc provider? Denis Pagan   What is the reason for this call? Ir biopsy not done, still come to [facility]? Person/Department that the call was transferred to? Time that call was transferred? ZIYAD Chao 4:27pm 7/21   Your call will be transferred now. If you receive a voicemail, please leave a detailed message and a member of the team will return your call as soon as possible. Did you relay this information to the caller?  yes

## 2023-07-21 NOTE — TELEPHONE ENCOUNTER
Patient called in to report that when he saw Dr. Lili Mora on 5/18, he was scheduled for IR bx on 7/14. That bx was cancelled because the IR doc thought it would be too risky. Patient asking if he should keep appt with Dr. Michel Waldron on 7/31. I encouraged him to keep it, and that I would send a message to his nurse. Patient verbalized understanding.

## 2023-07-21 NOTE — PROGRESS NOTES
Assessment:  1. Chronic pain syndrome    2. Sacroiliitis ([address]) - Right    3. Cervical radiculopathy    4. Lumbar radiculopathy    5. Cervical herniated disc    6. Spondylosis of cervical region without myelopathy or radiculopathy    7. Cervical stenosis of spinal canal        Plan:  1. Based on patient report and physical exam, the patient's symptomatology does seem to be consistent with sacroiliac mediated pain from sacroiliitis. We will schedule the patient for a right SIJ injection to decrease any inflammatory component of the patient's pain symptoms. Complete risks and benefits including bleeding, infection, tissue reaction, nerve injury and allergic reaction were discussed. The patient was agreeable and verbalized an understanding  2. We can repeat right L5 TFESI should the patient's radicular symptoms return in the future  3. Patient may continue gabapentin 400 mg 3 times a day as prescribed. Was refilled today  4. We will avoid NSAIDs secondary to GI effects  5. Patient may continue Tylenol as needed and should not exceed more than 3000 mg in 24 hours  6. Continue with home exercise program  7 patient is nonopioid therapy from our practice secondary to medical marijuana use. 8.  Follow-up pending results of procedure or sooner if needed    History of Present Illness: The patient is a 69 y.o. male last seen on 6/1/2023 who presents for a follow up office visit in regards to chronic right lumbosacral back pain with radiculopathy into the right lower extremity and neck pain that will radiate toward the right trapezius musculature. He denies bowel or bladder incontinence or saddle anesthesia. Patient is status post right L5 TFESI on June 26, 2023 which resolved his lower extremity symptoms however he does continue with right-sided low back pain which she localizes over the SI joint. He continues on gabapentin 400 mg 3 times a day and Tylenol as needed.   Also utilizes medical marijuana    The patient rates his pain a 5 out of 10 on the numeric pain rating scale. He intermittently has pain in the morning and at night which is described as dull aching, sharp and shooting    I have personally reviewed and/or updated the patient's past medical history, past surgical history, family history, social history, current medications, allergies, and vital signs today. Review of Systems:    Review of Systems   Respiratory: Negative for shortness of breath. Cardiovascular: Negative for chest pain. Gastrointestinal: Negative for constipation, diarrhea, nausea and vomiting. Musculoskeletal: Negative for arthralgias, gait problem, joint swelling and myalgias. Skin: Negative for rash. Neurological: Negative for dizziness, seizures and weakness. All other systems reviewed and are negative.         Past Medical History:   Diagnosis Date   • Abdominal pain    • Arthritis     Last assessed 5/24/2013   • Avitaminosis D 2012   • Chronic pain disorder     lumbar-having LESI on 6/26/23   • Colon polyp    • Diabetes mellitus ([address])    • Displacement of cervical intervertebral disc 2014   • GERD (gastroesophageal reflux disease)    • Glaucoma     Normal Pressure Glaucoma   • HTN (hypertension) 2007   • Hypertension    • IBS (irritable bowel syndrome) 2021   • Marijuana use     daily for chronic pain   • Nephrolithiasis 05/24/2013   • Pituitary microadenoma ([address]) 2010   • Spinal stenosis in cervical region 2014   • Sprain and strain of calcaneofibular (ligament) 12/05/2013   • Submandibular lymphadenopathy 08/08/2019   • Uric acid nephrolithiasis 1990       Past Surgical History:   Procedure Laterality Date   • ASPIRATION / INJECTION RENAL CYST      Renal Cyst Aspiration   • CATARACT EXTRACTION      12/2017- right eye, 01/2018- left eye   • COLONOSCOPY  2005   • EYE SURGERY Bilateral     Cataract Surgery   • TONSILLECTOMY     • UPPER GASTROINTESTINAL ENDOSCOPY         Family History   Problem Relation Age of Onset • Diabetes unspecified Mother    • Heart disease Mother    • Nephrolithiasis Mother    • Kidney disease Mother    • Other Mother         Back Disorder   • Thyroid disease Mother    • Diabetes type II Mother    • Hypertension Mother    • Thyroid disease unspecified Mother    • Diabetes unspecified Father    • Heart disease Father    • Hypertension Father    • Diabetes unspecified Sister    • Heart disease Sister    • Stroke Sister    • Diabetes unspecified Brother    • Heart disease Brother    • Nephrolithiasis Brother    • Lung cancer Brother    • Other Brother         COPD   • Diabetes unspecified Sister    • Heart disease Sister    • Diabetes unspecified Sister    • Diabetes unspecified Brother    • Heart disease Brother    • Diabetes unspecified Brother    • Other Brother         Back Disorder   • Diabetes unspecified Brother    • Other Brother         Back Disorder   • Diabetes unspecified Brother    • Stomach cancer Paternal Aunt        Social History     Occupational History   • Occupation: deputy controller   Tobacco Use   • Smoking status: Former     Packs/day: 0.25     Years: 2.00     Total pack years: 0.50     Types: Cigarettes     Start date: 1     Quit date: 1980     Years since quittin.5   • Smokeless tobacco: Never   Vaping Use   • Vaping Use: Never used   Substance and Sexual Activity   • Alcohol use:  Yes     Alcohol/week: 4.0 standard drinks of alcohol     Types: 2 Cans of beer, 2 Standard drinks or equivalent per week     Comment: social   • Drug use: Yes     Frequency: 7.0 times per week     Types: Marijuana     Comment: Use Medical Marijuana daily (1-3 capsules or tincture)   • Sexual activity: Yes     Partners: Female     Birth control/protection: Female Sterilization         Current Outpatient Medications:   •  gabapentin (NEURONTIN) 400 mg capsule, Take 1 capsule (400 mg total) by mouth 3 (three) times a day, Disp: 270 capsule, Rfl: 0  •  acetaminophen (TYLENOL) 650 mg CR tablet, Take 650 mg by mouth every 8 (eight) hours as needed for mild pain, Disp: , Rfl:   •  amLODIPine (NORVASC) 10 mg tablet, Take 1 tablet (10 mg total) by mouth daily (Patient taking differently: Take 10 mg by mouth daily at bedtime), Disp: 90 tablet, Rfl: 1  •  aspirin (ECOTRIN LOW STRENGTH) 81 mg EC tablet, Take 81 mg by mouth daily , Disp: , Rfl:   •  B Complex Vitamins (VITAMIN B-COMPLEX PO), Take 1 capsule by mouth daily , Disp: , Rfl:   •  BD Pen Needle Harriett U/F 32G X 4 MM MISC, Use 4x per day, Disp: 400 each, Rfl: 3  •  brimonidine-timolol (COMBIGAN) 0.2-0.5 %, Administer 1 drop to both eyes every 12 (twelve) hours, Disp: , Rfl:   •  cholecalciferol (VITAMIN D3) 1,000 units tablet, Take 1,000 Units by mouth 2 (two) times a day , Disp: , Rfl:   •  Continuous Blood Gluc  (Dexcom G6 ) AGATA, Use, Disp: , Rfl:   •  Continuous Blood Gluc Sensor (Dexcom G6 Sensor) MISC, Use, Disp: , Rfl:   •  dicyclomine (BENTYL) 20 mg tablet, Take 1 tablet (20 mg total) by mouth 4 (four) times a day (before meals and at bedtime), Disp: 120 tablet, Rfl: 5  •  doxazosin (CARDURA) 4 mg tablet, Take 1 tablet (4 mg total) by mouth daily (Patient taking differently: Take 4 mg by mouth every morning), Disp: 90 tablet, Rfl: 1  •  Empagliflozin (Jardiance) 25 MG TABS, Take 1 tablet (25 mg total) by mouth daily (Patient taking differently: Take 25 mg by mouth every morning Last dose 6/23), Disp: 90 tablet, Rfl: 3  •  ferrous gluconate (FERGON) 240 (27 FE) MG tablet, Take 1 tablet (240 mg total) by mouth in the morning (Patient taking differently: Take 240 mg by mouth in the morning Last dose 6/20), Disp: 90 tablet, Rfl: 1  •  fluticasone (FLONASE) 50 mcg/act nasal spray, 1 spray into each nostril daily as needed for rhinitis (Acute URI/sinusitis), Disp: 18 mL, Rfl: 0  •  glipiZIDE (GLUCOTROL) 5 mg tablet, Take 1 tablet (5 mg total) by mouth in the morning, Disp: 90 tablet, Rfl: 2  •  Glucagon (Baqsimi Two Pack) 3 MG/DOSE POWD, Use 1 actuation as needed (low blood sugar) into 1 nostril, Disp: 1 each, Rfl: 1  •  Glucosamine-Chondroitin 250-200 MG TABS, Take 1 tablet by mouth 2 (two) times a day, Disp: , Rfl:   •  hydrochlorothiazide (HYDRODIURIL) 25 mg tablet, Take 1 tablet (25 mg total) by mouth daily (Patient taking differently: Take 25 mg by mouth every morning), Disp: 90 tablet, Rfl: 1  •  Insulin Glargine Solostar (Lantus SoloStar) 100 UNIT/ML SOPN, Inject 0.25 mL (25 Units total) under the skin daily at bedtime, Disp: 15 mL, Rfl: 3  •  insulin lispro (HumaLOG KwikPen) 100 units/mL injection pen, Inject per insulin scales up to 30 units per day. (Patient taking differently: Inject per insulin scales up to 30 units per day. --Dexcom system), Disp: 30 mL, Rfl: 3  •  lidocaine (Lidoderm) 5 %, Apply 1 patch topically daily Remove & Discard patch within 12 hours or as directed by MD, Disp: 6 patch, Rfl: 0  •  lisinopril (ZESTRIL) 40 mg tablet, Take 1 tablet (40 mg total) by mouth daily (Patient taking differently: Take 40 mg by mouth every morning), Disp: 90 tablet, Rfl: 1  •  loratadine (CLARITIN) 10 mg tablet, Take 10 mg by mouth daily, Disp: , Rfl:   •  MULTIPLE VITAMIN PO, Take 1 tablet by mouth daily , Disp: , Rfl:   •  omeprazole (PriLOSEC) 40 MG capsule, Take 1 capsule (40 mg total) by mouth daily (Patient taking differently: Take 40 mg by mouth every morning), Disp: 90 capsule, Rfl: 1  •  other medication, see sig,, Medication/product name: Medical Marijuana, Disp: , Rfl:   •  polyethylene glycol (MiraLax) 17 GM/SCOOP powder, 17 g daily, Disp: , Rfl:   •  Probiotic Product (PROBIOTIC-10) CAPS, Take 1 capsule by mouth daily , Disp: , Rfl:   •  simvastatin (ZOCOR) 20 mg tablet, Take 1 tablet (20 mg total) by mouth daily at bedtime, Disp: 90 tablet, Rfl: 3  •  testosterone (ANDROGEL) 25 MG/2.5GM (1%) GEL, PLACE 2 PACKETS ON THE SKIN DAILY, Disp: 180 g, Rfl: 1  •  vitamin E, tocopherol, 400 units capsule, Take 400 Units by mouth 2 (two) times a day , Disp: , Rfl:     Allergies   Allergen Reactions   • Clonidine Other (See Comments)     Diaphoresis, hot flashes   • Augmentin [Amoxicillin-Pot Clavulanate] Abdominal Pain   • Biaxin [Clarithromycin] Abdominal Pain   • Peanut (Diagnostic) - Food Allergy Diarrhea, GI Intolerance and Abdominal Pain   • Sitagliptin-Metformin Hcl Er Diarrhea, GI Intolerance and Abdominal Pain   • Dust Mite Extract Allergic Rhinitis   • Insulin Aspart GI Intolerance     Novolog    • Liraglutide Nausea Only and Abdominal Pain   • Metformin And Related Diarrhea and GI Intolerance   • Molds & Smuts Allergic Rhinitis   • Seasonal Ic [Cholestatin] Headache       Physical Exam:    /59   Pulse 71   Ht 5' 3" (1.6 m)   Wt 65.8 kg (145 lb)   BMI 25.69 kg/m²     Constitutional:normal, well developed, well nourished, alert, in no distress and non-toxic and no overt pain behavior. Eyes:anicteric  HEENT:grossly intact  Neck:supple, symmetric, trachea midline and no masses   Pulmonary:even and unlabored  Cardiovascular:No edema or pitting edema present  Skin:Normal without rashes or lesions and well hydrated  Psychiatric:Mood and affect appropriate  Neurologic:Cranial Nerves II-XII grossly intact  Musculoskeletal:antalgic gait. SI joint tender to palpation. equivocal straight leg raise on the right and negative on the left. Positive Nain's, AP compression and Gaenslen's test on the right    Imaging  [place] spine and pain procedure    (Results Pending)   MRI CERVICAL SPINE WITH AND WITHOUT CONTRAST   INDICATION: M54.12: Radiculopathy, cervical region. COMPARISON: 7/6/2023 and 7/8/2014   TECHNIQUE: Multiplanar, multisequence imaging of the cervical spine was performed before and after gadolinium administration. .   IV Contrast: 6.5 mL of Gadobutrol injection (SINGLE-DOSE)   IMAGE QUALITY: Diagnostic. FINDINGS:   ALIGNMENT: Normal alignment of the cervical spine. No compression fracture. No subluxation. No scoliosis.    MARROW SIGNAL: Normal marrow signal is identified within the visualized bony structures. No discrete marrow lesion. CERVICAL AND VISUALIZED UPPER THORACIC CORD: Normal signal within the visualized cord. PREVERTEBRAL AND PARASPINAL SOFT TISSUES: Normal.   VISUALIZED POSTERIOR FOSSA: The visualized posterior fossa demonstrates no abnormal signal.   CERVICAL DISC SPACES:   C2-C3: There is left-sided uncovertebral spurring. There is facet arthrosis. There is moderate left foraminal narrowing. There is no significant canal stenosis or right foraminal narrowing. C3-C4: There is a disc osteophyte complex with uncovertebral spurring. There is facet arthrosis. There is mild canal stenosis and cord flattening. There is severe left foraminal narrowing. There is no significant right foraminal narrowing. C4-C5: There is a disc osteophyte complex. There is facet arthrosis. There is mild canal stenosis. There is mild foraminal narrowing. C5-C6: There is a disc osteophyte complex with a superimposed left paracentral disc protrusion. There is effacement of the ventral thecal sac with contact of the cord. There is no significant foraminal narrowing. C6-C7: There is a left foraminal disc protrusion. There is mild left foraminal narrowing. There is no significant canal stenosis or right foraminal narrowing. C7-T1: Normal.   UPPER THORACIC DISC SPACES: Normal.   POSTCONTRAST IMAGING: Normal.   OTHER FINDINGS: None. IMPRESSION:   Multilevel cervical spondylosis, as described above. Most notable level is at C3-C4 where multifactorial disease results in overall mild canal stenosis and cord flattening. Severe left foraminal narrowing is present at this level. MRI LUMBAR SPINE WITHOUT CONTRAST   INDICATION: M54.16: Radiculopathy, lumbar region.    COMPARISON: X-ray lumbar spine 3/18/2020   TECHNIQUE: Sagittal T1, sagittal T2, sagittal inversion recovery, axial T1 and axial T2, coronal T2.   IMAGE QUALITY: Diagnostic FINDINGS:   VERTEBRAL BODIES: There are 5 lumbar type vertebral bodies. There is preserved normal lumbar lordosis. No spondylolisthesis. SACRUM: Normal signal within the sacrum. No evidence of insufficiency or stress fracture. Tiny right-sided Tarlov cyst is noted at level L2-3. DISTAL CORD AND CONUS: Normal size and signal within the distal cord and conus. PARASPINAL SOFT TISSUES: Paraspinal soft tissues are unremarkable. Tiny T2 hyperintense liver lesion statistically favoring hepatic cyst. Left renal upper pole cortical cyst.   LOWER THORACIC DISC SPACES: Normal disc height and signal. No disc herniation, canal stenosis or foraminal narrowing. LUMBAR DISC SPACES:   L1-L2: No disc bulge. No canal or foraminal stenosis. L2-L3: No disc bulge. No canal or foraminal stenosis. L3-L4: No disc bulge. No canal or foraminal stenosis. L4-L5: Minimal left foraminal disc protrusion. No canal stenosis. No significant foraminal narrowing. L5-S1: There is mild disc bulge. Right greater than left facet arthropathy. No significant canal stenosis. There is mild to moderate right and mild left foraminal narrowing. IMPRESSION:   1. Mild degenerative changes at levels L4-5 and L5-S1 without significant canal stenosis. 2. Mild to moderate right foraminal narrowing at L5-S1.      Orders Placed This Encounter   Procedures   • [place] spine and pain procedure

## 2023-07-24 ENCOUNTER — OFFICE VISIT (OUTPATIENT)
Dept: PAIN MEDICINE | Facility: CLINIC | Age: 68
End: 2023-07-24
Payer: MEDICARE

## 2023-07-24 VITALS
DIASTOLIC BLOOD PRESSURE: 59 MMHG | HEIGHT: 63 IN | WEIGHT: 145 LBS | BODY MASS INDEX: 25.69 KG/M2 | SYSTOLIC BLOOD PRESSURE: 103 MMHG | HEART RATE: 71 BPM

## 2023-07-24 DIAGNOSIS — M54.12 CERVICAL RADICULOPATHY: ICD-10-CM

## 2023-07-24 DIAGNOSIS — M48.02 CERVICAL STENOSIS OF SPINAL CANAL: ICD-10-CM

## 2023-07-24 DIAGNOSIS — M54.16 LUMBAR RADICULOPATHY: ICD-10-CM

## 2023-07-24 DIAGNOSIS — G89.4 CHRONIC PAIN SYNDROME: Primary | ICD-10-CM

## 2023-07-24 DIAGNOSIS — M46.1 SACROILIITIS (HCC): ICD-10-CM

## 2023-07-24 DIAGNOSIS — M47.812 SPONDYLOSIS OF CERVICAL REGION WITHOUT MYELOPATHY OR RADICULOPATHY: ICD-10-CM

## 2023-07-24 DIAGNOSIS — M50.20 CERVICAL HERNIATED DISC: ICD-10-CM

## 2023-07-24 PROCEDURE — 99214 OFFICE O/P EST MOD 30 MIN: CPT | Performed by: NURSE PRACTITIONER

## 2023-07-24 RX ORDER — GABAPENTIN 400 MG/1
400 CAPSULE ORAL 3 TIMES DAILY
Qty: 270 CAPSULE | Refills: 0 | Status: SHIPPED | OUTPATIENT
Start: 2023-07-24

## 2023-07-24 NOTE — PATIENT INSTRUCTIONS
Sacroiliac Joint Injection   WHAT YOU NEED TO KNOW:   What do I need to know about a sacroiliac joint injection? A sacroiliac (SI) joint injection is done to diagnose or treat pain from sacroiliac joint syndrome. The pain caused by this syndrome may be felt in your lower back, buttocks, groin, and your thigh. How do I prepare for an SI injection? Your healthcare provider will ask you to not take any pain medicine the day of the injection. Ask him or her if there are any other medicines you should not take. You will need to find someone to drive you home after your procedure. What will happen during the SI injection? You will be awake for your injection. You may be given calming medicine if you are anxious. You will lie on your stomach with a pillow under your abdomen. Your healthcare provider will give you an injection of medicine to numb the area. He or she may use an x-ray, ultrasound, or CT scan to find the area to inject. You may also be given an injection of contrast material to help your SI joint show up better. Then, your healthcare provider will inject local anesthesia, antiinflammatory medicine, or both into your SI joint. Healthcare providers will watch you closely for any problems for up to 30 minutes after your injection. Your healthcare provider will check to see if your pain decreases after the injection. What are the risks of an SI injection? You may have some weakness for a short time after your injection. The SI injection can cause bleeding, infection, and pain. It can also cause temporary weakness in your leg and problems urinating. You may have an allergic reaction to the medicine that is injected into your SI joint. Your pain may return and you may need more treatment. CARE AGREEMENT:   You have the right to help plan your care. Learn about your health condition and how it may be treated.  Discuss treatment options with your healthcare providers to decide what care you want to receive. You always have the right to refuse treatment. The above information is an  only. It is not intended as medical advice for individual conditions or treatments. Talk to your doctor, nurse or pharmacist before following any medical regimen to see if it is safe and effective for you. © Copyright Lov[employer]ch 2022 Information is for End User's use only and may not be sold, redistributed or otherwise used for commercial purposes.

## 2023-07-27 ENCOUNTER — TELEPHONE (OUTPATIENT)
Dept: HEMATOLOGY ONCOLOGY | Facility: CLINIC | Age: 68
End: 2023-07-27

## 2023-07-27 NOTE — TELEPHONE ENCOUNTER
Call Transfer   Who are you speaking with? self   If it is not the patient, are they listed on an active communication consent form? self   Who is the patients HemOnc/SurgOnc provider? Akil Luna   What is the reason for this call? Is appmt needed without biopsy? Person/Department that the call was transferred to? Time that call was transferred? Tino Chavez 11:34am 7/27   Your call will be transferred now. If you receive a voicemail, please leave a detailed message and a member of the team will return your call as soon as possible. Did you relay this information to the caller?  yes

## 2023-07-27 NOTE — TELEPHONE ENCOUNTER
Spoke with pt. He stated that he was unable to have the IR biopsy done because it was deemed too risky. Advised pt to keep consult appt with Dr Theron Sheehan for Monday 7/31 to discuss any surgical options.

## 2023-07-31 ENCOUNTER — CONSULT (OUTPATIENT)
Dept: SURGICAL ONCOLOGY | Facility: CLINIC | Age: 68
End: 2023-07-31
Payer: MEDICARE

## 2023-07-31 VITALS
HEART RATE: 62 BPM | OXYGEN SATURATION: 99 % | DIASTOLIC BLOOD PRESSURE: 76 MMHG | WEIGHT: 145 LBS | SYSTOLIC BLOOD PRESSURE: 116 MMHG | TEMPERATURE: 96.6 F | HEIGHT: 63 IN | RESPIRATION RATE: 18 BRPM | BODY MASS INDEX: 25.69 KG/M2

## 2023-07-31 DIAGNOSIS — K66.8 CALCIFIED MESENTERIC MASS: ICD-10-CM

## 2023-07-31 DIAGNOSIS — K63.89 MESENTERIC MASS: Primary | ICD-10-CM

## 2023-07-31 PROCEDURE — 99205 OFFICE O/P NEW HI 60 MIN: CPT | Performed by: STUDENT IN AN ORGANIZED HEALTH CARE EDUCATION/TRAINING PROGRAM

## 2023-07-31 RX ORDER — TRAMADOL HYDROCHLORIDE 50 MG/1
50 TABLET ORAL EVERY 6 HOURS PRN
Qty: 10 TABLET | Refills: 0 | Status: SHIPPED | OUTPATIENT
Start: 2023-07-31

## 2023-07-31 NOTE — PROGRESS NOTES
Surgical Oncology Consultation    [address] [facility]  [facility]  [address] [facility] [place] [id number]    Patient:  Guillermo Crespo  1955  [id number]    Primary Care provider: Vivienne Kim MD  [address] [address]    Referring provider: Gabriela Pink MD  [address] [facility]  [address] [facility],  [address] [facility]    Diagnoses and all orders for this visit:    Mesenteric mass    Calcified mesenteric mass  -     Ambulatory referral to Surgical Oncology        Chief Complaint   Patient presents with   • Consult       No follow-ups on file. Oncology History    No history exists. History of Present Illness  : This is a 59-year-old gentleman seen in consultation today for a mesenteric mass. Briefly, the patient has been having near daily abdominal pain with vague cramping for several months. He describes a general feeling of discomfort daily with occasional sharp pains. He also describes daily nausea with a reduction in his appetite, though he has not had any episodes of vomiting. He states he has intermittent constipation and diarrhea, which appears to be unpredictable. He denies any symptoms of flushing, floating diarrhea, fevers, chills, significant fatigue. The patient underwent work-up with his GI providers, who repeated an EGD and colonoscopy due to his symptomatology which did not reveal a source. He then had a CT scan of the abdomen and pelvis to work-up a previously detected finding from a noncontrasted scan in October 2022 revealing mesenteric nodularity. This demonstrated a vague mesenteric mass infiltrating the small bowel mesentery at the mid SMA. Given its appearance and a lab value of a chromogranin near thousand, the patient underwent a dotatate PET scan. This failed to show significant avidity within the mesenteric mass.   Of note, there was a small focus of moderate avidity at the pancreatic tail with no corresponding CT correlate. The patient was sent for interventional radiology percutaneous biopsy but this was unable to be completed due to the position of the mass. Review of Systems  Complete ROS Surg Onc:   Constitutional: The patient ENDORSES new or recent history of general fatigue, with weight loss, and reduced appetite. Eyes: No complaints of visual problems, no scleral icterus. ENT: No complaints of ear pain, no hoarseness, no difficulty swallowing,  no tinnitus and no new masses in head, oral cavity, or neck. Cardiovascular: No complaints of chest pain, no palpitations, no ankle edema. Respiratory: No complaints of shortness of breath, no cough. Gastrointestinal: No complaints of jaundice, no bloody stools, no pale stools. Genitourinary: No complaints of dysuria, no hematuria, no nocturia, no frequent urination, no urethral discharge. Musculoskeletal: No complaints of weakness, paralysis, joint stiffness or arthralgias. Integumentary: No complaints of rash, no new lesions. Neurological: No complaints of convulsions, no seizures, no dizziness. Hematologic/Lymphatic: No complaints of easy bruising. Endocrine:  No hot or cold intolerance. No polydipsia, polyphagia, or polyuria. Allergy/immunology:  No environmental allergies. No food allergies. Not immunocompromised.       Patient Active Problem List   Diagnosis   • Benign essential hypertension   • Carpal tunnel syndrome   • Cervical herniated disc   • Cervical radiculopathy   • Cervical stenosis of spinal canal   • Type 2 diabetes mellitus with both eyes affected by mild nonproliferative retinopathy without macular edema, with long-term current use of insulin (HCC)   • Hyperlipidemia   • Hypogonadotropic hypogonadism ([address])   • Pituitary microadenoma ([address])   • Obstructive sleep apnea syndrome   • Spondylosis of cervical region without myelopathy or radiculopathy   • Mesenteric mass   • Vitamin D deficiency   • Low back pain with sciatica   • Sacroiliitis ([address])   • Overweight (BMI 25.0-29. 9)   • Gastroesophageal reflux disease without esophagitis   • Chronic pain syndrome   • Salivary gland adenitis   • Arthralgia of right hand   • Lumbar radiculopathy   • Stage 3 chronic kidney disease, unspecified whether stage 3a or 3b CKD (HCC)   • Iron deficiency anemia secondary to inadequate dietary iron intake   • Advanced care planning/counseling discussion   • Calcified mesenteric mass     Past Medical History:   Diagnosis Date   • Abdominal pain    • Arthritis     Last assessed 5/24/2013   • Avitaminosis D 2012   • Chronic pain disorder     lumbar-having LESI on 6/26/23   • Colon polyp    • Diabetes mellitus ([address])    • Displacement of cervical intervertebral disc 2014   • GERD (gastroesophageal reflux disease)    • Glaucoma     Normal Pressure Glaucoma   • HTN (hypertension) 2007   • Hypertension    • IBS (irritable bowel syndrome) 2021   • Marijuana use     daily for chronic pain   • Nephrolithiasis 05/24/2013   • Pituitary microadenoma ([address]) 2010   • Spinal stenosis in cervical region 2014   • Sprain and strain of calcaneofibular (ligament) 12/05/2013   • Submandibular lymphadenopathy 08/08/2019   • Uric acid nephrolithiasis 1990     Past Surgical History:   Procedure Laterality Date   • ASPIRATION / INJECTION RENAL CYST      Renal Cyst Aspiration   • CATARACT EXTRACTION      12/2017- right eye, 01/2018- left eye   • COLONOSCOPY  2005   • EYE SURGERY Bilateral     Cataract Surgery   • TONSILLECTOMY     • UPPER GASTROINTESTINAL ENDOSCOPY       Family History   Problem Relation Age of Onset   • Diabetes unspecified Mother    • Heart disease Mother    • Nephrolithiasis Mother    • Kidney disease Mother    • Other Mother         Back Disorder   • Thyroid disease Mother    • Diabetes type II Mother    • Hypertension Mother    • Thyroid disease unspecified Mother    • Diabetes unspecified Father    • Heart disease Father    • Hypertension Father    • Diabetes unspecified Sister    • Heart disease Sister    • Stroke Sister    • Diabetes unspecified Brother    • Heart disease Brother    • Nephrolithiasis Brother    • Lung cancer Brother    • Other Brother         COPD   • Diabetes unspecified Sister    • Heart disease Sister    • Diabetes unspecified Sister    • Diabetes unspecified Brother    • Heart disease Brother    • Diabetes unspecified Brother    • Other Brother         Back Disorder   • Diabetes unspecified Brother    • Other Brother         Back Disorder   • Diabetes unspecified Brother    • Stomach cancer Paternal Aunt      Social History     Socioeconomic History   • Marital status: /Civil Union     Spouse name: Not on file   • Number of children: Not on file   • Years of education: Not on file   • Highest education level: Not on file   Occupational History   • Occupation:    Tobacco Use   • Smoking status: Former     Packs/day: 0.25     Years: 2.00     Total pack years: 0.50     Types: Cigarettes     Start date: 1     Quit date: 1980     Years since quittin.6   • Smokeless tobacco: Never   Vaping Use   • Vaping Use: Never used   Substance and Sexual Activity   • Alcohol use:  Yes     Alcohol/week: 4.0 standard drinks of alcohol     Types: 2 Cans of beer, 2 Standard drinks or equivalent per week     Comment: social   • Drug use: Yes     Frequency: 7.0 times per week     Types: Marijuana     Comment: Use Medical Marijuana daily (1-3 capsules or tincture)   • Sexual activity: Yes     Partners: Female     Birth control/protection: Female Sterilization   Other Topics Concern   • Not on file   Social History Narrative   • Not on file     Social Determinants of Health     Financial Resource Strain: Low Risk  (2023)    Overall Financial Resource Strain (CARDIA)    • Difficulty of Paying Living Expenses: Not very hard   Food Insecurity: Not on file   Transportation Needs: No Transportation Needs (2023)    [facility] Transportation    • Lack of Transportation (Medical): No    • Lack of Transportation (Non-Medical):  No   Physical Activity: Not on file   Stress: Not on file   Social Connections: Not on file   Intimate Partner Violence: Not on file   Housing Stability: Not on file       Current Outpatient Medications:   •  acetaminophen (TYLENOL) 650 mg CR tablet, Take 650 mg by mouth every 8 (eight) hours as needed for mild pain, Disp: , Rfl:   •  amLODIPine (NORVASC) 10 mg tablet, Take 1 tablet (10 mg total) by mouth daily (Patient taking differently: Take 10 mg by mouth daily at bedtime), Disp: 90 tablet, Rfl: 1  •  aspirin (ECOTRIN LOW STRENGTH) 81 mg EC tablet, Take 81 mg by mouth daily , Disp: , Rfl:   •  B Complex Vitamins (VITAMIN B-COMPLEX PO), Take 1 capsule by mouth daily , Disp: , Rfl:   •  BD Pen Needle Harriett U/F 32G X 4 MM MISC, Use 4x per day, Disp: 400 each, Rfl: 3  •  brimonidine-timolol (COMBIGAN) 0.2-0.5 %, Administer 1 drop to both eyes every 12 (twelve) hours, Disp: , Rfl:   •  cholecalciferol (VITAMIN D3) 1,000 units tablet, Take 1,000 Units by mouth 2 (two) times a day , Disp: , Rfl:   •  Continuous Blood Gluc  (Dexcom G6 ) AGATA, Use, Disp: , Rfl:   •  Continuous Blood Gluc Sensor (Dexcom G6 Sensor) MISC, Use, Disp: , Rfl:   •  dicyclomine (BENTYL) 20 mg tablet, Take 1 tablet (20 mg total) by mouth 4 (four) times a day (before meals and at bedtime), Disp: 120 tablet, Rfl: 5  •  doxazosin (CARDURA) 4 mg tablet, Take 1 tablet (4 mg total) by mouth daily (Patient taking differently: Take 4 mg by mouth every morning), Disp: 90 tablet, Rfl: 1  •  Empagliflozin (Jardiance) 25 MG TABS, Take 1 tablet (25 mg total) by mouth daily (Patient taking differently: Take 25 mg by mouth every morning Last dose 6/23), Disp: 90 tablet, Rfl: 3  •  ferrous gluconate (FERGON) 240 (27 FE) MG tablet, Take 1 tablet (240 mg total) by mouth in the morning (Patient taking differently: Take 240 mg by mouth in the morning Last dose 6/20), Disp: 90 tablet, Rfl: 1  •  fluticasone (FLONASE) 50 mcg/act nasal spray, 1 spray into each nostril daily as needed for rhinitis (Acute URI/sinusitis), Disp: 18 mL, Rfl: 0  •  gabapentin (NEURONTIN) 400 mg capsule, Take 1 capsule (400 mg total) by mouth 3 (three) times a day, Disp: 270 capsule, Rfl: 0  •  Glucagon (Baqsimi Two Pack) 3 MG/DOSE POWD, Use 1 actuation as needed (low blood sugar) into 1 nostril, Disp: 1 each, Rfl: 1  •  Glucosamine-Chondroitin 250-200 MG TABS, Take 1 tablet by mouth 2 (two) times a day, Disp: , Rfl:   •  hydrochlorothiazide (HYDRODIURIL) 25 mg tablet, Take 1 tablet (25 mg total) by mouth daily (Patient taking differently: Take 25 mg by mouth every morning), Disp: 90 tablet, Rfl: 1  •  insulin lispro (HumaLOG KwikPen) 100 units/mL injection pen, Inject per insulin scales up to 30 units per day. (Patient taking differently: Inject per insulin scales up to 30 units per day. --Dexcom system), Disp: 30 mL, Rfl: 3  •  lidocaine (Lidoderm) 5 %, Apply 1 patch topically daily Remove & Discard patch within 12 hours or as directed by MD, Disp: 6 patch, Rfl: 0  •  lisinopril (ZESTRIL) 40 mg tablet, Take 1 tablet (40 mg total) by mouth daily (Patient taking differently: Take 40 mg by mouth every morning), Disp: 90 tablet, Rfl: 1  •  loratadine (CLARITIN) 10 mg tablet, Take 10 mg by mouth daily, Disp: , Rfl:   •  MULTIPLE VITAMIN PO, Take 1 tablet by mouth daily , Disp: , Rfl:   •  omeprazole (PriLOSEC) 40 MG capsule, Take 1 capsule (40 mg total) by mouth daily (Patient taking differently: Take 40 mg by mouth every morning), Disp: 90 capsule, Rfl: 1  •  other medication, see sig,, Medication/product name: Medical Marijuana, Disp: , Rfl:   •  polyethylene glycol (MiraLax) 17 GM/SCOOP powder, 17 g daily, Disp: , Rfl:   •  Probiotic Product (PROBIOTIC-10) CAPS, Take 1 capsule by mouth daily , Disp: , Rfl:   •  simvastatin (ZOCOR) 20 mg tablet, Take 1 tablet (20 mg total) by mouth daily at bedtime, Disp: 90 tablet, Rfl: 3  •  testosterone (ANDROGEL) 25 MG/2.5GM (1%) GEL, PLACE 2 PACKETS ON THE SKIN DAILY, Disp: 180 g, Rfl: 1  •  vitamin E, tocopherol, 400 units capsule, Take 400 Units by mouth 2 (two) times a day , Disp: , Rfl:   •  glipiZIDE (GLUCOTROL) 5 mg tablet, Take 1 tablet (5 mg total) by mouth in the morning, Disp: 90 tablet, Rfl: 2  •  Insulin Glargine Solostar (Lantus SoloStar) 100 UNIT/ML SOPN, Inject 0.25 mL (25 Units total) under the skin daily at bedtime, Disp: 15 mL, Rfl: 3  Allergies   Allergen Reactions   • Clonidine Other (See Comments)     Diaphoresis, hot flashes   • Augmentin [Amoxicillin-Pot Clavulanate] Abdominal Pain   • Biaxin [Clarithromycin] Abdominal Pain   • Peanut (Diagnostic) - Food Allergy Diarrhea, GI Intolerance and Abdominal Pain   • Sitagliptin-Metformin Hcl Er Diarrhea, GI Intolerance and Abdominal Pain   • Dust Mite Extract Allergic Rhinitis   • Insulin Aspart GI Intolerance     Novolog    • Liraglutide Nausea Only and Abdominal Pain   • Metformin And Related Diarrhea and GI Intolerance   • Molds & Smuts Allergic Rhinitis   • Seasonal Ic [Cholestatin] Headache       Vitals:    07/31/23 1509   BP: 116/76   Pulse: 62   Resp: 18   Temp: (!) 96.6 °F (35.9 °C)   SpO2: 99%       Physical Exam   General: Appears well, appears stated age  Skin: Warm, anicteric  HEENT: Normocephalic, atraumatic; sclera aniceteric, mucous membranes moist; cervical nodes without adenopathy  Cardiopulmonary: RRR, Easy WOB, no BLE edema  Abd: Mild distension, soft, nontender, no masses appreciated, no hepatosplenomegaly  MSK: Symmetric, no cyanosis, no overt weakness  Lymphatic: No cervical, axillary or inguinal lymphadenopathy  Neuro: Affect appropriate, no gross motor abnormalities    Labs: Reviewed in EPIC    Imaging  CT abdomen and pelvis w IV and oral contrast    Addendum Date: 7/10/2023 Addendum:   Please note:  On further evaluation, the 5 mm nodule at the caudal margin of the pancreatic tail is considered statistically most likely to represent CT artifact. This leaves no CT abnormality to account for hypermetabolism in the pancreas on most recent PET/CT scan. As that finding was quite convincing, consider further investigation with pancreatic protocol CT to evaluate for pancreatic mass which may be isointense to pancreas on all CT imaging phases. Result Date: 7/10/2023  Narrative: CT ABDOMEN WITH IV CONTRAST INDICATION:   K66.8: Other specified disorders of peritoneum K86.89: Other specified diseases of pancreas. COMPARISON: PET/CT scan performed June 6, 2023 TECHNIQUE:  CT examination of the abdomen was performed after the administration of intravenous contrast. Scanning through the abdomen was performed in arterial, venous and delayed phases according a protocol specifically designed to evaluate upper abdominal viscera. Multiplanar 2D reformatted images were created from the source data. This examination, like all CT scans performed in the [facility], was performed utilizing techniques to minimize radiation dose exposure, including the use of iterative reconstruction and automated exposure control. Radiation dose length  product (DLP) for this visit:  619.68 mGy-cm IV Contrast:  100 mL of iohexol (OMNIPAQUE) Enteric Contrast:  Enteric contrast was administered. FINDINGS: LOWER CHEST:  No clinically significant abnormality identified in the visualized lower chest. LIVER/BILIARY TREE:  Unremarkable. GALLBLADDER:  No calcified gallstones. No pericholecystic inflammatory change. SPLEEN:  Unremarkable. PANCREAS: Very questionable 6 mm hyperdense lesion at the anterior margin of the distal pancreatic tail on image 45 of series 6, not detectable on any other CT imaging. Otherwise no evidence for pancreatic mass. ADRENAL GLANDS: Nonobstructing 4 mm calculus at the lower pole of the right kidney. Small left renal cyst. No solid renal mass. No hydronephrosis. KIDNEYS/URETERS:  Unremarkable. No hydronephrosis. VISUALIZED STOMACH AND BOWEL:  Unremarkable. ABDOMINAL CAVITY: Infiltrative mass in the small bowel mesentery surrounding but not narrowing mesenteric vessels and containing associated calcifications is a somewhat amorphous shape but is estimated at approximately 5.7 x 2.6 x 4.5 cm on images 81 of series 3 and 46 of series 603. Surrounding nodularity reidentified. Borderline retroperitoneal nodes are identified. VESSELS: Prominence of peripheral mesenteric vasculature related to mass infiltrating in the central small bowel mesentery. ABDOMINAL WALL:  Unremarkable. OSSEOUS STRUCTURES:  No acute fracture or destructive osseous lesion. Impression: Tiny 6 mm nodule in the distal pancreatic tail detectable only on delayed phase postcontrast imaging and possibly of no clinical significance. Otherwise no CT findings to account for hypermetabolism in the pancreatic tail. Reidentified infiltrative mass in the mesentery with calcifications and surrounding nodularity highly suspicious for carcinoid spectrum lesion. Workstation performed: [id number]       I independently reviewed and interpreted the above laboratory and imaging data including present and past CT, PET, heme onc and GI evals with EGD, scope, path. Discussion/Summary: This is a 64-year-old gentleman with a mesenteric mass of unknown etiology. At this juncture, the elevated chromogranin may be due to a medical interaction or he may have a false negative PET dotatate scan with this infiltrative mass representing a carcinoid tumor. However, this may also represent a lymphoma, desmoid tumor, or other etiology. The patient's symptoms are relatively nonspecific and may or may not be related to the presence of the mass. At this juncture, given the proximity on the SMA, I like to perform a hand-assisted laparoscopic biopsy.   This will allow for evaluation of resectability should the histology reveal a lesion that meets criteria for resection. Likewise, obtaining a biopsy will allow a tissue diagnosis to better determine next steps. The risks of the procedure have been described to include infection, bleeding, damage to nearby structures as well as the more global risks of MI, stroke, PE, death. The patient is relatively healthy and has been instructed to abstain from all blood thinning agents for 7 days. We will proceed with surgery as above.

## 2023-07-31 NOTE — H&P (VIEW-ONLY)
[address]  [address]  [facility] [address]  [address]  [facility]        1/31/2018  [address] [facility] [address]         Dear Lisa Vega office has made a commitm Surgical Oncology Consultation    [address] [facility]  [facility]  [address] [facility] [place] [address]    Patient:  Daniel Chavis  1955  [id number]    Primary Care provider: Ying Gleason MD  [address] [address]    Referring provider: Kolton Rico MD  [address] [facility]  [address] [facility],  [address] [facility]    Diagnoses and all orders for this visit:    Mesenteric mass    Calcified mesenteric mass  -     Ambulatory referral to Surgical Oncology        Chief Complaint   Patient presents with   • Consult       No follow-ups on file. Oncology History    No history exists. History of Present Illness  : This is a 64-year-old gentleman seen in consultation today for a mesenteric mass. Briefly, the patient has been having near daily abdominal pain with vague cramping for several months. He describes a general feeling of discomfort daily with occasional sharp pains. He also describes daily nausea with a reduction in his appetite, though he has not had any episodes of vomiting. He states he has intermittent constipation and diarrhea, which appears to be unpredictable. He denies any symptoms of flushing, floating diarrhea, fevers, chills, significant fatigue. The patient underwent work-up with his GI providers, who repeated an EGD and colonoscopy due to his symptomatology which did not reveal a source. He then had a CT scan of the abdomen and pelvis to work-up a previously detected finding from a noncontrasted scan in 2022 revealing mesenteric nodularity. This demonstrated a vague mesenteric mass infiltrating the small bowel mesentery at the mid SMA. Given its appearance and a lab value of a chromogranin near thousand, the patient underwent a dotatate PET scan. This failed to show significant avidity within the mesenteric mass.   Of note, there was a small focus of moderate avidity at the pancreatic tail with no corresponding CT correlate. The patient was sent for interventional radiology percutaneous biopsy but this was unable to be completed due to the position of the mass. Review of Systems  Complete ROS Surg Onc:   Constitutional: The patient ENDORSES new or recent history of general fatigue, with weight loss, and reduced appetite. Eyes: No complaints of visual problems, no scleral icterus. ENT: No complaints of ear pain, no hoarseness, no difficulty swallowing,  no tinnitus and no new masses in head, oral cavity, or neck. Cardiovascular: No complaints of chest pain, no palpitations, no ankle edema. Respiratory: No complaints of shortness of breath, no cough. Gastrointestinal: No complaints of jaundice, no bloody stools, no pale stools. Genitourinary: No complaints of dysuria, no hematuria, no nocturia, no frequent urination, no urethral discharge. Musculoskeletal: No complaints of weakness, paralysis, joint stiffness or arthralgias. Integumentary: No complaints of rash, no new lesions. Neurological: No complaints of convulsions, no seizures, no dizziness. Hematologic/Lymphatic: No complaints of easy bruising. Endocrine:  No hot or cold intolerance. No polydipsia, polyphagia, or polyuria. Allergy/immunology:  No environmental allergies. No food allergies. Not immunocompromised.       Patient Active Problem List   Diagnosis   • Benign essential hypertension   • Carpal tunnel syndrome   • Cervical herniated disc   • Cervical radiculopathy   • Cervical stenosis of spinal canal   • Type 2 diabetes mellitus with both eyes affected by mild nonproliferative retinopathy without macular edema, with long-term current use of insulin (HCC)   • Hyperlipidemia   • Hypogonadotropic hypogonadism ([address])   • Pituitary microadenoma ([address])   • Obstructive sleep apnea syndrome   • Spondylosis of cervical region without myelopathy or radiculopathy   • Mesenteric mass   • Vitamin D deficiency   • Low back pain with sciatica   • Sacroiliitis ([address])   • Overweight (BMI 25.0-29. 9)   • Gastroesophageal reflux disease without esophagitis   • Chronic pain syndrome   • Salivary gland adenitis   • Arthralgia of right hand   • Lumbar radiculopathy   • Stage 3 chronic kidney disease, unspecified whether stage 3a or 3b CKD (HCC)   • Iron deficiency anemia secondary to inadequate dietary iron intake   • Advanced care planning/counseling discussion   • Calcified mesenteric mass     Past Medical History:   Diagnosis Date   • Abdominal pain    • Arthritis     Last assessed 5/24/2013   • Avitaminosis D 2012   • Chronic pain disorder     lumbar-having LESI on 6/26/23   • Colon polyp    • Diabetes mellitus ([address])    • Displacement of cervical intervertebral disc 2014   • GERD (gastroesophageal reflux disease)    • Glaucoma     Normal Pressure Glaucoma   • HTN (hypertension) 2007   • Hypertension    • IBS (irritable bowel syndrome) 2021   • Marijuana use     daily for chronic pain   • Nephrolithiasis 05/24/2013   • Pituitary microadenoma ([address]) 2010   • Spinal stenosis in cervical region 2014   • Sprain and strain of calcaneofibular (ligament) 12/05/2013   • Submandibular lymphadenopathy 08/08/2019   • Uric acid nephrolithiasis 1990     Past Surgical History:   Procedure Laterality Date   • ASPIRATION / INJECTION RENAL CYST      Renal Cyst Aspiration   • CATARACT EXTRACTION      12/2017- right eye, 01/2018- left eye   • COLONOSCOPY  2005   • EYE SURGERY Bilateral     Cataract Surgery   • TONSILLECTOMY     • UPPER GASTROINTESTINAL ENDOSCOPY       Family History   Problem Relation Age of Onset   • Diabetes unspecified Mother    • Heart disease Mother    • Nephrolithiasis Mother    • Kidney disease Mother    • Other Mother         Back Disorder   • Thyroid disease Mother    • Diabetes type II Mother    • Hypertension Mother    • Thyroid disease unspecified Mother    • Diabetes unspecified Father    • Heart disease Father    • Hypertension Father    • Diabetes unspecified Sister    • Heart disease Sister    • Stroke Sister    • Diabetes unspecified Brother    • Heart disease Brother    • Nephrolithiasis Brother    • Lung cancer Brother    • Other Brother         COPD   • Diabetes unspecified Sister    • Heart disease Sister    • Diabetes unspecified Sister    • Diabetes unspecified Brother    • Heart disease Brother    • Diabetes unspecified Brother    • Other Brother         Back Disorder   • Diabetes unspecified Brother    • Other Brother         Back Disorder   • Diabetes unspecified Brother    • Stomach cancer Paternal Aunt      Social History     Socioeconomic History   • Marital status: /Civil Union     Spouse name: Not on file   • Number of children: Not on file   • Years of education: Not on file   • Highest education level: Not on file   Occupational History   • Occupation:    Tobacco Use   • Smoking status: Former     Packs/day: 0.25     Years: 2.00     Total pack years: 0.50     Types: Cigarettes     Start date: 1     Quit date: 1980     Years since quittin.6   • Smokeless tobacco: Never   Vaping Use   • Vaping Use: Never used   Substance and Sexual Activity   • Alcohol use:  Yes     Alcohol/week: 4.0 standard drinks of alcohol     Types: 2 Cans of beer, 2 Standard drinks or equivalent per week     Comment: social   • Drug use: Yes     Frequency: 7.0 times per week     Types: Marijuana     Comment: Use Medical Marijuana daily (1-3 capsules or tincture)   • Sexual activity: Yes     Partners: Female     Birth control/protection: Female Sterilization   Other Topics Concern   • Not on file   Social History Narrative   • Not on file     Social Determinants of Health     Financial Resource Strain: Low Risk  (2023)    Overall Financial Resource Strain (CARDIA)    • Difficulty of Paying Living Expenses: Not very hard   Food Insecurity: Not on file   Transportation Needs: No Transportation Needs (2023)    Sharri Moore Transportation    • Lack of Transportation (Medical): No    • Lack of Transportation (Non-Medical):  No   Physical Activity: Not on file   Stress: Not on file   Social Connections: Not on file   Intimate Partner Violence: Not on file   Housing Stability: Not on file       Current Outpatient Medications:   •  acetaminophen (TYLENOL) 650 mg CR tablet, Take 650 mg by mouth every 8 (eight) hours as needed for mild pain, Disp: , Rfl:   •  amLODIPine (NORVASC) 10 mg tablet, Take 1 tablet (10 mg total) by mouth daily (Patient taking differently: Take 10 mg by mouth daily at bedtime), Disp: 90 tablet, Rfl: 1  •  aspirin (ECOTRIN LOW STRENGTH) 81 mg EC tablet, Take 81 mg by mouth daily , Disp: , Rfl:   •  B Complex Vitamins (VITAMIN B-COMPLEX PO), Take 1 capsule by mouth daily , Disp: , Rfl:   •  BD Pen Needle Harriett U/F 32G X 4 MM MISC, Use 4x per day, Disp: 400 each, Rfl: 3  •  brimonidine-timolol (COMBIGAN) 0.2-0.5 %, Administer 1 drop to both eyes every 12 (twelve) hours, Disp: , Rfl:   •  cholecalciferol (VITAMIN D3) 1,000 units tablet, Take 1,000 Units by mouth 2 (two) times a day , Disp: , Rfl:   •  Continuous Blood Gluc  (Dexcom G6 ) AGATA, Use, Disp: , Rfl:   •  Continuous Blood Gluc Sensor (Dexcom G6 Sensor) MISC, Use, Disp: , Rfl:   •  dicyclomine (BENTYL) 20 mg tablet, Take 1 tablet (20 mg total) by mouth 4 (four) times a day (before meals and at bedtime), Disp: 120 tablet, Rfl: 5  •  doxazosin (CARDURA) 4 mg tablet, Take 1 tablet (4 mg total) by mouth daily (Patient taking differently: Take 4 mg by mouth every morning), Disp: 90 tablet, Rfl: 1  •  Empagliflozin (Jardiance) 25 MG TABS, Take 1 tablet (25 mg total) by mouth daily (Patient taking differently: Take 25 mg by mouth every morning Last dose 6/23), Disp: 90 tablet, Rfl: 3  •  ferrous gluconate (FERGON) 240 (27 FE) MG tablet, Take 1 tablet (240 mg total) by mouth in the morning (Patient taking differently: Take 240 mg by mouth in the morning Last dose 6/20), Disp: 90 tablet, Rfl: 1  •  fluticasone (FLONASE) 50 mcg/act nasal spray, 1 spray into each nostril daily as needed for rhinitis (Acute URI/sinusitis), Disp: 18 mL, Rfl: 0  •  gabapentin (NEURONTIN) 400 mg capsule, Take 1 capsule (400 mg total) by mouth 3 (three) times a day, Disp: 270 capsule, Rfl: 0  •  Glucagon (Baqsimi Two Pack) 3 MG/DOSE POWD, Use 1 actuation as needed (low blood sugar) into 1 nostril, Disp: 1 each, Rfl: 1  •  Glucosamine-Chondroitin 250-200 MG TABS, Take 1 tablet by mouth 2 (two) times a day, Disp: , Rfl:   •  hydrochlorothiazide (HYDRODIURIL) 25 mg tablet, Take 1 tablet (25 mg total) by mouth daily (Patient taking differently: Take 25 mg by mouth every morning), Disp: 90 tablet, Rfl: 1  •  insulin lispro (HumaLOG KwikPen) 100 units/mL injection pen, Inject per insulin scales up to 30 units per day. (Patient taking differently: Inject per insulin scales up to 30 units per day. --Dexcom system), Disp: 30 mL, Rfl: 3  •  lidocaine (Lidoderm) 5 %, Apply 1 patch topically daily Remove & Discard patch within 12 hours or as directed by MD, Disp: 6 patch, Rfl: 0  •  lisinopril (ZESTRIL) 40 mg tablet, Take 1 tablet (40 mg total) by mouth daily (Patient taking differently: Take 40 mg by mouth every morning), Disp: 90 tablet, Rfl: 1  •  loratadine (CLARITIN) 10 mg tablet, Take 10 mg by mouth daily, Disp: , Rfl:   •  MULTIPLE VITAMIN PO, Take 1 tablet by mouth daily , Disp: , Rfl:   •  omeprazole (PriLOSEC) 40 MG capsule, Take 1 capsule (40 mg total) by mouth daily (Patient taking differently: Take 40 mg by mouth every morning), Disp: 90 capsule, Rfl: 1  •  other medication, see sig,, Medication/product name: Medical Marijuana, Disp: , Rfl:   •  polyethylene glycol (MiraLax) 17 GM/SCOOP powder, 17 g daily, Disp: , Rfl:   •  Probiotic Product (PROBIOTIC-10) CAPS, Take 1 capsule by mouth daily , Disp: , Rfl:   •  simvastatin (ZOCOR) 20 mg tablet, Take 1 tablet (20 mg total) by mouth daily at bedtime, Disp: 90 tablet, Rfl: 3  •  testosterone (ANDROGEL) 25 MG/2.5GM (1%) GEL, PLACE 2 PACKETS ON THE SKIN DAILY, Disp: 180 g, Rfl: 1  •  vitamin E, tocopherol, 400 units capsule, Take 400 Units by mouth 2 (two) times a day , Disp: , Rfl:   •  glipiZIDE (GLUCOTROL) 5 mg tablet, Take 1 tablet (5 mg total) by mouth in the morning, Disp: 90 tablet, Rfl: 2  •  Insulin Glargine Solostar (Lantus SoloStar) 100 UNIT/ML SOPN, Inject 0.25 mL (25 Units total) under the skin daily at bedtime, Disp: 15 mL, Rfl: 3  Allergies   Allergen Reactions   • Clonidine Other (See Comments)     Diaphoresis, hot flashes   • Augmentin [Amoxicillin-Pot Clavulanate] Abdominal Pain   • Biaxin [Clarithromycin] Abdominal Pain   • Peanut (Diagnostic) - Food Allergy Diarrhea, GI Intolerance and Abdominal Pain   • Sitagliptin-Metformin Hcl Er Diarrhea, GI Intolerance and Abdominal Pain   • Dust Mite Extract Allergic Rhinitis   • Insulin Aspart GI Intolerance     Novolog    • Liraglutide Nausea Only and Abdominal Pain   • Metformin And Related Diarrhea and GI Intolerance   • Molds & Smuts Allergic Rhinitis   • Seasonal Ic [Cholestatin] Headache       Vitals:    07/31/23 1509   BP: 116/76   Pulse: 62   Resp: 18   Temp: (!) 96.6 °F (35.9 °C)   SpO2: 99%       Physical Exam   General: Appears well, appears stated age  Skin: Warm, anicteric  HEENT: Normocephalic, atraumatic; sclera aniceteric, mucous membranes moist; cervical nodes without adenopathy  Cardiopulmonary: RRR, Easy WOB, no BLE edema  Abd: Mild distension, soft, nontender, no masses appreciated, no hepatosplenomegaly  MSK: Symmetric, no cyanosis, no overt weakness  Lymphatic: No cervical, axillary or inguinal lymphadenopathy  Neuro: Affect appropriate, no gross motor abnormalities    Labs: Reviewed in EPIC    Imaging  CT abdomen and pelvis w IV and oral contrast    Addendum Date: 7/10/2023 Addendum:   Please note:  On further evaluation, the 5 mm nodule at the caudal margin of the pancreatic tail is considered statistically most likely to represent CT artifact. This leaves no CT abnormality to account for hypermetabolism in the pancreas on most recent PET/CT scan. As that finding was quite convincing, consider further investigation with pancreatic protocol CT to evaluate for pancreatic mass which may be isointense to pancreas on all CT imaging phases. Result Date: 7/10/2023  Narrative: CT ABDOMEN WITH IV CONTRAST INDICATION:   K66.8: Other specified disorders of peritoneum K86.89: Other specified diseases of pancreas. COMPARISON: PET/CT scan performed June 6, 2023 TECHNIQUE:  CT examination of the abdomen was performed after the administration of intravenous contrast. Scanning through the abdomen was performed in arterial, venous and delayed phases according a protocol specifically designed to evaluate upper abdominal viscera. Multiplanar 2D reformatted images were created from the source data. This examination, like all CT scans performed in the [facility], was performed utilizing techniques to minimize radiation dose exposure, including the use of iterative reconstruction and automated exposure control. Radiation dose length  product (DLP) for this visit:  619.68 mGy-cm IV Contrast:  100 mL of iohexol (OMNIPAQUE) Enteric Contrast:  Enteric contrast was administered. FINDINGS: LOWER CHEST:  No clinically significant abnormality identified in the visualized lower chest. LIVER/BILIARY TREE:  Unremarkable. GALLBLADDER:  No calcified gallstones. No pericholecystic inflammatory change. SPLEEN:  Unremarkable. PANCREAS: Very questionable 6 mm hyperdense lesion at the anterior margin of the distal pancreatic tail on image 45 of series 6, not detectable on any other CT imaging. Otherwise no evidence for pancreatic mass. ADRENAL GLANDS: Nonobstructing 4 mm calculus at the lower pole of the right kidney. Small left renal cyst. No solid renal mass. No hydronephrosis. KIDNEYS/URETERS:  Unremarkable. No hydronephrosis. VISUALIZED STOMACH AND BOWEL:  Unremarkable. ABDOMINAL CAVITY: Infiltrative mass in the small bowel mesentery surrounding but not narrowing mesenteric vessels and containing associated calcifications is a somewhat amorphous shape but is estimated at approximately 5.7 x 2.6 x 4.5 cm on images 81 of series 3 and 46 of series 603. Surrounding nodularity reidentified. Borderline retroperitoneal nodes are identified. VESSELS: Prominence of peripheral mesenteric vasculature related to mass infiltrating in the central small bowel mesentery. ABDOMINAL WALL:  Unremarkable. OSSEOUS STRUCTURES:  No acute fracture or destructive osseous lesion. Impression: Tiny 6 mm nodule in the distal pancreatic tail detectable only on delayed phase postcontrast imaging and possibly of no clinical significance. Otherwise no CT findings to account for hypermetabolism in the pancreatic tail. Reidentified infiltrative mass in the mesentery with calcifications and surrounding nodularity highly suspicious for carcinoid spectrum lesion. Workstation performed: [id number]       I independently reviewed and interpreted the above laboratory and imaging data including present and past CT, PET, heme onc and GI evals with EGD, scope, path. Discussion/Summary: This is a 49-year-old gentleman with a mesenteric mass of unknown etiology. At this juncture, the elevated chromogranin may be due to a medical interaction or he may have a false negative PET dotatate scan with this infiltrative mass representing a carcinoid tumor. However, this may also represent a lymphoma, desmoid tumor, or other etiology. The patient's symptoms are relatively nonspecific and may or may not be related to the presence of the mass. At this juncture, given the proximity on the SMA, I like to perform a hand-assisted laparoscopic biopsy.   This will allow for evaluation of resectability should the histology reveal a lesion that meets criteria for resection. Likewise, obtaining a biopsy will allow a tissue diagnosis to better determine next steps. The risks of the procedure have been described to include infection, bleeding, damage to nearby structures as well as the more global risks of MI, stroke, PE, death. The patient is relatively healthy and has been instructed to abstain from all blood thinning agents for 7 days. We will proceed with surgery as above.

## 2023-08-08 ENCOUNTER — OFFICE VISIT (OUTPATIENT)
Dept: LAB | Age: 68
End: 2023-08-08
Payer: MEDICARE

## 2023-08-08 ENCOUNTER — APPOINTMENT (OUTPATIENT)
Dept: RADIOLOGY | Age: 68
End: 2023-08-08
Payer: MEDICARE

## 2023-08-08 ENCOUNTER — APPOINTMENT (OUTPATIENT)
Dept: LAB | Age: 68
End: 2023-08-08
Payer: MEDICARE

## 2023-08-08 DIAGNOSIS — Z41.9 SURGERY, ELECTIVE: ICD-10-CM

## 2023-08-08 DIAGNOSIS — K66.8 CALCIFIED MESENTERIC MASS: ICD-10-CM

## 2023-08-08 LAB
ABO GROUP BLD: NORMAL
BASOPHILS # BLD AUTO: 0.03 THOUSANDS/ÂΜL (ref 0–0.1)
BASOPHILS NFR BLD AUTO: 0 % (ref 0–1)
BLD GP AB SCN SERPL QL: NEGATIVE
EOSINOPHIL # BLD AUTO: 0.31 THOUSAND/ÂΜL (ref 0–0.61)
EOSINOPHIL NFR BLD AUTO: 3 % (ref 0–6)
ERYTHROCYTE [DISTWIDTH] IN BLOOD BY AUTOMATED COUNT: 15.4 % (ref 11.6–15.1)
HCT VFR BLD AUTO: 43.9 % (ref 36.5–49.3)
HGB BLD-MCNC: 15.1 G/DL (ref 12–17)
IMM GRANULOCYTES # BLD AUTO: 0.05 THOUSAND/UL (ref 0–0.2)
IMM GRANULOCYTES NFR BLD AUTO: 0 % (ref 0–2)
LYMPHOCYTES # BLD AUTO: 3.42 THOUSANDS/ÂΜL (ref 0.6–4.47)
LYMPHOCYTES NFR BLD AUTO: 29 % (ref 14–44)
MCH RBC QN AUTO: 30.2 PG (ref 26.8–34.3)
MCHC RBC AUTO-ENTMCNC: 34.4 G/DL (ref 31.4–37.4)
MCV RBC AUTO: 88 FL (ref 82–98)
MONOCYTES # BLD AUTO: 0.91 THOUSAND/ÂΜL (ref 0.17–1.22)
MONOCYTES NFR BLD AUTO: 8 % (ref 4–12)
NEUTROPHILS # BLD AUTO: 7.17 THOUSANDS/ÂΜL (ref 1.85–7.62)
NEUTS SEG NFR BLD AUTO: 60 % (ref 43–75)
NRBC BLD AUTO-RTO: 0 /100 WBCS
PLATELET # BLD AUTO: 354 THOUSANDS/UL (ref 149–390)
PMV BLD AUTO: 10 FL (ref 8.9–12.7)
RBC # BLD AUTO: 5 MILLION/UL (ref 3.88–5.62)
RH BLD: POSITIVE
SPECIMEN EXPIRATION DATE: NORMAL
WBC # BLD AUTO: 11.89 THOUSAND/UL (ref 4.31–10.16)

## 2023-08-08 PROCEDURE — 71046 X-RAY EXAM CHEST 2 VIEWS: CPT

## 2023-08-08 PROCEDURE — 93005 ELECTROCARDIOGRAM TRACING: CPT

## 2023-08-08 PROCEDURE — 86850 RBC ANTIBODY SCREEN: CPT

## 2023-08-08 PROCEDURE — 86901 BLOOD TYPING SEROLOGIC RH(D): CPT

## 2023-08-08 PROCEDURE — 85025 COMPLETE CBC W/AUTO DIFF WBC: CPT

## 2023-08-08 PROCEDURE — 86900 BLOOD TYPING SEROLOGIC ABO: CPT

## 2023-08-09 LAB
ATRIAL RATE: 68 BPM
P AXIS: 81 DEGREES
PR INTERVAL: 164 MS
QRS AXIS: 22 DEGREES
QRSD INTERVAL: 94 MS
QT INTERVAL: 400 MS
QTC INTERVAL: 425 MS
T WAVE AXIS: 63 DEGREES
VENTRICULAR RATE: 68 BPM

## 2023-08-09 PROCEDURE — 93010 ELECTROCARDIOGRAM REPORT: CPT | Performed by: INTERNAL MEDICINE

## 2023-08-11 ENCOUNTER — ANESTHESIA EVENT (OUTPATIENT)
Dept: PERIOP | Facility: HOSPITAL | Age: 68
End: 2023-08-11
Payer: MEDICARE

## 2023-08-15 ENCOUNTER — RA CDI HCC (OUTPATIENT)
Dept: OTHER | Facility: HOSPITAL | Age: 68
End: 2023-08-15

## 2023-08-15 NOTE — PROGRESS NOTES
[address] [facility] coding opportunities     E11.22     Chart Reviewed number of suggestions sent to Provider: 1     Patients Insurance     Medicare Insurance: [employer]

## 2023-08-18 NOTE — PRE-PROCEDURE INSTRUCTIONS
Pre-Surgery Instructions:   Medication Instructions   • acetaminophen (TYLENOL) 650 mg CR tablet Uses PRN- OK to take day of surgery   • amLODIPine (NORVASC) 10 mg tablet Take day of surgery. • B Complex Vitamins (VITAMIN B-COMPLEX PO) Stop taking 7 days prior to surgery. • brimonidine-timolol (COMBIGAN) 0.2-0.5 % Take day of surgery. • cholecalciferol (VITAMIN D3) 1,000 units tablet Stop taking 7 days prior to surgery. • dicyclomine (BENTYL) 20 mg tablet Hold day of surgery. • doxazosin (CARDURA) 4 mg tablet Take day of surgery. • Empagliflozin (Jardiance) 25 MG TABS Stop taking 4 days prior to surgery. • ferrous gluconate (FERGON) 240 (27 FE) MG tablet Hold day of surgery. • gabapentin (NEURONTIN) 400 mg capsule Take day of surgery. • glipiZIDE (GLUCOTROL) 5 mg tablet Hold day of surgery. • Glucosamine-Chondroitin 250-200 MG TABS Stop taking 7 days prior to surgery. • hydrochlorothiazide (HYDRODIURIL) 25 mg tablet Hold day of surgery. • Insulin Glargine Solostar (Lantus SoloStar) 100 UNIT/ML SOPN Take night before surgery   • insulin lispro (HumaLOG KwikPen) 100 units/mL injection pen Hold day of surgery. • lisinopril (ZESTRIL) 40 mg tablet Hold day of surgery. • loratadine (CLARITIN) 10 mg tablet Take day of surgery. • MULTIPLE VITAMIN PO Stop taking 7 days prior to surgery. • omeprazole (PriLOSEC) 40 MG capsule Take day of surgery. • other medication, see sig, Hold day of surgery. • polyethylene glycol (MiraLax) 17 GM/SCOOP powder Hold day of surgery. • Probiotic Product (PROBIOTIC-10) CAPS Hold day of surgery. • simvastatin (ZOCOR) 20 mg tablet Take day of surgery. • testosterone (ANDROGEL) 25 MG/2.5GM (1%) GEL Hold after surgical shower   • vitamin E, tocopherol, 400 units capsule Stop taking 7 days prior to surgery. Medication instructions for day surgery reviewed. Please use only a sip of water to take your instructed medications.  Avoid all over the counter vitamins, supplements and NSAIDS starting today. Tylenol is ok to take as needed. You will receive a call one business day prior to surgery with an arrival time and hospital directions. If your surgery is scheduled on a Monday, the hospital will be calling you on the Friday prior to your surgery. If you have not heard from anyone by 8pm, please call the hospital supervisor through the hospital  at [phone]. Jose Diaz [phone]). Do not eat or drink anything after midnight the night before your surgery, including candy, mints, lifesavers, or chewing gum. Do not drink alcohol 24hrs before your surgery. Try not to smoke at least 24hrs before your surgery. Follow the pre surgery showering instructions as listed in the [facility] Surgical Experience Booklet” or otherwise provided by your surgeon's office. Do not shave the surgical area 24 hours before surgery. Do not apply any lotions, creams, including makeup, cologne, deodorant, or perfumes after showering on the day of your surgery. No contact lenses, eye make-up, or artificial eyelashes. Remove nail polish, including gel polish, and any artificial, gel, or acrylic nails if possible. Remove all jewelry including rings and body piercing jewelry. Wear causal clothing that is easy to take on and off. Consider your type of surgery. Keep any valuables, jewelry, piercings at home. Please bring any specially ordered equipment (sling, braces) if indicated. Arrange for a responsible person to drive you to and from the hospital on the day of your surgery. Visitor Guidelines discussed. Call the surgeon's office with any new illnesses, exposures, or additional questions prior to surgery. Please reference your [facility] Surgical Experience Booklet” for additional information to prepare for your upcoming surgery.

## 2023-08-21 ENCOUNTER — OFFICE VISIT (OUTPATIENT)
Dept: INTERNAL MEDICINE CLINIC | Facility: OTHER | Age: 68
End: 2023-08-21
Payer: MEDICARE

## 2023-08-21 VITALS
HEART RATE: 73 BPM | WEIGHT: 142 LBS | OXYGEN SATURATION: 99 % | DIASTOLIC BLOOD PRESSURE: 60 MMHG | BODY MASS INDEX: 25.16 KG/M2 | HEIGHT: 63 IN | SYSTOLIC BLOOD PRESSURE: 110 MMHG | TEMPERATURE: 98 F

## 2023-08-21 DIAGNOSIS — N18.30 STAGE 3 CHRONIC KIDNEY DISEASE, UNSPECIFIED WHETHER STAGE 3A OR 3B CKD (HCC): ICD-10-CM

## 2023-08-21 DIAGNOSIS — J44.9 CHRONIC OBSTRUCTIVE PULMONARY DISEASE, UNSPECIFIED COPD TYPE (HCC): ICD-10-CM

## 2023-08-21 DIAGNOSIS — E66.3 OVERWEIGHT (BMI 25.0-29.9): ICD-10-CM

## 2023-08-21 DIAGNOSIS — Z79.4 TYPE 2 DIABETES MELLITUS WITH HYPERGLYCEMIA, WITH LONG-TERM CURRENT USE OF INSULIN (HCC): ICD-10-CM

## 2023-08-21 DIAGNOSIS — I10 BENIGN ESSENTIAL HYPERTENSION: ICD-10-CM

## 2023-08-21 DIAGNOSIS — K21.9 GASTROESOPHAGEAL REFLUX DISEASE WITHOUT ESOPHAGITIS: ICD-10-CM

## 2023-08-21 DIAGNOSIS — K63.89 MESENTERIC MASS: ICD-10-CM

## 2023-08-21 DIAGNOSIS — E11.65 TYPE 2 DIABETES MELLITUS WITH HYPERGLYCEMIA, WITH LONG-TERM CURRENT USE OF INSULIN (HCC): ICD-10-CM

## 2023-08-21 DIAGNOSIS — E78.2 MIXED HYPERLIPIDEMIA: ICD-10-CM

## 2023-08-21 DIAGNOSIS — E11.3293 TYPE 2 DIABETES MELLITUS WITH BOTH EYES AFFECTED BY MILD NONPROLIFERATIVE RETINOPATHY WITHOUT MACULAR EDEMA, WITH LONG-TERM CURRENT USE OF INSULIN (HCC): Primary | ICD-10-CM

## 2023-08-21 DIAGNOSIS — Z79.4 TYPE 2 DIABETES MELLITUS WITH BOTH EYES AFFECTED BY MILD NONPROLIFERATIVE RETINOPATHY WITHOUT MACULAR EDEMA, WITH LONG-TERM CURRENT USE OF INSULIN (HCC): Primary | ICD-10-CM

## 2023-08-21 PROCEDURE — 99214 OFFICE O/P EST MOD 30 MIN: CPT | Performed by: INTERNAL MEDICINE

## 2023-08-21 RX ORDER — HYDROCHLOROTHIAZIDE 25 MG/1
25 TABLET ORAL EVERY MORNING
Qty: 90 TABLET | Refills: 1 | Status: SHIPPED | OUTPATIENT
Start: 2023-08-21

## 2023-08-21 RX ORDER — SIMVASTATIN 20 MG
20 TABLET ORAL
Qty: 90 TABLET | Refills: 3 | Status: SHIPPED | OUTPATIENT
Start: 2023-08-21

## 2023-08-21 RX ORDER — DOXAZOSIN MESYLATE 4 MG/1
4 TABLET ORAL EVERY MORNING
Qty: 90 TABLET | Refills: 1 | Status: SHIPPED | OUTPATIENT
Start: 2023-08-21

## 2023-08-21 RX ORDER — AMLODIPINE BESYLATE 10 MG/1
10 TABLET ORAL
Qty: 90 TABLET | Refills: 1 | Status: SHIPPED | OUTPATIENT
Start: 2023-08-21

## 2023-08-21 RX ORDER — LISINOPRIL 40 MG/1
40 TABLET ORAL EVERY MORNING
Qty: 90 TABLET | Refills: 1 | Status: SHIPPED | OUTPATIENT
Start: 2023-08-21

## 2023-08-21 RX ORDER — OMEPRAZOLE 40 MG/1
40 CAPSULE, DELAYED RELEASE ORAL EVERY MORNING
Qty: 90 CAPSULE | Refills: 1 | Status: SHIPPED | OUTPATIENT
Start: 2023-08-21

## 2023-08-21 NOTE — PROGRESS NOTES
Assessment/Plan:    Type 2 diabetes mellitus with both eyes affected by mild nonproliferative retinopathy without macular edema, with long-term current use of insulin ([address])  Controlled, continue f/u with endocrinology. continue current diabetic regimen. Lab Results   Component Value Date    HGBA1C 5.6 05/02/2023       Benign essential hypertension  Controlled, continue current antihypertensive regimen. Stage 3 chronic kidney disease, unspecified whether stage 3a or 3b CKD ([facility])  Lab Results   Component Value Date    EGFR 63 08/08/2023    EGFR 61 06/09/2023    EGFR 59 01/24/2023    CREATININE 1.17 08/08/2023    CREATININE 1.20 06/09/2023    CREATININE 1.23 01/24/2023   continue to trend kidney function. Mesenteric mass  Scheduled for diagnostic laparoscopy this Thursday. Hyperlipidemia  Continue statin therapy. Diagnoses and all orders for this visit:    Type 2 diabetes mellitus with both eyes affected by mild nonproliferative retinopathy without macular edema, with long-term current use of insulin ([address])    Type 2 diabetes mellitus with hyperglycemia, with long-term current use of insulin ([facility])  -     amLODIPine (NORVASC) 10 mg tablet; Take 1 tablet (10 mg total) by mouth daily at bedtime  -     lisinopril (ZESTRIL) 40 mg tablet; Take 1 tablet (40 mg total) by mouth every morning  -     simvastatin (ZOCOR) 20 mg tablet; Take 1 tablet (20 mg total) by mouth daily at bedtime    Benign essential hypertension  -     doxazosin (CARDURA) 4 mg tablet; Take 1 tablet (4 mg total) by mouth every morning  -     hydrochlorothiazide (HYDRODIURIL) 25 mg tablet; Take 1 tablet (25 mg total) by mouth every morning    Gastroesophageal reflux disease without esophagitis  -     omeprazole (PriLOSEC) 40 MG capsule;  Take 1 capsule (40 mg total) by mouth every morning    Chronic obstructive pulmonary disease, unspecified COPD type ([address])    Stage 3 chronic kidney disease, unspecified whether stage 3a or 3b CKD ([address])    Overweight (BMI 25.0-29. 9)    Mesenteric mass    Mixed hyperlipidemia          BMI Counseling: Body mass index is 25.15 kg/m². The BMI is above normal. Nutrition recommendations include decreasing portion sizes, encouraging healthy choices of fruits and vegetables, decreasing fast food intake, consuming healthier snacks, limiting drinks that contain sugar, moderation in carbohydrate intake, increasing intake of lean protein, reducing intake of saturated and trans fat and reducing intake of cholesterol. Exercise recommendations include moderate physical activity 150 minutes/week and exercising 3-5 times per week. No pharmacotherapy was ordered. Patient referred to PCP. Rationale for BMI follow-up plan is due to patient being overweight or obese. Depression Screening and Follow-up Plan: Patient was screened for depression during today's encounter. They screened negative with a PHQ-2 score of 0. Subjective:      Patient ID: Germán Landaverde is a 69 y.o. male. Chief Complaint   Patient presents with   • Follow-up     6 month follow up        69year old male is seen today for follow up of chronic conditions. Labs reviewed: CBC shows mild elevation in WBC (11.2), otherwise normal range. CMP, lipid panel, and PSA are within acceptable range. He has a diagnostic laparoscopy scheduled this Thursday for a calcified mesenteric mass. He has been compliant with hismedication regimen. He has no other complaints or concerns at this time. Hypertension  This is a chronic problem. The current episode started more than 1 year ago. The problem is controlled. Pertinent negatives include no chest pain, headaches, palpitations or shortness of breath. Past treatments include calcium channel blockers, direct vasodilators, ACE inhibitors and diuretics. The current treatment provides moderate improvement. There are no compliance problems. Hyperlipidemia  This is a chronic problem.  The current episode started more than 1 year ago. The problem is controlled. Recent lipid tests were reviewed and are normal. Pertinent negatives include no chest pain or shortness of breath. Current antihyperlipidemic treatment includes statins. The current treatment provides moderate improvement of lipids. There are no compliance problems. Diabetes  He presents for his follow-up diabetic visit. He has type 2 diabetes mellitus. His disease course has been stable. There are no hypoglycemic associated symptoms. Pertinent negatives for hypoglycemia include no dizziness or headaches. There are no diabetic associated symptoms. Pertinent negatives for diabetes include no chest pain, no fatigue and no weakness. Symptoms are stable. Current diabetic treatment includes diet, insulin injections and oral agent (monotherapy). He is compliant with treatment all of the time. An ACE inhibitor/angiotensin II receptor blocker is being taken. The following portions of the patient's history were reviewed and updated as appropriate: allergies, current medications, past family history, past medical history, past social history, past surgical history and problem list.    Review of Systems   Constitutional: Negative for activity change, appetite change, chills, diaphoresis, fatigue and fever. HENT: Negative for congestion, postnasal drip, rhinorrhea, sinus pressure, sinus pain, sneezing and sore throat. Eyes: Negative for visual disturbance. Respiratory: Negative for apnea, cough, choking, chest tightness, shortness of breath and wheezing. Cardiovascular: Negative for chest pain, palpitations and leg swelling. Gastrointestinal: Negative for abdominal distention, abdominal pain, anal bleeding, blood in stool, constipation, diarrhea, nausea and vomiting. Endocrine: Negative for cold intolerance and heat intolerance. Genitourinary: Negative for difficulty urinating, dysuria and hematuria. Musculoskeletal: Negative. Skin: Negative. Neurological: Negative for dizziness, weakness, light-headedness, numbness and headaches. Hematological: Negative for adenopathy. Psychiatric/Behavioral: Negative for agitation, sleep disturbance and suicidal ideas. All other systems reviewed and are negative.         Past Medical History:   Diagnosis Date   • Abdominal pain    • Allergic    • Arthritis     Last assessed 5/24/2013   • Avitaminosis D 2012   • Chronic kidney disease    • Chronic pain disorder     lumbar-having LESI on 6/26/23   • Colon polyp    • CPAP (continuous positive airway pressure) dependence    • Diabetes mellitus (HCC)    • Displacement of cervical intervertebral disc 2014   • Diverticulitis of colon 2021    slight   • GERD (gastroesophageal reflux disease)    • Glaucoma     Normal Pressure Glaucoma   • Headache(784.0) continuing around rt eye   • HTN (hypertension) 2007   • Hypertension    • IBS (irritable bowel syndrome) 2021   • Kidney stone    • Marijuana use     daily for chronic pain   • Nephrolithiasis 05/24/2013   • Pituitary microadenoma ([address]) 2010   • Shortness of breath     started June 2023-only when he bends over-not with activity   • Sleep apnea    • Spinal stenosis in cervical region 2014   • Sprain and strain of calcaneofibular (ligament) 12/05/2013   • Submandibular lymphadenopathy 08/08/2019   • Uric acid nephrolithiasis 1990   • Visual impairment Glaucoma, diabetic retinopothy         Current Outpatient Medications:   •  acetaminophen (TYLENOL) 650 mg CR tablet, Take 650 mg by mouth every 8 (eight) hours as needed for mild pain, Disp: , Rfl:   •  amLODIPine (NORVASC) 10 mg tablet, Take 1 tablet (10 mg total) by mouth daily at bedtime, Disp: 90 tablet, Rfl: 1  •  aspirin (ECOTRIN LOW STRENGTH) 81 mg EC tablet, Take 81 mg by mouth daily , Disp: , Rfl:   •  B Complex Vitamins (VITAMIN B-COMPLEX PO), Take 1 capsule by mouth daily , Disp: , Rfl:   •  BD Pen Needle Harriett U/F 32G X 4 MM MISC, Use 4x per day, Disp: 400 each, Rfl: 3  •  brimonidine-timolol (COMBIGAN) 0.2-0.5 %, Administer 1 drop to both eyes every 12 (twelve) hours, Disp: , Rfl:   •  cholecalciferol (VITAMIN D3) 1,000 units tablet, Take 1,000 Units by mouth 2 (two) times a day , Disp: , Rfl:   •  Continuous Blood Gluc  (Dexcom G6 ) AGATA, Use, Disp: , Rfl:   •  Continuous Blood Gluc Sensor (Dexcom G6 Sensor) MISC, Use, Disp: , Rfl:   •  dicyclomine (BENTYL) 20 mg tablet, Take 1 tablet (20 mg total) by mouth 4 (four) times a day (before meals and at bedtime), Disp: 120 tablet, Rfl: 5  •  doxazosin (CARDURA) 4 mg tablet, Take 1 tablet (4 mg total) by mouth every morning, Disp: 90 tablet, Rfl: 1  •  Empagliflozin (Jardiance) 25 MG TABS, Take 1 tablet (25 mg total) by mouth daily (Patient taking differently: Take 25 mg by mouth every morning Last dose 6/23), Disp: 90 tablet, Rfl: 3  •  ferrous gluconate (FERGON) 240 (27 FE) MG tablet, Take 1 tablet (240 mg total) by mouth in the morning (Patient taking differently: Take 240 mg by mouth in the morning Last dose 6/20), Disp: 90 tablet, Rfl: 1  •  fluticasone (FLONASE) 50 mcg/act nasal spray, 1 spray into each nostril daily as needed for rhinitis (Acute URI/sinusitis), Disp: 18 mL, Rfl: 0  •  gabapentin (NEURONTIN) 400 mg capsule, Take 1 capsule (400 mg total) by mouth 3 (three) times a day, Disp: 270 capsule, Rfl: 0  •  glipiZIDE (GLUCOTROL) 5 mg tablet, Take 1 tablet (5 mg total) by mouth in the morning, Disp: 90 tablet, Rfl: 2  •  Glucagon (Baqsimi Two Pack) 3 MG/DOSE POWD, Use 1 actuation as needed (low blood sugar) into 1 nostril, Disp: 1 each, Rfl: 1  •  Glucosamine-Chondroitin 250-200 MG TABS, Take 1 tablet by mouth 2 (two) times a day, Disp: , Rfl:   •  hydrochlorothiazide (HYDRODIURIL) 25 mg tablet, Take 1 tablet (25 mg total) by mouth every morning, Disp: 90 tablet, Rfl: 1  •  Insulin Glargine Solostar (Lantus SoloStar) 100 UNIT/ML SOPN, Inject 0.25 mL (25 Units total) under the skin daily at bedtime, Disp: 15 mL, Rfl: 3  •  insulin lispro (HumaLOG KwikPen) 100 units/mL injection pen, Inject per insulin scales up to 30 units per day. (Patient taking differently: Inject per insulin scales up to 30 units per day. --Dexcom system), Disp: 30 mL, Rfl: 3  •  lidocaine (Lidoderm) 5 %, Apply 1 patch topically daily Remove & Discard patch within 12 hours or as directed by MD, Disp: 6 patch, Rfl: 0  •  lisinopril (ZESTRIL) 40 mg tablet, Take 1 tablet (40 mg total) by mouth every morning, Disp: 90 tablet, Rfl: 1  •  loratadine (CLARITIN) 10 mg tablet, Take 10 mg by mouth daily, Disp: , Rfl:   •  MULTIPLE VITAMIN PO, Take 1 tablet by mouth daily , Disp: , Rfl:   •  omeprazole (PriLOSEC) 40 MG capsule, Take 1 capsule (40 mg total) by mouth every morning, Disp: 90 capsule, Rfl: 1  •  other medication, see sig,, Medication/product name: Medical Marijuana, Disp: , Rfl:   •  polyethylene glycol (MiraLax) 17 GM/SCOOP powder, 17 g if needed, Disp: , Rfl:   •  Probiotic Product (PROBIOTIC-10) CAPS, Take 1 capsule by mouth daily , Disp: , Rfl:   •  simvastatin (ZOCOR) 20 mg tablet, Take 1 tablet (20 mg total) by mouth daily at bedtime, Disp: 90 tablet, Rfl: 3  •  testosterone (ANDROGEL) 25 MG/2.5GM (1%) GEL, PLACE 2 PACKETS ON THE SKIN DAILY, Disp: 180 g, Rfl: 1  •  traMADol (Ultram) 50 mg tablet, Take 1 tablet (50 mg total) by mouth every 6 (six) hours as needed for moderate pain (Patient taking differently: Take 50 mg by mouth every 6 (six) hours as needed for moderate pain Post op), Disp: 10 tablet, Rfl: 0  •  vitamin E, tocopherol, 400 units capsule, Take 400 Units by mouth 2 (two) times a day , Disp: , Rfl:     Allergies   Allergen Reactions   • Clonidine Other (See Comments)     Diaphoresis, hot flashes   • Augmentin [Amoxicillin-Pot Clavulanate] Abdominal Pain   • Biaxin [Clarithromycin] Abdominal Pain   • Peanut (Diagnostic) - Food Allergy Diarrhea, GI Intolerance and Abdominal Pain   • Sitagliptin-Metformin Hcl Er Diarrhea, GI Intolerance and Abdominal Pain   • Dust Mite Extract Allergic Rhinitis   • Insulin Aspart GI Intolerance     Novolog    • Liraglutide Nausea Only and Abdominal Pain   • Metformin And Related Diarrhea and GI Intolerance   • Molds & Smuts Allergic Rhinitis   • Seasonal Ic [Cholestatin] Headache       Social History   Past Surgical History:   Procedure Laterality Date   • ASPIRATION / INJECTION RENAL CYST      Renal Cyst Aspiration   • CATARACT EXTRACTION      2017- right eye, 2018- left eye   • COLONOSCOPY  2005   • EYE SURGERY Bilateral     Cataract Surgery   • LITHOTRIPSY Right    • TONSILLECTOMY     • UPPER GASTROINTESTINAL ENDOSCOPY       Family History   Problem Relation Age of Onset   • Diabetes unspecified Mother    • Heart disease Mother    • Nephrolithiasis Mother    • Kidney disease Mother    • Other Mother         Back Disorder   • Thyroid disease Mother    • Diabetes type II Mother    • Hypertension Mother    • Thyroid disease unspecified Mother    • Diabetes Mother            • Arthritis Mother    • Diabetes unspecified Father    • Heart disease Father    • Hypertension Father    • Thrombosis Father            • Diabetes unspecified Sister    • Heart disease Sister    • Stroke Sister    • Diabetes unspecified Brother    • Heart disease Brother    • Nephrolithiasis Brother    • Lung cancer Brother    • Other Brother         COPD   • Diabetes unspecified Sister    • Heart disease Sister    • Diabetes Sister    • Arthritis Sister    • Diabetes unspecified Sister    • Diabetes unspecified Brother    • Heart disease Brother    • Diabetes unspecified Brother    • Other Brother         Back Disorder   • COPD Brother    • Diabetes unspecified Brother    • Other Brother         Back Disorder   • Diabetes unspecified Brother    • Stomach cancer Paternal Aunt        Objective:  /60 (BP Location: Left arm, Patient Position: Sitting, Cuff Size: Adult)   Pulse 73   Temp 98 °F (36.7 °C) Ht 5' 3" (1.6 m)   Wt 64.4 kg (142 lb)   SpO2 99%   BMI 25.15 kg/m²     Recent Results (from the past 1344 hour(s))   Fingerstick Glucose (POCT)    Collection Time: 06/28/23  7:12 AM   Result Value Ref Range    POC Glucose 84 65 - 140 mg/dl   Tissue Exam    Collection Time: 06/28/23  7:44 AM   Result Value Ref Range    Case Report       Surgical Pathology Report                         Case: [id number]                                   Authorizing Provider:  Mita Pepper MD          Collected:           06/28/2023 0744              Ordering Location:     [facility]   Received:            06/28/2023 [facility]                                                                       Pathologist:           Vanna Martino MD                                                                    Specimens:   A) - Duodenum, duodenum r/o celiac                                                                  B) - Stomach, stomach r/o h.pylori                                                                  C) - Stomach, Antrum gastric nodule                                                                 D) - Stomach, Gastric body  stomach polpys                                                          E) - Colon, Random colon bx r/o microscopic colitis                                        Addendum       Part E) The lymphocytic aggregates consist of mixed T and B lymphocytes, confirmed with CD3 and CD20 immunostains, respectively. Final Diagnosis       A. Duodenum, biopsy:  -   Small intestinal mucosa with no significant histopathologic change. -   No evidence of celiac disease. B. Stomach, biopsy:  -   Gastric antral and transitional mucosa with mild chronic inactive gastritis. -   Gastric oxyntic mucosa with changes suggestive of proton pump inhibitor effect.  -   Negative for intestinal metaplasia and dysplasia.   -   Negative for Helicobacter pylori-type organisms on H&E stain. C. Stomach, antrum nodule, biopsy:  -   Gastric hyperplastic polyp with erosion and acute inflammation.  -   PAS-Alcian blue stain for intestinal metaplasia is negative. D. Stomach, body polyps, polypectomy:  -   Fundic gland polyps. E. Colon, random, biopsy:  -   Colonic mucosa with prominent lymphoid aggregates. -   No evidence of microscopic colitis. -   CD3 and CD20 immunostains are pending and will be reported in an addendum. Interpretation performed at [facility], [address] ([place]), [facility]        Additional Information       All reported additional testing was performed with appropriately reactive controls. These tests were developed and their performance characteristics determined by [facility] or appropriate performing facility, though some tests may be performed on tissues which have not been validated for performance characteristics (such as staining performed on alcohol exposed cell blocks and decalcified tissues). Results should be interpreted with caution and in the context of the patients’ clinical condition. These tests may not be cleared or approved by the U.S. Food and Drug Administration, though the FDA has determined that such clearance or approval is not necessary. These tests are used for clinical purposes and they should not be regarded as investigational or for research. This laboratory has been approved by CLIA 88, designated as a high-complexity laboratory and is qualified to perform these tests. Valerie Perry Description        A. The specimen is received in formalin, labeled with the patient's name and hospital number, and is designated " duodenum". The specimen consists of 4 tan soft tissue fragments measuring range from 0.1 to 0.3 cm in greatest dimension. Entirely submitted. One screened cassette. B.  The specimen is received in formalin, labeled with the patient's name and hospital number, and is designated " stomach". The specimen consists of 2 tan soft tissue fragments measuring 0.2 and 0.4 cm in greatest dimension. Entirely submitted. One screened cassette. C. The specimen is received in formalin, labeled with the patient's name and hospital number, and is designated " stomach gastric nodule". The specimen consists of 2 tan soft tissue fragments both measuring 0.4 cm in greatest dimension. Entirely submitted. One screened cassette. D. The specimen is received in formalin, labeled with the patient's name and hospital number, and is designated " gastric body stomach polyps". The specimen consists of 2 tan soft tissue fragments measuring 0.3 and 0.4 cm in greatest dimension. Entirely submitted. One screened cassette. E. The specimen is received in formalin, labeled with the patient's name and hospital number, and is designated " random colon". The specimen consists of 8 tan soft tissue fragments measuring range from 0.1 to 0.4 cm in greatest dimension. Entirely submitted. One screened cassette. Note: The estimated total formalin fixation time based upon information provided by the submitting clinician and the standard processing schedule is under 72 hours. Yesy      Clinical Information       Per EGD,  INDICATION:  Nausea, Abnormal weight loss, Change in bowel habits, Left lower quadrant abdominal pain  FINDINGS:  · Z-line 39 cm from the incisors  · Two sessile fundic gland polyps measuring smaller than 5 mm in the fundus of the stomach; performed cold forceps biopsy. Less than 5 subcentimeter fundic gland polyps. Randomly biopsied/sampled. · Single submucosal nodule in the antrum was observed; performed cold forceps biopsy  · Performed multiple forceps biopsies to rule out H. pylori in the body of the stomach and antrum  · The duodenal bulb and 2nd part of the duodenum appeared normal. Performed random biopsy using biopsy forceps to rule out celiac disease.     Per colonoscopy,  FINDINGS:  · The entire colon appeared normal. Performed random biopsy using biopsy forceps.    Fingerstick Glucose (POCT)    Collection Time: 06/28/23  8:12 AM   Result Value Ref Range    POC Glucose 139 65 - 140 mg/dl   PSA, Total Screen    Collection Time: 08/08/23  8:33 AM   Result Value Ref Range    PSA 0.54 0.00 - 4.00 ng/mL   Lipid panel    Collection Time: 08/08/23  8:33 AM   Result Value Ref Range    Cholesterol 94 See Comment mg/dL    Triglycerides 135 See Comment mg/dL    HDL, Direct 34 (L) >=40 mg/dL    LDL Calculated 33 0 - 100 mg/dL    Non-HDL-Chol (CHOL-HDL) 60 mg/dl   Comprehensive metabolic panel    Collection Time: 08/08/23  8:33 AM   Result Value Ref Range    Sodium 139 135 - 147 mmol/L    Potassium 4.5 3.5 - 5.3 mmol/L    Chloride 106 96 - 108 mmol/L    CO2 29 21 - 32 mmol/L    ANION GAP 4 mmol/L    BUN 31 (H) 5 - 25 mg/dL    Creatinine 1.17 0.60 - 1.30 mg/dL    Glucose, Fasting 119 (H) 65 - 99 mg/dL    Calcium 9.5 8.3 - 10.1 mg/dL    AST 11 5 - 45 U/L    ALT 29 12 - 78 U/L    Alkaline Phosphatase 69 46 - 116 U/L    Total Protein 7.3 6.4 - 8.4 g/dL    Albumin 3.9 3.5 - 5.0 g/dL    Total Bilirubin 1.00 0.20 - 1.00 mg/dL    eGFR 63 ml/min/1.73sq m   Type and screen    Collection Time: 08/08/23  8:33 AM   Result Value Ref Range    ABO Grouping A     Rh Factor Positive     Antibody Screen Negative     Specimen Expiration Date [id number]    CBC and differential    Collection Time: 08/08/23  8:33 AM   Result Value Ref Range    WBC 11.89 (H) 4.31 - 10.16 Thousand/uL    RBC 5.00 3.88 - 5.62 Million/uL    Hemoglobin 15.1 12.0 - 17.0 g/dL    Hematocrit 43.9 36.5 - 49.3 %    MCV 88 82 - 98 fL    MCH 30.2 26.8 - 34.3 pg    MCHC 34.4 31.4 - 37.4 g/dL    RDW 15.4 (H) 11.6 - 15.1 %    MPV 10.0 8.9 - 12.7 fL    Platelets 717 971 - 420 Thousands/uL    nRBC 0 /100 WBCs    Neutrophils Relative 60 43 - 75 %    Immat GRANS % 0 0 - 2 %    Lymphocytes Relative 29 14 - 44 %    Monocytes Relative 8 4 - 12 % Eosinophils Relative 3 0 - 6 %    Basophils Relative 0 0 - 1 %    Neutrophils Absolute 7.17 1.85 - 7.62 Thousands/µL    Immature Grans Absolute 0.05 0.00 - 0.20 Thousand/uL    Lymphocytes Absolute 3.42 0.60 - 4.47 Thousands/µL    Monocytes Absolute 0.91 0.17 - 1.22 Thousand/µL    Eosinophils Absolute 0.31 0.00 - 0.61 Thousand/µL    Basophils Absolute 0.03 0.00 - 0.10 Thousands/µL   ECG 12 lead    Collection Time: 08/08/23 10:32 AM   Result Value Ref Range    Ventricular Rate 68 BPM    Atrial Rate 68 BPM    AR Interval 164 ms    QRSD Interval 94 ms    QT Interval 400 ms    QTC Interval 425 ms    P Axis 81 degrees    QRS Axis 22 degrees    T Wave Axis 63 degrees            Physical Exam  Vitals and nursing note reviewed. Constitutional:       General: He is not in acute distress. Appearance: He is well-developed. He is not diaphoretic. HENT:      Head: Normocephalic and atraumatic. Eyes:      General: No scleral icterus. Right eye: No discharge. Left eye: No discharge. Conjunctiva/sclera: Conjunctivae normal.      Pupils: Pupils are equal, round, and reactive to light. Neck:      Thyroid: No thyromegaly. Vascular: No JVD. Cardiovascular:      Rate and Rhythm: Normal rate and regular rhythm. Heart sounds: Normal heart sounds. No murmur heard. No friction rub. No gallop. Pulmonary:      Effort: Pulmonary effort is normal. No respiratory distress. Breath sounds: Normal breath sounds. No wheezing or rales. Chest:      Chest wall: No tenderness. Abdominal:      General: Bowel sounds are normal. There is no distension. Palpations: Abdomen is soft. There is no mass. Tenderness: There is no abdominal tenderness. There is no guarding or rebound. Musculoskeletal:         General: No tenderness or deformity. Normal range of motion. Cervical back: Normal range of motion and neck supple. Lymphadenopathy:      Cervical: No cervical adenopathy.    Skin: General: Skin is warm and dry. Coloration: Skin is not pale. Findings: No erythema or rash. Neurological:      Mental Status: He is alert and oriented to person, place, and time. Cranial Nerves: No cranial nerve deficit. Coordination: Coordination normal.      Deep Tendon Reflexes: Reflexes are normal and symmetric. Psychiatric:         Behavior: Behavior normal.         Thought Content:  Thought content normal.         Judgment: Judgment normal.

## 2023-08-21 NOTE — ASSESSMENT & PLAN NOTE
Lab Results   Component Value Date    EGFR 63 08/08/2023    EGFR 61 06/09/2023    EGFR 59 01/24/2023    CREATININE 1.17 08/08/2023    CREATININE 1.20 06/09/2023    CREATININE 1.23 01/24/2023   continue to trend kidney function.

## 2023-08-21 NOTE — ASSESSMENT & PLAN NOTE
Controlled, continue f/u with endocrinology. continue current diabetic regimen.    Lab Results   Component Value Date    HGBA1C 5.6 05/02/2023

## 2023-08-23 ENCOUNTER — TELEPHONE (OUTPATIENT)
Dept: SURGICAL ONCOLOGY | Facility: CLINIC | Age: 68
End: 2023-08-23

## 2023-08-24 ENCOUNTER — ANESTHESIA (OUTPATIENT)
Dept: PERIOP | Facility: HOSPITAL | Age: 68
End: 2023-08-24
Payer: MEDICARE

## 2023-08-24 ENCOUNTER — HOSPITAL ENCOUNTER (OUTPATIENT)
Facility: HOSPITAL | Age: 68
Setting detail: OUTPATIENT SURGERY
Discharge: HOME | End: 2023-08-24
Attending: STUDENT IN AN ORGANIZED HEALTH CARE EDUCATION/TRAINING PROGRAM | Admitting: STUDENT IN AN ORGANIZED HEALTH CARE EDUCATION/TRAINING PROGRAM
Payer: MEDICARE

## 2023-08-24 VITALS
HEIGHT: 63 IN | BODY MASS INDEX: 25.69 KG/M2 | DIASTOLIC BLOOD PRESSURE: 70 MMHG | TEMPERATURE: 96.7 F | OXYGEN SATURATION: 92 % | SYSTOLIC BLOOD PRESSURE: 96 MMHG | RESPIRATION RATE: 16 BRPM | WEIGHT: 145 LBS | HEART RATE: 58 BPM

## 2023-08-24 DIAGNOSIS — K66.8 CALCIFIED MESENTERIC MASS: ICD-10-CM

## 2023-08-24 DIAGNOSIS — K63.89 MESENTERIC MASS: ICD-10-CM

## 2023-08-24 LAB
ABO GROUP BLD: NORMAL
GLUCOSE SERPL-MCNC: 144 MG/DL (ref 65–140)
GLUCOSE SERPL-MCNC: 159 MG/DL (ref 65–140)
RH BLD: POSITIVE

## 2023-08-24 PROCEDURE — 88237 TISSUE CULTURE BONE MARROW: CPT | Performed by: STUDENT IN AN ORGANIZED HEALTH CARE EDUCATION/TRAINING PROGRAM

## 2023-08-24 PROCEDURE — 88341 IMHCHEM/IMCYTCHM EA ADD ANTB: CPT | Performed by: STUDENT IN AN ORGANIZED HEALTH CARE EDUCATION/TRAINING PROGRAM

## 2023-08-24 PROCEDURE — 88305 TISSUE EXAM BY PATHOLOGIST: CPT | Performed by: STUDENT IN AN ORGANIZED HEALTH CARE EDUCATION/TRAINING PROGRAM

## 2023-08-24 PROCEDURE — 82948 REAGENT STRIP/BLOOD GLUCOSE: CPT

## 2023-08-24 PROCEDURE — 88360 TUMOR IMMUNOHISTOCHEM/MANUAL: CPT | Performed by: STUDENT IN AN ORGANIZED HEALTH CARE EDUCATION/TRAINING PROGRAM

## 2023-08-24 PROCEDURE — 49321 LAPAROSCOPY BIOPSY: CPT | Performed by: STUDENT IN AN ORGANIZED HEALTH CARE EDUCATION/TRAINING PROGRAM

## 2023-08-24 PROCEDURE — 88342 IMHCHEM/IMCYTCHM 1ST ANTB: CPT | Performed by: STUDENT IN AN ORGANIZED HEALTH CARE EDUCATION/TRAINING PROGRAM

## 2023-08-24 PROCEDURE — 88364 INSITU HYBRIDIZATION (FISH): CPT | Performed by: STUDENT IN AN ORGANIZED HEALTH CARE EDUCATION/TRAINING PROGRAM

## 2023-08-24 PROCEDURE — 88112 CYTOPATH CELL ENHANCE TECH: CPT | Performed by: SPECIALIST

## 2023-08-24 PROCEDURE — 88365 INSITU HYBRIDIZATION (FISH): CPT | Performed by: STUDENT IN AN ORGANIZED HEALTH CARE EDUCATION/TRAINING PROGRAM

## 2023-08-24 PROCEDURE — 88377 M/PHMTRC ALYS ISHQUANT/SEMIQ: CPT | Performed by: STUDENT IN AN ORGANIZED HEALTH CARE EDUCATION/TRAINING PROGRAM

## 2023-08-24 PROCEDURE — 88262 CHROMOSOME ANALYSIS 15-20: CPT | Performed by: STUDENT IN AN ORGANIZED HEALTH CARE EDUCATION/TRAINING PROGRAM

## 2023-08-24 RX ORDER — SODIUM CHLORIDE 9 MG/ML
INJECTION, SOLUTION INTRAVENOUS CONTINUOUS PRN
Status: DISCONTINUED | OUTPATIENT
Start: 2023-08-24 | End: 2023-08-24

## 2023-08-24 RX ORDER — FENTANYL CITRATE 50 UG/ML
INJECTION, SOLUTION INTRAMUSCULAR; INTRAVENOUS AS NEEDED
Status: DISCONTINUED | OUTPATIENT
Start: 2023-08-24 | End: 2023-08-24

## 2023-08-24 RX ORDER — HYDROMORPHONE HCL/PF 1 MG/ML
SYRINGE (ML) INJECTION AS NEEDED
Status: DISCONTINUED | OUTPATIENT
Start: 2023-08-24 | End: 2023-08-24

## 2023-08-24 RX ORDER — DEXTROSE MONOHYDRATE 25 G/50ML
INJECTION, SOLUTION INTRAVENOUS AS NEEDED
Status: DISCONTINUED | OUTPATIENT
Start: 2023-08-24 | End: 2023-08-24

## 2023-08-24 RX ORDER — PROPOFOL 10 MG/ML
INJECTION, EMULSION INTRAVENOUS AS NEEDED
Status: DISCONTINUED | OUTPATIENT
Start: 2023-08-24 | End: 2023-08-24

## 2023-08-24 RX ORDER — CLINDAMYCIN PHOSPHATE 900 MG/50ML
900 INJECTION INTRAVENOUS ONCE
Status: DISCONTINUED | OUTPATIENT
Start: 2023-08-24 | End: 2023-08-24 | Stop reason: HOSPADM

## 2023-08-24 RX ORDER — ROCURONIUM BROMIDE 10 MG/ML
INJECTION, SOLUTION INTRAVENOUS AS NEEDED
Status: DISCONTINUED | OUTPATIENT
Start: 2023-08-24 | End: 2023-08-24

## 2023-08-24 RX ORDER — HYDROMORPHONE HCL IN WATER/PF 6 MG/30 ML
0.2 PATIENT CONTROLLED ANALGESIA SYRINGE INTRAVENOUS
Status: DISCONTINUED | OUTPATIENT
Start: 2023-08-24 | End: 2023-08-24 | Stop reason: HOSPADM

## 2023-08-24 RX ORDER — EPHEDRINE SULFATE 50 MG/ML
INJECTION INTRAVENOUS AS NEEDED
Status: DISCONTINUED | OUTPATIENT
Start: 2023-08-24 | End: 2023-08-24

## 2023-08-24 RX ORDER — SODIUM CHLORIDE, SODIUM LACTATE, POTASSIUM CHLORIDE, CALCIUM CHLORIDE 600; 310; 30; 20 MG/100ML; MG/100ML; MG/100ML; MG/100ML
INJECTION, SOLUTION INTRAVENOUS CONTINUOUS PRN
Status: DISCONTINUED | OUTPATIENT
Start: 2023-08-24 | End: 2023-08-24

## 2023-08-24 RX ORDER — MAGNESIUM HYDROXIDE 1200 MG/15ML
LIQUID ORAL AS NEEDED
Status: DISCONTINUED | OUTPATIENT
Start: 2023-08-24 | End: 2023-08-24 | Stop reason: HOSPADM

## 2023-08-24 RX ORDER — KETAMINE HCL IN NACL, ISO-OSM 100MG/10ML
SYRINGE (ML) INJECTION AS NEEDED
Status: DISCONTINUED | OUTPATIENT
Start: 2023-08-24 | End: 2023-08-24

## 2023-08-24 RX ORDER — MIDAZOLAM HYDROCHLORIDE 2 MG/2ML
INJECTION, SOLUTION INTRAMUSCULAR; INTRAVENOUS AS NEEDED
Status: DISCONTINUED | OUTPATIENT
Start: 2023-08-24 | End: 2023-08-24

## 2023-08-24 RX ORDER — ONDANSETRON 2 MG/ML
INJECTION INTRAMUSCULAR; INTRAVENOUS AS NEEDED
Status: DISCONTINUED | OUTPATIENT
Start: 2023-08-24 | End: 2023-08-24

## 2023-08-24 RX ORDER — ONDANSETRON 2 MG/ML
4 INJECTION INTRAMUSCULAR; INTRAVENOUS ONCE AS NEEDED
Status: DISCONTINUED | OUTPATIENT
Start: 2023-08-24 | End: 2023-08-24 | Stop reason: HOSPADM

## 2023-08-24 RX ORDER — CEFAZOLIN SODIUM 1 G/3ML
INJECTION, POWDER, FOR SOLUTION INTRAMUSCULAR; INTRAVENOUS AS NEEDED
Status: DISCONTINUED | OUTPATIENT
Start: 2023-08-24 | End: 2023-08-24

## 2023-08-24 RX ORDER — FENTANYL CITRATE/PF 50 MCG/ML
50 SYRINGE (ML) INJECTION
Status: DISCONTINUED | OUTPATIENT
Start: 2023-08-24 | End: 2023-08-24 | Stop reason: HOSPADM

## 2023-08-24 RX ORDER — DEXAMETHASONE SODIUM PHOSPHATE 10 MG/ML
INJECTION, SOLUTION INTRAMUSCULAR; INTRAVENOUS AS NEEDED
Status: DISCONTINUED | OUTPATIENT
Start: 2023-08-24 | End: 2023-08-24

## 2023-08-24 RX ORDER — LIDOCAINE HYDROCHLORIDE 10 MG/ML
INJECTION, SOLUTION EPIDURAL; INFILTRATION; INTRACAUDAL; PERINEURAL AS NEEDED
Status: DISCONTINUED | OUTPATIENT
Start: 2023-08-24 | End: 2023-08-24

## 2023-08-24 RX ORDER — SODIUM CHLORIDE, SODIUM LACTATE, POTASSIUM CHLORIDE, CALCIUM CHLORIDE 600; 310; 30; 20 MG/100ML; MG/100ML; MG/100ML; MG/100ML
75 INJECTION, SOLUTION INTRAVENOUS CONTINUOUS
Status: DISCONTINUED | OUTPATIENT
Start: 2023-08-24 | End: 2023-08-24 | Stop reason: HOSPADM

## 2023-08-24 RX ADMIN — DEXTROSE MONOHYDRATE 25 ML: 25 INJECTION, SOLUTION INTRAVENOUS at 08:55

## 2023-08-24 RX ADMIN — ONDANSETRON 4 MG: 2 INJECTION INTRAMUSCULAR; INTRAVENOUS at 09:47

## 2023-08-24 RX ADMIN — SODIUM CHLORIDE: 0.9 INJECTION, SOLUTION INTRAVENOUS at 08:25

## 2023-08-24 RX ADMIN — ROCURONIUM BROMIDE 50 MG: 10 INJECTION, SOLUTION INTRAVENOUS at 08:11

## 2023-08-24 RX ADMIN — PROPOFOL 50 MG: 10 INJECTION, EMULSION INTRAVENOUS at 08:11

## 2023-08-24 RX ADMIN — EPHEDRINE SULFATE 10 MG: 50 INJECTION INTRAVENOUS at 08:37

## 2023-08-24 RX ADMIN — FENTANYL CITRATE 100 MCG: 50 INJECTION INTRAMUSCULAR; INTRAVENOUS at 08:11

## 2023-08-24 RX ADMIN — CEFAZOLIN 1000 MG: 1 INJECTION, POWDER, FOR SOLUTION INTRAMUSCULAR; INTRAVENOUS at 08:50

## 2023-08-24 RX ADMIN — ROCURONIUM BROMIDE 10 MG: 10 INJECTION, SOLUTION INTRAVENOUS at 09:14

## 2023-08-24 RX ADMIN — SODIUM CHLORIDE, SODIUM LACTATE, POTASSIUM CHLORIDE, AND CALCIUM CHLORIDE: .6; .31; .03; .02 INJECTION, SOLUTION INTRAVENOUS at 10:03

## 2023-08-24 RX ADMIN — Medication 30 MG: at 08:11

## 2023-08-24 RX ADMIN — Medication 20 MG: at 09:08

## 2023-08-24 RX ADMIN — MIDAZOLAM 1 MG: 1 INJECTION INTRAMUSCULAR; INTRAVENOUS at 08:01

## 2023-08-24 RX ADMIN — MIDAZOLAM 1 MG: 1 INJECTION INTRAMUSCULAR; INTRAVENOUS at 07:55

## 2023-08-24 RX ADMIN — FENTANYL CITRATE 50 MCG: 50 INJECTION INTRAMUSCULAR; INTRAVENOUS at 10:26

## 2023-08-24 RX ADMIN — DEXAMETHASONE SODIUM PHOSPHATE 4 MG: 10 INJECTION, SOLUTION INTRAMUSCULAR; INTRAVENOUS at 08:11

## 2023-08-24 RX ADMIN — LIDOCAINE HYDROCHLORIDE 50 MG: 10 INJECTION, SOLUTION EPIDURAL; INFILTRATION; INTRACAUDAL; PERINEURAL at 08:11

## 2023-08-24 RX ADMIN — HYDROMORPHONE HYDROCHLORIDE 0.5 MG: 1 INJECTION, SOLUTION INTRAMUSCULAR; INTRAVENOUS; SUBCUTANEOUS at 09:25

## 2023-08-24 RX ADMIN — SUGAMMADEX 200 MG: 100 INJECTION, SOLUTION INTRAVENOUS at 09:45

## 2023-08-24 RX ADMIN — SODIUM CHLORIDE, SODIUM LACTATE, POTASSIUM CHLORIDE, AND CALCIUM CHLORIDE: .6; .31; .03; .02 INJECTION, SOLUTION INTRAVENOUS at 07:19

## 2023-08-24 RX ADMIN — EPHEDRINE SULFATE 5 MG: 50 INJECTION INTRAVENOUS at 08:32

## 2023-08-24 NOTE — ANESTHESIA POSTPROCEDURE EVALUATION
Post-Op Assessment Note    CV Status:  Stable  Pain Score: 0    Pain management: adequate  Multimodal analgesia used between 6 hours prior to anesthesia start to PACU discharge    Mental Status:  Arousable and sleepy   Hydration Status:  Stable and euvolemic   PONV Controlled:  None   Airway Patency:  Patent      Post Op Vitals Reviewed: Yes      Staff: CRNA         No notable events documented.     /57 (08/24/23 1002)    Temp 98.1 °F (36.7 °C) (08/24/23 1002)    Pulse 83 (08/24/23 1002)   Resp   18   SpO2 97 % (08/24/23 1002)

## 2023-08-24 NOTE — ANESTHESIA PREPROCEDURE EVALUATION
Procedure:  HAND ASSISTED DIAGNOSTIC LAPAROSCOPY (Abdomen)    Relevant Problems   CARDIO   (+) Benign essential hypertension   (+) Hyperlipidemia      ENDO   (+) Type 2 diabetes mellitus with both eyes affected by mild nonproliferative retinopathy without macular edema, with long-term current use of insulin (HCC)      GI/HEPATIC   (+) Gastroesophageal reflux disease without esophagitis      /RENAL   (+) Stage 3 chronic kidney disease, unspecified whether stage 3a or 3b CKD (HCC)      HEMATOLOGY   (+) Iron deficiency anemia secondary to inadequate dietary iron intake      MUSCULOSKELETAL   (+) Low back pain with sciatica   (+) Sacroiliitis (HCC)   (+) Spondylosis of cervical region without myelopathy or radiculopathy      NEURO/PSYCH   (+) Chronic pain syndrome      PULMONARY   (+) Obstructive sleep apnea syndrome        Physical Exam    Airway    Mallampati score: II  TM Distance: >3 FB  Neck ROM: limited     Dental   No notable dental hx     Cardiovascular  Cardiovascular exam normal    Pulmonary  Pulmonary exam normal     Other Findings        Anesthesia Plan  ASA Score- 3     Anesthesia Type- general with ASA Monitors. Additional Monitors:   Airway Plan: ETT. Comment: [place] for intubation due to cervical stenosis/arthritis. Has had some non-cardiac SOB "when bending over"- medical clearance in chart. No COPD/asthma/current URI sxs. .       Plan Factors-Exercise tolerance (METS): >4 METS. Chart reviewed. EKG reviewed. Imaging results reviewed. Existing labs reviewed. Patient summary reviewed. Patient is not a current smoker. Induction- intravenous. Postoperative Plan- Plan for postoperative opioid use. Planned trial extubation    Informed Consent- Anesthetic plan and risks discussed with patient. I personally reviewed this patient with the CRNA. Discussed and agreed on the Anesthesia Plan with the CRNA. Lesa Page

## 2023-08-24 NOTE — INTERVAL H&P NOTE
H&P reviewed. After examining the patient I find no changes in the patients condition since the H&P had been written.     Vitals:    08/24/23 0638   BP: 126/57   Pulse: 68   Resp: 16   Temp: (!) 97.3 °F (36.3 °C)   SpO2: 98%

## 2023-08-24 NOTE — DISCHARGE INSTR - AVS FIRST PAGE
[facility] Surgical Discharge Instructions  Procedure: Diagnostic laparoscopy with mesenteric mass biopsy  Surgeon: Dr. Mulugeta Yao MD    Please follow-up as scheduled. If you do not already have a follow-up appointment, please call our office when you leave to schedule follow-up appointment within 1-2 weeks. Activity:  - No lifting, pushing, or pulling anything over 15 pounds for 4-6 weeks or until cleared by your physician  - Regular activity (working around the house, walking up/down stairs, etc.) is ok and encouraged  - No driving until you are no longer using narcotic pain medications    Diet:    - You may resume your normal diet    Wound Care:  - Your incisions are closed with a purple skin glue and covered with white "Steri-Strips". Do not pick or peel at these strips as they will fall off on their own over time. - You may shower in 24 hours; when you shower, allow soapy water to run over your incisions and then pat dry. Do NOT rub or scrub your incisions  - Do not submerge your incisions in tubs, baths, pools, etc until cleared by your surgeon  - Do not apply any lotions, creams, or ointments to your incision until cleared by your surgeon    Medications:    - Continue your pre-hospital medication regimen after discharge unless your were instructed otherwise. Please refer to your discharge medication list for further details. - For the first few days following your procedure, take Tylenol to provide a solid baseline pain control   - Pain medications can cause constipation.  If you are unable to have a bowel movement for more than 1-2 days, take an over the counter stool softener or laxative such as Milk of Magnesia, Colace, or Senna    Additional Instructions:  - If you have any questions or concerns after discharge please do not hesitate to contact our office, we would be happy to provide clarification      Call our office or return to the Emergency Room if you develop a fever greater than 101F, chills, persistent nausea/vomiting, worsening/uncontrollable pain, and/or increasing redness or purulent/foul smelling drainage from your incision(s)

## 2023-08-25 LAB
GLUCOSE SERPL-MCNC: 152 MG/DL (ref 65–140)
GLUCOSE SERPL-MCNC: 80 MG/DL (ref 65–140)

## 2023-08-25 NOTE — OP NOTE
OPERATIVE REPORT  PATIENT NAME: Lauran Galeazzi    :  1955  MRN: [id number]  Pt Location: [facility] OR ROOM 03    SURGERY DATE: 2023    Surgeon(s) and Role:     * Mervat Massey MD - Primary     * Jennifer Cohen MD - Assisting    Preop Diagnosis:  Mesenteric mass [K63.89]  Calcified mesenteric mass [K66.8]    Post-Op Diagnosis Codes:     * Mesenteric mass [K63.89]     * Calcified mesenteric mass [K66.8]    Procedure(s):  DIAGNOSTIC LAPAROSCOPY, MESENTERIC BIOPSY    Specimen(s):  ID Type Source Tests Collected by Time Destination   1 :  550 Twila Gibbs MD 2023 3077    2 : mesenteric biopsy Tissue Lymph Node - Lymphoma Prtocol CHROMOSOME ANALYSIS, LEUKEMIA\LYMPHOMA, TISSUE EXAM, LEUKEMIA/LYMPHOMA FLOW CYTOMETRY Mervat Massey MD 2023 [id number]    3 : mesenteric bx Tissue Mesentery TISSUE EXAM Mervat Massey MD 2023 [id number]    4 : mesenteric biopsy Tissue Lymph Node - Lymphoma Prtocol CHROMOSOME ANALYSIS, LEUKEMIA\LYMPHOMA, TISSUE EXAM, LEUKEMIA/LYMPHOMA FLOW CYTOMETRY Mervat Massey MD 2023 [id number]        Estimated Blood Loss:   Minimal    Drains:  None    Anesthesia Type:   General    Operative Indications:  Mesenteric mass [K63.89]  Calcified mesenteric mass [K66.8]    Operative Findings:  - Scattered pockets of milky-appearing intra-peritoneal fluid  - No obvious evidence of intra-abdominal metastasis/carcinomatosis  - Large dense mesenteric mass within the proximal jejunal mesentery with erythema of the associated jejunum but no evidence of intestinal ischemia              Complications:   None    Procedure and Technique:  After obtaining informed written consent, the patient was brought back to the operating room and placed in the supine position on the operating room table with both arms extended.  Bilateral lower extremity SCDs were placed, preoperative antibiotics were administered, and the patient underwent uneventful induction of general anesthesia. Both arms were tucked at the patient's side, the abdomen was prepped/draped in the usual sterile fashion, and a universal timeout was undertaken where the patient's identity and proposed procedure were confirmed by all in the room. 0.5% Marcaine with epinephrine was infiltrated at Curran's point and a small stab incision was made. The Veress needle was used to gain entry into the abdominal cavity and, following a low initial opening pressure, pneumoperitoneum was titrated to 15mmHg. Additional local anesthetic (for a total of 20cc) was infiltrated into all anticipated trocar sites and a 5mm supraumbilical Optiview trocar was placed. Initial inspection revealed no injury from entry. Three additional 5mm trocars were placed under direct visualization and cursory examination of the intra-abdominal cavity revealed no obvious evidence of metastasis/carcinomatosis but a few scattered pockets of chylous appearing fluid. This fluid was collected and sent for cytology. The greater omentum/transverse colon were retracted cephalad and the ligament of Treitz was identified. The proximal jejunum appeared erythematous and at the base of the proximal jejunal mesentery we identified the mesenteric mass in question. See the above intra-operative pictures. It was extremely dense, non-mobile, and resulted in limited mobility of the associated segment of jejunum. The peritoneum overlying the mass was scored with electrocautery, multiple tissue biopsies were obtained with the laparoscopic biopsy forceps, and each specimen was sent to pathology with lymphoma protocol. Using electrocautery and multiple pieces of Surgicel, hemostasis was obtained.  Following the confirmation of instrument/sponge/sharp counts, pneumoperitoneum was completely evacuated, each skin incision was closed with 4-0 Monocryl/skin glue/Steri-Strips, and the patient was uneventfully extubated to be transported to PACU in stable condition with no immediate complications noted. Dr. Marco Young was present and scrubbed for the entire procedure.     Patient Disposition:  PACU     This procedure was not performed to treat colon cancer through resection      SIGNATURE: Kae Guerrero MD  DATE: August 25, 2023  TIME: 11:12 AM

## 2023-08-27 LAB — SCAN RESULT: NORMAL

## 2023-08-28 PROCEDURE — 88112 CYTOPATH CELL ENHANCE TECH: CPT | Performed by: SPECIALIST

## 2023-08-29 DIAGNOSIS — Z79.4 TYPE 2 DIABETES MELLITUS WITH HYPERGLYCEMIA, WITH LONG-TERM CURRENT USE OF INSULIN (HCC): ICD-10-CM

## 2023-08-29 DIAGNOSIS — E11.65 TYPE 2 DIABETES MELLITUS WITH HYPERGLYCEMIA, WITH LONG-TERM CURRENT USE OF INSULIN (HCC): ICD-10-CM

## 2023-08-29 RX ORDER — PEN NEEDLE, DIABETIC 32GX 5/32"
NEEDLE, DISPOSABLE MISCELLANEOUS
Qty: 400 EACH | Refills: 0 | Status: SHIPPED | OUTPATIENT
Start: 2023-08-29

## 2023-08-31 LAB — SCAN RESULT: NORMAL

## 2023-09-06 PROCEDURE — 88360 TUMOR IMMUNOHISTOCHEM/MANUAL: CPT | Performed by: STUDENT IN AN ORGANIZED HEALTH CARE EDUCATION/TRAINING PROGRAM

## 2023-09-06 PROCEDURE — 88341 IMHCHEM/IMCYTCHM EA ADD ANTB: CPT | Performed by: STUDENT IN AN ORGANIZED HEALTH CARE EDUCATION/TRAINING PROGRAM

## 2023-09-06 PROCEDURE — 88305 TISSUE EXAM BY PATHOLOGIST: CPT | Performed by: STUDENT IN AN ORGANIZED HEALTH CARE EDUCATION/TRAINING PROGRAM

## 2023-09-06 PROCEDURE — 88342 IMHCHEM/IMCYTCHM 1ST ANTB: CPT | Performed by: STUDENT IN AN ORGANIZED HEALTH CARE EDUCATION/TRAINING PROGRAM

## 2023-09-06 PROCEDURE — 88365 INSITU HYBRIDIZATION (FISH): CPT | Performed by: STUDENT IN AN ORGANIZED HEALTH CARE EDUCATION/TRAINING PROGRAM

## 2023-09-06 PROCEDURE — 88364 INSITU HYBRIDIZATION (FISH): CPT | Performed by: STUDENT IN AN ORGANIZED HEALTH CARE EDUCATION/TRAINING PROGRAM

## 2023-09-12 ENCOUNTER — OFFICE VISIT (OUTPATIENT)
Dept: SURGICAL ONCOLOGY | Facility: CLINIC | Age: 68
End: 2023-09-12

## 2023-09-12 VITALS
HEIGHT: 63 IN | HEART RATE: 75 BPM | TEMPERATURE: 97.2 F | SYSTOLIC BLOOD PRESSURE: 118 MMHG | DIASTOLIC BLOOD PRESSURE: 74 MMHG | RESPIRATION RATE: 18 BRPM | OXYGEN SATURATION: 98 % | BODY MASS INDEX: 26.05 KG/M2 | WEIGHT: 147 LBS

## 2023-09-12 DIAGNOSIS — K63.89 MESENTERIC MASS: Primary | ICD-10-CM

## 2023-09-12 PROCEDURE — 99024 POSTOP FOLLOW-UP VISIT: CPT | Performed by: STUDENT IN AN ORGANIZED HEALTH CARE EDUCATION/TRAINING PROGRAM

## 2023-09-12 NOTE — PROGRESS NOTES
Surgical Oncology Consultation F/U    [address] [facility]  [facility] [place]  [address] [place] [address] [facility] [id number]    Patient:  Magui Barker  1955  [id number]    Primary Care provider: Tomas Blake MD  [facility]    Referring provider:  No referring provider defined for this encounter. Diagnoses and all orders for this visit:    Mesenteric mass        Chief Complaint   Patient presents with   • Post-op       No follow-ups on file. Oncology History    No history exists. History of Present Illness  : This is a 53-year-old gentleman seen in consultation today for a mesenteric mass. Briefly, the patient has been having near daily abdominal pain with vague cramping for several months. He describes a general feeling of discomfort daily with occasional sharp pains. He also describes daily nausea with a reduction in his appetite, though he has not had any episodes of vomiting. He states he has intermittent constipation and diarrhea, which appears to be unpredictable. He denies any symptoms of flushing, floating diarrhea, fevers, chills, significant fatigue. The patient underwent work-up with his GI providers, who repeated an EGD and colonoscopy due to his symptomatology which did not reveal a source. He then had a CT scan of the abdomen and pelvis to work-up a previously detected finding from a noncontrasted scan in October 2022 revealing mesenteric nodularity. This demonstrated a vague mesenteric mass infiltrating the small bowel mesentery at the mid SMA. Given its appearance and a lab value of a chromogranin near thousand, the patient underwent a dotatate PET scan. This failed to show significant avidity within the mesenteric mass. Of note, there was a small focus of moderate avidity at the pancreatic tail with no corresponding CT correlate.   The patient was sent for interventional radiology percutaneous biopsy but this was unable to be completed due to the position of the mass. At this juncture, the elevated chromogranin may be due to a medical interaction or he may have a false negative PET dotatate scan with this infiltrative mass representing a carcinoid tumor. However, this may also represent a lymphoma, desmoid tumor, or other etiology. The patient's symptoms are relatively nonspecific and may or may not be related to the presence of the mass. At this juncture, given the proximity on the SMA, I like to perform a hand-assisted laparoscopic biopsy. This will allow for evaluation of resectability should the histology reveal a lesion that meets criteria for resection. Likewise, obtaining a biopsy will allow a tissue diagnosis to better determine next steps. Interval  Pt is s/p diag lapa with biopsy. Mesenteric mass appeared calcified, nodular, and very proximal on the jejunum. Resectability unlikely. Sufficient sample obtained of lesional tissue. Path with benign fibrosis. States his intermittent diarrhea and constipation have significantly improved since diagnostic laparoscopy. Likewise, his nausea has almost entirely resolved and he is eating well and gaining weight. Recovering well. Review of Systems  Complete ROS Surg Onc:   Constitutional: The patient denies new or recent history of general fatigue, with weight loss, and reduced appetite. Eyes: No complaints of visual problems, no scleral icterus. ENT: No complaints of ear pain, no hoarseness, no difficulty swallowing,  no tinnitus and no new masses in head, oral cavity, or neck. Cardiovascular: No complaints of chest pain, no palpitations, no ankle edema. Respiratory: No complaints of shortness of breath, no cough. Gastrointestinal: No complaints of jaundice, no bloody stools, no pale stools. Genitourinary: No complaints of dysuria, no hematuria, no nocturia, no frequent urination, no urethral discharge.    Musculoskeletal: No complaints of weakness, paralysis, joint stiffness or arthralgias. Integumentary: No complaints of rash, no new lesions. Neurological: No complaints of convulsions, no seizures, no dizziness. Hematologic/Lymphatic: No complaints of easy bruising. Endocrine:  No hot or cold intolerance. No polydipsia, polyphagia, or polyuria. Allergy/immunology:  No environmental allergies. No food allergies. Not immunocompromised. Patient Active Problem List   Diagnosis   • Benign essential hypertension   • Carpal tunnel syndrome   • Cervical herniated disc   • Cervical radiculopathy   • Cervical stenosis of spinal canal   • Type 2 diabetes mellitus with both eyes affected by mild nonproliferative retinopathy without macular edema, with long-term current use of insulin ([facility])   • Hyperlipidemia   • Hypogonadotropic hypogonadism ([address])   • Pituitary microadenoma ([facility])   • Obstructive sleep apnea syndrome   • Spondylosis of cervical region without myelopathy or radiculopathy   • Mesenteric mass   • Vitamin D deficiency   • Low back pain with sciatica   • Sacroiliitis ([address])   • Overweight (BMI 25.0-29. 9)   • Gastroesophageal reflux disease without esophagitis   • Chronic pain syndrome   • Salivary gland adenitis   • Arthralgia of right hand   • Lumbar radiculopathy   • Stage 3 chronic kidney disease, unspecified whether stage 3a or 3b CKD ([facility])   • Iron deficiency anemia secondary to inadequate dietary iron intake   • Advanced care planning/counseling discussion   • Calcified mesenteric mass     Past Medical History:   Diagnosis Date   • Abdominal pain    • Allergic    • Arthritis     Last assessed 5/24/2013   • Avitaminosis D 2012   • Chronic kidney disease    • Chronic pain disorder     lumbar-having LESI on 6/26/23   • Colon polyp    • CPAP (continuous positive airway pressure) dependence    • Diabetes mellitus ([address])    • Displacement of cervical intervertebral disc 2014   • Diverticulitis of colon 2021    slight   • GERD (gastroesophageal reflux disease)    • Glaucoma     Normal Pressure Glaucoma   • Headache(784.0) continuing around rt eye   • HTN (hypertension)    • Hypertension    • IBS (irritable bowel syndrome)    • Kidney stone    • Marijuana use     daily for chronic pain   • Nephrolithiasis 2013   • Pituitary microadenoma ([address])    • Shortness of breath     started 2023-only when he bends over-not with activity   • Sleep apnea    • Spinal stenosis in cervical region    • Sprain and strain of calcaneofibular (ligament) 2013   • Submandibular lymphadenopathy 2019   • Uric acid nephrolithiasis    • Visual impairment Glaucoma, diabetic retinopothy     Past Surgical History:   Procedure Laterality Date   • ASPIRATION / INJECTION RENAL CYST      Renal Cyst Aspiration   • CATARACT EXTRACTION      2017- right eye, 2018- left eye   • COLONOSCOPY     • EYE SURGERY Bilateral     Cataract Surgery   • LITHOTRIPSY Right    • AR LAPS ABD PRTM&OMENTUM DX W/WO SPEC BR/WA SPX N/A 2023    Procedure: DIAGNOSTIC LAPAROSCOPY mesenteric biopsy;  Surgeon: Issac Nieves MD;  Location: [facility];  Service: Surgical Oncology   • TONSILLECTOMY     • UPPER GASTROINTESTINAL ENDOSCOPY       Family History   Problem Relation Age of Onset   • Diabetes unspecified Mother    • Heart disease Mother    • Nephrolithiasis Mother    • Kidney disease Mother    • Other Mother         Back Disorder   • Thyroid disease Mother    • Diabetes type II Mother    • Hypertension Mother    • Thyroid disease unspecified Mother    • Diabetes Mother            • Arthritis Mother    • Diabetes unspecified Father    • Heart disease Father    • Hypertension Father    • Thrombosis Father            • Diabetes unspecified Sister    • Heart disease Sister    • Stroke Sister    • Diabetes unspecified Brother    • Heart disease Brother    • Nephrolithiasis Brother    • Lung cancer Brother    • Other Brother COPD   • Diabetes unspecified Sister    • Heart disease Sister    • Diabetes Sister    • Arthritis Sister    • Diabetes unspecified Sister    • Diabetes unspecified Brother    • Heart disease Brother    • Diabetes unspecified Brother    • Other Brother         Back Disorder   • COPD Brother    • Diabetes unspecified Brother    • Other Brother         Back Disorder   • Diabetes unspecified Brother    • Stomach cancer Paternal Aunt      Social History     Socioeconomic History   • Marital status: /Civil Union     Spouse name: Not on file   • Number of children: Not on file   • Years of education: Not on file   • Highest education level: Not on file   Occupational History   • Occupation:    Tobacco Use   • Smoking status: Former     Packs/day: 0.25     Years: 2.00     Total pack years: 0.50     Types: Cigarettes     Start date: 1     Quit date: 1980     Years since quittin.7   • Smokeless tobacco: Never   Vaping Use   • Vaping Use: Never used   Substance and Sexual Activity   • Alcohol use: Yes     Alcohol/week: 4.0 standard drinks of alcohol     Types: 2 Cans of beer, 2 Standard drinks or equivalent per week     Comment: social   • Drug use: Yes     Frequency: 7.0 times per week     Types: Marijuana     Comment: Use Medical Marijuana daily (1-3 capsules or tincture)   • Sexual activity: Yes     Partners: Female     Birth control/protection: Female Sterilization   Other Topics Concern   • Not on file   Social History Narrative   • Not on file     Social Determinants of Health     Financial Resource Strain: Low Risk  (2023)    Overall Financial Resource Strain (CARDIA)    • Difficulty of Paying Living Expenses: Not very hard   Food Insecurity: Not on file   Transportation Needs: No Transportation Needs (2023)    PRAPARE - Transportation    • Lack of Transportation (Medical): No    • Lack of Transportation (Non-Medical):  No   Physical Activity: Not on file   Stress: Not on file   Social Connections: Not on file   Intimate Partner Violence: Not on file   Housing Stability: Not on file       Current Outpatient Medications:   •  acetaminophen (TYLENOL) 650 mg CR tablet, Take 650 mg by mouth every 8 (eight) hours as needed for mild pain, Disp: , Rfl:   •  amLODIPine (NORVASC) 10 mg tablet, Take 1 tablet (10 mg total) by mouth daily at bedtime, Disp: 90 tablet, Rfl: 1  •  aspirin (ECOTRIN LOW STRENGTH) 81 mg EC tablet, Take 81 mg by mouth daily , Disp: , Rfl:   •  B Complex Vitamins (VITAMIN B-COMPLEX PO), Take 1 capsule by mouth daily , Disp: , Rfl:   •  BD Pen Needle Harriett U/F 32G X 4 MM MISC, Use 4x per day, Disp: 400 each, Rfl: 0  •  brimonidine-timolol (COMBIGAN) 0.2-0.5 %, Administer 1 drop to both eyes every 12 (twelve) hours, Disp: , Rfl:   •  cholecalciferol (VITAMIN D3) 1,000 units tablet, Take 1,000 Units by mouth 2 (two) times a day , Disp: , Rfl:   •  Continuous Blood Gluc  (Dexcom G6 ) AGATA, Use, Disp: , Rfl:   •  Continuous Blood Gluc Sensor (Dexcom G6 Sensor) MISC, Use, Disp: , Rfl:   •  dicyclomine (BENTYL) 20 mg tablet, Take 1 tablet (20 mg total) by mouth 4 (four) times a day (before meals and at bedtime), Disp: 120 tablet, Rfl: 5  •  doxazosin (CARDURA) 4 mg tablet, Take 1 tablet (4 mg total) by mouth every morning, Disp: 90 tablet, Rfl: 1  •  Empagliflozin (Jardiance) 25 MG TABS, Take 1 tablet (25 mg total) by mouth daily (Patient taking differently: Take 25 mg by mouth every morning Last dose 6/23), Disp: 90 tablet, Rfl: 3  •  ferrous gluconate (FERGON) 240 (27 FE) MG tablet, Take 1 tablet (240 mg total) by mouth in the morning (Patient taking differently: Take 240 mg by mouth in the morning Last dose 6/20), Disp: 90 tablet, Rfl: 1  •  fluticasone (FLONASE) 50 mcg/act nasal spray, 1 spray into each nostril daily as needed for rhinitis (Acute URI/sinusitis), Disp: 18 mL, Rfl: 0  •  gabapentin (NEURONTIN) 400 mg capsule, Take 1 capsule (400 mg total) by mouth 3 (three) times a day, Disp: 270 capsule, Rfl: 0  •  glipiZIDE (GLUCOTROL) 5 mg tablet, Take 1 tablet (5 mg total) by mouth in the morning, Disp: 90 tablet, Rfl: 2  •  Glucagon (Baqsimi Two Pack) 3 MG/DOSE POWD, Use 1 actuation as needed (low blood sugar) into 1 nostril, Disp: 1 each, Rfl: 1  •  Glucosamine-Chondroitin 250-200 MG TABS, Take 1 tablet by mouth 2 (two) times a day, Disp: , Rfl:   •  hydrochlorothiazide (HYDRODIURIL) 25 mg tablet, Take 1 tablet (25 mg total) by mouth every morning, Disp: 90 tablet, Rfl: 1  •  insulin lispro (HumaLOG KwikPen) 100 units/mL injection pen, Inject per insulin scales up to 30 units per day. (Patient taking differently: Inject per insulin scales up to 30 units per day. --Dexcom system), Disp: 30 mL, Rfl: 3  •  lidocaine (Lidoderm) 5 %, Apply 1 patch topically daily Remove & Discard patch within 12 hours or as directed by MD, Disp: 6 patch, Rfl: 0  •  lisinopril (ZESTRIL) 40 mg tablet, Take 1 tablet (40 mg total) by mouth every morning, Disp: 90 tablet, Rfl: 1  •  loratadine (CLARITIN) 10 mg tablet, Take 10 mg by mouth daily, Disp: , Rfl:   •  MULTIPLE VITAMIN PO, Take 1 tablet by mouth daily , Disp: , Rfl:   •  omeprazole (PriLOSEC) 40 MG capsule, Take 1 capsule (40 mg total) by mouth every morning, Disp: 90 capsule, Rfl: 1  •  other medication, see sig,, Medication/product name: Medical Marijuana, Disp: , Rfl:   •  polyethylene glycol (MiraLax) 17 GM/SCOOP powder, 17 g if needed, Disp: , Rfl:   •  Probiotic Product (PROBIOTIC-10) CAPS, Take 1 capsule by mouth daily , Disp: , Rfl:   •  simvastatin (ZOCOR) 20 mg tablet, Take 1 tablet (20 mg total) by mouth daily at bedtime, Disp: 90 tablet, Rfl: 3  •  testosterone (ANDROGEL) 25 MG/2.5GM (1%) GEL, PLACE 2 PACKETS ON THE SKIN DAILY, Disp: 180 g, Rfl: 1  •  vitamin E, tocopherol, 400 units capsule, Take 400 Units by mouth 2 (two) times a day , Disp: , Rfl:   •  Insulin Glargine Solostar (Lantus SoloStar) 100 UNIT/ML SOPN, Inject 0.25 mL (25 Units total) under the skin daily at bedtime, Disp: 15 mL, Rfl: 3  •  traMADol (Ultram) 50 mg tablet, Take 1 tablet (50 mg total) by mouth every 6 (six) hours as needed for moderate pain (Patient not taking: Reported on 9/12/2023), Disp: 10 tablet, Rfl: 0  Allergies   Allergen Reactions   • Clonidine Other (See Comments)     Diaphoresis, hot flashes   • Augmentin [Amoxicillin-Pot Clavulanate] Abdominal Pain   • Biaxin [Clarithromycin] Abdominal Pain   • Peanut (Diagnostic) - Food Allergy Diarrhea, GI Intolerance and Abdominal Pain   • Sitagliptin-Metformin Hcl Er Diarrhea, GI Intolerance and Abdominal Pain   • Dust Mite Extract Allergic Rhinitis   • Insulin Aspart GI Intolerance     Novolog    • Liraglutide Nausea Only and Abdominal Pain   • Metformin And Related Diarrhea and GI Intolerance   • Molds & Smuts Allergic Rhinitis   • Seasonal Ic [Cholestatin] Headache       Vitals:    09/12/23 1021   BP: 118/74   Pulse: 75   Resp: 18   Temp: (!) 97.2 °F (36.2 °C)   SpO2: 98%       Physical Exam   General: Appears well, appears stated age  Skin: Warm, anicteric. Incisions healing well  HEENT: Normocephalic, atraumatic; sclera aniceteric, mucous membranes moist; cervical nodes without adenopathy  Cardiopulmonary: RRR, Easy WOB, no BLE edema  Abd: Flat, soft, nontender, no masses appreciated, no hepatosplenomegaly  MSK: Symmetric, no cyanosis, no overt weakness  Lymphatic: No cervical, axillary or inguinal lymphadenopathy  Neuro: Affect appropriate, no gross motor abnormalities    Labs:   Final Diagnosis   A-B. Mesentery, Mass, Biopsy  -  Fragments of fibroconnective tissue with granulation tissue, chronic inflammation, and reactive mesothelial cells. -  Negative for malignancy.     Comment: The patient's abnormal imaging findings of mesenteric mass are noted. The current sample shows fragments of fibroconnective tissue with reactive changes.  There is no evidence of carcinoma and work-up for neuroendocrine tumor is negative. Possible etiologies include fat necrosis, auto-immune disease, prior surgical treatment, infection, and reactive tissue adjacent to unsampled lesional tissue. Clinical and radiographic correlation is advised for adequate sampling of the target lesion. Imaging  CT abdomen and pelvis w IV and oral contrast    Addendum Date: 7/10/2023 Addendum:   Please note: On further evaluation, the 5 mm nodule at the caudal margin of the pancreatic tail is considered statistically most likely to represent CT artifact. This leaves no CT abnormality to account for hypermetabolism in the pancreas on most recent PET/CT scan. As that finding was quite convincing, consider further investigation with pancreatic protocol CT to evaluate for pancreatic mass which may be isointense to pancreas on all CT imaging phases. Result Date: 7/10/2023  Narrative: CT ABDOMEN WITH IV CONTRAST INDICATION:   K66.8: Other specified disorders of peritoneum K86.89: Other specified diseases of pancreas. COMPARISON: PET/CT scan performed June 6, 2023 TECHNIQUE:  CT examination of the abdomen was performed after the administration of intravenous contrast. Scanning through the abdomen was performed in arterial, venous and delayed phases according a protocol specifically designed to evaluate upper abdominal viscera. Multiplanar 2D reformatted images were created from the source data. This examination, like all CT scans performed in the [facility], was performed utilizing techniques to minimize radiation dose exposure, including the use of iterative reconstruction and automated exposure control. Radiation dose length  product (DLP) for this visit:  619.68 mGy-cm IV Contrast:  100 mL of iohexol (OMNIPAQUE) Enteric Contrast:  Enteric contrast was administered. FINDINGS: LOWER CHEST:  No clinically significant abnormality identified in the visualized lower chest. LIVER/BILIARY TREE:  Unremarkable. GALLBLADDER:  No calcified gallstones. No pericholecystic inflammatory change. SPLEEN:  Unremarkable. PANCREAS: Very questionable 6 mm hyperdense lesion at the anterior margin of the distal pancreatic tail on image 45 of series 6, not detectable on any other CT imaging. Otherwise no evidence for pancreatic mass. ADRENAL GLANDS: Nonobstructing 4 mm calculus at the lower pole of the right kidney. Small left renal cyst. No solid renal mass. No hydronephrosis. KIDNEYS/URETERS:  Unremarkable. No hydronephrosis. VISUALIZED STOMACH AND BOWEL:  Unremarkable. ABDOMINAL CAVITY: Infiltrative mass in the small bowel mesentery surrounding but not narrowing mesenteric vessels and containing associated calcifications is a somewhat amorphous shape but is estimated at approximately 5.7 x 2.6 x 4.5 cm on images 81 of series 3 and 46 of series 603. Surrounding nodularity reidentified. Borderline retroperitoneal nodes are identified. VESSELS: Prominence of peripheral mesenteric vasculature related to mass infiltrating in the central small bowel mesentery. ABDOMINAL WALL:  Unremarkable. OSSEOUS STRUCTURES:  No acute fracture or destructive osseous lesion. Impression: Tiny 6 mm nodule in the distal pancreatic tail detectable only on delayed phase postcontrast imaging and possibly of no clinical significance. Otherwise no CT findings to account for hypermetabolism in the pancreatic tail. Reidentified infiltrative mass in the mesentery with calcifications and surrounding nodularity highly suspicious for carcinoid spectrum lesion. Workstation performed: [id number]       I independently reviewed and interpreted the above laboratory and imaging data including present and past CT, PET, heme onc and GI evals with EGD, scope, path. Discussion/Summary: This is a 20-year-old gentleman with a mesenteric mass of unknown etiology. He is now s/p diag lapa with biopsy.  Mesenteric involvement very proximal. Exceptionally firm; non-malignant appearing. I am confident lesional tissue was obtained and showed inflammatory tissue alone. Sx have resolved with essentially zero intervention. CGA likely spurious. Will see him in 6 mo with repeat scan to confirm stability.

## 2023-09-12 NOTE — LETTER
September 12, 2023     Ginger Stevens, [address] [facility]  [address] [address]    Patient: Neena Flores   YOB: 1955   Date of Visit: 9/12/2023       Dear Dr. Jenna Craven: Thank you for referring Leti Pollard to me for evaluation. Below are my notes for this consultation. If you have questions, please do not hesitate to call me. I look forward to following your patient along with you. Sincerely,        Joe Cerda MD        CC: No Recipients    Joe Cerda MD  9/12/2023 11:13 AM  Incomplete  Surgical Oncology Consultation F/U    [address] [facility]  [facility] [address] [address]    Patient:  Neena Flores  1955  [id number]    Primary Care provider: Navin Vazquez MD  [address] [facility]    Referring provider:  No referring provider defined for this encounter. Diagnoses and all orders for this visit:    Mesenteric mass        Chief Complaint   Patient presents with   • Post-op       No follow-ups on file. Oncology History    No history exists. History of Present Illness  : This is a 57-year-old gentleman seen in consultation today for a mesenteric mass. Briefly, the patient has been having near daily abdominal pain with vague cramping for several months. He describes a general feeling of discomfort daily with occasional sharp pains. He also describes daily nausea with a reduction in his appetite, though he has not had any episodes of vomiting. He states he has intermittent constipation and diarrhea, which appears to be unpredictable. He denies any symptoms of flushing, floating diarrhea, fevers, chills, significant fatigue. The patient underwent work-up with his GI providers, who repeated an EGD and colonoscopy due to his symptomatology which did not reveal a source.   He then had a CT scan of the abdomen and pelvis to work-up a previously detected finding from a noncontrasted scan in October 2022 revealing mesenteric nodularity. This demonstrated a vague mesenteric mass infiltrating the small bowel mesentery at the mid SMA. Given its appearance and a lab value of a chromogranin near thousand, the patient underwent a dotatate PET scan. This failed to show significant avidity within the mesenteric mass. Of note, there was a small focus of moderate avidity at the pancreatic tail with no corresponding CT correlate. The patient was sent for interventional radiology percutaneous biopsy but this was unable to be completed due to the position of the mass. At this juncture, the elevated chromogranin may be due to a medical interaction or he may have a false negative PET dotatate scan with this infiltrative mass representing a carcinoid tumor. However, this may also represent a lymphoma, desmoid tumor, or other etiology. The patient's symptoms are relatively nonspecific and may or may not be related to the presence of the mass. At this juncture, given the proximity on the SMA, I like to perform a hand-assisted laparoscopic biopsy. This will allow for evaluation of resectability should the histology reveal a lesion that meets criteria for resection. Likewise, obtaining a biopsy will allow a tissue diagnosis to better determine next steps. Interval  Pt is s/p diag lapa with biopsy. Mesenteric mass appeared calcified, nodular, and very proximal on the jejunum. Resectability unlikely. Sufficient sample obtained of lesional tissue. Path with benign fibrosis. States his intermittent diarrhea and constipation have significantly improved since diagnostic laparoscopy. Likewise, his nausea has almost entirely resolved and he is eating well and gaining weight. Recovering well. Review of Systems  Complete ROS Surg Onc:   Constitutional: The patient denies new or recent history of general fatigue, with weight loss, and reduced appetite.    Eyes: No complaints of visual problems, no scleral icterus. ENT: No complaints of ear pain, no hoarseness, no difficulty swallowing,  no tinnitus and no new masses in head, oral cavity, or neck. Cardiovascular: No complaints of chest pain, no palpitations, no ankle edema. Respiratory: No complaints of shortness of breath, no cough. Gastrointestinal: No complaints of jaundice, no bloody stools, no pale stools. Genitourinary: No complaints of dysuria, no hematuria, no nocturia, no frequent urination, no urethral discharge. Musculoskeletal: No complaints of weakness, paralysis, joint stiffness or arthralgias. Integumentary: No complaints of rash, no new lesions. Neurological: No complaints of convulsions, no seizures, no dizziness. Hematologic/Lymphatic: No complaints of easy bruising. Endocrine:  No hot or cold intolerance. No polydipsia, polyphagia, or polyuria. Allergy/immunology:  No environmental allergies. No food allergies. Not immunocompromised. Patient Active Problem List   Diagnosis   • Benign essential hypertension   • Carpal tunnel syndrome   • Cervical herniated disc   • Cervical radiculopathy   • Cervical stenosis of spinal canal   • Type 2 diabetes mellitus with both eyes affected by mild nonproliferative retinopathy without macular edema, with long-term current use of insulin ([facility])   • Hyperlipidemia   • Hypogonadotropic hypogonadism ([address])   • Pituitary microadenoma ([facility])   • Obstructive sleep apnea syndrome   • Spondylosis of cervical region without myelopathy or radiculopathy   • Mesenteric mass   • Vitamin D deficiency   • Low back pain with sciatica   • Sacroiliitis ([address])   • Overweight (BMI 25.0-29. 9)   • Gastroesophageal reflux disease without esophagitis   • Chronic pain syndrome   • Salivary gland adenitis   • Arthralgia of right hand   • Lumbar radiculopathy   • Stage 3 chronic kidney disease, unspecified whether stage 3a or 3b CKD ([facility])   • Iron deficiency anemia secondary to inadequate dietary iron intake   • Advanced care planning/counseling discussion   • Calcified mesenteric mass     Past Medical History:   Diagnosis Date   • Abdominal pain    • Allergic    • Arthritis     Last assessed 5/24/2013   • Avitaminosis D 2012   • Chronic kidney disease    • Chronic pain disorder     lumbar-having LESI on 6/26/23   • Colon polyp    • CPAP (continuous positive airway pressure) dependence    • Diabetes mellitus (HCC)    • Displacement of cervical intervertebral disc 2014   • Diverticulitis of colon 2021    slight   • GERD (gastroesophageal reflux disease)    • Glaucoma     Normal Pressure Glaucoma   • Headache(784.0) continuing around rt eye   • HTN (hypertension) 2007   • Hypertension    • IBS (irritable bowel syndrome) 2021   • Kidney stone    • Marijuana use     daily for chronic pain   • Nephrolithiasis 05/24/2013   • Pituitary microadenoma ([address]) 2010   • Shortness of breath     started June 2023-only when he bends over-not with activity   • Sleep apnea    • Spinal stenosis in cervical region 2014   • Sprain and strain of calcaneofibular (ligament) 12/05/2013   • Submandibular lymphadenopathy 08/08/2019   • Uric acid nephrolithiasis 1990   • Visual impairment Glaucoma, diabetic retinopothy     Past Surgical History:   Procedure Laterality Date   • ASPIRATION / INJECTION RENAL CYST      Renal Cyst Aspiration   • CATARACT EXTRACTION      12/2017- right eye, 01/2018- left eye   • COLONOSCOPY  2005   • EYE SURGERY Bilateral     Cataract Surgery   • LITHOTRIPSY Right    • MI LAPS ABD PRTM&OMENTUM DX W/WO SPEC BR/WA SPX N/A 8/24/2023    Procedure: DIAGNOSTIC LAPAROSCOPY mesenteric biopsy;  Surgeon: Jennifer Galdamez MD;  Location: [facility];  Service: Surgical Oncology   • TONSILLECTOMY     • UPPER GASTROINTESTINAL ENDOSCOPY       Family History   Problem Relation Age of Onset   • Diabetes unspecified Mother    • Heart disease Mother    • Nephrolithiasis Mother    • Kidney disease Mother    • Other Mother Back Disorder   • Thyroid disease Mother    • Diabetes type II Mother    • Hypertension Mother    • Thyroid disease unspecified Mother    • Diabetes Mother            • Arthritis Mother    • Diabetes unspecified Father    • Heart disease Father    • Hypertension Father    • Thrombosis Father            • Diabetes unspecified Sister    • Heart disease Sister    • Stroke Sister    • Diabetes unspecified Brother    • Heart disease Brother    • Nephrolithiasis Brother    • Lung cancer Brother    • Other Brother         COPD   • Diabetes unspecified Sister    • Heart disease Sister    • Diabetes Sister    • Arthritis Sister    • Diabetes unspecified Sister    • Diabetes unspecified Brother    • Heart disease Brother    • Diabetes unspecified Brother    • Other Brother         Back Disorder   • COPD Brother    • Diabetes unspecified Brother    • Other Brother         Back Disorder   • Diabetes unspecified Brother    • Stomach cancer Paternal Aunt      Social History     Socioeconomic History   • Marital status: /Civil Union     Spouse name: Not on file   • Number of children: Not on file   • Years of education: Not on file   • Highest education level: Not on file   Occupational History   • Occupation:    Tobacco Use   • Smoking status: Former     Packs/day: 0.25     Years: 2.00     Total pack years: 0.50     Types: Cigarettes     Start date:      Quit date: 1980     Years since quittin.7   • Smokeless tobacco: Never   Vaping Use   • Vaping Use: Never used   Substance and Sexual Activity   • Alcohol use:  Yes     Alcohol/week: 4.0 standard drinks of alcohol     Types: 2 Cans of beer, 2 Standard drinks or equivalent per week     Comment: social   • Drug use: Yes     Frequency: 7.0 times per week     Types: Marijuana     Comment: Use Medical Marijuana daily (1-3 capsules or tincture)   • Sexual activity: Yes     Partners: Female     Birth control/protection: Female Sterilization   Other Topics Concern   • Not on file   Social History Narrative   • Not on file     Social Determinants of Health     Financial Resource Strain: Low Risk  (2/20/2023)    Overall Financial Resource Strain (CARDIA)    • Difficulty of Paying Living Expenses: Not very hard   Food Insecurity: Not on file   Transportation Needs: No Transportation Needs (2/20/2023)    PRAPARE - Transportation    • Lack of Transportation (Medical): No    • Lack of Transportation (Non-Medical):  No   Physical Activity: Not on file   Stress: Not on file   Social Connections: Not on file   Intimate Partner Violence: Not on file   Housing Stability: Not on file       Current Outpatient Medications:   •  acetaminophen (TYLENOL) 650 mg CR tablet, Take 650 mg by mouth every 8 (eight) hours as needed for mild pain, Disp: , Rfl:   •  amLODIPine (NORVASC) 10 mg tablet, Take 1 tablet (10 mg total) by mouth daily at bedtime, Disp: 90 tablet, Rfl: 1  •  aspirin (ECOTRIN LOW STRENGTH) 81 mg EC tablet, Take 81 mg by mouth daily , Disp: , Rfl:   •  B Complex Vitamins (VITAMIN B-COMPLEX PO), Take 1 capsule by mouth daily , Disp: , Rfl:   •  BD Pen Needle Harriett U/F 32G X 4 MM MISC, Use 4x per day, Disp: 400 each, Rfl: 0  •  brimonidine-timolol (COMBIGAN) 0.2-0.5 %, Administer 1 drop to both eyes every 12 (twelve) hours, Disp: , Rfl:   •  cholecalciferol (VITAMIN D3) 1,000 units tablet, Take 1,000 Units by mouth 2 (two) times a day , Disp: , Rfl:   •  Continuous Blood Gluc  (Dexcom G6 ) AGATA, Use, Disp: , Rfl:   •  Continuous Blood Gluc Sensor (Dexcom G6 Sensor) MISC, Use, Disp: , Rfl:   •  dicyclomine (BENTYL) 20 mg tablet, Take 1 tablet (20 mg total) by mouth 4 (four) times a day (before meals and at bedtime), Disp: 120 tablet, Rfl: 5  •  doxazosin (CARDURA) 4 mg tablet, Take 1 tablet (4 mg total) by mouth every morning, Disp: 90 tablet, Rfl: 1  •  Empagliflozin (Jardiance) 25 MG TABS, Take 1 tablet (25 mg total) by mouth daily (Patient taking differently: Take 25 mg by mouth every morning Last dose 6/23), Disp: 90 tablet, Rfl: 3  •  ferrous gluconate (FERGON) 240 (27 FE) MG tablet, Take 1 tablet (240 mg total) by mouth in the morning (Patient taking differently: Take 240 mg by mouth in the morning Last dose 6/20), Disp: 90 tablet, Rfl: 1  •  fluticasone (FLONASE) 50 mcg/act nasal spray, 1 spray into each nostril daily as needed for rhinitis (Acute URI/sinusitis), Disp: 18 mL, Rfl: 0  •  gabapentin (NEURONTIN) 400 mg capsule, Take 1 capsule (400 mg total) by mouth 3 (three) times a day, Disp: 270 capsule, Rfl: 0  •  glipiZIDE (GLUCOTROL) 5 mg tablet, Take 1 tablet (5 mg total) by mouth in the morning, Disp: 90 tablet, Rfl: 2  •  Glucagon (Baqsimi Two Pack) 3 MG/DOSE POWD, Use 1 actuation as needed (low blood sugar) into 1 nostril, Disp: 1 each, Rfl: 1  •  Glucosamine-Chondroitin 250-200 MG TABS, Take 1 tablet by mouth 2 (two) times a day, Disp: , Rfl:   •  hydrochlorothiazide (HYDRODIURIL) 25 mg tablet, Take 1 tablet (25 mg total) by mouth every morning, Disp: 90 tablet, Rfl: 1  •  insulin lispro (HumaLOG KwikPen) 100 units/mL injection pen, Inject per insulin scales up to 30 units per day. (Patient taking differently: Inject per insulin scales up to 30 units per day. --Dexcom system), Disp: 30 mL, Rfl: 3  •  lidocaine (Lidoderm) 5 %, Apply 1 patch topically daily Remove & Discard patch within 12 hours or as directed by MD, Disp: 6 patch, Rfl: 0  •  lisinopril (ZESTRIL) 40 mg tablet, Take 1 tablet (40 mg total) by mouth every morning, Disp: 90 tablet, Rfl: 1  •  loratadine (CLARITIN) 10 mg tablet, Take 10 mg by mouth daily, Disp: , Rfl:   •  MULTIPLE VITAMIN PO, Take 1 tablet by mouth daily , Disp: , Rfl:   •  omeprazole (PriLOSEC) 40 MG capsule, Take 1 capsule (40 mg total) by mouth every morning, Disp: 90 capsule, Rfl: 1  •  other medication, see sig,, Medication/product name: Medical Marijuana, Disp: , Rfl:   •  polyethylene glycol (MiraLax) 17 GM/SCOOP powder, 17 g if needed, Disp: , Rfl:   •  Probiotic Product (PROBIOTIC-10) CAPS, Take 1 capsule by mouth daily , Disp: , Rfl:   •  simvastatin (ZOCOR) 20 mg tablet, Take 1 tablet (20 mg total) by mouth daily at bedtime, Disp: 90 tablet, Rfl: 3  •  testosterone (ANDROGEL) 25 MG/2.5GM (1%) GEL, PLACE 2 PACKETS ON THE SKIN DAILY, Disp: 180 g, Rfl: 1  •  vitamin E, tocopherol, 400 units capsule, Take 400 Units by mouth 2 (two) times a day , Disp: , Rfl:   •  Insulin Glargine Solostar (Lantus SoloStar) 100 UNIT/ML SOPN, Inject 0.25 mL (25 Units total) under the skin daily at bedtime, Disp: 15 mL, Rfl: 3  •  traMADol (Ultram) 50 mg tablet, Take 1 tablet (50 mg total) by mouth every 6 (six) hours as needed for moderate pain (Patient not taking: Reported on 9/12/2023), Disp: 10 tablet, Rfl: 0  Allergies   Allergen Reactions   • Clonidine Other (See Comments)     Diaphoresis, hot flashes   • Augmentin [Amoxicillin-Pot Clavulanate] Abdominal Pain   • Biaxin [Clarithromycin] Abdominal Pain   • Peanut (Diagnostic) - Food Allergy Diarrhea, GI Intolerance and Abdominal Pain   • Sitagliptin-Metformin Hcl Er Diarrhea, GI Intolerance and Abdominal Pain   • Dust Mite Extract Allergic Rhinitis   • Insulin Aspart GI Intolerance     Novolog    • Liraglutide Nausea Only and Abdominal Pain   • Metformin And Related Diarrhea and GI Intolerance   • Molds & Smuts Allergic Rhinitis   • Seasonal Ic [Cholestatin] Headache       Vitals:    09/12/23 1021   BP: 118/74   Pulse: 75   Resp: 18   Temp: (!) 97.2 °F (36.2 °C)   SpO2: 98%       Physical Exam   General: Appears well, appears stated age  Skin: Warm, anicteric.  Incisions healing well  HEENT: Normocephalic, atraumatic; sclera aniceteric, mucous membranes moist; cervical nodes without adenopathy  Cardiopulmonary: RRR, Easy WOB, no BLE edema  Abd: Flat, soft, nontender, no masses appreciated, no hepatosplenomegaly  MSK: Symmetric, no cyanosis, no overt weakness  Lymphatic: No cervical, axillary or inguinal lymphadenopathy  Neuro: Affect appropriate, no gross motor abnormalities    Labs:   Final Diagnosis   A-B. Mesentery, Mass, Biopsy  -  Fragments of fibroconnective tissue with granulation tissue, chronic inflammation, and reactive mesothelial cells. -  Negative for malignancy. Comment: The patient's abnormal imaging findings of mesenteric mass are noted. The current sample shows fragments of fibroconnective tissue with reactive changes. There is no evidence of carcinoma and work-up for neuroendocrine tumor is negative. Possible etiologies include fat necrosis, auto-immune disease, prior surgical treatment, infection, and reactive tissue adjacent to unsampled lesional tissue. Clinical and radiographic correlation is advised for adequate sampling of the target lesion. Imaging  CT abdomen and pelvis w IV and oral contrast    Addendum Date: 7/10/2023 Addendum:   Please note: On further evaluation, the 5 mm nodule at the caudal margin of the pancreatic tail is considered statistically most likely to represent CT artifact. This leaves no CT abnormality to account for hypermetabolism in the pancreas on most recent PET/CT scan. As that finding was quite convincing, consider further investigation with pancreatic protocol CT to evaluate for pancreatic mass which may be isointense to pancreas on all CT imaging phases. Result Date: 7/10/2023  Narrative: CT ABDOMEN WITH IV CONTRAST INDICATION:   K66.8: Other specified disorders of peritoneum K86.89: Other specified diseases of pancreas. COMPARISON: PET/CT scan performed June 6, 2023 TECHNIQUE:  CT examination of the abdomen was performed after the administration of intravenous contrast. Scanning through the abdomen was performed in arterial, venous and delayed phases according a protocol specifically designed to evaluate upper abdominal viscera.  Multiplanar 2D reformatted images were created from the source data. This examination, like all CT scans performed in the [facility], was performed utilizing techniques to minimize radiation dose exposure, including the use of iterative reconstruction and automated exposure control. Radiation dose length  product (DLP) for this visit:  619.68 mGy-cm IV Contrast:  100 mL of iohexol (OMNIPAQUE) Enteric Contrast:  Enteric contrast was administered. FINDINGS: LOWER CHEST:  No clinically significant abnormality identified in the visualized lower chest. LIVER/BILIARY TREE:  Unremarkable. GALLBLADDER:  No calcified gallstones. No pericholecystic inflammatory change. SPLEEN:  Unremarkable. PANCREAS: Very questionable 6 mm hyperdense lesion at the anterior margin of the distal pancreatic tail on image 45 of series 6, not detectable on any other CT imaging. Otherwise no evidence for pancreatic mass. ADRENAL GLANDS: Nonobstructing 4 mm calculus at the lower pole of the right kidney. Small left renal cyst. No solid renal mass. No hydronephrosis. KIDNEYS/URETERS:  Unremarkable. No hydronephrosis. VISUALIZED STOMACH AND BOWEL:  Unremarkable. ABDOMINAL CAVITY: Infiltrative mass in the small bowel mesentery surrounding but not narrowing mesenteric vessels and containing associated calcifications is a somewhat amorphous shape but is estimated at approximately 5.7 x 2.6 x 4.5 cm on images 81 of series 3 and 46 of series 603. Surrounding nodularity reidentified. Borderline retroperitoneal nodes are identified. VESSELS: Prominence of peripheral mesenteric vasculature related to mass infiltrating in the central small bowel mesentery. ABDOMINAL WALL:  Unremarkable. OSSEOUS STRUCTURES:  No acute fracture or destructive osseous lesion. Impression: Tiny 6 mm nodule in the distal pancreatic tail detectable only on delayed phase postcontrast imaging and possibly of no clinical significance. Otherwise no CT findings to account for hypermetabolism in the pancreatic tail.  Razia Varghese infiltrative mass in the mesentery with calcifications and surrounding nodularity highly suspicious for carcinoid spectrum lesion. Workstation performed: [id number]       I independently reviewed and interpreted the above laboratory and imaging data including present and past CT, PET, heme onc and GI evals with EGD, scope, path. Discussion/Summary: This is a 64-year-old gentleman with a mesenteric mass of unknown etiology. He is now s/p diag lapa with biopsy. Mesenteric involvement very proximal. Exceptionally firm; non-malignant appearing. I am confident lesional tissue was obtained and showed inflammatory tissue alone. Sx have resolved with essentially zero intervention. CGA likely spurious. Will see him in 6 mo with repeat scan to confirm stability.

## 2023-09-20 ENCOUNTER — OFFICE VISIT (OUTPATIENT)
Dept: ENDOCRINOLOGY | Facility: CLINIC | Age: 68
End: 2023-09-20
Payer: MEDICARE

## 2023-09-20 VITALS
HEIGHT: 63 IN | DIASTOLIC BLOOD PRESSURE: 60 MMHG | WEIGHT: 148.2 LBS | SYSTOLIC BLOOD PRESSURE: 120 MMHG | BODY MASS INDEX: 26.26 KG/M2

## 2023-09-20 DIAGNOSIS — E11.65 TYPE 2 DIABETES MELLITUS WITH HYPERGLYCEMIA, WITH LONG-TERM CURRENT USE OF INSULIN (HCC): Primary | ICD-10-CM

## 2023-09-20 DIAGNOSIS — E78.2 MIXED HYPERLIPIDEMIA: ICD-10-CM

## 2023-09-20 DIAGNOSIS — I10 BENIGN ESSENTIAL HYPERTENSION: ICD-10-CM

## 2023-09-20 DIAGNOSIS — E23.0 HYPOGONADOTROPIC HYPOGONADISM (HCC): ICD-10-CM

## 2023-09-20 DIAGNOSIS — E11.3293 TYPE 2 DIABETES MELLITUS WITH BOTH EYES AFFECTED BY MILD NONPROLIFERATIVE RETINOPATHY WITHOUT MACULAR EDEMA, WITH LONG-TERM CURRENT USE OF INSULIN (HCC): ICD-10-CM

## 2023-09-20 DIAGNOSIS — Z79.4 TYPE 2 DIABETES MELLITUS WITH BOTH EYES AFFECTED BY MILD NONPROLIFERATIVE RETINOPATHY WITHOUT MACULAR EDEMA, WITH LONG-TERM CURRENT USE OF INSULIN (HCC): ICD-10-CM

## 2023-09-20 DIAGNOSIS — Z79.4 TYPE 2 DIABETES MELLITUS WITH HYPERGLYCEMIA, WITH LONG-TERM CURRENT USE OF INSULIN (HCC): Primary | ICD-10-CM

## 2023-09-20 LAB — SL AMB POCT HEMOGLOBIN AIC: 6 (ref ?–6.5)

## 2023-09-20 PROCEDURE — 99214 OFFICE O/P EST MOD 30 MIN: CPT | Performed by: PHYSICIAN ASSISTANT

## 2023-09-20 PROCEDURE — 95251 CONT GLUC MNTR ANALYSIS I&R: CPT | Performed by: PHYSICIAN ASSISTANT

## 2023-09-20 PROCEDURE — 83036 HEMOGLOBIN GLYCOSYLATED A1C: CPT | Performed by: PHYSICIAN ASSISTANT

## 2023-09-20 RX ORDER — INSULIN LISPRO 100 [IU]/ML
INJECTION, SOLUTION INTRAVENOUS; SUBCUTANEOUS
Qty: 30 ML | Refills: 3 | Status: SHIPPED | OUTPATIENT
Start: 2023-09-20

## 2023-09-20 RX ORDER — INSULIN GLARGINE 100 [IU]/ML
25 INJECTION, SOLUTION SUBCUTANEOUS
Qty: 30 ML | Refills: 1 | Status: SHIPPED | OUTPATIENT
Start: 2023-09-20

## 2023-09-20 NOTE — PROGRESS NOTES
Established Patient Progress Note      Chief Complaint   Patient presents with   • Diabetes Type 2        Impression & Plan:    Problem List Items Addressed This Visit        Endocrine    Type 2 diabetes mellitus with both eyes affected by mild nonproliferative retinopathy without macular edema, with long-term current use of insulin ([address])     Diabetes poorly controlled based review of CGM and A1C does not seem to be accurate. Recent labs showed no anemia/iron deficiency. Complete labs as ordered. HE is having post-meal hyperglycemia since stopping ozempic but can have fasting/premeal hyperglyemia which can cause him to delay or reduce humalog dose. For now, will discontinue glipizide  Advised him to let us know if  blood sugars are significantly higher we can resume glipizide or adjust insulin. Change Humalog Carb ratio to 6  He is becoming more active now that he has recovered from medical procedure. Advised him to contact office in two weeks for review of CGM data and additional med adjustments if needed. Lab Results   Component Value Date    HGBA1C 6.0 09/20/2023            Relevant Medications    Insulin Glargine Solostar (Lantus SoloStar) 100 UNIT/ML SOPN    insulin lispro (HumaLOG KwikPen) 100 units/mL injection pen    Hypogonadotropic hypogonadism (HCC)     Continue androgel. Relevant Orders    Testosterone, free, total- Lab Collect       Cardiovascular and Mediastinum    Benign essential hypertension     Well controlled.              Other    Hyperlipidemia     Continue simvastatin        Other Visit Diagnoses     Type 2 diabetes mellitus with hyperglycemia, with long-term current use of insulin ([facility])    -  Primary    Relevant Medications    Insulin Glargine Solostar (Lantus SoloStar) 100 UNIT/ML SOPN    insulin lispro (HumaLOG KwikPen) 100 units/mL injection pen    Other Relevant Orders    POCT hemoglobin A1c (Completed)    Hgb Fractionation Cascade    Fructosamine- Lab Collect Orders Placed This Encounter   Procedures   • Hgb Fractionation Cascade     Standing Status:   Future     Standing Expiration Date:   9/20/2024   • Fructosamine- Lab Collect     Standing Status:   Future     Standing Expiration Date:   9/20/2024   • Testosterone, free, total- Lab Collect     This is a patient instruction: Fasting preferred. Collections for men not undergoing treatment must be completed between 7am-9am ONLY. Collection time restrictions are not applicable to women or men already undergoing treatment. Standing Status:   Future     Standing Expiration Date:   9/20/2024   • POCT hemoglobin A1c       History of Present Illness:   Sasha Brady is a 69 y.o. male with a history of type 2 diabetes with long term use of insulin since many years ago. Reports complications of neuropathy. Denies recent illness or hospitalizations. Denies recent severe hypoglycemic or severe hyperglycemic episodes. Denies any issues with his current regimen. home glucose monitoring: are performed regularly using Dexcom G6    Has been off Ozempic since June and GI symptoms have improved and symptoms even better after biopsy procedure on 8/24/2023. Has had Extensive GI workup which was normal other than a mesenteric mass. He was seen by Dr. Karen Steele, had Biopsy  Performed by hand assisted diagnostic lap and this showed no malignancy. Has not been active due to restrictions post procedure but now has been able to be more active. .     CGM Interpretation  Sasha Brady   Device used Dexcom G6, Home use   Indication: Type 2 Diabetes  More than 72 hours of data was reviewed. Report to be scanned to chart. Date Range: 9/7/2023-9/20/2023  Analysis of data:   Average Glucose: 185mg/dl   SD : 65mg/dl   Time in Target Range: 47%   Time Above Range: 36% high, 16% very high   Time Below Range: 1%   Interpretation of data:   Blood sugars are elevated at midnight and declining overnight with some fasting hypoglycemia.  He is having frequent post-meal hyperglycemia. Current regimen:   Jardiance 25mg daily   Glipizide 5mg daily   Lantus 25 bedtime  Humalog 1 unit per 7g carbs plus 1 unit per 25mg dl above 120    Last Eye Exam: 10/2022 exam showed mild retinopathy, but seen every 3-6 months  Last Foot Exam: 2/2023, no problems    Has hypertension: Taking amlodipine and lisinopril  Has hyperlipidemia: Taking simvastatin      For hypogonadism, taking Androgel 1%- 2 packs daily   Not sure if he feels any better since resuming testosterone replacement. Denies any side effects or urinary diffficulty. Patient Active Problem List   Diagnosis   • Benign essential hypertension   • Carpal tunnel syndrome   • Cervical herniated disc   • Cervical radiculopathy   • Cervical stenosis of spinal canal   • Type 2 diabetes mellitus with both eyes affected by mild nonproliferative retinopathy without macular edema, with long-term current use of insulin ([facility])   • Hyperlipidemia   • Hypogonadotropic hypogonadism ([address])   • Pituitary microadenoma (HCC)   • Obstructive sleep apnea syndrome   • Spondylosis of cervical region without myelopathy or radiculopathy   • Mesenteric mass   • Vitamin D deficiency   • Low back pain with sciatica   • Sacroiliitis ([address])   • Overweight (BMI 25.0-29. 9)   • Gastroesophageal reflux disease without esophagitis   • Chronic pain syndrome   • Salivary gland adenitis   • Arthralgia of right hand   • Lumbar radiculopathy   • Stage 3 chronic kidney disease, unspecified whether stage 3a or 3b CKD ([facility])   • Iron deficiency anemia secondary to inadequate dietary iron intake   • Advanced care planning/counseling discussion   • Calcified mesenteric mass      Past Medical History:   Diagnosis Date   • Abdominal pain    • Allergic    • Arthritis     Last assessed 5/24/2013   • Avitaminosis D 2012   • Chronic kidney disease    • Chronic pain disorder     lumbar-having LESI on 6/26/23   • Colon polyp    • CPAP (continuous positive airway pressure) dependence    • Diabetes mellitus ([address])    • Displacement of cervical intervertebral disc    • Diverticulitis of colon     slight   • GERD (gastroesophageal reflux disease)    • Glaucoma     Normal Pressure Glaucoma   • Headache(784.0) continuing around rt eye   • HTN (hypertension)    • Hypertension    • IBS (irritable bowel syndrome)    • Kidney stone    • Marijuana use     daily for chronic pain   • Nephrolithiasis 2013   • Pituitary microadenoma ([address])    • Shortness of breath     started 2023-only when he bends over-not with activity   • Sleep apnea    • Spinal stenosis in cervical region    • Sprain and strain of calcaneofibular (ligament) 2013   • Submandibular lymphadenopathy 2019   • Uric acid nephrolithiasis    • Visual impairment Glaucoma, diabetic retinopothy      Past Surgical History:   Procedure Laterality Date   • ASPIRATION / INJECTION RENAL CYST      Renal Cyst Aspiration   • CATARACT EXTRACTION      2017- right eye, 2018- left eye   • COLONOSCOPY     • EYE SURGERY Bilateral     Cataract Surgery   • LITHOTRIPSY Right    • MN LAPS ABD PRTM&OMENTUM DX W/WO SPEC BR/WA SPX N/A 2023    Procedure: DIAGNOSTIC LAPAROSCOPY mesenteric biopsy;  Surgeon: Viviane Benton MD;  Location: [facility];  Service: Surgical Oncology   • TONSILLECTOMY     • UPPER GASTROINTESTINAL ENDOSCOPY        Family History   Problem Relation Age of Onset   • Diabetes unspecified Mother    • Heart disease Mother    • Nephrolithiasis Mother    • Kidney disease Mother    • Other Mother         Back Disorder   • Thyroid disease Mother    • Diabetes type II Mother    • Hypertension Mother    • Thyroid disease unspecified Mother    • Diabetes Mother            • Arthritis Mother    • Diabetes unspecified Father    • Heart disease Father    • Hypertension Father    • Thrombosis Father            • Diabetes unspecified Sister    • Heart disease Sister    • Stroke Sister    • Diabetes unspecified Brother    • Heart disease Brother    • Nephrolithiasis Brother    • Lung cancer Brother    • Other Brother         COPD   • Diabetes unspecified Sister    • Heart disease Sister    • Diabetes Sister    • Arthritis Sister    • Diabetes unspecified Sister    • Diabetes unspecified Brother    • Heart disease Brother    • Diabetes unspecified Brother    • Other Brother         Back Disorder   • COPD Brother    • Diabetes unspecified Brother    • Other Brother         Back Disorder   • Diabetes unspecified Brother    • Stomach cancer Paternal Aunt      Social History     Tobacco Use   • Smoking status: Former     Packs/day: 0.25     Years: 2.00     Total pack years: 0.50     Types: Cigarettes     Start date: 1     Quit date: 1980     Years since quittin.7   • Smokeless tobacco: Never   Substance Use Topics   • Alcohol use:  Yes     Alcohol/week: 4.0 standard drinks of alcohol     Types: 2 Cans of beer, 2 Standard drinks or equivalent per week     Comment: social     Allergies   Allergen Reactions   • Clonidine Other (See Comments)     Diaphoresis, hot flashes   • Augmentin [Amoxicillin-Pot Clavulanate] Abdominal Pain   • Biaxin [Clarithromycin] Abdominal Pain   • Peanut (Diagnostic) - Food Allergy Diarrhea, GI Intolerance and Abdominal Pain   • Sitagliptin-Metformin Hcl Er Diarrhea, GI Intolerance and Abdominal Pain   • Dust Mite Extract Allergic Rhinitis   • Insulin Aspart GI Intolerance     Novolog    • Liraglutide Nausea Only and Abdominal Pain   • Metformin And Related Diarrhea and GI Intolerance   • Molds & Smuts Allergic Rhinitis   • Seasonal Ic [Cholestatin] Headache         Current Outpatient Medications:   •  acetaminophen (TYLENOL) 650 mg CR tablet, Take 650 mg by mouth every 8 (eight) hours as needed for mild pain, Disp: , Rfl:   •  amLODIPine (NORVASC) 10 mg tablet, Take 1 tablet (10 mg total) by mouth daily at bedtime, Disp: 90 tablet, Rfl: 1  •  aspirin (ECOTRIN LOW STRENGTH) 81 mg EC tablet, Take 81 mg by mouth daily , Disp: , Rfl:   •  B Complex Vitamins (VITAMIN B-COMPLEX PO), Take 1 capsule by mouth daily , Disp: , Rfl:   •  BD Pen Needle Harriett U/F 32G X 4 MM MISC, Use 4x per day, Disp: 400 each, Rfl: 0  •  brimonidine-timolol (COMBIGAN) 0.2-0.5 %, Administer 1 drop to both eyes every 12 (twelve) hours, Disp: , Rfl:   •  cholecalciferol (VITAMIN D3) 1,000 units tablet, Take 1,000 Units by mouth 2 (two) times a day , Disp: , Rfl:   •  Continuous Blood Gluc  (Dexcom G6 ) AGATA, Use, Disp: , Rfl:   •  Continuous Blood Gluc Sensor (Dexcom G6 Sensor) MISC, Use, Disp: , Rfl:   •  dicyclomine (BENTYL) 20 mg tablet, Take 1 tablet (20 mg total) by mouth 4 (four) times a day (before meals and at bedtime), Disp: 120 tablet, Rfl: 5  •  doxazosin (CARDURA) 4 mg tablet, Take 1 tablet (4 mg total) by mouth every morning, Disp: 90 tablet, Rfl: 1  •  Empagliflozin (Jardiance) 25 MG TABS, Take 1 tablet (25 mg total) by mouth daily (Patient taking differently: Take 25 mg by mouth every morning Last dose 6/23), Disp: 90 tablet, Rfl: 3  •  ferrous gluconate (FERGON) 240 (27 FE) MG tablet, Take 1 tablet (240 mg total) by mouth in the morning (Patient taking differently: Take 240 mg by mouth in the morning Last dose 6/20), Disp: 90 tablet, Rfl: 1  •  fluticasone (FLONASE) 50 mcg/act nasal spray, 1 spray into each nostril daily as needed for rhinitis (Acute URI/sinusitis), Disp: 18 mL, Rfl: 0  •  gabapentin (NEURONTIN) 400 mg capsule, Take 1 capsule (400 mg total) by mouth 3 (three) times a day, Disp: 270 capsule, Rfl: 0  •  Glucagon (Baqsimi Two Pack) 3 MG/DOSE POWD, Use 1 actuation as needed (low blood sugar) into 1 nostril, Disp: 1 each, Rfl: 1  •  Glucosamine-Chondroitin 250-200 MG TABS, Take 1 tablet by mouth 2 (two) times a day, Disp: , Rfl:   •  hydrochlorothiazide (HYDRODIURIL) 25 mg tablet, Take 1 tablet (25 mg total) by mouth every morning, Disp: 90 tablet, Rfl: 1  •  Insulin Glargine Solostar (Lantus SoloStar) 100 UNIT/ML SOPN, Inject 0.25 mL (25 Units total) under the skin daily at bedtime, Disp: 30 mL, Rfl: 1  •  insulin lispro (HumaLOG KwikPen) 100 units/mL injection pen, Inject per insulin scales up to 30 units per day., Disp: 30 mL, Rfl: 3  •  lidocaine (Lidoderm) 5 %, Apply 1 patch topically daily Remove & Discard patch within 12 hours or as directed by MD, Disp: 6 patch, Rfl: 0  •  lisinopril (ZESTRIL) 40 mg tablet, Take 1 tablet (40 mg total) by mouth every morning, Disp: 90 tablet, Rfl: 1  •  loratadine (CLARITIN) 10 mg tablet, Take 10 mg by mouth daily, Disp: , Rfl:   •  MULTIPLE VITAMIN PO, Take 1 tablet by mouth daily , Disp: , Rfl:   •  omeprazole (PriLOSEC) 40 MG capsule, Take 1 capsule (40 mg total) by mouth every morning, Disp: 90 capsule, Rfl: 1  •  other medication, see sig,, Medication/product name: Medical Marijuana, Disp: , Rfl:   •  polyethylene glycol (MiraLax) 17 GM/SCOOP powder, 17 g if needed, Disp: , Rfl:   •  Probiotic Product (PROBIOTIC-10) CAPS, Take 1 capsule by mouth daily , Disp: , Rfl:   •  simvastatin (ZOCOR) 20 mg tablet, Take 1 tablet (20 mg total) by mouth daily at bedtime, Disp: 90 tablet, Rfl: 3  •  testosterone (ANDROGEL) 25 MG/2.5GM (1%) GEL, PLACE 2 PACKETS ON THE SKIN DAILY, Disp: 180 g, Rfl: 1  •  vitamin E, tocopherol, 400 units capsule, Take 400 Units by mouth 2 (two) times a day , Disp: , Rfl:     Review of Systems   Constitutional: Negative for activity change, appetite change and fatigue. HENT: Negative for sore throat, trouble swallowing and voice change. Eyes: Negative for visual disturbance. Respiratory: Negative for choking, chest tightness and shortness of breath. Cardiovascular: Negative for chest pain, palpitations and leg swelling. Gastrointestinal: Negative for abdominal pain, constipation and diarrhea.    Endocrine: Negative for cold intolerance, heat intolerance, polydipsia, polyphagia and polyuria. Genitourinary: Negative for frequency. Musculoskeletal: Negative for arthralgias and myalgias. Skin: Negative for rash. Neurological: Negative for dizziness and syncope. Hematological: Negative for adenopathy. Psychiatric/Behavioral: Negative for sleep disturbance. All other systems reviewed and are negative. Physical Exam:  Body mass index is 26.25 kg/m². /60   Ht 5' 3" (1.6 m)   Wt 67.2 kg (148 lb 3.2 oz)   BMI 26.25 kg/m²    Wt Readings from Last 3 Encounters:   09/20/23 67.2 kg (148 lb 3.2 oz)   09/12/23 66.7 kg (147 lb)   08/24/23 65.8 kg (145 lb)       Physical Exam  Vitals reviewed. Constitutional:       General: He is not in acute distress. Appearance: He is well-developed. HENT:      Head: Normocephalic and atraumatic. Eyes:      Conjunctiva/sclera: Conjunctivae normal.      Pupils: Pupils are equal, round, and reactive to light. Neck:      Thyroid: No thyromegaly. Cardiovascular:      Rate and Rhythm: Normal rate and regular rhythm. Heart sounds: Normal heart sounds. No murmur heard. Pulmonary:      Effort: Pulmonary effort is normal. No respiratory distress. Breath sounds: Normal breath sounds. No wheezing or rales. Abdominal:      General: Bowel sounds are normal. There is no distension. Palpations: Abdomen is soft. Tenderness: There is no abdominal tenderness. Musculoskeletal:         General: Normal range of motion. Cervical back: Normal range of motion and neck supple. Lymphadenopathy:      Cervical: No cervical adenopathy. Skin:     General: Skin is warm and dry. Neurological:      Mental Status: He is alert and oriented to person, place, and time.            Labs:   Lab Results   Component Value Date    HGBA1C 6.0 09/20/2023    HGBA1C 5.6 05/02/2023    HGBA1C 5.4 01/24/2023     Lab Results   Component Value Date    CREATININE 1.17 08/08/2023    CREATININE 1.20 06/09/2023    CREATININE 1.23 01/24/2023    BUN 31 (H) 08/08/2023     10/28/2017    K 4.5 08/08/2023     08/08/2023    CO2 29 08/08/2023     eGFR   Date Value Ref Range Status   08/08/2023 63 ml/min/1.73sq m Final     Lab Results   Component Value Date    CHOL 141 10/28/2017    HDL 34 (L) 08/08/2023    TRIG 135 08/08/2023     Lab Results   Component Value Date    ALT 29 08/08/2023    AST 11 08/08/2023    ALKPHOS 69 08/08/2023    BILITOT 1.2 10/28/2017     Lab Results   Component Value Date    BOZ2ZGERAPAA 3.710 01/24/2023    PKU3LCDNVIHV 4.350 07/18/2022    EJR4XQCTIMGD 3.020 06/10/2021     Lab Results   Component Value Date    FREET4 0.97 01/24/2023             Discussed with the patient and all questioned fully answered. He will call me if any problems arise. Follow-up appointment in 3 months. Counseled patient on diagnostic results, prognosis, risk and benefit of treatment options, instruction for management, importance of treatment compliance, Risk  factor reduction and impressions    There are no Patient Instructions on file for this visit.     Michelle Romero PA-C

## 2023-09-20 NOTE — ASSESSMENT & PLAN NOTE
Diabetes poorly controlled based review of CGM and A1C does not seem to be accurate. Recent labs showed no anemia/iron deficiency. Complete labs as ordered. HE is having post-meal hyperglycemia since stopping ozempic but can have fasting/premeal hyperglyemia which can cause him to delay or reduce humalog dose. For now, will discontinue glipizide  Advised him to let us know if  blood sugars are significantly higher we can resume glipizide or adjust insulin. Change Humalog Carb ratio to 6  He is becoming more active now that he has recovered from medical procedure. Advised him to contact office in two weeks for review of CGM data and additional med adjustments if needed.    Lab Results   Component Value Date    HGBA1C 6.0 09/20/2023

## 2023-09-22 ENCOUNTER — TELEPHONE (OUTPATIENT)
Dept: HEMATOLOGY ONCOLOGY | Facility: CLINIC | Age: 68
End: 2023-09-22

## 2023-09-22 NOTE — TELEPHONE ENCOUNTER
Patient Call    Who are you speaking with? Patient    If it is not the patient, are they listed on an active communication consent form? N/A   What is the reason for this call? The patient would like to know if there is a certain time before his appointment that he should complete his Chromogranin A test.     The patient states the office can leave him a message [email or web address] or they can call him to let him know. Does this require a call back? Yes   If a call back is required, please list best call back number [phone]   If a call back is required, advise that a message will be forwarded to their care team and someone will return their call as soon as possible. Did you relay this information to the patient?  Yes

## 2023-09-22 NOTE — TELEPHONE ENCOUNTER
Appointment Change  Cancel, Reschedule, Change to Virtual      Who are you speaking with? Patient   If it is not the patient, is the caller listed on the communication consent form? N/A   Which provider is the appointment scheduled with? Danna Cohen PA-C   When was the original appointment scheduled? Please list date and time                   10/03/2023 @3PM    At which location is the appointment scheduled to take place? [id number]   Was the appointment rescheduled? Was the appointment changed from an in person visit to a virtual visit? If so, please list the details of the change. Yes, 10/11/2023 @3:30PM    What is the reason for the appointment change? Spouse has surgery this day.

## 2023-09-26 ENCOUNTER — IOP CHECK (OUTPATIENT)
Dept: URBAN - METROPOLITAN AREA CLINIC 6 | Facility: CLINIC | Age: 68
End: 2023-09-26

## 2023-09-26 DIAGNOSIS — H40.1122: ICD-10-CM

## 2023-09-26 DIAGNOSIS — H40.1113: ICD-10-CM

## 2023-09-26 DIAGNOSIS — H04.123: ICD-10-CM

## 2023-09-26 PROCEDURE — 92012 INTRM OPH EXAM EST PATIENT: CPT

## 2023-09-26 PROCEDURE — 92083 EXTENDED VISUAL FIELD XM: CPT

## 2023-09-26 ASSESSMENT — TONOMETRY
OS_IOP_MMHG: 12
OD_IOP_MMHG: 13

## 2023-09-26 ASSESSMENT — VISUAL ACUITY
OD_SC: 20/30
OS_SC: 20/25

## 2023-09-27 ENCOUNTER — APPOINTMENT (OUTPATIENT)
Dept: LAB | Age: 68
End: 2023-09-27
Payer: MEDICARE

## 2023-09-27 DIAGNOSIS — Z79.4 TYPE 2 DIABETES MELLITUS WITH HYPERGLYCEMIA, WITH LONG-TERM CURRENT USE OF INSULIN (HCC): ICD-10-CM

## 2023-09-27 DIAGNOSIS — E11.65 TYPE 2 DIABETES MELLITUS WITH HYPERGLYCEMIA, WITH LONG-TERM CURRENT USE OF INSULIN (HCC): ICD-10-CM

## 2023-09-27 DIAGNOSIS — E23.0 HYPOGONADOTROPIC HYPOGONADISM (HCC): ICD-10-CM

## 2023-09-27 DIAGNOSIS — K63.89 MESENTERIC MASS: ICD-10-CM

## 2023-09-27 PROCEDURE — 36415 COLL VENOUS BLD VENIPUNCTURE: CPT

## 2023-09-27 PROCEDURE — 84402 ASSAY OF FREE TESTOSTERONE: CPT

## 2023-09-27 PROCEDURE — 82985 ASSAY OF GLYCATED PROTEIN: CPT

## 2023-09-27 PROCEDURE — 84403 ASSAY OF TOTAL TESTOSTERONE: CPT

## 2023-09-27 PROCEDURE — 83020 HEMOGLOBIN ELECTROPHORESIS: CPT

## 2023-09-27 PROCEDURE — 86316 IMMUNOASSAY TUMOR OTHER: CPT

## 2023-09-28 LAB
FRUCTOSAMINE SERPL-SCNC: 268 UMOL/L (ref 0–285)
TESTOST FREE SERPL-MCNC: 2.2 PG/ML (ref 6.6–18.1)
TESTOST SERPL-MCNC: 186 NG/DL (ref 264–916)

## 2023-09-29 LAB — CGA SERPL-MCNC: 417.7 NG/ML (ref 0–101.8)

## 2023-09-30 LAB
HGB A MFR BLD: 2.4 % (ref 1.8–3.2)
HGB A MFR BLD: 97.6 % (ref 96.4–98.8)
HGB F MFR BLD: 0 % (ref 0–2)
HGB FRACT BLD-IMP: NORMAL
HGB S MFR BLD: 0 %

## 2023-10-04 DIAGNOSIS — M54.12 CERVICAL RADICULOPATHY: ICD-10-CM

## 2023-10-04 DIAGNOSIS — M54.16 LUMBAR RADICULOPATHY: ICD-10-CM

## 2023-10-04 RX ORDER — GABAPENTIN 400 MG/1
400 CAPSULE ORAL 3 TIMES DAILY
Qty: 270 CAPSULE | Refills: 3 | OUTPATIENT
Start: 2023-10-04

## 2023-10-11 ENCOUNTER — OFFICE VISIT (OUTPATIENT)
Dept: HEMATOLOGY ONCOLOGY | Facility: CLINIC | Age: 68
End: 2023-10-11
Payer: MEDICARE

## 2023-10-11 VITALS
BODY MASS INDEX: 26.54 KG/M2 | SYSTOLIC BLOOD PRESSURE: 100 MMHG | OXYGEN SATURATION: 97 % | TEMPERATURE: 97.9 F | HEIGHT: 63 IN | DIASTOLIC BLOOD PRESSURE: 62 MMHG | HEART RATE: 68 BPM | RESPIRATION RATE: 17 BRPM | WEIGHT: 149.8 LBS

## 2023-10-11 DIAGNOSIS — K66.8 CALCIFIED MESENTERIC MASS: Primary | ICD-10-CM

## 2023-10-11 PROCEDURE — 99215 OFFICE O/P EST HI 40 MIN: CPT | Performed by: PHYSICIAN ASSISTANT

## 2023-10-11 NOTE — PROGRESS NOTES
Hematology/Oncology Outpatient Follow- up Note  Lauran Galeazzi 69 y.o. male MRN: @ Encounter: [id number]        Date:  10/11/2023      Assessment / Plan:    Incidentally identified mesenteric masses with calcifications on CT scan October 2022 which on retrospect were there on September 2021 CT scan but had increased in size from 1.7 x 1.5 cm to 2.3 x 2.3 cm.      6/5/23 He had elevation of chromogranin A to 981 (0-101). Normal serotonin level. He did not have any flushing. He did have some diarrhea. Underwent diag laparoscopy with biopsy per Dr. Ling Barrientos-   - Fragments of fibroconnective tissue with granulation tissue, chronic inflammation, and reactive mesothelial cells. - Negative for malignancy. Possible etiologies include fat necrosis, auto-immune disease, prior surgical treatment, infection, and reactive tissue adjacent to unsampled lesional tissue. There were no metaphases (dividing cells) detected to perform cytogenetic testing or flow cytometry. Per Dr. Ja Jaramillo notation -  “Mesenteric mass appeared calcified, nodular, and very proximal on the jejunum. Resectability unlikely. Sufficient sample obtained of lesional tissue. Reported that his intermittent diarrhea and constipation have significantly improved since diagnostic laparoscopy. Exceptionally firm; non-malignant appearing. I am confident lesional tissue was obtained and showed inflammatory tissue alone. Sx have resolved with essentially zero intervention. CGA likely spurious. Will see him in 6 mo with repeat scan to confirm stability.”    At this time, he will follow with Dr. Ling Barrientos. Follow up chromogranin A requested in February as to trend. At this time, f/u prn.           HPI:   Lacey Zarate is a 59-year-old  male seen for initial consultation 5/18/2023 at the referral of Betty Severino MD regarding Retroperitoneal/mesenteric mass,      PMH hypogonadism, pituitary microadenoma, diabetes mellitus type 2 diagnosed at 48years old, he follows with endocrinology. There has been discordance with CGM and hemoglobin A1c,  irritable bowel syndrome, obstructive sleep apnea, sacroiliitis. He was put on Ozempic for diabetes mellitus controlled, with subsequent diarrhea, weight loss about 20 pounds over 3 months in early 2023. Had abdominal cramps, diarrhea. Presented to the ER. CAT scan of the pelvis area 10/27/22 showed Three partially visualized central mesenteric masses with calcifications measuring up to 2.3 x 2.3 cm,  previously 1.7 x 1.5 cm, Mild haziness of the mesenteric vasculature, increased since prior study when compared to 9/1/21 CT scan. He denied any night sweats low-grade fever he reported flushing, weight loss, diarrhea denied any melena hematochezia hematuria skin rash, pruritus     Chromogranin A is elevated at 981 (0-101). Normal serotonin level. Dotatate scan on 6/2023 showed partially calcified mesenteric mass with SUV of 4, no evidence of radiotracer avid intra-abdominal adenopathy, no evidence of metastatic disease in the liver, there is an increased pancreatic tail activity SUV of 12, enlarged prostate    7/5/23 CT A/P with IV and oral contrast - Very questionable 6 mm hyperdense lesion at the anterior margin of the distal pancreatic tail on image 45 of series 6, not detectable on any other CT imaging. the 5 mm nodule at the caudal margin of the pancreatic tail is considered statistically most likely to represent CT artifact. This leaves no CT abnormality to account for hypermetabolism in the pancreas on most recent PET/CT scan. Infiltrative mass in the small bowel mesentery surrounding but not narrowing mesenteric vessels and containing associated calcifications is a somewhat amorphous shape but is estimated at approximately 5.7 x 2.6 x 4.5 cm on images 81 of series 3 and 46 of series 603. Surrounding nodularity reidentified. Borderline retroperitoneal nodes are identified.      VESSELS: Prominence of peripheral mesenteric vasculature related to mass infiltrating in the central small bowel mesentery. 8/24/23 -Mesenteric mass diag laparoscopy with biopsy per Dr. Diamond Murray   Final Diagnosis  A-B. Mesentery, Mass, Biopsy  - Fragments of fibroconnective tissue with granulation tissue, chronic inflammation, and reactive mesothelial cells. - Negative for malignancy. Possible etiologies include fat necrosis, auto-immune disease, prior surgical treatment, infection, and reactive tissue adjacent to unsampled lesional tissue. There were no metaphases (dividing cells) detected to perform cytogenetic testing or flow cytometry    At his his 9/12/23 f/u with Dr. Diamond Murray  “Mesenteric mass appeared calcified, nodular, and very proximal on the jejunum. Resectability unlikely. Sufficient sample obtained of lesional tissue. Path with benign fibrosis. States his intermittent diarrhea and constipation have significantly improved since diagnostic laparoscopy. Likewise, his nausea has almost entirely resolved and he is eating well and gaining weight. Recovering well. This is a 57-year-old gentleman with a mesenteric mass of unknown etiology. He is now s/p diag lapa with biopsy. Mesenteric involvement very proximal. Exceptionally firm; non-malignant appearing. I am confident lesional tissue was obtained and showed inflammatory tissue alone. Sx have resolved with essentially zero intervention. CGA likely spurious. Will see him in 6 mo with repeat scan to confirm stability.”      Interval History:    9/27/23 chromagranin A level 417      Review of Systems   Constitutional:  Negative for appetite change, chills, diaphoresis, fatigue, fever and unexpected weight change. HENT:   Negative for mouth sores, nosebleeds, sore throat, tinnitus and voice change. Eyes:  Negative for eye problems. Respiratory:  Negative for chest tightness, cough, shortness of breath and wheezing.     Cardiovascular:  Negative for chest pain, leg swelling and palpitations. Gastrointestinal:  Negative for abdominal distention, abdominal pain, blood in stool, constipation, diarrhea, nausea, rectal pain and vomiting. Endocrine: Negative for hot flashes. Genitourinary: Negative. Musculoskeletal:  Negative for gait problem and myalgias. Skin:  Negative for itching and rash. Neurological:  Negative for dizziness, gait problem, headaches, light-headedness and numbness. Hematological:  Negative for adenopathy. Psychiatric/Behavioral:  Negative for confusion and sleep disturbance. The patient is not nervous/anxious. Test Results:        Labs:   Lab Results   Component Value Date    HGB 15.1 08/08/2023    HCT 43.9 08/08/2023    MCV 88 08/08/2023     08/08/2023    WBC 11.89 (H) 08/08/2023    NRBC 0 08/08/2023     Lab Results   Component Value Date     10/28/2017    K 4.5 08/08/2023     08/08/2023    CO2 29 08/08/2023    ANIONGAP 10 09/15/2015    BUN 31 (H) 08/08/2023    CREATININE 1.17 08/08/2023    GLUCOSE 127 09/15/2015    GLUF 119 (H) 08/08/2023    CALCIUM 9.5 08/08/2023    AST 11 08/08/2023    ALT 29 08/08/2023    ALKPHOS 69 08/08/2023    PROT 8.1 10/28/2017    BILITOT 1.2 10/28/2017    EGFR 63 08/08/2023           Imaging: No results found. Allergies:    Allergies   Allergen Reactions    Clonidine Other (See Comments)     Diaphoresis, hot flashes    Augmentin [Amoxicillin-Pot Clavulanate] Abdominal Pain    Biaxin [Clarithromycin] Abdominal Pain    Peanut (Diagnostic) - Food Allergy Diarrhea, GI Intolerance and Abdominal Pain    Sitagliptin-Metformin Hcl Er Diarrhea, GI Intolerance and Abdominal Pain    Dust Mite Extract Allergic Rhinitis    Insulin Aspart GI Intolerance     Novolog     Liraglutide Nausea Only and Abdominal Pain    Metformin And Related Diarrhea and GI Intolerance    Molds & Smuts Allergic Rhinitis    Seasonal Ic [Cholestatin] Headache     Current Medications: Reviewed  PMH/FH/SH:  Reviewed      Physical Exam:    There is no height or weight on file to calculate BSA. Ht Readings from Last 3 Encounters:   23 5' 3" (1.6 m)   23 5' 3" (1.6 m)   23 5' 3" (1.6 m)        Wt Readings from Last 3 Encounters:   23 67.2 kg (148 lb 3.2 oz)   23 66.7 kg (147 lb)   23 65.8 kg (145 lb)        Temp Readings from Last 3 Encounters:   23 (!) 97.2 °F (36.2 °C)   23 (!) 96.7 °F (35.9 °C)   23 98 °F (36.7 °C)        BP Readings from Last 3 Encounters:   23 120/60   23 118/74   23 96/70             Physical Exam  Vitals reviewed. Constitutional:       General: He is not in acute distress. Appearance: He is well-developed. He is not diaphoretic. HENT:      Head: Normocephalic and atraumatic. Eyes:      Conjunctiva/sclera: Conjunctivae normal.   Neck:      Trachea: No tracheal deviation. Cardiovascular:      Rate and Rhythm: Normal rate and regular rhythm. Heart sounds: No murmur heard. No friction rub. No gallop. Pulmonary:      Effort: Pulmonary effort is normal. No respiratory distress. Breath sounds: Normal breath sounds. No wheezing or rales. Chest:      Chest wall: No tenderness. Abdominal:      General: There is no distension. Palpations: Abdomen is soft. Tenderness: There is no abdominal tenderness. Musculoskeletal:      Cervical back: Normal range of motion and neck supple. Lymphadenopathy:      Cervical: No cervical adenopathy. Skin:     General: Skin is warm and dry. Coloration: Skin is not pale. Findings: No erythema. Neurological:      Mental Status: He is alert and oriented to person, place, and time. Psychiatric:         Behavior: Behavior normal.         Thought Content:  Thought content normal.         Judgment: Judgment normal.         ECO      Emergency Contacts:    Extended Emergency Contact Information  Primary Emergency Contact: Dilia Coffman  Address: [facility] [address] [facility] [address] [facility] [address] [address] [address] [place] Phone: [phone]  Mobile Phone: [phone]  Relation: Spouse

## 2023-10-13 ENCOUNTER — ESTABLISHED COMPREHENSIVE EXAM (OUTPATIENT)
Dept: URBAN - METROPOLITAN AREA CLINIC 6 | Facility: CLINIC | Age: 68
End: 2023-10-13

## 2023-10-13 ENCOUNTER — TELEPHONE (OUTPATIENT)
Dept: ENDOCRINOLOGY | Facility: CLINIC | Age: 68
End: 2023-10-13

## 2023-10-13 DIAGNOSIS — Z79.4: ICD-10-CM

## 2023-10-13 DIAGNOSIS — E11.3293: ICD-10-CM

## 2023-10-13 PROCEDURE — 92014 COMPRE OPH EXAM EST PT 1/>: CPT

## 2023-10-13 PROCEDURE — 92202 OPSCPY EXTND ON/MAC DRAW: CPT

## 2023-10-13 PROCEDURE — 92134 CPTRZ OPH DX IMG PST SGM RTA: CPT

## 2023-10-13 ASSESSMENT — VISUAL ACUITY
OS_SC: 20/25-2
OD_SC: 20/40

## 2023-10-13 ASSESSMENT — TONOMETRY
OD_IOP_MMHG: 11
OS_IOP_MMHG: 11

## 2023-10-13 NOTE — TELEPHONE ENCOUNTER
Pt LMOM he would like CGM download and labs reviewed.  Dexcom report downloaded and forwarded to provider

## 2023-10-16 DIAGNOSIS — D75.1 ERYTHROCYTOSIS: ICD-10-CM

## 2023-10-16 DIAGNOSIS — Z79.4 TYPE 2 DIABETES MELLITUS WITH HYPERGLYCEMIA, WITH LONG-TERM CURRENT USE OF INSULIN (HCC): Primary | ICD-10-CM

## 2023-10-16 DIAGNOSIS — E23.0 HYPOGONADOTROPIC HYPOGONADISM (HCC): ICD-10-CM

## 2023-10-16 DIAGNOSIS — E11.65 TYPE 2 DIABETES MELLITUS WITH HYPERGLYCEMIA, WITH LONG-TERM CURRENT USE OF INSULIN (HCC): Primary | ICD-10-CM

## 2023-10-16 RX ORDER — TESTOSTERONE 12.5 MG/1.25G
GEL TOPICAL
Qty: 225 G | Refills: 1 | Status: SHIPPED | OUTPATIENT
Start: 2023-10-16 | End: 2023-10-16 | Stop reason: ALTCHOICE

## 2023-10-16 RX ORDER — TESTOSTERONE 20.25 MG/1.25G
GEL TOPICAL
Qty: 112.5 G | Refills: 3 | Status: SHIPPED | OUTPATIENT
Start: 2023-10-16

## 2023-10-16 RX ORDER — INSULIN GLARGINE 100 [IU]/ML
22 INJECTION, SOLUTION SUBCUTANEOUS
Qty: 30 ML | Refills: 1 | Status: SHIPPED | OUTPATIENT
Start: 2023-10-16

## 2023-11-04 DIAGNOSIS — E11.65 TYPE 2 DIABETES MELLITUS WITH HYPERGLYCEMIA, WITH LONG-TERM CURRENT USE OF INSULIN (HCC): ICD-10-CM

## 2023-11-04 DIAGNOSIS — Z79.4 TYPE 2 DIABETES MELLITUS WITH HYPERGLYCEMIA, WITH LONG-TERM CURRENT USE OF INSULIN (HCC): ICD-10-CM

## 2023-11-06 RX ORDER — PEN NEEDLE, DIABETIC 32GX 5/32"
NEEDLE, DISPOSABLE MISCELLANEOUS
Qty: 360 EACH | Refills: 2 | Status: SHIPPED | OUTPATIENT
Start: 2023-11-06

## 2023-11-07 ENCOUNTER — TELEPHONE (OUTPATIENT)
Dept: ENDOCRINOLOGY | Facility: CLINIC | Age: 68
End: 2023-11-07

## 2023-12-12 ENCOUNTER — TELEMEDICINE (OUTPATIENT)
Age: 68
End: 2023-12-12
Payer: MEDICARE

## 2023-12-12 VITALS — HEIGHT: 63 IN | WEIGHT: 149 LBS | BODY MASS INDEX: 26.4 KG/M2 | TEMPERATURE: 98.4 F

## 2023-12-12 DIAGNOSIS — J40 BRONCHITIS: Primary | ICD-10-CM

## 2023-12-12 PROCEDURE — 99213 OFFICE O/P EST LOW 20 MIN: CPT | Performed by: NURSE PRACTITIONER

## 2023-12-12 RX ORDER — PREDNISONE 20 MG/1
40 TABLET ORAL DAILY
Qty: 10 TABLET | Refills: 0 | Status: SHIPPED | OUTPATIENT
Start: 2023-12-12 | End: 2023-12-17

## 2023-12-12 RX ORDER — AZITHROMYCIN 250 MG/1
TABLET, FILM COATED ORAL
Qty: 6 TABLET | Refills: 0 | Status: SHIPPED | OUTPATIENT
Start: 2023-12-12 | End: 2023-12-16

## 2023-12-12 RX ORDER — BENZONATATE 200 MG/1
200 CAPSULE ORAL 3 TIMES DAILY PRN
Qty: 20 CAPSULE | Refills: 0 | Status: SHIPPED | OUTPATIENT
Start: 2023-12-12

## 2023-12-12 NOTE — ASSESSMENT & PLAN NOTE
-Start Zpac   -Start Prednisone   -will start tessalon pearls   -continue Muscinex  Rest and fluids advised. Educated that the course of this illness could be 2-4 weeks. Discussed symptomatic relief, such as warm steam inhalations, tylenol/ibuprofen for fevers and body aches, rest, and drink plenty of fluids. Warm salt gargles for sore throat. Discussed red flag signs to go to the ER, such as chest pain or shortness of breath.    Return to the office for reevaluation if symptoms worsen or do not improve in 1-2 weeks

## 2023-12-12 NOTE — PROGRESS NOTES
Virtual Regular Visit    Verification of patient location:    Patient is located at Home in the following state in which I hold an active license [place]      Assessment/Plan:    Problem List Items Addressed This Visit          Respiratory    Bronchitis - Primary     -Start Zpac   -Start Prednisone   -will start tessalon pearls   -continue Muscinex  Rest and fluids advised. Educated that the course of this illness could be 2-4 weeks. Discussed symptomatic relief, such as warm steam inhalations, tylenol/ibuprofen for fevers and body aches, rest, and drink plenty of fluids. Warm salt gargles for sore throat. Discussed red flag signs to go to the ER, such as chest pain or shortness of breath. Return to the office for reevaluation if symptoms worsen or do not improve in 1-2 weeks           Relevant Medications    azithromycin (ZITHROMAX) 250 mg tablet    benzonatate (TESSALON) 200 MG capsule    predniSONE 20 mg tablet            Reason for visit is   Chief Complaint   Patient presents with    Virtual Brief Visit     Patient started 2 weeks ago with water in ears that cleared last Wednesday started to cough got worse Friday night with congestion,headache, fatigue,chills, shortness of breath blowing yellow mucus out of nose sore throat from coughing  took covid test on Sunday it was negative    Health Screening     Medicare Wellness Visit scheduled for 2/21 fall risk Diabetic foot exam to be done at next appointment    Virtual Regular Visit        Encounter provider CHRISTIANO Valadez    Provider located at [address]  [address] [facility] [address] [place] [id number] [phone]      Recent Visits  No visits were found meeting these conditions.   Showing recent visits within past 7 days and meeting all other requirements  Today's Visits  Date Type Provider Dept   12/12/23 1792 [facility], [address] [place] [address] Primary Care [place]   Showing today's visits and meeting all other requirements  Future Appointments  No visits were found meeting these conditions. Showing future appointments within next 150 days and meeting all other requirements       The patient was identified by name and date of birth. Sonu Saravia was informed that this is a telemedicine visit and that the visit is being conducted through the [employer]. He agrees to proceed. .  My office door was closed. No one else was in the room. He acknowledged consent and understanding of privacy and security of the video platform. The patient has agreed to participate and understands they can discontinue the visit at any time. Patient is aware this is a billable service. Subjective  Sonu Saravia is a 77 y.o. male  . Patient resents today with URI-like symptoms. He reports that his symptoms started approximately 2 weeks ago with ear fullness which cleared last Wednesday. He then started with a cough which got worse on Friday night at which point he developed congestion, headaches, fatigue, chills, shortness of breath, and rhinorrhea with yellow mucus.   took covid test on Sunday it was negative    He reports his wife is current sick     Had his flu shot this year   Had COVID booster as well               Past Medical History:   Diagnosis Date    Abdominal pain     Allergic     Arthritis     Last assessed 5/24/2013    Avitaminosis D 2012    Chronic kidney disease     Chronic pain disorder     lumbar-having LESI on 6/26/23    Colon polyp     CPAP (continuous positive airway pressure) dependence     Diabetes mellitus ([address])     Displacement of cervical intervertebral disc 2014    Diverticulitis of colon 2021    slight    GERD (gastroesophageal reflux disease)     Glaucoma     Normal Pressure Glaucoma    Headache(784.0) continuing around rt eye    HTN (hypertension) 2007    Hypertension     IBS (irritable bowel syndrome) 2021    Kidney stone Marijuana use     daily for chronic pain    Nephrolithiasis 05/24/2013    Pituitary microadenoma ([address]) 2010    Shortness of breath     started June 2023-only when he bends over-not with activity    Sleep apnea     Spinal stenosis in cervical region 2014    Sprain and strain of calcaneofibular (ligament) 12/05/2013    Submandibular lymphadenopathy 08/08/2019    Uric acid nephrolithiasis 1990    Visual impairment Glaucoma, diabetic retinopothy       Past Surgical History:   Procedure Laterality Date    ASPIRATION / INJECTION RENAL CYST      Renal Cyst Aspiration    CATARACT EXTRACTION      12/2017- right eye, 01/2018- left eye    COLONOSCOPY  2005    EYE SURGERY Bilateral     Cataract Surgery    LITHOTRIPSY Right     OR LAPS ABD PRTM&OMENTUM DX W/WO SPEC BR/WA SPX N/A 8/24/2023    Procedure: DIAGNOSTIC LAPAROSCOPY mesenteric biopsy;  Surgeon: Eliza Gonzalez MD;  Location: [facility];  Service: Surgical Oncology    TONSILLECTOMY      UPPER GASTROINTESTINAL ENDOSCOPY         Current Outpatient Medications   Medication Sig Dispense Refill    acetaminophen (TYLENOL) 650 mg CR tablet Take 650 mg by mouth every 8 (eight) hours as needed for mild pain      amLODIPine (NORVASC) 10 mg tablet Take 1 tablet (10 mg total) by mouth daily at bedtime 90 tablet 1    aspirin (ECOTRIN LOW STRENGTH) 81 mg EC tablet Take 81 mg by mouth daily       azithromycin (ZITHROMAX) 250 mg tablet Take 2 tablets today then 1 tablet daily x 4 days 6 tablet 0    B Complex Vitamins (VITAMIN B-COMPLEX PO) Take 1 capsule by mouth daily       BD Pen Needle Harriett U/F 32G X 4 MM MISC USE 4 TIMES DAILY 360 each 2    benzonatate (TESSALON) 200 MG capsule Take 1 capsule (200 mg total) by mouth 3 (three) times a day as needed for cough 20 capsule 0    brimonidine-timolol (COMBIGAN) 0.2-0.5 % Administer 1 drop to both eyes every 12 (twelve) hours      cholecalciferol (VITAMIN D3) 1,000 units tablet Take 1,000 Units by mouth 2 (two) times a day Continuous Blood Gluc  (Dexcom G6 ) AGATA Use      Continuous Blood Gluc Sensor (Dexcom G6 Sensor) MISC Use      dicyclomine (BENTYL) 20 mg tablet Take 1 tablet (20 mg total) by mouth 4 (four) times a day (before meals and at bedtime) 120 tablet 5    doxazosin (CARDURA) 4 mg tablet Take 1 tablet (4 mg total) by mouth every morning 90 tablet 1    Empagliflozin (Jardiance) 25 MG TABS Take 1 tablet (25 mg total) by mouth daily (Patient taking differently: Take 25 mg by mouth every morning Last dose 6/23) 90 tablet 3    ferrous gluconate (FERGON) 240 (27 FE) MG tablet Take 1 tablet (240 mg total) by mouth in the morning (Patient taking differently: Take 240 mg by mouth in the morning Last dose 6/20) 90 tablet 1    fluticasone (FLONASE) 50 mcg/act nasal spray 1 spray into each nostril daily as needed for rhinitis (Acute URI/sinusitis) 18 mL 0    gabapentin (NEURONTIN) 400 mg capsule Take 1 capsule (400 mg total) by mouth 3 (three) times a day 270 capsule 0    Glucagon (Baqsimi Two Pack) 3 MG/DOSE POWD Use 1 actuation as needed (low blood sugar) into 1 nostril 1 each 1    Glucosamine-Chondroitin 250-200 MG TABS Take 1 tablet by mouth 2 (two) times a day      hydrochlorothiazide (HYDRODIURIL) 25 mg tablet Take 1 tablet (25 mg total) by mouth every morning 90 tablet 1    Insulin Glargine Solostar (Lantus SoloStar) 100 UNIT/ML SOPN Inject 0.22 mL (22 Units total) under the skin daily at bedtime 30 mL 1    insulin lispro (HumaLOG KwikPen) 100 units/mL injection pen Inject per insulin scales up to 30 units per day.  30 mL 3    lidocaine (Lidoderm) 5 % Apply 1 patch topically daily Remove & Discard patch within 12 hours or as directed by MD 6 patch 0    lisinopril (ZESTRIL) 40 mg tablet Take 1 tablet (40 mg total) by mouth every morning 90 tablet 1    loratadine (CLARITIN) 10 mg tablet Take 10 mg by mouth daily      MULTIPLE VITAMIN PO Take 1 tablet by mouth daily       omeprazole (PriLOSEC) 40 MG capsule Take 1 capsule (40 mg total) by mouth every morning 90 capsule 1    other medication, see sig, Medication/product name: Medical Marijuana      polyethylene glycol (MiraLax) 17 GM/SCOOP powder 17 g if needed      predniSONE 20 mg tablet Take 2 tablets (40 mg total) by mouth daily for 5 days 10 tablet 0    Probiotic Product (PROBIOTIC-10) CAPS Take 1 capsule by mouth daily       simvastatin (ZOCOR) 20 mg tablet Take 1 tablet (20 mg total) by mouth daily at bedtime 90 tablet 3    testosterone (ANDROGEL) 1.62 % Apply 3 packets daily (Dose/concentration change) 112.5 g 3    vitamin E, tocopherol, 400 units capsule Take 400 Units by mouth 2 (two) times a day        No current facility-administered medications for this visit. Allergies   Allergen Reactions    Clonidine Other (See Comments)     Diaphoresis, hot flashes    Augmentin [Amoxicillin-Pot Clavulanate] Abdominal Pain    Biaxin [Clarithromycin] Abdominal Pain    Peanut (Diagnostic) - Food Allergy Diarrhea, GI Intolerance and Abdominal Pain    Sitagliptin-Metformin Hcl Er Diarrhea, GI Intolerance and Abdominal Pain    Dust Mite Extract Allergic Rhinitis    Insulin Aspart GI Intolerance     Novolog     Liraglutide Nausea Only and Abdominal Pain    Metformin And Related Diarrhea and GI Intolerance    Molds & Smuts Allergic Rhinitis    Seasonal Ic [Cholestatin] Headache       Review of Systems   Constitutional:  Positive for chills. Negative for activity change, appetite change, diaphoresis and fever. HENT:  Positive for congestion and sore throat. Negative for ear discharge, ear pain, postnasal drip, rhinorrhea, sinus pressure and sinus pain. Eyes:  Negative for pain, discharge, itching and visual disturbance. Respiratory:  Positive for cough and shortness of breath. Negative for chest tightness and wheezing. Cardiovascular:  Negative for chest pain, palpitations and leg swelling.    Gastrointestinal:  Negative for abdominal pain, constipation, diarrhea, nausea and vomiting. Endocrine: Negative for polydipsia, polyphagia and polyuria. Genitourinary:  Negative for difficulty urinating, dysuria and urgency. Musculoskeletal:  Negative for arthralgias, back pain and neck pain. Skin:  Negative for rash and wound. Neurological:  Positive for headaches. Negative for dizziness, weakness and numbness. Video Exam    Vitals:    12/12/23 1250   Temp: 98.4 °F (36.9 °C)   TempSrc: Temporal   Weight: 67.6 kg (149 lb)   Height: 5' 3" (1.6 m)       Physical Exam  Constitutional:       General: He is not in acute distress. Appearance: He is well-developed. He is not diaphoretic. HENT:      Head: Normocephalic and atraumatic. Right Ear: External ear normal.      Left Ear: External ear normal.      Nose: Nose normal.      Mouth/Throat:      Mouth: Mucous membranes are moist.      Pharynx: No oropharyngeal exudate or posterior oropharyngeal erythema. Eyes:      General:         Right eye: No discharge. Left eye: No discharge. Conjunctiva/sclera: Conjunctivae normal.      Pupils: Pupils are equal, round, and reactive to light. Neck:      Thyroid: No thyromegaly. Cardiovascular:      Rate and Rhythm: Normal rate and regular rhythm. Heart sounds: Normal heart sounds. No murmur heard. No friction rub. No gallop. Pulmonary:      Effort: Pulmonary effort is normal. No respiratory distress. Breath sounds: Normal breath sounds. No stridor. No wheezing or rales. Abdominal:      General: Bowel sounds are normal. There is no distension. Palpations: Abdomen is soft. Tenderness: There is no abdominal tenderness. Musculoskeletal:      Cervical back: Normal range of motion and neck supple. Lymphadenopathy:      Cervical: No cervical adenopathy. Skin:     General: Skin is warm and dry. Findings: No erythema or rash. Neurological:      Mental Status: He is alert and oriented to person, place, and time.    Psychiatric: Behavior: Behavior normal.         Thought Content:  Thought content normal.         Judgment: Judgment normal.          Visit Time  Total Visit Duration: 15

## 2024-01-11 ENCOUNTER — APPOINTMENT (OUTPATIENT)
Dept: LAB | Age: 69
End: 2024-01-11
Payer: MEDICARE

## 2024-01-11 DIAGNOSIS — E23.0 HYPOGONADOTROPIC HYPOGONADISM (HCC): ICD-10-CM

## 2024-01-11 DIAGNOSIS — D75.1 ERYTHROCYTOSIS: ICD-10-CM

## 2024-01-11 DIAGNOSIS — K66.8 CALCIFIED MESENTERIC MASS: ICD-10-CM

## 2024-01-11 LAB
BASOPHILS # BLD AUTO: 0.04 THOUSANDS/ÂΜL (ref 0–0.1)
BASOPHILS NFR BLD AUTO: 0 % (ref 0–1)
EOSINOPHIL # BLD AUTO: 0.45 THOUSAND/ÂΜL (ref 0–0.61)
EOSINOPHIL NFR BLD AUTO: 5 % (ref 0–6)
ERYTHROCYTE [DISTWIDTH] IN BLOOD BY AUTOMATED COUNT: 15.9 % (ref 11.6–15.1)
HCT VFR BLD AUTO: 48.4 % (ref 36.5–49.3)
HGB BLD-MCNC: 16.5 G/DL (ref 12–17)
IMM GRANULOCYTES # BLD AUTO: 0.03 THOUSAND/UL (ref 0–0.2)
IMM GRANULOCYTES NFR BLD AUTO: 0 % (ref 0–2)
LYMPHOCYTES # BLD AUTO: 3.78 THOUSANDS/ÂΜL (ref 0.6–4.47)
LYMPHOCYTES NFR BLD AUTO: 41 % (ref 14–44)
MCH RBC QN AUTO: 30.3 PG (ref 26.8–34.3)
MCHC RBC AUTO-ENTMCNC: 34.1 G/DL (ref 31.4–37.4)
MCV RBC AUTO: 89 FL (ref 82–98)
MONOCYTES # BLD AUTO: 1.01 THOUSAND/ÂΜL (ref 0.17–1.22)
MONOCYTES NFR BLD AUTO: 11 % (ref 4–12)
NEUTROPHILS # BLD AUTO: 3.82 THOUSANDS/ÂΜL (ref 1.85–7.62)
NEUTS SEG NFR BLD AUTO: 43 % (ref 43–75)
NRBC BLD AUTO-RTO: 0 /100 WBCS
PLATELET # BLD AUTO: 292 THOUSANDS/UL (ref 149–390)
PMV BLD AUTO: 9.5 FL (ref 8.9–12.7)
RBC # BLD AUTO: 5.45 MILLION/UL (ref 3.88–5.62)
WBC # BLD AUTO: 9.13 THOUSAND/UL (ref 4.31–10.16)

## 2024-01-11 PROCEDURE — 84403 ASSAY OF TOTAL TESTOSTERONE: CPT

## 2024-01-11 PROCEDURE — 84402 ASSAY OF FREE TESTOSTERONE: CPT

## 2024-01-11 PROCEDURE — 85025 COMPLETE CBC W/AUTO DIFF WBC: CPT

## 2024-01-11 PROCEDURE — 36415 COLL VENOUS BLD VENIPUNCTURE: CPT

## 2024-01-12 LAB
TESTOST FREE SERPL-MCNC: 4.4 PG/ML (ref 6.6–18.1)
TESTOST SERPL-MCNC: 421 NG/DL (ref 264–916)

## 2024-01-17 ENCOUNTER — OFFICE VISIT (OUTPATIENT)
Dept: ENDOCRINOLOGY | Facility: CLINIC | Age: 69
End: 2024-01-17
Payer: MEDICARE

## 2024-01-17 VITALS
DIASTOLIC BLOOD PRESSURE: 54 MMHG | WEIGHT: 155.6 LBS | HEIGHT: 63 IN | HEART RATE: 76 BPM | BODY MASS INDEX: 27.57 KG/M2 | SYSTOLIC BLOOD PRESSURE: 126 MMHG

## 2024-01-17 DIAGNOSIS — Z79.4 TYPE 2 DIABETES MELLITUS WITH HYPERGLYCEMIA, WITH LONG-TERM CURRENT USE OF INSULIN (HCC): Primary | ICD-10-CM

## 2024-01-17 DIAGNOSIS — I10 BENIGN ESSENTIAL HYPERTENSION: ICD-10-CM

## 2024-01-17 DIAGNOSIS — E23.0 HYPOGONADOTROPIC HYPOGONADISM (HCC): ICD-10-CM

## 2024-01-17 DIAGNOSIS — N18.30 STAGE 3 CHRONIC KIDNEY DISEASE, UNSPECIFIED WHETHER STAGE 3A OR 3B CKD (HCC): ICD-10-CM

## 2024-01-17 DIAGNOSIS — E11.65 TYPE 2 DIABETES MELLITUS WITH HYPERGLYCEMIA, WITH LONG-TERM CURRENT USE OF INSULIN (HCC): Primary | ICD-10-CM

## 2024-01-17 DIAGNOSIS — Z79.4 TYPE 2 DIABETES MELLITUS WITH BOTH EYES AFFECTED BY MILD NONPROLIFERATIVE RETINOPATHY WITHOUT MACULAR EDEMA, WITH LONG-TERM CURRENT USE OF INSULIN (HCC): ICD-10-CM

## 2024-01-17 DIAGNOSIS — E11.3293 TYPE 2 DIABETES MELLITUS WITH BOTH EYES AFFECTED BY MILD NONPROLIFERATIVE RETINOPATHY WITHOUT MACULAR EDEMA, WITH LONG-TERM CURRENT USE OF INSULIN (HCC): ICD-10-CM

## 2024-01-17 DIAGNOSIS — E78.2 MIXED HYPERLIPIDEMIA: ICD-10-CM

## 2024-01-17 LAB — SL AMB POCT HEMOGLOBIN AIC: 6.1 (ref ?–6.5)

## 2024-01-17 PROCEDURE — 95251 CONT GLUC MNTR ANALYSIS I&R: CPT | Performed by: INTERNAL MEDICINE

## 2024-01-17 PROCEDURE — 83036 HEMOGLOBIN GLYCOSYLATED A1C: CPT | Performed by: INTERNAL MEDICINE

## 2024-01-17 PROCEDURE — 99214 OFFICE O/P EST MOD 30 MIN: CPT | Performed by: INTERNAL MEDICINE

## 2024-01-17 RX ORDER — TESTOSTERONE 20.25 MG/1.25G
GEL TOPICAL
Qty: 112.5 G | Refills: 5 | Status: SHIPPED | OUTPATIENT
Start: 2024-01-17

## 2024-01-17 RX ORDER — GLIPIZIDE 5 MG/1
5 TABLET ORAL DAILY
Qty: 90 TABLET | Refills: 3 | Status: SHIPPED | OUTPATIENT
Start: 2024-01-17 | End: 2025-01-11

## 2024-01-17 RX ORDER — GLIPIZIDE 5 MG/1
5 TABLET ORAL DAILY
COMMUNITY
End: 2024-01-17 | Stop reason: SDUPTHER

## 2024-01-17 NOTE — PROGRESS NOTES
Chad Coffman 68 y.o. male MRN: [id number]    Encounter: [id number]      Assessment/Plan     Assessment:  This is a 68 y.o.-year-old male with diabetes with hyperglycemia, hypertension, hyperlipidemia, hypogonadotropic hypogonadism, stage III chronic kidney disease, retinopathy.    Plan:  1.  Diabetes with hyperglycemia has improved based on CGM.  A1c today is 6.1%.  Continue current regimen.  He is agreeable to switching to Dexcom G7.  He is going to let me know who the supplier is so we can send them a prescription.    2.  Hypertension-the blood pressure is in the target range.    3.  Hyperlipidemia-continue statin.    4.  Hypogonadotropic hypogonadism-his total testosterone is in the target range.  Continue testosterone supplementation.    5.  Stage III chronic kidney disease appears to be stable.    6.  Diabetes related retinopathy-he follows with ophthalmology.    CC: Diabetes    History of Present Illness     HPI:  68-year-old man presents for follow-up of diabetes.  He is currently on multiple medications including basal bolus insulin therapy.  He occasionally gets hypoglycemia and has hyperglycemia.  His diabetes is complicated by retinopathy and chronic kidney disease.    For hypogonadotropic hypogonadism, he is on testosterone supplementation.     Review of Systems   Constitutional:  Negative for chills and fever.   Respiratory:  Negative for shortness of breath.    Cardiovascular:  Negative for chest pain.   Gastrointestinal:  Negative for constipation, diarrhea, nausea and vomiting.   Endocrine: Negative for polydipsia and polyuria.   All other systems reviewed and are negative.      Historical Information   Past Medical History:   Diagnosis Date    Abdominal pain     Allergic     Arthritis     Last assessed 5/24/2013    Avitaminosis D 2012    Chronic kidney disease     Chronic pain disorder     lumbar-having LESI on 6/26/23    Colon polyp     CPAP (continuous positive airway pressure) dependence      Diabetes mellitus (HCC)     Displacement of cervical intervertebral disc 2014    Diverticulitis of colon 2021    slight    GERD (gastroesophageal reflux disease)     Glaucoma     Normal Pressure Glaucoma    Headache(784.0) continuing around rt eye    HTN (hypertension) 2007    Hypertension     IBS (irritable bowel syndrome) 2021    Kidney stone     Marijuana use     daily for chronic pain    Nephrolithiasis 05/24/2013    Pituitary microadenoma (HCC) 2010    Shortness of breath     started June 2023-only when he bends over-not with activity    Sleep apnea     Spinal stenosis in cervical region 2014    Sprain and strain of calcaneofibular (ligament) 12/05/2013    Submandibular lymphadenopathy 08/08/2019    Uric acid nephrolithiasis 1990    Visual impairment Glaucoma, diabetic retinopothy     Past Surgical History:   Procedure Laterality Date    ASPIRATION / INJECTION RENAL CYST      Renal Cyst Aspiration    CATARACT EXTRACTION      12/2017- right eye, 01/2018- left eye    COLONOSCOPY  2005    EYE SURGERY Bilateral     Cataract Surgery    LITHOTRIPSY Right     VA LAPS ABD PRTM&OMENTUM DX W/WO SPEC BR/WA SPX N/A 8/24/2023    Procedure: DIAGNOSTIC LAPAROSCOPY mesenteric biopsy;  Surgeon: Aurroa Espinoza MD;  Location: [facility];  Service: Surgical Oncology    TONSILLECTOMY      UPPER GASTROINTESTINAL ENDOSCOPY       Social History   Social History     Substance and Sexual Activity   Alcohol Use Yes    Alcohol/week: 4.0 standard drinks of alcohol    Types: 2 Cans of beer, 2 Standard drinks or equivalent per week    Comment: social     Social History     Substance and Sexual Activity   Drug Use Yes    Frequency: 7.0 times per week    Types: Marijuana    Comment: Use Medical Marijuana daily (1-3 capsules or tincture)     Social History     Tobacco Use   Smoking Status Former    Current packs/day: 0.00    Average packs/day: 0.3 packs/day for 2.0 years (0.5 ttl pk-yrs)    Types: Cigarettes    Start date: 1978    Quit date:  1980    Years since quittin.0   Smokeless Tobacco Never     Family History:   Family History   Problem Relation Age of Onset    Diabetes unspecified Mother     Heart disease Mother     Nephrolithiasis Mother     Kidney disease Mother     Other Mother         Back Disorder    Thyroid disease Mother     Diabetes type II Mother     Hypertension Mother     Thyroid disease unspecified Mother     Diabetes Mother             Arthritis Mother     Diabetes unspecified Father     Heart disease Father     Hypertension Father     Thrombosis Father             Diabetes type II Father     Diabetes unspecified Sister     Heart disease Sister     Stroke Sister     Diabetes unspecified Brother     Heart disease Brother     Nephrolithiasis Brother     Lung cancer Brother     Other Brother         COPD    Diabetes unspecified Sister     Heart disease Sister     Diabetes Sister     Arthritis Sister     Diabetes type II Sister     Diabetes unspecified Sister     Diabetes unspecified Brother     Heart disease Brother     Diabetes unspecified Brother     Other Brother         Back Disorder    COPD Brother     Diabetes type II Brother     Diabetes unspecified Brother     Other Brother         Back Disorder    Diabetes unspecified Brother     Stomach cancer Paternal Aunt     Diabetes type II Maternal Aunt        Meds/Allergies   Current Outpatient Medications   Medication Sig Dispense Refill    acetaminophen (TYLENOL) 650 mg CR tablet Take 650 mg by mouth every 8 (eight) hours as needed for mild pain      amLODIPine (NORVASC) 10 mg tablet Take 1 tablet (10 mg total) by mouth daily at bedtime 90 tablet 1    aspirin (ECOTRIN LOW STRENGTH) 81 mg EC tablet Take 81 mg by mouth daily       B Complex Vitamins (VITAMIN B-COMPLEX PO) Take 1 capsule by mouth daily       BD Pen Needle Harriett U/F 32G X 4 MM MISC USE 4 TIMES DAILY 360 each 2    benzonatate (TESSALON) 200 MG capsule Take 1 capsule (200 mg total) by mouth 3 (three)  times a day as needed for cough 20 capsule 0    brimonidine-timolol (COMBIGAN) 0.2-0.5 % Administer 1 drop to both eyes every 12 (twelve) hours      cholecalciferol (VITAMIN D3) 1,000 units tablet Take 1,000 Units by mouth 2 (two) times a day       Continuous Blood Gluc  (Dexcom G6 ) AGATA Use      Continuous Blood Gluc Sensor (Dexcom G6 Sensor) MISC Use      dicyclomine (BENTYL) 20 mg tablet Take 1 tablet (20 mg total) by mouth 4 (four) times a day (before meals and at bedtime) 120 tablet 5    doxazosin (CARDURA) 4 mg tablet Take 1 tablet (4 mg total) by mouth every morning 90 tablet 1    Empagliflozin (Jardiance) 25 MG TABS Take 1 tablet (25 mg total) by mouth daily (Patient taking differently: Take 25 mg by mouth every morning Last dose 6/23) 90 tablet 3    ferrous gluconate (FERGON) 240 (27 FE) MG tablet Take 1 tablet (240 mg total) by mouth in the morning (Patient taking differently: Take 240 mg by mouth in the morning Last dose 6/20) 90 tablet 1    fluticasone (FLONASE) 50 mcg/act nasal spray 1 spray into each nostril daily as needed for rhinitis (Acute URI/sinusitis) 18 mL 0    gabapentin (NEURONTIN) 400 mg capsule Take 1 capsule (400 mg total) by mouth 3 (three) times a day 270 capsule 0    glipiZIDE (GLUCOTROL) 5 mg tablet Take 5 mg by mouth in the morning      Glucagon (Baqsimi Two Pack) 3 MG/DOSE POWD Use 1 actuation as needed (low blood sugar) into 1 nostril 1 each 1    Glucosamine-Chondroitin 250-200 MG TABS Take 1 tablet by mouth 2 (two) times a day      hydrochlorothiazide (HYDRODIURIL) 25 mg tablet Take 1 tablet (25 mg total) by mouth every morning 90 tablet 1    Insulin Glargine Solostar (Lantus SoloStar) 100 UNIT/ML SOPN Inject 0.22 mL (22 Units total) under the skin daily at bedtime 30 mL 1    insulin lispro (HumaLOG KwikPen) 100 units/mL injection pen Inject per insulin scales up to 30 units per day. 30 mL 3    lidocaine (Lidoderm) 5 % Apply 1 patch topically daily Remove & Discard  "patch within 12 hours or as directed by MD 6 patch 0    lisinopril (ZESTRIL) 40 mg tablet Take 1 tablet (40 mg total) by mouth every morning 90 tablet 1    loratadine (CLARITIN) 10 mg tablet Take 10 mg by mouth daily      MULTIPLE VITAMIN PO Take 1 tablet by mouth daily       omeprazole (PriLOSEC) 40 MG capsule Take 1 capsule (40 mg total) by mouth every morning 90 capsule 1    other medication, see sig, Medication/product name: Medical Marijuana      polyethylene glycol (MiraLax) 17 GM/SCOOP powder 17 g if needed      Probiotic Product (PROBIOTIC-10) CAPS Take 1 capsule by mouth daily       simvastatin (ZOCOR) 20 mg tablet Take 1 tablet (20 mg total) by mouth daily at bedtime 90 tablet 3    testosterone (ANDROGEL) 1.62 % Apply 3 packets daily (Dose/concentration change) 112.5 g 3    vitamin E, tocopherol, 400 units capsule Take 400 Units by mouth 2 (two) times a day        No current facility-administered medications for this visit.     Allergies   Allergen Reactions    Clonidine Other (See Comments)     Diaphoresis, hot flashes    Augmentin [Amoxicillin-Pot Clavulanate] Abdominal Pain    Biaxin [Clarithromycin] Abdominal Pain    Peanut (Diagnostic) - Food Allergy Diarrhea, GI Intolerance and Abdominal Pain    Sitagliptin-Metformin Hcl Er Diarrhea, GI Intolerance and Abdominal Pain    Dust Mite Extract Allergic Rhinitis    Insulin Aspart GI Intolerance     Novolog     Liraglutide Nausea Only and Abdominal Pain    Metformin And Related Diarrhea and GI Intolerance    Molds & Smuts Allergic Rhinitis    Seasonal Ic [Cholestatin] Headache       Objective   Vitals: Blood pressure 126/54, pulse 76, height 5' 3\" (1.6 m), weight 70.6 kg (155 lb 9.6 oz).    Physical Exam  Constitutional:       General: He is not in acute distress.     Appearance: He is well-developed. He is not diaphoretic.   HENT:      Head: Normocephalic and atraumatic.   Eyes:      General:         Right eye: No discharge.         Left eye: No discharge. "   Pulmonary:      Effort: Pulmonary effort is normal.   Musculoskeletal:         General: Normal range of motion.      Cervical back: Normal range of motion.   Skin:     Coloration: Skin is not jaundiced.   Neurological:      General: No focal deficit present.      Mental Status: He is alert and oriented to person, place, and time.      Cranial Nerves: No cranial nerve deficit.   Psychiatric:         Mood and Affect: Mood normal.         Behavior: Behavior normal.         The history was obtained from the review of the chart, patient.    Lab Results:   Lab Results   Component Value Date/Time    Hemoglobin A1C 6.0 09/20/2023 11:38 AM    Hemoglobin A1C 5.6 05/02/2023 08:35 AM    Hemoglobin A1C 5.4 01/24/2023 08:05 AM    Hemoglobin A1C 5.4 01/20/2023 09:51 AM    WBC 9.13 01/11/2024 07:58 AM    WBC 11.89 (H) 08/08/2023 08:33 AM    Hemoglobin 16.5 01/11/2024 07:58 AM    Hemoglobin 15.1 08/08/2023 08:33 AM    Hemoglobin 15.1 04/12/2023 08:11 AM    Hematocrit 48.4 01/11/2024 07:58 AM    Hematocrit 43.9 08/08/2023 08:33 AM    Hematocrit 45.8 04/12/2023 08:11 AM    MCV 89 01/11/2024 07:58 AM    MCV 88 08/08/2023 08:33 AM    Platelets 292 01/11/2024 07:58 AM    Platelets 354 08/08/2023 08:33 AM    BUN 31 (H) 08/08/2023 08:33 AM    BUN 26 (H) 06/09/2023 09:02 AM    BUN 21 01/24/2023 08:05 AM    Potassium 4.5 08/08/2023 08:33 AM    Potassium 4.1 06/09/2023 09:02 AM    Potassium 3.9 01/24/2023 08:05 AM    Chloride 106 08/08/2023 08:33 AM    Chloride 106 06/09/2023 09:02 AM    Chloride 103 01/24/2023 08:05 AM    CO2 29 08/08/2023 08:33 AM    CO2 29 06/09/2023 09:02 AM    CO2 28 01/24/2023 08:05 AM    Creatinine 1.17 08/08/2023 08:33 AM    Creatinine 1.20 06/09/2023 09:02 AM    Creatinine 1.23 01/24/2023 08:05 AM    AST 11 08/08/2023 08:33 AM    AST 17 01/24/2023 08:05 AM    ALT 29 08/08/2023 08:33 AM    ALT 38 01/24/2023 08:05 AM    Total Protein 7.3 08/08/2023 08:33 AM    Total Protein 7.6 01/24/2023 08:05 AM    Albumin 3.9  "08/08/2023 08:33 AM    Albumin 4.0 01/24/2023 08:05 AM    HDL, Direct 34 (L) 08/08/2023 08:33 AM    Triglycerides 135 08/08/2023 08:33 AM       Chad Coffman   Device used Dexcom  Home use       Indication     Type 2 Diabetes    More than 72 hours of data was reviewed. Report to be scanned to chart.     Date Range: January 4, 2024 to January 17, 2024    Analysis of data:   % time CGM used: 100%  Average Glucose: 148 mg/dL  SD : 46 mg/dL  Time in Target Range: 75%  Time Above Range: 21% high and 2% very high  Time Below Range: 1% low and less than 1% very low    Interpretation of data: Overall, his blood sugar is in target range most of the time.      Imaging Studies: I have personally reviewed pertinent reports.      Portions of the record may have been created with voice recognition software. Occasional wrong word or \"sound a like\" substitutions may have occurred due to the inherent limitations of voice recognition software. Read the chart carefully and recognize, using context, where substitutions have occurred.    "

## 2024-01-23 ENCOUNTER — TELEPHONE (OUTPATIENT)
Dept: ENDOCRINOLOGY | Facility: CLINIC | Age: 69
End: 2024-01-23

## 2024-01-29 ENCOUNTER — OFFICE VISIT (OUTPATIENT)
Dept: SLEEP CENTER | Facility: CLINIC | Age: 69
End: 2024-01-29
Payer: MEDICARE

## 2024-01-29 VITALS
OXYGEN SATURATION: 96 % | BODY MASS INDEX: 27.29 KG/M2 | SYSTOLIC BLOOD PRESSURE: 120 MMHG | HEIGHT: 63 IN | WEIGHT: 154 LBS | HEART RATE: 66 BPM | DIASTOLIC BLOOD PRESSURE: 64 MMHG

## 2024-01-29 DIAGNOSIS — G89.4 CHRONIC PAIN SYNDROME: ICD-10-CM

## 2024-01-29 DIAGNOSIS — E66.3 OVERWEIGHT (BMI 25.0-29.9): ICD-10-CM

## 2024-01-29 DIAGNOSIS — G47.33 OBSTRUCTIVE SLEEP APNEA SYNDROME: Primary | ICD-10-CM

## 2024-01-29 DIAGNOSIS — F45.8 BRUXISM: ICD-10-CM

## 2024-01-29 DIAGNOSIS — I10 BENIGN ESSENTIAL HYPERTENSION: ICD-10-CM

## 2024-01-29 DIAGNOSIS — Z79.899 MEDICAL MARIJUANA USE: ICD-10-CM

## 2024-01-29 DIAGNOSIS — R41.3 MEMORY DIFFICULTIES: ICD-10-CM

## 2024-01-29 DIAGNOSIS — Z91.09 ENVIRONMENTAL ALLERGIES: ICD-10-CM

## 2024-01-29 DIAGNOSIS — R68.2 DRY MOUTH: ICD-10-CM

## 2024-01-29 PROCEDURE — 99214 OFFICE O/P EST MOD 30 MIN: CPT | Performed by: INTERNAL MEDICINE

## 2024-01-29 NOTE — PATIENT INSTRUCTIONS

## 2024-01-29 NOTE — PROGRESS NOTES
Follow-Up Note - [facility]   Chad Coffman  68 y.o. male  :1955  MRN:[id number]  DOS:2024    CC: I saw this patient for follow-up in clinic today for Sleep disordered breathing, Coexisting Sleep and Medical Problems.  Interval changes: He is using a ResMed machine.    Results of prior studies in : The diagnostic study demonstrated an AHI of 24 per hour, higher during stage REM and while supine. Minimum oxygen saturation was 90%. During the subsequent therapeutic study, sleep disordered breathing was successfully treated with CPAP at 6 cm H2O.      PFSH, Problem List, Medications & Allergies were reviewed in EMR.   He  has a past medical history of Abdominal pain, Allergic, Arthritis, Avitaminosis D (), Chronic kidney disease, Chronic pain disorder, Colon polyp, CPAP (continuous positive airway pressure) dependence, Diabetes mellitus (HCC), Displacement of cervical intervertebral disc (), Diverticulitis of colon (), GERD (gastroesophageal reflux disease), Glaucoma, Headache(784.0) (continuing around rt eye), HTN (hypertension) (), Hypertension, IBS (irritable bowel syndrome) (), Kidney stone, Marijuana use, Nephrolithiasis (2013), Pituitary microadenoma (HCC) (), Shortness of breath, Sleep apnea, Spinal stenosis in cervical region (), Sprain and strain of calcaneofibular (ligament) (2013), Submandibular lymphadenopathy (2019), Uric acid nephrolithiasis (), and Visual impairment (Glaucoma, diabetic retinopothy).    He has a current medication list which includes the following prescription(s): acetaminophen, amlodipine, aspirin, b complex vitamins, bd pen needle eunice u/f, benzonatate, brimonidine-timolol, cholecalciferol, dexcom g6 , dexcom g6 sensor, dicyclomine, doxazosin, empagliflozin, ferrous gluconate, fluticasone, gabapentin, glipizide, baqsimi two pack, glucosamine-chondroitin, hydrochlorothiazide, insulin glargine solostar,  "insulin lispro, lidocaine, lisinopril, loratadine, multiple vitamin, omeprazole, other medication (see sig), polyethylene glycol, probiotic-10, simvastatin, testosterone, and vitamin e (tocopherol).    PHYSIOLOGICAL DATA REVIEW : Using PAP > 4 hours/night 100%. Estimated YVETTE 0.6/hour with pressure of 7cm H2O ; patient has not been using non FDA approved devices to sanitize the machine.  INTERPRETATION: Compliance is excellent; Pressure setting is:optimal; ;   SUBJECTIVE: With respect to use of PAP, Chad  is experiencing minimal adverse effects:dry mouth/throat.He derives benefit.  Is satisfied with sleep and daytime function.   Sleep Routine: Chad reports getting >8 hrs sleep; he has no difficulty initiating or maintaining sleep . He arises spontaneously and feels more refreshed since on Rx.Chad reports occasional    daytime sleepiness, feels like napping & does when has the opportunity.  He rated himself at Total score: 8 /24 on the Desdemona Sleepiness Scale.   Other issues: Bruxism for which she uses a dental guard.     Habits: Reports that he quit smoking about 44 years ago. His smoking use included cigarettes. He started smoking about 46 years ago. He has a 0.5 pack-year smoking history. He has never used smokeless tobacco.,  Reports current alcohol use of about 4.0 standard drinks of alcohol per week.,  Reports current drug use. Frequency: 7.00 times per week. Drug: Marijuana., Caffeine use:limited; Exercise routine: regular.      ROS: Significant for he had some weight reduction while on Ozempic but regained because he had to discontinue the medication.  Allergies are controlled.  He is on medication for chronic pain due to degenerative joint disease involving multiple joints..  He continues to report some difficulty with memory.    EXAM: /64 (BP Location: Right arm, Patient Position: Sitting, Cuff Size: Standard)   Pulse 66   Ht 5' 3\" (1.6 m)   Wt 69.9 kg (154 lb)   SpO2 96%   BMI 27.28 kg/m²  " "   Wt Readings from Last 3 Encounters:   01/29/24 69.9 kg (154 lb)   01/17/24 70.6 kg (155 lb 9.6 oz)   12/12/23 67.6 kg (149 lb)      Patient is well groomed; well appearing.   CNS: Alert, orientated, speech clear & coherent  Psych: cooperative and in no distress. Mental state: Appears normal.  H&N: EOMI; NC/AT: No facial pressure marks, no rashes.    Skin/Extrem: col & hydration normal; no edema  Resp: Respiratory effort is normal  Physical findings otherwise essentially unchanged from previous.    IMPRESSION: Problem List Items & Comorbidities Addressed this Visit    1. Obstructive sleep apnea syndrome  PAP DME Resupply/Reorder      2. Dry mouth        3. Bruxism        4. Environmental allergies        5. Memory difficulties        6. Chronic pain syndrome        7. Medical marijuana use        8. Benign essential hypertension        9. Overweight (BMI 25.0-29.9)            PLAN:  I reviewed results of prior studies and physiologic data with the patient.   I discussed treatment options with risks and benefits.  Treatment with  PAP is medically necessary and Chad is agreable to continue use.   Care of equipment, methods to improve comfort using PAP and importance of compliance with therapy were discussed.  Pressure setting: continue 7 cmH2O.    Rx provided to replace supplies and Care coordinated with DME provider.   Discussed strategies for weight reduction.    Follow-up is advised in 1 year or sooner if needed to monitor progress, compliance and to adjust therapy.     Thank you for allowing me to participate in the care of this patient.    Sincerely,     Authenticated electronically on 01/29/24   Board Certified Specialist     Portions of the record may have been created with voice recognition software. Occasional wrong word or \"sound a like\" substitutions may have occurred due to the inherent limitations of voice recognition software. There may also be notations and random deletions of words or characters " from malfunctioning software. Read the chart carefully and recognize, using context, where substitutions/deletions have occurred.

## 2024-01-30 ENCOUNTER — TELEPHONE (OUTPATIENT)
Dept: SLEEP CENTER | Facility: CLINIC | Age: 69
End: 2024-01-30

## 2024-02-08 ENCOUNTER — TELEPHONE (OUTPATIENT)
Dept: SLEEP CENTER | Facility: CLINIC | Age: 69
End: 2024-02-08

## 2024-02-08 ENCOUNTER — APPOINTMENT (OUTPATIENT)
Dept: LAB | Age: 69
End: 2024-02-08
Payer: MEDICARE

## 2024-02-08 LAB
ALBUMIN SERPL BCP-MCNC: 4.5 G/DL (ref 3.5–5)
ALP SERPL-CCNC: 54 U/L (ref 34–104)
ALT SERPL W P-5'-P-CCNC: 22 U/L (ref 7–52)
ANION GAP SERPL CALCULATED.3IONS-SCNC: 13 MMOL/L
AST SERPL W P-5'-P-CCNC: 22 U/L (ref 13–39)
BILIRUB SERPL-MCNC: 1.23 MG/DL (ref 0.2–1)
BUN SERPL-MCNC: 25 MG/DL (ref 5–25)
CALCIUM SERPL-MCNC: 9.3 MG/DL (ref 8.4–10.2)
CHLORIDE SERPL-SCNC: 100 MMOL/L (ref 96–108)
CO2 SERPL-SCNC: 28 MMOL/L (ref 21–32)
CREAT SERPL-MCNC: 1.08 MG/DL (ref 0.6–1.3)
CRP SERPL QL: 1.8 MG/L
GFR SERPL CREATININE-BSD FRML MDRD: 69 ML/MIN/1.73SQ M
GLUCOSE P FAST SERPL-MCNC: 125 MG/DL (ref 65–99)
POTASSIUM SERPL-SCNC: 4 MMOL/L (ref 3.5–5.3)
PROT SERPL-MCNC: 7 G/DL (ref 6.4–8.4)
SODIUM SERPL-SCNC: 141 MMOL/L (ref 135–147)

## 2024-02-08 PROCEDURE — 86140 C-REACTIVE PROTEIN: CPT

## 2024-02-08 PROCEDURE — 80053 COMPREHEN METABOLIC PANEL: CPT

## 2024-02-08 PROCEDURE — 86316 IMMUNOASSAY TUMOR OTHER: CPT

## 2024-02-08 NOTE — TELEPHONE ENCOUNTER
Physician order form for CPAP supplies received via fax from [facility].     Form placed in Dr. Prescott's inbox in [facility] to complete.    Completed form to be faxed to [phone].

## 2024-02-12 LAB — CGA SERPL-MCNC: 507.2 NG/ML (ref 0–101.8)

## 2024-02-15 ENCOUNTER — RA CDI HCC (OUTPATIENT)
Dept: OTHER | Facility: HOSPITAL | Age: 69
End: 2024-02-15

## 2024-02-15 NOTE — PROGRESS NOTES
HCC coding opportunities          Chart Reviewed number of suggestions sent to Provider: 1     Patients Insurance   E11.22  Medicare Insurance: Medicare

## 2024-02-21 ENCOUNTER — OFFICE VISIT (OUTPATIENT)
Dept: INTERNAL MEDICINE CLINIC | Facility: OTHER | Age: 69
End: 2024-02-21
Payer: MEDICARE

## 2024-02-21 VITALS
OXYGEN SATURATION: 96 % | HEART RATE: 67 BPM | WEIGHT: 155.6 LBS | SYSTOLIC BLOOD PRESSURE: 134 MMHG | RESPIRATION RATE: 18 BRPM | DIASTOLIC BLOOD PRESSURE: 72 MMHG | TEMPERATURE: 98 F | BODY MASS INDEX: 27.57 KG/M2 | HEIGHT: 63 IN

## 2024-02-21 DIAGNOSIS — E78.2 MIXED HYPERLIPIDEMIA: ICD-10-CM

## 2024-02-21 DIAGNOSIS — E11.65 TYPE 2 DIABETES MELLITUS WITH HYPERGLYCEMIA, WITH LONG-TERM CURRENT USE OF INSULIN (HCC): ICD-10-CM

## 2024-02-21 DIAGNOSIS — E66.3 OVERWEIGHT (BMI 25.0-29.9): ICD-10-CM

## 2024-02-21 DIAGNOSIS — D35.2 PITUITARY MICROADENOMA (HCC): ICD-10-CM

## 2024-02-21 DIAGNOSIS — N18.30 STAGE 3 CHRONIC KIDNEY DISEASE, UNSPECIFIED WHETHER STAGE 3A OR 3B CKD (HCC): ICD-10-CM

## 2024-02-21 DIAGNOSIS — Z00.00 MEDICARE ANNUAL WELLNESS VISIT, SUBSEQUENT: ICD-10-CM

## 2024-02-21 DIAGNOSIS — M25.541 ARTHRALGIA OF RIGHT HAND: ICD-10-CM

## 2024-02-21 DIAGNOSIS — M54.40 LOW BACK PAIN WITH SCIATICA, SCIATICA LATERALITY UNSPECIFIED, UNSPECIFIED BACK PAIN LATERALITY, UNSPECIFIED CHRONICITY: ICD-10-CM

## 2024-02-21 DIAGNOSIS — K21.9 GASTROESOPHAGEAL REFLUX DISEASE WITHOUT ESOPHAGITIS: ICD-10-CM

## 2024-02-21 DIAGNOSIS — M54.16 LUMBAR RADICULOPATHY: ICD-10-CM

## 2024-02-21 DIAGNOSIS — M46.1 SACROILIITIS (HCC): ICD-10-CM

## 2024-02-21 DIAGNOSIS — Z13.6 SCREENING FOR CARDIOVASCULAR CONDITION: ICD-10-CM

## 2024-02-21 DIAGNOSIS — Z79.4 TYPE 2 DIABETES MELLITUS WITH BOTH EYES AFFECTED BY MILD NONPROLIFERATIVE RETINOPATHY WITHOUT MACULAR EDEMA, WITH LONG-TERM CURRENT USE OF INSULIN (HCC): Primary | ICD-10-CM

## 2024-02-21 DIAGNOSIS — Z79.4 TYPE 2 DIABETES MELLITUS WITH HYPERGLYCEMIA, WITH LONG-TERM CURRENT USE OF INSULIN (HCC): ICD-10-CM

## 2024-02-21 DIAGNOSIS — M54.12 CERVICAL RADICULOPATHY: ICD-10-CM

## 2024-02-21 DIAGNOSIS — I10 BENIGN ESSENTIAL HYPERTENSION: ICD-10-CM

## 2024-02-21 DIAGNOSIS — Z12.11 SCREENING FOR COLORECTAL CANCER: ICD-10-CM

## 2024-02-21 DIAGNOSIS — E11.3293 TYPE 2 DIABETES MELLITUS WITH BOTH EYES AFFECTED BY MILD NONPROLIFERATIVE RETINOPATHY WITHOUT MACULAR EDEMA, WITH LONG-TERM CURRENT USE OF INSULIN (HCC): Primary | ICD-10-CM

## 2024-02-21 DIAGNOSIS — Z12.5 SCREENING FOR PROSTATE CANCER: ICD-10-CM

## 2024-02-21 DIAGNOSIS — Z12.12 SCREENING FOR COLORECTAL CANCER: ICD-10-CM

## 2024-02-21 DIAGNOSIS — Z13.1 SCREENING FOR DIABETES MELLITUS: ICD-10-CM

## 2024-02-21 DIAGNOSIS — Z71.89 ADVANCED CARE PLANNING/COUNSELING DISCUSSION: ICD-10-CM

## 2024-02-21 PROBLEM — J40 BRONCHITIS: Status: RESOLVED | Noted: 2023-12-12 | Resolved: 2024-02-21

## 2024-02-21 PROCEDURE — 99497 ADVNCD CARE PLAN 30 MIN: CPT | Performed by: INTERNAL MEDICINE

## 2024-02-21 PROCEDURE — G0438 PPPS, INITIAL VISIT: HCPCS | Performed by: INTERNAL MEDICINE

## 2024-02-21 PROCEDURE — 99214 OFFICE O/P EST MOD 30 MIN: CPT | Performed by: INTERNAL MEDICINE

## 2024-02-21 RX ORDER — SENNOSIDES 8.6 MG
650 CAPSULE ORAL EVERY 8 HOURS PRN
COMMUNITY

## 2024-02-21 RX ORDER — LISINOPRIL 40 MG/1
40 TABLET ORAL EVERY MORNING
Qty: 90 TABLET | Refills: 1 | Status: SHIPPED | OUTPATIENT
Start: 2024-02-21

## 2024-02-21 RX ORDER — DOXAZOSIN MESYLATE 4 MG/1
4 TABLET ORAL EVERY MORNING
Qty: 90 TABLET | Refills: 1 | Status: SHIPPED | OUTPATIENT
Start: 2024-02-21

## 2024-02-21 RX ORDER — AMLODIPINE BESYLATE 10 MG/1
10 TABLET ORAL
Qty: 90 TABLET | Refills: 1 | Status: SHIPPED | OUTPATIENT
Start: 2024-02-21

## 2024-02-21 RX ORDER — OMEPRAZOLE 40 MG/1
40 CAPSULE, DELAYED RELEASE ORAL EVERY MORNING
Qty: 90 CAPSULE | Refills: 1 | Status: SHIPPED | OUTPATIENT
Start: 2024-02-21

## 2024-02-21 RX ORDER — GABAPENTIN 400 MG/1
400 CAPSULE ORAL 3 TIMES DAILY
Qty: 270 CAPSULE | Refills: 0 | Status: SHIPPED | OUTPATIENT
Start: 2024-02-21

## 2024-02-21 RX ORDER — SIMVASTATIN 20 MG
20 TABLET ORAL
Qty: 90 TABLET | Refills: 3 | Status: SHIPPED | OUTPATIENT
Start: 2024-02-21

## 2024-02-21 RX ORDER — HYDROCHLOROTHIAZIDE 25 MG/1
25 TABLET ORAL EVERY MORNING
Qty: 90 TABLET | Refills: 1 | Status: SHIPPED | OUTPATIENT
Start: 2024-02-21

## 2024-02-21 NOTE — ASSESSMENT & PLAN NOTE
Chad Coffman and I had a thorough discussion regarding advance care planning.  he  has advanced directives at home to which I recommended he provide a copy to be scanned for his medical records.  he  understands that today's visit is a billable service.  Refer to ACP note.

## 2024-02-21 NOTE — ASSESSMENT & PLAN NOTE
Continue current regimen, management as per endocrinology.   Lab Results   Component Value Date    HGBA1C 6.1 01/17/2024

## 2024-02-21 NOTE — ASSESSMENT & PLAN NOTE
Lab Results   Component Value Date    EGFR 69 02/08/2024    EGFR 63 08/08/2023    EGFR 61 06/09/2023    CREATININE 1.08 02/08/2024    CREATININE 1.17 08/08/2023    CREATININE 1.20 06/09/2023   Continue to trend kidney, avoid nephrotoxic agents.

## 2024-02-21 NOTE — PATIENT INSTRUCTIONS
Medicare Preventive Visit Patient Instructions  Thank you for completing your Welcome to Medicare Visit or Medicare Annual Wellness Visit today. Your next wellness visit will be due in one year (2/21/2025).  The screening/preventive services that you may require over the next 5-10 years are detailed below. Some tests may not apply to you based off risk factors and/or age. Screening tests ordered at today's visit but not completed yet may show as past due. Also, please note that scanned in results may not display below.  Preventive Screenings:  Service Recommendations Previous Testing/Comments   Colorectal Cancer Screening  Colonoscopy    Fecal Occult Blood Test (FOBT)/Fecal Immunochemical Test (FIT)  Fecal DNA/Cologuard Test  Flexible Sigmoidoscopy Age: 45-75 years old   Colonoscopy: every 10 years (May be performed more frequently if at higher risk)  OR  FOBT/FIT: every 1 year  OR  Cologuard: every 3 years  OR  Sigmoidoscopy: every 5 years  Screening may be recommended earlier than age 45 if at higher risk for colorectal cancer. Also, an individualized decision between you and your healthcare provider will decide whether screening between the ages of 76-85 would be appropriate. Colonoscopy: 06/28/2023  FOBT/FIT: Not on file  Cologuard: Not on file  Sigmoidoscopy: Not on file    Screening Current     Prostate Cancer Screening Individualized decision between patient and health care provider in men between ages of 55-69   Medicare will cover every 12 months beginning on the day after your 50th birthday PSA: 0.54 ng/mL     Screening Current     Hepatitis C Screening Once for adults born between 1945 and 1965  More frequently in patients at high risk for Hepatitis C Hep C Antibody: 12/31/2019    Screening Current   Diabetes Screening 1-2 times per year if you're at risk for diabetes or have pre-diabetes Fasting glucose: 125 mg/dL (2/8/2024)  A1C: 6.1 (1/17/2024)  Screening Not Indicated  History Diabetes   Cholesterol  Screening Once every 5 years if you don't have a lipid disorder. May order more often based on risk factors. Lipid panel: 08/08/2023  Screening Not Indicated  History Lipid Disorder      Other Preventive Screenings Covered by Medicare:  Abdominal Aortic Aneurysm (AAA) Screening: covered once if your at risk. You're considered to be at risk if you have a family history of AAA or a male between the age of 65-75 who smoking at least 100 cigarettes in your lifetime.  Lung Cancer Screening: covers low dose CT scan once per year if you meet all of the following conditions: (1) Age 55-77; (2) No signs or symptoms of lung cancer; (3) Current smoker or have quit smoking within the last 15 years; (4) You have a tobacco smoking history of at least 20 pack years (packs per day x number of years you smoked); (5) You get a written order from a healthcare provider.  Glaucoma Screening: covered annually if you're considered high risk: (1) You have diabetes OR (2) Family history of glaucoma OR (3)  aged 50 and older OR (4)  American aged 65 and older  Osteoporosis Screening: covered every 2 years if you meet one of the following conditions: (1) Have a vertebral abnormality; (2) On glucocorticoid therapy for more than 3 months; (3) Have primary hyperparathyroidism; (4) On osteoporosis medications and need to assess response to drug therapy.  HIV Screening: covered annually if you're between the age of 15-65. Also covered annually if you are younger than 15 and older than 65 with risk factors for HIV infection. For pregnant patients, it is covered up to 3 times per pregnancy.    Immunizations:  Immunization Recommendations   Influenza Vaccine Annual influenza vaccination during flu season is recommended for all persons aged >= 6 months who do not have contraindications   Pneumococcal Vaccine   * Pneumococcal conjugate vaccine = PCV13 (Prevnar 13), PCV15 (Vaxneuvance), PCV20 (Prevnar 20)  * Pneumococcal  polysaccharide vaccine = PPSV23 (Pneumovax) Adults 19-63 yo with certain risk factors or if 65+ yo  If never received any pneumonia vaccine: recommend Prevnar 20 (PCV20)  Give PCV20 if previously received 1 dose of PCV13 or PPSV23   Hepatitis B Vaccine 3 dose series if at intermediate or high risk (ex: diabetes, end stage renal disease, liver disease)   Respiratory syncytial virus (RSV) Vaccine - COVERED BY MEDICARE PART D  * RSVPreF3 (Arexvy) CDC recommends that adults 60 years of age and older may receive a single dose of RSV vaccine using shared clinical decision-making (SCDM)   Tetanus (Td) Vaccine - COST NOT COVERED BY MEDICARE PART B Following completion of primary series, a booster dose should be given every 10 years to maintain immunity against tetanus. Td may also be given as tetanus wound prophylaxis.   Tdap Vaccine - COST NOT COVERED BY MEDICARE PART B Recommended at least once for all adults. For pregnant patients, recommended with each pregnancy.   Shingles Vaccine (Shingrix) - COST NOT COVERED BY MEDICARE PART B  2 shot series recommended in those 19 years and older who have or will have weakened immune systems or those 50 years and older     Health Maintenance Due:      Topic Date Due   • Colorectal Cancer Screening  06/26/2028   • Hepatitis C Screening  Completed     Immunizations Due:      Topic Date Due   • Pneumococcal Vaccine: 65+ Years (2 of 2 - PPSV23 or PCV20) 04/29/2020   • COVID-19 Vaccine (7 - 2023-24 season) 01/16/2024     Advance Directives   What are advance directives?  Advance directives are legal documents that state your wishes and plans for medical care. These plans are made ahead of time in case you lose your ability to make decisions for yourself. Advance directives can apply to any medical decision, such as the treatments you want, and if you want to donate organs.   What are the types of advance directives?  There are many types of advance directives, and each state has rules  about how to use them. You may choose a combination of any of the following:  Living will:  This is a written record of the treatment you want. You can also choose which treatments you do not want, which to limit, and which to stop at a certain time. This includes surgery, medicine, IV fluid, and tube feedings.   Durable power of  for healthcare (DPAHC):  This is a written record that states who you want to make healthcare choices for you when you are unable to make them for yourself. This person, called a proxy, is usually a family member or a friend. You may choose more than 1 proxy.  Do not resuscitate (DNR) order:  A DNR order is used in case your heart stops beating or you stop breathing. It is a request not to have certain forms of treatment, such as CPR. A DNR order may be included in other types of advance directives.  Medical directive:  This covers the care that you want if you are in a coma, near death, or unable to make decisions for yourself. You can list the treatments you want for each condition. Treatment may include pain medicine, surgery, blood transfusions, dialysis, IV or tube feedings, and a ventilator (breathing machine).  Values history:  This document has questions about your views, beliefs, and how you feel and think about life. This information can help others choose the care that you would choose.  Why are advance directives important?  An advance directive helps you control your care. Although spoken wishes may be used, it is better to have your wishes written down. Spoken wishes can be misunderstood, or not followed. Treatments may be given even if you do not want them. An advance directive may make it easier for your family to make difficult choices about your care.   Weight Management   Why it is important to manage your weight:  Being overweight increases your risk of health conditions such as heart disease, high blood pressure, type 2 diabetes, and certain types of cancer. It  can also increase your risk for osteoarthritis, sleep apnea, and other respiratory problems. Aim for a slow, steady weight loss. Even a small amount of weight loss can lower your risk of health problems.  How to lose weight safely:  A safe and healthy way to lose weight is to eat fewer calories and get regular exercise. You can lose up about 1 pound a week by decreasing the number of calories you eat by 500 calories each day.   Healthy meal plan for weight management:  A healthy meal plan includes a variety of foods, contains fewer calories, and helps you stay healthy. A healthy meal plan includes the following:  Eat whole-grain foods more often.  A healthy meal plan should contain fiber. Fiber is the part of grains, fruits, and vegetables that is not broken down by your body. Whole-grain foods are healthy and provide extra fiber in your diet. Some examples of whole-grain foods are whole-wheat breads and pastas, oatmeal, brown rice, and bulgur.  Eat a variety of vegetables every day.  Include dark, leafy greens such as spinach, kale, jt greens, and mustard greens. Eat yellow and orange vegetables such as carrots, sweet potatoes, and winter squash.   Eat a variety of fruits every day.  Choose fresh or canned fruit (canned in its own juice or light syrup) instead of juice. Fruit juice has very little or no fiber.  Eat low-fat dairy foods.  Drink fat-free (skim) milk or 1% milk. Eat fat-free yogurt and low-fat cottage cheese. Try low-fat cheeses such as mozzarella and other reduced-fat cheeses.  Choose meat and other protein foods that are low in fat.  Choose beans or other legumes such as split peas or lentils. Choose fish, skinless poultry (chicken or turkey), or lean cuts of red meat (beef or pork). Before you cook meat or poultry, cut off any visible fat.   Use less fat and oil.  Try baking foods instead of frying them. Add less fat, such as margarine, sour cream, regular salad dressing and mayonnaise to  foods. Eat fewer high-fat foods. Some examples of high-fat foods include french fries, doughnuts, ice cream, and cakes.  Eat fewer sweets.  Limit foods and drinks that are high in sugar. This includes candy, cookies, regular soda, and sweetened drinks.  Exercise:  Exercise at least 30 minutes per day on most days of the week. Some examples of exercise include walking, biking, dancing, and swimming. You can also fit in more physical activity by taking the stairs instead of the elevator or parking farther away from stores. Ask your healthcare provider about the best exercise plan for you.      © Copyright [employer] 2018 Information is for End User's use only and may not be sold, redistributed or otherwise used for commercial purposes. All illustrations and images included in CareNotes® are the copyrighted property of [employer], [employer] or [employer]    Medicare Preventive Visit Patient Instructions  Thank you for completing your Welcome to Medicare Visit or Medicare Annual Wellness Visit today. Your next wellness visit will be due in one year (2/21/2025).  The screening/preventive services that you may require over the next 5-10 years are detailed below. Some tests may not apply to you based off risk factors and/or age. Screening tests ordered at today's visit but not completed yet may show as past due. Also, please note that scanned in results may not display below.  Preventive Screenings:  Service Recommendations Previous Testing/Comments   Colorectal Cancer Screening  Colonoscopy    Fecal Occult Blood Test (FOBT)/Fecal Immunochemical Test (FIT)  Fecal DNA/Cologuard Test  Flexible Sigmoidoscopy Age: 45-75 years old   Colonoscopy: every 10 years (May be performed more frequently if at higher risk)  OR  FOBT/FIT: every 1 year  OR  Cologuard: every 3 years  OR  Sigmoidoscopy: every 5 years  Screening may be recommended earlier than age 45 if at higher risk for colorectal cancer. Also, an individualized  decision between you and your healthcare provider will decide whether screening between the ages of 76-85 would be appropriate. Colonoscopy: 06/28/2023  FOBT/FIT: Not on file  Cologuard: Not on file  Sigmoidoscopy: Not on file    Screening Current     Prostate Cancer Screening Individualized decision between patient and health care provider in men between ages of 55-69   Medicare will cover every 12 months beginning on the day after your 50th birthday PSA: 0.54 ng/mL     Screening Current     Hepatitis C Screening Once for adults born between 1945 and 1965  More frequently in patients at high risk for Hepatitis C Hep C Antibody: 12/31/2019    Screening Current   Diabetes Screening 1-2 times per year if you're at risk for diabetes or have pre-diabetes Fasting glucose: 125 mg/dL (2/8/2024)  A1C: 6.1 (1/17/2024)  Screening Not Indicated  History Diabetes   Cholesterol Screening Once every 5 years if you don't have a lipid disorder. May order more often based on risk factors. Lipid panel: 08/08/2023  Screening Not Indicated  History Lipid Disorder      Other Preventive Screenings Covered by Medicare:  Abdominal Aortic Aneurysm (AAA) Screening: covered once if your at risk. You're considered to be at risk if you have a family history of AAA or a male between the age of 65-75 who smoking at least 100 cigarettes in your lifetime.  Lung Cancer Screening: covers low dose CT scan once per year if you meet all of the following conditions: (1) Age 55-77; (2) No signs or symptoms of lung cancer; (3) Current smoker or have quit smoking within the last 15 years; (4) You have a tobacco smoking history of at least 20 pack years (packs per day x number of years you smoked); (5) You get a written order from a healthcare provider.  Glaucoma Screening: covered annually if you're considered high risk: (1) You have diabetes OR (2) Family history of glaucoma OR (3)  aged 50 and older OR (4)  American aged 65 and  older  Osteoporosis Screening: covered every 2 years if you meet one of the following conditions: (1) Have a vertebral abnormality; (2) On glucocorticoid therapy for more than 3 months; (3) Have primary hyperparathyroidism; (4) On osteoporosis medications and need to assess response to drug therapy.  HIV Screening: covered annually if you're between the age of 15-65. Also covered annually if you are younger than 15 and older than 65 with risk factors for HIV infection. For pregnant patients, it is covered up to 3 times per pregnancy.    Immunizations:  Immunization Recommendations   Influenza Vaccine Annual influenza vaccination during flu season is recommended for all persons aged >= 6 months who do not have contraindications   Pneumococcal Vaccine   * Pneumococcal conjugate vaccine = PCV13 (Prevnar 13), PCV15 (Vaxneuvance), PCV20 (Prevnar 20)  * Pneumococcal polysaccharide vaccine = PPSV23 (Pneumovax) Adults 19-65 yo with certain risk factors or if 65+ yo  If never received any pneumonia vaccine: recommend Prevnar 20 (PCV20)  Give PCV20 if previously received 1 dose of PCV13 or PPSV23   Hepatitis B Vaccine 3 dose series if at intermediate or high risk (ex: diabetes, end stage renal disease, liver disease)   Respiratory syncytial virus (RSV) Vaccine - COVERED BY MEDICARE PART D  * RSVPreF3 (Arexvy) CDC recommends that adults 60 years of age and older may receive a single dose of RSV vaccine using shared clinical decision-making (SCDM)   Tetanus (Td) Vaccine - COST NOT COVERED BY MEDICARE PART B Following completion of primary series, a booster dose should be given every 10 years to maintain immunity against tetanus. Td may also be given as tetanus wound prophylaxis.   Tdap Vaccine - COST NOT COVERED BY MEDICARE PART B Recommended at least once for all adults. For pregnant patients, recommended with each pregnancy.   Shingles Vaccine (Shingrix) - COST NOT COVERED BY MEDICARE PART B  2 shot series recommended in  those 19 years and older who have or will have weakened immune systems or those 50 years and older     Health Maintenance Due:      Topic Date Due   • Colorectal Cancer Screening  06/26/2028   • Hepatitis C Screening  Completed     Immunizations Due:      Topic Date Due   • Pneumococcal Vaccine: 65+ Years (2 of 2 - PPSV23 or PCV20) 04/29/2020   • COVID-19 Vaccine (7 - 2023-24 season) 01/16/2024     Advance Directives   What are advance directives?  Advance directives are legal documents that state your wishes and plans for medical care. These plans are made ahead of time in case you lose your ability to make decisions for yourself. Advance directives can apply to any medical decision, such as the treatments you want, and if you want to donate organs.   What are the types of advance directives?  There are many types of advance directives, and each state has rules about how to use them. You may choose a combination of any of the following:  Living will:  This is a written record of the treatment you want. You can also choose which treatments you do not want, which to limit, and which to stop at a certain time. This includes surgery, medicine, IV fluid, and tube feedings.   Durable power of  for healthcare (DPAHC):  This is a written record that states who you want to make healthcare choices for you when you are unable to make them for yourself. This person, called a proxy, is usually a family member or a friend. You may choose more than 1 proxy.  Do not resuscitate (DNR) order:  A DNR order is used in case your heart stops beating or you stop breathing. It is a request not to have certain forms of treatment, such as CPR. A DNR order may be included in other types of advance directives.  Medical directive:  This covers the care that you want if you are in a coma, near death, or unable to make decisions for yourself. You can list the treatments you want for each condition. Treatment may include pain medicine,  surgery, blood transfusions, dialysis, IV or tube feedings, and a ventilator (breathing machine).  Values history:  This document has questions about your views, beliefs, and how you feel and think about life. This information can help others choose the care that you would choose.  Why are advance directives important?  An advance directive helps you control your care. Although spoken wishes may be used, it is better to have your wishes written down. Spoken wishes can be misunderstood, or not followed. Treatments may be given even if you do not want them. An advance directive may make it easier for your family to make difficult choices about your care.   Weight Management   Why it is important to manage your weight:  Being overweight increases your risk of health conditions such as heart disease, high blood pressure, type 2 diabetes, and certain types of cancer. It can also increase your risk for osteoarthritis, sleep apnea, and other respiratory problems. Aim for a slow, steady weight loss. Even a small amount of weight loss can lower your risk of health problems.  How to lose weight safely:  A safe and healthy way to lose weight is to eat fewer calories and get regular exercise. You can lose up about 1 pound a week by decreasing the number of calories you eat by 500 calories each day.   Healthy meal plan for weight management:  A healthy meal plan includes a variety of foods, contains fewer calories, and helps you stay healthy. A healthy meal plan includes the following:  Eat whole-grain foods more often.  A healthy meal plan should contain fiber. Fiber is the part of grains, fruits, and vegetables that is not broken down by your body. Whole-grain foods are healthy and provide extra fiber in your diet. Some examples of whole-grain foods are whole-wheat breads and pastas, oatmeal, brown rice, and bulgur.  Eat a variety of vegetables every day.  Include dark, leafy greens such as spinach, kale, jt greens, and  mustard greens. Eat yellow and orange vegetables such as carrots, sweet potatoes, and winter squash.   Eat a variety of fruits every day.  Choose fresh or canned fruit (canned in its own juice or light syrup) instead of juice. Fruit juice has very little or no fiber.  Eat low-fat dairy foods.  Drink fat-free (skim) milk or 1% milk. Eat fat-free yogurt and low-fat cottage cheese. Try low-fat cheeses such as mozzarella and other reduced-fat cheeses.  Choose meat and other protein foods that are low in fat.  Choose beans or other legumes such as split peas or lentils. Choose fish, skinless poultry (chicken or turkey), or lean cuts of red meat (beef or pork). Before you cook meat or poultry, cut off any visible fat.   Use less fat and oil.  Try baking foods instead of frying them. Add less fat, such as margarine, sour cream, regular salad dressing and mayonnaise to foods. Eat fewer high-fat foods. Some examples of high-fat foods include french fries, doughnuts, ice cream, and cakes.  Eat fewer sweets.  Limit foods and drinks that are high in sugar. This includes candy, cookies, regular soda, and sweetened drinks.  Exercise:  Exercise at least 30 minutes per day on most days of the week. Some examples of exercise include walking, biking, dancing, and swimming. You can also fit in more physical activity by taking the stairs instead of the elevator or parking farther away from stores. Ask your healthcare provider about the best exercise plan for you.      © Copyright [employer] 2018 Information is for End User's use only and may not be sold, redistributed or otherwise used for commercial purposes. All illustrations and images included in CareNotes® are the copyrighted property of [employer] or [employer]

## 2024-02-21 NOTE — PROGRESS NOTES
Diabetic Foot Exam    Patient's shoes and socks removed.    Right Foot/Ankle   Right Foot Inspection  Skin Exam: skin normal and skin intact. No warmth, no callus, no erythema, no maceration, no abnormal color, no pre-ulcer, no ulcer and no callus.     Toe Exam: ROM and strength within normal limits.     Sensory   Monofilament testing: intact    Vascular  Capillary refills: < 3 seconds  The right DP pulse is 2+. The right PT pulse is 2+.     Left Foot/Ankle  Left Foot Inspection  Skin Exam: skin normal and skin intact. No dry skin, no warmth, no erythema, no maceration, normal color, no pre-ulcer, no ulcer and no callus.     Toe Exam: ROM and strength within normal limits.     Sensory   Monofilament testing: intact    Vascular  Capillary refills: < 3 seconds  The left DP pulse is 2+. The left PT pulse is 2+.     Assign Risk Category  No deformity present  No loss of protective sensation  No weak pulses  Risk: 0   Assessment and Plan:     Problem List Items Addressed This Visit        Digestive    Gastroesophageal reflux disease without esophagitis     Controlled with omeprazole.          Relevant Medications    omeprazole (PriLOSEC) 40 MG capsule       Endocrine    Type 2 diabetes mellitus with both eyes affected by mild nonproliferative retinopathy without macular edema, with long-term current use of insulin ([facility]) - Primary     Continue current regimen, management as per endocrinology.   Lab Results   Component Value Date    HGBA1C 6.1 01/17/2024          Relevant Medications    amLODIPine (NORVASC) 10 mg tablet    lisinopril (ZESTRIL) 40 mg tablet    simvastatin (ZOCOR) 20 mg tablet    Other Relevant Orders    Lipid panel    Comprehensive metabolic panel    Pituitary microadenoma (HCC)       Cardiovascular and Mediastinum    Benign essential hypertension     Stable, continue current antihypertensive regimen.          Relevant Medications    doxazosin (CARDURA) 4 mg tablet    hydroCHLOROthiazide 25 mg tablet     amLODIPine (NORVASC) 10 mg tablet    lisinopril (ZESTRIL) 40 mg tablet    Other Relevant Orders    Lipid panel    Comprehensive metabolic panel       Nervous and Auditory    Cervical radiculopathy    Relevant Medications    gabapentin (NEURONTIN) 400 mg capsule    Low back pain with sciatica     Continue gabapentin and discussed ROM and stretching          Lumbar radiculopathy    Relevant Medications    gabapentin (NEURONTIN) 400 mg capsule       Musculoskeletal and Integument    Sacroiliitis (HCC)       Genitourinary    Stage 3 chronic kidney disease, unspecified whether stage 3a or 3b CKD (HCC)     Lab Results   Component Value Date    EGFR 69 02/08/2024    EGFR 63 08/08/2023    EGFR 61 06/09/2023    CREATININE 1.08 02/08/2024    CREATININE 1.17 08/08/2023    CREATININE 1.20 06/09/2023   Continue to trend kidney, avoid nephrotoxic agents.          Relevant Medications    hydroCHLOROthiazide 25 mg tablet    Other Relevant Orders    Comprehensive metabolic panel       Other    Hyperlipidemia     Continue statin therapy.          Relevant Medications    simvastatin (ZOCOR) 20 mg tablet    Other Relevant Orders    Lipid panel    Comprehensive metabolic panel    Overweight (BMI 25.0-29.9)    Arthralgia of right hand     Continue tylenol and ROM/stretching exercises.          Advanced care planning/counseling discussion     Chad Coffman and I had a thorough discussion regarding advance care planning.  he  has advanced directives at home to which I recommended he provide a copy to be scanned for his medical records.  he  understands that today's visit is a billable service.  Refer to ACP note.          Other Visit Diagnoses     Medicare annual wellness visit, subsequent        Type 2 diabetes mellitus with hyperglycemia, with long-term current use of insulin (HCC)        Relevant Medications    amLODIPine (NORVASC) 10 mg tablet    lisinopril (ZESTRIL) 40 mg tablet    simvastatin (ZOCOR) 20 mg tablet    Screening for  diabetes mellitus        Screening for cardiovascular condition        Screening for colorectal cancer        Screening for prostate cancer            Serious Illness Conversation    1. What is your understanding now of where you are with your illness?  Prognostic Understanding: appropriate understanding of prognosis     2. How much information about what is likely to be ahead with your illness would you like to have?  Information: patient wants to be fully informed     3. What did you (clinician) communicate to the patient?  Prognostic Communication: Uncertain - It can be difficult to predict what will happen with your illness. I hope you will continue to live well for a long time but I’m worried that you could get sick quickly, and I think it is important to prepare for that possibility.     4. If your health situation worsens, what are your most important goals?  Goals: have my medical decisions respected, be mentally aware, be at home, be spiritually and emotionally at peace, not be a burden, be emotionally at peace, be physically comfortable     5. What are the biggest fears and worries about the future and your health?  Fears/Worries: loss of control, being a financial burden, being a physical burden, burdening others     6. What abilities are so critical to your life that you cannot imagine living without them?  Unacceptable Function: being unconscious     7. What gives you strength as you think about the future with your illness?     8. If you become sicker, how much are you willing to go through for the possibility of gaining more time?  Be in the hospital: Yes Have a feeding tube: Yes   Be in the ICU: Yes Live in a nursing home: Yes   Be on a ventilator: Yes Be uncomfortable: Yes   Be on dialysis: Yes Undergo aggressive test and/or procedures: Yes   9. How much does your proxy and family know about your priorities and wishes?  Discussion Discussion: extensive discussion with family about goals and wishes         How does this plan sound to you? I will do everything I can to help you through this.  Patient verbalized understanding of the plan     I have spent 16 minutes speaking with my patient on advanced care planning today or during this visit     Advanced directives            Preventive health issues were discussed with patient, and age appropriate screening tests were ordered as noted in patient's After Visit Summary.  Personalized health advice and appropriate referrals for health education or preventive services given if needed, as noted in patient's After Visit Summary.     History of Present Illness:     Patient presents for a Medicare Wellness Visit    Chad Coffman is seen today for follow up of chronic conditions.   Recent laboratory studies reviewed today with the patient.   he has been compliant with his medication regimen.   He has been experiencing worsening arthritis of back, hips, and bilateral hands.   he has no complaints or concerns at this time.       Hypertension  This is a chronic problem. The current episode started more than 1 year ago. The problem is unchanged. The problem is controlled. Pertinent negatives include no chest pain, headaches, palpitations or shortness of breath. Past treatments include calcium channel blockers, diuretics and ACE inhibitors. The current treatment provides moderate improvement. There are no compliance problems.    Diabetes  He presents for his follow-up diabetic visit. He has type 2 diabetes mellitus. His disease course has been stable. Pertinent negatives for hypoglycemia include no dizziness or headaches. There are no diabetic associated symptoms. Pertinent negatives for diabetes include no chest pain, no fatigue and no weakness. Current diabetic treatment includes diet, insulin injections and oral agent (dual therapy). He is compliant with treatment all of the time. There is no change in his home blood glucose trend. An ACE inhibitor/angiotensin II receptor  blocker is being taken. Eye exam is current.   Hyperlipidemia  This is a chronic problem. The current episode started more than 1 year ago. The problem is controlled. Recent lipid tests were reviewed and are normal. Pertinent negatives include no chest pain or shortness of breath. Current antihyperlipidemic treatment includes statins. The current treatment provides moderate improvement of lipids. There are no compliance problems.    Heartburn  He reports no abdominal pain, no chest pain, no choking, no coughing, no nausea, no sore throat or no wheezing. This is a chronic problem. The current episode started more than 1 year ago. The problem occurs rarely. The problem has been unchanged. The symptoms are aggravated by certain foods. Pertinent negatives include no fatigue. He has tried a PPI for the symptoms. The treatment provided moderate relief.      Patient Care Team:  Geoffrey Ojeda MD as PCP - General  Geoffrey Ojeda MD as PCP - PCP-[facility]  Juan Santiago MD as PCP - Endocrinology (Endocrinology)  MD Jeffrey Rubio DO (Pain Medicine)  DO Saleem Rodriguez MD Shweta Batra as Diabetes Educator (Nutrition)  Jessie Torrez PA-C as Physician Assistant (Endocrinology)  Aurora Espinoza MD (Surgical Oncology)  Michelle Herrmann MD as Medical Oncologist (Hematology)     Review of Systems:     Review of Systems   Constitutional:  Negative for activity change, appetite change, chills, diaphoresis, fatigue and fever.   HENT:  Negative for congestion, postnasal drip, rhinorrhea, sinus pressure, sinus pain, sneezing and sore throat.    Eyes:  Negative for visual disturbance.   Respiratory:  Negative for apnea, cough, choking, chest tightness, shortness of breath and wheezing.    Cardiovascular:  Negative for chest pain, palpitations and leg swelling.   Gastrointestinal:  Negative for abdominal distention, abdominal pain, anal bleeding, blood in stool,  constipation, diarrhea, nausea and vomiting.   Endocrine: Negative for cold intolerance and heat intolerance.   Genitourinary:  Negative for difficulty urinating, dysuria and hematuria.   Musculoskeletal: Negative.    Skin: Negative.    Neurological:  Negative for dizziness, weakness, light-headedness, numbness and headaches.   Hematological:  Negative for adenopathy.   Psychiatric/Behavioral:  Negative for agitation, sleep disturbance and suicidal ideas.    All other systems reviewed and are negative.       Problem List:     Patient Active Problem List   Diagnosis   • Benign essential hypertension   • Carpal tunnel syndrome   • Cervical herniated disc   • Cervical radiculopathy   • Cervical stenosis of spinal canal   • Type 2 diabetes mellitus with both eyes affected by mild nonproliferative retinopathy without macular edema, with long-term current use of insulin (HCC)   • Hyperlipidemia   • Hypogonadotropic hypogonadism (HCC)   • Pituitary microadenoma (HCC)   • Obstructive sleep apnea syndrome   • Spondylosis of cervical region without myelopathy or radiculopathy   • Mesenteric mass   • Vitamin D deficiency   • Low back pain with sciatica   • Sacroiliitis (HCC)   • Overweight (BMI 25.0-29.9)   • Gastroesophageal reflux disease without esophagitis   • Chronic pain syndrome   • Salivary gland adenitis   • Arthralgia of right hand   • Lumbar radiculopathy   • Stage 3 chronic kidney disease, unspecified whether stage 3a or 3b CKD (HCC)   • Iron deficiency anemia secondary to inadequate dietary iron intake   • Advanced care planning/counseling discussion   • Calcified mesenteric mass      Past Medical and Surgical History:     Past Medical History:   Diagnosis Date   • Abdominal pain    • Allergic    • Arthritis     Last assessed 5/24/2013   • Avitaminosis D 2012   • Chronic kidney disease    • Chronic pain disorder     lumbar-having LESI on 6/26/23   • Colon polyp    • CPAP (continuous positive airway pressure)  dependence    • Diabetes mellitus (HCC)    • Displacement of cervical intervertebral disc    • Diverticulitis of colon     slight   • GERD (gastroesophageal reflux disease)    • Glaucoma     Normal Pressure Glaucoma   • Headache(784.0) continuing around rt eye   • HTN (hypertension)    • Hypertension    • IBS (irritable bowel syndrome)    • Kidney stone    • Marijuana use     daily for chronic pain   • Nephrolithiasis 2013   • Pituitary microadenoma (HCC)    • Shortness of breath     started 2023-only when he bends over-not with activity   • Sleep apnea    • Spinal stenosis in cervical region    • Sprain and strain of calcaneofibular (ligament) 2013   • Submandibular lymphadenopathy 2019   • Uric acid nephrolithiasis    • Visual impairment Glaucoma, diabetic retinopothy     Past Surgical History:   Procedure Laterality Date   • ASPIRATION / INJECTION RENAL CYST      Renal Cyst Aspiration   • CATARACT EXTRACTION      2017- right eye, 2018- left eye   • COLONOSCOPY     • EYE SURGERY Bilateral     Cataract Surgery   • LITHOTRIPSY Right    • NM LAPS ABD PRTM&OMENTUM DX W/WO SPEC BR/WA SPX N/A 2023    Procedure: DIAGNOSTIC LAPAROSCOPY mesenteric biopsy;  Surgeon: Aurora Espinoza MD;  Location: [facility];  Service: Surgical Oncology   • TONSILLECTOMY     • UPPER GASTROINTESTINAL ENDOSCOPY        Family History:     Family History   Problem Relation Age of Onset   • Diabetes unspecified Mother    • Heart disease Mother    • Nephrolithiasis Mother    • Kidney disease Mother    • Other Mother         Back Disorder   • Thyroid disease Mother    • Diabetes type II Mother    • Hypertension Mother    • Thyroid disease unspecified Mother    • Diabetes Mother            • Arthritis Mother    • Diabetes unspecified Father    • Heart disease Father    • Hypertension Father    • Thrombosis Father            • Diabetes type II Father    • Diabetes  unspecified Sister    • Heart disease Sister    • Stroke Sister    • Diabetes unspecified Brother    • Heart disease Brother    • Nephrolithiasis Brother    • Lung cancer Brother    • Other Brother         COPD   • Diabetes unspecified Sister    • Heart disease Sister    • Diabetes Sister    • Arthritis Sister    • Diabetes type II Sister    • Diabetes unspecified Sister    • Diabetes unspecified Brother    • Heart disease Brother    • Diabetes unspecified Brother    • Other Brother         Back Disorder   • COPD Brother    • Diabetes type II Brother    • Diabetes unspecified Brother    • Other Brother         Back Disorder   • Diabetes unspecified Brother    • Stomach cancer Paternal Aunt    • Diabetes type II Maternal Aunt       Social History:     Social History     Socioeconomic History   • Marital status: /Civil Union     Spouse name: None   • Number of children: None   • Years of education: None   • Highest education level: None   Occupational History   • Occupation:    Tobacco Use   • Smoking status: Former     Current packs/day: 0.00     Average packs/day: 0.3 packs/day for 2.0 years (0.5 ttl pk-yrs)     Types: Cigarettes     Start date:      Quit date: 1980     Years since quittin.1   • Smokeless tobacco: Never   Vaping Use   • Vaping status: Never Used   Substance and Sexual Activity   • Alcohol use: Yes     Alcohol/week: 4.0 standard drinks of alcohol     Types: 2 Cans of beer, 2 Standard drinks or equivalent per week     Comment: social   • Drug use: Yes     Frequency: 7.0 times per week     Types: Marijuana     Comment: Use Medical Marijuana daily (1-3 capsules or tincture)   • Sexual activity: Yes     Partners: Female     Birth control/protection: Female Sterilization   Other Topics Concern   • None   Social History Narrative   • None     Social Determinants of Health     Financial Resource Strain: Low Risk  (2024)    Overall Financial Resource Strain (CARDIA)     • Difficulty of Paying Living Expenses: Not very hard   Food Insecurity: Not on file   Transportation Needs: No Transportation Needs (2/20/2024)    PRAPARE - Transportation    • Lack of Transportation (Medical): No    • Lack of Transportation (Non-Medical): No   Physical Activity: Not on file   Stress: Not on file   Social Connections: Not on file   Intimate Partner Violence: Not on file   Housing Stability: Not on file      Medications and Allergies:     Current Outpatient Medications   Medication Sig Dispense Refill   • acetaminophen (Tylenol 8 Hour Arthritis Pain) 650 mg CR tablet Take 650 mg by mouth every 8 (eight) hours as needed for mild pain     • acetaminophen (TYLENOL) 650 mg CR tablet Take 650 mg by mouth every 8 (eight) hours as needed for mild pain     • amLODIPine (NORVASC) 10 mg tablet Take 1 tablet (10 mg total) by mouth daily at bedtime 90 tablet 1   • aspirin (ECOTRIN LOW STRENGTH) 81 mg EC tablet Take 81 mg by mouth daily      • B Complex Vitamins (VITAMIN B-COMPLEX PO) Take 1 capsule by mouth daily      • BD Pen Needle Harriett U/F 32G X 4 MM MISC USE 4 TIMES DAILY 360 each 2   • brimonidine-timolol (COMBIGAN) 0.2-0.5 % Administer 1 drop to both eyes every 12 (twelve) hours     • cholecalciferol (VITAMIN D3) 1,000 units tablet Take 1,000 Units by mouth 2 (two) times a day      • Continuous Blood Gluc  (Dexcom G6 ) AGATA Use     • Continuous Blood Gluc Sensor (Dexcom G6 Sensor) MISC Use     • dicyclomine (BENTYL) 20 mg tablet Take 1 tablet (20 mg total) by mouth 4 (four) times a day (before meals and at bedtime) (Patient taking differently: Take 20 mg by mouth if needed) 120 tablet 5   • doxazosin (CARDURA) 4 mg tablet Take 1 tablet (4 mg total) by mouth every morning 90 tablet 1   • Empagliflozin (Jardiance) 25 MG TABS Take 1 tablet (25 mg total) by mouth daily 90 tablet 3   • ferrous gluconate (FERGON) 240 (27 FE) MG tablet Take 1 tablet (240 mg total) by mouth in the morning  (Patient taking differently: Take 240 mg by mouth in the morning Last dose 6/20) 90 tablet 1   • fluticasone (FLONASE) 50 mcg/act nasal spray 1 spray into each nostril daily as needed for rhinitis (Acute URI/sinusitis) 18 mL 0   • gabapentin (NEURONTIN) 400 mg capsule Take 1 capsule (400 mg total) by mouth 3 (three) times a day 270 capsule 0   • glipiZIDE (GLUCOTROL) 5 mg tablet Take 1 tablet (5 mg total) by mouth in the morning 90 tablet 3   • Glucagon (Baqsimi Two Pack) 3 MG/DOSE POWD Use 1 actuation as needed (low blood sugar) into 1 nostril 1 each 1   • Glucosamine-Chondroitin 250-200 MG TABS Take 1 tablet by mouth 2 (two) times a day     • hydroCHLOROthiazide 25 mg tablet Take 1 tablet (25 mg total) by mouth every morning 90 tablet 1   • Insulin Glargine Solostar (Lantus SoloStar) 100 UNIT/ML SOPN Inject 0.22 mL (22 Units total) under the skin daily at bedtime 30 mL 1   • insulin lispro (HumaLOG KwikPen) 100 units/mL injection pen Inject per insulin scales up to 30 units per day. (Patient taking differently: Inject per insulin scales up to 40 units per day.) 30 mL 3   • lidocaine (Lidoderm) 5 % Apply 1 patch topically daily Remove & Discard patch within 12 hours or as directed by MD (Patient taking differently: Apply 1 patch topically if needed Remove & Discard patch within 12 hours or as directed by MD) 6 patch 0   • lisinopril (ZESTRIL) 40 mg tablet Take 1 tablet (40 mg total) by mouth every morning 90 tablet 1   • loratadine (CLARITIN) 10 mg tablet Take 10 mg by mouth daily     • MULTIPLE VITAMIN PO Take 1 tablet by mouth daily      • omeprazole (PriLOSEC) 40 MG capsule Take 1 capsule (40 mg total) by mouth every morning 90 capsule 1   • other medication, see sig, Medication/product name: Medical Marijuana     • polyethylene glycol (MiraLax) 17 GM/SCOOP powder 17 g if needed     • Probiotic Product (PROBIOTIC-10) CAPS Take 1 capsule by mouth daily      • simvastatin (ZOCOR) 20 mg tablet Take 1 tablet (20 mg  total) by mouth daily at bedtime 90 tablet 3   • testosterone (ANDROGEL) 1.62 % Apply 3 packets daily (Dose/concentration change) 112.5 g 5   • vitamin E, tocopherol, 400 units capsule Take 400 Units by mouth 2 (two) times a day        No current facility-administered medications for this visit.     Allergies   Allergen Reactions   • Clonidine Other (See Comments)     Diaphoresis, hot flashes   • Augmentin [Amoxicillin-Pot Clavulanate] Abdominal Pain   • Biaxin [Clarithromycin] Abdominal Pain   • Peanut (Diagnostic) - Food Allergy Diarrhea, GI Intolerance and Abdominal Pain   • Sitagliptin-Metformin Hcl Er Diarrhea, GI Intolerance and Abdominal Pain   • Dust Mite Extract Allergic Rhinitis   • Insulin Aspart GI Intolerance     Novolog    • Liraglutide Nausea Only and Abdominal Pain   • Metformin And Related Diarrhea and GI Intolerance   • Molds & Smuts Allergic Rhinitis   • Seasonal Ic [Cholestatin] Headache      Immunizations:     Immunization History   Administered Date(s) Administered   • COVID-19 PFIZER VACCINE 0.3 ML IM 03/02/2021, 03/22/2021, 10/11/2021, 10/05/2022, 10/05/2022, 11/21/2023   • COVID-19 Pfizer mRNA vacc PF sally-sucrose 12 yr and older (Comirnaty) 11/21/2023   • H1N1, All Formulations 11/16/2009   • INFLUENZA 09/29/2009, 10/07/2016, 10/14/2017, 10/09/2018, 10/04/2019, 11/12/2021, 10/21/2022   • Influenza Quadrivalent Preservative Free 3 years and older IM 10/14/2017   • Influenza, high dose seasonal 0.7 mL 10/21/2022, 11/07/2023   • Influenza, injectable, quadrivalent, preservative free 0.5 mL 10/09/2018   • Pneumococcal Conjugate 13-Valent 03/04/2020   • Rsv, Unspecified 02/08/2024   • Tdap 06/05/2008, 12/05/2022   • Zoster 09/08/2016   • Zoster Vaccine Recombinant 12/20/2019, 06/10/2020      Health Maintenance:         Topic Date Due   • Colorectal Cancer Screening  06/26/2028   • Hepatitis C Screening  Completed         Topic Date Due   • Pneumococcal Vaccine: 65+ Years (2 of 2 - PPSV23 or  PCV20) 04/29/2020   • COVID-19 Vaccine (7 - 2023-24 season) 01/16/2024      Medicare Screening Tests and Risk Assessments:     Chad is here for his Subsequent Wellness visit. Last Medicare Wellness visit information reviewed, patient interviewed and updates made to the record today.      Health Risk Assessment:   Patient rates overall health as good. Patient feels that their physical health rating is slightly better. Patient is satisfied with their life. Eyesight was rated as slightly worse. Hearing was rated as same. Patient feels that their emotional and mental health rating is slightly better. Patients states they are sometimes angry. Patient states they are often unusually tired/fatigued. Pain experienced in the last 7 days has been a lot. Patient's pain rating has been 7/10. Patient states that he has experienced weight loss or gain in last 6 months.     Fall Risk Screening:   In the past year, patient has experienced: no history of falling in past year      Home Safety:  Patient does not have trouble with stairs inside or outside of their home. Patient has working smoke alarms and has working carbon monoxide detector. Home safety hazards include: household clutter and medications that cause fatigue. Careful around cats, toys    Nutrition:   Current diet is Diabetic.     Medications:   Patient is currently taking over-the-counter supplements. OTC medications include: see medication list. Patient is able to manage medications.     Activities of Daily Living (ADLs)/Instrumental Activities of Daily Living (IADLs):   Walk and transfer into and out of bed and chair?: Yes  Dress and groom yourself?: Yes    Bathe or shower yourself?: Yes    Feed yourself? Yes  Do your laundry/housekeeping?: Yes  Manage your money, pay your bills and track your expenses?: Yes  Make your own meals?: Yes    Do your own shopping?: Yes    ADL comments: Have needed to do more cooking, laundry, etc. due to my wife's knee  problems    Durable Medical Equipment Suppliers  [employer]    Previous Hospitalizations:   Any hospitalizations or ED visits within the last 12 months?: No      Advance Care Planning:   Living will: No    Durable POA for healthcare: Yes    Advanced directive: Yes    Advanced directive counseling given: Yes    End of Life Decisions reviewed with patient: Yes    Provider agrees with end of life decisions: Yes      Cognitive Screening:   Provider or family/friend/caregiver concerned regarding cognition?: No    PREVENTIVE SCREENINGS      Cardiovascular Screening:    General: Screening Not Indicated, History Lipid Disorder and Risks and Benefits Discussed    Due for: Lipid Panel      Diabetes Screening:     General: Screening Not Indicated, History Diabetes and Risks and Benefits Discussed    Due for: Blood Glucose      Colorectal Cancer Screening:     General: Screening Current      Prostate Cancer Screening:    General: Screening Current and Risks and Benefits Discussed    Due for: PSA      Osteoporosis Screening:    General: Screening Not Indicated      Abdominal Aortic Aneurysm (AAA) Screening:    Risk factors include: age between 65-76 yo and tobacco use        General: Screening Not Indicated      Lung Cancer Screening:     General: Screening Not Indicated      Hepatitis C Screening:    General: Screening Current and Risks and Benefits Discussed    Screening, Brief Intervention, and Referral to Treatment (SBIRT)    Screening  Typical number of drinks in a day: 1  Typical number of drinks in a week: 3  Interpretation: Low risk drinking behavior.    AUDIT-C Screenin) How often did you have a drink containing alcohol in the past year? 2 to 4 times a month  2) How many drinks did you have on a typical day when you were drinking in the past year? 1 to 2  3) How often did you have 6 or more drinks on one occasion in the past year? never    AUDIT-C Score: 2  Interpretation: Score 0-3 (male): Negative screen for alcohol  "misuse    Single Item Drug Screening:  How often have you used an illegal drug (including marijuana) or a prescription medication for non-medical reasons in the past year? never    Single Item Drug Screen Score: 0  Interpretation: Negative screen for possible drug use disorder    Brief Intervention  Alcohol & drug use screenings were reviewed. No concerns regarding substance use disorder identified.     Annual Depression Screening  Time spent screening and evaluating the patient for depression during today's encounter was 5 minutes.    Other Counseling Topics:   Car/seat belt/driving safety, skin self-exam, sunscreen and calcium and vitamin D intake and regular weightbearing exercise.     No results found.     Physical Exam:     /72 (BP Location: Left arm, Patient Position: Sitting, Cuff Size: Standard)   Pulse 67   Temp 98 °F (36.7 °C) (Temporal)   Resp 18   Ht 5' 3\" (1.6 m)   Wt 70.6 kg (155 lb 9.6 oz)   SpO2 96%   BMI 27.56 kg/m²     Physical Exam  Vitals and nursing note reviewed.   Constitutional:       General: He is not in acute distress.     Appearance: Normal appearance. He is normal weight. He is not ill-appearing, toxic-appearing or diaphoretic.   HENT:      Head: Normocephalic and atraumatic.      Right Ear: Tympanic membrane, ear canal and external ear normal. There is no impacted cerumen.      Left Ear: Tympanic membrane, ear canal and external ear normal. There is no impacted cerumen.      Nose: Nose normal. No congestion or rhinorrhea.      Mouth/Throat:      Mouth: Mucous membranes are moist.      Pharynx: Oropharynx is clear. No oropharyngeal exudate or posterior oropharyngeal erythema.   Eyes:      General: No scleral icterus.        Right eye: No discharge.         Left eye: No discharge.      Extraocular Movements: Extraocular movements intact.      Conjunctiva/sclera: Conjunctivae normal.      Pupils: Pupils are equal, round, and reactive to light.   Neck:      Vascular: No carotid " bruit.   Cardiovascular:      Rate and Rhythm: Normal rate and regular rhythm.      Pulses: Normal pulses. no weak pulses.           Dorsalis pedis pulses are 2+ on the right side and 2+ on the left side.        Posterior tibial pulses are 2+ on the right side and 2+ on the left side.      Heart sounds: Normal heart sounds. No murmur heard.     No friction rub. No gallop.   Pulmonary:      Effort: Pulmonary effort is normal. No respiratory distress.      Breath sounds: Normal breath sounds. No wheezing or rales.   Abdominal:      General: Abdomen is flat. Bowel sounds are normal. There is no distension.      Palpations: Abdomen is soft. There is no mass.      Tenderness: There is no abdominal tenderness. There is no guarding.      Hernia: No hernia is present.   Musculoskeletal:         General: No swelling, tenderness, deformity or signs of injury. Normal range of motion.      Cervical back: Normal range of motion and neck supple. No rigidity. No muscular tenderness.      Right lower leg: No edema.      Left lower leg: No edema.   Feet:      Right foot:      Skin integrity: No ulcer, skin breakdown, erythema, warmth or callus.      Left foot:      Skin integrity: No ulcer, skin breakdown, erythema, warmth, callus or dry skin.   Lymphadenopathy:      Cervical: No cervical adenopathy.   Skin:     General: Skin is warm and dry.      Capillary Refill: Capillary refill takes less than 2 seconds.      Coloration: Skin is not jaundiced or pale.      Findings: No bruising, erythema, lesion or rash.   Neurological:      General: No focal deficit present.      Mental Status: He is alert and oriented to person, place, and time. Mental status is at baseline.      Cranial Nerves: No cranial nerve deficit.      Sensory: No sensory deficit.      Motor: No weakness.      Coordination: Coordination normal.      Gait: Gait normal.      Deep Tendon Reflexes: Reflexes normal.   Psychiatric:         Mood and Affect: Mood normal.          Behavior: Behavior normal.         Thought Content: Thought content normal.         Judgment: Judgment normal.          Geoffrey Ojeda MD

## 2024-03-04 ENCOUNTER — HOSPITAL ENCOUNTER (OUTPATIENT)
Dept: RADIOLOGY | Facility: HOSPITAL | Age: 69
Discharge: HOME | End: 2024-03-04
Attending: INTERNAL MEDICINE
Payer: MEDICARE

## 2024-03-04 DIAGNOSIS — E11.65 TYPE 2 DIABETES MELLITUS WITH HYPERGLYCEMIA, WITH LONG-TERM CURRENT USE OF INSULIN (HCC): ICD-10-CM

## 2024-03-04 DIAGNOSIS — Z79.4 TYPE 2 DIABETES MELLITUS WITH HYPERGLYCEMIA, WITH LONG-TERM CURRENT USE OF INSULIN (HCC): ICD-10-CM

## 2024-03-04 PROCEDURE — G1004 CDSM NDSC: HCPCS

## 2024-03-04 PROCEDURE — A9541 TC99M SULFUR COLLOID: HCPCS

## 2024-03-04 PROCEDURE — 78264 GASTRIC EMPTYING IMG STUDY: CPT

## 2024-03-05 ENCOUNTER — APPOINTMENT (OUTPATIENT)
Dept: LAB | Age: 69
End: 2024-03-05
Payer: MEDICARE

## 2024-03-05 ENCOUNTER — HOSPITAL ENCOUNTER (OUTPATIENT)
Dept: RADIOLOGY | Age: 69
Discharge: HOME | End: 2024-03-05
Payer: MEDICARE

## 2024-03-05 DIAGNOSIS — Z79.4 TYPE 2 DIABETES MELLITUS WITH HYPERGLYCEMIA, WITH LONG-TERM CURRENT USE OF INSULIN (HCC): ICD-10-CM

## 2024-03-05 DIAGNOSIS — Z79.4 TYPE 2 DIABETES MELLITUS WITH BOTH EYES AFFECTED BY MILD NONPROLIFERATIVE RETINOPATHY WITHOUT MACULAR EDEMA, WITH LONG-TERM CURRENT USE OF INSULIN (HCC): ICD-10-CM

## 2024-03-05 DIAGNOSIS — E11.3293 TYPE 2 DIABETES MELLITUS WITH BOTH EYES AFFECTED BY MILD NONPROLIFERATIVE RETINOPATHY WITHOUT MACULAR EDEMA, WITH LONG-TERM CURRENT USE OF INSULIN (HCC): ICD-10-CM

## 2024-03-05 DIAGNOSIS — I10 BENIGN ESSENTIAL HYPERTENSION: ICD-10-CM

## 2024-03-05 DIAGNOSIS — K63.89 MESENTERIC MASS: ICD-10-CM

## 2024-03-05 DIAGNOSIS — N18.30 STAGE 3 CHRONIC KIDNEY DISEASE, UNSPECIFIED WHETHER STAGE 3A OR 3B CKD (HCC): ICD-10-CM

## 2024-03-05 DIAGNOSIS — E78.2 MIXED HYPERLIPIDEMIA: ICD-10-CM

## 2024-03-05 DIAGNOSIS — E11.65 TYPE 2 DIABETES MELLITUS WITH HYPERGLYCEMIA, WITH LONG-TERM CURRENT USE OF INSULIN (HCC): ICD-10-CM

## 2024-03-05 DIAGNOSIS — K31.84 GASTROPARESIS: Primary | ICD-10-CM

## 2024-03-05 LAB
BUN SERPL-MCNC: 26 MG/DL (ref 5–25)
CREAT SERPL-MCNC: 1.16 MG/DL (ref 0.6–1.3)
GFR SERPL CREATININE-BSD FRML MDRD: 63 ML/MIN/1.73SQ M

## 2024-03-05 PROCEDURE — 74177 CT ABD & PELVIS W/CONTRAST: CPT

## 2024-03-05 PROCEDURE — G1004 CDSM NDSC: HCPCS

## 2024-03-05 RX ADMIN — IOHEXOL 100 ML: 350 INJECTION, SOLUTION INTRAVENOUS at 11:00

## 2024-03-08 ENCOUNTER — TELEPHONE (OUTPATIENT)
Dept: INTERNAL MEDICINE CLINIC | Facility: OTHER | Age: 69
End: 2024-03-08

## 2024-03-08 NOTE — TELEPHONE ENCOUNTER
Spoke to patient.  Pt had most recent diabetic eye exam end of last year.  Will send message to [facility].      Pt also has appt with them next week. Will watch to see if report comes in chart.  If not I will send another message.

## 2024-03-11 ENCOUNTER — TELEPHONE (OUTPATIENT)
Dept: ADMINISTRATIVE | Facility: OTHER | Age: 69
End: 2024-03-11

## 2024-03-11 ENCOUNTER — ESTABLISHED COMPREHENSIVE EXAM (OUTPATIENT)
Dept: URBAN - METROPOLITAN AREA CLINIC 6 | Facility: CLINIC | Age: 69
End: 2024-03-11

## 2024-03-11 DIAGNOSIS — Z96.1: ICD-10-CM

## 2024-03-11 DIAGNOSIS — H04.123: ICD-10-CM

## 2024-03-11 DIAGNOSIS — E11.3293: ICD-10-CM

## 2024-03-11 DIAGNOSIS — H40.1113: ICD-10-CM

## 2024-03-11 DIAGNOSIS — H40.1122: ICD-10-CM

## 2024-03-11 DIAGNOSIS — Z79.4: ICD-10-CM

## 2024-03-11 LAB
LEFT EYE DIABETIC RETINOPATHY: POSITIVE
RIGHT EYE DIABETIC RETINOPATHY: POSITIVE

## 2024-03-11 PROCEDURE — 92020 GONIOSCOPY: CPT

## 2024-03-11 PROCEDURE — 92014 COMPRE OPH EXAM EST PT 1/>: CPT

## 2024-03-11 PROCEDURE — 92133 CPTRZD OPH DX IMG PST SGM ON: CPT

## 2024-03-11 PROCEDURE — 92202 OPSCPY EXTND ON/MAC DRAW: CPT

## 2024-03-11 ASSESSMENT — TONOMETRY
OS_IOP_MMHG: 17
OD_IOP_MMHG: 16

## 2024-03-11 ASSESSMENT — VISUAL ACUITY
OS_SC: 20/25-1
OD_SC: 20/30-2

## 2024-03-11 NOTE — TELEPHONE ENCOUNTER
Upon review of the In Basket request and the patient's chart, initial outreach has been made via fax to facility. Please see Contacts section for details.     Thank you  Carlos Bernstein MA

## 2024-03-11 NOTE — TELEPHONE ENCOUNTER
03/11/24 1:16 PM     [employer] SmartForm used to document caregap status.    Carlos Bernstein MA

## 2024-03-11 NOTE — TELEPHONE ENCOUNTER
----- Message from Samir Eaton sent at 3/8/2024  2:07 PM EST -----  03/08/24 2:07 PM    Hello, our patient Chad Coffman has had Diabetic Eye Exam completed/performed. Please assist in updating the patient chart by making an External outreach to [facility] facility located [facility]. The date of service is Dec 2023

## 2024-03-11 NOTE — LETTER
Diabetic Eye Exam Form    Date Requested: 24  Patient: Chad Coffman  Patient : [id number]   Referring Provider: Geoffrey Ojeda MD      DIABETIC Eye Exam Date _______________________________      Type of Exam MUST be documented for Diabetic Eye Exams. Please CHECK ONE.     Retinal Exam       Dilated Retinal Exam       OCT       Optomap-Iris Exam      Fundus Photography       Left Eye - Please check Retinopathy or No Retinopathy        Exam did show retinopathy    Exam did not show retinopathy       Right Eye - Please check Retinopathy or No Retinopathy       Exam did show retinopathy    Exam did not show retinopathy       Comments __________________________________________________________    Practice Providing Exam ______________________________________________    Exam Performed By (print name) _______________________________________      Provider Signature ___________________________________________________      These reports are needed for  compliance.  Please fax this completed form and a copy of the Diabetic Eye Exam report to our office located at [address] [place] [address] as soon as possible via Fax [phone] attention Carlos: Phone [phone]  We thank you for your assistance in treating our mutual patient.

## 2024-03-11 NOTE — TELEPHONE ENCOUNTER
Upon review of the In Basket request we were able to locate, review, and update the patient chart as requested for Diabetic Eye Exam.    Any additional questions or concerns should be emailed to the Practice Liaisons via the appropriate education email address, please do not reply via In Basket.    Thank you  Carlos Bernstein MA

## 2024-03-12 ENCOUNTER — DOCUMENTATION (OUTPATIENT)
Dept: HEMATOLOGY ONCOLOGY | Facility: CLINIC | Age: 69
End: 2024-03-12

## 2024-03-12 ENCOUNTER — TELEPHONE (OUTPATIENT)
Dept: GASTROENTEROLOGY | Facility: CLINIC | Age: 69
End: 2024-03-12

## 2024-03-12 ENCOUNTER — OFFICE VISIT (OUTPATIENT)
Dept: SURGICAL ONCOLOGY | Facility: CLINIC | Age: 69
End: 2024-03-12
Payer: MEDICARE

## 2024-03-12 VITALS
HEIGHT: 63 IN | OXYGEN SATURATION: 96 % | BODY MASS INDEX: 27.25 KG/M2 | HEART RATE: 83 BPM | SYSTOLIC BLOOD PRESSURE: 118 MMHG | WEIGHT: 153.8 LBS | DIASTOLIC BLOOD PRESSURE: 50 MMHG | TEMPERATURE: 98.2 F

## 2024-03-12 DIAGNOSIS — R93.5 ABNORMAL FINDINGS ON DIAGNOSTIC IMAGING OF OTHER ABDOMINAL REGIONS, INCLUDING RETROPERITONEUM: ICD-10-CM

## 2024-03-12 DIAGNOSIS — K63.89 MESENTERIC MASS: Primary | ICD-10-CM

## 2024-03-12 PROCEDURE — 99214 OFFICE O/P EST MOD 30 MIN: CPT | Performed by: STUDENT IN AN ORGANIZED HEALTH CARE EDUCATION/TRAINING PROGRAM

## 2024-03-12 NOTE — TELEPHONE ENCOUNTER
Last ov 5/17/23 Dr. Murcia, Procedure combo 6/28/23.I spoke with Mr. Coffman. Patient referred for urgent appointment by Dr. Espinoza reason  Needs to see GI for EGD with push +/- capsule endoscopy     There is  a separate referral from endocrinologist due to abnormal GES study.    Patient will be leaving on Friday and back to area the 27th. He was offered appointment on the 14th. Currently no earlier appointment avaiable. Please review and advise.

## 2024-03-12 NOTE — PROGRESS NOTES
Surgical Oncology Consultation F/U    [address] [facility]  [facility]  [address] [facility] [address]  [facility] [address]    Patient:  Chad Coffman  1955  [id number]    Primary Care provider:  Geoffrey Ojeda MD  [address] [facility] [place] [address]    Referring provider:  No referring provider defined for this encounter.    Diagnoses and all orders for this visit:    Mesenteric mass        Chief Complaint   Patient presents with    Follow-up       No follow-ups on file.    Oncology History    No history exists.       History of Present Illness  :   This is a 67-year-old gentleman seen in consultation today for a mesenteric mass.  Briefly, the patient has been having near daily abdominal pain with vague cramping for several months.  He describes a general feeling of discomfort daily with occasional sharp pains.  He also describes daily nausea with a reduction in his appetite, though he has not had any episodes of vomiting.  He states he has intermittent constipation and diarrhea, which appears to be unpredictable.  He denies any symptoms of flushing, floating diarrhea, fevers, chills, significant fatigue.  The patient underwent work-up with his GI providers, who repeated an EGD and colonoscopy due to his symptomatology which did not reveal a source.  He then had a CT scan of the abdomen and pelvis to work-up a previously detected finding from a noncontrasted scan in October 2022 revealing mesenteric nodularity.  This demonstrated a vague mesenteric mass infiltrating the small bowel mesentery at the mid SMA.  Given its appearance and a lab value of a chromogranin near thousand, the patient underwent a dotatate PET scan.  This failed to show significant avidity within the mesenteric mass.  Of note, there was a small focus of moderate avidity at the pancreatic tail with no corresponding CT correlate.  The patient was sent for interventional radiology percutaneous biopsy but this  was unable to be completed due to the position of the mass.    At this juncture, the elevated chromogranin may be due to a medical interaction or he may have a false negative PET dotatate scan with this infiltrative mass representing a carcinoid tumor.  However, this may also represent a lymphoma, desmoid tumor, or other etiology.  The patient's symptoms are relatively nonspecific and may or may not be related to the presence of the mass.  At this juncture, given the proximity on the SMA, I like to perform a hand-assisted laparoscopic biopsy.  This will allow for evaluation of resectability should the histology reveal a lesion that meets criteria for resection.  Likewise, obtaining a biopsy will allow a tissue diagnosis to better determine next steps.     Interval  Pt is s/p diag lapa 8/2023 with biopsy. Mesenteric mass appeared calcified, nodular, and very proximal on the jejunum. Resectability unlikely. Sufficient sample obtained of lesional tissue. Path with benign fibrosis. Postop he stated his intermittent diarrhea and constipation had significantly improved since diagnostic laparoscopy. Likewise, his nausea had almost entirely resolved and he was eating well and gaining weight. Today his CGA remains elevated and repeat scan shows expansion of the mesenteric mass. He underwent gastric emptying study this month as well, demonstrating slowed emptying.         Review of Systems  Complete ROS Surg Onc:   Constitutional: The patient denies new or recent history of general fatigue, with weight loss, and reduced appetite.   Eyes: No complaints of visual problems, no scleral icterus.   ENT: No complaints of ear pain, no hoarseness, no difficulty swallowing,  no tinnitus and no new masses in head, oral cavity, or neck.   Cardiovascular: No complaints of chest pain, no palpitations, no ankle edema.   Respiratory: No complaints of shortness of breath, no cough.   Gastrointestinal: No complaints of jaundice, no bloody  stools, no pale stools.   Genitourinary: No complaints of dysuria, no hematuria, no nocturia, no frequent urination, no urethral discharge.   Musculoskeletal: No complaints of weakness, paralysis, joint stiffness or arthralgias.  Integumentary: No complaints of rash, no new lesions.   Neurological: No complaints of convulsions, no seizures, no dizziness.   Hematologic/Lymphatic: No complaints of easy bruising.   Endocrine:  No hot or cold intolerance.  No polydipsia, polyphagia, or polyuria.  Allergy/immunology:  No environmental allergies.  No food allergies.  Not immunocompromised.      Patient Active Problem List   Diagnosis    Benign essential hypertension    Carpal tunnel syndrome    Cervical herniated disc    Cervical radiculopathy    Cervical stenosis of spinal canal    Type 2 diabetes mellitus with both eyes affected by mild nonproliferative retinopathy without macular edema, with long-term current use of insulin (HCC)    Hyperlipidemia    Hypogonadotropic hypogonadism (HCC)    Pituitary microadenoma (HCC)    Obstructive sleep apnea syndrome    Spondylosis of cervical region without myelopathy or radiculopathy    Mesenteric mass    Vitamin D deficiency    Low back pain with sciatica    Sacroiliitis (HCC)    Overweight (BMI 25.0-29.9)    Gastroesophageal reflux disease without esophagitis    Chronic pain syndrome    Salivary gland adenitis    Arthralgia of right hand    Lumbar radiculopathy    Stage 3 chronic kidney disease, unspecified whether stage 3a or 3b CKD (HCC)    Iron deficiency anemia secondary to inadequate dietary iron intake    Advanced care planning/counseling discussion    Calcified mesenteric mass     Past Medical History:   Diagnosis Date    Abdominal pain     Allergic     Arthritis     Last assessed 5/24/2013    Avitaminosis D 2012    Chronic kidney disease     Chronic pain disorder     lumbar-having LESI on 6/26/23    Colon polyp     CPAP (continuous positive airway pressure) dependence      Diabetes mellitus (HCC)     Displacement of cervical intervertebral disc     Diverticulitis of colon     slight    GERD (gastroesophageal reflux disease)     Glaucoma     Normal Pressure Glaucoma    Headache(784.0) continuing around rt eye    HTN (hypertension)     Hypertension     IBS (irritable bowel syndrome)     Kidney stone     Marijuana use     daily for chronic pain    Nephrolithiasis 2013    Pituitary microadenoma (HCC)     Shortness of breath     started 2023-only when he bends over-not with activity    Sleep apnea     Spinal stenosis in cervical region     Sprain and strain of calcaneofibular (ligament) 2013    Submandibular lymphadenopathy 2019    Uric acid nephrolithiasis     Visual impairment Glaucoma, diabetic retinopothy     Past Surgical History:   Procedure Laterality Date    ASPIRATION / INJECTION RENAL CYST      Renal Cyst Aspiration    CATARACT EXTRACTION      2017- right eye, 2018- left eye    COLONOSCOPY  2005    EYE SURGERY Bilateral     Cataract Surgery    LITHOTRIPSY Right     OR LAPS ABD PRTM&OMENTUM DX W/WO SPEC BR/WA SPX N/A 2023    Procedure: DIAGNOSTIC LAPAROSCOPY mesenteric biopsy;  Surgeon: Aurora Espinoza MD;  Location: [facility];  Service: Surgical Oncology    TONSILLECTOMY      UPPER GASTROINTESTINAL ENDOSCOPY       Family History   Problem Relation Age of Onset    Diabetes unspecified Mother     Heart disease Mother     Nephrolithiasis Mother     Kidney disease Mother     Other Mother         Back Disorder    Thyroid disease Mother     Diabetes type II Mother     Hypertension Mother     Thyroid disease unspecified Mother     Diabetes Mother             Arthritis Mother     Diabetes unspecified Father     Heart disease Father     Hypertension Father     Thrombosis Father             Diabetes type II Father     Diabetes unspecified Sister     Heart disease Sister     Stroke Sister     Diabetes  unspecified Brother     Heart disease Brother     Nephrolithiasis Brother     Lung cancer Brother     Other Brother         COPD    Diabetes unspecified Sister     Heart disease Sister     Diabetes Sister     Arthritis Sister     Diabetes type II Sister     Diabetes unspecified Sister     Diabetes unspecified Brother     Heart disease Brother     Diabetes unspecified Brother     Other Brother         Back Disorder    COPD Brother     Diabetes type II Brother     Diabetes unspecified Brother     Other Brother         Back Disorder    Diabetes unspecified Brother     Stomach cancer Paternal Aunt     Diabetes type II Maternal Aunt      Social History     Socioeconomic History    Marital status: /Civil Union     Spouse name: Not on file    Number of children: Not on file    Years of education: Not on file    Highest education level: Not on file   Occupational History    Occupation:    Tobacco Use    Smoking status: Former     Current packs/day: 0.00     Average packs/day: 0.3 packs/day for 2.0 years (0.5 ttl pk-yrs)     Types: Cigarettes     Start date:      Quit date: 1980     Years since quittin.2    Smokeless tobacco: Never   Vaping Use    Vaping status: Never Used   Substance and Sexual Activity    Alcohol use: Yes     Alcohol/week: 4.0 standard drinks of alcohol     Types: 2 Cans of beer, 2 Standard drinks or equivalent per week     Comment: social    Drug use: Yes     Frequency: 7.0 times per week     Types: Marijuana     Comment: Use Medical Marijuana daily (1-3 capsules or tincture)    Sexual activity: Yes     Partners: Female     Birth control/protection: Female Sterilization   Other Topics Concern    Not on file   Social History Narrative    Not on file     Social Determinants of Health     Financial Resource Strain: Low Risk  (2024)    Overall Financial Resource Strain (CARDIA)     Difficulty of Paying Living Expenses: Not very hard   Food Insecurity: Not on file    Transportation Needs: No Transportation Needs (2/20/2024)    PRAPARE - Transportation     Lack of Transportation (Medical): No     Lack of Transportation (Non-Medical): No   Physical Activity: Not on file   Stress: Not on file   Social Connections: Not on file   Intimate Partner Violence: Not on file   Housing Stability: Not on file       Current Outpatient Medications:     acetaminophen (Tylenol 8 Hour Arthritis Pain) 650 mg CR tablet, Take 650 mg by mouth every 8 (eight) hours as needed for mild pain, Disp: , Rfl:     amLODIPine (NORVASC) 10 mg tablet, Take 1 tablet (10 mg total) by mouth daily at bedtime, Disp: 90 tablet, Rfl: 1    aspirin (ECOTRIN LOW STRENGTH) 81 mg EC tablet, Take 81 mg by mouth daily , Disp: , Rfl:     B Complex Vitamins (VITAMIN B-COMPLEX PO), Take 1 capsule by mouth daily , Disp: , Rfl:     BD Pen Needle Harriett U/F 32G X 4 MM MISC, USE 4 TIMES DAILY, Disp: 360 each, Rfl: 2    brimonidine-timolol (COMBIGAN) 0.2-0.5 %, Administer 1 drop to both eyes every 12 (twelve) hours, Disp: , Rfl:     cholecalciferol (VITAMIN D3) 1,000 units tablet, Take 1,000 Units by mouth 2 (two) times a day , Disp: , Rfl:     Continuous Blood Gluc  (Dexcom G6 ) AGATA, Use, Disp: , Rfl:     Continuous Blood Gluc Sensor (Dexcom G6 Sensor) MISC, Use, Disp: , Rfl:     dicyclomine (BENTYL) 20 mg tablet, Take 1 tablet (20 mg total) by mouth 4 (four) times a day (before meals and at bedtime) (Patient taking differently: Take 20 mg by mouth if needed), Disp: 120 tablet, Rfl: 5    doxazosin (CARDURA) 4 mg tablet, Take 1 tablet (4 mg total) by mouth every morning, Disp: 90 tablet, Rfl: 1    Empagliflozin (Jardiance) 25 MG TABS, Take 1 tablet (25 mg total) by mouth daily, Disp: 90 tablet, Rfl: 3    ferrous gluconate (FERGON) 240 (27 FE) MG tablet, Take 1 tablet (240 mg total) by mouth in the morning (Patient taking differently: Take 240 mg by mouth in the morning Last dose 6/20), Disp: 90 tablet, Rfl: 1     fluticasone (FLONASE) 50 mcg/act nasal spray, 1 spray into each nostril daily as needed for rhinitis (Acute URI/sinusitis), Disp: 18 mL, Rfl: 0    gabapentin (NEURONTIN) 400 mg capsule, Take 1 capsule (400 mg total) by mouth 3 (three) times a day, Disp: 270 capsule, Rfl: 0    glipiZIDE (GLUCOTROL) 5 mg tablet, Take 1 tablet (5 mg total) by mouth in the morning, Disp: 90 tablet, Rfl: 3    Glucagon (Baqsimi Two Pack) 3 MG/DOSE POWD, Use 1 actuation as needed (low blood sugar) into 1 nostril, Disp: 1 each, Rfl: 1    Glucosamine-Chondroitin 250-200 MG TABS, Take 1 tablet by mouth 2 (two) times a day, Disp: , Rfl:     hydroCHLOROthiazide 25 mg tablet, Take 1 tablet (25 mg total) by mouth every morning, Disp: 90 tablet, Rfl: 1    Insulin Glargine Solostar (Lantus SoloStar) 100 UNIT/ML SOPN, Inject 0.22 mL (22 Units total) under the skin daily at bedtime, Disp: 30 mL, Rfl: 1    insulin lispro (HumaLOG KwikPen) 100 units/mL injection pen, Inject per insulin scales up to 30 units per day. (Patient taking differently: Inject per insulin scales up to 40 units per day.), Disp: 30 mL, Rfl: 3    lidocaine (Lidoderm) 5 %, Apply 1 patch topically daily Remove & Discard patch within 12 hours or as directed by MD (Patient taking differently: Apply 1 patch topically if needed Remove & Discard patch within 12 hours or as directed by MD), Disp: 6 patch, Rfl: 0    lisinopril (ZESTRIL) 40 mg tablet, Take 1 tablet (40 mg total) by mouth every morning, Disp: 90 tablet, Rfl: 1    loratadine (CLARITIN) 10 mg tablet, Take 10 mg by mouth daily, Disp: , Rfl:     MULTIPLE VITAMIN PO, Take 1 tablet by mouth daily , Disp: , Rfl:     omeprazole (PriLOSEC) 40 MG capsule, Take 1 capsule (40 mg total) by mouth every morning, Disp: 90 capsule, Rfl: 1    other medication, see sig,, Medication/product name: Medical Marijuana, Disp: , Rfl:     polyethylene glycol (MiraLax) 17 GM/SCOOP powder, 17 g if needed, Disp: , Rfl:     Probiotic Product  (PROBIOTIC-10) CAPS, Take 1 capsule by mouth daily , Disp: , Rfl:     simvastatin (ZOCOR) 20 mg tablet, Take 1 tablet (20 mg total) by mouth daily at bedtime, Disp: 90 tablet, Rfl: 3    testosterone (ANDROGEL) 1.62 %, Apply 3 packets daily (Dose/concentration change), Disp: 112.5 g, Rfl: 5    vitamin E, tocopherol, 400 units capsule, Take 400 Units by mouth 2 (two) times a day , Disp: , Rfl:     acetaminophen (TYLENOL) 650 mg CR tablet, Take 650 mg by mouth every 8 (eight) hours as needed for mild pain, Disp: , Rfl:   Allergies   Allergen Reactions    Clonidine Other (See Comments)     Diaphoresis, hot flashes    Augmentin [Amoxicillin-Pot Clavulanate] Abdominal Pain    Biaxin [Clarithromycin] Abdominal Pain    Peanut (Diagnostic) - Food Allergy Diarrhea, GI Intolerance and Abdominal Pain    Sitagliptin-Metformin Hcl Er Diarrhea, GI Intolerance and Abdominal Pain    Dust Mite Extract Allergic Rhinitis    Insulin Aspart GI Intolerance     Novolog     Liraglutide Nausea Only and Abdominal Pain    Metformin And Related Diarrhea and GI Intolerance    Molds & Smuts Allergic Rhinitis    Seasonal Ic [Cholestatin] Headache       Vitals:    03/12/24 1050   BP: 118/50   Pulse: 83   Temp: 98.2 °F (36.8 °C)   SpO2: 96%       Physical Exam   General: Appears well, appears stated age  Skin: Warm, anicteric. Incisions healing well  HEENT: Normocephalic, atraumatic; sclera aniceteric, mucous membranes moist; cervical nodes without adenopathy  Cardiopulmonary: RRR, Easy WOB, no BLE edema  Abd: Flat, soft, nontender, no masses appreciated, no hepatosplenomegaly  MSK: Symmetric, no cyanosis, no overt weakness  Lymphatic: No cervical, axillary or inguinal lymphadenopathy  Neuro: Affect appropriate, no gross motor abnormalities    Labs:   Final Diagnosis   A-B. Mesentery, Mass, Biopsy  -  Fragments of fibroconnective tissue with granulation tissue, chronic inflammation, and reactive mesothelial cells.  -  Negative for malignancy.      Comment: The patient's abnormal imaging findings of mesenteric mass are noted. The current sample shows fragments of fibroconnective tissue with reactive changes. There is no evidence of carcinoma and work-up for neuroendocrine tumor is negative. Possible etiologies include fat necrosis, auto-immune disease, prior surgical treatment, infection, and reactive tissue adjacent to unsampled lesional tissue. Clinical and radiographic correlation is advised for adequate sampling of the target lesion.          Imaging  CT abdomen and pelvis w IV and oral contrast    Addendum Date: 7/10/2023 Addendum:   Please note: On further evaluation, the 5 mm nodule at the caudal margin of the pancreatic tail is considered statistically most likely to represent CT artifact. This leaves no CT abnormality to account for hypermetabolism in the pancreas on most recent PET/CT scan. As that finding was quite convincing, consider further investigation with pancreatic protocol CT to evaluate for pancreatic mass which may be isointense to pancreas on all CT imaging phases.    Result Date: 7/10/2023  Narrative: CT ABDOMEN WITH IV CONTRAST INDICATION:   K66.8: Other specified disorders of peritoneum K86.89: Other specified diseases of pancreas. COMPARISON: PET/CT scan performed June 6, 2023 TECHNIQUE:  CT examination of the abdomen was performed after the administration of intravenous contrast. Scanning through the abdomen was performed in arterial, venous and delayed phases according a protocol specifically designed to evaluate upper abdominal viscera. Multiplanar 2D reformatted images were created from the source data. This examination, like all CT scans performed in the [facility], was performed utilizing techniques to minimize radiation dose exposure, including the use of iterative reconstruction and automated exposure control.  Radiation dose length  product (DLP) for this visit:  619.68 mGy-cm IV Contrast:  100 mL of  iohexol (OMNIPAQUE) Enteric Contrast:  Enteric contrast was administered. FINDINGS: LOWER CHEST:  No clinically significant abnormality identified in the visualized lower chest. LIVER/BILIARY TREE:  Unremarkable. GALLBLADDER:  No calcified gallstones. No pericholecystic inflammatory change. SPLEEN:  Unremarkable. PANCREAS: Very questionable 6 mm hyperdense lesion at the anterior margin of the distal pancreatic tail on image 45 of series 6, not detectable on any other CT imaging. Otherwise no evidence for pancreatic mass. ADRENAL GLANDS: Nonobstructing 4 mm calculus at the lower pole of the right kidney. Small left renal cyst. No solid renal mass. No hydronephrosis. KIDNEYS/URETERS:  Unremarkable. No hydronephrosis. VISUALIZED STOMACH AND BOWEL:  Unremarkable. ABDOMINAL CAVITY: Infiltrative mass in the small bowel mesentery surrounding but not narrowing mesenteric vessels and containing associated calcifications is a somewhat amorphous shape but is estimated at approximately 5.7 x 2.6 x 4.5 cm on images 81 of series 3 and 46 of series 603. Surrounding nodularity reidentified. Borderline retroperitoneal nodes are identified. VESSELS: Prominence of peripheral mesenteric vasculature related to mass infiltrating in the central small bowel mesentery. ABDOMINAL WALL:  Unremarkable. OSSEOUS STRUCTURES:  No acute fracture or destructive osseous lesion.     Impression: Tiny 6 mm nodule in the distal pancreatic tail detectable only on delayed phase postcontrast imaging and possibly of no clinical significance. Otherwise no CT findings to account for hypermetabolism in the pancreatic tail. Reidentified infiltrative mass in the mesentery with calcifications and surrounding nodularity highly suspicious for carcinoid spectrum lesion. Workstation performed: [id number]       I independently reviewed and interpreted the above laboratory and imaging data including present and past CT, PET, heme onc and GI evals with EGD, scope, path.      Discussion/Summary:   This is a 67-year-old gentleman with a mesenteric mass of unknown etiology. He is s/p diag lapa with biopsy 8/2023: Mesenteric involvement very proximal. Exceptionally firm; non-malignant appearing. Path showed inflammatory tissue alone. Sx have resolved with essentially zero intervention. CT shows expansion of the mass and CGA remains high. Will communicate with Dr Murcia re: push enteroscopy +/- capsule endoscopy. Will also repeat dotatate scan to eval interval avidity. Will discuss at TB.

## 2024-03-12 NOTE — TELEPHONE ENCOUNTER
Patients GI provider:  Dr. Murcia    Number to return call: [phone]    Reason for call: Dr. Espinoza's office called to schedule pt for urgent apt for dx Mesenteric mass. Only apt was for 3/14 in [place] and pt will be away for 2 weeks on that date. No other apts available until June.     Scheduled procedure/appointment date if applicable: none

## 2024-03-12 NOTE — PROGRESS NOTES
In-basket message received from Dr. [facility]. Chart reviewed and prep completed.      [FreeTextEntry1] : Status post laparoscopic sigmoid colon resection. Patient progressing well. Tolerating diet. Normal bowel movements improving pain

## 2024-03-22 ENCOUNTER — DOCUMENTATION (OUTPATIENT)
Dept: HEMATOLOGY ONCOLOGY | Facility: CLINIC | Age: 69
End: 2024-03-22

## 2024-03-22 NOTE — PROGRESS NOTES
LIVER MULTIDISCIPLINARY CANCER CONFERENCE    DATE:  3/22/24      PRESENTING DOCTOR:  Dr. Herrmann      DIAGNOSIS:  Mesenteric Mass      Christian Coffman was presented at the Liver Multidisciplinary Cancer Conference today.      PHYSICIAN RECOMMENDED PLAN:    -  Suspicion for pancreatic neuroendocrine tumor with metastasis to mesentery after review of studies during conference.  The recommendation is for referral to Gastroenterology for endoscopy for biopsy of pancreas lesion and possible biopsy of mesenteric mass.  Referral to GI placed.     Team agreed to plan.    The final treatment plan will be left to the discretion of the patient and the treating physician.     DISCLAIMERS:  TO THE TREATING PHYSICIAN:  This conference is a meeting of clinicians from various specialty areas who evaluate and discuss patients for whom a multidisciplinary treatment approach is being considered. Please note that the above opinion was a consensus of the conference attendees and is intended only to assist in quality care of the discussed patient.  The responsibility for follow up on the input given during the conference, along with any final decisions regarding plan of care, is that of the patient and the patient's provider. Accordingly, appointments have only been recommended based on this information and have NOT been scheduled unless otherwise noted.      TO THE PATIENT:  This summary is a brief record of major aspects of your cancer treatment. You may choose to share a copy with any of your doctors or nurses. However, this is not a detailed or comprehensive record of your care.      NCCN guidelines were readily available for review at this discussion

## 2024-03-25 ENCOUNTER — TELEPHONE (OUTPATIENT)
Dept: ADMINISTRATIVE | Facility: OTHER | Age: 69
End: 2024-03-25

## 2024-03-25 ENCOUNTER — PREP FOR PROCEDURE (OUTPATIENT)
Dept: GASTROENTEROLOGY | Facility: CLINIC | Age: 69
End: 2024-03-25

## 2024-03-25 DIAGNOSIS — R63.4 ABNORMAL WEIGHT LOSS: Primary | ICD-10-CM

## 2024-03-25 DIAGNOSIS — R93.5 ABNORMAL CT SCAN, PELVIS: ICD-10-CM

## 2024-03-25 NOTE — TELEPHONE ENCOUNTER
----- Message from Samir Eaton sent at 3/24/2024  4:09 PM EDT -----  03/24/24 4:09 PM  03/24/24 4:09 PM    Hello, our patient Chad Coffman has had Diabetic Eye Exam completed/performed. Please assist in updating the patient chart by making an External outreach to [facility] facility located in [facility]. The date of service is Week of March 10 2024.    Thank you,  Samir Eaton  [facility] [place]

## 2024-03-25 NOTE — LETTER
Diabetic Eye Exam Form    Date Requested: 24  Patient: Chad Coffman  Patient : 1955   Referring Provider: Geoffrey Ojeda MD      DIABETIC Eye Exam Date _______________________________      Type of Exam MUST be documented for Diabetic Eye Exams. Please CHECK ONE.     Retinal Exam       Dilated Retinal Exam       OCT       Optomap-Iris Exam      Fundus Photography       Left Eye - Please check Retinopathy or No Retinopathy        Exam did show retinopathy    Exam did not show retinopathy       Right Eye - Please check Retinopathy or No Retinopathy       Exam did show retinopathy    Exam did not show retinopathy       Comments __ Patient told PCP he was in week of 03/10/2024 and had DM Eye performed. __________________________________________________________    Practice Providing Exam ______________________________________________    Exam Performed By (print name) _______________________________________      Provider Signature ___________________________________________________      These reports are needed for  compliance.  Please fax this completed form and a copy of the Diabetic Eye Exam report to our office located at [address] [place] [address] as soon as possible via Fax [phone] attention Carlos: Phone [phone]  We thank you for your assistance in treating our mutual patient.

## 2024-03-27 ENCOUNTER — TELEPHONE (OUTPATIENT)
Dept: GASTROENTEROLOGY | Facility: CLINIC | Age: 69
End: 2024-03-27

## 2024-03-27 NOTE — TELEPHONE ENCOUNTER
----- Message from Carissa Freire PA-C sent at 3/25/2024  2:31 PM EDT -----  EUS order is in TY!  ----- Message -----  From: Orlando Wagner MD  Sent: 3/23/2024  10:53 AM EDT  To: Carissa Freire PA-C; #    Thanks. Yes Urgent.   Though do we know why it was presented on the liver tumor board - anything on the liver?   Thanks.   Orlando    ----- Message -----  From: Carissa Freire PA-C  Sent: 3/22/2024   3:33 PM EDT  To: Orlando Wagner MD; [facility]    Referring physician : Alexis    Diagnosis : Mesenteric mass    Discussed with patient : YES/NO: yes    Symptoms :     Labs :     Imaging : 3/5/24 CT Continued interval increase in size of infiltrative mesenteric mass with calcifications and spiculation concerning for carcinoid spectrum lesion. PET SCAN scheduled.     Prior endoscopy : EGD ·2 sessile fundic gland polyps in the fundus of the stomach were removed with cold forceps biopsy. Single submucosal nodule in the antrum; performed cold forceps biopsy  ·Performed forceps biopsies in the body of the stomach and antrum to rule out H. pylori  ·The duodenal bulb and 2nd part of the duodenum appeared normal. Performed random biopsy to rule out celiac disease. Pathology benign.   #EUS was recommended at this time.       Plan :   1. URGENT EUS.      Order placed : YES/NO: no     ----- Message -----  From: Jenise Ortiz  Sent: 3/22/2024  12:14 PM EDT  To: Cierra Phillips RN; Norman Quinones RN; #    This patient was discussed at the liver [facility] this morning. He needs an appointment ASAP for endoscopy for biopsy of pancreas lesion and possible biopsy of mesenteric mass.  A referral has been entered.    Thank you

## 2024-03-27 NOTE — TELEPHONE ENCOUNTER
Procedure:  EUS  Scheduled date of procedure (as of today): 4/4/24  Physician performing procedure: Dr. Wagner  Location of procedure: [place]   Instructions reviewed with patient by:  Josselyn ni chart   Clearances: Diabetic med form sent

## 2024-03-28 NOTE — TELEPHONE ENCOUNTER
03/28/24 3:33 PM     [employer] SmartForm used to document caregap status.    Carlos Bernstein MA

## 2024-03-29 ENCOUNTER — HOSPITAL ENCOUNTER (OUTPATIENT)
Dept: RADIOLOGY | Age: 69
Discharge: HOME | End: 2024-03-29
Payer: MEDICARE

## 2024-03-29 DIAGNOSIS — D48.4 NEOPLASM OF UNCERTAIN BEHAVIOR OF MESENTERY: ICD-10-CM

## 2024-03-29 DIAGNOSIS — R93.5 ABNORMAL FINDINGS ON DIAGNOSTIC IMAGING OF OTHER ABDOMINAL REGIONS, INCLUDING RETROPERITONEUM: ICD-10-CM

## 2024-03-29 DIAGNOSIS — K63.89 MESENTERIC MASS: ICD-10-CM

## 2024-03-29 PROCEDURE — 78815 PET IMAGE W/CT SKULL-THIGH: CPT

## 2024-03-29 PROCEDURE — A9587 GALLIUM GA-68: HCPCS

## 2024-04-01 ENCOUNTER — OFFICE VISIT (OUTPATIENT)
Dept: GASTROENTEROLOGY | Facility: AMBULARY SURGERY CENTER | Age: 69
End: 2024-04-01
Payer: MEDICARE

## 2024-04-01 VITALS
SYSTOLIC BLOOD PRESSURE: 118 MMHG | HEART RATE: 71 BPM | HEIGHT: 63 IN | BODY MASS INDEX: 27.21 KG/M2 | DIASTOLIC BLOOD PRESSURE: 62 MMHG | OXYGEN SATURATION: 96 % | WEIGHT: 153.6 LBS

## 2024-04-01 DIAGNOSIS — R19.4 CHANGE IN BOWEL HABITS: ICD-10-CM

## 2024-04-01 DIAGNOSIS — K31.84 GASTROPARESIS: Primary | ICD-10-CM

## 2024-04-01 DIAGNOSIS — K63.89 MESENTERIC MASS: ICD-10-CM

## 2024-04-01 DIAGNOSIS — R14.0 FLATULENCE/GAS PAIN/BELCHING: ICD-10-CM

## 2024-04-01 PROCEDURE — 99214 OFFICE O/P EST MOD 30 MIN: CPT | Performed by: INTERNAL MEDICINE

## 2024-04-01 PROCEDURE — G2211 COMPLEX E/M VISIT ADD ON: HCPCS | Performed by: INTERNAL MEDICINE

## 2024-04-01 NOTE — PATIENT INSTRUCTIONS
NUTRITION RECOMMENDATIONS FOR PATIENTS WITH   DELAYED GASTRIC EMPTYING **    Nutrition education and diet modifications are important factors for controlling the symptoms of chronic nausea and vomiting associated with impaired stomach emptying. Maintaining good nutrition can be achieved with modest changes in your diet while at the same time help to reduce symptoms.     Basic Points to Remember:    1. Eat smaller portions. The greater the meal volume, the slower the stomach empties. Eat smaller meals more frequently for easier digestion.  2. Sit up or stand during eating and after meals. Body positioning during meals is very important. Avoid lying down while eating. Try to sit up or stand and walk around during and at least 3 hours after meals.   3. Choose liquid or pureed foods should empty even on a bad day. Liquid food exits the stomach more easily than solids. Switch to a liquid diet or pureed/ground foods if necessary. The same thing can be accomplished in most patients my thoroughly chewing your food. Drink caloric drinks rather than water (e.g. Peach/pear nectar, cranberry juice, Gator Aide, soup broth, [place]mar-Aid, etc.).  4. Be aware of side effects from medications. Some medications may irritate the stomach or cause further delay of your stomach emptying. Either can lead to nausea and vomiting. Ask your doctor if you are currently taking any medications that may be causing chronic nausea and/or vomiting.  5. If you have diabetes, the most important thing to do is to control your glucose levels. Elevated glucose levels >200 mg/dL can delay stomach emptying in healthy people. If you have diabetes, take frequent glucose measurements and make insulin adjustments as needed for glucose control.  6. Avoid excess fiber intake. Some fiber is important to maintain normal intestinal functions. On the other hand, a high-fiber diet slows stomach emptying and increases the presence of food residue in the stomach. Avoid  "foods like oranges, berries, green beans, figs, skin on apples, potato peels, broccoli, cauliflower, and lettuce. Most fruits and vegetables can be digested successfully if they are smaller than 1/4 inch. Again, chewing and cutting up your food and eating with plenty of liquid is important. Avoid excess high-fiber supplements such as Metamucil, Citrucel, etc.  7. Avoid high-fat foods. Fat slows the exit of food from the stomach. A low-fat diet is recommended; however, some liquid containing fat can be a good source of calories.  8. Take a daily multi-vitamin supplement. A multi-vitamin supplement (specifically vitamins A and C, and the mineral iron) should be considered.  9. Eat nutritious foods. Eat nutritiously before filling up on empty calories found in foods such as cakes, candy and pastries.  10. Be aware of \"trigger foods\". Some foods may cause symptoms more readily than others. Take note of these foods and avoid them if possible.     Recommended Foods:    Skim milk, low-fat yogurt, low-fat milkshakes, low-fat cheeses, hot cereals (cream of wheat, grits).  Fat-free soups and bouillon with noodles and vegetables.  Fruit juice, canned fruits without skin (applesauce, peaches, pears); add honey or syrup for extra calories.  Eggs, peanut butter.  Meats, fish, poultry; mix with broth, water, vegetable or V-8 juice.  Low-grain bread and cereals, pasta, rice, crackers.  Vegetable juices, cooked vegetables without skins (beets, carrots, mushrooms, potatoes, including mashed potatoes)  Tea, water, coffee, soda    ** These recommendations have been modified from a publication from the American [employer]. It was written by the following authors:  Krissy BROOKS, Erica ROBIN, Don S, Roberto MONTAÑO.                  "

## 2024-04-01 NOTE — PROGRESS NOTES
[facility] - Outpatient Follow-up Note  Chad Coffman 68 y.o. male MRN: [id number]  Encounter: [id number]          ASSESSMENT AND PLAN:      1. Flatulence/gas pain/belching  2. Change in bowel habits  3. Gastroparesis  With delayed gastric emptying on GES  Emphasized nutritional/lifestyle changes  ? From abdominal mass  Continue strict glycemic control  Avoid GLP-1 agonists  F/u endocrinology  Nutrition referral placed    4. Mesenteric mass  Mesenteric mass of unknown etiology  Diagnostic laparoscopy with biopsy August/2023-proximal mesenteric involvement, biopsies benign  Not amenable to IR biopsy  Unclear contribution to above symptoms  Recent CT with expansion of the mass and elevated chromogranin  He is scheduled for endoscopic ultrasound to also evaluate pancreatic tail lesion with Dr. Wagner in 3 days  If unremarkable, will plan for push enteroscopy +/ capsule  Continue follow up with surgical oncology, oncology  - Ambulatory referral to Gastroenterology  - EGD with push ordered    ______________________________________________________________________    SUBJECTIVE:      Patient is a 68-year-old male who is seen in office visit follow-up for gas, abdominal cramping. With T2DM Last hba1c 6.1% on insulin.  He is present with his wife at today's visit, who contributes to history.  He continues to have gas, including abdominal bloating, flatus, belching.  This usually occurs within 30 minutes after eating, and has been associated with frequent stools, approximately 1-3 after breakfast.  He is no longer on metformin or Ozempic.  He is on Jardiance and insulin to manage his diabetes. He follows with endocrinology.     Patient with mesenteric mass seen on CT performed for evaluation of his abdominal symptoms after EGD, colonoscopy unremarkable.  Diagnostic laparoscopy with biopsy, unable to resect mass.  Pathology with benign fibrosis.  Elevated chromagranin. EUS scheduled with Dr. Wagner  "for later this week. IR biopsy was not able to be performed.    GES March 2024- significantly delayed gastric emptying  PET March 2024-partially calcified irregular mesenteric mass, persistent indeterminant nodule tracer activity in the pancreatic tail  CT March/2024-interval increase in size of infiltrative mesenteric mass with calcifications spiculation concerning for carcinoid spectrum lesion  EGD June 2023-2 sessile fundic gland polyps in the fundus with single mucosal nodule in the antrum, biopsies negative for H. pylori, EUS recommended    Patient is moving to [place], [place] in mid May to be near his daughter who works as a clinical psychologist at [employer], where he plans to transfer his care.    Abdominal Pain  This is a recurrent problem. The current episode started more than 1 year ago. The onset quality is gradual. The problem occurs 2 to 4 times per day. The most recent episode lasted 4 hours. The problem has been waxing and waning. The pain is located in the LLQ, left flank and right flank. The pain is at a severity of 4/10. The quality of the pain is cramping, a sensation of fullness and sharp. The abdominal pain radiates to the LLQ, RLQ and pelvis. Associated symptoms include arthralgias, belching, diarrhea, flatus, frequency and nausea. Pertinent negatives include no anorexia, constipation, dysuria, fever, headaches, hematochezia, hematuria, melena, myalgias, vomiting or weight loss. Nothing aggravates the pain. The pain is relieved by Belching, bowel movements and passing flatus. Prior diagnostic workup includes CT scan, GI consult, lower endoscopy and surgery.         Objective     Blood pressure 118/62, pulse 71, height 5' 3\" (1.6 m), weight 69.7 kg (153 lb 9.6 oz), SpO2 96%. Body mass index is 27.21 kg/m².      PHYSICAL EXAM:      General Appearance:   Alert, cooperative, no distress   HEENT:   Normocephalic, atraumatic, anicteric.     Neck:  Supple, symmetrical, trachea midline   Lungs:   " Equal chest rise and unlabored breathing, normal effort, no coughing.    Heart::   No visualized JVD.   Abdomen:   Soft, non-tender, non-distended; normal bowel sounds; no masses, no organomegaly    Rectal:   Deferred    Extremities:  No cyanosis, clubbing or edema    Pulses:  2+ and symmetric    Skin:  No jaundice, rashes, or lesions      Lab Results:   Lab Results   Component Value Date    WBC 9.13 01/11/2024    HGB 16.5 01/11/2024    HCT 48.4 01/11/2024    MCV 89 01/11/2024     01/11/2024       Lab Results   Component Value Date     10/28/2017    SODIUM 141 02/08/2024    K 4.0 02/08/2024     02/08/2024    CO2 28 02/08/2024    ANIONGAP 10 09/15/2015    AGAP 13 02/08/2024    BUN 26 (H) 03/05/2024    CREATININE 1.16 03/05/2024    GLUC 78 06/15/2019    GLUF 125 (H) 02/08/2024    CALCIUM 9.3 02/08/2024    AST 22 02/08/2024    ALT 22 02/08/2024    ALKPHOS 54 02/08/2024    PROT 8.1 10/28/2017    TP 7.0 02/08/2024    BILITOT 1.2 10/28/2017    TBILI 1.23 (H) 02/08/2024    EGFR 63 03/05/2024       I personally reviewed relevant labs.    Radiology Results:   GES March 2024- significantly delayed gastric emptying  PET March 2024-partially calcified irregular mesenteric mass, persistent indeterminant nodule tracer activity in the pancreatic tail  CT March/2024-interval increase in size of infiltrative mesenteric mass with calcifications spiculation concerning for carcinoid spectrum lesion     I personally reviewed relevant images in PACS.

## 2024-04-04 ENCOUNTER — ANESTHESIA (OUTPATIENT)
Dept: GASTROENTEROLOGY | Facility: HOSPITAL | Age: 69
End: 2024-04-04

## 2024-04-04 ENCOUNTER — HOSPITAL ENCOUNTER (OUTPATIENT)
Dept: GASTROENTEROLOGY | Facility: HOSPITAL | Age: 69
Setting detail: OUTPATIENT SURGERY
End: 2024-04-04
Attending: INTERNAL MEDICINE
Payer: MEDICARE

## 2024-04-04 ENCOUNTER — ANESTHESIA EVENT (OUTPATIENT)
Dept: GASTROENTEROLOGY | Facility: HOSPITAL | Age: 69
End: 2024-04-04

## 2024-04-04 VITALS
HEART RATE: 64 BPM | SYSTOLIC BLOOD PRESSURE: 123 MMHG | RESPIRATION RATE: 18 BRPM | TEMPERATURE: 97.5 F | OXYGEN SATURATION: 96 % | DIASTOLIC BLOOD PRESSURE: 58 MMHG

## 2024-04-04 DIAGNOSIS — R63.4 ABNORMAL WEIGHT LOSS: ICD-10-CM

## 2024-04-04 DIAGNOSIS — R93.5 ABNORMAL CT SCAN, PELVIS: ICD-10-CM

## 2024-04-04 LAB — GLUCOSE SERPL-MCNC: 222 MG/DL (ref 65–140)

## 2024-04-04 PROCEDURE — 88305 TISSUE EXAM BY PATHOLOGIST: CPT | Performed by: PATHOLOGY

## 2024-04-04 PROCEDURE — 82948 REAGENT STRIP/BLOOD GLUCOSE: CPT

## 2024-04-04 RX ORDER — PROPOFOL 10 MG/ML
INJECTION, EMULSION INTRAVENOUS CONTINUOUS PRN
Status: DISCONTINUED | OUTPATIENT
Start: 2024-04-04 | End: 2024-04-04

## 2024-04-04 RX ORDER — PROPOFOL 10 MG/ML
INJECTION, EMULSION INTRAVENOUS AS NEEDED
Status: DISCONTINUED | OUTPATIENT
Start: 2024-04-04 | End: 2024-04-04

## 2024-04-04 RX ORDER — LIDOCAINE HYDROCHLORIDE 20 MG/ML
INJECTION, SOLUTION EPIDURAL; INFILTRATION; INTRACAUDAL; PERINEURAL AS NEEDED
Status: DISCONTINUED | OUTPATIENT
Start: 2024-04-04 | End: 2024-04-04

## 2024-04-04 RX ORDER — SODIUM CHLORIDE 9 MG/ML
INJECTION, SOLUTION INTRAVENOUS CONTINUOUS PRN
Status: DISCONTINUED | OUTPATIENT
Start: 2024-04-04 | End: 2024-04-04

## 2024-04-04 RX ORDER — FENTANYL CITRATE 50 UG/ML
INJECTION, SOLUTION INTRAMUSCULAR; INTRAVENOUS AS NEEDED
Status: DISCONTINUED | OUTPATIENT
Start: 2024-04-04 | End: 2024-04-04

## 2024-04-04 RX ADMIN — FENTANYL CITRATE 25 MCG: 50 INJECTION INTRAMUSCULAR; INTRAVENOUS at 10:26

## 2024-04-04 RX ADMIN — SODIUM CHLORIDE: 9 INJECTION, SOLUTION INTRAVENOUS at 10:17

## 2024-04-04 RX ADMIN — PROPOFOL 130 MCG/KG/MIN: 10 INJECTION, EMULSION INTRAVENOUS at 10:23

## 2024-04-04 RX ADMIN — FENTANYL CITRATE 25 MCG: 50 INJECTION INTRAMUSCULAR; INTRAVENOUS at 10:55

## 2024-04-04 RX ADMIN — PROPOFOL 100 MG: 10 INJECTION, EMULSION INTRAVENOUS at 10:21

## 2024-04-04 RX ADMIN — LIDOCAINE HYDROCHLORIDE 100 MG: 20 INJECTION, SOLUTION EPIDURAL; INFILTRATION; INTRACAUDAL; PERINEURAL at 10:21

## 2024-04-04 NOTE — ANESTHESIA POSTPROCEDURE EVALUATION
Post-Op Assessment Note    CV Status:  Stable    Pain management: adequate       Mental Status:  Alert and awake   Hydration Status:  Euvolemic   PONV Controlled:  Controlled   Airway Patency:  Patent     Post Op Vitals Reviewed: Yes    No anethesia notable event occurred.    Staff: CRNA               BP   100/67   Temp   96.7   Pulse  63   Resp   16   SpO2   98%

## 2024-04-04 NOTE — ANESTHESIA PREPROCEDURE EVALUATION
Procedure:  ENDOSCOPIC ULTRASOUND (UPPER)    Relevant Problems   ANESTHESIA (within normal limits)      CARDIO   (+) Benign essential hypertension   (+) Hyperlipidemia      ENDO   (+) Type 2 diabetes mellitus with both eyes affected by mild nonproliferative retinopathy without macular edema, with long-term current use of insulin (HCC) (Last Jardiance 3/31. Took half Lantus last night)      GI/HEPATIC   (+) Gastroesophageal reflux disease without esophagitis      /RENAL   (+) Stage 3 chronic kidney disease, unspecified whether stage 3a or 3b CKD (HCC)      HEMATOLOGY   (+) Iron deficiency anemia secondary to inadequate dietary iron intake      MUSCULOSKELETAL   (+) Low back pain with sciatica   (+) Sacroiliitis (HCC)   (+) Spondylosis of cervical region without myelopathy or radiculopathy      NEURO/PSYCH   (+) Chronic pain syndrome      PULMONARY   (+) Obstructive sleep apnea syndrome (Compliant with CPAP)      Other   (+) Mesenteric mass        Physical Exam    Airway    Mallampati score: III  TM Distance: >3 FB  Neck ROM: full     Dental   No notable dental hx     Cardiovascular  Cardiovascular exam normal    Pulmonary  Pulmonary exam normal     Other Findings        Anesthesia Plan  ASA Score- 3     Anesthesia Type- IV sedation with anesthesia with ASA Monitors.         Additional Monitors:     Airway Plan:            Plan Factors-Exercise tolerance (METS): >4 METS.    Chart reviewed. EKG reviewed. Imaging results reviewed. Existing labs reviewed. Patient summary reviewed.    Patient is not a current smoker.              Induction- intravenous.    Postoperative Plan-     Informed Consent- Anesthetic plan and risks discussed with patient.  I personally reviewed this patient with the CRNA. Discussed and agreed on the Anesthesia Plan with the CRNA..

## 2024-04-04 NOTE — H&P
History and Physical -  [facility]  Chad Coffman 68 y.o. male MRN: [id number]    HPI: Chad Coffman is a 68 y.o. year old male who presents with gastric polyp.       Review of Systems    Historical Information   Past Medical History:   Diagnosis Date    Abdominal pain     Allergic     Arthritis     Last assessed 5/24/2013    Avitaminosis D 2012    Chronic kidney disease     Chronic pain disorder     lumbar-having LESI on 6/26/23    Colon polyp     CPAP (continuous positive airway pressure) dependence     Diabetes mellitus (HCC)     Displacement of cervical intervertebral disc 2014    Diverticulitis of colon 2021    slight    GERD (gastroesophageal reflux disease)     Glaucoma     Normal Pressure Glaucoma    Headache(784.0) continuing around rt eye    HTN (hypertension) 2007    Hypertension     IBS (irritable bowel syndrome) 2021    Kidney stone     Marijuana use     daily for chronic pain    Nephrolithiasis 05/24/2013    Pituitary microadenoma (HCC) 2010    Shortness of breath     started June 2023-only when he bends over-not with activity    Sleep apnea     Spinal stenosis in cervical region 2014    Sprain and strain of calcaneofibular (ligament) 12/05/2013    Submandibular lymphadenopathy 08/08/2019    Uric acid nephrolithiasis 1990    Visual impairment Glaucoma, diabetic retinopothy     Past Surgical History:   Procedure Laterality Date    ASPIRATION / INJECTION RENAL CYST      Renal Cyst Aspiration    CATARACT EXTRACTION      12/2017- right eye, 01/2018- left eye    COLONOSCOPY  2005    EYE SURGERY Bilateral     Cataract Surgery    LITHOTRIPSY Right     [place] LAPS ABD PRTM&OMENTUM DX W/WO SPEC BR/WA SPX N/A 8/24/2023    Procedure: DIAGNOSTIC LAPAROSCOPY mesenteric biopsy;  Surgeon: Aurora Espnioza MD;  Location: [facility];  Service: Surgical Oncology    TONSILLECTOMY      UPPER GASTROINTESTINAL ENDOSCOPY       Social History   Social History     Substance and Sexual Activity   Alcohol Use  Yes    Alcohol/week: 4.0 standard drinks of alcohol    Types: 2 Cans of beer, 2 Standard drinks or equivalent per week    Comment: social     Social History     Substance and Sexual Activity   Drug Use Yes    Frequency: 7.0 times per week    Types: Marijuana    Comment: Use Medical Marijuana daily (1-3 capsules or tincture)     Social History     Tobacco Use   Smoking Status Former    Current packs/day: 0.00    Average packs/day: 0.3 packs/day for 2.0 years (0.5 ttl pk-yrs)    Types: Cigarettes    Start date:     Quit date: 1980    Years since quittin.2   Smokeless Tobacco Never     Family History   Problem Relation Age of Onset    Diabetes unspecified Mother     Heart disease Mother     Nephrolithiasis Mother     Kidney disease Mother     Other Mother         Back Disorder    Thyroid disease Mother     Diabetes type II Mother     Hypertension Mother     Thyroid disease unspecified Mother     Diabetes Mother             Arthritis Mother     Diabetes unspecified Father     Heart disease Father     Hypertension Father     Thrombosis Father             Diabetes type II Father     Diabetes unspecified Sister     Heart disease Sister     Stroke Sister     Diabetes unspecified Brother     Heart disease Brother     Nephrolithiasis Brother     Lung cancer Brother     Other Brother         COPD    Diabetes unspecified Sister     Heart disease Sister     Diabetes Sister     Arthritis Sister     Diabetes type II Sister     Diabetes unspecified Sister     Diabetes unspecified Brother     Heart disease Brother     Diabetes unspecified Brother     Other Brother         Back Disorder    COPD Brother     Diabetes type II Brother     Diabetes unspecified Brother     Other Brother         Back Disorder    Diabetes unspecified Brother     Stomach cancer Paternal Aunt     Diabetes type II Maternal Aunt        Meds/Allergies     (Not in a hospital admission)      Allergies   Allergen Reactions    Clonidine  Other (See Comments)     Diaphoresis, hot flashes    Augmentin [Amoxicillin-Pot Clavulanate] Abdominal Pain    Biaxin [Clarithromycin] Abdominal Pain    Peanut (Diagnostic) - Food Allergy Diarrhea, GI Intolerance and Abdominal Pain    Sitagliptin-Metformin Hcl Er Diarrhea, GI Intolerance and Abdominal Pain    Dust Mite Extract Allergic Rhinitis    Insulin Aspart GI Intolerance     Novolog     Liraglutide Nausea Only and Abdominal Pain    Metformin And Related Diarrhea and GI Intolerance    Molds & Smuts Allergic Rhinitis    Seasonal Ic [Cholestatin] Headache       Objective     /62   Pulse 68   Temp (!) 96.6 °F (35.9 °C) (Tympanic)   Resp 16   SpO2 95%       PHYSICAL EXAM    Gen: NAD  CV: RRR  CHEST: Clear  ABD: soft, NT/ND  EXT: no edema  Neuro: AAO      ASSESSMENT/PLAN:  This is a 68 y.o. year old male here for EGD / EUS/ EMR for gastric polyp.     PLAN:   Procedure: EUS/ EGD/ EMR

## 2024-04-05 PROCEDURE — 88305 TISSUE EXAM BY PATHOLOGIST: CPT | Performed by: PATHOLOGY

## 2024-04-09 DIAGNOSIS — K86.2 PANCREATIC CYST: Primary | ICD-10-CM

## 2024-04-10 ENCOUNTER — TELEPHONE (OUTPATIENT)
Dept: GASTROENTEROLOGY | Facility: MEDICAL CENTER | Age: 69
End: 2024-04-10

## 2024-04-10 NOTE — TELEPHONE ENCOUNTER
----- Message from Orlando Wagner MD sent at 4/9/2024  4:47 PM EDT -----  Please inform patient that since we had seen some small cysts on the pancreas at the time of the endoscopic ultrasound I would like to repeat with an MRI in 6 months.  I put the order in and he should get blood work done prior to that as well.  Most likely these are benign cyst but we have to continue observing them at this time.  Thank you

## 2024-04-12 NOTE — RESULT ENCOUNTER NOTE
Please schedule EGD in 1 year.    Inform patient via [employer].  Please review the pathology/lab result of further discussion.    Copied from [employer] message :       Hello Gene,     Your stomach and small bowel biopsies were unremarkable.  The polyp in the stomach was benign but precancerous.  Recommend repeat endoscopy in 1 year.    Best regards,     Orlando Wagner MD

## 2024-04-23 ENCOUNTER — OFFICE VISIT (OUTPATIENT)
Dept: ENDOCRINOLOGY | Facility: CLINIC | Age: 69
End: 2024-04-23
Payer: MEDICARE

## 2024-04-23 VITALS
HEIGHT: 63 IN | SYSTOLIC BLOOD PRESSURE: 110 MMHG | HEART RATE: 60 BPM | DIASTOLIC BLOOD PRESSURE: 72 MMHG | BODY MASS INDEX: 27.32 KG/M2 | WEIGHT: 154.2 LBS

## 2024-04-23 DIAGNOSIS — Z79.4 TYPE 2 DIABETES MELLITUS WITH BOTH EYES AFFECTED BY MILD NONPROLIFERATIVE RETINOPATHY WITHOUT MACULAR EDEMA, WITH LONG-TERM CURRENT USE OF INSULIN (HCC): ICD-10-CM

## 2024-04-23 DIAGNOSIS — E11.3293 TYPE 2 DIABETES MELLITUS WITH BOTH EYES AFFECTED BY MILD NONPROLIFERATIVE RETINOPATHY WITHOUT MACULAR EDEMA, WITH LONG-TERM CURRENT USE OF INSULIN (HCC): ICD-10-CM

## 2024-04-23 DIAGNOSIS — E11.65 TYPE 2 DIABETES MELLITUS WITH HYPERGLYCEMIA, WITH LONG-TERM CURRENT USE OF INSULIN (HCC): Primary | ICD-10-CM

## 2024-04-23 DIAGNOSIS — M54.16 LUMBAR RADICULOPATHY: ICD-10-CM

## 2024-04-23 DIAGNOSIS — M54.12 CERVICAL RADICULOPATHY: ICD-10-CM

## 2024-04-23 DIAGNOSIS — I10 BENIGN ESSENTIAL HYPERTENSION: ICD-10-CM

## 2024-04-23 DIAGNOSIS — Z79.4 TYPE 2 DIABETES MELLITUS WITH HYPERGLYCEMIA, WITH LONG-TERM CURRENT USE OF INSULIN (HCC): Primary | ICD-10-CM

## 2024-04-23 DIAGNOSIS — E23.0 HYPOGONADOTROPIC HYPOGONADISM (HCC): ICD-10-CM

## 2024-04-23 LAB — SL AMB POCT HEMOGLOBIN AIC: 6.4 (ref ?–6.5)

## 2024-04-23 PROCEDURE — 83036 HEMOGLOBIN GLYCOSYLATED A1C: CPT | Performed by: PHYSICIAN ASSISTANT

## 2024-04-23 PROCEDURE — 99214 OFFICE O/P EST MOD 30 MIN: CPT | Performed by: PHYSICIAN ASSISTANT

## 2024-04-23 PROCEDURE — 95251 CONT GLUC MNTR ANALYSIS I&R: CPT | Performed by: PHYSICIAN ASSISTANT

## 2024-04-23 RX ORDER — INSULIN GLARGINE 100 [IU]/ML
22 INJECTION, SOLUTION SUBCUTANEOUS
Qty: 30 ML | Refills: 1 | Status: SHIPPED | OUTPATIENT
Start: 2024-04-23

## 2024-04-23 RX ORDER — GABAPENTIN 400 MG/1
400 CAPSULE ORAL 3 TIMES DAILY
Qty: 270 CAPSULE | Refills: 1 | Status: SHIPPED | OUTPATIENT
Start: 2024-04-23

## 2024-04-23 RX ORDER — TESTOSTERONE 16.2 MG/G
GEL TRANSDERMAL EVERY MORNING
Status: CANCELLED | OUTPATIENT
Start: 2024-04-23

## 2024-04-23 RX ORDER — GLUCAGON 3 MG/1
POWDER NASAL
Qty: 1 EACH | Refills: 1 | Status: SHIPPED | OUTPATIENT
Start: 2024-04-23

## 2024-04-23 RX ORDER — INSULIN LISPRO 100 [IU]/ML
INJECTION, SOLUTION INTRAVENOUS; SUBCUTANEOUS
Qty: 30 ML | Refills: 1 | Status: SHIPPED | OUTPATIENT
Start: 2024-04-23

## 2024-04-23 RX ORDER — GLIPIZIDE 5 MG/1
5 TABLET ORAL DAILY
Qty: 90 TABLET | Refills: 1 | Status: SHIPPED | OUTPATIENT
Start: 2024-04-23 | End: 2025-04-18

## 2024-04-23 NOTE — PROGRESS NOTES
Established Patient Progress Note    Chief Complaint:  Diabetes follow up visit    Impression & Plan:    Problem List Items Addressed This Visit        Cardiovascular and Mediastinum    Benign essential hypertension     Well controlled on current medication            Endocrine    Type 2 diabetes mellitus with both eyes affected by mild nonproliferative retinopathy without macular edema, with long-term current use of insulin ([facility])     A1C at goal but recent CGM data shows hyperglycemia- he reports poor dietary choices due to recent packing/moving to [place].  Continue current regimen and focus on lifestyle modification.   Lab Results   Component Value Date    HGBA1C 6.4 04/23/2024          Relevant Medications    Glucagon (Baqsimi Two Pack) 3 MG/DOSE POWD    insulin lispro (HumaLOG KwikPen) 100 units/mL injection pen    Insulin Glargine Solostar (Lantus SoloStar) 100 UNIT/ML SOPN    glipiZIDE (GLUCOTROL) 5 mg tablet    Empagliflozin (Jardiance) 25 MG TABS    Hypogonadotropic hypogonadism (HCC)     He has had difficulty with supply of Androgel packets at pharmacy- advised him to check with Pharmacy if supply of Canister is better will change from packets to canister.         Other Visit Diagnoses     Type 2 diabetes mellitus with hyperglycemia, with long-term current use of insulin ([facility])    -  Primary    Relevant Medications    Glucagon (Baqsimi Two Pack) 3 MG/DOSE POWD    insulin lispro (HumaLOG KwikPen) 100 units/mL injection pen    Insulin Glargine Solostar (Lantus SoloStar) 100 UNIT/ML SOPN    glipiZIDE (GLUCOTROL) 5 mg tablet    Empagliflozin (Jardiance) 25 MG TABS    Other Relevant Orders    POCT hemoglobin A1c (Completed)          History of Present Illness:   Chad Coffman is a 68 y.o. male with a history of type 2 diabetes  since many years ago. Reports complications of neuropathy and retinopathy.   Follows with GI, has gastroparesis and mesenteric mass. Also following with surgical oncology.     Moving  to [place] soon, clearing out house packing a lot so has not been eating as well. Family will be helping to set up medical care.   *Did not tolerate GLP1 agonist due to GI side effects     Current regimen:   Humalog 1 unit per 5g carbs  and 1 unit per 25mg/dl above 125 (needing more)  Lantus 22 at bedtime (if very  high, uses 25)  Glipizide 5mg daily   Jardiance 25mg daily     CGM Interpretation  Chad Renemax   Device used Dexcom for Personal Use  Indication: Type of Diabetes: Type 2 Diabetes  More than 72 hours of data was reviewed. Report to be scanned to chart.   Date Range: 4/10/2024-4/23/2024  Analysis of data:   Average Glucose: 179 mg/dl  SD : 63mg/dl  Time in Target Range: 57%  Time Above Range: 42%  Time Below Range: <1%   Interpretation of data:   Fasting blood sugars are in target with Hyperglycemia after meals, especially breakfast.   Blood sugars are trending down overnight.      Has hypertension: Taking lisinopril and HCTZ and cardura and amlodinpine  Has hyperlipidemia: Taking simvastatin      hypogonadism  Androgel 1.62%  3 packets per day   Can only get 20 day supply at a time.     2017 stable 3-4mm pituitary microadenoma  Comapred to MRI from 6/205 and 10/2015.     Patient Active Problem List   Diagnosis   • Benign essential hypertension   • Carpal tunnel syndrome   • Cervical herniated disc   • Cervical radiculopathy   • Cervical stenosis of spinal canal   • Type 2 diabetes mellitus with both eyes affected by mild nonproliferative retinopathy without macular edema, with long-term current use of insulin (HCC)   • Hyperlipidemia   • Hypogonadotropic hypogonadism (HCC)   • Pituitary microadenoma (HCC)   • Obstructive sleep apnea syndrome   • Spondylosis of cervical region without myelopathy or radiculopathy   • Mesenteric mass   • Vitamin D deficiency   • Low back pain with sciatica   • Sacroiliitis (HCC)   • Overweight (BMI 25.0-29.9)   • Gastroesophageal reflux disease without esophagitis   •  Chronic pain syndrome   • Salivary gland adenitis   • Arthralgia of right hand   • Lumbar radiculopathy   • Stage 3 chronic kidney disease, unspecified whether stage 3a or 3b CKD (HCC)   • Iron deficiency anemia secondary to inadequate dietary iron intake   • Advanced care planning/counseling discussion   • Calcified mesenteric mass   • Gastroparesis      Past Medical History:   Diagnosis Date   • Abdominal pain    • Allergic    • Arthritis     Last assessed 5/24/2013   • Avitaminosis D 2012   • Chronic kidney disease    • Chronic pain disorder     lumbar-having LESI on 6/26/23   • Colon polyp    • CPAP (continuous positive airway pressure) dependence    • Diabetes mellitus (HCC)    • Displacement of cervical intervertebral disc 2014   • Diverticulitis of colon 2021    slight   • GERD (gastroesophageal reflux disease)    • Glaucoma     Normal Pressure Glaucoma   • Headache(784.0) continuing around rt eye   • HTN (hypertension) 2007   • Hypertension    • IBS (irritable bowel syndrome) 2021   • Kidney stone    • Marijuana use     daily for chronic pain   • Nephrolithiasis 05/24/2013   • Pituitary microadenoma (HCC) 2010   • Shortness of breath     started June 2023-only when he bends over-not with activity   • Sleep apnea    • Spinal stenosis in cervical region 2014   • Sprain and strain of calcaneofibular (ligament) 12/05/2013   • Submandibular lymphadenopathy 08/08/2019   • Uric acid nephrolithiasis 1990   • Visual impairment Glaucoma, diabetic retinopothy      Past Surgical History:   Procedure Laterality Date   • ASPIRATION / INJECTION RENAL CYST      Renal Cyst Aspiration   • CATARACT EXTRACTION      12/2017- right eye, 01/2018- left eye   • COLONOSCOPY  2005   • EYE SURGERY Bilateral     Cataract Surgery   • LITHOTRIPSY Right    • NE LAPS ABD PRTM&OMENTUM DX W/WO SPEC BR/WA SPX N/A 8/24/2023    Procedure: DIAGNOSTIC LAPAROSCOPY mesenteric biopsy;  Surgeon: Aurora Espinoza MD;  Location: [facility];  Service:  Surgical Oncology   • TONSILLECTOMY     • UPPER GASTROINTESTINAL ENDOSCOPY        Family History   Problem Relation Age of Onset   • Diabetes unspecified Mother    • Heart disease Mother    • Nephrolithiasis Mother    • Kidney disease Mother    • Other Mother         Back Disorder   • Thyroid disease Mother    • Diabetes type II Mother    • Hypertension Mother    • Thyroid disease unspecified Mother    • Diabetes Mother            • Arthritis Mother    • Diabetes unspecified Father    • Heart disease Father    • Hypertension Father    • Thrombosis Father            • Diabetes type II Father    • Diabetes unspecified Sister    • Heart disease Sister    • Stroke Sister    • Diabetes unspecified Brother    • Heart disease Brother    • Nephrolithiasis Brother    • Lung cancer Brother    • Other Brother         COPD   • Diabetes unspecified Sister    • Heart disease Sister    • Diabetes Sister    • Arthritis Sister    • Diabetes type II Sister    • Diabetes unspecified Sister    • Diabetes unspecified Brother    • Heart disease Brother    • Diabetes unspecified Brother    • Other Brother         Back Disorder   • COPD Brother    • Diabetes type II Brother    • Diabetes unspecified Brother    • Other Brother         Back Disorder   • Diabetes unspecified Brother    • Stomach cancer Paternal Aunt    • Diabetes type II Maternal Aunt      Social History     Tobacco Use   • Smoking status: Former     Current packs/day: 0.00     Average packs/day: 0.3 packs/day for 2.0 years (0.5 ttl pk-yrs)     Types: Cigarettes     Start date:      Quit date: 1980     Years since quittin.3   • Smokeless tobacco: Never   Substance Use Topics   • Alcohol use: Yes     Alcohol/week: 4.0 standard drinks of alcohol     Types: 2 Cans of beer, 2 Standard drinks or equivalent per week     Comment: social     Allergies   Allergen Reactions   • Clonidine Other (See Comments)     Diaphoresis, hot flashes   • Augmentin  [Amoxicillin-Pot Clavulanate] Abdominal Pain   • Biaxin [Clarithromycin] Abdominal Pain   • Peanut (Diagnostic) - Food Allergy Diarrhea, GI Intolerance and Abdominal Pain   • Sitagliptin-Metformin Hcl Er Diarrhea, GI Intolerance and Abdominal Pain   • Dust Mite Extract Allergic Rhinitis   • Insulin Aspart GI Intolerance     Novolog    • Liraglutide Nausea Only and Abdominal Pain   • Metformin And Related Diarrhea and GI Intolerance   • Molds & Smuts Allergic Rhinitis   • Seasonal Ic [Cholestatin] Headache         Current Outpatient Medications:   •  acetaminophen (Tylenol 8 Hour Arthritis Pain) 650 mg CR tablet, Take 650 mg by mouth every 8 (eight) hours as needed for mild pain, Disp: , Rfl:   •  amLODIPine (NORVASC) 10 mg tablet, Take 1 tablet (10 mg total) by mouth daily at bedtime, Disp: 90 tablet, Rfl: 1  •  aspirin (ECOTRIN LOW STRENGTH) 81 mg EC tablet, Take 81 mg by mouth daily , Disp: , Rfl:   •  B Complex Vitamins (VITAMIN B-COMPLEX PO), Take 1 capsule by mouth daily , Disp: , Rfl:   •  BD Pen Needle Harriett U/F 32G X 4 MM MISC, USE 4 TIMES DAILY, Disp: 360 each, Rfl: 2  •  brimonidine-timolol (COMBIGAN) 0.2-0.5 %, Administer 1 drop to both eyes every 12 (twelve) hours, Disp: , Rfl:   •  cholecalciferol (VITAMIN D3) 1,000 units tablet, Take 1,000 Units by mouth 2 (two) times a day , Disp: , Rfl:   •  dicyclomine (BENTYL) 20 mg tablet, Take 1 tablet (20 mg total) by mouth 4 (four) times a day (before meals and at bedtime) (Patient taking differently: Take 20 mg by mouth if needed), Disp: 120 tablet, Rfl: 5  •  doxazosin (CARDURA) 4 mg tablet, Take 1 tablet (4 mg total) by mouth every morning, Disp: 90 tablet, Rfl: 1  •  Empagliflozin (Jardiance) 25 MG TABS, Take 1 tablet (25 mg total) by mouth daily, Disp: 90 tablet, Rfl: 1  •  ferrous gluconate (FERGON) 240 (27 FE) MG tablet, Take 1 tablet (240 mg total) by mouth in the morning (Patient taking differently: Take 240 mg by mouth in the morning Last dose 6/20),  Disp: 90 tablet, Rfl: 1  •  fluticasone (FLONASE) 50 mcg/act nasal spray, 1 spray into each nostril daily as needed for rhinitis (Acute URI/sinusitis), Disp: 18 mL, Rfl: 0  •  glipiZIDE (GLUCOTROL) 5 mg tablet, Take 1 tablet (5 mg total) by mouth in the morning, Disp: 90 tablet, Rfl: 1  •  Glucagon (Baqsimi Two Pack) 3 MG/DOSE POWD, Use if needed for severe hypoglycemia, Disp: 1 each, Rfl: 1  •  Glucosamine-Chondroitin 250-200 MG TABS, Take 1 tablet by mouth 2 (two) times a day, Disp: , Rfl:   •  hydroCHLOROthiazide 25 mg tablet, Take 1 tablet (25 mg total) by mouth every morning, Disp: 90 tablet, Rfl: 1  •  Insulin Glargine Solostar (Lantus SoloStar) 100 UNIT/ML SOPN, Inject 0.22 mL (22 Units total) under the skin daily at bedtime, Disp: 30 mL, Rfl: 1  •  insulin lispro (HumaLOG KwikPen) 100 units/mL injection pen, Inject per insulin scales up to 40 units per day., Disp: 30 mL, Rfl: 1  •  lidocaine (Lidoderm) 5 %, Apply 1 patch topically daily Remove & Discard patch within 12 hours or as directed by MD (Patient taking differently: Apply 1 patch topically if needed Remove & Discard patch within 12 hours or as directed by MD), Disp: 6 patch, Rfl: 0  •  lisinopril (ZESTRIL) 40 mg tablet, Take 1 tablet (40 mg total) by mouth every morning, Disp: 90 tablet, Rfl: 1  •  loratadine (CLARITIN) 10 mg tablet, Take 10 mg by mouth daily, Disp: , Rfl:   •  MULTIPLE VITAMIN PO, Take 1 tablet by mouth daily , Disp: , Rfl:   •  omeprazole (PriLOSEC) 40 MG capsule, Take 1 capsule (40 mg total) by mouth every morning, Disp: 90 capsule, Rfl: 1  •  other medication, see sig,, Medication/product name: Medical Marijuana, Disp: , Rfl:   •  polyethylene glycol (MiraLax) 17 GM/SCOOP powder, 17 g if needed, Disp: , Rfl:   •  Probiotic Product (PROBIOTIC-10) CAPS, Take 1 capsule by mouth daily , Disp: , Rfl:   •  simvastatin (ZOCOR) 20 mg tablet, Take 1 tablet (20 mg total) by mouth daily at bedtime, Disp: 90 tablet, Rfl: 3  •  vitamin E,  "tocopherol, 400 units capsule, Take 400 Units by mouth 2 (two) times a day , Disp: , Rfl:   •  gabapentin (NEURONTIN) 400 mg capsule, TAKE 1 CAPSULE BY MOUTH 3 TIMES  DAILY, Disp: 270 capsule, Rfl: 1  •  testosterone (ANDROGEL) 1.62 % TD gel pump, Apply 3 actuation (60.75 mg total) topically every morning, Disp: 90 actuation, Rfl: 3    Review of Systems   Constitutional:  Negative for activity change, appetite change and fatigue.   HENT:  Negative for sore throat, trouble swallowing and voice change.    Eyes:  Negative for visual disturbance.   Respiratory:  Negative for choking, chest tightness and shortness of breath.    Cardiovascular:  Negative for chest pain, palpitations and leg swelling.   Gastrointestinal:  Negative for abdominal pain, constipation and diarrhea.   Endocrine: Negative for cold intolerance, heat intolerance, polydipsia, polyphagia and polyuria.   Genitourinary:  Negative for frequency.   Musculoskeletal:  Negative for arthralgias and myalgias.   Skin:  Negative for rash.   Neurological:  Negative for dizziness and syncope.   Hematological:  Negative for adenopathy.   Psychiatric/Behavioral:  Negative for sleep disturbance.    All other systems reviewed and are negative.      Physical Exam:  Body mass index is 27.32 kg/m².  /72   Pulse 60   Ht 5' 3\" (1.6 m)   Wt 69.9 kg (154 lb 3.2 oz)   BMI 27.32 kg/m²    Wt Readings from Last 3 Encounters:   04/23/24 69.9 kg (154 lb 3.2 oz)   04/01/24 69.7 kg (153 lb 9.6 oz)   03/12/24 69.8 kg (153 lb 12.8 oz)       Physical Exam  Vitals reviewed.   Constitutional:       General: He is not in acute distress.     Appearance: He is well-developed.   HENT:      Head: Normocephalic and atraumatic.   Eyes:      Conjunctiva/sclera: Conjunctivae normal.      Pupils: Pupils are equal, round, and reactive to light.   Neck:      Thyroid: No thyromegaly.   Cardiovascular:      Rate and Rhythm: Normal rate and regular rhythm.      Heart sounds: Normal heart " sounds. No murmur heard.  Pulmonary:      Effort: Pulmonary effort is normal. No respiratory distress.      Breath sounds: Normal breath sounds. No wheezing or rales.   Abdominal:      General: Bowel sounds are normal. There is no distension.      Palpations: Abdomen is soft.      Tenderness: There is no abdominal tenderness.   Musculoskeletal:         General: Normal range of motion.      Cervical back: Normal range of motion and neck supple.   Lymphadenopathy:      Cervical: No cervical adenopathy.   Skin:     General: Skin is warm and dry.   Neurological:      Mental Status: He is alert and oriented to person, place, and time.           Labs:   Lab Results   Component Value Date    HGBA1C 6.4 04/23/2024    HGBA1C 6.1 01/17/2024    HGBA1C 6.0 09/20/2023     Lab Results   Component Value Date    CREATININE 1.16 03/05/2024    CREATININE 1.08 02/08/2024    CREATININE 1.17 08/08/2023    BUN 26 (H) 03/05/2024     10/28/2017    K 4.0 02/08/2024     02/08/2024    CO2 28 02/08/2024     eGFR   Date Value Ref Range Status   03/05/2024 63 ml/min/1.73sq m Final     Lab Results   Component Value Date    CHOL 141 10/28/2017    HDL 34 (L) 08/08/2023    TRIG 135 08/08/2023     Lab Results   Component Value Date    ALT 22 02/08/2024    AST 22 02/08/2024    ALKPHOS 54 02/08/2024    BILITOT 1.2 10/28/2017     Lab Results   Component Value Date    TVG7BMOBMHVN 3.710 01/24/2023    EZT1DWGCMNEM 4.350 07/18/2022    ILG8OXVMHQGU 3.020 06/10/2021     Lab Results   Component Value Date    FREET4 0.97 01/24/2023       Discussed with the patient and all questioned fully answered. He will call me if any problems arise.    Follow-up appointment if needed  Counseled patient on diagnostic results, prognosis, risk and benefit of treatment options, instruction for management, importance of treatment compliance, Risk  factor reduction and impressions    Jessie Torrez PA-C

## 2024-04-25 ENCOUNTER — TELEPHONE (OUTPATIENT)
Age: 69
End: 2024-04-25

## 2024-04-25 ENCOUNTER — TELEPHONE (OUTPATIENT)
Dept: GASTROENTEROLOGY | Facility: AMBULARY SURGERY CENTER | Age: 69
End: 2024-04-25

## 2024-04-25 NOTE — TELEPHONE ENCOUNTER
----- Message from Rosalind Balbuena sent at 4/23/2024  7:58 AM EDT -----    ----- Message -----  From: Xenia Murcia MD  Sent: 4/22/2024   4:50 PM EDT  To: Orlando Wagner MD; #    Please schedule for EGD with push enteroscopy if patient is still in the area ?planning on moving. This is recommended to visualize abnormal findings on PET/CT that were not seen on EUS.

## 2024-04-25 NOTE — TELEPHONE ENCOUNTER
Patient states that he is moving mid May and doesn't think he wants to have that procedure done. States that he doesn't understanding why he needs this one because the last one was suppose to be to evaluate the finding

## 2024-04-25 NOTE — TELEPHONE ENCOUNTER
Patient called in states that he checked with pharmacy and they do have the testosterone pump bottle.  Asks if this can be sent to the [address]

## 2024-04-26 ENCOUNTER — TELEPHONE (OUTPATIENT)
Dept: GASTROENTEROLOGY | Facility: AMBULARY SURGERY CENTER | Age: 69
End: 2024-04-26

## 2024-04-29 ENCOUNTER — APPOINTMENT (EMERGENCY)
Dept: RADIOLOGY | Facility: HOSPITAL | Age: 69
End: 2024-04-29
Payer: MEDICARE

## 2024-04-29 ENCOUNTER — HOSPITAL ENCOUNTER (EMERGENCY)
Facility: HOSPITAL | Age: 69
Discharge: HOME | End: 2024-04-29
Attending: EMERGENCY MEDICINE
Payer: MEDICARE

## 2024-04-29 VITALS
HEART RATE: 83 BPM | DIASTOLIC BLOOD PRESSURE: 56 MMHG | TEMPERATURE: 98.4 F | RESPIRATION RATE: 18 BRPM | SYSTOLIC BLOOD PRESSURE: 117 MMHG | OXYGEN SATURATION: 97 %

## 2024-04-29 DIAGNOSIS — Z79.4 TYPE 2 DIABETES MELLITUS WITH HYPERGLYCEMIA, WITH LONG-TERM CURRENT USE OF INSULIN (HCC): ICD-10-CM

## 2024-04-29 DIAGNOSIS — E23.0 HYPOGONADOTROPIC HYPOGONADISM (HCC): Primary | ICD-10-CM

## 2024-04-29 DIAGNOSIS — E11.65 TYPE 2 DIABETES MELLITUS WITH HYPERGLYCEMIA, WITH LONG-TERM CURRENT USE OF INSULIN (HCC): ICD-10-CM

## 2024-04-29 DIAGNOSIS — M25.571 RIGHT ANKLE PAIN: Primary | ICD-10-CM

## 2024-04-29 PROCEDURE — 99283 EMERGENCY DEPT VISIT LOW MDM: CPT

## 2024-04-29 PROCEDURE — 73610 X-RAY EXAM OF ANKLE: CPT

## 2024-04-29 PROCEDURE — 99284 EMERGENCY DEPT VISIT MOD MDM: CPT | Performed by: EMERGENCY MEDICINE

## 2024-04-29 PROCEDURE — 73630 X-RAY EXAM OF FOOT: CPT

## 2024-04-29 RX ORDER — TESTOSTERONE 16.2 MG/G
60.75 GEL TRANSDERMAL EVERY MORNING
Qty: 90 ACTUATION | Refills: 3 | Status: SHIPPED | OUTPATIENT
Start: 2024-04-29

## 2024-04-29 NOTE — ED PROVIDER NOTES
History  Chief Complaint   Patient presents with    Ankle Pain     Right ankle pain, twisted it going down front porch stairs      HPI  Patient is a 76-year-old male presenting with right-sided ankle pain.  States that he was going down the stairs and landed on his right ankle.  Has a previous ankle fracture.  Denies any weakness or numbness.  Patient is able to bear weight however states that he has difficulty walking due to the severe pain.  Patient denies any other injuries.  Reports no other complaints.    Prior to Admission Medications   Prescriptions Last Dose Informant Patient Reported? Taking?   B Complex Vitamins (VITAMIN B-COMPLEX PO)  Self Yes No   Sig: Take 1 capsule by mouth daily    BD Pen Needle Harriett U/F 32G X 4 MM MISC  Self No No   Sig: USE 4 TIMES DAILY   Empagliflozin (Jardiance) 25 MG TABS   No No   Sig: Take 1 tablet (25 mg total) by mouth daily   Glucagon (Baqsimi Two Pack) 3 MG/DOSE POWD   No No   Sig: Use if needed for severe hypoglycemia   Glucosamine-Chondroitin 250-200 MG TABS  Self Yes No   Sig: Take 1 tablet by mouth 2 (two) times a day   Insulin Glargine Solostar (Lantus SoloStar) 100 UNIT/ML SOPN   No No   Sig: Inject 0.22 mL (22 Units total) under the skin daily at bedtime   MULTIPLE VITAMIN PO  Self Yes No   Sig: Take 1 tablet by mouth daily    Probiotic Product (PROBIOTIC-10) CAPS  Self Yes No   Sig: Take 1 capsule by mouth daily    acetaminophen (Tylenol 8 Hour Arthritis Pain) 650 mg CR tablet  Self Yes No   Sig: Take 650 mg by mouth every 8 (eight) hours as needed for mild pain   amLODIPine (NORVASC) 10 mg tablet   No No   Sig: Take 1 tablet (10 mg total) by mouth daily at bedtime   aspirin (ECOTRIN LOW STRENGTH) 81 mg EC tablet  Self Yes No   Sig: Take 81 mg by mouth daily    brimonidine-timolol (COMBIGAN) 0.2-0.5 %  Self Yes No   Sig: Administer 1 drop to both eyes every 12 (twelve) hours   cholecalciferol (VITAMIN D3) 1,000 units tablet  Self Yes No   Sig: Take 1,000 Units by  mouth 2 (two) times a day    dicyclomine (BENTYL) 20 mg tablet  Self No No   Sig: Take 1 tablet (20 mg total) by mouth 4 (four) times a day (before meals and at bedtime)   Patient taking differently: Take 20 mg by mouth if needed   doxazosin (CARDURA) 4 mg tablet   No No   Sig: Take 1 tablet (4 mg total) by mouth every morning   ferrous gluconate (FERGON) 240 (27 FE) MG tablet  Self No No   Sig: Take 1 tablet (240 mg total) by mouth in the morning   Patient taking differently: Take 240 mg by mouth in the morning Last dose    fluticasone (FLONASE) 50 mcg/act nasal spray  Self No No   Si spray into each nostril daily as needed for rhinitis (Acute URI/sinusitis)   gabapentin (NEURONTIN) 400 mg capsule   No No   Sig: TAKE 1 CAPSULE BY MOUTH 3 TIMES  DAILY   glipiZIDE (GLUCOTROL) 5 mg tablet   No No   Sig: Take 1 tablet (5 mg total) by mouth in the morning   hydroCHLOROthiazide 25 mg tablet   No No   Sig: Take 1 tablet (25 mg total) by mouth every morning   insulin lispro (HumaLOG KwikPen) 100 units/mL injection pen   No No   Sig: Inject per insulin scales up to 40 units per day.   lidocaine (Lidoderm) 5 %  Self No No   Sig: Apply 1 patch topically daily Remove & Discard patch within 12 hours or as directed by MD   Patient taking differently: Apply 1 patch topically if needed Remove & Discard patch within 12 hours or as directed by MD   lisinopril (ZESTRIL) 40 mg tablet   No No   Sig: Take 1 tablet (40 mg total) by mouth every morning   loratadine (CLARITIN) 10 mg tablet  Self Yes No   Sig: Take 10 mg by mouth daily   omeprazole (PriLOSEC) 40 MG capsule   No No   Sig: Take 1 capsule (40 mg total) by mouth every morning   other medication, see sig,  Self Yes No   Sig: Medication/product name: Medical Marijuana   polyethylene glycol (MiraLax) 17 GM/SCOOP powder  Self Yes No   Si g if needed   simvastatin (ZOCOR) 20 mg tablet   No No   Sig: Take 1 tablet (20 mg total) by mouth daily at bedtime   testosterone  (ANDROGEL) 1.62 % TD gel pump   No No   Sig: Apply 3 actuation (60.75 mg total) topically every morning   vitamin E, tocopherol, 400 units capsule  Self Yes No   Sig: Take 400 Units by mouth 2 (two) times a day       Facility-Administered Medications: None       Past Medical History:   Diagnosis Date    Abdominal pain     Allergic     Arthritis     Last assessed 5/24/2013    Avitaminosis D 2012    Chronic kidney disease     Chronic pain disorder     lumbar-having LESI on 6/26/23    Colon polyp     CPAP (continuous positive airway pressure) dependence     Diabetes mellitus (HCC)     Displacement of cervical intervertebral disc 2014    Diverticulitis of colon 2021    slight    GERD (gastroesophageal reflux disease)     Glaucoma     Normal Pressure Glaucoma    Headache(784.0) continuing around rt eye    HTN (hypertension) 2007    Hypertension     IBS (irritable bowel syndrome) 2021    Kidney stone     Marijuana use     daily for chronic pain    Nephrolithiasis 05/24/2013    Pituitary microadenoma (HCC) 2010    Shortness of breath     started June 2023-only when he bends over-not with activity    Sleep apnea     Spinal stenosis in cervical region 2014    Sprain and strain of calcaneofibular (ligament) 12/05/2013    Submandibular lymphadenopathy 08/08/2019    Uric acid nephrolithiasis 1990    Visual impairment Glaucoma, diabetic retinopothy       Past Surgical History:   Procedure Laterality Date    ASPIRATION / INJECTION RENAL CYST      Renal Cyst Aspiration    CATARACT EXTRACTION      12/2017- right eye, 01/2018- left eye    COLONOSCOPY  2005    EYE SURGERY Bilateral     Cataract Surgery    LITHOTRIPSY Right     SD LAPS ABD PRTM&OMENTUM DX W/WO SPEC BR/WA SPX N/A 8/24/2023    Procedure: DIAGNOSTIC LAPAROSCOPY mesenteric biopsy;  Surgeon: Aurora Espinoza MD;  Location: [facility];  Service: Surgical Oncology    TONSILLECTOMY      UPPER GASTROINTESTINAL ENDOSCOPY         Family History   Problem Relation Age of Onset     Diabetes unspecified Mother     Heart disease Mother     Nephrolithiasis Mother     Kidney disease Mother     Other Mother         Back Disorder    Thyroid disease Mother     Diabetes type II Mother     Hypertension Mother     Thyroid disease unspecified Mother     Diabetes Mother             Arthritis Mother     Diabetes unspecified Father     Heart disease Father     Hypertension Father     Thrombosis Father             Diabetes type II Father     Diabetes unspecified Sister     Heart disease Sister     Stroke Sister     Diabetes unspecified Brother     Heart disease Brother     Nephrolithiasis Brother     Lung cancer Brother     Other Brother         COPD    Diabetes unspecified Sister     Heart disease Sister     Diabetes Sister     Arthritis Sister     Diabetes type II Sister     Diabetes unspecified Sister     Diabetes unspecified Brother     Heart disease Brother     Diabetes unspecified Brother     Other Brother         Back Disorder    COPD Brother     Diabetes type II Brother     Diabetes unspecified Brother     Other Brother         Back Disorder    Diabetes unspecified Brother     Stomach cancer Paternal Aunt     Diabetes type II Maternal Aunt      I have reviewed and agree with the history as documented.    E-Cigarette/Vaping    E-Cigarette Use Never User      E-Cigarette/Vaping Substances    Nicotine No     THC No     CBD No     Flavoring No     Other No     Unknown No      Social History     Tobacco Use    Smoking status: Former     Current packs/day: 0.00     Average packs/day: 0.3 packs/day for 2.0 years (0.5 ttl pk-yrs)     Types: Cigarettes     Start date:      Quit date: 1980     Years since quittin.3    Smokeless tobacco: Never   Vaping Use    Vaping status: Never Used   Substance Use Topics    Alcohol use: Yes     Alcohol/week: 4.0 standard drinks of alcohol     Types: 2 Cans of beer, 2 Standard drinks or equivalent per week     Comment: social    Drug use: Yes      Frequency: 7.0 times per week     Types: Marijuana     Comment: Use Medical Marijuana daily (1-3 capsules or tincture)       Review of Systems   Constitutional:  Negative for chills, diaphoresis, fever and unexpected weight change.   HENT:  Negative for ear pain and sore throat.    Eyes:  Negative for visual disturbance.   Respiratory:  Negative for cough, chest tightness and shortness of breath.    Cardiovascular:  Negative for chest pain and leg swelling.   Gastrointestinal:  Negative for abdominal distention, abdominal pain, constipation, diarrhea, nausea and vomiting.   Endocrine: Negative.    Genitourinary:  Negative for difficulty urinating and dysuria.   Musculoskeletal:  Positive for arthralgias.   Skin: Negative.    Allergic/Immunologic: Negative.    Neurological: Negative.    Hematological: Negative.    Psychiatric/Behavioral: Negative.     All other systems reviewed and are negative.      Physical Exam  Physical Exam  Vitals and nursing note reviewed.   Constitutional:       General: He is not in acute distress.     Appearance: Normal appearance. He is not ill-appearing.   HENT:      Head: Normocephalic and atraumatic.      Right Ear: External ear normal.      Left Ear: External ear normal.      Nose: Nose normal.      Mouth/Throat:      Mouth: Mucous membranes are moist.      Pharynx: Oropharynx is clear.   Eyes:      General: No scleral icterus.        Right eye: No discharge.         Left eye: No discharge.      Extraocular Movements: Extraocular movements intact.      Conjunctiva/sclera: Conjunctivae normal.      Pupils: Pupils are equal, round, and reactive to light.   Cardiovascular:      Rate and Rhythm: Normal rate and regular rhythm.      Pulses: Normal pulses.      Heart sounds: Normal heart sounds.   Pulmonary:      Effort: Pulmonary effort is normal.      Breath sounds: Normal breath sounds.   Abdominal:      General: Abdomen is flat. Bowel sounds are normal. There is no distension.       Palpations: Abdomen is soft.      Tenderness: There is no abdominal tenderness. There is no guarding or rebound.   Musculoskeletal:         General: Swelling (right ankle) and tenderness (right ankle) present. Normal range of motion.      Cervical back: Normal range of motion and neck supple.   Skin:     General: Skin is warm and dry.      Capillary Refill: Capillary refill takes less than 2 seconds.      Findings: Bruising (right ankle) present.   Neurological:      General: No focal deficit present.      Mental Status: He is alert and oriented to person, place, and time. Mental status is at baseline.   Psychiatric:         Mood and Affect: Mood normal.         Behavior: Behavior normal.         Thought Content: Thought content normal.         Judgment: Judgment normal.         Vital Signs  ED Triage Vitals [04/29/24 1725]   Temperature Pulse Respirations Blood Pressure SpO2   98.4 °F (36.9 °C) 83 18 117/56 97 %      Temp Source Heart Rate Source Patient Position - Orthostatic VS BP Location FiO2 (%)   Oral Monitor Sitting Right arm --      Pain Score       5           Vitals:    04/29/24 1725   BP: 117/56   Pulse: 83   Patient Position - Orthostatic VS: Sitting         Visual Acuity      ED Medications  Medications - No data to display    Diagnostic Studies  Results Reviewed       None                   XR ankle 3+ views RIGHT   ED Interpretation by Tobias Treviño MD (04/29 1919)   No acute osseous abnormality       XR foot 3+ views RIGHT   ED Interpretation by Tobias Treviño MD (04/29 1919)   No obvious osseous abnormality                  Procedures  Procedures         ED Course                                             Medical Decision Making  5-year-old male presenting with right ankle pain after an injury  Will assess for possible fracture versus dislocation via x-ray  No obvious osseous abnormality noted on x-ray   Will plan to apply air cast and send home with crutches   Plan to discharge with ortho f/u      Problems Addressed:  Right ankle pain: acute illness or injury    Amount and/or Complexity of Data Reviewed  Radiology: ordered and independent interpretation performed.             Disposition  Final diagnoses:   Right ankle pain     Time reflects when diagnosis was documented in both MDM as applicable and the Disposition within this note       Time User Action Codes Description Comment    4/29/2024  8:02 PM Tobias Treviño Add [M25.571] Right ankle pain           ED Disposition       ED Disposition   Discharge    Condition   Stable    Date/Time   Mon Apr 29, 2024  8:02 PM    Comment   Chad Renemax discharge to home/self care.                   Follow-up Information       Follow up With Specialties Details Why Contact Info Additional Information    [facility] [place] [facility] Schedule an appointment as soon as possible for a visit   [address] [address] [phone] [facility] [place], [place] [address], [address] [facility], [place], [id number] [phone]            Patient's Medications   Discharge Prescriptions    No medications on file       No discharge procedures on file.    PDMP Review         Value Time User    PDMP Reviewed  Yes 3/9/2023 10:26 AM CHRISTIANO Valdez            ED Provider  Electronically Signed by             Tobias Treviño MD  04/29/24 2024

## 2024-04-30 NOTE — ASSESSMENT & PLAN NOTE
A1C at goal but recent CGM data shows hyperglycemia- he reports poor dietary choices due to recent packing/moving to [place].  Continue current regimen and focus on lifestyle modification.   Lab Results   Component Value Date    HGBA1C 6.4 04/23/2024

## 2024-04-30 NOTE — ASSESSMENT & PLAN NOTE
He has had difficulty with supply of Androgel packets at pharmacy- advised him to check with Pharmacy if supply of Canister is better will change from packets to canister.

## 2024-05-08 ENCOUNTER — OFFICE VISIT (OUTPATIENT)
Dept: SURGICAL ONCOLOGY | Facility: CLINIC | Age: 69
End: 2024-05-08
Payer: MEDICARE

## 2024-05-08 VITALS
SYSTOLIC BLOOD PRESSURE: 142 MMHG | OXYGEN SATURATION: 98 % | HEART RATE: 83 BPM | WEIGHT: 147.5 LBS | TEMPERATURE: 98.6 F | BODY MASS INDEX: 26.13 KG/M2 | DIASTOLIC BLOOD PRESSURE: 56 MMHG | RESPIRATION RATE: 18 BRPM | HEIGHT: 63 IN

## 2024-05-08 DIAGNOSIS — K63.89 MESENTERIC MASS: Primary | ICD-10-CM

## 2024-05-08 PROCEDURE — 99215 OFFICE O/P EST HI 40 MIN: CPT | Performed by: STUDENT IN AN ORGANIZED HEALTH CARE EDUCATION/TRAINING PROGRAM

## 2024-05-08 RX ORDER — ACYCLOVIR 400 MG/1
TABLET ORAL
COMMUNITY
Start: 2024-03-14

## 2024-05-08 NOTE — PROGRESS NOTES
Surgical Oncology Consultation F/U    [address] [facility]  [facility]  [address]  [facility] [address]    Patient:  Chad Coffman  1955  [id number]    Primary Care provider:  Geoffrey Ojeda MD  [address] [facility] [address]    Referring provider:  No referring provider defined for this encounter.    There are no diagnoses linked to this encounter.      Chief Complaint   Patient presents with    Follow-up     Patient being seen for f/u. Last PET 3/29/2024 and EUS 4/4/2024.       No follow-ups on file.    Oncology History    No history exists.       History of Present Illness  :   This is a 67-year-old gentleman seen in consultation today for a mesenteric mass.  Briefly, the patient has been having near daily abdominal pain with vague cramping for several months.  He describes a general feeling of discomfort daily with occasional sharp pains.  He also describes daily nausea with a reduction in his appetite, though he has not had any episodes of vomiting.  He states he has intermittent constipation and diarrhea, which appears to be unpredictable.  He denies any symptoms of flushing, floating diarrhea, fevers, chills, significant fatigue.  The patient underwent work-up with his GI providers, who repeated an EGD and colonoscopy due to his symptomatology which did not reveal a source.  He then had a CT scan of the abdomen and pelvis to work-up a previously detected finding from a noncontrasted scan in October 2022 revealing mesenteric nodularity.  This demonstrated a vague mesenteric mass infiltrating the small bowel mesentery at the mid SMA.  Given its appearance and a lab value of a chromogranin near thousand, the patient underwent a dotatate PET scan.  This failed to show significant avidity within the mesenteric mass.  Of note, there was a small focus of moderate avidity at the pancreatic tail with no corresponding CT correlate.  The patient was sent  for interventional radiology percutaneous biopsy but this was unable to be completed due to the position of the mass.    At this juncture, the elevated chromogranin may be due to a medical interaction or he may have a false negative PET dotatate scan with this infiltrative mass representing a carcinoid tumor.  However, this may also represent a lymphoma, desmoid tumor, or other etiology.  The patient's symptoms are relatively nonspecific and may or may not be related to the presence of the mass.  At this juncture, given the proximity on the SMA, I like to perform a hand-assisted laparoscopic biopsy.  This will allow for evaluation of resectability should the histology reveal a lesion that meets criteria for resection.  Likewise, obtaining a biopsy will allow a tissue diagnosis to better determine next steps.     Interval   Pt is s/p diag lapa 8/2023 with biopsy. Mesenteric mass appeared calcified, nodular, and very proximal on the jejunum. Resectability unlikely. Sufficient sample obtained of lesional tissue. Path with benign fibrosis. Postop he stated his intermittent diarrhea and constipation had significantly improved since diagnostic laparoscopy. Likewise, his nausea had almost entirely resolved and he was eating well and gaining weight. When seen 2 mo ago his CGA remained elevated and repeat scan showed expansion of the mesenteric mass. He had undergone gastric emptying studyin march as well, demonstrating slowed emptying. 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 No other EGD findings that were concerning. Plans to move to [place] soon and has not scheduled push enteroscopy as discussed with Dr Divina CARRASCO. Continues to have vague symptoms despite resolution right after diag lapa.         Review of Systems  Complete ROS Surg  Onc:   Constitutional: The patient denies new or recent history of general fatigue, with weight loss, and reduced appetite.   Eyes: No complaints of visual problems, no scleral icterus.   ENT: No complaints of ear pain, no hoarseness, no difficulty swallowing,  no tinnitus and no new masses in head, oral cavity, or neck.   Cardiovascular: No complaints of chest pain, no palpitations, no ankle edema.   Respiratory: No complaints of shortness of breath, no cough.   Gastrointestinal: No complaints of jaundice, no bloody stools, no pale stools.   Genitourinary: No complaints of dysuria, no hematuria, no nocturia, no frequent urination, no urethral discharge.   Musculoskeletal: No complaints of weakness, paralysis, joint stiffness or arthralgias.  Integumentary: No complaints of rash, no new lesions.   Neurological: No complaints of convulsions, no seizures, no dizziness.   Hematologic/Lymphatic: No complaints of easy bruising.   Endocrine:  No hot or cold intolerance.  No polydipsia, polyphagia, or polyuria.  Allergy/immunology:  No environmental allergies.  No food allergies.  Not immunocompromised.      Patient Active Problem List   Diagnosis    Benign essential hypertension    Carpal tunnel syndrome    Cervical herniated disc    Cervical radiculopathy    Cervical stenosis of spinal canal    Type 2 diabetes mellitus with both eyes affected by mild nonproliferative retinopathy without macular edema, with long-term current use of insulin (HCC)    Hyperlipidemia    Hypogonadotropic hypogonadism (HCC)    Pituitary microadenoma (HCC)    Obstructive sleep apnea syndrome    Spondylosis of cervical region without myelopathy or radiculopathy    Mesenteric mass    Vitamin D deficiency    Low back pain with sciatica    Sacroiliitis (HCC)    Overweight (BMI 25.0-29.9)    Gastroesophageal reflux disease without esophagitis    Chronic pain syndrome    Salivary gland adenitis    Arthralgia of right hand    Lumbar radiculopathy     Stage 3 chronic kidney disease, unspecified whether stage 3a or 3b CKD (HCC)    Iron deficiency anemia secondary to inadequate dietary iron intake    Advanced care planning/counseling discussion    Calcified mesenteric mass    Gastroparesis     Past Medical History:   Diagnosis Date    Abdominal pain     Allergic     Arthritis     Last assessed 5/24/2013    Avitaminosis D 2012    Chronic kidney disease     Chronic pain disorder     lumbar-having LESI on 6/26/23    Colon polyp     CPAP (continuous positive airway pressure) dependence     Diabetes mellitus (HCC)     Displacement of cervical intervertebral disc 2014    Diverticulitis of colon 2021    slight    GERD (gastroesophageal reflux disease)     Glaucoma     Normal Pressure Glaucoma    Headache(784.0) continuing around rt eye    HTN (hypertension) 2007    Hypertension     IBS (irritable bowel syndrome) 2021    Kidney stone     Marijuana use     daily for chronic pain    Nephrolithiasis 05/24/2013    Pituitary microadenoma (HCC) 2010    Shortness of breath     started June 2023-only when he bends over-not with activity    Sleep apnea     Spinal stenosis in cervical region 2014    Sprain and strain of calcaneofibular (ligament) 12/05/2013    Submandibular lymphadenopathy 08/08/2019    Uric acid nephrolithiasis 1990    Visual impairment Glaucoma, diabetic retinopothy     Past Surgical History:   Procedure Laterality Date    ASPIRATION / INJECTION RENAL CYST      Renal Cyst Aspiration    CATARACT EXTRACTION      12/2017- right eye, 01/2018- left eye    COLONOSCOPY  2005    EYE SURGERY Bilateral     Cataract Surgery    LITHOTRIPSY Right     WY LAPS ABD PRTM&OMENTUM DX W/WO SPEC BR/WA SPX N/A 8/24/2023    Procedure: DIAGNOSTIC LAPAROSCOPY mesenteric biopsy;  Surgeon: Aurora Espinoza MD;  Location: [facility];  Service: Surgical Oncology    TONSILLECTOMY      UPPER GASTROINTESTINAL ENDOSCOPY       Family History   Problem Relation Age of Onset    Diabetes unspecified  Mother     Heart disease Mother     Nephrolithiasis Mother     Kidney disease Mother     Other Mother         Back Disorder    Thyroid disease Mother     Diabetes type II Mother     Hypertension Mother     Thyroid disease unspecified Mother     Diabetes Mother             Arthritis Mother     Diabetes unspecified Father     Heart disease Father     Hypertension Father     Thrombosis Father             Diabetes type II Father     Diabetes unspecified Sister     Heart disease Sister     Stroke Sister     Diabetes unspecified Brother     Heart disease Brother     Nephrolithiasis Brother     Lung cancer Brother     Other Brother         COPD    Diabetes unspecified Sister     Heart disease Sister     Diabetes Sister     Arthritis Sister     Diabetes type II Sister     Diabetes unspecified Sister     Diabetes unspecified Brother     Heart disease Brother     Diabetes unspecified Brother     Other Brother         Back Disorder    COPD Brother     Diabetes type II Brother     Diabetes unspecified Brother     Other Brother         Back Disorder    Diabetes unspecified Brother     Stomach cancer Paternal Aunt     Diabetes type II Maternal Aunt      Social History     Socioeconomic History    Marital status: /Civil Union     Spouse name: Not on file    Number of children: Not on file    Years of education: Not on file    Highest education level: Not on file   Occupational History    Occupation:    Tobacco Use    Smoking status: Former     Current packs/day: 0.00     Average packs/day: 0.3 packs/day for 2.0 years (0.5 ttl pk-yrs)     Types: Cigarettes     Start date:      Quit date: 1980     Years since quittin.3    Smokeless tobacco: Never   Vaping Use    Vaping status: Never Used   Substance and Sexual Activity    Alcohol use: Yes     Alcohol/week: 4.0 standard drinks of alcohol     Types: 2 Cans of beer, 2 Standard drinks or equivalent per week     Comment: social    Drug  use: Yes     Frequency: 7.0 times per week     Types: Marijuana     Comment: Use Medical Marijuana daily (1-3 capsules or tincture)    Sexual activity: Yes     Partners: Female     Birth control/protection: Female Sterilization   Other Topics Concern    Not on file   Social History Narrative    Not on file     Social Determinants of Health     Financial Resource Strain: Low Risk  (2/20/2024)    Overall Financial Resource Strain (CARDIA)     Difficulty of Paying Living Expenses: Not very hard   Food Insecurity: Not on file   Transportation Needs: No Transportation Needs (2/20/2024)    PRAPARE - Transportation     Lack of Transportation (Medical): No     Lack of Transportation (Non-Medical): No   Physical Activity: Not on file   Stress: Not on file   Social Connections: Not on file   Intimate Partner Violence: Not on file   Housing Stability: Not on file       Current Outpatient Medications:     acetaminophen (Tylenol 8 Hour Arthritis Pain) 650 mg CR tablet, Take 650 mg by mouth every 8 (eight) hours as needed for mild pain, Disp: , Rfl:     amLODIPine (NORVASC) 10 mg tablet, Take 1 tablet (10 mg total) by mouth daily at bedtime, Disp: 90 tablet, Rfl: 1    aspirin (ECOTRIN LOW STRENGTH) 81 mg EC tablet, Take 81 mg by mouth daily , Disp: , Rfl:     B Complex Vitamins (VITAMIN B-COMPLEX PO), Take 1 capsule by mouth daily , Disp: , Rfl:     BD Pen Needle Harriett U/F 32G X 4 MM MISC, USE 4 TIMES DAILY, Disp: 360 each, Rfl: 2    brimonidine-timolol (COMBIGAN) 0.2-0.5 %, Administer 1 drop to both eyes every 12 (twelve) hours, Disp: , Rfl:     cholecalciferol (VITAMIN D3) 1,000 units tablet, Take 1,000 Units by mouth 2 (two) times a day , Disp: , Rfl:     Continuous Glucose Sensor (Dexcom G7 Sensor), , Disp: , Rfl:     dicyclomine (BENTYL) 20 mg tablet, Take 1 tablet (20 mg total) by mouth 4 (four) times a day (before meals and at bedtime) (Patient taking differently: Take 20 mg by mouth if needed), Disp: 120 tablet, Rfl: 5     doxazosin (CARDURA) 4 mg tablet, Take 1 tablet (4 mg total) by mouth every morning, Disp: 90 tablet, Rfl: 1    Empagliflozin (Jardiance) 25 MG TABS, Take 1 tablet (25 mg total) by mouth daily, Disp: 90 tablet, Rfl: 1    ferrous gluconate (FERGON) 240 (27 FE) MG tablet, Take 1 tablet (240 mg total) by mouth in the morning (Patient taking differently: Take 240 mg by mouth in the morning Last dose 6/20), Disp: 90 tablet, Rfl: 1    fluticasone (FLONASE) 50 mcg/act nasal spray, 1 spray into each nostril daily as needed for rhinitis (Acute URI/sinusitis), Disp: 18 mL, Rfl: 0    gabapentin (NEURONTIN) 400 mg capsule, TAKE 1 CAPSULE BY MOUTH 3 TIMES  DAILY, Disp: 270 capsule, Rfl: 1    glipiZIDE (GLUCOTROL) 5 mg tablet, Take 1 tablet (5 mg total) by mouth in the morning, Disp: 90 tablet, Rfl: 1    Glucagon (Baqsimi Two Pack) 3 MG/DOSE POWD, Use if needed for severe hypoglycemia, Disp: 1 each, Rfl: 1    Glucosamine-Chondroitin 250-200 MG TABS, Take 1 tablet by mouth 2 (two) times a day, Disp: , Rfl:     hydroCHLOROthiazide 25 mg tablet, Take 1 tablet (25 mg total) by mouth every morning, Disp: 90 tablet, Rfl: 1    Insulin Glargine Solostar (Lantus SoloStar) 100 UNIT/ML SOPN, Inject 0.22 mL (22 Units total) under the skin daily at bedtime, Disp: 30 mL, Rfl: 1    insulin lispro (HumaLOG KwikPen) 100 units/mL injection pen, Inject per insulin scales up to 40 units per day., Disp: 30 mL, Rfl: 1    lidocaine (Lidoderm) 5 %, Apply 1 patch topically daily Remove & Discard patch within 12 hours or as directed by MD (Patient taking differently: Apply 1 patch topically if needed Remove & Discard patch within 12 hours or as directed by MD), Disp: 6 patch, Rfl: 0    lisinopril (ZESTRIL) 40 mg tablet, Take 1 tablet (40 mg total) by mouth every morning, Disp: 90 tablet, Rfl: 1    loratadine (CLARITIN) 10 mg tablet, Take 10 mg by mouth daily, Disp: , Rfl:     MULTIPLE VITAMIN PO, Take 1 tablet by mouth daily , Disp: , Rfl:     omeprazole  (PriLOSEC) 40 MG capsule, Take 1 capsule (40 mg total) by mouth every morning, Disp: 90 capsule, Rfl: 1    other medication, see sig,, Medication/product name: Medical Marijuana, Disp: , Rfl:     polyethylene glycol (MiraLax) 17 GM/SCOOP powder, 17 g if needed, Disp: , Rfl:     Probiotic Product (PROBIOTIC-10) CAPS, Take 1 capsule by mouth daily , Disp: , Rfl:     simvastatin (ZOCOR) 20 mg tablet, Take 1 tablet (20 mg total) by mouth daily at bedtime, Disp: 90 tablet, Rfl: 3    testosterone (ANDROGEL) 1.62 % TD gel pump, Apply 3 actuation (60.75 mg total) topically every morning, Disp: 90 actuation, Rfl: 3    vitamin E, tocopherol, 400 units capsule, Take 400 Units by mouth 2 (two) times a day , Disp: , Rfl:   Allergies   Allergen Reactions    Clonidine Other (See Comments)     Diaphoresis, hot flashes    Augmentin [Amoxicillin-Pot Clavulanate] Abdominal Pain    Biaxin [Clarithromycin] Abdominal Pain    Peanut (Diagnostic) - Food Allergy Diarrhea, GI Intolerance and Abdominal Pain    Sitagliptin-Metformin Hcl Er Diarrhea, GI Intolerance and Abdominal Pain    Dust Mite Extract Allergic Rhinitis    Insulin Aspart GI Intolerance     Novolog     Liraglutide Nausea Only and Abdominal Pain    Metformin And Related Diarrhea and GI Intolerance    Molds & Smuts Allergic Rhinitis    Seasonal Ic [Cholestatin] Headache       Vitals:    05/08/24 1100   BP: 142/56   Pulse: 83   Resp: 18   Temp: 98.6 °F (37 °C)   SpO2: 98%       Physical Exam   General: Appears well, appears stated age  Skin: Warm, anicteric. Incisions healing well  HEENT: Normocephalic, atraumatic; sclera aniceteric, mucous membranes moist; cervical nodes without adenopathy  Cardiopulmonary: RRR, Easy WOB, no BLE edema  Abd: Flat, soft, nontender, no masses appreciated, no hepatosplenomegaly  MSK: Symmetric, no cyanosis, no overt weakness  Lymphatic: No cervical, axillary or inguinal lymphadenopathy  Neuro: Affect appropriate, no gross motor  abnormalities    Labs:   Final Diagnosis   A-B. Mesentery, Mass, Biopsy  -  Fragments of fibroconnective tissue with granulation tissue, chronic inflammation, and reactive mesothelial cells.  -  Negative for malignancy.     Comment: The patient's abnormal imaging findings of mesenteric mass are noted. The current sample shows fragments of fibroconnective tissue with reactive changes. There is no evidence of carcinoma and work-up for neuroendocrine tumor is negative. Possible etiologies include fat necrosis, auto-immune disease, prior surgical treatment, infection, and reactive tissue adjacent to unsampled lesional tissue. Clinical and radiographic correlation is advised for adequate sampling of the target lesion.          Imaging  CT abdomen and pelvis w IV and oral contrast    Addendum Date: 7/10/2023 Addendum:   Please note: On further evaluation, the 5 mm nodule at the caudal margin of the pancreatic tail is considered statistically most likely to represent CT artifact. This leaves no CT abnormality to account for hypermetabolism in the pancreas on most recent PET/CT scan. As that finding was quite convincing, consider further investigation with pancreatic protocol CT to evaluate for pancreatic mass which may be isointense to pancreas on all CT imaging phases.    Result Date: 7/10/2023  Narrative: CT ABDOMEN WITH IV CONTRAST INDICATION:   K66.8: Other specified disorders of peritoneum K86.89: Other specified diseases of pancreas. COMPARISON: PET/CT scan performed June 6, 2023 TECHNIQUE:  CT examination of the abdomen was performed after the administration of intravenous contrast. Scanning through the abdomen was performed in arterial, venous and delayed phases according a protocol specifically designed to evaluate upper abdominal viscera. Multiplanar 2D reformatted images were created from the source data. This examination, like all CT scans performed in the [facility], was performed utilizing  techniques to minimize radiation dose exposure, including the use of iterative reconstruction and automated exposure control.  Radiation dose length  product (DLP) for this visit:  619.68 mGy-cm IV Contrast:  100 mL of iohexol (OMNIPAQUE) Enteric Contrast:  Enteric contrast was administered. FINDINGS: LOWER CHEST:  No clinically significant abnormality identified in the visualized lower chest. LIVER/BILIARY TREE:  Unremarkable. GALLBLADDER:  No calcified gallstones. No pericholecystic inflammatory change. SPLEEN:  Unremarkable. PANCREAS: Very questionable 6 mm hyperdense lesion at the anterior margin of the distal pancreatic tail on image 45 of series 6, not detectable on any other CT imaging. Otherwise no evidence for pancreatic mass. ADRENAL GLANDS: Nonobstructing 4 mm calculus at the lower pole of the right kidney. Small left renal cyst. No solid renal mass. No hydronephrosis. KIDNEYS/URETERS:  Unremarkable. No hydronephrosis. VISUALIZED STOMACH AND BOWEL:  Unremarkable. ABDOMINAL CAVITY: Infiltrative mass in the small bowel mesentery surrounding but not narrowing mesenteric vessels and containing associated calcifications is a somewhat amorphous shape but is estimated at approximately 5.7 x 2.6 x 4.5 cm on images 81 of series 3 and 46 of series 603. Surrounding nodularity reidentified. Borderline retroperitoneal nodes are identified. VESSELS: Prominence of peripheral mesenteric vasculature related to mass infiltrating in the central small bowel mesentery. ABDOMINAL WALL:  Unremarkable. OSSEOUS STRUCTURES:  No acute fracture or destructive osseous lesion.     Impression: Tiny 6 mm nodule in the distal pancreatic tail detectable only on delayed phase postcontrast imaging and possibly of no clinical significance. Otherwise no CT findings to account for hypermetabolism in the pancreatic tail. Reidentified infiltrative mass in the mesentery with calcifications and surrounding nodularity highly suspicious for  carcinoid spectrum lesion. Workstation performed: [id number]       I independently reviewed and interpreted the above laboratory and imaging data including present and past CT, PET, heme onc and GI evals with EGD, EUS, scope, path.     Discussion/Summary:   This is a 67-year-old gentleman with a mesenteric mass of unknown etiology. He is s/p diag lapa with biopsy 8/2023: Mesenteric involvement very proximal. Exceptionally firm; non-malignant appearing. Path showed inflammatory tissue alone. Sx temporarily resolved with essentially zero intervention. CT then showed expansion of the mass and CGA remains high. Repeat dota with punctate panc lesions and continued moderate uptake at mesenteric mass. EUS with no solid panc lesions. Next steps push enteroscopy +/- empiric trial of lanreotide. Provided contact info for Dr Donald Johnson in [place]. Instructed to obtain images on disks from medical records. I am available prn.

## 2024-05-19 ENCOUNTER — HOSPITAL ENCOUNTER (EMERGENCY)
Facility: HOSPITAL | Age: 69
Discharge: HOME | End: 2024-05-19
Attending: EMERGENCY MEDICINE
Payer: MEDICARE

## 2024-05-19 ENCOUNTER — APPOINTMENT (EMERGENCY)
Dept: CT IMAGING | Facility: HOSPITAL | Age: 69
End: 2024-05-19
Payer: MEDICARE

## 2024-05-19 VITALS
HEIGHT: 63 IN | WEIGHT: 155 LBS | OXYGEN SATURATION: 95 % | SYSTOLIC BLOOD PRESSURE: 141 MMHG | DIASTOLIC BLOOD PRESSURE: 64 MMHG | BODY MASS INDEX: 27.46 KG/M2 | HEART RATE: 65 BPM | RESPIRATION RATE: 16 BRPM | TEMPERATURE: 98.1 F

## 2024-05-19 DIAGNOSIS — K52.9 GASTROENTERITIS: ICD-10-CM

## 2024-05-19 DIAGNOSIS — R10.32 LEFT LOWER QUADRANT ABDOMINAL PAIN: Primary | ICD-10-CM

## 2024-05-19 LAB
ALBUMIN SERPL BCP-MCNC: 4.4 G/DL (ref 3.5–5)
ALP SERPL-CCNC: 59 U/L (ref 34–104)
ALT SERPL W P-5'-P-CCNC: 21 U/L (ref 7–52)
ANION GAP SERPL CALCULATED.3IONS-SCNC: 8 MMOL/L (ref 4–13)
AST SERPL W P-5'-P-CCNC: 22 U/L (ref 13–39)
BASOPHILS # BLD AUTO: 0.03 THOUSANDS/ÂΜL (ref 0–0.1)
BASOPHILS NFR BLD AUTO: 0 % (ref 0–1)
BILIRUB SERPL-MCNC: 1.87 MG/DL (ref 0.2–1)
BUN SERPL-MCNC: 26 MG/DL (ref 5–25)
CALCIUM SERPL-MCNC: 9.8 MG/DL (ref 8.4–10.2)
CHLORIDE SERPL-SCNC: 104 MMOL/L (ref 96–108)
CO2 SERPL-SCNC: 28 MMOL/L (ref 21–32)
CREAT SERPL-MCNC: 0.99 MG/DL (ref 0.6–1.3)
EOSINOPHIL # BLD AUTO: 0.01 THOUSAND/ÂΜL (ref 0–0.61)
EOSINOPHIL NFR BLD AUTO: 0 % (ref 0–6)
ERYTHROCYTE [DISTWIDTH] IN BLOOD BY AUTOMATED COUNT: 14.6 % (ref 11.6–15.1)
GFR SERPL CREATININE-BSD FRML MDRD: 76 ML/MIN/1.73SQ M
GLUCOSE SERPL-MCNC: 202 MG/DL (ref 65–140)
HCT VFR BLD AUTO: 43.8 % (ref 36.5–49.3)
HGB BLD-MCNC: 15.1 G/DL (ref 12–17)
IMM GRANULOCYTES # BLD AUTO: 0.05 THOUSAND/UL (ref 0–0.2)
IMM GRANULOCYTES NFR BLD AUTO: 0 % (ref 0–2)
LIPASE SERPL-CCNC: 12 U/L (ref 11–82)
LYMPHOCYTES # BLD AUTO: 2.27 THOUSANDS/ÂΜL (ref 0.6–4.47)
LYMPHOCYTES NFR BLD AUTO: 17 % (ref 14–44)
MAGNESIUM SERPL-MCNC: 2.3 MG/DL (ref 1.9–2.7)
MCH RBC QN AUTO: 29.8 PG (ref 26.8–34.3)
MCHC RBC AUTO-ENTMCNC: 34.5 G/DL (ref 31.4–37.4)
MCV RBC AUTO: 87 FL (ref 82–98)
MONOCYTES # BLD AUTO: 0.89 THOUSAND/ÂΜL (ref 0.17–1.22)
MONOCYTES NFR BLD AUTO: 7 % (ref 4–12)
NEUTROPHILS # BLD AUTO: 9.78 THOUSANDS/ÂΜL (ref 1.85–7.62)
NEUTS SEG NFR BLD AUTO: 76 % (ref 43–75)
NRBC BLD AUTO-RTO: 0 /100 WBCS
PLATELET # BLD AUTO: 294 THOUSANDS/UL (ref 149–390)
PMV BLD AUTO: 9.4 FL (ref 8.9–12.7)
POTASSIUM SERPL-SCNC: 3.7 MMOL/L (ref 3.5–5.3)
PROT SERPL-MCNC: 7.3 G/DL (ref 6.4–8.4)
RBC # BLD AUTO: 5.06 MILLION/UL (ref 3.88–5.62)
SODIUM SERPL-SCNC: 140 MMOL/L (ref 135–147)
WBC # BLD AUTO: 13.03 THOUSAND/UL (ref 4.31–10.16)

## 2024-05-19 PROCEDURE — 96361 HYDRATE IV INFUSION ADD-ON: CPT

## 2024-05-19 PROCEDURE — 96374 THER/PROPH/DIAG INJ IV PUSH: CPT

## 2024-05-19 PROCEDURE — 83690 ASSAY OF LIPASE: CPT | Performed by: EMERGENCY MEDICINE

## 2024-05-19 PROCEDURE — 99285 EMERGENCY DEPT VISIT HI MDM: CPT | Performed by: EMERGENCY MEDICINE

## 2024-05-19 PROCEDURE — 36415 COLL VENOUS BLD VENIPUNCTURE: CPT

## 2024-05-19 PROCEDURE — 85025 COMPLETE CBC W/AUTO DIFF WBC: CPT

## 2024-05-19 PROCEDURE — 74177 CT ABD & PELVIS W/CONTRAST: CPT

## 2024-05-19 PROCEDURE — 96375 TX/PRO/DX INJ NEW DRUG ADDON: CPT

## 2024-05-19 PROCEDURE — 83735 ASSAY OF MAGNESIUM: CPT

## 2024-05-19 PROCEDURE — 99284 EMERGENCY DEPT VISIT MOD MDM: CPT

## 2024-05-19 PROCEDURE — 80053 COMPREHEN METABOLIC PANEL: CPT

## 2024-05-19 RX ORDER — ONDANSETRON 2 MG/ML
4 INJECTION INTRAMUSCULAR; INTRAVENOUS ONCE
Status: COMPLETED | OUTPATIENT
Start: 2024-05-19 | End: 2024-05-19

## 2024-05-19 RX ORDER — KETOROLAC TROMETHAMINE 30 MG/ML
15 INJECTION, SOLUTION INTRAMUSCULAR; INTRAVENOUS ONCE
Status: COMPLETED | OUTPATIENT
Start: 2024-05-19 | End: 2024-05-19

## 2024-05-19 RX ORDER — ONDANSETRON 4 MG/1
4 TABLET, ORALLY DISINTEGRATING ORAL EVERY 8 HOURS PRN
Qty: 20 TABLET | Refills: 0 | Status: SHIPPED | OUTPATIENT
Start: 2024-05-19

## 2024-05-19 RX ORDER — MORPHINE SULFATE 4 MG/ML
4 INJECTION, SOLUTION INTRAMUSCULAR; INTRAVENOUS ONCE
Status: COMPLETED | OUTPATIENT
Start: 2024-05-19 | End: 2024-05-19

## 2024-05-19 RX ADMIN — KETOROLAC TROMETHAMINE 15 MG: 30 INJECTION, SOLUTION INTRAMUSCULAR; INTRAVENOUS at 12:30

## 2024-05-19 RX ADMIN — MORPHINE SULFATE 4 MG: 4 INJECTION INTRAVENOUS at 13:01

## 2024-05-19 RX ADMIN — ONDANSETRON 4 MG: 2 INJECTION INTRAMUSCULAR; INTRAVENOUS at 12:31

## 2024-05-19 RX ADMIN — IOHEXOL 85 ML: 350 INJECTION, SOLUTION INTRAVENOUS at 14:25

## 2024-05-19 RX ADMIN — SODIUM CHLORIDE 500 ML: 0.9 INJECTION, SOLUTION INTRAVENOUS at 13:00

## 2024-05-19 NOTE — ED ATTENDING ATTESTATION
5/19/2024  I, Rosalind Vences MD, saw and evaluated the patient. I have discussed the patient with the resident/non-physician practitioner and agree with the resident's/non-physician practitioner's findings, Plan of Care, and MDM as documented in the resident's/non-physician practitioner's note, except where noted. All available labs and Radiology studies were reviewed.  I was present for key portions of any procedure(s) performed by the resident/non-physician practitioner and I was immediately available to provide assistance.       At this point I agree with the current assessment done in the Emergency Department.  I have conducted an independent evaluation of this patient a history and physical is as follows:    68-year-old male with known mesenteric mass under surveillance by surgical oncology.  Patient presents for evaluation of pain to the left lower quadrant of the abdomen.  He has some chronic vague abdominal pain at baseline, but reports worsening pain focal to the left lower quadrant that started 2 days ago.  Pain is described as sharp, severe, worse with movement.  Accompanied by nausea, no vomiting.  Patient had 1 episode of nonbloody diarrhea yesterday, 1 this morning.  He has been taking Pepto-Bismol without relief of his abdominal pain.  Denies dysuria or frequency.    Physical Exam  Constitutional:       General: He is not in acute distress.     Appearance: He is well-developed. He is not diaphoretic.   HENT:      Head: Normocephalic and atraumatic.      Right Ear: External ear normal.      Left Ear: External ear normal.      Nose: Nose normal.   Eyes:      Conjunctiva/sclera: Conjunctivae normal.   Cardiovascular:      Rate and Rhythm: Normal rate and regular rhythm.      Pulses: Normal pulses.      Heart sounds: Normal heart sounds. No murmur heard.     No friction rub. No gallop.   Pulmonary:      Effort: Pulmonary effort is normal. No respiratory distress.      Breath sounds: Normal breath  sounds. No wheezing or rales.   Abdominal:      General: Bowel sounds are normal. There is no distension.      Palpations: Abdomen is soft.      Tenderness: There is abdominal tenderness (diffuse, with severe tenderness to the LLQ). There is no right CVA tenderness, left CVA tenderness or guarding.   Musculoskeletal:         General: No deformity. Normal range of motion.      Cervical back: Normal range of motion and neck supple.      Right lower leg: No edema.      Left lower leg: No edema.   Skin:     General: Skin is warm and dry.   Neurological:      Mental Status: He is alert and oriented to person, place, and time.      Motor: No abnormal muscle tone.   Psychiatric:         Mood and Affect: Mood normal.           ED Course  ED Course as of 05/19/24 2107   Sun May 19, 2024   1300 Leukocytosis present to 13.  GFR 76.  Zofran, Toradol, and morphine given for symptomatic relief.     CT scan of the abdomen pelvis shows no evidence of SBO or diverticulitis.  Patient's calcified mesenteric mass is grossly stable from prior.  Patient feels improved on reassessment.  He is tolerating oral intake.  Stable for discharge home with return precautions discussed.    Critical Care Time  Procedures

## 2024-05-19 NOTE — ED PROVIDER NOTES
History  Chief Complaint   Patient presents with    Abdominal Pain     Patient has had 3 days of nausea, abdominal pain, diarrhea. Hx of diverticulitis and mesenteric mass.     HPI  67 yo male presents for evaluation of abdominal pain. He has a hx of a mesenteric mass, diverticulosis, IDDM, HTN, IBS. He reports 2 days of lower abdominal pain, nausea, and diarrhea.  He reports baseline abdominal pain, but pain is now worsened, and localized to LLQ.  Pain is sharp, nonradiating, and worse with movement.  Reports 1 episode of loose, brown stools, with no blood noted.  Pepto-Bismol did not improve symptoms.  He denies urinary frequency, dysuria, hematuria, vomiting, chest pain, SOB.    Prior to Admission Medications   Prescriptions Last Dose Informant Patient Reported? Taking?   B Complex Vitamins (VITAMIN B-COMPLEX PO)  Self Yes No   Sig: Take 1 capsule by mouth daily    BD Pen Needle Harriett U/F 32G X 4 MM MISC  Self No No   Sig: USE 4 TIMES DAILY   Continuous Glucose Sensor (Dexcom G7 Sensor)  Self Yes No   Empagliflozin (Jardiance) 25 MG TABS  Self No No   Sig: Take 1 tablet (25 mg total) by mouth daily   Glucagon (Baqsimi Two Pack) 3 MG/DOSE POWD  Self No No   Sig: Use if needed for severe hypoglycemia   Glucosamine-Chondroitin 250-200 MG TABS  Self Yes No   Sig: Take 1 tablet by mouth 2 (two) times a day   Insulin Glargine Solostar (Lantus SoloStar) 100 UNIT/ML SOPN  Self No No   Sig: Inject 0.22 mL (22 Units total) under the skin daily at bedtime   MULTIPLE VITAMIN PO  Self Yes No   Sig: Take 1 tablet by mouth daily    Probiotic Product (PROBIOTIC-10) CAPS  Self Yes No   Sig: Take 1 capsule by mouth daily    acetaminophen (Tylenol 8 Hour Arthritis Pain) 650 mg CR tablet  Self Yes No   Sig: Take 650 mg by mouth every 8 (eight) hours as needed for mild pain   amLODIPine (NORVASC) 10 mg tablet  Self No No   Sig: Take 1 tablet (10 mg total) by mouth daily at bedtime   aspirin (ECOTRIN LOW STRENGTH) 81 mg EC tablet  Self  Yes No   Sig: Take 81 mg by mouth daily    brimonidine-timolol (COMBIGAN) 0.2-0.5 %  Self Yes No   Sig: Administer 1 drop to both eyes every 12 (twelve) hours   cholecalciferol (VITAMIN D3) 1,000 units tablet  Self Yes No   Sig: Take 1,000 Units by mouth 2 (two) times a day    dicyclomine (BENTYL) 20 mg tablet  Self No No   Sig: Take 1 tablet (20 mg total) by mouth 4 (four) times a day (before meals and at bedtime)   Patient taking differently: Take 20 mg by mouth if needed   doxazosin (CARDURA) 4 mg tablet  Self No No   Sig: Take 1 tablet (4 mg total) by mouth every morning   ferrous gluconate (FERGON) 240 (27 FE) MG tablet  Self No No   Sig: Take 1 tablet (240 mg total) by mouth in the morning   Patient taking differently: Take 240 mg by mouth in the morning Last dose 20   fluticasone (FLONASE) 50 mcg/act nasal spray  Self No No   Si spray into each nostril daily as needed for rhinitis (Acute URI/sinusitis)   gabapentin (NEURONTIN) 400 mg capsule  Self No No   Sig: TAKE 1 CAPSULE BY MOUTH 3 TIMES  DAILY   glipiZIDE (GLUCOTROL) 5 mg tablet  Self No No   Sig: Take 1 tablet (5 mg total) by mouth in the morning   hydroCHLOROthiazide 25 mg tablet  Self No No   Sig: Take 1 tablet (25 mg total) by mouth every morning   insulin lispro (HumaLOG KwikPen) 100 units/mL injection pen  Self No No   Sig: Inject per insulin scales up to 40 units per day.   lidocaine (Lidoderm) 5 %  Self No No   Sig: Apply 1 patch topically daily Remove & Discard patch within 12 hours or as directed by MD   Patient taking differently: Apply 1 patch topically if needed Remove & Discard patch within 12 hours or as directed by MD   lisinopril (ZESTRIL) 40 mg tablet  Self No No   Sig: Take 1 tablet (40 mg total) by mouth every morning   loratadine (CLARITIN) 10 mg tablet  Self Yes No   Sig: Take 10 mg by mouth daily   omeprazole (PriLOSEC) 40 MG capsule  Self No No   Sig: Take 1 capsule (40 mg total) by mouth every morning   other medication,  see sig,  Self Yes No   Sig: Medication/product name: Medical Marijuana   polyethylene glycol (MiraLax) 17 GM/SCOOP powder  Self Yes No   Si g if needed   simvastatin (ZOCOR) 20 mg tablet  Self No No   Sig: Take 1 tablet (20 mg total) by mouth daily at bedtime   testosterone (ANDROGEL) 1.62 % TD gel pump  Self No No   Sig: Apply 3 actuation (60.75 mg total) topically every morning   vitamin E, tocopherol, 400 units capsule  Self Yes No   Sig: Take 400 Units by mouth 2 (two) times a day       Facility-Administered Medications: None       Past Medical History:   Diagnosis Date    Abdominal pain     Allergic     Arthritis     Last assessed 2013    Avitaminosis D     Chronic kidney disease     Chronic pain disorder     lumbar-having LESI on 23    Colon polyp     CPAP (continuous positive airway pressure) dependence     Diabetes mellitus (HCC)     Displacement of cervical intervertebral disc     Diverticulitis of colon     slight    GERD (gastroesophageal reflux disease)     Glaucoma     Normal Pressure Glaucoma    Headache(784.0) continuing around rt eye    HTN (hypertension)     Hypertension     IBS (irritable bowel syndrome)     Kidney stone     Marijuana use     daily for chronic pain    Nephrolithiasis 2013    Pituitary microadenoma (HCC)     Shortness of breath     started 2023-only when he bends over-not with activity    Sleep apnea     Spinal stenosis in cervical region     Sprain and strain of calcaneofibular (ligament) 2013    Submandibular lymphadenopathy 2019    Uric acid nephrolithiasis 1990    Visual impairment Glaucoma, diabetic retinopothy       Past Surgical History:   Procedure Laterality Date    ASPIRATION / INJECTION RENAL CYST      Renal Cyst Aspiration    CATARACT EXTRACTION      2017- right eye, 2018- left eye    COLONOSCOPY  2005    EYE SURGERY Bilateral     Cataract Surgery    LITHOTRIPSY Right     OR LAPS ABD PRTM&OMENTUM DX  W/WO SPEC BR/WA SPX N/A 2023    Procedure: DIAGNOSTIC LAPAROSCOPY mesenteric biopsy;  Surgeon: Aurora Espinoza MD;  Location: [facility];  Service: Surgical Oncology    TONSILLECTOMY      UPPER GASTROINTESTINAL ENDOSCOPY         Family History   Problem Relation Age of Onset    Diabetes unspecified Mother     Heart disease Mother     Nephrolithiasis Mother     Kidney disease Mother     Other Mother         Back Disorder    Thyroid disease Mother     Diabetes type II Mother     Hypertension Mother     Thyroid disease unspecified Mother     Diabetes Mother             Arthritis Mother     Diabetes unspecified Father     Heart disease Father     Hypertension Father     Thrombosis Father             Diabetes type II Father     Diabetes unspecified Sister     Heart disease Sister     Stroke Sister     Diabetes unspecified Brother     Heart disease Brother     Nephrolithiasis Brother     Lung cancer Brother     Other Brother         COPD    Diabetes unspecified Sister     Heart disease Sister     Diabetes Sister     Arthritis Sister     Diabetes type II Sister     Diabetes unspecified Sister     Diabetes unspecified Brother     Heart disease Brother     Diabetes unspecified Brother     Other Brother         Back Disorder    COPD Brother     Diabetes type II Brother     Diabetes unspecified Brother     Other Brother         Back Disorder    Diabetes unspecified Brother     Stomach cancer Paternal Aunt     Diabetes type II Maternal Aunt      I have reviewed and agree with the history as documented.    E-Cigarette/Vaping    E-Cigarette Use Never User      E-Cigarette/Vaping Substances    Nicotine No     THC No     CBD No     Flavoring No     Other No     Unknown No      Social History     Tobacco Use    Smoking status: Former     Current packs/day: 0.00     Average packs/day: 0.3 packs/day for 2.0 years (0.5 ttl pk-yrs)     Types: Cigarettes     Start date:      Quit date: 1980     Years since  quittin.4    Smokeless tobacco: Never   Vaping Use    Vaping status: Never Used   Substance Use Topics    Alcohol use: Yes     Alcohol/week: 4.0 standard drinks of alcohol     Types: 2 Cans of beer, 2 Standard drinks or equivalent per week     Comment: social    Drug use: Yes     Frequency: 7.0 times per week     Types: Marijuana     Comment: Use Medical Marijuana daily (1-3 capsules or tincture)        Review of Systems   Constitutional:  Negative for fever.   HENT:  Negative for sore throat.    Respiratory:  Negative for cough and shortness of breath.    Cardiovascular:  Negative for chest pain.   Gastrointestinal:  Positive for abdominal pain, diarrhea and nausea. Negative for vomiting.   Genitourinary:  Negative for dysuria and hematuria.   Neurological:  Negative for dizziness and headaches.       Physical Exam  ED Triage Vitals [24 1157]   Temperature Pulse Respirations Blood Pressure SpO2   98.1 °F (36.7 °C) 76 18 165/71 97 %      Temp Source Heart Rate Source Patient Position - Orthostatic VS BP Location FiO2 (%)   Oral Monitor Sitting Right arm --      Pain Score       9             Orthostatic Vital Signs  Vitals:    24 1157 24 1526   BP: 165/71 141/64   Pulse: 76 65   Patient Position - Orthostatic VS: Sitting Lying       Physical Exam  Vitals and nursing note reviewed.   Constitutional:       General: He is not in acute distress.     Appearance: He is well-developed.   HENT:      Head: Normocephalic and atraumatic.   Eyes:      Conjunctiva/sclera: Conjunctivae normal.   Cardiovascular:      Rate and Rhythm: Normal rate and regular rhythm.      Heart sounds: No murmur heard.  Pulmonary:      Effort: Pulmonary effort is normal. No respiratory distress.      Breath sounds: Normal breath sounds.   Abdominal:      Palpations: Abdomen is soft.      Tenderness: There is generalized abdominal tenderness and tenderness in the left lower quadrant.   Musculoskeletal:         General: No  swelling.      Cervical back: Neck supple.   Skin:     General: Skin is warm and dry.      Capillary Refill: Capillary refill takes less than 2 seconds.   Neurological:      Mental Status: He is alert.   Psychiatric:         Mood and Affect: Mood normal.         ED Medications  Medications   ondansetron (ZOFRAN) injection 4 mg (4 mg Intravenous Given 5/19/24 1231)   ketorolac (TORADOL) injection 15 mg (15 mg Intravenous Given 5/19/24 1230)   morphine injection 4 mg (4 mg Intravenous Given 5/19/24 1301)   sodium chloride 0.9 % bolus 500 mL (0 mL Intravenous Stopped 5/19/24 1526)   iohexol (OMNIPAQUE) 350 MG/ML injection (MULTI-DOSE) 85 mL (85 mL Intravenous Given 5/19/24 1425)       Diagnostic Studies  Results Reviewed       Procedure Component Value Units Date/Time    Lipase [[id number]]  (Normal) Collected: 05/19/24 1228    Lab Status: Final result Specimen: Blood from Arm, Right Updated: 05/19/24 1330     Lipase 12 u/L     Comprehensive metabolic panel [[id number]]  (Abnormal) Collected: 05/19/24 1228    Lab Status: Final result Specimen: Blood from Arm, Right Updated: 05/19/24 1255     Sodium 140 mmol/L      Potassium 3.7 mmol/L      Chloride 104 mmol/L      CO2 28 mmol/L      ANION GAP 8 mmol/L      BUN 26 mg/dL      Creatinine 0.99 mg/dL      Glucose 202 mg/dL      Calcium 9.8 mg/dL      AST 22 U/L      ALT 21 U/L      Alkaline Phosphatase 59 U/L      Total Protein 7.3 g/dL      Albumin 4.4 g/dL      Total Bilirubin 1.87 mg/dL      eGFR 76 ml/min/1.73sq m     Narrative:      National Kidney Disease Foundation guidelines for Chronic Kidney Disease (CKD):     Stage 1 with normal or high GFR (GFR > 90 mL/min/1.73 square meters)    Stage 2 Mild CKD (GFR = 60-89 mL/min/1.73 square meters)    Stage 3A Moderate CKD (GFR = 45-59 mL/min/1.73 square meters)    Stage 3B Moderate CKD (GFR = 30-44 mL/min/1.73 square meters)    Stage 4 Severe CKD (GFR = 15-29 mL/min/1.73 square meters)    Stage 5 End Stage CKD (GFR <15  mL/min/1.73 square meters)  Note: GFR calculation is accurate only with a steady state creatinine    Magnesium [[id number]]  (Normal) Collected: 05/19/24 1228    Lab Status: Final result Specimen: Blood from Arm, Right Updated: 05/19/24 1255     Magnesium 2.3 mg/dL     CBC and differential [[id number]]  (Abnormal) Collected: 05/19/24 1228    Lab Status: Final result Specimen: Blood from Arm, Right Updated: 05/19/24 1240     WBC 13.03 Thousand/uL      RBC 5.06 Million/uL      Hemoglobin 15.1 g/dL      Hematocrit 43.8 %      MCV 87 fL      MCH 29.8 pg      MCHC 34.5 g/dL      RDW 14.6 %      MPV 9.4 fL      Platelets 294 Thousands/uL      nRBC 0 /100 WBCs      Segmented % 76 %      Immature Grans % 0 %      Lymphocytes % 17 %      Monocytes % 7 %      Eosinophils Relative 0 %      Basophils Relative 0 %      Absolute Neutrophils 9.78 Thousands/µL      Absolute Immature Grans 0.05 Thousand/uL      Absolute Lymphocytes 2.27 Thousands/µL      Absolute Monocytes 0.89 Thousand/µL      Eosinophils Absolute 0.01 Thousand/µL      Basophils Absolute 0.03 Thousands/µL                    CT abdomen pelvis with contrast   Final Result by Jevon Thornton DO (05/19 1507)      1. No CT findings of small bowel obstruction or diverticulitis.      2. Grossly stable partially calcified mesenteric mass which may be indicative of carcinoid tumor.      3. Small distal right ureteric calculi without evidence for ureteral obstruction/hydronephrosis.      Additional incidental findings as above.      Workstation performed: [id number]               Procedures  Procedures      ED Course                             SBIRT 22yo+      Flowsheet Row Most Recent Value   Initial Alcohol Screen: US AUDIT-C     1. How often do you have a drink containing alcohol? 0 Filed at: 05/19/2024 1201   2. How many drinks containing alcohol do you have on a typical day you are drinking?  0 Filed at: 05/19/2024 1201   3a. Male UNDER 65: How often do you have five  or more drinks on one occasion? 0 Filed at: 05/19/2024 1201   3b. FEMALE Any Age, or MALE 65+: How often do you have 4 or more drinks on one occassion? 0 Filed at: 05/19/2024 1201   Audit-C Score 0 Filed at: 05/19/2024 1201   PAUL: How many times in the past year have you...    Used an illegal drug or used a prescription medication for non-medical reasons? Never Filed at: 05/19/2024 1201                  Medical Decision Making  68-year-old male presents for evaluation of nausea, abdominal pain and diarrhea.    Differentials: Gastroenteritis, diverticulitis, pyelonephritis, gastritis    On initial evaluation, patient in no acute distress.  VSS.  Generalized abdominal tenderness, worse in LLQ.  Labs significant for mildly elevated WBC.  CT negative for findings consistent with small bowel obstruction or diverticulitis, or other acute abdominal pathology.  Patient received IV fluid bolus, morphine, Zofran and Toradol for symptomatic relief.  On reevaluation, patient endorses symptomatic improvement.  Discussed CT findings with patient, and expressed that likely diagnosis was gastroenteritis.  Encouraged continued fluid intake, and prescribed some Zofran for nausea at home.  Patient stable for dispo at this time.    Amount and/or Complexity of Data Reviewed  Labs: ordered.  Radiology: ordered.    Risk  Prescription drug management.          Disposition  Final diagnoses:   Left lower quadrant abdominal pain   Gastroenteritis     Time reflects when diagnosis was documented in both MDM as applicable and the Disposition within this note       Time User Action Codes Description Comment    5/19/2024  3:32 PM Thaddeus Ascencio Add [R10.32] Left lower quadrant abdominal pain     5/19/2024  3:42 PM Thaddeus Ascencio Add [K52.9] Gastroenteritis           ED Disposition       ED Disposition   Discharge    Condition   Stable    Date/Time   Sun May 19, 2024 1542    Comment   Chad Coffman discharge to home/self care.                    Follow-up Information    None         Discharge Medication List as of 5/19/2024  3:50 PM        START taking these medications    Details   ondansetron (ZOFRAN-ODT) 4 mg disintegrating tablet Take 1 tablet (4 mg total) by mouth every 8 (eight) hours as needed for nausea or vomiting, Starting Sun 5/19/2024, Normal           CONTINUE these medications which have NOT CHANGED    Details   acetaminophen (Tylenol 8 Hour Arthritis Pain) 650 mg CR tablet Take 650 mg by mouth every 8 (eight) hours as needed for mild pain, Historical Med      amLODIPine (NORVASC) 10 mg tablet Take 1 tablet (10 mg total) by mouth daily at bedtime, Starting Wed 2/21/2024, Normal      aspirin (ECOTRIN LOW STRENGTH) 81 mg EC tablet Take 81 mg by mouth daily , Historical Med      B Complex Vitamins (VITAMIN B-COMPLEX PO) Take 1 capsule by mouth daily , Historical Med      BD Pen Needle Harriett U/F 32G X 4 MM MISC USE 4 TIMES DAILY, Normal      brimonidine-timolol (COMBIGAN) 0.2-0.5 % Administer 1 drop to both eyes every 12 (twelve) hours, Historical Med      cholecalciferol (VITAMIN D3) 1,000 units tablet Take 1,000 Units by mouth 2 (two) times a day , Historical Med      Continuous Glucose Sensor (Dexcom G7 Sensor) Historical Med      dicyclomine (BENTYL) 20 mg tablet Take 1 tablet (20 mg total) by mouth 4 (four) times a day (before meals and at bedtime), Starting Mon 2/20/2023, Normal      doxazosin (CARDURA) 4 mg tablet Take 1 tablet (4 mg total) by mouth every morning, Starting Wed 2/21/2024, Normal      Empagliflozin (Jardiance) 25 MG TABS Take 1 tablet (25 mg total) by mouth daily, Starting Tue 4/23/2024, Normal      ferrous gluconate (FERGON) 240 (27 FE) MG tablet Take 1 tablet (240 mg total) by mouth in the morning, Starting Mon 8/1/2022, No Print      fluticasone (FLONASE) 50 mcg/act nasal spray 1 spray into each nostril daily as needed for rhinitis (Acute URI/sinusitis), Starting Tue 10/26/2021, Normal      gabapentin  (NEURONTIN) 400 mg capsule TAKE 1 CAPSULE BY MOUTH 3 TIMES  DAILY, Starting Tue 4/23/2024, Normal      glipiZIDE (GLUCOTROL) 5 mg tablet Take 1 tablet (5 mg total) by mouth in the morning, Starting Tue 4/23/2024, Until Fri 4/18/2025, Normal      Glucagon (Baqsimi Two Pack) 3 MG/DOSE POWD Use if needed for severe hypoglycemia, Normal      Glucosamine-Chondroitin 250-200 MG TABS Take 1 tablet by mouth 2 (two) times a day, Historical Med      hydroCHLOROthiazide 25 mg tablet Take 1 tablet (25 mg total) by mouth every morning, Starting Wed 2/21/2024, Normal      Insulin Glargine Solostar (Lantus SoloStar) 100 UNIT/ML SOPN Inject 0.22 mL (22 Units total) under the skin daily at bedtime, Starting Tue 4/23/2024, Normal      insulin lispro (HumaLOG KwikPen) 100 units/mL injection pen Inject per insulin scales up to 40 units per day., Normal      lidocaine (Lidoderm) 5 % Apply 1 patch topically daily Remove & Discard patch within 12 hours or as directed by MD, Starting Thu 10/27/2022, Normal      lisinopril (ZESTRIL) 40 mg tablet Take 1 tablet (40 mg total) by mouth every morning, Starting Wed 2/21/2024, Normal      loratadine (CLARITIN) 10 mg tablet Take 10 mg by mouth daily, Historical Med      MULTIPLE VITAMIN PO Take 1 tablet by mouth daily , Historical Med      omeprazole (PriLOSEC) 40 MG capsule Take 1 capsule (40 mg total) by mouth every morning, Starting Wed 2/21/2024, Normal      other medication, see sig, Medication/product name: Medical Marijuana, Historical Med      polyethylene glycol (MiraLax) 17 GM/SCOOP powder 17 g if needed, Starting Thu 5/18/2023, Historical Med      Probiotic Product (PROBIOTIC-10) CAPS Take 1 capsule by mouth daily , Historical Med      simvastatin (ZOCOR) 20 mg tablet Take 1 tablet (20 mg total) by mouth daily at bedtime, Starting Wed 2/21/2024, Normal      testosterone (ANDROGEL) 1.62 % TD gel pump Apply 3 actuation (60.75 mg total) topically every morning, Starting Mon 4/29/2024,  Normal      vitamin E, tocopherol, 400 units capsule Take 400 Units by mouth 2 (two) times a day , Historical Med           No discharge procedures on file.    PDMP Review         Value Time User    PDMP Reviewed  Yes 3/9/2023 10:26 AM CHRISTIANO Valdez             ED Provider  Attending physically available and evaluated Chad Coffman. I managed the patient along with the ED Attending.    Electronically Signed by           Thaddeus Ascencio MD  05/21/24 2782

## 2024-06-11 ENCOUNTER — DOCUMENTATION (OUTPATIENT)
Dept: ENDOCRINOLOGY | Facility: CLINIC | Age: 69
End: 2024-06-11

## 2024-07-12 DIAGNOSIS — E11.3293 TYPE 2 DIABETES MELLITUS WITH BOTH EYES AFFECTED BY MILD NONPROLIFERATIVE RETINOPATHY WITHOUT MACULAR EDEMA, WITH LONG-TERM CURRENT USE OF INSULIN (HCC): ICD-10-CM

## 2024-07-12 DIAGNOSIS — Z79.4 TYPE 2 DIABETES MELLITUS WITH BOTH EYES AFFECTED BY MILD NONPROLIFERATIVE RETINOPATHY WITHOUT MACULAR EDEMA, WITH LONG-TERM CURRENT USE OF INSULIN (HCC): ICD-10-CM

## 2024-07-12 RX ORDER — INSULIN LISPRO 100 [IU]/ML
INJECTION, SOLUTION INTRAVENOUS; SUBCUTANEOUS
Qty: 45 ML | Refills: 1 | Status: SHIPPED | OUTPATIENT
Start: 2024-07-12

## 2024-07-17 ENCOUNTER — TELEPHONE (OUTPATIENT)
Age: 69
End: 2024-07-17

## 2024-07-17 NOTE — TELEPHONE ENCOUNTER
Patient called to say he recently moved to [place] and he spoke with an endocrinology office out there, and they need a referral, last 3 office visit notes, and labs faxed to [phone]. He said he did not know the name of the office they just provided him with a fax number.

## 2024-07-19 ENCOUNTER — TELEPHONE (OUTPATIENT)
Age: 69
End: 2024-07-19

## 2024-07-19 NOTE — TELEPHONE ENCOUNTER
Jung Dawson,     Called patient to schedule follow-up but last cancelled appointment notes state patient was moving out of state.    Left message on machine to call back to confirm new PCP in order to update chart.     I did not remove Dr. Ojeda yet.

## 2024-07-19 NOTE — TELEPHONE ENCOUNTER
Patient returned called and asked to send the medical release form via [employer] and confirmed his address which is updated in his chart:      [address] [place] [address]

## 2024-07-19 NOTE — TELEPHONE ENCOUNTER
Patient called back he move  here is  his new address [id number]  [facility] wrong  zip code Could you please mail to this address or  if there is some way he can do it through my chart.I will call patient to go over address.

## 2024-07-19 NOTE — TELEPHONE ENCOUNTER
Please update PCP   [de-identified] : cerumen cleared au mild erythema ad canal [Normal] : mucosa is normal [Midline] : trachea located in midline position [de-identified] : area of concern  is rt parotid tail-no mass

## 2024-07-19 NOTE — TELEPHONE ENCOUNTER
Pt called back with new PCP info.    Ava Cano  [address] [place] [address]  assoc with [facility]     Address updated as well

## 2024-07-23 ENCOUNTER — PATIENT MESSAGE (OUTPATIENT)
Dept: INTERNAL MEDICINE CLINIC | Facility: OTHER | Age: 69
End: 2024-07-23

## 2024-07-23 ENCOUNTER — OFFICE VISIT (OUTPATIENT)
Dept: URBAN - METROPOLITAN AREA CLINIC 44 | Facility: CLINIC | Age: 69
End: 2024-07-23
Payer: MEDICARE

## 2024-07-23 DIAGNOSIS — H40.1134 PRIMARY OPEN-ANGLE GLAUCOMA, BILATERAL, INDETERMINATE STAGE: Primary | ICD-10-CM

## 2024-07-23 DIAGNOSIS — Z96.1 PRESENCE OF INTRAOCULAR LENS: ICD-10-CM

## 2024-07-23 DIAGNOSIS — E11.3393 TYPE 2 DIAB W MODERATE NONPRLF DIAB RTNOP W/O MACULAR EDEMA, BILATERAL: ICD-10-CM

## 2024-07-23 PROCEDURE — 92004 COMPRE OPH EXAM NEW PT 1/>: CPT | Performed by: OPTOMETRIST

## 2024-07-23 ASSESSMENT — INTRAOCULAR PRESSURE
OD: 17
OS: 18

## 2024-07-23 ASSESSMENT — VISUAL ACUITY
OD: 20/20
OS: 20/20

## 2024-07-23 ASSESSMENT — KERATOMETRY
OS: 46.13
OD: 46.38

## 2024-07-23 NOTE — PATIENT COMMUNICATION
FYI - patient has moved to [place] and will be seeing a provider at [facility].

## 2024-07-26 DIAGNOSIS — Z79.4 TYPE 2 DIABETES MELLITUS WITH HYPERGLYCEMIA, WITH LONG-TERM CURRENT USE OF INSULIN (HCC): ICD-10-CM

## 2024-07-26 DIAGNOSIS — I10 BENIGN ESSENTIAL HYPERTENSION: ICD-10-CM

## 2024-07-26 DIAGNOSIS — E23.0 HYPOGONADOTROPIC HYPOGONADISM (HCC): ICD-10-CM

## 2024-07-26 DIAGNOSIS — K21.9 GASTROESOPHAGEAL REFLUX DISEASE WITHOUT ESOPHAGITIS: ICD-10-CM

## 2024-07-26 DIAGNOSIS — E11.65 TYPE 2 DIABETES MELLITUS WITH HYPERGLYCEMIA, WITH LONG-TERM CURRENT USE OF INSULIN (HCC): ICD-10-CM

## 2024-07-26 RX ORDER — DOXAZOSIN MESYLATE 4 MG/1
4 TABLET ORAL EVERY MORNING
Qty: 100 TABLET | Refills: 1 | Status: SHIPPED | OUTPATIENT
Start: 2024-07-26

## 2024-07-26 RX ORDER — AMLODIPINE BESYLATE 10 MG/1
10 TABLET ORAL
Qty: 100 TABLET | Refills: 1 | Status: SHIPPED | OUTPATIENT
Start: 2024-07-26

## 2024-07-26 RX ORDER — HYDROCHLOROTHIAZIDE 25 MG/1
25 TABLET ORAL EVERY MORNING
Qty: 100 TABLET | Refills: 1 | Status: SHIPPED | OUTPATIENT
Start: 2024-07-26

## 2024-07-26 RX ORDER — OMEPRAZOLE 40 MG/1
40 CAPSULE, DELAYED RELEASE ORAL EVERY MORNING
Qty: 100 CAPSULE | Refills: 1 | Status: SHIPPED | OUTPATIENT
Start: 2024-07-26

## 2024-07-26 RX ORDER — LISINOPRIL 40 MG/1
40 TABLET ORAL EVERY MORNING
Qty: 100 TABLET | Refills: 1 | Status: SHIPPED | OUTPATIENT
Start: 2024-07-26

## 2024-07-26 NOTE — TELEPHONE ENCOUNTER
Patient moved to [place] and needs refills - he has not found an Endo in [place] yet     Reason for call:   [x] Refill   [] Prior Auth  [] Other:     Office:   [] PCP/Provider -   [x] Specialty/Provider - [facility]        Medication: Empagliflozin (Jardiance) 25 MG 1 tablet daily    glipiZIDE (GLUCOTROL) 5 mg 1 tablet daily     testosterone (ANDROGEL) 1.62 % TD gel 3 applications daily       [facility]acy: [facility] [place]     Does the patient have enough for 3 days?   [x] Yes   [] No - Send as HP to POD

## 2024-07-26 NOTE — TELEPHONE ENCOUNTER
Reason for call:   [x] Refill   [] Prior Auth  [x] Other: pt moved to [place] but still hasn't found a new PCP, is hoping to get a 90 day supply of the following medications so he does not run out before he's est care    Office:   [x] PCP/Provider - Dr Ojeda  [] Specialty/Provider -             Pharmacy: [facility]    Does the patient have enough for 3 days?   [x] Yes   [] No - Send as HP to POD

## 2024-07-29 RX ORDER — GLIPIZIDE 5 MG/1
5 TABLET ORAL DAILY
Qty: 90 TABLET | Refills: 0 | Status: SHIPPED | OUTPATIENT
Start: 2024-07-29 | End: 2025-07-24

## 2024-07-29 RX ORDER — TESTOSTERONE 16.2 MG/G
60.75 GEL TRANSDERMAL EVERY MORNING
Qty: 90 ACTUATION | Refills: 0 | Status: SHIPPED | OUTPATIENT
Start: 2024-07-29

## 2024-09-10 ENCOUNTER — TELEPHONE (OUTPATIENT)
Dept: ENDOCRINOLOGY | Facility: CLINIC | Age: 69
End: 2024-09-10

## 2024-09-10 DIAGNOSIS — E11.65 TYPE 2 DIABETES MELLITUS WITH HYPERGLYCEMIA, WITH LONG-TERM CURRENT USE OF INSULIN (HCC): ICD-10-CM

## 2024-09-10 DIAGNOSIS — Z79.4 TYPE 2 DIABETES MELLITUS WITH HYPERGLYCEMIA, WITH LONG-TERM CURRENT USE OF INSULIN (HCC): ICD-10-CM

## 2024-09-10 RX ORDER — GLIPIZIDE 5 MG/1
5 TABLET ORAL EVERY MORNING
Qty: 90 TABLET | Refills: 1 | Status: SHIPPED | OUTPATIENT
Start: 2024-09-10

## 2024-09-11 DIAGNOSIS — M54.12 CERVICAL RADICULOPATHY: ICD-10-CM

## 2024-09-11 DIAGNOSIS — M54.16 LUMBAR RADICULOPATHY: ICD-10-CM

## 2024-09-11 RX ORDER — GABAPENTIN 400 MG/1
400 CAPSULE ORAL 3 TIMES DAILY
Qty: 270 CAPSULE | Refills: 3 | Status: SHIPPED | OUTPATIENT
Start: 2024-09-11

## 2024-09-13 ENCOUNTER — TELEPHONE (OUTPATIENT)
Dept: GASTROENTEROLOGY | Facility: MEDICAL CENTER | Age: 69
End: 2024-09-13

## 2024-09-16 DIAGNOSIS — E23.0 HYPOGONADOTROPIC HYPOGONADISM (HCC): ICD-10-CM

## 2024-09-19 RX ORDER — TESTOSTERONE 20.25 MG/1.25G
GEL TOPICAL
Qty: 75 G | Refills: 1 | OUTPATIENT
Start: 2024-09-19

## 2024-09-19 NOTE — TELEPHONE ENCOUNTER
Patient called- he is no longer living in [place], he lives in [place] so he will no longer be following up with our office.    He is getting his medications through his GP down there until he has an Endo appointment at the end of October.     He asked that we please stop calling him because it is 5 am there when we are typically calling. He would like a release of care paper put in his chart so his new endocrinologist has access to his records.

## 2024-11-03 DIAGNOSIS — Z79.4 TYPE 2 DIABETES MELLITUS WITH HYPERGLYCEMIA, WITH LONG-TERM CURRENT USE OF INSULIN (HCC): ICD-10-CM

## 2024-11-03 DIAGNOSIS — E11.65 TYPE 2 DIABETES MELLITUS WITH HYPERGLYCEMIA, WITH LONG-TERM CURRENT USE OF INSULIN (HCC): ICD-10-CM

## 2024-11-05 RX ORDER — INSULIN GLARGINE 100 [IU]/ML
INJECTION, SOLUTION SUBCUTANEOUS
Qty: 30 ML | Refills: 3 | OUTPATIENT
Start: 2024-11-05

## 2024-11-21 ENCOUNTER — OFFICE VISIT (OUTPATIENT)
Dept: URBAN - METROPOLITAN AREA CLINIC 44 | Facility: CLINIC | Age: 69
End: 2024-11-21
Payer: MEDICARE

## 2024-11-21 DIAGNOSIS — H40.1134 PRIMARY OPEN-ANGLE GLAUCOMA, BILATERAL, INDETERMINATE STAGE: Primary | ICD-10-CM

## 2024-11-21 PROCEDURE — 76514 ECHO EXAM OF EYE THICKNESS: CPT | Performed by: OPTOMETRIST

## 2024-11-21 PROCEDURE — 92133 CPTRZD OPH DX IMG PST SGM ON: CPT | Performed by: OPTOMETRIST

## 2024-11-21 PROCEDURE — 99213 OFFICE O/P EST LOW 20 MIN: CPT | Performed by: OPTOMETRIST

## 2024-11-21 PROCEDURE — 92083 EXTENDED VISUAL FIELD XM: CPT | Performed by: OPTOMETRIST

## 2024-11-21 ASSESSMENT — INTRAOCULAR PRESSURE
OS: 16
OD: 16

## 2024-12-11 ENCOUNTER — TELEPHONE (OUTPATIENT)
Dept: ENDOCRINOLOGY | Facility: CLINIC | Age: 69
End: 2024-12-11

## 2025-02-19 ENCOUNTER — TELEPHONE (OUTPATIENT)
Dept: ENDOCRINOLOGY | Facility: CLINIC | Age: 70
End: 2025-02-19

## 2025-03-24 ENCOUNTER — OFFICE VISIT (OUTPATIENT)
Dept: URBAN - METROPOLITAN AREA CLINIC 44 | Facility: CLINIC | Age: 70
End: 2025-03-24
Payer: MEDICARE

## 2025-03-24 DIAGNOSIS — H40.1134 PRIMARY OPEN-ANGLE GLAUCOMA, BILATERAL, INDETERMINATE STAGE: Primary | ICD-10-CM

## 2025-03-24 PROCEDURE — 92083 EXTENDED VISUAL FIELD XM: CPT | Performed by: OPTOMETRIST

## 2025-03-24 PROCEDURE — 99213 OFFICE O/P EST LOW 20 MIN: CPT | Performed by: OPTOMETRIST

## 2025-03-24 RX ORDER — BRIMONIDINE TARTRATE AND TIMOLOL MALEATE 2; 5 MG/ML; MG/ML
SOLUTION/ DROPS OPHTHALMIC
Qty: 10 | Refills: 3 | Status: ACTIVE
Start: 2025-03-24

## 2025-03-24 ASSESSMENT — INTRAOCULAR PRESSURE
OS: 15
OD: 17

## 2025-04-28 ENCOUNTER — TELEPHONE (OUTPATIENT)
Dept: GASTROENTEROLOGY | Facility: CLINIC | Age: 70
End: 2025-04-28

## 2025-05-27 ENCOUNTER — TELEPHONE (OUTPATIENT)
Dept: ENDOCRINOLOGY | Facility: CLINIC | Age: 70
End: 2025-05-27

## 2025-05-27 NOTE — TELEPHONE ENCOUNTER
Received fax from  [facility] for chart notes last appt 4-2024 per provider las note only if needed for appt scheduling desk called PT he stated moved to [place]    Faxed to [phone]

## 2025-06-25 ENCOUNTER — TELEPHONE (OUTPATIENT)
Age: 70
End: 2025-06-25

## 2025-06-25 NOTE — TELEPHONE ENCOUNTER
Patient called in requesting all sleep records be faxed to new facility in [facility]. Patient has appt scheduled 7/1    [phone]  Attn: Carmen Lombardo

## 2025-06-26 NOTE — TELEPHONE ENCOUNTER
Left voicemail advising patient to call medical records for all sleep records - patient provided medical records phone/ fax .

## 2025-07-24 ENCOUNTER — OFFICE VISIT (OUTPATIENT)
Dept: URBAN - METROPOLITAN AREA CLINIC 44 | Facility: CLINIC | Age: 70
End: 2025-07-24
Payer: MEDICARE

## 2025-07-24 DIAGNOSIS — E11.3393 TYPE 2 DIAB W MODERATE NONPRLF DIAB RTNOP W/O MACULAR EDEMA, BILATERAL: Primary | ICD-10-CM

## 2025-07-24 DIAGNOSIS — H40.1134 PRIMARY OPEN-ANGLE GLAUCOMA, BILATERAL, INDETERMINATE STAGE: ICD-10-CM

## 2025-07-24 DIAGNOSIS — Z96.1 PRESENCE OF INTRAOCULAR LENS: ICD-10-CM

## 2025-07-24 PROCEDURE — 92014 COMPRE OPH EXAM EST PT 1/>: CPT | Performed by: OPTOMETRIST

## 2025-07-24 ASSESSMENT — INTRAOCULAR PRESSURE
OD: 15
OS: 15

## 2025-07-24 ASSESSMENT — VISUAL ACUITY
OS: 20/20
OD: 20/20

## 2025-07-24 ASSESSMENT — KERATOMETRY: OS: 46.50

## (undated) DEVICE — NEEDLE SPINAL18G X 3.5 IN QUINCKE

## (undated) DEVICE — ELECTRODE LAP J HOOK E-Z CLEAN 33CM-0021

## (undated) DEVICE — SUT MONOCRYL 4-0 PS-2 18 IN Y496G

## (undated) DEVICE — ELECTRODE BLADE MOD E-Z CLEAN 2.5IN 6.4CM -0012M

## (undated) DEVICE — INTENDED FOR TISSUE SEPARATION, AND OTHER PROCEDURES THAT REQUIRE A SHARP SURGICAL BLADE TO PUNCTURE OR CUT.: Brand: BARD-PARKER SAFETY BLADES SIZE 15, STERILE

## (undated) DEVICE — GLOVE SRG BIOGEL 6.5

## (undated) DEVICE — INSUFFLATION NEEDLE TO ESTABLISH PNEUMOPERITONEUM.: Brand: INSUFFLATION NEEDLE

## (undated) DEVICE — ADHESIVE SKIN HIGH VISCOSITY EXOFIN 1ML

## (undated) DEVICE — INSUFFLATION TUBING PRIMFLO

## (undated) DEVICE — Device

## (undated) DEVICE — PLUMEPEN PRO 10FT

## (undated) DEVICE — INTENDED FOR TISSUE SEPARATION, AND OTHER PROCEDURES THAT REQUIRE A SHARP SURGICAL BLADE TO PUNCTURE OR CUT.: Brand: BARD-PARKER SAFETY BLADES SIZE 11, STERILE

## (undated) DEVICE — IRRIG ENDO FLO TUBING

## (undated) DEVICE — TROCAR: Brand: KII FIOS FIRST ENTRY

## (undated) DEVICE — SUT VICRYL PLUS 0 UR-6 27IN VCP603H

## (undated) DEVICE — 3M™ STERI-STRIP™ REINFORCED ADHESIVE SKIN CLOSURES, R1547, 1/2 IN X 4 IN (12 MM X 100 MM), 6 STRIPS/ENVELOPE: Brand: 3M™ STERI-STRIP™

## (undated) DEVICE — CHLORAPREP HI-LITE 26ML ORANGE

## (undated) DEVICE — TELFA NON-ADHERENT ABSORBENT DRESSING: Brand: TELFA

## (undated) DEVICE — ACCESS PLATFORM FOR MINIMALLY INVASIVE SURGERY.: Brand: GELPORT® LAPAROSCOPIC  SYSTEM

## (undated) DEVICE — BETHLEHEM MAJOR GENERAL PACK: Brand: CARDINAL HEALTH

## (undated) DEVICE — SURGICEL 4 X 8

## (undated) DEVICE — VISUALIZATION SYSTEM: Brand: CLEARIFY

## (undated) DEVICE — TUBING SMOKE EVAC W/FILTRATION DEVICE PLUMEPORT ACTIV